# Patient Record
Sex: MALE | Race: WHITE | NOT HISPANIC OR LATINO | Employment: OTHER | URBAN - METROPOLITAN AREA
[De-identification: names, ages, dates, MRNs, and addresses within clinical notes are randomized per-mention and may not be internally consistent; named-entity substitution may affect disease eponyms.]

---

## 2023-10-28 ENCOUNTER — APPOINTMENT (OUTPATIENT)
Dept: GENERAL RADIOLOGY | Facility: HOSPITAL | Age: 73
DRG: 034 | End: 2023-10-28
Payer: OTHER GOVERNMENT

## 2023-10-28 ENCOUNTER — APPOINTMENT (OUTPATIENT)
Dept: CT IMAGING | Facility: HOSPITAL | Age: 73
DRG: 034 | End: 2023-10-28
Payer: OTHER GOVERNMENT

## 2023-10-28 ENCOUNTER — HOSPITAL ENCOUNTER (INPATIENT)
Facility: HOSPITAL | Age: 73
LOS: 14 days | Discharge: NURSING, REHAB OR LONG-TERM CARE | DRG: 034 | End: 2023-11-11
Attending: EMERGENCY MEDICINE | Admitting: EMERGENCY MEDICINE
Payer: OTHER GOVERNMENT

## 2023-10-28 DIAGNOSIS — I63.9 CEREBROVASCULAR ACCIDENT (CVA), UNSPECIFIED MECHANISM: Primary | ICD-10-CM

## 2023-10-28 LAB
ABO GROUP BLD: NORMAL
ALBUMIN SERPL-MCNC: 3.7 G/DL (ref 3.5–5.2)
ALBUMIN/GLOB SERPL: 1.3 G/DL
ALP SERPL-CCNC: 89 U/L (ref 39–117)
ALT SERPL W P-5'-P-CCNC: 7 U/L (ref 1–41)
ANION GAP SERPL CALCULATED.3IONS-SCNC: 10.5 MMOL/L (ref 5–15)
AST SERPL-CCNC: 14 U/L (ref 1–40)
BASOPHILS # BLD AUTO: 0.12 10*3/MM3 (ref 0–0.2)
BASOPHILS NFR BLD AUTO: 1 % (ref 0–1.5)
BILIRUB SERPL-MCNC: 0.6 MG/DL (ref 0–1.2)
BLD GP AB SCN SERPL QL: NEGATIVE
BUN SERPL-MCNC: 9 MG/DL (ref 8–23)
BUN/CREAT SERPL: 5.9 (ref 7–25)
CALCIUM SPEC-SCNC: 8.8 MG/DL (ref 8.6–10.5)
CHLORIDE SERPL-SCNC: 100 MMOL/L (ref 98–107)
CO2 SERPL-SCNC: 30.5 MMOL/L (ref 22–29)
CREAT SERPL-MCNC: 1.53 MG/DL (ref 0.76–1.27)
DEPRECATED RDW RBC AUTO: 40.9 FL (ref 37–54)
EGFRCR SERPLBLD CKD-EPI 2021: 47.1 ML/MIN/1.73
EOSINOPHIL # BLD AUTO: 0.25 10*3/MM3 (ref 0–0.4)
EOSINOPHIL NFR BLD AUTO: 2.2 % (ref 0.3–6.2)
ERYTHROCYTE [DISTWIDTH] IN BLOOD BY AUTOMATED COUNT: 12.2 % (ref 12.3–15.4)
GLOBULIN UR ELPH-MCNC: 2.9 GM/DL
GLUCOSE SERPL-MCNC: 76 MG/DL (ref 65–99)
HCT VFR BLD AUTO: 42.8 % (ref 37.5–51)
HGB BLD-MCNC: 14.7 G/DL (ref 13–17.7)
HOLD SPECIMEN: NORMAL
HOLD SPECIMEN: NORMAL
IMM GRANULOCYTES # BLD AUTO: 0.03 10*3/MM3 (ref 0–0.05)
IMM GRANULOCYTES NFR BLD AUTO: 0.3 % (ref 0–0.5)
INR PPP: 1.02 (ref 0.9–1.1)
LYMPHOCYTES # BLD AUTO: 2.85 10*3/MM3 (ref 0.7–3.1)
LYMPHOCYTES NFR BLD AUTO: 24.7 % (ref 19.6–45.3)
MCH RBC QN AUTO: 31.1 PG (ref 26.6–33)
MCHC RBC AUTO-ENTMCNC: 34.3 G/DL (ref 31.5–35.7)
MCV RBC AUTO: 90.5 FL (ref 79–97)
MONOCYTES # BLD AUTO: 1.14 10*3/MM3 (ref 0.1–0.9)
MONOCYTES NFR BLD AUTO: 9.9 % (ref 5–12)
NEUTROPHILS NFR BLD AUTO: 61.9 % (ref 42.7–76)
NEUTROPHILS NFR BLD AUTO: 7.15 10*3/MM3 (ref 1.7–7)
NRBC BLD AUTO-RTO: 0 /100 WBC (ref 0–0.2)
PLATELET # BLD AUTO: 264 10*3/MM3 (ref 140–450)
PMV BLD AUTO: 9.5 FL (ref 6–12)
POTASSIUM SERPL-SCNC: 3.1 MMOL/L (ref 3.5–5.2)
PROT SERPL-MCNC: 6.6 G/DL (ref 6–8.5)
PROTHROMBIN TIME: 13.5 SECONDS (ref 11.7–14.2)
RBC # BLD AUTO: 4.73 10*6/MM3 (ref 4.14–5.8)
RH BLD: POSITIVE
SODIUM SERPL-SCNC: 141 MMOL/L (ref 136–145)
T&S EXPIRATION DATE: NORMAL
WBC NRBC COR # BLD: 11.54 10*3/MM3 (ref 3.4–10.8)
WHOLE BLOOD HOLD COAG: NORMAL
WHOLE BLOOD HOLD SPECIMEN: NORMAL

## 2023-10-28 PROCEDURE — 71045 X-RAY EXAM CHEST 1 VIEW: CPT

## 2023-10-28 PROCEDURE — 86850 RBC ANTIBODY SCREEN: CPT | Performed by: EMERGENCY MEDICINE

## 2023-10-28 PROCEDURE — 86901 BLOOD TYPING SEROLOGIC RH(D): CPT | Performed by: EMERGENCY MEDICINE

## 2023-10-28 PROCEDURE — 93005 ELECTROCARDIOGRAM TRACING: CPT | Performed by: EMERGENCY MEDICINE

## 2023-10-28 PROCEDURE — 25810000003 SODIUM CHLORIDE 0.9 % SOLUTION: Performed by: INTERNAL MEDICINE

## 2023-10-28 PROCEDURE — 80053 COMPREHEN METABOLIC PANEL: CPT | Performed by: EMERGENCY MEDICINE

## 2023-10-28 PROCEDURE — 86900 BLOOD TYPING SEROLOGIC ABO: CPT | Performed by: EMERGENCY MEDICINE

## 2023-10-28 PROCEDURE — 99285 EMERGENCY DEPT VISIT HI MDM: CPT

## 2023-10-28 PROCEDURE — 73030 X-RAY EXAM OF SHOULDER: CPT

## 2023-10-28 PROCEDURE — 93010 ELECTROCARDIOGRAM REPORT: CPT | Performed by: STUDENT IN AN ORGANIZED HEALTH CARE EDUCATION/TRAINING PROGRAM

## 2023-10-28 PROCEDURE — 99222 1ST HOSP IP/OBS MODERATE 55: CPT | Performed by: PSYCHIATRY & NEUROLOGY

## 2023-10-28 PROCEDURE — 85025 COMPLETE CBC W/AUTO DIFF WBC: CPT | Performed by: EMERGENCY MEDICINE

## 2023-10-28 PROCEDURE — 70450 CT HEAD/BRAIN W/O DYE: CPT

## 2023-10-28 PROCEDURE — 85610 PROTHROMBIN TIME: CPT | Performed by: EMERGENCY MEDICINE

## 2023-10-28 RX ORDER — ACETAMINOPHEN 325 MG/1
650 TABLET ORAL EVERY 4 HOURS PRN
Status: DISCONTINUED | OUTPATIENT
Start: 2023-10-28 | End: 2023-11-11 | Stop reason: HOSPADM

## 2023-10-28 RX ORDER — LISINOPRIL 20 MG/1
20 TABLET ORAL DAILY
COMMUNITY

## 2023-10-28 RX ORDER — SODIUM CHLORIDE 9 MG/ML
75 INJECTION, SOLUTION INTRAVENOUS CONTINUOUS
Status: DISCONTINUED | OUTPATIENT
Start: 2023-10-28 | End: 2023-11-02

## 2023-10-28 RX ORDER — UREA 10 %
3 LOTION (ML) TOPICAL NIGHTLY PRN
Status: DISCONTINUED | OUTPATIENT
Start: 2023-10-28 | End: 2023-11-11 | Stop reason: HOSPADM

## 2023-10-28 RX ORDER — ASPIRIN 300 MG/1
300 SUPPOSITORY RECTAL DAILY
Status: DISCONTINUED | OUTPATIENT
Start: 2023-10-28 | End: 2023-10-31

## 2023-10-28 RX ORDER — LISINOPRIL 20 MG/1
20 TABLET ORAL DAILY
Status: DISCONTINUED | OUTPATIENT
Start: 2023-10-29 | End: 2023-11-11 | Stop reason: HOSPADM

## 2023-10-28 RX ORDER — SODIUM CHLORIDE 0.9 % (FLUSH) 0.9 %
10 SYRINGE (ML) INJECTION EVERY 12 HOURS SCHEDULED
Status: DISCONTINUED | OUTPATIENT
Start: 2023-10-28 | End: 2023-11-11 | Stop reason: HOSPADM

## 2023-10-28 RX ORDER — ATORVASTATIN CALCIUM 80 MG/1
80 TABLET, FILM COATED ORAL NIGHTLY
Status: DISCONTINUED | OUTPATIENT
Start: 2023-10-28 | End: 2023-11-11 | Stop reason: HOSPADM

## 2023-10-28 RX ORDER — AMOXICILLIN 250 MG
2 CAPSULE ORAL 2 TIMES DAILY
Status: DISCONTINUED | OUTPATIENT
Start: 2023-10-28 | End: 2023-11-11 | Stop reason: HOSPADM

## 2023-10-28 RX ORDER — ONDANSETRON 4 MG/1
4 TABLET, FILM COATED ORAL EVERY 6 HOURS PRN
Status: DISCONTINUED | OUTPATIENT
Start: 2023-10-28 | End: 2023-11-11 | Stop reason: HOSPADM

## 2023-10-28 RX ORDER — CARVEDILOL 6.25 MG/1
6.25 TABLET ORAL 2 TIMES DAILY WITH MEALS
COMMUNITY

## 2023-10-28 RX ORDER — ASPIRIN 325 MG
325 TABLET ORAL DAILY
Status: DISCONTINUED | OUTPATIENT
Start: 2023-10-28 | End: 2023-10-31

## 2023-10-28 RX ORDER — BISACODYL 10 MG
10 SUPPOSITORY, RECTAL RECTAL DAILY PRN
Status: DISCONTINUED | OUTPATIENT
Start: 2023-10-28 | End: 2023-11-11 | Stop reason: HOSPADM

## 2023-10-28 RX ORDER — ONDANSETRON 2 MG/ML
4 INJECTION INTRAMUSCULAR; INTRAVENOUS EVERY 6 HOURS PRN
Status: DISCONTINUED | OUTPATIENT
Start: 2023-10-28 | End: 2023-10-28 | Stop reason: SDUPTHER

## 2023-10-28 RX ORDER — POLYETHYLENE GLYCOL 3350 17 G/17G
17 POWDER, FOR SOLUTION ORAL DAILY PRN
Status: DISCONTINUED | OUTPATIENT
Start: 2023-10-28 | End: 2023-11-11 | Stop reason: HOSPADM

## 2023-10-28 RX ORDER — CARVEDILOL 6.25 MG/1
6.25 TABLET ORAL 2 TIMES DAILY WITH MEALS
Status: DISCONTINUED | OUTPATIENT
Start: 2023-10-28 | End: 2023-11-11 | Stop reason: HOSPADM

## 2023-10-28 RX ORDER — SODIUM CHLORIDE 9 MG/ML
40 INJECTION, SOLUTION INTRAVENOUS AS NEEDED
Status: DISCONTINUED | OUTPATIENT
Start: 2023-10-28 | End: 2023-11-11 | Stop reason: HOSPADM

## 2023-10-28 RX ORDER — ONDANSETRON 2 MG/ML
4 INJECTION INTRAMUSCULAR; INTRAVENOUS EVERY 6 HOURS PRN
Status: DISCONTINUED | OUTPATIENT
Start: 2023-10-28 | End: 2023-11-11 | Stop reason: HOSPADM

## 2023-10-28 RX ORDER — ACETAMINOPHEN 650 MG/1
650 SUPPOSITORY RECTAL EVERY 4 HOURS PRN
Status: DISCONTINUED | OUTPATIENT
Start: 2023-10-28 | End: 2023-11-02

## 2023-10-28 RX ORDER — ASPIRIN 81 MG/1
81 TABLET ORAL DAILY
COMMUNITY

## 2023-10-28 RX ORDER — SODIUM CHLORIDE 0.9 % (FLUSH) 0.9 %
10 SYRINGE (ML) INJECTION AS NEEDED
Status: DISCONTINUED | OUTPATIENT
Start: 2023-10-28 | End: 2023-11-11 | Stop reason: HOSPADM

## 2023-10-28 RX ORDER — BISACODYL 5 MG/1
5 TABLET, DELAYED RELEASE ORAL DAILY PRN
Status: DISCONTINUED | OUTPATIENT
Start: 2023-10-28 | End: 2023-11-11 | Stop reason: HOSPADM

## 2023-10-28 RX ORDER — FUROSEMIDE 40 MG/1
40 TABLET ORAL DAILY
COMMUNITY

## 2023-10-28 RX ORDER — SODIUM CHLORIDE 0.9 % (FLUSH) 0.9 %
10 SYRINGE (ML) INJECTION AS NEEDED
Status: DISCONTINUED | OUTPATIENT
Start: 2023-10-28 | End: 2023-11-02

## 2023-10-28 RX ORDER — ACETAMINOPHEN 325 MG/1
650 TABLET ORAL EVERY 4 HOURS PRN
Status: DISCONTINUED | OUTPATIENT
Start: 2023-10-28 | End: 2023-10-28 | Stop reason: SDUPTHER

## 2023-10-28 RX ORDER — BISACODYL 10 MG
10 SUPPOSITORY, RECTAL RECTAL DAILY PRN
Status: DISCONTINUED | OUTPATIENT
Start: 2023-10-28 | End: 2023-10-28 | Stop reason: SDUPTHER

## 2023-10-28 RX ADMIN — SODIUM CHLORIDE 75 ML/HR: 9 INJECTION, SOLUTION INTRAVENOUS at 18:49

## 2023-10-28 RX ADMIN — ATORVASTATIN CALCIUM 80 MG: 80 TABLET, FILM COATED ORAL at 20:55

## 2023-10-28 RX ADMIN — Medication 10 ML: at 20:56

## 2023-10-28 RX ADMIN — Medication 3 MG: at 20:56

## 2023-10-28 RX ADMIN — CARVEDILOL 6.25 MG: 6.25 TABLET, FILM COATED ORAL at 22:05

## 2023-10-28 RX ADMIN — SENNOSIDES AND DOCUSATE SODIUM 2 TABLET: 50; 8.6 TABLET ORAL at 20:56

## 2023-10-28 RX ADMIN — ASPIRIN 325 MG: 325 TABLET ORAL at 18:49

## 2023-10-28 NOTE — CASE MANAGEMENT/SOCIAL WORK
Notified by registration that patient has VA insurance (secondary) and Medicare Part A (primary). Patient is not a candidate to consider transfer to [facility] facility, given that his presentation is concerning for CVA. ERIN Hobson RN

## 2023-10-28 NOTE — ED PROVIDER NOTES
EMERGENCY DEPARTMENT ENCOUNTER    Room Number:  14/14  PCP: Provider, No Known      HPI:  Chief Complaint: Left arm weakness  A complete HPI/ROS/PMH/PSH/SH/FH are unobtainable due to: Dementia  Context: Tigre Amaral is a 73 y.o. male who presents to the ED c/o left arm weakness.  This occurred Thursday night.  He had a fall that was witnessed by his granddaughter.  He fell onto his left side.  At that time, granddaughter did not notice any abnormalities other than some weakness in the left arm which she attributed to pain.  He is continue to have this weakness and she has most recently noticed some lack of coordination which is why she brought him here today.  He did not hit his head.  He is on no blood thinners.  He has a history of dementia.        PAST MEDICAL HISTORY  Active Ambulatory Problems     Diagnosis Date Noted    No Active Ambulatory Problems     Resolved Ambulatory Problems     Diagnosis Date Noted    No Resolved Ambulatory Problems     No Additional Past Medical History         PAST SURGICAL HISTORY  No past surgical history on file.      FAMILY HISTORY  No family history on file.      SOCIAL HISTORY  Social History     Socioeconomic History    Marital status: Single         ALLERGIES  Patient has no known allergies.        REVIEW OF SYSTEMS  Review of Systems     All systems reviewed and negative except for those discussed in HPI.       PHYSICAL EXAM  ED Triage Vitals [10/28/23 1306]   Temp Heart Rate Resp BP SpO2   98.2 °F (36.8 °C) 74 16 133/75 99 %      Temp src Heart Rate Source Patient Position BP Location FiO2 (%)   Oral Monitor Sitting Right arm --       Physical Exam      GENERAL: no acute distress  HENT: nares patent  EYES: no scleral icterus  CV: regular rhythm, normal rate  RESPIRATORY: normal effort  ABDOMEN: soft  MUSCULOSKELETAL: no deformity  NEURO:   4/5 strength to his left arm with associated dysmetria  He appears to have some degree of visual neglect to his left side although  it is inconsistent  NIH Stroke Scale      Interval: Baseline  Time: 15:43 EDT  Person Administering Scale: Niko Rivas II, MD    Administer stroke scale items in the order listed. Record performance in each category after each subscale exam. Do not go back and change scores. Follow directions provided for each exam technique. Scores should reflect what the patient does, not what the clinician thinks the patient can do. The clinician should record answers while administering the exam and work quickly. Except where indicated, the patient should not be coached (i.e., repeated requests to patient to make a special effort).      1a  Level of consciousness: 0=alert; keenly responsive   1b. LOC questions:  0=Performs both tasks correctly   1c. LOC commands: 0=Answers both questions correctly   2.  Best Gaze: 0=normal   3.  Visual: 0=No visual loss   4. Facial Palsy: 0=Normal symmetric movement   5a.  Motor left arm: 1=Drift, limb holds 90 (or 45) degrees but drifts down before full 10 seconds: does not hit bed   5b.  Motor right arm: 0=No drift, limb holds 90 (or 45) degrees for full 10 seconds   6a. motor left le=Drift, limb holds 90 (or 45) degrees but drifts down before full 10 seconds: does not hit bed   6b  Motor right le=No drift, limb holds 90 (or 45) degrees for full 10 seconds   7. Limb Ataxia: 1=Present in one limb   8.  Sensory: 0=Normal; no sensory loss   9. Best Language:  0=No aphasia, normal   10. Dysarthria: 0=Normal   11. Extinction and Inattention: 1=Visual, tactile, auditory, spatial or personal inattention or extinction to bilateral simultaneous stimulation in one of the sensory modalities   12. Distal motor function: 0=Normal    Total:   4     PSYCH:  calm, cooperative  SKIN: warm, dry    Vital signs and nursing notes reviewed.          LAB RESULTS  Recent Results (from the past 24 hour(s))   Comprehensive Metabolic Panel    Collection Time: 10/28/23  2:49 PM    Specimen: Blood   Result  Value Ref Range    Glucose 76 65 - 99 mg/dL    BUN 9 8 - 23 mg/dL    Creatinine 1.53 (H) 0.76 - 1.27 mg/dL    Sodium 141 136 - 145 mmol/L    Potassium 3.1 (L) 3.5 - 5.2 mmol/L    Chloride 100 98 - 107 mmol/L    CO2 30.5 (H) 22.0 - 29.0 mmol/L    Calcium 8.8 8.6 - 10.5 mg/dL    Total Protein 6.6 6.0 - 8.5 g/dL    Albumin 3.7 3.5 - 5.2 g/dL    ALT (SGPT) 7 1 - 41 U/L    AST (SGOT) 14 1 - 40 U/L    Alkaline Phosphatase 89 39 - 117 U/L    Total Bilirubin 0.6 0.0 - 1.2 mg/dL    Globulin 2.9 gm/dL    A/G Ratio 1.3 g/dL    BUN/Creatinine Ratio 5.9 (L) 7.0 - 25.0    Anion Gap 10.5 5.0 - 15.0 mmol/L    eGFR 47.1 (L) >60.0 mL/min/1.73   Protime-INR    Collection Time: 10/28/23  2:49 PM    Specimen: Blood   Result Value Ref Range    Protime 13.5 11.7 - 14.2 Seconds    INR 1.02 0.90 - 1.10   CBC Auto Differential    Collection Time: 10/28/23  2:49 PM    Specimen: Blood   Result Value Ref Range    WBC 11.54 (H) 3.40 - 10.80 10*3/mm3    RBC 4.73 4.14 - 5.80 10*6/mm3    Hemoglobin 14.7 13.0 - 17.7 g/dL    Hematocrit 42.8 37.5 - 51.0 %    MCV 90.5 79.0 - 97.0 fL    MCH 31.1 26.6 - 33.0 pg    MCHC 34.3 31.5 - 35.7 g/dL    RDW 12.2 (L) 12.3 - 15.4 %    RDW-SD 40.9 37.0 - 54.0 fl    MPV 9.5 6.0 - 12.0 fL    Platelets 264 140 - 450 10*3/mm3    Neutrophil % 61.9 42.7 - 76.0 %    Lymphocyte % 24.7 19.6 - 45.3 %    Monocyte % 9.9 5.0 - 12.0 %    Eosinophil % 2.2 0.3 - 6.2 %    Basophil % 1.0 0.0 - 1.5 %    Immature Grans % 0.3 0.0 - 0.5 %    Neutrophils, Absolute 7.15 (H) 1.70 - 7.00 10*3/mm3    Lymphocytes, Absolute 2.85 0.70 - 3.10 10*3/mm3    Monocytes, Absolute 1.14 (H) 0.10 - 0.90 10*3/mm3    Eosinophils, Absolute 0.25 0.00 - 0.40 10*3/mm3    Basophils, Absolute 0.12 0.00 - 0.20 10*3/mm3    Immature Grans, Absolute 0.03 0.00 - 0.05 10*3/mm3    nRBC 0.0 0.0 - 0.2 /100 WBC   ECG 12 Lead ED Triage Standing Order; Stroke (Onset >12 hrs)    Collection Time: 10/28/23  3:11 PM   Result Value Ref Range    QT Interval 541 ms    QTC Interval  541 ms       Ordered the above labs and reviewed the results.        RADIOLOGY  CT Head Without Contrast Stroke Protocol    Result Date: 10/28/2023  CT HEAD WITHOUT CONTRAST  CLINICAL HISTORY: Left arm weakness. Status post fall.  TECHNIQUE: CT scan of the head was obtained with 3 mm axial soft tissue and 2 mm axial bone algorithm algorithm images. No intravenous contrast was administered. Sagittal and coronal reconstructions were obtained.  COMPARISON: No previous similar studies are available for comparison.  FINDINGS:   There is no evidence for a calvarial fracture. There is no evidence for an acute extra-axial hemorrhage.  The ventricles, sulci, and cisterns are age-appropriate. There are extensive areas of chronic infarction identified within the lateral aspect of the left frontal, parietal, temporal, and occipital lobes within the left MCA distribution. These foci of encephalomalacia measure up to approximately 10.1 x 2.5 cm in greatest axial dimensions. Small chronic infarcts within the left lentiform nucleus are within the lateral lenticulostriate distribution. Also, there is a focus of age-indeterminate infarction within the superior aspect of the right parietal lobe within the right MCA distribution measuring up to approximately 5 mm in diameter. Another age-indeterminate infarct is seen within the right MCA distribution involving the right precentral gyrus measuring up to 9 mm in diameter. Also, there is an age-indeterminate infarct within the right occipital lobe that measures up to 1 cm in diameter that is either within the right MCA or right PCA distribution. Further temporal characterization with MR imaging is suggested.  There is a soft tissue mass within the right premaxillary soft tissues that extends into the soft tissues along the right lateral aspect of the nose and this measures up to 3.3 x 1.9 cm in greatest axial dimensions and is compatible with a known basal cell carcinoma.  Incidental note  is made of a fracture of the right orbital floor that is likely chronic in nature although correlation with clinical data is suggested       There is no evidence for acute traumatic intracranial pathology. However, there are small age-indeterminate infarcts noted within the right frontal and parietal lobes that could potentially be acute to subacute in nature. These are within the right MCA distribution. Additionally, there is an age-indeterminate infarct within the right occipital lobe that is either within the right MCA or right PCA distribution. Further temporal characterization is recommended with MR imaging.  There is a large chronic infarct within the lateral aspect of the left cerebral hemisphere that is within the left MCA distribution and this involves the lateral aspect of the left frontal, parietal, temporal, and occipital lobes.  There is a 3.3 x 1.9 cm soft tissue mass within the right premaxillary soft tissues that extends into the soft tissues along the right lateral aspect of the nose and this is compatible with a known basal cell carcinoma.  Incidental note is made of a fracture of the right orbital floor that is likely chronic in nature although correlation with clinical data is suggested.  These findings and recommendations were discussed with Dr. Niko Rivas on 10/28/2023 at approximately 3:15 p.m.  Radiation dose reduction techniques were utilized, including automated exposure control and exposure modulation based on body size.      XR Chest 1 View, XR Shoulder 2+ View Left    Result Date: 10/28/2023  PORTABLE CHEST X-RAY AND LEFT SHOULDER X-RAY  HISTORY: Fall, arm pain. Stroke  Portable chest x-ray is provided. A total of 3 images of the left shoulder also provided. Correlation: None.  FINDINGS: The cardiomediastinal silhouette is normal. The lungs are clear. The costophrenic sulci are dry and the bones appear normal. There is no pneumothorax. Dual-lead cardiac pacer/defibrillator.  No  osseous, articular, or soft tissue abnormality identified at the left shoulder.      Cardiac defibrillator in place. Otherwise negative x-rays of the chest and the left shoulder.  This report was finalized on 10/28/2023 3:07 PM by Dr. Jhonny Charles M.D on Workstation: [facility]       Ordered the above noted radiological studies. Reviewed by me in PACS.          PROCEDURES  Procedures        MEDICATIONS GIVEN IN ER  Medications   sodium chloride 0.9 % flush 10 mL (has no administration in time range)         MEDICAL DECISION MAKING, PROGRESS, and CONSULTS    Discussion below represents my analysis of pertinent findings related to patient's condition, differential diagnosis, treatment plan and final disposition.      Orders placed during this visit:  Orders Placed This Encounter   Procedures    CT Head Without Contrast Stroke Protocol    XR Chest 1 View    XR Shoulder 2+ View Left    Comprehensive Metabolic Panel    Protime-INR    [place] Draw    CBC Auto Differential    NPO Diet NPO Type: Strict NPO    Undress and Gown    Continuous Pulse Oximetry    Vital Signs    Nursing Dysphagia Screening (Complete Prior to Giving anything PO)    RN to Place Order SLP Consult (IF swallow screen failed) - Eval & Treat Choosing Reason of RN Dysphagia Screen Failed    [facility] (on-call MD unless specified) Details    Oxygen Therapy- Nasal Cannula; Titrate 1-6 LPM Per SpO2; 90 - 95%    POC Glucose Once    ECG 12 Lead ED Triage Standing Order; Stroke (Onset >12 hrs)    Type & Screen    Insert Peripheral IV    Inpatient Admission    CBC & Differential    Green Top (Gel)    Lavender Top    Gold Top - SST    Light Blue Top             Differential diagnosis:    Fracture, stroke, TIA, cervical radiculopathy          Independent interpretation of labs, radiology studies, and discussions with consultants:  ED Course as of 10/28/23 1543   Sat Oct 28, 2023   1510 I discussed the CT head results with Dr. Harrison, radiology.  Patient has  age-indeterminate infarct in the right MCA territory that correlates with his left arm weakness. [TD]   1511 Patient is not a candidate for TNK as he was last known well 2 days ago. [TD]   1512 I discussed the case with Dr. Roberts, stroke neurology.  He will follow in consultation.  He recommends CTA head and neck as well as MRI. [TD]      ED Course User Index  [TD] Niko Rivas II, MD             DIAGNOSIS  Final diagnoses:   Cerebrovascular accident (CVA), unspecified mechanism         DISPOSITION  Admit      Latest Documented Vital Signs:  As of 15:43 EDT  BP- 159/76 HR- 61 Temp- 98.2 °F (36.8 °C) (Oral) O2 sat- 98%      --    Please note that portions of this were completed with a voice recognition program.       Note Disclaimer: At [facility], we believe that sharing information builds trust and better relationships. You are receiving this note because you are receiving care at [facility] or recently visited. It is possible you will see health information before a provider has talked with you about it. This kind of information can be easy to misunderstand. To help you fully understand what it means for your health, we urge you to discuss this note with your provider.         Niko Rivas II, MD  10/28/23 6937

## 2023-10-28 NOTE — ED NOTES
Nursing report ED to floor  Tigre Amaral  73 y.o.  male    HPI :   Chief Complaint   Patient presents with    Fall    Arm Injury       Admitting doctor:   Lashon Brooks MD    Admitting diagnosis:   The encounter diagnosis was Cerebrovascular accident (CVA), unspecified mechanism.    Code status:   Current Code Status       Date Active Code Status Order ID Comments User Context       Not on file            Allergies:   Patient has no known allergies.    Isolation:   No active isolations    Intake and Output  No intake or output data in the 24 hours ending 10/28/23 1611    Weight:       10/28/23  1306   Weight: 74.8 kg (165 lb)       Most recent vitals:   Vitals:    10/28/23 1449 10/28/23 1451 10/28/23 1451 10/28/23 1521   BP: 138/73 156/74 138/73 159/76   BP Location:   Right arm    Patient Position:   Sitting    Pulse: 74 63 60 61   Resp:   17    Temp:       TempSrc:       SpO2: 96% 99% 99% 98%   Weight:       Height:           Active LDAs/IV Access:   Lines, Drains & Airways       Active LDAs       None                    Labs (abnormal labs have a star):   Labs Reviewed   COMPREHENSIVE METABOLIC PANEL - Abnormal; Notable for the following components:       Result Value    Creatinine 1.53 (*)     Potassium 3.1 (*)     CO2 30.5 (*)     BUN/Creatinine Ratio 5.9 (*)     eGFR 47.1 (*)     All other components within normal limits    Narrative:     GFR Normal >60  Chronic Kidney Disease <60  Kidney Failure <15    The GFR formula is only valid for adults with stable renal function between ages 18 and 70.   CBC WITH AUTO DIFFERENTIAL - Abnormal; Notable for the following components:    WBC 11.54 (*)     RDW 12.2 (*)     Neutrophils, Absolute 7.15 (*)     Monocytes, Absolute 1.14 (*)     All other components within normal limits   PROTIME-INR - Normal   RAINBOW DRAW    Narrative:     The following orders were created for panel order [place] Draw.  Procedure                               Abnormality          Status                     ---------                               -----------         ------                     Green Top (Gel)[[id number]]                                  Final result               Lavender Top[[id number]]                                     Final result               Gold Top - SST[[id number]]                                   Final result               Light Blue Top[[id number]]                                   Final result                 Please view results for these tests on the individual orders.   POCT GLUCOSE FINGERSTICK   TYPE AND SCREEN   CBC AND DIFFERENTIAL    Narrative:     The following orders were created for panel order CBC & Differential.  Procedure                               Abnormality         Status                     ---------                               -----------         ------                     CBC Auto Differential[[id number]]        Abnormal            Final result                 Please view results for these tests on the individual orders.   GREEN TOP   LAVENDER TOP   GOLD TOP - SST   LIGHT BLUE TOP       EKG:   ECG 12 Lead ED Triage Standing Order; Stroke (Onset >12 hrs)   Preliminary Result   HEART RATE= 60  bpm   RR Interval= 1000  ms   NE Interval= 96  ms   P Horizontal Axis= 250  deg   P Front Axis= -67  deg   QRSD Interval= 213  ms   QT Interval= 541  ms   QTcB= 541  ms   QRS Axis= -73  deg   T Wave Axis= 106  deg   - ABNORMAL ECG -   A-V dual-paced rhythm with some inhibition   No further analysis attempted due to paced rhythm   Baseline wander in lead(s) V2   Electronically Signed By:    Date and Time of Study: 2023-10-28 15:11:55          Meds given in ED:   Medications   sodium chloride 0.9 % flush 10 mL (has no administration in time range)       Imaging results:  CT Head Without Contrast Stroke Protocol    Result Date: 10/28/2023   There is no evidence for acute traumatic intracranial pathology. However, there are small age-indeterminate infarcts  noted within the right frontal and parietal lobes that could potentially be acute to subacute in nature. These are within the right MCA distribution. Additionally and similarly, there is an age-indeterminate infarct within the right occipital lobe that is either within the right MCA or right PCA distribution. Further temporal characterization is recommended with MR imaging.  There is a large chronic infarct within the lateral aspect of the left cerebral hemisphere that is within the left MCA distribution and this involves the lateral aspect of the left frontal, parietal, temporal, and occipital lobes.  There is a 3.3 x 1.9 cm soft tissue mass within the right premaxillary soft tissues that extends into the soft tissues along the right lateral aspect of the nose and this is compatible with a known basal cell carcinoma.  Incidental note is made of a fracture of the right orbital floor that is likely chronic in nature although correlation with clinical data is suggested.  These findings and recommendations were discussed with Dr. Niko Rivas on 10/28/2023 at approximately 3:15 p.m.  Radiation dose reduction techniques were utilized, including automated exposure control and exposure modulation based on body size.  This report was finalized on 10/28/2023 4:02 PM by Dr. Jeff Harrison M.D on Workstation: [id number]      XR Chest 1 View    Result Date: 10/28/2023  Cardiac defibrillator in place. Otherwise negative x-rays of the chest and the left shoulder.  This report was finalized on 10/28/2023 3:07 PM by Dr. Jhonny Charles M.D on Workstation: [id number]      XR Shoulder 2+ View Left    Result Date: 10/28/2023  Cardiac defibrillator in place. Otherwise negative x-rays of the chest and the left shoulder.  This report was finalized on 10/28/2023 3:07 PM by Dr. Jhonny Charles M.D on Workstation: [id number]       Ambulatory status:   - Assist x2     Social issues:   Social History     Socioeconomic History    Marital status:  Single       NIH Stroke Scale:       Gayle Felix RN  10/28/23 16:11 EDT

## 2023-10-28 NOTE — CONSULTS
"NEUROLOGY CONSULT - STROKE TEAM    DOS: 10/28/2023  NAME: Tigre Amaral   : 1950  PCP: Provider, No Known  CC: Stroke  Referring MD: No ref. provider found  Patient being seen at request of Dr. Rivas for advice and opinion on weakness and fall.    Neurological Problem and Interval History:  73 y.o. male presents with chief complaint of: \"I feel fine\".  73m with history of head trauma left frontal brought by granddaughter who says he had a fall several days ago onto his left side and weakness of the left arm. He is on aspirin and has had dementia. It's not clear that his left frontal CTA abnormality is not residual of a contusion instead of a left MCA inferior branch infarct but it does look like it could be either.       Past Medical/Surgical Hx:  No past medical history on file.  No past surgical history on file.    Review of Systems:        No fever, sweats or chills,  No neck pain, back pain or joint pain  No loss of hearing or tinnitus  No blurry or double vision  No rashes or edema  No chest pain or palpitations  No shortness of breath or wheezing  No diarrhea or constipation  No urinary incontinence or dysuria  No seizures or syncope  No depression or anxiety    Medications On Admission  (Not in a hospital admission)    No current facility-administered medications on file prior to encounter.     No current outpatient medications on file prior to encounter.       Allergies:  No Known Allergies    Social Hx:  Social History     Socioeconomic History    Marital status: Single       Family Hx:  No family history on file.    Review of Imaging (Interpretation of images not reports):      Laboratory Results:   Lab Results   Component Value Date    GLUCOSE 76 10/28/2023    CALCIUM 8.8 10/28/2023     10/28/2023    K 3.1 (L) 10/28/2023    CO2 30.5 (H) 10/28/2023     10/28/2023    BUN 9 10/28/2023    CREATININE 1.53 (H) 10/28/2023    BCR 5.9 (L) 10/28/2023    ANIONGAP 10.5 10/28/2023     Lab Results " "  Component Value Date    WBC 11.54 (H) 10/28/2023    HGB 14.7 10/28/2023    HCT 42.8 10/28/2023    MCV 90.5 10/28/2023     10/28/2023     No results found for: \"CHOL\"  No results found for: \"HDL\"  No results found for: \"LDL\"  No results found for: \"TRIG\"  No results found for: \"HGBA1C\"  Lab Results   Component Value Date    INR 1.02 10/28/2023    INR 0.97 04/26/2023    PROTIME 13.5 10/28/2023    PROTIME 11.3 04/26/2023         Physical Examination:   /76   Pulse 61   Temp 98.2 °F (36.8 °C) (Oral)   Resp 17   Ht 172.7 cm (68\")   Wt 74.8 kg (165 lb)   SpO2 98%   BMI 25.09 kg/m²   General Appearance:   Well developed, well nourished, well groomed, alert, and cooperative.  HEENT: Normocephalic. Atraumatic. No JVD, no tracheal deviation. Large right face tumor.   Neck and Spine: Normal range of motion.  Normal alignment. No mass or tenderness.   Extremities:    No edema or deformities.   Skin:    No rashes or discoloration    Neurological examination:  Higher Integrative  Function: Alert and oriented to person but January 2001 and doesn't know the president. Unable to retain information for more than a few minutes. Able to do simple calculation but not more complex. Some receptive aphasia elements? Makes errors on more complex commands.   CN II: Pupils are equal, round, and reactive to light. Blinks to visual threat and counts fingers in all fields  CN III IV VI: Extraocular movements are full without nystagmus.   CN V: Normal facial sensation   CN VII: Facial movements are symmetric. No labial dysarthria  CN VIII:   Hard of hearing?  CN IX & X:   No palatal or pharnygeal dysarthria.  CN XI: Turns head without abnormality.   CN XII:   The tongue is midline.  No lingual dysarthria.   Motor: Left pronator drift, slower finger tap and hand tap. Able to lift left leg easily and do heel to shin. Able to perform finger to nose and give nearly full power on grasp but can just overcome push and pull. "   Reflexes: 2+ in the upper and lower extremities. Plantar responses are flexor.  Sensation: Denies sensory complanits.   Station and Gait: Deferred for bed rest    Coordination: Finger-to-nose test shows no dysmetria.  Rapid alternating movements are normal.  Heel-to-shin normal.    NIHSS:     Diagnoses / Discussion:  73 y.o. who presents with Sx of left arm weakness and a fall. 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 Patient was on aspirin at home. Patient with moderate-severe dementia making examination and history difficult.    CT also shows a right orbital fracture of uncertain significance and defer to primary team.    Plan:  Does not meet criteria for minor stroke protocol given the time frame but having the event on aspirin, if MRI confirms infarct, consider advancing to plavix as single agent. Echo and MRI (patient has pacemaker but has had MRI previously).      I have discussed the above with the patient and family.  Time spent with patient: 40min    MDM  Reviewed: previous chart, nursing note and vitals  Reviewed previous: labs and CT scan  Interpretation: CT scan and labs  Total time providing critical care: 30-74 minutes. This excludes time spent performing separately reportable procedures and services.         Prince Roberts MD  Neurology

## 2023-10-29 ENCOUNTER — APPOINTMENT (OUTPATIENT)
Dept: CT IMAGING | Facility: HOSPITAL | Age: 73
DRG: 034 | End: 2023-10-29
Payer: OTHER GOVERNMENT

## 2023-10-29 LAB
ALBUMIN SERPL-MCNC: 3.1 G/DL (ref 3.5–5.2)
ALBUMIN/GLOB SERPL: 1.3 G/DL
ALP SERPL-CCNC: 71 U/L (ref 39–117)
ALT SERPL W P-5'-P-CCNC: 6 U/L (ref 1–41)
ANION GAP SERPL CALCULATED.3IONS-SCNC: 9.6 MMOL/L (ref 5–15)
AST SERPL-CCNC: 13 U/L (ref 1–40)
BILIRUB SERPL-MCNC: 0.8 MG/DL (ref 0–1.2)
BUN SERPL-MCNC: 9 MG/DL (ref 8–23)
BUN/CREAT SERPL: 7 (ref 7–25)
CALCIUM SPEC-SCNC: 8.3 MG/DL (ref 8.6–10.5)
CHLORIDE SERPL-SCNC: 103 MMOL/L (ref 98–107)
CHOLEST SERPL-MCNC: 146 MG/DL (ref 0–200)
CO2 SERPL-SCNC: 26.4 MMOL/L (ref 22–29)
CREAT SERPL-MCNC: 1.28 MG/DL (ref 0.76–1.27)
DEPRECATED RDW RBC AUTO: 40.2 FL (ref 37–54)
EGFRCR SERPLBLD CKD-EPI 2021: 58.4 ML/MIN/1.73
ERYTHROCYTE [DISTWIDTH] IN BLOOD BY AUTOMATED COUNT: 12.3 % (ref 12.3–15.4)
GLOBULIN UR ELPH-MCNC: 2.4 GM/DL
GLUCOSE BLDC GLUCOMTR-MCNC: 102 MG/DL (ref 70–130)
GLUCOSE BLDC GLUCOMTR-MCNC: 144 MG/DL (ref 70–130)
GLUCOSE BLDC GLUCOMTR-MCNC: 92 MG/DL (ref 70–130)
GLUCOSE BLDC GLUCOMTR-MCNC: 94 MG/DL (ref 70–130)
GLUCOSE SERPL-MCNC: 93 MG/DL (ref 65–99)
HBA1C MFR BLD: 5.2 % (ref 4.8–5.6)
HCT VFR BLD AUTO: 38.1 % (ref 37.5–51)
HDLC SERPL-MCNC: 34 MG/DL (ref 40–60)
HGB BLD-MCNC: 13.1 G/DL (ref 13–17.7)
LDLC SERPL CALC-MCNC: 98 MG/DL (ref 0–100)
LDLC/HDLC SERPL: 2.87 {RATIO}
MAGNESIUM SERPL-MCNC: 1.8 MG/DL (ref 1.6–2.4)
MCH RBC QN AUTO: 30.8 PG (ref 26.6–33)
MCHC RBC AUTO-ENTMCNC: 34.4 G/DL (ref 31.5–35.7)
MCV RBC AUTO: 89.6 FL (ref 79–97)
PLATELET # BLD AUTO: 222 10*3/MM3 (ref 140–450)
PMV BLD AUTO: 9.6 FL (ref 6–12)
POTASSIUM SERPL-SCNC: 2.4 MMOL/L (ref 3.5–5.2)
POTASSIUM SERPL-SCNC: 3.4 MMOL/L (ref 3.5–5.2)
PROT SERPL-MCNC: 5.5 G/DL (ref 6–8.5)
QT INTERVAL: 541 MS
QTC INTERVAL: 541 MS
RBC # BLD AUTO: 4.25 10*6/MM3 (ref 4.14–5.8)
SODIUM SERPL-SCNC: 139 MMOL/L (ref 136–145)
TRIGL SERPL-MCNC: 72 MG/DL (ref 0–150)
TSH SERPL DL<=0.05 MIU/L-ACNC: 1.88 UIU/ML (ref 0.27–4.2)
VLDLC SERPL-MCNC: 14 MG/DL (ref 5–40)
WBC NRBC COR # BLD: 8.49 10*3/MM3 (ref 3.4–10.8)

## 2023-10-29 PROCEDURE — 84443 ASSAY THYROID STIM HORMONE: CPT | Performed by: INTERNAL MEDICINE

## 2023-10-29 PROCEDURE — 97530 THERAPEUTIC ACTIVITIES: CPT

## 2023-10-29 PROCEDURE — 80061 LIPID PANEL: CPT | Performed by: INTERNAL MEDICINE

## 2023-10-29 PROCEDURE — 99233 SBSQ HOSP IP/OBS HIGH 50: CPT | Performed by: PHYSICIAN ASSISTANT

## 2023-10-29 PROCEDURE — 97162 PT EVAL MOD COMPLEX 30 MIN: CPT

## 2023-10-29 PROCEDURE — 82948 REAGENT STRIP/BLOOD GLUCOSE: CPT

## 2023-10-29 PROCEDURE — 84132 ASSAY OF SERUM POTASSIUM: CPT | Performed by: STUDENT IN AN ORGANIZED HEALTH CARE EDUCATION/TRAINING PROGRAM

## 2023-10-29 PROCEDURE — 36415 COLL VENOUS BLD VENIPUNCTURE: CPT | Performed by: INTERNAL MEDICINE

## 2023-10-29 PROCEDURE — 85027 COMPLETE CBC AUTOMATED: CPT | Performed by: INTERNAL MEDICINE

## 2023-10-29 PROCEDURE — 70498 CT ANGIOGRAPHY NECK: CPT

## 2023-10-29 PROCEDURE — 83735 ASSAY OF MAGNESIUM: CPT | Performed by: STUDENT IN AN ORGANIZED HEALTH CARE EDUCATION/TRAINING PROGRAM

## 2023-10-29 PROCEDURE — 83036 HEMOGLOBIN GLYCOSYLATED A1C: CPT | Performed by: INTERNAL MEDICINE

## 2023-10-29 PROCEDURE — 97166 OT EVAL MOD COMPLEX 45 MIN: CPT

## 2023-10-29 PROCEDURE — 25510000001 IOPAMIDOL PER 1 ML: Performed by: STUDENT IN AN ORGANIZED HEALTH CARE EDUCATION/TRAINING PROGRAM

## 2023-10-29 PROCEDURE — 80053 COMPREHEN METABOLIC PANEL: CPT | Performed by: INTERNAL MEDICINE

## 2023-10-29 PROCEDURE — 70496 CT ANGIOGRAPHY HEAD: CPT

## 2023-10-29 RX ORDER — NICOTINE 21 MG/24HR
1 PATCH, TRANSDERMAL 24 HOURS TRANSDERMAL
Status: DISCONTINUED | OUTPATIENT
Start: 2023-10-29 | End: 2023-11-11 | Stop reason: HOSPADM

## 2023-10-29 RX ORDER — POTASSIUM CHLORIDE 750 MG/1
40 TABLET, FILM COATED, EXTENDED RELEASE ORAL EVERY 4 HOURS
Status: COMPLETED | OUTPATIENT
Start: 2023-10-29 | End: 2023-10-29

## 2023-10-29 RX ADMIN — POTASSIUM CHLORIDE 40 MEQ: 750 TABLET, EXTENDED RELEASE ORAL at 08:46

## 2023-10-29 RX ADMIN — POTASSIUM CHLORIDE 40 MEQ: 750 TABLET, EXTENDED RELEASE ORAL at 17:58

## 2023-10-29 RX ADMIN — LISINOPRIL 20 MG: 20 TABLET ORAL at 08:47

## 2023-10-29 RX ADMIN — SENNOSIDES AND DOCUSATE SODIUM 2 TABLET: 50; 8.6 TABLET ORAL at 08:47

## 2023-10-29 RX ADMIN — ASPIRIN 325 MG: 325 TABLET ORAL at 08:47

## 2023-10-29 RX ADMIN — SENNOSIDES AND DOCUSATE SODIUM 2 TABLET: 50; 8.6 TABLET ORAL at 20:24

## 2023-10-29 RX ADMIN — POTASSIUM CHLORIDE 40 MEQ: 750 TABLET, EXTENDED RELEASE ORAL at 14:01

## 2023-10-29 RX ADMIN — NICOTINE 1 PATCH: 21 PATCH, EXTENDED RELEASE TRANSDERMAL at 20:24

## 2023-10-29 RX ADMIN — CARVEDILOL 6.25 MG: 6.25 TABLET, FILM COATED ORAL at 17:58

## 2023-10-29 RX ADMIN — IOPAMIDOL 95 ML: 755 INJECTION, SOLUTION INTRAVENOUS at 16:18

## 2023-10-29 RX ADMIN — ATORVASTATIN CALCIUM 80 MG: 80 TABLET, FILM COATED ORAL at 20:24

## 2023-10-29 RX ADMIN — CARVEDILOL 6.25 MG: 6.25 TABLET, FILM COATED ORAL at 08:46

## 2023-10-29 RX ADMIN — Medication 10 ML: at 20:31

## 2023-10-29 NOTE — PROGRESS NOTES
[facility] Acute Inpt Rehab Note     Received request for acute inpatient rehab evaluation per CCP.      Chart reviewed and evaluation started.  Therapies noted.    Will reach out to family regarding discharge planning / support.    Will update CCP when evaluation is complete.    Please note limited bed availability on rehab over coming days.    Thanks,   Santa Mosher RN  Rehab Admission Nurse

## 2023-10-29 NOTE — H&P
HISTORY AND PHYSICAL   [facility]        Date of Admission: 10/28/2023  Patient Identification:  Name: Tigre Amaral  Age: 73 y.o.  Sex: male  :  1950  MRN: [id number]                     Primary Care Physician: Provider, No Known    Chief Complaint:  73 year old gentleman who presented to the emergency room with weakness of his left arm; he fell two nights ago; he has had difficulty keeping his balance and has been more confused; he has a history of dementia and does not remember any of these events; a family member is at the bedside to give the history     History of Present Illness:   As above    Past Medical History:  No past medical history on file.  Past Surgical History:  No past surgical history on file.   Home Meds:  Medications Prior to Admission   Medication Sig Dispense Refill Last Dose    aspirin 81 MG EC tablet Take 1 tablet by mouth Daily.   10/28/2023    carvedilol (COREG) 6.25 MG tablet Take 1 tablet by mouth 2 (Two) Times a Day With Meals.   10/28/2023    furosemide (LASIX) 40 MG tablet Take 1 tablet by mouth Daily.   10/28/2023    lisinopril (PRINIVIL,ZESTRIL) 20 MG tablet Take 1 tablet by mouth Daily.   10/28/2023       Allergies:  No Known Allergies  Immunizations:    There is no immunization history on file for this patient.  Social History:   Social History     Social History Narrative    Not on file     Social History     Socioeconomic History    Marital status: Single       Family History:  No family history on file.     Review of Systems  Not obtainable from the patient    Objective:  T Max 24 hrs: Temp (24hrs), Av.9 °F (36.6 °C), Min:97.5 °F (36.4 °C), Max:98.2 °F (36.8 °C)    Vitals Ranges:   Temp:  [97.5 °F (36.4 °C)-98.2 °F (36.8 °C)] 97.5 °F (36.4 °C)  Heart Rate:  [55-74] 60  Resp:  [16-17] 16  BP: (130-169)/(59-80) 130/64      Exam:  /64 (BP Location: Left arm, Patient Position: Lying)   Pulse 60   Temp 97.5 °F (36.4 °C) (Oral)   Resp 16   Ht  "172.7 cm (68\")   Wt 74.8 kg (165 lb)   SpO2 98%   BMI 25.09 kg/m²     General Appearance:    Alert, cooperative, no distress, appears stated age   Head:    Normocephalic, without obvious abnormality, atraumatic   Eyes:    PERRL, conjunctivae/corneas clear, EOM's intact, both eyes   Ears:    Normal external ear canals, both ears   Nose:   Nares normal, septum midline, mucosa normal, no drainage    or sinus tenderness; nodule medial to nose right   Throat:   Lips, mucosa, and tongue normal   Neck:   Supple, symmetrical, trachea midline, no adenopathy;     thyroid:  no enlargement/tenderness/nodules; no carotid    bruit or JVD   Back:     Symmetric, no curvature, ROM normal, no CVA tenderness   Lungs:     Decreased breath sounds bilaterally, respirations unlabored   Chest Wall:    No tenderness or deformity    Heart:    Regular rate and rhythm, S1 and S2 normal, no murmur, rub   or gallop   Abdomen:     Soft, nontender, bowel sounds active all four quadrants,     no masses, no hepatomegaly, no splenomegaly   Extremities:   Extremities normal, atraumatic, no cyanosis or edema                       .    Data Review:  Labs in chart were reviewed.  WBC   Date Value Ref Range Status   10/28/2023 11.54 (H) 3.40 - 10.80 10*3/mm3 Final     Hemoglobin   Date Value Ref Range Status   10/28/2023 14.7 13.0 - 17.7 g/dL Final     Hematocrit   Date Value Ref Range Status   10/28/2023 42.8 37.5 - 51.0 % Final     Platelets   Date Value Ref Range Status   10/28/2023 264 140 - 450 10*3/mm3 Final     Sodium   Date Value Ref Range Status   10/28/2023 141 136 - 145 mmol/L Final     Potassium   Date Value Ref Range Status   10/28/2023 3.1 (L) 3.5 - 5.2 mmol/L Final     Chloride   Date Value Ref Range Status   10/28/2023 100 98 - 107 mmol/L Final     CO2   Date Value Ref Range Status   10/28/2023 30.5 (H) 22.0 - 29.0 mmol/L Final     BUN   Date Value Ref Range Status   10/28/2023 9 8 - 23 mg/dL Final     Creatinine   Date Value Ref Range " Status   10/28/2023 1.53 (H) 0.76 - 1.27 mg/dL Final     Glucose   Date Value Ref Range Status   10/28/2023 76 65 - 99 mg/dL Final     Calcium   Date Value Ref Range Status   10/28/2023 8.8 8.6 - 10.5 mg/dL Final                Imaging Results (All)       Procedure Component Value Units Date/Time    CT Head Without Contrast Stroke Protocol [[id number]] Collected: 10/28/23 1524     Updated: 10/28/23 1605    Narrative:      CT HEAD WITHOUT CONTRAST     CLINICAL HISTORY: Left arm weakness. Status post fall.     TECHNIQUE: CT scan of the head was obtained with 3 mm axial soft tissue  and 2 mm axial bone algorithm algorithm images. No intravenous contrast  was administered. Sagittal and coronal reconstructions were obtained.     COMPARISON: No previous similar studies are available for comparison.     FINDINGS:       There is no evidence for a calvarial fracture. There is no evidence for  an acute extra-axial hemorrhage.     The ventricles, sulci, and cisterns are age-appropriate. There are  extensive areas of chronic infarction identified within the lateral  aspect of the left frontal, parietal, temporal, and occipital lobes  within the left MCA distribution. These foci of encephalomalacia measure  up to approximately 10.1 x 2.5 cm in greatest axial dimensions. Small  chronic infarcts within the left lentiform nucleus are within the  lateral lenticulostriate distribution. Also, there is a focus of  age-indeterminate infarction within the superior aspect of the right  parietal lobe within the right MCA distribution measuring up to  approximately 5 mm in diameter. Another age-indeterminate infarct is  seen within the right MCA distribution involving the right precentral  gyrus measuring up to 9 mm in diameter. Also, there is an  age-indeterminate infarct within the right occipital lobe that measures  up to 1 cm in diameter that is either within the right MCA or right PCA  distribution. Further temporal characterization  with MR imaging is  suggested.     There is a soft tissue mass within the right premaxillary soft tissues  that extends into the soft tissues along the right lateral aspect of the  nose and this measures up to 3.3 x 1.9 cm in greatest axial dimensions  and is compatible with a known basal cell carcinoma.     Incidental note is made of a fracture of the right orbital floor that is  likely chronic in nature although correlation with clinical data is  suggested       Impression:         There is no evidence for acute traumatic intracranial pathology.  However, there are small age-indeterminate infarcts noted within the  right frontal and parietal lobes that could potentially be acute to  subacute in nature. These are within the right MCA distribution.  Additionally and similarly, there is an age-indeterminate infarct within  the right occipital lobe that is either within the right MCA or right  PCA distribution. Further temporal characterization is recommended with  MR imaging.     There is a large chronic infarct within the lateral aspect of the left  cerebral hemisphere that is within the left MCA distribution and this  involves the lateral aspect of the left frontal, parietal, temporal, and  occipital lobes.     There is a 3.3 x 1.9 cm soft tissue mass within the right premaxillary  soft tissues that extends into the soft tissues along the right lateral  aspect of the nose and this is compatible with a known basal cell  carcinoma.     Incidental note is made of a fracture of the right orbital floor that is  likely chronic in nature although correlation with clinical data is  suggested.     These findings and recommendations were discussed with Dr. Niko Rivas  on 10/28/2023 at approximately 3:15 p.m.     Radiation dose reduction techniques were utilized, including automated  exposure control and exposure modulation based on body size.     This report was finalized on 10/28/2023 4:02 PM by Dr. Jeff Harrison M.D  on  Workstation: [id number]       XR Chest 1 View [[id number]] Collected: 10/28/23 1506     Updated: 10/28/23 1510    Narrative:      PORTABLE CHEST X-RAY AND LEFT SHOULDER X-RAY     HISTORY: Fall, arm pain. Stroke     Portable chest x-ray is provided. A total of 3 images of the left  shoulder also provided. Correlation: None.     FINDINGS: The cardiomediastinal silhouette is normal. The lungs are  clear. The costophrenic sulci are dry and the bones appear normal. There  is no pneumothorax. Dual-lead cardiac pacer/defibrillator.     No osseous, articular, or soft tissue abnormality identified at the left  shoulder.       Impression:      Cardiac defibrillator in place. Otherwise negative x-rays of  the chest and the left shoulder.     This report was finalized on 10/28/2023 3:07 PM by Dr. Jhonny Charles M.D on Workstation: [id number]       XR Shoulder 2+ View Left [[id number]] Collected: 10/28/23 1506     Updated: 10/28/23 1510    Narrative:      PORTABLE CHEST X-RAY AND LEFT SHOULDER X-RAY     HISTORY: Fall, arm pain. Stroke     Portable chest x-ray is provided. A total of 3 images of the left  shoulder also provided. Correlation: None.     FINDINGS: The cardiomediastinal silhouette is normal. The lungs are  clear. The costophrenic sulci are dry and the bones appear normal. There  is no pneumothorax. Dual-lead cardiac pacer/defibrillator.     No osseous, articular, or soft tissue abnormality identified at the left  shoulder.       Impression:      Cardiac defibrillator in place. Otherwise negative x-rays of  the chest and the left shoulder.     This report was finalized on 10/28/2023 3:07 PM by Dr. Jhonny Charles M.D on Workstation: [id number]                 Assessment:  Active Hospital Problems    Diagnosis  POA    **Stroke [I63.9]  Yes      Resolved Hospital Problems   No resolved problems to display.   Dementia  Fall  Hypertension  Hypokalemia  Ckd3  ataxia    Plan:  Will monitor on telemetry  Stroke workup  Replace  potassium  Appreciate neurology input  Dw patient, family and ed provider    Lashon Brooks MD  10/28/2023  21:29 EDT

## 2023-10-29 NOTE — DISCHARGE PLACEMENT REQUEST
"Tigre Amaral (73 y.o. Male)       Date of Birth   1950    Social Security Number       Address   [address] [place] [address]    Home Phone   [phone]    MRN   [id number]       Scientologist   None    Marital Status   Single                            Admission Date   10/28/23    Admission Type   Emergency    Admitting Provider   Niko Rivas II, MD    Attending Provider   Jimenez Zarate MD    Department, Room/Bed   [address], [address]/1       Discharge Date       Discharge Disposition       Discharge Destination                                 Attending Provider: Jimenez Zarate MD    Allergies: No Known Allergies    Isolation: None   Infection: None   Code Status: CPR    Ht: 172.7 cm (68\")   Wt: 74.8 kg (165 lb)    Admission Cmt: None   Principal Problem: Stroke [I63.9]                   Active Insurance as of 10/28/2023       Primary Coverage       Payor Plan Insurance Group Employer/Plan Group    MEDICARE MEDICARE A ONLY        Payor Plan Address Payor Plan Phone Number Payor Plan Fax Number Effective Dates    [address] [phone]  3/1/2015 - None Entered    [facility] [id number]         Subscriber Name Subscriber Birth Date Member ID       TIGRE AMARAL 1950 [id number]               Secondary Coverage       Payor Plan Insurance Group Employer/Plan Group    [id number]        Payor Plan Address Payor Plan Phone Number Payor Plan Fax Number Effective Dates    [facility] OFFICE OF COMMUNITY CARE [phone]  1/1/2023 - None Entered    [address] [address]       [facility] [id number]         Subscriber Name Subscriber Birth Date Member ID       TIGRE AMARAL 1950 [id number]                     Emergency Contacts        (Rel.) Home Phone Work Phone Mobile Phone    DIRK FOWLER (Grandchild) [phone] -- --              {Outbreak/Travel/Exposure Documentation......;  Question Available Choices Patient Response   COVID-19 " Outbreak Screen:  Do you currently have a new onset of the following symptoms?        Fever/Chills, Cough, Shortness of air, Loss of taste or smell, No, Unknown  No (10/28/23 1305)   COVID-19 Outbreak Screen: In the last 14 days, have you had contact with anyone who is ill, has show any of the symptoms listed above and/or has been diagnosis with the 2019 Novel Coronavirus? This includes any immediate household members but excludes any patients with whom you have been in contact within your normal work duties wearing proper PPE, if you are a healthcare worker.  Yes, No, Unknown              (not recorded)   COVID-19 Outbreak Screen: Who was notified? Free text (not recorded)   Ebola Screening Outbreak Screen: Have you traveled to the [place] or [place] within the past 21 days?  Yes, No, Unknown (not recorded)   Ebola Screening Outbreak Screen: Do you have ANY of the following symptoms: Fever/Chills, Vomiting, Diarrhea, Fatigue, Headache, Muscle pain, Unexplained bleeding, Abdominal (stomach) pain, No, Unknown (not recorded)   Ebola Screening Outbreak Screen: Name of Person notified Free text (not recorded)   Travel Screen: Have you traveled in the last month? If so, to what country have you traveled? If [place] what state? Yes, No, Unknown  List of all countries  List of all States No (10/28/23 1307)  (not recorded)  (not recorded)   Infection Risk: Do you currently have the following symptoms?  (If cough is selected, the Tuberculosis Screen is performed.) Cough, Fever, Rash, No No (10/28/23 2247)   Tuberculosis Screen: Do you have any of the following Tuberculosis Risks?  Have you lived or spent time with anyone who had or may have TB?  Have you lived in or visited any of the following areas for more than one month: [place], [place], [place], [place] or [place], the [place] or [place]?  Do you have HIV/AIDS?  Have you lived in or worked in a nursing home, homeless shelter, correctional  facility, or substance abuse treatment facility?   No    If Yes do you have any of the following symptoms? Yes responses display to the right    If Yes, symptoms listed are:  Cough greater than or equal to 3 weeks, Loss of appetite, Unexplained weight loss, Night sweats, Bloody sputum or hemoptysis, Hoarseness, Fever, Fatigue, Chest pain, No (not recorded)  (not recorded)   Exposure Screen: Have you been exposed to any of these contagious diseases in the last month? Measles, Chickenpox, Meningitis, Pertussis, Whooping Cough, No No (10/28/23 6411)

## 2023-10-29 NOTE — PROGRESS NOTES
Name: Tigre Amaral ADMIT: 10/28/2023   : 1950  PCP: Provider, No Known    MRN: [id number] LOS: 1 days   AGE/SEX: 73 y.o. male  ROOM: [facility]     Subjective   No acute events overnight.  Potassium low at 2.4.      Objective   Objective   Vital Signs  Temp:  [97.5 °F (36.4 °C)-98.4 °F (36.9 °C)] 98.4 °F (36.9 °C)  Heart Rate:  [55-74] 60  Resp:  [16-18] 18  BP: (130-169)/(59-80) 139/77  SpO2:  [96 %-100 %] 97 %  on   ;   Device (Oxygen Therapy): room air  Body mass index is 25.09 kg/m².  Physical Exam  Constitutional:       General: He is not in acute distress.  Cardiovascular:      Rate and Rhythm: Normal rate and regular rhythm.      Comments: Pacemaker noted  Pulmonary:      Effort: Pulmonary effort is normal. No respiratory distress.   Abdominal:      General: There is no distension.      Palpations: Abdomen is soft.      Tenderness: There is no abdominal tenderness.   Skin:     General: Skin is warm and dry.   Neurological:      General: No focal deficit present.      Mental Status: He is alert and oriented to person, place, and time.      Comments: I do not appreciate significant left-sided weakness.  Motor strength of both the upper and lower extremities including right and left, seem about equal to me.         Results Review     I reviewed the patient's new clinical results.  Results from last 7 days   Lab Units 10/29/23  0600 10/28/23  1449   WBC 10*3/mm3 8.49 11.54*   HEMOGLOBIN g/dL 13.1 14.7   PLATELETS 10*3/mm3 222 264     Results from last 7 days   Lab Units 10/29/23  0600 10/28/23  1449   SODIUM mmol/L 139 141   POTASSIUM mmol/L 2.4* 3.1*   CHLORIDE mmol/L 103 100   CO2 mmol/L 26.4 30.5*   BUN mg/dL 9 9   CREATININE mg/dL 1.28* 1.53*   GLUCOSE mg/dL 93 76   Estimated Creatinine Clearance: 52.8 mL/min (A) (by C-G formula based on SCr of 1.28 mg/dL (H)).  Results from last 7 days   Lab Units 10/29/23  0600 10/28/23  1449   ALBUMIN g/dL 3.1* 3.7   BILIRUBIN mg/dL 0.8 0.6   ALK PHOS U/L 71 89  "  AST (SGOT) U/L 13 14   ALT (SGPT) U/L 6 7     Results from last 7 days   Lab Units 10/29/23  0600 10/28/23  1449   CALCIUM mg/dL 8.3* 8.8   ALBUMIN g/dL 3.1* 3.7     No results found for: \"COVID19\"  Hemoglobin A1C   Date/Time Value Ref Range Status   10/29/2023 0600 5.20 4.80 - 5.60 % Final     Glucose   Date/Time Value Ref Range Status   10/29/2023 1128 102 70 - 130 mg/dL Final   10/29/2023 0555 94 70 - 130 mg/dL Final   10/29/2023 0045 92 70 - 130 mg/dL Final     No results found for this or any previous visit.      CT Head Without Contrast Stroke Protocol  Narrative: CT HEAD WITHOUT CONTRAST     CLINICAL HISTORY: Left arm weakness. Status post fall.     TECHNIQUE: CT scan of the head was obtained with 3 mm axial soft tissue  and 2 mm axial bone algorithm algorithm images. No intravenous contrast  was administered. Sagittal and coronal reconstructions were obtained.     COMPARISON: No previous similar studies are available for comparison.     FINDINGS:       There is no evidence for a calvarial fracture. There is no evidence for  an acute extra-axial hemorrhage.     The ventricles, sulci, and cisterns are age-appropriate. There are  extensive areas of chronic infarction identified within the lateral  aspect of the left frontal, parietal, temporal, and occipital lobes  within the left MCA distribution. These foci of encephalomalacia measure  up to approximately 10.1 x 2.5 cm in greatest axial dimensions. Small  chronic infarcts within the left lentiform nucleus are within the  lateral lenticulostriate distribution. Also, there is a focus of  age-indeterminate infarction within the superior aspect of the right  parietal lobe within the right MCA distribution measuring up to  approximately 5 mm in diameter. Another age-indeterminate infarct is  seen within the right MCA distribution involving the right precentral  gyrus measuring up to 9 mm in diameter. Also, there is an  age-indeterminate infarct within the right " occipital lobe that measures  up to 1 cm in diameter that is either within the right MCA or right PCA  distribution. Further temporal characterization with MR imaging is  suggested.     There is a soft tissue mass within the right premaxillary soft tissues  that extends into the soft tissues along the right lateral aspect of the  nose and this measures up to 3.3 x 1.9 cm in greatest axial dimensions  and is compatible with a known basal cell carcinoma.     Incidental note is made of a fracture of the right orbital floor that is  likely chronic in nature although correlation with clinical data is  suggested     Impression:    There is no evidence for acute traumatic intracranial pathology.  However, there are small age-indeterminate infarcts noted within the  right frontal and parietal lobes that could potentially be acute to  subacute in nature. These are within the right MCA distribution.  Additionally and similarly, there is an age-indeterminate infarct within  the right occipital lobe that is either within the right MCA or right  PCA distribution. Further temporal characterization is recommended with  MR imaging.     There is a large chronic infarct within the lateral aspect of the left  cerebral hemisphere that is within the left MCA distribution and this  involves the lateral aspect of the left frontal, parietal, temporal, and  occipital lobes.     There is a 3.3 x 1.9 cm soft tissue mass within the right premaxillary  soft tissues that extends into the soft tissues along the right lateral  aspect of the nose and this is compatible with a known basal cell  carcinoma.     Incidental note is made of a fracture of the right orbital floor that is  likely chronic in nature although correlation with clinical data is  suggested.     These findings and recommendations were discussed with Dr. Niko Rivas  on 10/28/2023 at approximately 3:15 p.m.     Radiation dose reduction techniques were utilized, including  automated  exposure control and exposure modulation based on body size.     This report was finalized on 10/28/2023 4:02 PM by Dr. Jeff Harrison M.D  on Workstation: [id number]     XR Chest 1 View, XR Shoulder 2+ View Left  Narrative: PORTABLE CHEST X-RAY AND LEFT SHOULDER X-RAY     HISTORY: Fall, arm pain. Stroke     Portable chest x-ray is provided. A total of 3 images of the left  shoulder also provided. Correlation: None.     FINDINGS: The cardiomediastinal silhouette is normal. The lungs are  clear. The costophrenic sulci are dry and the bones appear normal. There  is no pneumothorax. Dual-lead cardiac pacer/defibrillator.     No osseous, articular, or soft tissue abnormality identified at the left  shoulder.     Impression: Cardiac defibrillator in place. Otherwise negative x-rays of  the chest and the left shoulder.     This report was finalized on 10/28/2023 3:07 PM by Dr. Jhonny Charles M.D on Workstation: [id number]       Scheduled Medications  aspirin, 325 mg, Oral, Daily   Or  aspirin, 300 mg, Rectal, Daily  atorvastatin, 80 mg, Oral, Nightly  carvedilol, 6.25 mg, Oral, BID With Meals  lisinopril, 20 mg, Oral, Daily  potassium chloride ER, 40 mEq, Oral, Q4H  senna-docusate sodium, 2 tablet, Oral, BID  sodium chloride, 10 mL, Intravenous, Q12H    Infusions  sodium chloride, 75 mL/hr, Last Rate: 75 mL/hr (10/28/23 7872)    Diet  Diet: Cardiac Diets; Healthy Heart (2-3 Na+); Texture: Soft to Chew (NDD 3); Soft to Chew: Chopped Meat; Fluid Consistency: Thin (IDDSI 0)       Assessment/Plan     Active Hospital Problems    Diagnosis  POA    **Stroke [I63.9]  Yes      Resolved Hospital Problems   No resolved problems to display.       73 y.o. male admitted with Stroke.      Left-sided hemiparesis  -CT head shows encephalomalacia of the left MCA region and right-sided parietal hypodensity.  -Stroke Work-up is pending.  -Aspirin, high-dose statin    Hypertension  -Continue lisinopril and Coreg    Hypokalemia  -Is  currently receiving supplemental potassium.  Repeat labs tomorrow  -Check magnesium      SCDs for DVT prophylaxis.  Full code.  Discussed with patient and family.  Anticipate discharge home with home health vs SNF      Jimenez Zarate MD  [facility]  10/29/23  14:05 EDT

## 2023-10-29 NOTE — PLAN OF CARE
Goal Outcome Evaluation:  Plan of Care Reviewed With: patient, grandchild(bolivar)              Patient is a 73 y.o. male admitted to [facility] for CVA (work up going and MRI not completed at time of eval) on 10/28/2023. CT head revealed  small age-indeterminate infarcts noted within the R frontal and parietal lobes that could potentially be acute to subacute in nature within the right MCA distribution.PMHx includes dementia, pacemaker.. Patient is ind at baseline without use of AD and lives alone but daughter lives next door to pt.  Pt presents with decreased L sided LE strength compared to R, L lateral lean in sitting and standing, increased cognitive deficits per granddaughter at bedside, poor attention to tasks, decreased LUE coordination, and overall functional decline from baseline. Inattention to L UE during tasks with cues to correct. Today, patient performed bed mobility with modA, required modAx2 for STS to rwx, and took a few sidesteps to HOB requiring minAx2 using rwx.  Patient may benefit from skilled PT services to address functional deficits and improve level of independence prior to discharge. Anticipate acute rehab upon DC.        Anticipated Discharge Disposition (PT): inpatient rehabilitation facility

## 2023-10-29 NOTE — PLAN OF CARE
Goal Outcome Evaluation:  Plan of Care Reviewed With: patient, grandchild(bolivar)        Progress: no change  Outcome Evaluation: Pt is a 73 y.o. male admitted with CVA with PMH of dementia. Per family, Pt lives independently next door to daughter and does not use any DME. Pt participated in bed mobility with Min/Mod Ax1, functional transfers with Mod Ax2, and LBD tasks with Mod A. Pt presents with decreased sequencing, coordination, balance, and strength impacting ADL independence. Varying left lateral lean noted with EOB activity. Skilled IPOT services warranted. Rec IRF at d/c to address current functional deficits.

## 2023-10-29 NOTE — THERAPY EVALUATION
Patient Name: Tigre Amaral  : 1950    MRN: [id number]                              Today's Date: 10/29/2023       Admit Date: 10/28/2023    Visit Dx:     ICD-10-CM ICD-9-CM   1. Cerebrovascular accident (CVA), unspecified mechanism  I63.9 434.91     Patient Active Problem List   Diagnosis    Stroke     History reviewed. No pertinent past medical history.  History reviewed. No pertinent surgical history.   General Information       Row Name 10/29/23 [facility]          Physical Therapy Time and Intention    Document Type evaluation  -CB     Mode of Treatment co-treatment;occupational therapy;physical therapy  -CB       Row Name 10/29/23 [facility]          General Information    Patient Profile Reviewed yes  -CB     Prior Level of Function independent:;gait;transfer;bed mobility  no AD at baseline  -CB     Existing Precautions/Restrictions fall;pacemaker  -CB     Barriers to Rehab medically complex;cognitive status  -CB       Row Name 10/29/23 [facility]          Living Environment    People in Home alone  daughter lives next door to pt  -CB       Row Name 10/29/23 [facility]          Home Main Entrance    Number of Stairs, Main Entrance none  -CB       Row Name 10/29/23 [facility]          Cognition    Orientation Status (Cognition) oriented to;person  increased confusion from baseline per granddaughter report at bedside  -CB       Row Name 10/29/23 125          Safety Issues, Functional Mobility    Safety Issues Affecting Function (Mobility) ability to follow commands;awareness of need for assistance;insight into deficits/self-awareness;judgment;positioning of assistive device;problem-solving;safety precaution awareness;safety precautions follow-through/compliance;sequencing abilities  -CB     Impairments Affecting Function (Mobility) balance;cognition;coordination;endurance/activity tolerance;postural/trunk control;strength;visual/perceptual  -CB               User Key  (r) = Recorded By, (t) = Taken By, (c) = Cosigned By       Initials Name Provider Type    CB Katherin England PT Physical Therapist                   Mobility       Row Name 10/29/23 1253          Bed Mobility    Bed Mobility supine-sit;sit-supine  -CB     Supine-Sit [place] (Bed Mobility) moderate assist (50% patient effort);verbal cues  -CB     Sit-Supine [place] (Bed Mobility) moderate assist (50% patient effort);verbal cues  -CB     Assistive Device (Bed Mobility) head of bed elevated;bed rails;leg   -CB       Row Name 10/29/23 1253          Sit-Stand Transfer    Sit-Stand [place] (Transfers) moderate assist (50% patient effort);2 person assist;nonverbal cues (demo/gesture);verbal cues  -CB     Assistive Device (Sit-Stand Transfers) walker, front-wheeled  -CB     Comment, (Sit-Stand Transfer) x3 STS reps with cues for UE placement and to attend to LUE  -CB       Row Name 10/29/23 1253          Gait/Stairs (Locomotion)    [place] Level (Gait) minimum assist (75% patient effort);2 person assist;verbal cues  -CB     Assistive Device (Gait) walker, front-wheeled  -CB     Distance in Feet (Gait) 3 sidesteps to HOB  -CB     Deviations/Abnormal Patterns (Gait) gait speed decreased;stride length decreased  -CB     Bilateral Gait Deviations forward flexed posture;heel strike decreased  -CB     Comment, (Gait/Stairs) cues for sequencing of LE to take sidesteps  -CB               User Key  (r) = Recorded By, (t) = Taken By, (c) = Cosigned By      Initials Name Provider Type    CB Katherin England PT Physical Therapist                   Obj/Interventions       Row Name 10/29/23 1256          Range of Motion Comprehensive    General Range of Motion bilateral lower extremity ROM WFL  -CB       Row Name 10/29/23 1256          Strength Comprehensive (MMT)    Comment, General Manual Muscle Testing (MMT) Assessment RLE 4+/5; LLE 3+/5  -CB       Row Name 10/29/23 1256          Motor Skills    Motor Skills coordination  -CB     Coordination finger to nose;minimal  impairment;moderate impairment;left  -CB       Row Name 10/29/23 1256          Balance    Balance Assessment sitting static balance;sitting dynamic balance;standing static balance;standing dynamic balance  -CB     Balance Interventions sitting;standing;sit to stand;supported;static;dynamic;minimal challenge  -CB     Comment, Balance variable assist sitting balance CGA-maxA; strong L lateral lean in sitting and standing with max VC to correct  -CB       Row Name 10/29/23 1256          Sensory Assessment (Somatosensory)    Sensory Assessment (Somatosensory) unable/difficult to assess  -CB               User Key  (r) = Recorded By, (t) = Taken By, (c) = Cosigned By      Initials Name Provider Type    CB Katherin England, PT Physical Therapist                   Goals/Plan       Row Name 10/29/23 1300          Bed Mobility Goal 1 (PT)    Activity/Assistive Device (Bed Mobility Goal 1, PT) bed mobility activities, all  -CB     [place] Level/Cues Needed (Bed Mobility Goal 1, PT) standby assist  -CB     Time Frame (Bed Mobility Goal 1, PT) long term goal (LTG);1 week  -CB       Row Name 10/29/23 1300          Transfer Goal 1 (PT)    Activity/Assistive Device (Transfer Goal 1, PT) sit-to-stand/stand-to-sit;bed-to-chair/chair-to-bed  -CB     [place] Level/Cues Needed (Transfer Goal 1, PT) minimum assist (75% or more patient effort)  -CB     Time Frame (Transfer Goal 1, PT) long term goal (LTG);1 week  -CB       Row Name 10/29/23 1300          Gait Training Goal 1 (PT)    Activity/Assistive Device (Gait Training Goal 1, PT) gait (walking locomotion);assistive device use;improve balance and speed;increase endurance/gait distance;decrease fall risk;diminish gait deviation  -CB     [place] Level (Gait Training Goal 1, PT) moderate assist (50-74% patient effort)  -CB     Distance (Gait Training Goal 1, PT) 25ft  -CB     Time Frame (Gait Training Goal 1, PT) long term goal (LTG);1 week  -CB       Row Name 10/29/23 1300           Therapy Assessment/Plan (PT)    Planned Therapy Interventions (PT) balance training;bed mobility training;gait training;home exercise program;patient/family education;transfer training;strengthening;neuromuscular re-education  -CB               User Key  (r) = Recorded By, (t) = Taken By, (c) = Cosigned By      Initials Name Provider Type    CB Katherin England, RHEA Physical Therapist                   Clinical Impression       Row Name 10/29/23 1259          Pain    Pretreatment Pain Rating 0/10 - no pain  -CB     Posttreatment Pain Rating 0/10 - no pain  -CB       Row Name 10/29/23 1259          Plan of Care Review    Plan of Care Reviewed With patient;grandchild(bolivar)  -CB     Outcome Evaluation Patient is a 73 y.o. male admitted to [facility] for CVA (work up going and MRI not completed at time of eval) on 10/28/2023. CT head revealed  small age-indeterminate infarcts noted within the R frontal and parietal lobes that could potentially be acute to subacute in nature within the right MCA distribution.PMHx includes dementia, pacemaker.. Patient is ind at baseline without use of AD and lives alone but daughter lives next door to pt.  Pt presents with decreased L sided LE strength compared to R, L lateral lean in sitting and standing, increased cognitive deficits per granddaughter at bedside, poor attention to tasks, decreased LUE coordination, and overall functional decline from baseline. Inattention to L UE during tasks with cues to correct. Today, patient performed bed mobility with modA, required modAx2 for STS to rwx, and took a few sidesteps to HOB requiring minAx2 using rwx.  Patient may benefit from skilled PT services to address functional deficits and improve level of independence prior to discharge. Anticipate acute rehab upon DC.  -CB       Row Name 10/29/23 1259          Therapy Assessment/Plan (PT)    Rehab Potential (PT) good, to achieve stated therapy goals  -CB     Criteria for Skilled Interventions  Met (PT) yes  -CB     Therapy Frequency (PT) 6 times/wk  -CB       Row Name 10/29/23 1259          Positioning and Restraints    Pre-Treatment Position in bed  -CB     Post Treatment Position bed  -CB     In Bed notified nsg;fowlers;call light within reach;encouraged to call for assist;exit alarm on;side rails up x3;with family/caregiver  -CB               User Key  (r) = Recorded By, (t) = Taken By, (c) = Cosigned By      Initials Name Provider Type    Katherin Douglas PT Physical Therapist                   Outcome Measures       Row Name 10/29/23 1301 10/29/23 0845       How much help from another person do you currently need...    Turning from your back to your side while in flat bed without using bedrails? 3  -CB 4  -RM    Moving from lying on back to sitting on the side of a flat bed without bedrails? 2  -CB 3  -RM    Moving to and from a bed to a chair (including a wheelchair)? 2  -CB 3  -RM    Standing up from a chair using your arms (e.g., wheelchair, bedside chair)? 2  -CB 3  -RM    Climbing 3-5 steps with a railing? 1  -CB 3  -RM    To walk in hospital room? 1  -CB 3  -RM    AM-PAC 6 Clicks Score (PT) 11  -CB 19  -RM    Highest level of mobility 4 --> Transferred to chair/commode  -CB 6 --> Walked 10 steps or more  -RM      Row Name 10/29/23 1301          Modified [place] Scale    Modified [place] Scale 5 - Severe disability.  Bedridden, incontinent, and requiring constant nursing care and attention.  -CB       Row Name 10/29/23 1301 10/29/23 1213       Functional Assessment    Outcome Measure Options AM-PAC 6 Clicks Basic Mobility (PT);Modified [place]  -CB AM-PAC 6 Clicks Daily Activity (OT)  -BS              User Key  (r) = Recorded By, (t) = Taken By, (c) = Cosigned By      Initials Name Provider Type    Katherin Douglas, PT Physical Therapist    Sanjiv Akins, RN Registered Nurse    Camille Greco, OT Occupational Therapist                                 Physical Therapy Education       Title:  PT OT SLP Therapies (In Progress)       Topic: Physical Therapy (In Progress)       Point: Mobility training (In Progress)       Learning Progress Summary             Patient Acceptance, E,TB,D, NR,NL by CB at 10/29/2023 1302   Family Acceptance, E,TB,D, NR,NL by CB at 10/29/2023 1302                         Point: Home exercise program (Not Started)       Learner Progress:  Not documented in this visit.              Point: Body mechanics (In Progress)       Learning Progress Summary             Patient Acceptance, E,TB,D, NR,NL by CB at 10/29/2023 1302   Family Acceptance, E,TB,D, NR,NL by CB at 10/29/2023 1302                         Point: Precautions (In Progress)       Learning Progress Summary             Patient Acceptance, E,TB,D, NR,NL by CB at 10/29/2023 1302   Family Acceptance, E,TB,D, NR,NL by CB at 10/29/2023 1302                                         User Key       Initials Effective Dates Name Provider Type Discipline     10/22/21 -  Katherin England, PT Physical Therapist PT                  PT Recommendation and Plan  Planned Therapy Interventions (PT): balance training, bed mobility training, gait training, home exercise program, patient/family education, transfer training, strengthening, neuromuscular re-education  Plan of Care Reviewed With: patient, grandchild(bolivar)  Outcome Evaluation: Patient is a 73 y.o. male admitted to [facility] for CVA (work up going and MRI not completed at time of eval) on 10/28/2023. CT head revealed  small age-indeterminate infarcts noted within the R frontal and parietal lobes that could potentially be acute to subacute in nature within the right MCA distribution.PMHx includes dementia, pacemaker.. Patient is ind at baseline without use of AD and lives alone but daughter lives next door to pt.  Pt presents with decreased L sided LE strength compared to R, L lateral lean in sitting and standing, increased cognitive deficits per granddaughter at bedside, poor attention to  tasks, decreased LUE coordination, and overall functional decline from baseline. Inattention to L UE during tasks with cues to correct. Today, patient performed bed mobility with modA, required modAx2 for STS to rwx, and took a few sidesteps to HOB requiring minAx2 using rwx.  Patient may benefit from skilled PT services to address functional deficits and improve level of independence prior to discharge. Anticipate acute rehab upon DC.     Time Calculation:         PT Charges       Row Name 10/29/23 1308             Time Calculation    Start Time 1028  -CB      Stop Time 1047  -CB      Time Calculation (min) 19 min  -CB      PT Received On 10/29/23  -CB      PT - Next Appointment 10/30/23  -CB      PT Goal Re-Cert Due Date 11/05/23  -CB         Time Calculation- PT    Total Timed Code Minutes- PT 10 minute(s)  -CB         Timed Charges    47811 - PT Therapeutic Activity Minutes 10  -CB         Total Minutes    Timed Charges Total Minutes 10  -CB       Total Minutes 10  -CB                User Key  (r) = Recorded By, (t) = Taken By, (c) = Cosigned By      Initials Name Provider Type    CB Katherin England, PT Physical Therapist                  Therapy Charges for Today       Code Description Service Date Service Provider Modifiers Qty    07248220882 HC PT THERAPEUTIC ACT EA 15 MIN 10/29/2023 Katherin England, PT GP 1    33848864961 HC PT EVAL MOD COMPLEXITY 3 10/29/2023 Katherin England, PT GP 1            PT G-Codes  Outcome Measure Options: AM-PAC 6 Clicks Basic Mobility (PT), Modified [place]  AM-PAC 6 Clicks Score (PT): 11  AM-PAC 6 Clicks Score (OT): 14  Modified Lisa Scale: 5 - Severe disability.  Bedridden, incontinent, and requiring constant nursing care and attention.  PT Discharge Summary  Anticipated Discharge Disposition (PT): inpatient rehabilitation facility    Katherin England PT  10/29/2023

## 2023-10-29 NOTE — PLAN OF CARE
Goal Outcome Evaluation:  Plan of Care Reviewed With: patient met lying in bed, vital signs have been stable, on room air, he is very forgetful and has to be redirected constantly. No new complain.        Progress: no change

## 2023-10-29 NOTE — CASE MANAGEMENT/SOCIAL WORK
Discharge Planning Assessment  [facility]     Patient Name: Tigre Amaral  MRN: [id number]  Today's Date: 10/29/2023    Admit Date: 10/28/2023    Plan: Acute rehab referrals pending   Discharge Needs Assessment       Row Name 10/29/23 1102       Living Environment    People in Home alone    Current Living Arrangements home    Primary Care Provided by self    Provides Primary Care For no one    Family Caregiver if Needed child(bolivar), adult;grandchild(bolivar), adult    Quality of Family Relationships helpful;involved;supportive       Transition Planning    Patient/Family Anticipates Transition to inpatient rehabilitation facility    Patient/Family Anticipated Services at Transition rehabilitation services       Discharge Needs Assessment    Readmission Within the Last 30 Days no previous admission in last 30 days    Current Outpatient/Agency/Support Group inpatient rehabilitation facility    Equipment Currently Used at Home walker, rolling    Concerns to be Addressed discharge planning                   Discharge Plan       Row Name 10/29/23 1104       Plan    Plan Acute rehab referrals pending    Patient/Family in Agreement with Plan yes    Plan Comments Incoming call from PT that patient and family agreeable to acute rehab at [facility] per therapy recommendations. CCP called and spoke wih patient's granddaughter, Allen, due to patient's confusion, who confirms her and patient agreeable to acute rehab. Agreeable to [facility] locations and [facility] acute. No strong preference for either but patient has been to [facility] before so interested in [place] location if appropriate. [facility] and [facility] referals placed. Patient lives alone next door to daughterRola ([phone]), and is generally IADL's. Per Allen, family checks in daily and may do a medicine reminder and help with meals. He has a walker but does not generallt use. No longer drives. No PCP, primarily sees heart specialist at [facility] but unable to recall  name. Pharmacy updated. CCP to follow. Jade CASSIDY RN CCP                  Continued Care and Services - Admitted Since 10/28/2023       Destination       Service Provider Request Status Selected Services Address Phone Fax Patient Preferred    [facility] Considering N/A [address] [id number] [phone] --    [facility] INPATIENT Pending - Request Sent N/A [address] [place] [id number] [phone] --    [facility] ACUTE REHAB PROGRAM Pending - No Request Sent N/A [address] [id number] [phone] -- --                     Demographic Summary       Row Name 10/29/23 1101       General Information    Arrived From home    Preferred Language English                   Functional Status       Row Name 10/29/23 1101       Functional Status    Usual Activity Tolerance good    Current Activity Tolerance moderate       Functional Status, IADL    Medications assistive person    Meal Preparation assistive person    Housekeeping assistive person    Laundry assistive person    Shopping assistive person                   Psychosocial    No documentation.                  Abuse/Neglect    No documentation.                  Legal    No documentation.                  Substance Abuse    No documentation.                  Patient Forms    No documentation.                     Jade Arellano RN

## 2023-10-29 NOTE — PROGRESS NOTES
[facility] Acute Inpt Rehab Note     Referral received via stroke order set.  Please note this is a screening only, rehab admissions will not actively be evaluating this patient.  If felt patient is appropriate for our services once therapies begin, please call our office at [phone], to initiate a full referral.    Thank you,   Santa Mosher RN   Rehab Admission Nurse

## 2023-10-29 NOTE — PROGRESS NOTES
"DOS: 10/29/2023  NAME: Tigre Amaral   : 1950  PCP: Provider, No Known  Chief Complaint   Patient presents with    Fall    Arm Injury       Chief complaint: L sided weakness  Subjective: Grandson at bedside.  No report of new neuro symptoms.  Pt confused.  Had difficulty getting dressed and seemed to be weaker on L.  Has h/o tbi where he had to learn to walk and talk again about 15 yrs ago, w/u at [facility], grandson thinks    Objective:  Vital signs: /77 (BP Location: Left arm, Patient Position: Lying)   Pulse 60   Temp 98.4 °F (36.9 °C) (Oral)   Resp 18   Ht 172.7 cm (68\")   Wt 74.8 kg (165 lb)   SpO2 97%   BMI 25.09 kg/m²      Gen: NAD, vitals reviewed  MS: distracted, needs redirection, poor insight, no aphasia, language intact, no neglect.  CN: visual acuity grossly normal, PERRL, EOMI, no facial droop, no dysarthria  Motor: drift of L arm, MAEW  Sensory: intact to light touch all 4 ext.    ROS:  No weakness, numbness  No fevers, chills      Laboratory results:  Lab Results   Component Value Date    GLUCOSE 93 10/29/2023    CALCIUM 8.3 (L) 10/29/2023     10/29/2023    K 2.4 (C) 10/29/2023    CO2 26.4 10/29/2023     10/29/2023    BUN 9 10/29/2023    CREATININE 1.28 (H) 10/29/2023    BCR 7.0 10/29/2023    ANIONGAP 9.6 10/29/2023     Lab Results   Component Value Date    WBC 8.49 10/29/2023    HGB 13.1 10/29/2023    HCT 38.1 10/29/2023    MCV 89.6 10/29/2023     10/29/2023     Lab Results   Component Value Date    LDL 98 10/29/2023         Lab 10/29/23  0600   HEMOGLOBIN A1C 5.20        Review of labs:     Review and interpretation of imaging: Head CT encephalomalacia of the left frontal, parietal, temporal, occipital lobes as well as lacunes and R parietal lobe, no acute finding, no hemorrhage, non for comparison    Workup to date:    Diagnoses:  1 L hemiparesis  2 TBI hx  3 tobacco abuse    Impression: 73-year-old male with past medical history of dementia, HTN, and grandson " describes what sounds like TBI possibly partly to explain L MCA encephalomalacia seen on HCT with description that he had to relearn to walk and talk again.  But at baseline has dementia but without residual weakness.  His wife who was his more or less his caretaker  in February.  He presented after a fall and reported L sided weakness.  Weakness noticed on Thursday Screening CT showed encephalomalacia of L MCA and R parietal hypodensity.   He was taking asa at home.  Stroke w/u pending.  Case management  c/s too      Will f/u on pending neuroimaging    Plan:  1 ASA, statin  2 tele  3 TTE  4 no need for permissive HTN    Thank you for this consultation.  Discussed above plan with neuro attending, Dr. Roberts who agrees with above plan.  Neurology team is available for concerns or questions.

## 2023-10-29 NOTE — THERAPY EVALUATION
Patient Name: Tigre Amaral  : 1950    MRN: [id number]                              Today's Date: 10/29/2023       Admit Date: 10/28/2023    Visit Dx:     ICD-10-CM ICD-9-CM   1. Cerebrovascular accident (CVA), unspecified mechanism  I63.9 434.91     Patient Active Problem List   Diagnosis    Stroke     No past medical history on file.  No past surgical history on file.   General Information       Row Name 10/29/23 1141          OT Time and Intention    Document Type evaluation  -BS     Mode of Treatment co-treatment;occupational therapy;physical therapy  -BS       Row Name 10/29/23 114          General Information    Patient Profile Reviewed yes  -BS     Prior Level of Function independent:;ADL's;cleaning;min assist:;home management;cooking  -BS     Existing Precautions/Restrictions fall  -BS     Barriers to Rehab medically complex;cognitive status  -BS       Row Name 10/29/23 1141          Occupational Profile    Environmental Supports and Barriers (Occupational Profile) [facility] reports Pt lives independently at home with his dog and daughter next door to check on him. Uses a walk in shower with no DME  -BS       Row Name 10/29/23 1141          Living Environment    People in Home alone  -BS       Row Name 10/29/23 1141          Cognition    Orientation Status (Cognition) oriented to;person  Pt presents with confusion. [facility] reports increased from baseline. Oriented to self only  -BS       Row Name 10/29/23 114          Safety Issues, Functional Mobility    Safety Issues Affecting Function (Mobility) ability to follow commands;awareness of need for assistance;impulsivity;insight into deficits/self-awareness;positioning of assistive device;judgment;safety precaution awareness;safety precautions follow-through/compliance;sequencing abilities  -BS     Impairments Affecting Function (Mobility) balance;cognition;coordination;endurance/activity tolerance;postural/trunk  control;strength;visual/perceptual  -BS               User Key  (r) = Recorded By, (t) = Taken By, (c) = Cosigned By      Initials Name Provider Type    BS Camille Dutta OT Occupational Therapist                     Mobility/ADL's       Row Name 10/29/23 1143          Bed Mobility    Bed Mobility supine-sit;sit-supine  -BS     Supine-Sit [place] (Bed Mobility) minimum assist (75% patient effort);moderate assist (50% patient effort);1 person assist  -BS     Sit-Supine [place] (Bed Mobility) minimum assist (75% patient effort);moderate assist (50% patient effort);1 person assist  -BS     Bed Mobility, Safety Issues cognitive deficits limit understanding;decreased use of arms for pushing/pulling;impaired trunk control for bed mobility  -BS       Row Name 10/29/23 1143          Transfers    Transfers sit-stand transfer;stand-sit transfer  -BS     Comment, (Transfers) Max cues for hand placement and sequencing. STS x2  -BS       Row Name 10/29/23 1143          Sit-Stand Transfer    Sit-Stand [place] (Transfers) moderate assist (50% patient effort);2 person assist;nonverbal cues (demo/gesture);verbal cues  -BS     Assistive Device (Sit-Stand Transfers) walker, front-wheeled  -BS       Row Name 10/29/23 1143          Stand-Sit Transfer    Stand-Sit [place] (Transfers) moderate assist (50% patient effort);verbal cues;nonverbal cues (demo/gesture)  -BS       Row Name 10/29/23 1143          Activities of Daily Living    BADL Assessment/Intervention lower body dressing  -BS       Row Name 10/29/23 1143          Lower Body Dressing Assessment/Training    [place] Level (Lower Body Dressing) don;socks;moderate assist (50% patient effort)  -BS     Position (Lower Body Dressing) edge of bed sitting  -BS     Comment, (Lower Body Dressing) Pt demo'd ability to cross legs over and grab sock. Decreased sequencing and command following and L lateral lean at EOB  -BS               User Key  (r) = Recorded By,  (t) = Taken By, (c) = Cosigned By      Initials Name Provider Type    BS Camille Dutta OT Occupational Therapist                   Obj/Interventions       Row Name 10/29/23 1149          Sensory Assessment (Somatosensory)    Sensory Assessment difficult to assess  -       Row Name 10/29/23 1149          Vision Assessment/Intervention    Visual Impairment/Limitations visual/perceptual impairments present  -     Vision Assessment Comment Decreased coordination and depth perception noted with finger to nose  -       Row Name 10/29/23 1149          Range of Motion Comprehensive    Comment, General Range of Motion RUE WFL. Noted deficits with LUE. AROM grossly 50% full ROM  -BS       Row Name 10/29/23 1149          Strength Comprehensive (MMT)    General Manual Muscle Testing (MMT) Assessment upper extremity strength deficits identified  -     Comment, General Manual Muscle Testing (MMT) Assessment RUE gorssly 4+/5, LUE grossly 2+/5  -       Row Name 10/29/23 1149          Motor Skills    Motor Skills coordination  -     Coordination gross motor deficit;left;upper extremity;finger to nose  -       Row Name 10/29/23 1149          Balance    Balance Assessment sitting static balance;sitting dynamic balance;standing static balance  -BS     Static Sitting Balance contact guard;maximum assist;1-person assist;non-verbal cues (demo/gesture);verbal cues  Variable balance CGA-Max A d/t Left Lateral lean. Max cueing  -BS     Dynamic Sitting Balance moderate assist;maximum assist;1-person assist;non-verbal cues (demo/gesture);verbal cues  -BS     Position, Sitting Balance supported;sitting edge of bed  -BS     Static Standing Balance minimal assist;2-person assist;non-verbal cues (demo/gesture);verbal cues  -BS     Position/Device Used, Standing Balance supported;walker, front-wheeled  -BS     Balance Interventions sitting;sit to stand;supported;static;dynamic;occupation based/functional task;weight shifting activity   -BS               User Key  (r) = Recorded By, (t) = Taken By, (c) = Cosigned By      Initials Name Provider Type    Camille Greco, OT Occupational Therapist                   Goals/Plan       Row Name 10/29/23 1209          Bed Mobility Goal 1 (OT)    Activity/Assistive Device (Bed Mobility Goal 1, OT) bed mobility activities, all  -BS     [place] Level/Cues Needed (Bed Mobility Goal 1, OT) minimum assist (75% or more patient effort)  -BS     Time Frame (Bed Mobility Goal 1, OT) long term goal (LTG);2 weeks  -BS     Progress/Outcomes (Bed Mobility Goal 1, OT) new goal  -BS       Row Name 10/29/23 1209          Transfer Goal 1 (OT)    Activity/Assistive Device (Transfer Goal 1, OT) transfers, all;sit-to-stand/stand-to-sit;commode, bedside without drop arms  -BS     [place] Level/Cues Needed (Transfer Goal 1, OT) moderate assist (50-74% patient effort)  -BS     Time Frame (Transfer Goal 1, OT) short term goal (STG);1 week  -BS     Progress/Outcome (Transfer Goal 1, OT) new goal  -BS       Row Name 10/29/23 1209          Dressing Goal 1 (OT)    Activity/Device (Dressing Goal 1, OT) dressing skills, all;upper body dressing;lower body dressing  -BS     [place]/Cues Needed (Dressing Goal 1, OT) minimum assist (75% or more patient effort)  -BS     Time Frame (Dressing Goal 1, OT) long term goal (LTG);by discharge  -BS     Progress/Outcome (Dressing Goal 1, OT) new goal  -BS       Row Name 10/29/23 1209          Toileting Goal 1 (OT)    Activity/Device (Toileting Goal 1, OT) toileting skills, all  -BS     [place] Level/Cues Needed (Toileting Goal 1, OT) minimum assist (75% or more patient effort)  -BS     Time Frame (Toileting Goal 1, OT) long term goal (LTG);by discharge  -BS     Progress/Outcome (Toileting Goal 1, OT) new goal  -BS       Row Name 10/29/23 1200          Grooming Goal 1 (OT)    Activity/Device (Grooming Goal 1, OT) grooming skills, all  -BS     [place] (Grooming Goal 1, OT)  minimum assist (75% or more patient effort)  -BS     Time Frame (Grooming Goal 1, OT) short term goal (STG);10 days  -BS     Progress/Outcome (Grooming Goal 1, OT) new goal  -BS       Row Name 10/29/23 1206          Strength Goal 1 (OT)    Strength Goal 1 (OT) LUE grossly 3+/5  -BS     Time Frame (Strength Goal 1, OT) long term goal (LTG);by discharge  -BS     Progress/Outcome (Strength Goal 1, OT) new goal  -BS       Row Name 10/29/23 120          Therapy Assessment/Plan (OT)    Planned Therapy Interventions (OT) activity tolerance training;adaptive equipment training;BADL retraining;functional balance retraining;passive ROM/stretching;patient/caregiver education/training;ROM/therapeutic exercise;occupation/activity based interventions;strengthening exercise;transfer/mobility retraining  -BS               User Key  (r) = Recorded By, (t) = Taken By, (c) = Cosigned By      Initials Name Provider Type    BS Camille Dutta OT Occupational Therapist                   Clinical Impression       Row Name 10/29/23 8141          Pain Assessment    Pretreatment Pain Rating 0/10 - no pain  -BS     Posttreatment Pain Rating 0/10 - no pain  -BS       Row Name 10/29/23 1154          Plan of Care Review    Plan of Care Reviewed With patient;grandchild(bolivar)  -BS     Progress no change  -BS     Outcome Evaluation Pt is a 73 y.o. male admitted with CVA with PMH of dementia. Per family, Pt lives independently next door to daughter and does not use any DME. Pt participated in bed mobility with Min/Mod Ax1, functional transfers with Mod Ax2, and LBD tasks with Mod A. Pt presents with decreased sequencing, coordination, balance, and strength impacting ADL independence. Varying left lateral lean noted with EOB activity. Skilled IPOT services warranted. Rec IRF at d/c to address current functional deficits.  -BS       Row Name 10/29/23 9163          Therapy Assessment/Plan (OT)    Rehab Potential (OT) good, to achieve stated therapy  goals  -BS     Criteria for Skilled Therapeutic Interventions Met (OT) yes;meets criteria;skilled treatment is necessary  -BS     Therapy Frequency (OT) 5 times/wk  -BS       Row Name 10/29/23 1157          Therapy Plan Review/Discharge Plan (OT)    Anticipated Discharge Disposition (OT) inpatient rehabilitation facility  -BS       Row Name 10/29/23 1157          Vital Signs    O2 Delivery Pre Treatment room air  -BS     Pre Patient Position Supine  -BS     Intra Patient Position Standing  -BS     Post Patient Position Supine  -BS       Row Name 10/29/23 1157          Positioning and Restraints    Pre-Treatment Position in bed  -BS     Post Treatment Position bed  -BS     In Bed supine;call light within reach;encouraged to call for assist;exit alarm on;with family/caregiver;notified nsg  -BS               User Key  (r) = Recorded By, (t) = Taken By, (c) = Cosigned By      Initials Name Provider Type    Camille Greco, OT Occupational Therapist                   Outcome Measures       Row Name 10/29/23 1213          How much help from another is currently needed...    Putting on and taking off regular lower body clothing? 2  -BS     Bathing (including washing, rinsing, and drying) 2  -BS     Toileting (which includes using toilet bed pan or urinal) 2  -BS     Putting on and taking off regular upper body clothing 2  -BS     Taking care of personal grooming (such as brushing teeth) 3  -BS     Eating meals 3  -BS     AM-PAC 6 Clicks Score (OT) 14  -BS       Row Name 10/29/23 0801          How much help from another person do you currently need...    Turning from your back to your side while in flat bed without using bedrails? 4  -RM     Moving from lying on back to sitting on the side of a flat bed without bedrails? 3  -RM     Moving to and from a bed to a chair (including a wheelchair)? 3  -RM     Standing up from a chair using your arms (e.g., wheelchair, bedside chair)? 3  -RM     Climbing 3-5 steps with a railing?  3  -RM     To walk in hospital room? 3  -RM     AM-PAC 6 Clicks Score (PT) 19  -RM     Highest level of mobility 6 --> Walked 10 steps or more  -RM       Row Name 10/29/23 1213          Functional Assessment    Outcome Measure Options AM-PAC 6 Clicks Daily Activity (OT)  -BS               User Key  (r) = Recorded By, (t) = Taken By, (c) = Cosigned By      Initials Name Provider Type     Sanjiv Valentino RN Registered Nurse    Camille Greco, KIRSTEN Occupational Therapist                    Occupational Therapy Education       Title: PT OT SLP Therapies (Done)       Topic: Occupational Therapy (Done)       Point: ADL training (Done)       Description:   Instruct learner(s) on proper safety adaptation and remediation techniques during self care or transfers.   Instruct in proper use of assistive devices.                  Learning Progress Summary             Patient Acceptance, E, VU,NR by BS at 10/29/2023 1214    Comment: Reason for eval/consult, goal setting, next level of care   Family Acceptance, E, VU,NR by BS at 10/29/2023 1214    Comment: Reason for eval/consult, goal setting, next level of care                         Point: Home exercise program (Done)       Description:   Instruct learner(s) on appropriate technique for monitoring, assisting and/or progressing therapeutic exercises/activities.                  Learning Progress Summary             Patient Acceptance, E, VU,NR by BS at 10/29/2023 1214    Comment: Reason for eval/consult, goal setting, next level of care   Family Acceptance, E, VU,NR by BS at 10/29/2023 1214    Comment: Reason for eval/consult, goal setting, next level of care                         Point: Precautions (Done)       Description:   Instruct learner(s) on prescribed precautions during self-care and functional transfers.                  Learning Progress Summary             Patient Acceptance, E, VU,NR by BS at 10/29/2023 1214    Comment: Reason for eval/consult, goal setting, next  level of care   Family Acceptance, E, VU,NR by BS at 10/29/2023 1214    Comment: Reason for eval/consult, goal setting, next level of care                         Point: Body mechanics (Done)       Description:   Instruct learner(s) on proper positioning and spine alignment during self-care, functional mobility activities and/or exercises.                  Learning Progress Summary             Patient Acceptance, E, VU,NR by BS at 10/29/2023 1214    Comment: Reason for eval/consult, goal setting, next level of care   Family Acceptance, E, VU,NR by BS at 10/29/2023 1214    Comment: Reason for eval/consult, goal setting, next level of care                                         User Key       Initials Effective Dates Name Provider Type Discipline    KATHRYN 11/14/22 -  Camille Dutta, OT Occupational Therapist OT                  OT Recommendation and Plan  Planned Therapy Interventions (OT): activity tolerance training, adaptive equipment training, BADL retraining, functional balance retraining, passive ROM/stretching, patient/caregiver education/training, ROM/therapeutic exercise, occupation/activity based interventions, strengthening exercise, transfer/mobility retraining  Therapy Frequency (OT): 5 times/wk  Plan of Care Review  Plan of Care Reviewed With: patient, grandchild(bolivar)  Progress: no change  Outcome Evaluation: Pt is a 73 y.o. male admitted with CVA with PMH of dementia. Per family, Pt lives independently next door to daughter and does not use any DME. Pt participated in bed mobility with Min/Mod Ax1, functional transfers with Mod Ax2, and LBD tasks with Mod A. Pt presents with decreased sequencing, coordination, balance, and strength impacting ADL independence. Varying left lateral lean noted with EOB activity. Skilled IPOT services warranted. Rec IRF at d/c to address current functional deficits.     Time Calculation:   Evaluation Complexity (OT)  Review Occupational Profile/Medical/Therapy History  Complexity: expanded/moderate complexity  Assessment, Occupational Performance/Identification of Deficit Complexity: 3-5 performance deficits  Clinical Decision Making Complexity (OT): detailed assessment/moderate complexity  Overall Complexity of Evaluation (OT): moderate complexity     Time Calculation- OT       Row Name 10/29/23 1215             Time Calculation- OT    OT Start Time 1009  -BS      OT Stop Time 1035  -BS      OT Time Calculation (min) 26 min  -BS      Total Timed Code Minutes- OT 15 minute(s)  -BS         Timed Charges    32045 - OT Therapeutic Activity Minutes 15  -BS         Untimed Charges    OT Eval/Re-eval Minutes 11  -BS         Total Minutes    Timed Charges Total Minutes 15  -BS      Untimed Charges Total Minutes 11  -BS       Total Minutes 26  -BS                User Key  (r) = Recorded By, (t) = Taken By, (c) = Cosigned By      Initials Name Provider Type    BS Camille Dutta OT Occupational Therapist                  Therapy Charges for Today       Code Description Service Date Service Provider Modifiers Qty    [id number]  OT THERAPEUTIC ACT EA 15 MIN 10/29/2023 Camille Dutta OT GO 1    [id number] HC OT EVAL MOD COMPLEXITY 3 10/29/2023 Camille Dutta OT GO 1                 Camille Dutta OT  10/29/2023

## 2023-10-30 ENCOUNTER — APPOINTMENT (OUTPATIENT)
Dept: CARDIOLOGY | Facility: HOSPITAL | Age: 73
DRG: 034 | End: 2023-10-30
Payer: OTHER GOVERNMENT

## 2023-10-30 LAB
ANION GAP SERPL CALCULATED.3IONS-SCNC: 7 MMOL/L (ref 5–15)
AORTIC DIMENSIONLESS INDEX: 0.7 (DI)
ASCENDING AORTA: 3.6 CM
BH CV ECHO MEAS - AI P1/2T: 603.9 MSEC
BH CV ECHO MEAS - AO MAX PG: 7.4 MMHG
BH CV ECHO MEAS - AO MEAN PG: 3 MMHG
BH CV ECHO MEAS - AO V2 MAX: 136 CM/SEC
BH CV ECHO MEAS - AO V2 VTI: 26 CM
BH CV ECHO MEAS - AVA(I,D): 2.6 CM2
BH CV ECHO MEAS - EDV(CUBED): 125 ML
BH CV ECHO MEAS - EDV(MOD-SP2): 178 ML
BH CV ECHO MEAS - EDV(MOD-SP4): 189 ML
BH CV ECHO MEAS - EF(MOD-BP): 21.6 %
BH CV ECHO MEAS - EF(MOD-SP2): 23.6 %
BH CV ECHO MEAS - EF(MOD-SP4): 25.4 %
BH CV ECHO MEAS - ESV(CUBED): 71.4 ML
BH CV ECHO MEAS - ESV(MOD-SP2): 136 ML
BH CV ECHO MEAS - ESV(MOD-SP4): 141 ML
BH CV ECHO MEAS - FS: 17 %
BH CV ECHO MEAS - IVS/LVPW: 1 CM
BH CV ECHO MEAS - IVSD: 1.2 CM
BH CV ECHO MEAS - LAT PEAK E' VEL: 3 CM/SEC
BH CV ECHO MEAS - LV MASS(C)D: 233.7 GRAMS
BH CV ECHO MEAS - LV MAX PG: 2.8 MMHG
BH CV ECHO MEAS - LV MEAN PG: 1 MMHG
BH CV ECHO MEAS - LV V1 MAX: 84.4 CM/SEC
BH CV ECHO MEAS - LV V1 VTI: 17.8 CM
BH CV ECHO MEAS - LVIDD: 5 CM
BH CV ECHO MEAS - LVIDS: 4.1 CM
BH CV ECHO MEAS - LVOT AREA: 3.8 CM2
BH CV ECHO MEAS - LVOT DIAM: 2.2 CM
BH CV ECHO MEAS - LVPWD: 1.2 CM
BH CV ECHO MEAS - MED PEAK E' VEL: 3.8 CM/SEC
BH CV ECHO MEAS - MR MAX PG: 89.3 MMHG
BH CV ECHO MEAS - MR MAX VEL: 472.5 CM/SEC
BH CV ECHO MEAS - MV A DUR: 0.15 SEC
BH CV ECHO MEAS - MV A MAX VEL: 73.7 CM/SEC
BH CV ECHO MEAS - MV DEC SLOPE: 200.7 CM/SEC2
BH CV ECHO MEAS - MV DEC TIME: 0.2 SEC
BH CV ECHO MEAS - MV E MAX VEL: 58.3 CM/SEC
BH CV ECHO MEAS - MV E/A: 0.79
BH CV ECHO MEAS - MV MAX PG: 2.6 MMHG
BH CV ECHO MEAS - MV MEAN PG: 1.14 MMHG
BH CV ECHO MEAS - MV P1/2T: 93.8 MSEC
BH CV ECHO MEAS - MV V2 VTI: 20.2 CM
BH CV ECHO MEAS - MVA(P1/2T): 2.35 CM2
BH CV ECHO MEAS - MVA(VTI): 3.4 CM2
BH CV ECHO MEAS - PA ACC TIME: 0.17 SEC
BH CV ECHO MEAS - PA V2 MAX: 65 CM/SEC
BH CV ECHO MEAS - PULM A REVS DUR: 0.13 SEC
BH CV ECHO MEAS - PULM A REVS VEL: 31.4 CM/SEC
BH CV ECHO MEAS - PULM DIAS VEL: 36.6 CM/SEC
BH CV ECHO MEAS - PULM S/D: 1
BH CV ECHO MEAS - PULM SYS VEL: 36.6 CM/SEC
BH CV ECHO MEAS - RAP SYSTOLE: 3 MMHG
BH CV ECHO MEAS - RV MAX PG: 1.28 MMHG
BH CV ECHO MEAS - RV V1 MAX: 56.6 CM/SEC
BH CV ECHO MEAS - RV V1 VTI: 12.5 CM
BH CV ECHO MEAS - SV(LVOT): 67.8 ML
BH CV ECHO MEAS - SV(MOD-SP2): 42 ML
BH CV ECHO MEAS - SV(MOD-SP4): 48 ML
BH CV ECHO MEAS - TAPSE (>1.6): 1.75 CM
BH CV ECHO MEAS - TR MAX PG: 75.5 MMHG
BH CV ECHO MEAS - TR MAX VEL: 434.4 CM/SEC
BH CV ECHO MEASUREMENTS AVERAGE E/E' RATIO: 17.15
BH CV ECHO SHUNT ASSESSMENT PERFORMED (HIDDEN SCRIPTING): 1
BH CV XLRA - RV BASE: 2.6 CM
BH CV XLRA - RV LENGTH: 7.3 CM
BH CV XLRA - RV MID: 1.63 CM
BH CV XLRA - TDI S': 9.2 CM/SEC
BUN SERPL-MCNC: 9 MG/DL (ref 8–23)
BUN/CREAT SERPL: 8.1 (ref 7–25)
CALCIUM SPEC-SCNC: 8.4 MG/DL (ref 8.6–10.5)
CHLORIDE SERPL-SCNC: 106 MMOL/L (ref 98–107)
CO2 SERPL-SCNC: 25 MMOL/L (ref 22–29)
CREAT SERPL-MCNC: 1.11 MG/DL (ref 0.76–1.27)
DEPRECATED RDW RBC AUTO: 39.1 FL (ref 37–54)
EGFRCR SERPLBLD CKD-EPI 2021: 69.2 ML/MIN/1.73
ERYTHROCYTE [DISTWIDTH] IN BLOOD BY AUTOMATED COUNT: 12 % (ref 12.3–15.4)
GLUCOSE BLDC GLUCOMTR-MCNC: 88 MG/DL (ref 70–130)
GLUCOSE SERPL-MCNC: 91 MG/DL (ref 65–99)
HCT VFR BLD AUTO: 37.9 % (ref 37.5–51)
HGB BLD-MCNC: 13.1 G/DL (ref 13–17.7)
LEFT ATRIUM VOLUME INDEX: 38.2 ML/M2
MAXIMAL PREDICTED HEART RATE: 145
MCH RBC QN AUTO: 31.1 PG (ref 26.6–33)
MCHC RBC AUTO-ENTMCNC: 34.6 G/DL (ref 31.5–35.7)
MCV RBC AUTO: 90 FL (ref 79–97)
PLATELET # BLD AUTO: 244 10*3/MM3 (ref 140–450)
PMV BLD AUTO: 9.7 FL (ref 6–12)
POTASSIUM SERPL-SCNC: 3.2 MMOL/L (ref 3.5–5.2)
POTASSIUM SERPL-SCNC: 3.2 MMOL/L (ref 3.5–5.2)
RBC # BLD AUTO: 4.21 10*6/MM3 (ref 4.14–5.8)
SINUS: 3.1 CM
SODIUM SERPL-SCNC: 138 MMOL/L (ref 136–145)
STJ: 3 CM
STRESS TARGET HR: 123
WBC NRBC COR # BLD: 9.46 10*3/MM3 (ref 3.4–10.8)

## 2023-10-30 PROCEDURE — 25510000001 PERFLUTREN (DEFINITY) 8.476 MG IN SODIUM CHLORIDE (PF) 0.9 % 10 ML INJECTION: Performed by: INTERNAL MEDICINE

## 2023-10-30 PROCEDURE — 93306 TTE W/DOPPLER COMPLETE: CPT

## 2023-10-30 PROCEDURE — 93306 TTE W/DOPPLER COMPLETE: CPT | Performed by: INTERNAL MEDICINE

## 2023-10-30 PROCEDURE — 85027 COMPLETE CBC AUTOMATED: CPT | Performed by: STUDENT IN AN ORGANIZED HEALTH CARE EDUCATION/TRAINING PROGRAM

## 2023-10-30 PROCEDURE — 99233 SBSQ HOSP IP/OBS HIGH 50: CPT | Performed by: NURSE PRACTITIONER

## 2023-10-30 PROCEDURE — 97530 THERAPEUTIC ACTIVITIES: CPT

## 2023-10-30 PROCEDURE — 82948 REAGENT STRIP/BLOOD GLUCOSE: CPT

## 2023-10-30 PROCEDURE — 80048 BASIC METABOLIC PNL TOTAL CA: CPT | Performed by: STUDENT IN AN ORGANIZED HEALTH CARE EDUCATION/TRAINING PROGRAM

## 2023-10-30 PROCEDURE — 97110 THERAPEUTIC EXERCISES: CPT

## 2023-10-30 PROCEDURE — 84132 ASSAY OF SERUM POTASSIUM: CPT | Performed by: STUDENT IN AN ORGANIZED HEALTH CARE EDUCATION/TRAINING PROGRAM

## 2023-10-30 PROCEDURE — 25810000003 SODIUM CHLORIDE 0.9 % SOLUTION: Performed by: INTERNAL MEDICINE

## 2023-10-30 RX ORDER — POTASSIUM CHLORIDE 750 MG/1
40 TABLET, FILM COATED, EXTENDED RELEASE ORAL EVERY 4 HOURS
Status: COMPLETED | OUTPATIENT
Start: 2023-10-30 | End: 2023-10-30

## 2023-10-30 RX ORDER — LORAZEPAM 2 MG/ML
1 INJECTION INTRAMUSCULAR
Status: COMPLETED | OUTPATIENT
Start: 2023-10-30 | End: 2023-11-02

## 2023-10-30 RX ADMIN — LISINOPRIL 20 MG: 20 TABLET ORAL at 08:52

## 2023-10-30 RX ADMIN — ATORVASTATIN CALCIUM 80 MG: 80 TABLET, FILM COATED ORAL at 20:32

## 2023-10-30 RX ADMIN — PERFLUTREN 2 ML: 6.52 INJECTION, SUSPENSION INTRAVENOUS at 11:35

## 2023-10-30 RX ADMIN — CARVEDILOL 6.25 MG: 6.25 TABLET, FILM COATED ORAL at 17:08

## 2023-10-30 RX ADMIN — CARVEDILOL 6.25 MG: 6.25 TABLET, FILM COATED ORAL at 08:52

## 2023-10-30 RX ADMIN — Medication 10 ML: at 20:32

## 2023-10-30 RX ADMIN — Medication 3 MG: at 20:32

## 2023-10-30 RX ADMIN — SENNOSIDES AND DOCUSATE SODIUM 2 TABLET: 50; 8.6 TABLET ORAL at 20:32

## 2023-10-30 RX ADMIN — NICOTINE 1 PATCH: 21 PATCH, EXTENDED RELEASE TRANSDERMAL at 08:53

## 2023-10-30 RX ADMIN — SODIUM CHLORIDE 75 ML/HR: 9 INJECTION, SOLUTION INTRAVENOUS at 08:54

## 2023-10-30 RX ADMIN — ASPIRIN 325 MG: 325 TABLET ORAL at 08:52

## 2023-10-30 RX ADMIN — ACETAMINOPHEN 650 MG: 325 TABLET, FILM COATED ORAL at 20:32

## 2023-10-30 RX ADMIN — POTASSIUM CHLORIDE 40 MEQ: 750 TABLET, EXTENDED RELEASE ORAL at 17:08

## 2023-10-30 RX ADMIN — POTASSIUM CHLORIDE 40 MEQ: 750 TABLET, EXTENDED RELEASE ORAL at 08:53

## 2023-10-30 NOTE — PLAN OF CARE
Goal Outcome Evaluation:  Plan of Care Reviewed With: patient           Outcome Evaluation: Patient leaving floor for testing. Step daughter present and reports patient is at baseline cognitive status. Semanatic paraphasias at baseline following TBI. Passed RN swallow screen. Will await MRI results before further testing.

## 2023-10-30 NOTE — PROGRESS NOTES
Name: Tigre Amaral ADMIT: 10/28/2023   : 1950  PCP: Provider, No Known    MRN: [id number] LOS: 2 days   AGE/SEX: 73 y.o. male  ROOM: [facility]     Subjective     No new changes overnight.    Objective   Objective   Vital Signs  Temp:  [97.3 °F (36.3 °C)-97.8 °F (36.6 °C)] 97.5 °F (36.4 °C)  Heart Rate:  [59-78] 59  Resp:  [16] 16  BP: (131-161)/() 143/66  SpO2:  [97 %-98 %] 97 %  on   ;   Device (Oxygen Therapy): room air  Body mass index is 28.43 kg/m².  Physical Exam  Constitutional:       General: He is not in acute distress.  Cardiovascular:      Rate and Rhythm: Normal rate and regular rhythm.      Comments: Pacemaker noted  Pulmonary:      Effort: Pulmonary effort is normal. No respiratory distress.   Abdominal:      General: There is no distension.      Palpations: Abdomen is soft.      Tenderness: There is no abdominal tenderness.   Skin:     General: Skin is warm and dry.   Neurological:      General: No focal deficit present.      Mental Status: He is alert and oriented to person, place, and time.      Comments: I do not appreciate significant left-sided weakness.  Motor strength of both the upper and lower extremities including right and left, seem about equal to me.         Results Review     I reviewed the patient's new clinical results.  Results from last 7 days   Lab Units 10/30/23  0522 10/29/23  0600 10/28/23  1449   WBC 10*3/mm3 9.46 8.49 11.54*   HEMOGLOBIN g/dL 13.1 13.1 14.7   PLATELETS 10*3/mm3 244 222 264     Results from last 7 days   Lab Units 10/30/23  0522 10/29/23  2015 10/29/23  0600 10/28/23  1449   SODIUM mmol/L 138  --  139 141   POTASSIUM mmol/L 3.2* 3.4* 2.4* 3.1*   CHLORIDE mmol/L 106  --  103 100   CO2 mmol/L 25.0  --  26.4 30.5*   BUN mg/dL 9  --  9 9   CREATININE mg/dL 1.11  --  1.28* 1.53*   GLUCOSE mg/dL 91  --  93 76   Estimated Creatinine Clearance: 61 mL/min (by C-G formula based on SCr of 1.11 mg/dL).  Results from last 7 days   Lab Units 10/29/23  0600  "10/28/23  1449   ALBUMIN g/dL 3.1* 3.7   BILIRUBIN mg/dL 0.8 0.6   ALK PHOS U/L 71 89   AST (SGOT) U/L 13 14   ALT (SGPT) U/L 6 7     Results from last 7 days   Lab Units 10/30/23  0522 10/29/23  0600 10/28/23  1449   CALCIUM mg/dL 8.4* 8.3* 8.8   ALBUMIN g/dL  --  3.1* 3.7   MAGNESIUM mg/dL  --  1.8  --      No results found for: \"COVID19\"  Hemoglobin A1C   Date/Time Value Ref Range Status   10/29/2023 0600 5.20 4.80 - 5.60 % Final     Glucose   Date/Time Value Ref Range Status   10/29/2023 1635 144 (H) 70 - 130 mg/dL Final   10/29/2023 1128 102 70 - 130 mg/dL Final   10/29/2023 0555 94 70 - 130 mg/dL Final   10/29/2023 0045 92 70 - 130 mg/dL Final     No results found for this or any previous visit.      CT Angiogram Neck, CT Angiogram Head  Narrative: CT ANGIOGRAM OF THE HEAD AND NECK WITH CONTRAST     CLINICAL HISTORY: Left arm weakness. Follow-up stroke.     TECHNIQUE: CT angiogram of the head and neck was obtained with 1 mm  axial images following the administration of IV contrast. Sagittal,  coronal, and 3-dimensional reconstructed images were obtained.  Additionally, a CT scan of the head was obtained with 3 mm axial images  before and after the administration of IV contrast.     COMPARISON: CT head dated 10/28/2023.     FINDINGS:     CTA NECK: There is a bovine configuration of the aortic arch.  Atherosclerotic changes are identified within the aortic arch and great  vessel origins. There is mild-to-moderate degree of stenosis at the  origin of the left common carotid artery and a mild degree of stenosis  is seen within the innominate artery. Mild-to-moderate degrees of  stenoses are appreciated within the origins of the right subclavian  artery and right common carotid artery. The right vertebral artery is  dominant. A mild-to-moderate degree of stenosis is appreciated at the  origin of the right vertebral artery. There is a 60-70% NASCET stenosis  within the proximal portion of the right ICA secondary to " a calcified  and noncalcified atherosclerotic plaque. A mild-to-moderate degree of  stenosis is appreciated within the common carotid arteries bilaterally.  Within the proximal portion of the left ICA, there is an approximate  70-80% NASCET stenosis secondary to mixed calcified and noncalcified  atherosclerotic plaque. There is a severe degree of stenosis at the  origin of the left vertebral artery. A moderate degree of stenosis is  appreciated within the V3 segment of the left vertebral artery.     CTA HEAD: The right vertebral artery is dominant. The post PICA segment  of the left vertebral artery is very diminutive in size and there is  mild atherosclerotic irregularity involving the junction of the V3 and  V4 segments of the left vertebral artery. The basilar artery is  unremarkable. The posterior cerebral arteries are remarkable for  moderate degrees of stenoses within both P2 segments. There is  atherosclerotic change resulting in mild-to-moderate irregularity and  stenosis of the left cavernous ICA. There is up to a moderate degree of  stenosis involving the supraclinoid segment of the right ICA and a  mild-to-moderate degree of stenosis is appreciated within the cavernous  segment of the right ICA. The middle and anterior cerebral arteries are  otherwise unremarkable.     Again appreciated are age-indeterminate infarcts within the right  frontal and parietal lobe within the right MCA distribution. The largest  of these infarcts measures up to approximately 1.1 cm in diameter on  this examination. Also, there is an age-indeterminate infarct within the  right occipital lobe that is either within the right MCA or right PCA  distribution. This infarct is approximately 1 cm in diameter. Again,  further temporal characterization with MR imaging is suggested. When  compared to the prior head CT performed yesterday, there is no  convincing interval change.     Again noted are extensive areas of chronic infarction  within the lateral  aspect of the left frontal, parietal, temporal, and occipital lobes that  are within the left MCA distribution. Again, these foci of chronic  infarctions measure approximately 10.1 x 2.5 cm in greatest axial  dimensions. Additional smaller chronic infarcts are noted within the  left lentiform nucleus within lateral lenticulostriate distribution.     Incidental note is made of a soft tissue mass within the right  premaxillary soft tissues that extends into the soft tissues along the  lateral aspect of the nose measuring up to 3.3 x 1.9 cm in greatest  axial dimensions that is compatible with a known basal cell carcinoma.     Impression:    There are small age-indeterminate infarcts noted within the right  frontal and parietal lobes within the right MCA distribution.  Additionally, a small age-indeterminate infarct is seen within the right  occipital lobe that is either within the right MCA or right PCA  distribution. Further temporal characterization with MR imaging is  suggested.     There is a large chronic infarct within the lateral aspect left frontal,  parietal, temporal, and occipital lobes within the left MCA distribution  and chronic infarcts are noted within the left lentiform nucleus within  the lateral lenticulostriate distribution.     There is an approximate 60-70% NASCET stenosis within the proximal  portion of the right ICA and an approximate 70-80% NASCET stenosis  within the proximal portion of the left ICA secondary to mixed calcified  and noncalcified atherosclerotic plaques.     There is a severe degree of stenosis at the origin of the left vertebral  artery and a moderate degree of stenosis is seen within the V3 segment  of the left vertebral artery.     The remaining intracranial and extracranial atherosclerotic changes are  as discussed in detail above.        Radiation dose reduction techniques were utilized, including automated  exposure control and exposure modulation  based on body size.     This report was finalized on 10/29/2023 7:14 PM by Dr. Jeff Harrison M.D  on Workstation: [id number]       Scheduled Medications  aspirin, 325 mg, Oral, Daily   Or  aspirin, 300 mg, Rectal, Daily  atorvastatin, 80 mg, Oral, Nightly  carvedilol, 6.25 mg, Oral, BID With Meals  lisinopril, 20 mg, Oral, Daily  nicotine, 1 patch, Transdermal, Q24H  potassium chloride ER, 40 mEq, Oral, Q4H  senna-docusate sodium, 2 tablet, Oral, BID  sodium chloride, 10 mL, Intravenous, Q12H    Infusions  sodium chloride, 75 mL/hr, Last Rate: 75 mL/hr (10/30/23 0068)    Diet  Diet: Cardiac Diets; Healthy Heart (2-3 Na+); Texture: Soft to Chew (NDD 3); Soft to Chew: Chopped Meat; Fluid Consistency: Thin (IDDSI 0)       Assessment/Plan     Active Hospital Problems    Diagnosis  POA    **Stroke [I63.9]  Yes      Resolved Hospital Problems   No resolved problems to display.       73 y.o. male admitted with Stroke.      Left-sided hemiparesis  -CT head shows encephalomalacia of the left MCA region and right-sided parietal hypodensity.  -Stroke Work-up is pending.  -Aspirin, high-dose statin    Hypertension  -Continue lisinopril and Coreg    Hypokalemia  -continue replacement     SCDs for DVT prophylaxis.  Full code.  Discussed with patient and family.  Anticipate discharge home with home health vs SNF      Jimenez Zarate MD  [facility]  10/30/23  12:34 EDT

## 2023-10-30 NOTE — PLAN OF CARE
Goal Outcome Evaluation:  Plan of Care Reviewed With: patient condition is the same. He is alert but still confused. On room air. New iv cannula was inserted. Patient was able to ambulate to the bathroom with assistance. No new complain from him        Progress: no change

## 2023-10-30 NOTE — PROGRESS NOTES
"DOS: 10/30/2023  NAME: Tigre Amaral   : 1950  PCP: Provider, No Known  Chief Complaint   Patient presents with    Fall    Arm Injury     Stroke    Subjective: Patent's step daughter who helps take care of him was at the bedside. He continues to have left arm>leg weakness. Cognition is reportedly at baseline. Pt seen in follow up today, however the problem is new to the examiner.      Objective:  Vital signs: /66   Pulse 59   Temp 97.5 °F (36.4 °C) (Oral)   Resp 16   Ht 172.7 cm (68\")   Wt 84.8 kg (187 lb)   SpO2 97%   BMI 28.43 kg/m²       General appearance: Well developed, well nourished, alert and cooperative.   HEENT: Normocephalic.   Cardiac: Regular rate and rhythm.   Peripheral Vasculature: Radial pulses are equal and symmetric.  Chest Exam: Clear to auscultation bilaterally, no wheezes, no rhonchi.  Extremities: Normal, no edema.   Skin: bulbous lesion noted to the right of his nose.     Higher integrative function: Awake and alert to person and city but not to month or year or current president.  Impaired recent/remote memory, attention/concentration.  Difficulty naming and following some commands.  Cranial nerves: Visual impairment on the left with simultaneous stimuli, extraocular movements intact, PERRL.  Normal facial sensation, face symmetric.  Tongue midline.  No dysarthria.  Motor: Right arm and leg 5 out of 5, left arm 4- out of 5, left leg 4 out of 5.  No fasciculations, rigidity, spasticity or abnormal movements.   Sensation: Intact light touch in all extremities.  Station and gait: Deferred.  Coordination: No dysmetria with finger-to-nose.    Scheduled Meds:aspirin, 325 mg, Oral, Daily   Or  aspirin, 300 mg, Rectal, Daily  atorvastatin, 80 mg, Oral, Nightly  carvedilol, 6.25 mg, Oral, BID With Meals  lisinopril, 20 mg, Oral, Daily  nicotine, 1 patch, Transdermal, Q24H  potassium chloride ER, 40 mEq, Oral, Q4H  senna-docusate sodium, 2 tablet, Oral, BID  sodium chloride, 10 " mL, Intravenous, Q12H      Continuous Infusions:sodium chloride, 75 mL/hr, Last Rate: 75 mL/hr (10/30/23 0854)      PRN Meds:.  acetaminophen **OR** acetaminophen    senna-docusate sodium **AND** polyethylene glycol **AND** bisacodyl **AND** bisacodyl    Calcium Replacement - Follow Nurse / BPA Driven Protocol    Magnesium Standard Dose Replacement - Follow Nurse / BPA Driven Protocol    melatonin    ondansetron **OR** ondansetron    Phosphorus Replacement - Follow Nurse / BPA Driven Protocol    Potassium Replacement - Follow Nurse / BPA Driven Protocol    sodium chloride    sodium chloride    sodium chloride    Laboratory results:  Lab Results   Component Value Date    GLUCOSE 91 10/30/2023    CALCIUM 8.4 (L) 10/30/2023     10/30/2023    K 3.2 (L) 10/30/2023    CO2 25.0 10/30/2023     10/30/2023    BUN 9 10/30/2023    CREATININE 1.11 10/30/2023    BCR 8.1 10/30/2023    ANIONGAP 7.0 10/30/2023     Lab Results   Component Value Date    WBC 9.46 10/30/2023    HGB 13.1 10/30/2023    HCT 37.9 10/30/2023    MCV 90.0 10/30/2023     10/30/2023     Lab Results   Component Value Date    CHOL 146 10/29/2023     Lab Results   Component Value Date    HDL 34 (L) 10/29/2023     Lab Results   Component Value Date    LDL 98 10/29/2023     Lab Results   Component Value Date    TRIG 72 10/29/2023         Lab 10/29/23  0600   HEMOGLOBIN A1C 5.20      Review and interpretation of imaging:  CT Angiogram Neck    Result Date: 10/29/2023  CT ANGIOGRAM OF THE HEAD AND NECK WITH CONTRAST  CLINICAL HISTORY: Left arm weakness. Follow-up stroke.  TECHNIQUE: CT angiogram of the head and neck was obtained with 1 mm axial images following the administration of IV contrast. Sagittal, coronal, and 3-dimensional reconstructed images were obtained. Additionally, a CT scan of the head was obtained with 3 mm axial images before and after the administration of IV contrast.  COMPARISON: CT head dated 10/28/2023.  FINDINGS:  CTA NECK:  There is a bovine configuration of the aortic arch. Atherosclerotic changes are identified within the aortic arch and great vessel origins. There is mild-to-moderate degree of stenosis at the origin of the left common carotid artery and a mild degree of stenosis is seen within the innominate artery. Mild-to-moderate degrees of stenoses are appreciated within the origins of the right subclavian artery and right common carotid artery. The right vertebral artery is dominant. A mild-to-moderate degree of stenosis is appreciated at the origin of the right vertebral artery. There is a 60-70% NASCET stenosis within the proximal portion of the right ICA secondary to a calcified and noncalcified atherosclerotic plaque. A mild-to-moderate degree of stenosis is appreciated within the common carotid arteries bilaterally. Within the proximal portion of the left ICA, there is an approximate 70-80% NASCET stenosis secondary to mixed calcified and noncalcified atherosclerotic plaque. There is a severe degree of stenosis at the origin of the left vertebral artery. A moderate degree of stenosis is appreciated within the V3 segment of the left vertebral artery.  CTA HEAD: The right vertebral artery is dominant. The post PICA segment of the left vertebral artery is very diminutive in size and there is mild atherosclerotic irregularity involving the junction of the V3 and V4 segments of the left vertebral artery. The basilar artery is unremarkable. The posterior cerebral arteries are remarkable for moderate degrees of stenoses within both P2 segments. There is atherosclerotic change resulting in mild-to-moderate irregularity and stenosis of the left cavernous ICA. There is up to a moderate degree of stenosis involving the supraclinoid segment of the right ICA and a mild-to-moderate degree of stenosis is appreciated within the cavernous segment of the right ICA. The middle and anterior cerebral arteries are otherwise unremarkable.  Again  appreciated are age-indeterminate infarcts within the right frontal and parietal lobe within the right MCA distribution. The largest of these infarcts measures up to approximately 1.1 cm in diameter on this examination. Also, there is an age-indeterminate infarct within the right occipital lobe that is either within the right MCA or right PCA distribution. This infarct is approximately 1 cm in diameter. Again, further temporal characterization with MR imaging is suggested. When compared to the prior head CT performed yesterday, there is no convincing interval change.  Again noted are extensive areas of chronic infarction within the lateral aspect of the left frontal, parietal, temporal, and occipital lobes that are within the left MCA distribution. Again, these foci of chronic infarctions measure approximately 10.1 x 2.5 cm in greatest axial dimensions. Additional smaller chronic infarcts are noted within the left lentiform nucleus within lateral lenticulostriate distribution.  Incidental note is made of a soft tissue mass within the right premaxillary soft tissues that extends into the soft tissues along the lateral aspect of the nose measuring up to 3.3 x 1.9 cm in greatest axial dimensions that is compatible with a known basal cell carcinoma.       There are small age-indeterminate infarcts noted within the right frontal and parietal lobes within the right MCA distribution. Additionally, a small age-indeterminate infarct is seen within the right occipital lobe that is either within the right MCA or right PCA distribution. Further temporal characterization with MR imaging is suggested.  There is a large chronic infarct within the lateral aspect left frontal, parietal, temporal, and occipital lobes within the left MCA distribution and chronic infarcts are noted within the left lentiform nucleus within the lateral lenticulostriate distribution.  There is an approximate 60-70% NASCET stenosis within the proximal  portion of the right ICA and an approximate 70-80% NASCET stenosis within the proximal portion of the left ICA secondary to mixed calcified and noncalcified atherosclerotic plaques.  There is a severe degree of stenosis at the origin of the left vertebral artery and a moderate degree of stenosis is seen within the V3 segment of the left vertebral artery.  The remaining intracranial and extracranial atherosclerotic changes are as discussed in detail above.   Radiation dose reduction techniques were utilized, including automated exposure control and exposure modulation based on body size.  This report was finalized on 10/29/2023 7:14 PM by Dr. Jeff Harrison M.D on Workstation: [id number]      CT Angiogram Head    Result Date: 10/29/2023  CT ANGIOGRAM OF THE HEAD AND NECK WITH CONTRAST  CLINICAL HISTORY: Left arm weakness. Follow-up stroke.  TECHNIQUE: CT angiogram of the head and neck was obtained with 1 mm axial images following the administration of IV contrast. Sagittal, coronal, and 3-dimensional reconstructed images were obtained. Additionally, a CT scan of the head was obtained with 3 mm axial images before and after the administration of IV contrast.  COMPARISON: CT head dated 10/28/2023.  FINDINGS:  CTA NECK: There is a bovine configuration of the aortic arch. Atherosclerotic changes are identified within the aortic arch and great vessel origins. There is mild-to-moderate degree of stenosis at the origin of the left common carotid artery and a mild degree of stenosis is seen within the innominate artery. Mild-to-moderate degrees of stenoses are appreciated within the origins of the right subclavian artery and right common carotid artery. The right vertebral artery is dominant. A mild-to-moderate degree of stenosis is appreciated at the origin of the right vertebral artery. There is a 60-70% NASCET stenosis within the proximal portion of the right ICA secondary to a calcified and noncalcified atherosclerotic  plaque. A mild-to-moderate degree of stenosis is appreciated within the common carotid arteries bilaterally. Within the proximal portion of the left ICA, there is an approximate 70-80% NASCET stenosis secondary to mixed calcified and noncalcified atherosclerotic plaque. There is a severe degree of stenosis at the origin of the left vertebral artery. A moderate degree of stenosis is appreciated within the V3 segment of the left vertebral artery.  CTA HEAD: The right vertebral artery is dominant. The post PICA segment of the left vertebral artery is very diminutive in size and there is mild atherosclerotic irregularity involving the junction of the V3 and V4 segments of the left vertebral artery. The basilar artery is unremarkable. The posterior cerebral arteries are remarkable for moderate degrees of stenoses within both P2 segments. There is atherosclerotic change resulting in mild-to-moderate irregularity and stenosis of the left cavernous ICA. There is up to a moderate degree of stenosis involving the supraclinoid segment of the right ICA and a mild-to-moderate degree of stenosis is appreciated within the cavernous segment of the right ICA. The middle and anterior cerebral arteries are otherwise unremarkable.  Again appreciated are age-indeterminate infarcts within the right frontal and parietal lobe within the right MCA distribution. The largest of these infarcts measures up to approximately 1.1 cm in diameter on this examination. Also, there is an age-indeterminate infarct within the right occipital lobe that is either within the right MCA or right PCA distribution. This infarct is approximately 1 cm in diameter. Again, further temporal characterization with MR imaging is suggested. When compared to the prior head CT performed yesterday, there is no convincing interval change.  Again noted are extensive areas of chronic infarction within the lateral aspect of the left frontal, parietal, temporal, and occipital  lobes that are within the left MCA distribution. Again, these foci of chronic infarctions measure approximately 10.1 x 2.5 cm in greatest axial dimensions. Additional smaller chronic infarcts are noted within the left lentiform nucleus within lateral lenticulostriate distribution.  Incidental note is made of a soft tissue mass within the right premaxillary soft tissues that extends into the soft tissues along the lateral aspect of the nose measuring up to 3.3 x 1.9 cm in greatest axial dimensions that is compatible with a known basal cell carcinoma.       There are small age-indeterminate infarcts noted within the right frontal and parietal lobes within the right MCA distribution. Additionally, a small age-indeterminate infarct is seen within the right occipital lobe that is either within the right MCA or right PCA distribution. Further temporal characterization with MR imaging is suggested.  There is a large chronic infarct within the lateral aspect left frontal, parietal, temporal, and occipital lobes within the left MCA distribution and chronic infarcts are noted within the left lentiform nucleus within the lateral lenticulostriate distribution.  There is an approximate 60-70% NASCET stenosis within the proximal portion of the right ICA and an approximate 70-80% NASCET stenosis within the proximal portion of the left ICA secondary to mixed calcified and noncalcified atherosclerotic plaques.  There is a severe degree of stenosis at the origin of the left vertebral artery and a moderate degree of stenosis is seen within the V3 segment of the left vertebral artery.  The remaining intracranial and extracranial atherosclerotic changes are as discussed in detail above.   Radiation dose reduction techniques were utilized, including automated exposure control and exposure modulation based on body size.  This report was finalized on 10/29/2023 7:14 PM by Dr. Jeff Harrison M.D on Workstation: [id number]      CT Head Without  Contrast Stroke Protocol    Result Date: 10/28/2023  CT HEAD WITHOUT CONTRAST  CLINICAL HISTORY: Left arm weakness. Status post fall.  TECHNIQUE: CT scan of the head was obtained with 3 mm axial soft tissue and 2 mm axial bone algorithm algorithm images. No intravenous contrast was administered. Sagittal and coronal reconstructions were obtained.  COMPARISON: No previous similar studies are available for comparison.  FINDINGS:   There is no evidence for a calvarial fracture. There is no evidence for an acute extra-axial hemorrhage.  The ventricles, sulci, and cisterns are age-appropriate. There are extensive areas of chronic infarction identified within the lateral aspect of the left frontal, parietal, temporal, and occipital lobes within the left MCA distribution. These foci of encephalomalacia measure up to approximately 10.1 x 2.5 cm in greatest axial dimensions. Small chronic infarcts within the left lentiform nucleus are within the lateral lenticulostriate distribution. Also, there is a focus of age-indeterminate infarction within the superior aspect of the right parietal lobe within the right MCA distribution measuring up to approximately 5 mm in diameter. Another age-indeterminate infarct is seen within the right MCA distribution involving the right precentral gyrus measuring up to 9 mm in diameter. Also, there is an age-indeterminate infarct within the right occipital lobe that measures up to 1 cm in diameter that is either within the right MCA or right PCA distribution. Further temporal characterization with MR imaging is suggested.  There is a soft tissue mass within the right premaxillary soft tissues that extends into the soft tissues along the right lateral aspect of the nose and this measures up to 3.3 x 1.9 cm in greatest axial dimensions and is compatible with a known basal cell carcinoma.  Incidental note is made of a fracture of the right orbital floor that is likely chronic in nature although  correlation with clinical data is suggested       There is no evidence for acute traumatic intracranial pathology. However, there are small age-indeterminate infarcts noted within the right frontal and parietal lobes that could potentially be acute to subacute in nature. These are within the right MCA distribution. Additionally and similarly, there is an age-indeterminate infarct within the right occipital lobe that is either within the right MCA or right PCA distribution. Further temporal characterization is recommended with MR imaging.  There is a large chronic infarct within the lateral aspect of the left cerebral hemisphere that is within the left MCA distribution and this involves the lateral aspect of the left frontal, parietal, temporal, and occipital lobes.  There is a 3.3 x 1.9 cm soft tissue mass within the right premaxillary soft tissues that extends into the soft tissues along the right lateral aspect of the nose and this is compatible with a known basal cell carcinoma.  Incidental note is made of a fracture of the right orbital floor that is likely chronic in nature although correlation with clinical data is suggested.  These findings and recommendations were discussed with Dr. Niko Rivas on 10/28/2023 at approximately 3:15 p.m.  Radiation dose reduction techniques were utilized, including automated exposure control and exposure modulation based on body size.  This report was finalized on 10/28/2023 4:02 PM by Dr. Jeff Harrison M.D on Workstation: [id number]      XR Chest 1 View    Result Date: 10/28/2023  PORTABLE CHEST X-RAY AND LEFT SHOULDER X-RAY  HISTORY: Fall, arm pain. Stroke  Portable chest x-ray is provided. A total of 3 images of the left shoulder also provided. Correlation: None.  FINDINGS: The cardiomediastinal silhouette is normal. The lungs are clear. The costophrenic sulci are dry and the bones appear normal. There is no pneumothorax. Dual-lead cardiac pacer/defibrillator.  No osseous,  articular, or soft tissue abnormality identified at the left shoulder.      Cardiac defibrillator in place. Otherwise negative x-rays of the chest and the left shoulder.  This report was finalized on 10/28/2023 3:07 PM by Dr. Jhonny Charles M.D on Workstation: [id number]      XR Shoulder 2+ View Left    Result Date: 10/28/2023  PORTABLE CHEST X-RAY AND LEFT SHOULDER X-RAY  HISTORY: Fall, arm pain. Stroke  Portable chest x-ray is provided. A total of 3 images of the left shoulder also provided. Correlation: None.  FINDINGS: The cardiomediastinal silhouette is normal. The lungs are clear. The costophrenic sulci are dry and the bones appear normal. There is no pneumothorax. Dual-lead cardiac pacer/defibrillator.  No osseous, articular, or soft tissue abnormality identified at the left shoulder.      Cardiac defibrillator in place. Otherwise negative x-rays of the chest and the left shoulder.  This report was finalized on 10/28/2023 3:07 PM by Dr. Jhonny Charles M.D on Workstation: [id number]      Impression:  73-year-old male, current smoker, with history of hypertension, remote alcohol dependence, TBI and dementia, and presence of ICD, only followed by cardiology as outpatient, who presented 10/28 with left-sided weakness following a fall several days prior.  Initial CT head showed small age-indeterminate infarcts in the right frontal and parietal lobes which could be acute to subacute strokes in the right MCA distribution, as well as age-indeterminate infarct in the right occipital lobe in the right MCA/PCA distribution and large chronic appearing infarct in the left MCA distribution involving the frontal, parietal, temporal, and occipital lobes.  Right premaxillary soft tissue mass also noted, compatible with known basal cell carcinoma.  Right orbital floor fracture also noted, likely chronic.  He was on aspirin 81 mg daily prior to admission.  His wife was his caregiver until she  in February and now his  stepdaughter and grandchildren are helping take care of him and manage his medications.    Additional work-up:  ECG: AV paced rhythm  CTA head/neck: Right ICA with 60 to 70% stenosis, left ICA with 70 to 80% stenosis, severe stenosis of the origin of the left vertebral artery and moderate stenosis of the V3 segment of the left vertebral artery.  Labs: TSH 1.88,LDL 98, hemoglobin A1c 5.20%    Diagnosis:  Left-sided weakness, suspect subacute right MCA infarcts  Dementia/history of TBI  Bilateral carotid stenosis  Tobacco abuse      Plan:  Follow-up MRI and 2D echo, will make further recommendations on CTA findings following MRI brain. Needs cardiology clearance for ICD. Will add ativan 1 mg IV to be given prior to MRI  On aspirin 325 mg daily  Lipitor 80 mg daily added  On nicotine patch, recommend tobacco cessation  Recommend follow-up with dermatology outpatient for basal cell carcinoma  Neurochecks  BP control  Stroke Education  OLMAN/SCDs  PT/OT/ST  Discussed with Dr. Pabon today, will follow

## 2023-10-30 NOTE — CASE MANAGEMENT/SOCIAL WORK
Continued Stay Note  [facility]     Patient Name: Tigre Amaral  MRN: [id number]  Today's Date: 10/30/2023    Admit Date: 10/28/2023    Plan: [facility]   Discharge Plan       Row Name 10/30/23 1446       Plan    Plan [facility]    Plan Comments S/W [facility]/ [facility]- they recommend subacute.  S/w [facility]/ [facility] - they can accept pt for acute rehab.  CCP spoke w/ pt and his family who are in agreement w/ [facility].  They can provide transport. ............[place] [facility]/ [facility]                   Discharge Codes    No documentation.                       Mable Benjamin RN

## 2023-10-30 NOTE — THERAPY TREATMENT NOTE
Patient Name: Tigre Amaral  : 1950    MRN: [id number]                              Today's Date: 10/30/2023       Admit Date: 10/28/2023    Visit Dx:     ICD-10-CM ICD-9-CM   1. Cerebrovascular accident (CVA), unspecified mechanism  I63.9 434.91     Patient Active Problem List   Diagnosis    Stroke     History reviewed. No pertinent past medical history.  History reviewed. No pertinent surgical history.   General Information       Row Name 10/30/23 1523          Physical Therapy Time and Intention    Document Type therapy note (daily note)  -CB     Mode of Treatment individual therapy;physical therapy  -CB       Row Name 10/30/23 1523          General Information    Existing Precautions/Restrictions fall;pacemaker  -CB       Row Name 10/30/23 1523          Cognition    Orientation Status (Cognition) oriented to;person  -CB       Row Name 10/30/23 1523          Safety Issues, Functional Mobility    Safety Issues Affecting Function (Mobility) ability to follow commands;awareness of need for assistance;at risk behavior observed;insight into deficits/self-awareness;judgment;problem-solving;safety precaution awareness;safety precautions follow-through/compliance;sequencing abilities  -CB     Impairments Affecting Function (Mobility) balance;cognition;coordination;endurance/activity tolerance;postural/trunk control;strength;visual/perceptual  -CB               User Key  (r) = Recorded By, (t) = Taken By, (c) = Cosigned By      Initials Name Provider Type    CB Katherin England PT Physical Therapist                   Mobility       Row Name 10/30/23 1523          Bed Mobility    Bed Mobility supine-sit  -CB     Supine-Sit [place] (Bed Mobility) moderate assist (50% patient effort);verbal cues  -CB     Assistive Device (Bed Mobility) head of bed elevated;bed rails;leg   -CB       Row Name 10/30/23 1523          Bed-Chair Transfer    Bed-Chair [place] (Transfers) minimum assist (75% patient  effort);2 person assist  -CB     Assistive Device (Bed-Chair Transfers) --  HHA  -CB       Row Name 10/30/23 1523          Sit-Stand Transfer    Sit-Stand [place] (Transfers) minimum assist (75% patient effort);2 person assist  -CB     Assistive Device (Sit-Stand Transfers) --  HHA  -CB     Comment, (Sit-Stand Transfer) x7 STS reps with cues; pt impulsively sits during STS  -CB       Row Name 10/30/23 1523          Gait/Stairs (Locomotion)    [place] Level (Gait) minimum assist (75% patient effort);2 person assist;verbal cues  -CB     Assistive Device (Gait) --  HHA  -CB     Distance in Feet (Gait) 12ft  -CB     Deviations/Abnormal Patterns (Gait) gait speed decreased;stride length decreased;falguni decreased;base of support, narrow  -CB     Bilateral Gait Deviations heel strike decreased  -CB     Comment, (Gait/Stairs) cues for LE sequencing; no overt LOB but pt is very unsteady  -CB               User Key  (r) = Recorded By, (t) = Taken By, (c) = Cosigned By      Initials Name Provider Type    Katherin Douglas, RHEA Physical Therapist                   Obj/Interventions       Row Name 10/30/23 1525          Motor Skills    Therapeutic Exercise --  seated marching and LAQ x10 reps  -CB       Row Name 10/30/23 1525          Balance    Balance Assessment standing static balance;standing dynamic balance  -CB     Static Standing Balance minimal assist;2-person assist;verbal cues  -CB     Dynamic Standing Balance minimal assist;2-person assist;verbal cues  -CB     Position/Device Used, Standing Balance supported  -CB     Comment, Balance L lateral lean in standing but decreased L lat lean in sitting this date  -CB               User Key  (r) = Recorded By, (t) = Taken By, (c) = Cosigned By      Initials Name Provider Type    Katherin Douglas, PT Physical Therapist                   Goals/Plan    No documentation.                  Clinical Impression       Row Name 10/30/23 1526          Pain    Pretreatment  Pain Rating 0/10 - no pain  -CB     Posttreatment Pain Rating 0/10 - no pain  -CB       Row Name 10/30/23 1526          Plan of Care Review    Plan of Care Reviewed With patient  -CB     Progress improving  -CB     Outcome Evaluation Patient participated with PT and son in law at bedside. Pt completed supine to sitting EOB requiring modA and then multiple STS reps with HHA requiring minAx2. Pt impulsively returns to sitting each time and max VC to maintain standing. Pt completed bed to chair tx with HHA requiring minAx2 then ambulated 12ft with B HHA requiring minAx2. Pt with narrow BARNEY, L lateral lean, and very unsteady during ambulation. Pt sitting UIC post session with family at bedside and RN notified. Will continue to progress as pt tolerates. Plan acute rehab at [place].  -CB       Row Name 10/30/23 1526          Positioning and Restraints    Pre-Treatment Position in bed  -CB     Post Treatment Position chair  -CB     In Chair notified nsg;reclined;call light within reach;encouraged to call for assist;exit alarm on;with family/caregiver  -CB               User Key  (r) = Recorded By, (t) = Taken By, (c) = Cosigned By      Initials Name Provider Type    Katherin Douglas, PT Physical Therapist                   Outcome Measures       Row Name 10/30/23 8496          How much help from another person do you currently need...    Turning from your back to your side while in flat bed without using bedrails? 3  -CB     Moving from lying on back to sitting on the side of a flat bed without bedrails? 2  -CB     Moving to and from a bed to a chair (including a wheelchair)? 2  -CB     Standing up from a chair using your arms (e.g., wheelchair, bedside chair)? 2  -CB     Climbing 3-5 steps with a railing? 1  -CB     To walk in hospital room? 2  -CB     AM-PAC 6 Clicks Score (PT) 12  -CB     Highest level of mobility 4 --> Transferred to chair/commode  -CB       Row Name 10/30/23 9392          Functional Assessment    Outcome  Measure Options AM-PAC 6 Clicks Basic Mobility (PT)  -CB               User Key  (r) = Recorded By, (t) = Taken By, (c) = Cosigned By      Initials Name Provider Type    CB Katherin England, PT Physical Therapist                                 Physical Therapy Education       Title: PT OT SLP Therapies (In Progress)       Topic: Physical Therapy (In Progress)       Point: Mobility training (In Progress)       Learning Progress Summary             Patient Acceptance, E,TB,D, NL,NR by CB at 10/30/2023 1530    Acceptance, E, VU by ML at 10/30/2023 0409    Acceptance, E,TB,D, NR,NL by CB at 10/29/2023 1302   Family Acceptance, E,TB,D, NR,NL by CB at 10/29/2023 1302                         Point: Home exercise program (In Progress)       Learning Progress Summary             Patient Acceptance, E,TB,D, NL,NR by CB at 10/30/2023 1530    Acceptance, E, VU by  at 10/30/2023 0409                         Point: Body mechanics (In Progress)       Learning Progress Summary             Patient Acceptance, E,TB,D, NL,NR by CB at 10/30/2023 1530    Acceptance, E, VU by ML at 10/30/2023 0409    Acceptance, E,TB,D, NR,NL by CB at 10/29/2023 1302   Family Acceptance, E,TB,D, NR,NL by CB at 10/29/2023 1302                         Point: Precautions (In Progress)       Learning Progress Summary             Patient Acceptance, E,TB,D, NL,NR by CB at 10/30/2023 1530    Acceptance, E, VU by  at 10/30/2023 0409    Acceptance, E,TB,D, NR,NL by CB at 10/29/2023 1302   Family Acceptance, E,TB,D, NR,NL by CB at 10/29/2023 1302                                         User Key       Initials Effective Dates Name Provider Type Discipline    CB 10/22/21 -  Katherin England, RHEA Physical Therapist PT     06/15/23 -  Kendal Flor RN Registered Nurse Nurse                  PT Recommendation and Plan  Planned Therapy Interventions (PT): balance training, bed mobility training, gait training, home exercise program, patient/family education,  transfer training, strengthening, neuromuscular re-education  Plan of Care Reviewed With: patient  Progress: improving  Outcome Evaluation: Patient participated with PT and son in law at bedside. Pt completed supine to sitting EOB requiring modA and then multiple STS reps with HHA requiring minAx2. Pt impulsively returns to sitting each time and max VC to maintain standing. Pt completed bed to chair tx with HHA requiring minAx2 then ambulated 12ft with B HHA requiring minAx2. Pt with narrow BARNEY, L lateral lean, and very unsteady during ambulation. Pt sitting UIC post session with family at bedside and RN notified. Will continue to progress as pt tolerates. Plan acute rehab at [place].     Time Calculation:         PT Charges       Row Name 10/30/23 1531             Time Calculation    Start Time 1324  -CB      Stop Time 1347  -CB      Time Calculation (min) 23 min  -CB      PT Received On 10/30/23  -CB      PT - Next Appointment 10/31/23  -CB         Time Calculation- PT    Total Timed Code Minutes- PT 23 minute(s)  -CB         Timed Charges    60003 - PT Therapeutic Exercise Minutes 8  -CB      21983 - PT Therapeutic Activity Minutes 15  -CB         Total Minutes    Timed Charges Total Minutes 23  -CB       Total Minutes 23  -CB                User Key  (r) = Recorded By, (t) = Taken By, (c) = Cosigned By      Initials Name Provider Type    CB Katherin England, PT Physical Therapist                  Therapy Charges for Today       Code Description Service Date Service Provider Modifiers Qty    71310114814 HC PT THERAPEUTIC ACT EA 15 MIN 10/29/2023 Katherin England, PT GP 1    02970465664 HC PT EVAL MOD COMPLEXITY 3 10/29/2023 Katherin England, PT GP 1    55020572718 HC PT THER PROC EA 15 MIN 10/30/2023 Katherin England, PT GP 1    75437595611 HC PT THERAPEUTIC ACT EA 15 MIN 10/30/2023 Katherin England, PT GP 1    56334219236 HC PT THER SUPP EA 15 MIN 10/30/2023 Katherin England, PT GP 2            PT G-Codes  Outcome Measure  Options: AM-PAC 6 Clicks Basic Mobility (PT)  AM-PAC 6 Clicks Score (PT): 12  AM-PAC 6 Clicks Score (OT): 14  Modified Lisa Scale: 5 - Severe disability.  Bedridden, incontinent, and requiring constant nursing care and attention.  PT Discharge Summary  Anticipated Discharge Disposition (PT): inpatient rehabilitation facility    Katherin England, PT  10/30/2023

## 2023-10-30 NOTE — PLAN OF CARE
Goal Outcome Evaluation:  Plan of Care Reviewed With: patient        Progress: improving  Outcome Evaluation: Patient participated with PT and son in law at bedside. Pt completed supine to sitting EOB requiring modA and then multiple STS reps with HHA requiring minAx2. Pt impulsively returns to sitting each time and max VC to maintain standing. Pt completed bed to chair tx with HHA requiring minAx2 then ambulated 12ft with B HHA requiring minAx2. Pt with narrow BARNEY, L lateral lean, and very unsteady during ambulation. Pt sitting UIC post session with family at bedside and RN notified. Will continue to progress as pt tolerates. Plan acute rehab at [place].      Anticipated Discharge Disposition (PT): inpatient rehabilitation facility

## 2023-10-30 NOTE — PROGRESS NOTES
[facility] Acute Rehab    Spoke with pt and son in law (at bedside with permission). Acute Rehab discussed. Pt is oriented to self only. Would need to return home alone as family works full time and are able to offer intermittent asst only. Anticipate pt would need 24/7 after dc from Acute Rehab.   Would recommend subacute rehab.   Msg left to inform CCP.   Called dgt per pt request to inform-     Will sign off    Codie Lima RN  Acute Rehab Admission Nurse

## 2023-10-31 ENCOUNTER — APPOINTMENT (OUTPATIENT)
Dept: MRI IMAGING | Facility: HOSPITAL | Age: 73
DRG: 034 | End: 2023-10-31
Payer: OTHER GOVERNMENT

## 2023-10-31 LAB
ANION GAP SERPL CALCULATED.3IONS-SCNC: 9.4 MMOL/L (ref 5–15)
BUN SERPL-MCNC: 9 MG/DL (ref 8–23)
BUN/CREAT SERPL: 8.4 (ref 7–25)
CALCIUM SPEC-SCNC: 8.4 MG/DL (ref 8.6–10.5)
CHLORIDE SERPL-SCNC: 107 MMOL/L (ref 98–107)
CO2 SERPL-SCNC: 21.6 MMOL/L (ref 22–29)
CREAT SERPL-MCNC: 1.07 MG/DL (ref 0.76–1.27)
EGFRCR SERPLBLD CKD-EPI 2021: 72.4 ML/MIN/1.73
GLUCOSE SERPL-MCNC: 96 MG/DL (ref 65–99)
POTASSIUM SERPL-SCNC: 3.7 MMOL/L (ref 3.5–5.2)
POTASSIUM SERPL-SCNC: 3.8 MMOL/L (ref 3.5–5.2)
SODIUM SERPL-SCNC: 138 MMOL/L (ref 136–145)

## 2023-10-31 PROCEDURE — 25810000003 SODIUM CHLORIDE 0.9 % SOLUTION: Performed by: INTERNAL MEDICINE

## 2023-10-31 PROCEDURE — 84132 ASSAY OF SERUM POTASSIUM: CPT | Performed by: HOSPITALIST

## 2023-10-31 PROCEDURE — 25010000002 LORAZEPAM PER 2 MG: Performed by: NURSE PRACTITIONER

## 2023-10-31 PROCEDURE — 99233 SBSQ HOSP IP/OBS HIGH 50: CPT | Performed by: NURSE PRACTITIONER

## 2023-10-31 PROCEDURE — 80048 BASIC METABOLIC PNL TOTAL CA: CPT | Performed by: STUDENT IN AN ORGANIZED HEALTH CARE EDUCATION/TRAINING PROGRAM

## 2023-10-31 RX ORDER — LORAZEPAM 2 MG/ML
1 INJECTION INTRAMUSCULAR ONCE AS NEEDED
Status: COMPLETED | OUTPATIENT
Start: 2023-10-31 | End: 2023-10-31

## 2023-10-31 RX ORDER — POTASSIUM CHLORIDE 750 MG/1
40 TABLET, FILM COATED, EXTENDED RELEASE ORAL EVERY 4 HOURS
Status: COMPLETED | OUTPATIENT
Start: 2023-10-31 | End: 2023-10-31

## 2023-10-31 RX ORDER — ASPIRIN 81 MG/1
81 TABLET, CHEWABLE ORAL DAILY
Status: DISCONTINUED | OUTPATIENT
Start: 2023-11-01 | End: 2023-11-11 | Stop reason: HOSPADM

## 2023-10-31 RX ADMIN — POTASSIUM CHLORIDE 40 MEQ: 750 TABLET, EXTENDED RELEASE ORAL at 09:23

## 2023-10-31 RX ADMIN — SENNOSIDES AND DOCUSATE SODIUM 2 TABLET: 50; 8.6 TABLET ORAL at 09:23

## 2023-10-31 RX ADMIN — LISINOPRIL 20 MG: 20 TABLET ORAL at 09:23

## 2023-10-31 RX ADMIN — SODIUM CHLORIDE 75 ML/HR: 9 INJECTION, SOLUTION INTRAVENOUS at 05:01

## 2023-10-31 RX ADMIN — ASPIRIN 325 MG: 325 TABLET ORAL at 09:23

## 2023-10-31 RX ADMIN — Medication 10 ML: at 21:09

## 2023-10-31 RX ADMIN — LORAZEPAM 1 MG: 2 INJECTION INTRAMUSCULAR; INTRAVENOUS at 12:01

## 2023-10-31 RX ADMIN — CARVEDILOL 6.25 MG: 6.25 TABLET, FILM COATED ORAL at 09:24

## 2023-10-31 RX ADMIN — CARVEDILOL 6.25 MG: 6.25 TABLET, FILM COATED ORAL at 17:33

## 2023-10-31 RX ADMIN — NICOTINE 1 PATCH: 21 PATCH, EXTENDED RELEASE TRANSDERMAL at 15:24

## 2023-10-31 RX ADMIN — ATORVASTATIN CALCIUM 80 MG: 80 TABLET, FILM COATED ORAL at 21:09

## 2023-10-31 RX ADMIN — Medication 10 ML: at 09:26

## 2023-10-31 RX ADMIN — POTASSIUM CHLORIDE 40 MEQ: 750 TABLET, EXTENDED RELEASE ORAL at 05:01

## 2023-10-31 NOTE — SIGNIFICANT NOTE
10/31/23 1436   OTHER   Discipline speech language pathologist;occupational therapist   Rehab Time/Intention   Session Not Performed patient unavailable for treatment;other (see comments)  (Pt off the floor for MRI. Has now returned but lethargic from ativan and unable to participate in functional ax. Will follow-up tomorrow)   Recommendation   OT - Next Appointment 11/01/23

## 2023-10-31 NOTE — SIGNIFICANT NOTE
10/31/23 1435   OTHER   Discipline physical therapist   Rehab Time/Intention   Session Not Performed   (pt off floor for MRI this afternoon; when pt returned, he was very groggy from ativan and unable to participate in therapy. Nurse aware. f/u 11/1 if appropriate)   Recommendation   PT - Next Appointment 11/01/23

## 2023-10-31 NOTE — SIGNIFICANT NOTE
10/31/23 7862   OTHER   Discipline speech language pathologist   Rehab Time/Intention   Session Not Performed other (see comments)  (MRI pending. Given cognition at baseline and plan is for acute rehab at discharge. Will defer speech/language/cognitive evaluation to the next level of care. Please re-consult with any changes to discharge plan.)

## 2023-10-31 NOTE — PROGRESS NOTES
[facility]     LOS: 3 days     Subjective:    CC:Fall and Arm Injury    DIET:  Diet Order   Procedures    Diet: Cardiac Diets; Healthy Heart (2-3 Na+); Texture: Soft to Chew (NDD 3); Soft to Chew: Chopped Meat; Fluid Consistency: Thin (IDDSI 0)   Attempted MRI and given Ativan, unable to get this completed because of agitation    Objective:    Vital Signs:  Temp:  [97.7 °F (36.5 °C)-97.8 °F (36.6 °C)] 97.8 °F (36.6 °C)  Heart Rate:  [59-96] 80  Resp:  [16-24] 22  BP: (149-177)/() 164/103    SpO2:  [92 %-100 %] 94 %  on   ;   Device (Oxygen Therapy): room air  Body mass index is 24.7 kg/m².    Physical Exam  HENT:      Head: Normocephalic and atraumatic.   Cardiovascular:      Heart sounds: No murmur heard.     No friction rub.   Pulmonary:      Effort: Pulmonary effort is normal.      Breath sounds: Normal breath sounds.   Abdominal:      General: Bowel sounds are normal. There is no distension.      Palpations: Abdomen is soft.      Tenderness: There is no abdominal tenderness.   Skin:     General: Skin is warm and dry.   Neurological:      Comments: Left sided paresis arm and leg, he is sedated from ativan for MRI         Results Review:    Glucose   Date Value Ref Range Status   10/31/2023 96 65 - 99 mg/dL Final   10/30/2023 91 65 - 99 mg/dL Final   10/29/2023 93 65 - 99 mg/dL Final     Results from last 7 days   Lab Units 10/30/23  0522   WBC 10*3/mm3 9.46   HEMOGLOBIN g/dL 13.1   HEMATOCRIT % 37.9   PLATELETS 10*3/mm3 244     Results from last 7 days   Lab Units 10/31/23  1520 10/31/23  0643 10/29/23  2015 10/29/23  0600   SODIUM mmol/L  --  138   < > 139   POTASSIUM mmol/L 3.8 3.7   < > 2.4*   CHLORIDE mmol/L  --  107   < > 103   CO2 mmol/L  --  21.6*   < > 26.4   BUN mg/dL  --  9   < > 9   CREATININE mg/dL  --  1.07   < > 1.28*   CALCIUM mg/dL  --  8.4*   < > 8.3*   BILIRUBIN mg/dL  --   --   --  0.8   ALK PHOS U/L  --   --   --  71   ALT (SGPT) U/L  --   --   --  6   AST  "(SGOT) U/L  --   --   --  13   GLUCOSE mg/dL  --  96   < > 93    < > = values in this interval not displayed.     Results from last 7 days   Lab Units 10/28/23  1449   INR  1.02     Results from last 7 days   Lab Units 10/29/23  0600   MAGNESIUM mg/dL 1.8         Cultures:  No results found for: \"BLOODCX\", \"URINECX\", \"WOUNDCX\", \"MRSACX\", \"RESPCX\", \"STOOLCX\"    I have reviewed daily medications and changes in CPOE    Scheduled meds  [START ON 11/1/2023] aspirin, 81 mg, Oral, Daily  atorvastatin, 80 mg, Oral, Nightly  carvedilol, 6.25 mg, Oral, BID With Meals  lisinopril, 20 mg, Oral, Daily  LORazepam, 1 mg, Intravenous, Once in imaging  nicotine, 1 patch, Transdermal, Q24H  senna-docusate sodium, 2 tablet, Oral, BID  sodium chloride, 10 mL, Intravenous, Q12H        sodium chloride, 75 mL/hr, Last Rate: 75 mL/hr (10/31/23 0501)      PRN meds    acetaminophen **OR** acetaminophen    senna-docusate sodium **AND** polyethylene glycol **AND** bisacodyl **AND** bisacodyl    Calcium Replacement - Follow Nurse / BPA Driven Protocol    Magnesium Standard Dose Replacement - Follow Nurse / BPA Driven Protocol    melatonin    ondansetron **OR** ondansetron    Phosphorus Replacement - Follow Nurse / BPA Driven Protocol    Potassium Replacement - Follow Nurse / BPA Driven Protocol    sodium chloride    sodium chloride    sodium chloride        Stroke        Assessment/Plan:    73 y.o. male admitted with Stroke.        Left-sided hemiparesis  -CT head shows encephalomalacia of the left MCA region and right-sided parietal hypodensity.  -Stroke Work-up is pending.  -Aspirin, high-dose statin     Hypertension  -Continue lisinopril and Coreg     Hypokalemia  -continue replacement          Abhinav Barnes MD  10/31/23  19:45 EDT      "

## 2023-10-31 NOTE — PROGRESS NOTES
DOS: 10/31/2023  NAME: Tigre Amaral   : 1950  PCP: Provider, No Known    Chief Complaint   Patient presents with    Fall    Arm Injury      Stroke    Subjective: Pt seen in follow up, however the problem is new to me.  Patient lying in bed with no family at bedside.  LUE is flaccid.  States he is able to lift his left leg.  Disoriented x2.  Impulsive and not really able to follow complex commands.    Objective:  Vital signs:      Vitals:    10/31/23 0001 10/31/23 0315 10/31/23 0500 10/31/23 0728   BP: 152/64 153/66  161/71   BP Location: Left arm Left arm  Left arm   Patient Position: Lying Lying  Lying   Pulse: 60 65  60   Resp: 18 16  16   Temp: Comment: PT refused. RN notified 97.7 °F (36.5 °C)  97.8 °F (36.6 °C)   TempSrc:  Oral  Oral   SpO2: 96% 100%  100%   Weight:   73.7 kg (162 lb 7.7 oz)    Height:           Current Facility-Administered Medications:     acetaminophen (TYLENOL) tablet 650 mg, 650 mg, Oral, Q4H PRN, 650 mg at 10/30/23 2032 **OR** acetaminophen (TYLENOL) suppository 650 mg, 650 mg, Rectal, Q4H PRN, Lashon Brooks MD    aspirin tablet 325 mg, 325 mg, Oral, Daily, 325 mg at 10/31/23 0923 **OR** aspirin suppository 300 mg, 300 mg, Rectal, Daily, Lashon Brooks MD    atorvastatin (LIPITOR) tablet 80 mg, 80 mg, Oral, Nightly, Lashon Brooks MD, 80 mg at 10/30/23 2032    sennosides-docusate (PERICOLACE) 8.6-50 MG per tablet 2 tablet, 2 tablet, Oral, BID, 2 tablet at 10/31/23 0923 **AND** polyethylene glycol (MIRALAX) packet 17 g, 17 g, Oral, Daily PRN **AND** bisacodyl (DULCOLAX) EC tablet 5 mg, 5 mg, Oral, Daily PRN **AND** bisacodyl (DULCOLAX) suppository 10 mg, 10 mg, Rectal, Daily PRNCecilia Renate Andrea, MD    Calcium Replacement - Follow Nurse / BPA Driven Protocol, , Does not apply, Cecilia BARRON Renate Andrea, MD    carvedilol (COREG) tablet 6.25 mg, 6.25 mg, Oral, BID With Meals, Lashon Brooks MD, 6.25 mg at 10/31/23 4978    lisinopril  (PRINIVIL,ZESTRIL) tablet 20 mg, 20 mg, Oral, Daily, Lashon Brooks MD, 20 mg at 10/31/23 0923    LORazepam (ATIVAN) injection 1 mg, 1 mg, Intravenous, Once in imaging, Moira Lindsay, APRN    LORazepam (ATIVAN) injection 1 mg, 1 mg, Intravenous, Once PRN, Carolina Casillas APRN    Magnesium Standard Dose Replacement - Follow Nurse / BPA Driven Protocol, , Does not apply, PRNCecilia Renate Andrea, MD    melatonin tablet 3 mg, 3 mg, Oral, Nightly PRN, Lashon Brooks MD, 3 mg at 10/30/23 2032    nicotine (NICODERM CQ) 21 MG/24HR patch 1 patch, 1 patch, Transdermal, Q24H, Jimenez Zarate MD, 1 patch at 10/30/23 0853    ondansetron (ZOFRAN) tablet 4 mg, 4 mg, Oral, Q6H PRN **OR** ondansetron (ZOFRAN) injection 4 mg, 4 mg, Intravenous, Q6H PRN, Lashon Brooks MD    Phosphorus Replacement - Follow Nurse / BPA Driven Protocol, , Does not apply, Cecilia BARRON Renate Andrea, MD    Potassium Replacement - Follow Nurse / BPA Driven Protocol, , Does not apply, PRNCecilia Renate Andrea, MD    sodium chloride 0.9 % flush 10 mL, 10 mL, Intravenous, PRN, Niko Rivas II, MD    sodium chloride 0.9 % flush 10 mL, 10 mL, Intravenous, Q12H, Prince Roberts MD, 10 mL at 10/31/23 0926    sodium chloride 0.9 % flush 10 mL, 10 mL, Intravenous, PRNArmando Daniel, MD    sodium chloride 0.9 % infusion 40 mL, 40 mL, Intravenous, PRNArmando Daniel, MD    sodium chloride 0.9 % infusion, 75 mL/hr, Intravenous, Continuous, Lashon Brooks MD, Last Rate: 75 mL/hr at 10/31/23 0501, 75 mL/hr at 10/31/23 0501    PRN meds    acetaminophen **OR** acetaminophen    senna-docusate sodium **AND** polyethylene glycol **AND** bisacodyl **AND** bisacodyl    Calcium Replacement - Follow Nurse / BPA Driven Protocol    LORazepam    Magnesium Standard Dose Replacement - Follow Nurse / BPA Driven Protocol    melatonin    ondansetron **OR** ondansetron    Phosphorus Replacement - Follow Nurse / BPA Driven Protocol    Potassium  "Replacement - Follow Nurse / BPA Driven Protocol    sodium chloride    sodium chloride    sodium chloride    No current facility-administered medications on file prior to encounter.     Current Outpatient Medications on File Prior to Encounter   Medication Sig    aspirin 81 MG EC tablet Take 1 tablet by mouth Daily.    carvedilol (COREG) 6.25 MG tablet Take 1 tablet by mouth 2 (Two) Times a Day With Meals.    furosemide (LASIX) 40 MG tablet Take 1 tablet by mouth Daily.    lisinopril (PRINIVIL,ZESTRIL) 20 MG tablet Take 1 tablet by mouth Daily.       General appearance: Elderly male, NAD, alert, edentulous  HEENT: Normocephalic, atraumatic, right-sided facial tumor just below the right orbital  Resp: Even and unlabored  Extremities: Left hand edema, nonpitting    Neurological:   MS: Oriented to self only.  Receptive aphasia,   CN: + blink to threat reflex, visual fields full, EOMI, facial sensation equal, no facial droop, tongue midline, [place] bilaterally  Motor: 0/5 LUE, 4/5 LLE, 5/5 RUE/RLE  Sensory: light touch sensation intact in all 4 ext.  Coordination: Normal finger to nose test on the right, unable to perform on the left  Gait and station: Deferred  Rapid alternating movements: normal finger to thumb tap on the right, unable to perform on the left    Laboratory results:  Lab Results   Component Value Date    TSH 1.880 10/29/2023     Lab Results   Component Value Date    HGBA1C 5.20 10/29/2023     No results found for: \"[id number]\"  Lab Results   Component Value Date    CHOL 146 10/29/2023     Lab Results   Component Value Date    TRIG 72 10/29/2023     Lab Results   Component Value Date    HDL 34 (L) 10/29/2023     Lab Results   Component Value Date    LDL 98 10/29/2023     Lab Results   Component Value Date    WBC 9.46 10/30/2023    HGB 13.1 10/30/2023    HCT 37.9 10/30/2023    MCV 90.0 10/30/2023     10/30/2023     Lab Results   Component Value Date    GLUCOSE 96 10/31/2023    BUN 9 10/31/2023    " CREATININE 1.07 10/31/2023    BCR 8.4 10/31/2023    K 3.7 10/31/2023    CO2 21.6 (L) 10/31/2023    CALCIUM 8.4 (L) 10/31/2023    ALBUMIN 3.1 (L) 10/29/2023    AST 13 10/29/2023    ALT 6 10/29/2023     Lab Results   Component Value Date    PTT 30.0 04/26/2023     Lab Results   Component Value Date    INR 1.02 10/28/2023    INR 0.97 04/26/2023    PROTIME 13.5 10/28/2023    PROTIME 11.3 04/26/2023     Brief Urine Lab Results       None            Review and interpretation of imaging:  CT Angiogram Neck    Result Date: 10/29/2023   There are small age-indeterminate infarcts noted within the right frontal and parietal lobes within the right MCA distribution. Additionally, a small age-indeterminate infarct is seen within the right occipital lobe that is either within the right MCA or right PCA distribution. Further temporal characterization with MR imaging is suggested.  There is a large chronic infarct within the lateral aspect left frontal, parietal, temporal, and occipital lobes within the left MCA distribution and chronic infarcts are noted within the left lentiform nucleus within the lateral lenticulostriate distribution.  There is an approximate 60-70% NASCET stenosis within the proximal portion of the right ICA and an approximate 70-80% NASCET stenosis within the proximal portion of the left ICA secondary to mixed calcified and noncalcified atherosclerotic plaques.  There is a severe degree of stenosis at the origin of the left vertebral artery and a moderate degree of stenosis is seen within the V3 segment of the left vertebral artery.  The remaining intracranial and extracranial atherosclerotic changes are as discussed in detail above.   Radiation dose reduction techniques were utilized, including automated exposure control and exposure modulation based on body size.  This report was finalized on 10/29/2023 7:14 PM by Dr. Jeff Harrison M.D on Workstation: [id number]      CT Angiogram Head    Result Date:  10/29/2023   There are small age-indeterminate infarcts noted within the right frontal and parietal lobes within the right MCA distribution. Additionally, a small age-indeterminate infarct is seen within the right occipital lobe that is either within the right MCA or right PCA distribution. Further temporal characterization with MR imaging is suggested.  There is a large chronic infarct within the lateral aspect left frontal, parietal, temporal, and occipital lobes within the left MCA distribution and chronic infarcts are noted within the left lentiform nucleus within the lateral lenticulostriate distribution.  There is an approximate 60-70% NASCET stenosis within the proximal portion of the right ICA and an approximate 70-80% NASCET stenosis within the proximal portion of the left ICA secondary to mixed calcified and noncalcified atherosclerotic plaques.  There is a severe degree of stenosis at the origin of the left vertebral artery and a moderate degree of stenosis is seen within the V3 segment of the left vertebral artery.  The remaining intracranial and extracranial atherosclerotic changes are as discussed in detail above.   Radiation dose reduction techniques were utilized, including automated exposure control and exposure modulation based on body size.  This report was finalized on 10/29/2023 7:14 PM by Dr. Jeff Harrison M.D on Workstation: [id number]      CT Head Without Contrast Stroke Protocol    Result Date: 10/28/2023   There is no evidence for acute traumatic intracranial pathology. However, there are small age-indeterminate infarcts noted within the right frontal and parietal lobes that could potentially be acute to subacute in nature. These are within the right MCA distribution. Additionally and similarly, there is an age-indeterminate infarct within the right occipital lobe that is either within the right MCA or right PCA distribution. Further temporal characterization is recommended with MR imaging.   There is a large chronic infarct within the lateral aspect of the left cerebral hemisphere that is within the left MCA distribution and this involves the lateral aspect of the left frontal, parietal, temporal, and occipital lobes.  There is a 3.3 x 1.9 cm soft tissue mass within the right premaxillary soft tissues that extends into the soft tissues along the right lateral aspect of the nose and this is compatible with a known basal cell carcinoma.  Incidental note is made of a fracture of the right orbital floor that is likely chronic in nature although correlation with clinical data is suggested.  These findings and recommendations were discussed with Dr. Niko Rivas on 10/28/2023 at approximately 3:15 p.m.  Radiation dose reduction techniques were utilized, including automated exposure control and exposure modulation based on body size.  This report was finalized on 10/28/2023 4:02 PM by Dr. Jeff Harrison M.D on Workstation: [id number]      XR Chest 1 View    Result Date: 10/28/2023  Cardiac defibrillator in place. Otherwise negative x-rays of the chest and the left shoulder.  This report was finalized on 10/28/2023 3:07 PM by Dr. Jhonny Charles M.D on Workstation: [id number]      XR Shoulder 2+ View Left    Result Date: 10/28/2023  Cardiac defibrillator in place. Otherwise negative x-rays of the chest and the left shoulder.  This report was finalized on 10/28/2023 3:07 PM by Dr. Jhonny Charles M.D on Workstation: [id number]     Results for orders placed during the hospital encounter of 10/28/23    Adult transthoracic echo complete    Interpretation Summary    Left ventricular systolic function is severely decreased. Calculated left ventricular EF = 21.6% Left ventricular ejection fraction appears to be less than 20%.The following left ventricular wall segments are hypokinetic: mid anterolateral, basal inferolateral, mid inferolateral, mid inferior, basal inferoseptal, mid inferoseptal, mid anteroseptal, basal  inferior and basal inferoseptal. The following left ventricular wall segments are akinetic: apical anterior, apical lateral, apical inferior, apical septal and apex.    Left ventricular wall thickness is consistent with mild concentric hypertrophy.    Left ventricular diastolic function is consistent with (grade II w/high LAP) pseudonormalization.    The left atrial cavity is mildly dilated.    Saline test results are negative for right to left atrial level shunt.    Estimated right ventricular systolic pressure from tricuspid regurgitation is mildly elevated (35-45 mmHg).      Impression/Assessment:  This is a 73-year-old male with past medical history of tobacco abuse, alcohol dependency, traumatic brain injury, pacemaker placement, hypertension, reported dementia history who presented to the hospital on 10/28/2023 with complaints of left upper extremity weakness.  Reportedly per documentation from the ER physician symptoms started Thursday night after he sustained a fall.  His family member stated that she did not notice significant left arm weakness after the fall but that the patient was complaining of pain.  He has aspirin listed as a home med however unclear if he has been compliant with this daily.  His initial noncontrast CT head did not reveal any acute intracranial abnormalities, known area of large left MCA territory infarct vs TBI noted as well as age-indeterminate infarct within the right MCA and PCA territories.    BP on arrival 133/75, HR 74. AV paced rhythm.  Temp 98.2. . K+ 3.1. Cr. 1.28.     Diagnosis:  Left-sided hemiparesis, suspect subacute right MCA infarct  Dementia/history of TBI  Bilateral carotid stenosis   History of stroke  Tobacco abuse  Cardiomyopathy    Additional work up to date:  2D Echo: LA cavity mildly dilated, saline test negative, EF 21.6%, septal wall motion abnormality noted, no evidence of LV thrombus or mass, no AV stenosis, mild to moderate MVR.    Plan:  Per RN MRI  scheduled for today at 1215  Lorazepam 1 mg available to administer if needed for behavior, I ordered an additional 1 mg IV as needed if he needs more.  For now continue ASA, I am going to decrease to 81 mg as the patient was not compliant with daily dosing at home  Lipitor 80 mg added this admission, LDL 98  Recommend follow-up with dermatology outpatient for facial basal cell carcinoma  Needs follow-up with vascular surgery outpatient for bilateral carotid stenosis  Neurochecks per stroke protocol  Normalize BP  Stroke Education  OLMAN/SCDs  PT/OT/ST  Therapies as written. CCP for discharge planning. Call RRT for any acute neurological changes. We will continue to follow and advise.    Case discussed with patient, RN, and Dr. Pabon, and he agrees with plan above.       SUNG Munroe

## 2023-10-31 NOTE — PLAN OF CARE
Goal Outcome Evaluation:  Plan of Care Reviewed With: patient condition is still the same. He is alert but still mildly confused. He is on room air, nil fresh complain from him        Progress: no change             Other Other Other Other Other Other

## 2023-11-01 ENCOUNTER — APPOINTMENT (OUTPATIENT)
Dept: MRI IMAGING | Facility: HOSPITAL | Age: 73
DRG: 034 | End: 2023-11-01
Payer: OTHER GOVERNMENT

## 2023-11-01 ENCOUNTER — APPOINTMENT (OUTPATIENT)
Dept: GENERAL RADIOLOGY | Facility: HOSPITAL | Age: 73
DRG: 034 | End: 2023-11-01
Payer: OTHER GOVERNMENT

## 2023-11-01 LAB
ALBUMIN SERPL-MCNC: 3.4 G/DL (ref 3.5–5.2)
ALBUMIN/GLOB SERPL: 1.1 G/DL
ALP SERPL-CCNC: 86 U/L (ref 39–117)
ALT SERPL W P-5'-P-CCNC: 14 U/L (ref 1–41)
ANION GAP SERPL CALCULATED.3IONS-SCNC: 11 MMOL/L (ref 5–15)
AST SERPL-CCNC: 51 U/L (ref 1–40)
BASOPHILS # BLD AUTO: 0.07 10*3/MM3 (ref 0–0.2)
BASOPHILS NFR BLD AUTO: 0.5 % (ref 0–1.5)
BILIRUB SERPL-MCNC: 1.1 MG/DL (ref 0–1.2)
BUN SERPL-MCNC: 11 MG/DL (ref 8–23)
BUN/CREAT SERPL: 9.1 (ref 7–25)
CALCIUM SPEC-SCNC: 8.9 MG/DL (ref 8.6–10.5)
CHLORIDE SERPL-SCNC: 106 MMOL/L (ref 98–107)
CO2 SERPL-SCNC: 20 MMOL/L (ref 22–29)
CREAT SERPL-MCNC: 1.21 MG/DL (ref 0.76–1.27)
D-LACTATE SERPL-SCNC: 1.8 MMOL/L (ref 0.5–2)
DEPRECATED RDW RBC AUTO: 42.8 FL (ref 37–54)
EGFRCR SERPLBLD CKD-EPI 2021: 62.4 ML/MIN/1.73
EOSINOPHIL # BLD AUTO: 0.05 10*3/MM3 (ref 0–0.4)
EOSINOPHIL NFR BLD AUTO: 0.3 % (ref 0.3–6.2)
ERYTHROCYTE [DISTWIDTH] IN BLOOD BY AUTOMATED COUNT: 12.6 % (ref 12.3–15.4)
GEN 5 2HR TROPONIN T REFLEX: 354 NG/L
GLOBULIN UR ELPH-MCNC: 3 GM/DL
GLUCOSE BLDC GLUCOMTR-MCNC: 128 MG/DL (ref 70–130)
GLUCOSE SERPL-MCNC: 119 MG/DL (ref 65–99)
HCT VFR BLD AUTO: 42.3 % (ref 37.5–51)
HGB BLD-MCNC: 14.3 G/DL (ref 13–17.7)
IMM GRANULOCYTES # BLD AUTO: 0.12 10*3/MM3 (ref 0–0.05)
IMM GRANULOCYTES NFR BLD AUTO: 0.8 % (ref 0–0.5)
LYMPHOCYTES # BLD AUTO: 1.4 10*3/MM3 (ref 0.7–3.1)
LYMPHOCYTES NFR BLD AUTO: 9.4 % (ref 19.6–45.3)
MCH RBC QN AUTO: 31.2 PG (ref 26.6–33)
MCHC RBC AUTO-ENTMCNC: 33.8 G/DL (ref 31.5–35.7)
MCV RBC AUTO: 92.2 FL (ref 79–97)
MONOCYTES # BLD AUTO: 1.54 10*3/MM3 (ref 0.1–0.9)
MONOCYTES NFR BLD AUTO: 10.3 % (ref 5–12)
NEUTROPHILS NFR BLD AUTO: 11.71 10*3/MM3 (ref 1.7–7)
NEUTROPHILS NFR BLD AUTO: 78.7 % (ref 42.7–76)
NRBC BLD AUTO-RTO: 0 /100 WBC (ref 0–0.2)
PLATELET # BLD AUTO: 292 10*3/MM3 (ref 140–450)
PMV BLD AUTO: 10.2 FL (ref 6–12)
POTASSIUM SERPL-SCNC: 3.4 MMOL/L (ref 3.5–5.2)
PROCALCITONIN SERPL-MCNC: 0.04 NG/ML (ref 0–0.25)
PROT SERPL-MCNC: 6.4 G/DL (ref 6–8.5)
QT INTERVAL: 443 MS
QT INTERVAL: 475 MS
QTC INTERVAL: 569 MS
QTC INTERVAL: 591 MS
RBC # BLD AUTO: 4.59 10*6/MM3 (ref 4.14–5.8)
SODIUM SERPL-SCNC: 137 MMOL/L (ref 136–145)
TROPONIN T DELTA: 15 NG/L
TROPONIN T SERPL HS-MCNC: 339 NG/L
WBC NRBC COR # BLD: 14.89 10*3/MM3 (ref 3.4–10.8)

## 2023-11-01 PROCEDURE — 71045 X-RAY EXAM CHEST 1 VIEW: CPT

## 2023-11-01 PROCEDURE — 97530 THERAPEUTIC ACTIVITIES: CPT

## 2023-11-01 PROCEDURE — 93010 ELECTROCARDIOGRAM REPORT: CPT | Performed by: INTERNAL MEDICINE

## 2023-11-01 PROCEDURE — 94799 UNLISTED PULMONARY SVC/PX: CPT

## 2023-11-01 PROCEDURE — 85025 COMPLETE CBC W/AUTO DIFF WBC: CPT | Performed by: HOSPITALIST

## 2023-11-01 PROCEDURE — 82948 REAGENT STRIP/BLOOD GLUCOSE: CPT

## 2023-11-01 PROCEDURE — 94760 N-INVAS EAR/PLS OXIMETRY 1: CPT

## 2023-11-01 PROCEDURE — 25010000002 ENOXAPARIN PER 10 MG: Performed by: HOSPITALIST

## 2023-11-01 PROCEDURE — 25010000002 ONDANSETRON PER 1 MG: Performed by: INTERNAL MEDICINE

## 2023-11-01 PROCEDURE — 80053 COMPREHEN METABOLIC PANEL: CPT | Performed by: HOSPITALIST

## 2023-11-01 PROCEDURE — 93005 ELECTROCARDIOGRAM TRACING: CPT | Performed by: NURSE PRACTITIONER

## 2023-11-01 PROCEDURE — 84145 PROCALCITONIN (PCT): CPT | Performed by: HOSPITALIST

## 2023-11-01 PROCEDURE — 93005 ELECTROCARDIOGRAM TRACING: CPT | Performed by: HOSPITALIST

## 2023-11-01 PROCEDURE — 97112 NEUROMUSCULAR REEDUCATION: CPT | Performed by: OCCUPATIONAL THERAPIST

## 2023-11-01 PROCEDURE — 83605 ASSAY OF LACTIC ACID: CPT | Performed by: HOSPITALIST

## 2023-11-01 PROCEDURE — 84484 ASSAY OF TROPONIN QUANT: CPT | Performed by: HOSPITALIST

## 2023-11-01 PROCEDURE — 97110 THERAPEUTIC EXERCISES: CPT

## 2023-11-01 PROCEDURE — 99232 SBSQ HOSP IP/OBS MODERATE 35: CPT | Performed by: NURSE PRACTITIONER

## 2023-11-01 RX ORDER — ENOXAPARIN SODIUM 100 MG/ML
40 INJECTION SUBCUTANEOUS EVERY 24 HOURS
Status: DISCONTINUED | OUTPATIENT
Start: 2023-11-01 | End: 2023-11-11 | Stop reason: HOSPADM

## 2023-11-01 RX ORDER — POTASSIUM CHLORIDE 1.5 G/1.58G
40 POWDER, FOR SOLUTION ORAL EVERY 4 HOURS
Status: COMPLETED | OUTPATIENT
Start: 2023-11-01 | End: 2023-11-02

## 2023-11-01 RX ORDER — HALOPERIDOL 5 MG/ML
2 INJECTION INTRAMUSCULAR ONCE AS NEEDED
Status: DISCONTINUED | OUTPATIENT
Start: 2023-11-01 | End: 2023-11-02

## 2023-11-01 RX ADMIN — SENNOSIDES AND DOCUSATE SODIUM 2 TABLET: 50; 8.6 TABLET ORAL at 09:33

## 2023-11-01 RX ADMIN — ONDANSETRON 4 MG: 2 INJECTION INTRAMUSCULAR; INTRAVENOUS at 17:58

## 2023-11-01 RX ADMIN — ASPIRIN 81 MG: 81 TABLET, CHEWABLE ORAL at 09:34

## 2023-11-01 RX ADMIN — ATORVASTATIN CALCIUM 80 MG: 80 TABLET, FILM COATED ORAL at 20:37

## 2023-11-01 RX ADMIN — Medication 10 ML: at 09:34

## 2023-11-01 RX ADMIN — CARVEDILOL 6.25 MG: 6.25 TABLET, FILM COATED ORAL at 18:17

## 2023-11-01 RX ADMIN — POTASSIUM CHLORIDE 40 MEQ: 1.5 FOR SOLUTION ORAL at 22:54

## 2023-11-01 RX ADMIN — ACETAMINOPHEN 650 MG: 325 TABLET, FILM COATED ORAL at 21:34

## 2023-11-01 RX ADMIN — LISINOPRIL 20 MG: 20 TABLET ORAL at 09:34

## 2023-11-01 RX ADMIN — NICOTINE 1 PATCH: 21 PATCH, EXTENDED RELEASE TRANSDERMAL at 09:35

## 2023-11-01 RX ADMIN — ENOXAPARIN SODIUM 40 MG: 100 INJECTION SUBCUTANEOUS at 23:01

## 2023-11-01 RX ADMIN — CARVEDILOL 6.25 MG: 6.25 TABLET, FILM COATED ORAL at 09:33

## 2023-11-01 RX ADMIN — Medication 10 ML: at 20:37

## 2023-11-01 NOTE — THERAPY TREATMENT NOTE
Patient Name: Tigre Amaral  : 1950    MRN: [id number]                              Today's Date: 2023       Admit Date: 10/28/2023    Visit Dx:     ICD-10-CM ICD-9-CM   1. Cerebrovascular accident (CVA), unspecified mechanism  I63.9 434.91     Patient Active Problem List   Diagnosis    Stroke     History reviewed. No pertinent past medical history.  History reviewed. No pertinent surgical history.   General Information       Row Name 23 1139          Physical Therapy Time and Intention    Document Type therapy note (daily note)  -DJ     Mode of Treatment individual therapy;physical therapy  -DJ       Row Name 23 1139          General Information    Patient Profile Reviewed yes  -DJ     Existing Precautions/Restrictions fall;pacemaker  -DJ       Row Name 23 1139          Cognition    Orientation Status (Cognition) oriented to;person;other (see comments)  pt thinks he is in senior care; mumbles incoherently about marajuana, very confused  -DJ       Row Name 23 1139          Safety Issues, Functional Mobility    Comment, Safety Issues/Impairments (Mobility) gt belt, nonskid socks  -DJ               User Key  (r) = Recorded By, (t) = Taken By, (c) = Cosigned By      Initials Name Provider Type    Sheila Talley, PT Physical Therapist                   Mobility       Row Name 23 1145          Bed Mobility    Bed Mobility supine-sit;sit-supine  -DJ     Supine-Sit [place] (Bed Mobility) moderate assist (50% patient effort);verbal cues  MUCH encouragement  -DJ     Sit-Supine [place] (Bed Mobility) moderate assist (50% patient effort);verbal cues;2 person assist  -DJ     Assistive Device (Bed Mobility) head of bed elevated;bed rails;leg   -DJ     Comment, (Bed Mobility) dependent to reposition in bed  -DJ       Row Name 23 114          Transfers    Comment, (Transfers) pt refused - once sitting pt attempted to return supine twice  -DJ       Row Name 23 1140           Bed-Chair Transfer    Bed-Chair [place] (Transfers) unable to assess  -DJ       Row Name 11/01/23 1145          Sit-Stand Transfer    Sit-Stand [place] (Transfers) unable to assess  -DJ               User Key  (r) = Recorded By, (t) = Taken By, (c) = Cosigned By      Initials Name Provider Type    Sheila Talley, RHEA Physical Therapist                   Obj/Interventions       Row Name 11/01/23 1146          Motor Skills    Motor Skills functional endurance  -DJ     Functional Endurance poor  -DJ     Therapeutic Exercise other (see comments)  difficulty following commands today  -DJ       Row Name 11/01/23 1146          Balance    Balance Assessment sitting static balance;sitting dynamic balance  -DJ     Static Sitting Balance contact guard  -DJ     Dynamic Sitting Balance minimal assist;2-person assist  -DJ     Position, Sitting Balance supported;sitting edge of bed  -DJ               User Key  (r) = Recorded By, (t) = Taken By, (c) = Cosigned By      Initials Name Provider Type    Sheila Talley, RHEA Physical Therapist                   Goals/Plan    No documentation.                  Clinical Impression       Row Name 11/01/23 1147          Pain    Pretreatment Pain Rating 0/10 - no pain  -DJ       Row Name 11/01/23 1147          Plan of Care Review    Plan of Care Reviewed With patient  -DJ     Progress declining  -DJ     Outcome Evaluation Pt found diagonal in bed with one leg over foot rest, generally confused (he thinks he is in [facility] and it is 2001, mumbles incoherently about marajuana). Pt has difficulty follwoing dommands but was able to sit EOB with mod A and much encouragement. Pt dem fair sitting balance but refused to stand from EOB. He attempted to return supine twice when sittign and therapise asked him to stand. Pt req mod A of 2 to raise legs back into bed and was dependent to reposition. Slow progress due to confusion. Cont PT to address functional deficits and progress as  tolerated.  -DJ       Row Name 11/01/23 1147          Therapy Assessment/Plan (PT)    Criteria for Skilled Interventions Met (PT) skilled treatment is necessary  -DJ       Row Name 11/01/23 1147          Vital Signs    Pre Patient Position Supine  -DJ     Intra Patient Position Sitting  -DJ     Post Patient Position Supine  -DJ       Row Name 11/01/23 1147          Positioning and Restraints    Pre-Treatment Position in bed  -DJ     Post Treatment Position bed  -DJ     In Bed notified nsg;supine;call light within reach;encouraged to call for assist;exit alarm on  -DJ               User Key  (r) = Recorded By, (t) = Taken By, (c) = Cosigned By      Initials Name Provider Type    Sheila Talley, PT Physical Therapist                   Outcome Measures       Row Name 11/01/23 1151          How much help from another person do you currently need...    Turning from your back to your side while in flat bed without using bedrails? 3  -DJ     Moving from lying on back to sitting on the side of a flat bed without bedrails? 2  -DJ     Moving to and from a bed to a chair (including a wheelchair)? 2  -DJ     Standing up from a chair using your arms (e.g., wheelchair, bedside chair)? 2  -DJ     Climbing 3-5 steps with a railing? 1  -DJ     To walk in hospital room? 2  -DJ     AM-PAC 6 Clicks Score (PT) 12  -DJ     Highest level of mobility 4 --> Transferred to chair/commode  -DJ       Row Name 11/01/23 1151          Functional Assessment    Outcome Measure Options AM-PAC 6 Clicks Basic Mobility (PT)  -DJ               User Key  (r) = Recorded By, (t) = Taken By, (c) = Cosigned By      Initials Name Provider Type    Sheila Talley, PT Physical Therapist                                 Physical Therapy Education       Title: PT OT SLP Therapies (In Progress)       Topic: Physical Therapy (In Progress)       Point: Mobility training (In Progress)       Learning Progress Summary             Patient Acceptance, E, VU by ML at  10/31/2023 0308    Acceptance, E,TB,D, NL,NR by CB at 10/30/2023 1530    Acceptance, E, VU by ML at 10/30/2023 0409    Acceptance, E,TB,D, NR,NL by CB at 10/29/2023 1302   Family Acceptance, E,TB,D, NR,NL by CB at 10/29/2023 1302                         Point: Home exercise program (Done)       Learning Progress Summary             Patient Acceptance, E, VU by ML at 10/31/2023 0308    Acceptance, E,TB,D, NL,NR by CB at 10/30/2023 1530    Acceptance, E, VU by ML at 10/30/2023 0409                         Point: Body mechanics (In Progress)       Learning Progress Summary             Patient Acceptance, E, NL,NR by DJ at 11/1/2023 1152    Acceptance, E, VU by ML at 10/31/2023 0308    Acceptance, E,TB,D, NL,NR by CB at 10/30/2023 1530    Acceptance, E, VU by ML at 10/30/2023 0409    Acceptance, E,TB,D, NR,NL by CB at 10/29/2023 1302   Family Acceptance, E,TB,D, NR,NL by CB at 10/29/2023 1302                         Point: Precautions (In Progress)       Learning Progress Summary             Patient Acceptance, E, NL,NR by DJ at 11/1/2023 1152    Acceptance, E, VU by ML at 10/31/2023 0308    Acceptance, E,TB,D, NL,NR by CB at 10/30/2023 1530    Acceptance, E, VU by ML at 10/30/2023 0409    Acceptance, E,TB,D, NR,NL by CB at 10/29/2023 1302   Family Acceptance, E,TB,D, NR,NL by CB at 10/29/2023 1302                                         User Key       Initials Effective Dates Name Provider Type Discipline    TJ 10/25/19 -  Sheila Reyna, PT Physical Therapist PT    CB 10/22/21 -  Katherin England, PT Physical Therapist PT     06/15/23 -  Kendal Flor, RN Registered Nurse Nurse                  PT Recommendation and Plan     Plan of Care Reviewed With: patient  Progress: declining  Outcome Evaluation: Pt found diagonal in bed with one leg over foot rest, generally confused (he thinks he is in [facility] and it is 2001, mumbles incoherently about marajuana). Pt has difficulty follwoing dommands but was able to sit EOB with  mod A and much encouragement. Pt dem fair sitting balance but refused to stand from EOB. He attempted to return supine twice when sittign and therapise asked him to stand. Pt req mod A of 2 to raise legs back into bed and was dependent to reposition. Slow progress due to confusion. Cont PT to address functional deficits and progress as tolerated.     Time Calculation:         PT Charges       Row Name 11/01/23 1152             Time Calculation    Start Time 0933  -DJ      Stop Time 0953  -DJ      Time Calculation (min) 20 min  -DJ      PT Non-Billable Time (min) 10 min  -DJ      PT Received On 11/01/23  -DJ      PT - Next Appointment 11/02/23  -DJ                User Key  (r) = Recorded By, (t) = Taken By, (c) = Cosigned By      Initials Name Provider Type    Sheila Talley, RHEA Physical Therapist                  Therapy Charges for Today       Code Description Service Date Service Provider Modifiers Qty    [id number]  PT THERAPEUTIC ACT EA 15 MIN 11/1/2023 Sheila Reyna, PT GP 1    [id number]  PT THER PROC EA 15 MIN 11/1/2023 Sheila Reyna, PT GP 1            PT G-Codes  Outcome Measure Options: AM-PAC 6 Clicks Basic Mobility (PT)  AM-PAC 6 Clicks Score (PT): 12  AM-PAC 6 Clicks Score (OT): 14  Modified [place] Scale: 5 - Severe disability.  Bedridden, incontinent, and requiring constant nursing care and attention.       Sheila Reyna PT  11/1/2023

## 2023-11-01 NOTE — THERAPY TREATMENT NOTE
Patient Name: Tigre Amaral  : 1950    MRN: [id number]                              Today's Date: 2023       Admit Date: 10/28/2023    Visit Dx:     ICD-10-CM ICD-9-CM   1. Cerebrovascular accident (CVA), unspecified mechanism  I63.9 434.91     Patient Active Problem List   Diagnosis    Stroke     History reviewed. No pertinent past medical history.  History reviewed. No pertinent surgical history.   General Information       Row Name 23 1533          OT Time and Intention    Document Type therapy note (daily note)  -     Mode of Treatment individual therapy;occupational therapy  -       Row Name 23 1533          General Information    Existing Precautions/Restrictions fall  -     Barriers to Rehab medically complex  -       Row Name 23          Cognition    Orientation Status (Cognition) oriented to;person  -       Row Name 23 1533          Safety Issues, Functional Mobility    Impairments Affecting Function (Mobility) coordination;endurance/activity tolerance;cognition;motor control;grasp;motor planning;muscle tone abnormal;strength  -               User Key  (r) = Recorded By, (t) = Taken By, (c) = Cosigned By      Initials Name Provider Type     Marisa Francois OTR Occupational Therapist                     Mobility/ADL's       Row Name 23 153          Bed Mobility    Comment, (Bed Mobility) NT pt A x2. and very groggy, in/out of sleep. and wants to remain in bed.  -       Row Name 23 153          Transfers    Comment, (Transfers) NT pt wants to remain in bed  -               User Key  (r) = Recorded By, (t) = Taken By, (c) = Cosigned By      Initials Name Provider Type     Marisa Francois OTR Occupational Therapist                   Obj/Interventions       Row Name 23 153          Shoulder (Therapeutic Exercise)    Shoulder (Therapeutic Exercise) PROM (passive range of motion)  -     Shoulder PROM (Therapeutic  Exercise) left;flexion;extension;10 repetitions;aBduction;aDduction  2 sets  -       Row Name 11/01/23 1535          Elbow/Forearm (Therapeutic Exercise)    Elbow/Forearm (Therapeutic Exercise) PROM (passive range of motion)  -     Elbow/Forearm PROM (Therapeutic Exercise) left;flexion;extension;supination;pronation;supine;10 repetitions  2 sets  -       Row Name 11/01/23 1535          Wrist (Therapeutic Exercise)    Wrist (Therapeutic Exercise) PROM (passive range of motion)  -     Wrist PROM (Therapeutic Exercise) left;flexion;extension;10 repetitions  2 sets  -       Row Name 11/01/23 1535          Hand (Therapeutic Exercise)    Hand (Therapeutic Exercise) PROM (passive range of motion)  -     Hand PROM (Therapeutic Exercise) left;finger flexion;finger extension;extrinsic stretch;intrinsic stretch;thumb flexion;10 repetitions  2 sets  -       Row Name 11/01/23 1535          Motor Skills    Therapeutic Exercise shoulder;elbow/forearm;wrist;hand  -       Row Name 11/01/23 1535          Balance    Comment, Balance NT pt wanted to remain in bed.  -               User Key  (r) = Recorded By, (t) = Taken By, (c) = Cosigned By      Initials Name Provider Type    Marisa Borjas, OTR Occupational Therapist                   Goals/Plan    No documentation.                  Clinical Impression       Row Name 11/01/23 1536          Pain Assessment    Pretreatment Pain Rating 0/10 - no pain  -     Posttreatment Pain Rating 0/10 - no pain  -     Pre/Posttreatment Pain Comment but reports sometimes has pain in L UE  -       Row Name 11/01/23 1536          Plan of Care Review    Plan of Care Reviewed With patient  -     Progress no change  -     Outcome Evaluation pt worked w OT today in tx session. pt completed PROM L UE and demo no movement return yet in UE. pt able to reach w RUE for cup and drink from cup. pt fatigued this date and wanted to remain in bed, noted pt is A x2 for bed  mobility and tsf. pt cont to benefit from OT to incr ADL ,strength, ROM, balance, and tsf.  -KP       Row Name 11/01/23 1536          Positioning and Restraints    Pre-Treatment Position in bed  -KP     Post Treatment Position bed  -KP     In Bed supine;exit alarm on;call light within reach;encouraged to call for assist  -KP               User Key  (r) = Recorded By, (t) = Taken By, (c) = Cosigned By      Initials Name Provider Type    Marisa Borjas, OTR Occupational Therapist                   Outcome Measures       Row Name 11/01/23 1538          How much help from another is currently needed...    Putting on and taking off regular lower body clothing? 2  -KP     Bathing (including washing, rinsing, and drying) 2  -KP     Toileting (which includes using toilet bed pan or urinal) 2  -KP     Putting on and taking off regular upper body clothing 2  -KP     Taking care of personal grooming (such as brushing teeth) 3  -KP     Eating meals 3  -KP     AM-PAC 6 Clicks Score (OT) 14  -KP       Row Name 11/01/23 1151          How much help from another person do you currently need...    Turning from your back to your side while in flat bed without using bedrails? 3  -DJ     Moving from lying on back to sitting on the side of a flat bed without bedrails? 2  -DJ     Moving to and from a bed to a chair (including a wheelchair)? 2  -DJ     Standing up from a chair using your arms (e.g., wheelchair, bedside chair)? 2  -DJ     Climbing 3-5 steps with a railing? 1  -DJ     To walk in hospital room? 2  -DJ     AM-PAC 6 Clicks Score (PT) 12  -DJ     Highest level of mobility 4 --> Transferred to chair/commode  -DJ       Row Name 11/01/23 1538 11/01/23 1151       Functional Assessment    Outcome Measure Options AM-PAC 6 Clicks Daily Activity (OT)  -KP AM-PAC 6 Clicks Basic Mobility (PT)  -DJ              User Key  (r) = Recorded By, (t) = Taken By, (c) = Cosigned By      Initials Name Provider Type    Marisa Borjas  Ayden OTR Occupational Therapist    Sheila Talley, PT Physical Therapist                    Occupational Therapy Education       Title: PT OT SLP Therapies (In Progress)       Topic: Occupational Therapy (Done)       Point: ADL training (Done)       Description:   Instruct learner(s) on proper safety adaptation and remediation techniques during self care or transfers.   Instruct in proper use of assistive devices.                  Learning Progress Summary             Patient Acceptance, E, VU by ML at 10/31/2023 0308    Acceptance, E, VU by ML at 10/30/2023 0409    Acceptance, E, VU,NR by BS at 10/29/2023 1214    Comment: Reason for eval/consult, goal setting, next level of care   Family Acceptance, E, VU,NR by BS at 10/29/2023 1214    Comment: Reason for eval/consult, goal setting, next level of care                         Point: Home exercise program (Done)       Description:   Instruct learner(s) on appropriate technique for monitoring, assisting and/or progressing therapeutic exercises/activities.                  Learning Progress Summary             Patient Acceptance, E, VU by ML at 10/31/2023 0308    Acceptance, E, VU by ML at 10/30/2023 0409    Acceptance, E, VU,NR by BS at 10/29/2023 1214    Comment: Reason for eval/consult, goal setting, next level of care   Family Acceptance, E, VU,NR by BS at 10/29/2023 1214    Comment: Reason for eval/consult, goal setting, next level of care                         Point: Precautions (Done)       Description:   Instruct learner(s) on prescribed precautions during self-care and functional transfers.                  Learning Progress Summary             Patient Acceptance, E, VU by ML at 10/31/2023 0308    Acceptance, E, VU by ML at 10/30/2023 0409    Acceptance, E, VU,NR by BS at 10/29/2023 1214    Comment: Reason for eval/consult, goal setting, next level of care   Family Acceptance, E, VU,NR by BS at 10/29/2023 1214    Comment: Reason for eval/consult, goal  setting, next level of care                         Point: Body mechanics (Done)       Description:   Instruct learner(s) on proper positioning and spine alignment during self-care, functional mobility activities and/or exercises.                  Learning Progress Summary             Patient Acceptance, E, VU by  at 10/31/2023 0308    Acceptance, E, VU by  at 10/30/2023 0409    Acceptance, E, VU,NR by BS at 10/29/2023 1214    Comment: Reason for eval/consult, goal setting, next level of care   Family Acceptance, E, VU,NR by  at 10/29/2023 1214    Comment: Reason for eval/consult, goal setting, next level of care                                         User Key       Initials Effective Dates Name Provider Type Discipline     11/14/22 -  Camille Dutta OT Occupational Therapist OT     06/15/23 -  Kendal Flor RN Registered Nurse Nurse                  OT Recommendation and Plan     Plan of Care Review  Plan of Care Reviewed With: patient  Progress: no change  Outcome Evaluation: pt worked w OT today in tx session. pt completed PROM L UE and demo no movement return yet in UE. pt able to reach w RUE for cup and drink from cup. pt fatigued this date and wanted to remain in bed, noted pt is A x2 for bed mobility and tsf. pt cont to benefit from OT to incr ADL ,strength, ROM, balance, and tsf.     Time Calculation:         Time Calculation- OT       Row Name 11/01/23 1538             Time Calculation- OT    OT Start Time 1427  -KP      OT Stop Time 1437  -KP      OT Time Calculation (min) 10 min  -KP      Total Timed Code Minutes- OT 10 minute(s)  -KP      OT Received On 11/01/23  -      OT - Next Appointment 11/02/23  -         Timed Charges    15509 -  OT Neuromuscular Reeducation Minutes 10  -KP         Total Minutes    Timed Charges Total Minutes 10  -KP       Total Minutes 10  -KP                User Key  (r) = Recorded By, (t) = Taken By, (c) = Cosigned By      Initials Name Provider Type      Marisa Francois, YESSICA Occupational Therapist                  Therapy Charges for Today       Code Description Service Date Service Provider Modifiers Qty    [id number] HC OT NEUROMUSC RE EDUCATION EA 15 MIN 11/1/2023 Marisa Francois, YESSICA GO 1                 YESSICA Vidales  11/1/2023

## 2023-11-01 NOTE — PROGRESS NOTES
Discharge Planning Assessment  [facility]     Patient Name: Tigre Amaral  MRN: [id number]  Today's Date: 11/1/2023    Admit Date: 10/28/2023    Plan: [facility], wheel chair van arranged for tomorrow   Discharge Needs Assessment    No documentation.                  Discharge Plan       Row Name 11/01/23 1324       Plan    Plan [facility], wheel chair van arranged for tomorrow    Plan Comments [facility] wheel chair van arranged for tomorrow at [id number] [place] J[place]                  Continued Care and Services - Admitted Since 10/28/2023       Destination Coordination complete.      Service Provider Request Status Selected Services Address Phone Fax Patient Preferred    [facility] [place]  Selected Inpatient Rehabilitation [address] [facility] [id number] [phone] --    [facility] Pending - [facility] N[facility]A [address] [facility] [id number] [phone] --    [facility] AC[facility] REHAB PROGRAM Declined  Clinical Denial N/A [address] [id number] [phone] -- --                  Expected Discharge Date and Time       Expected Discharge Date Expected Discharge Time    Nov 1, 2023            Demographic Summary    No documentation.                  Functional Status    No documentation.                  Psychosocial    No documentation.                  Abuse/Neglect    No documentation.                  Legal    No documentation.                  Substance Abuse    No documentation.                  Patient Forms    No documentation.                     Azul Bledsoe, DAVIN

## 2023-11-01 NOTE — PLAN OF CARE
Goal Outcome Evaluation:      Patient alert with some confusion. Patient has no complaints of pain, slept this shift after attempt at MRI on previous shift.

## 2023-11-01 NOTE — CODE DOCUMENTATION
"Patient Name:  Tigre Amaral  YOB: 1950  MRN:  [id number]  Admit Date:  10/28/2023    Visit Diagnoses:     ICD-10-CM ICD-9-CM   1. Cerebrovascular accident (CVA), unspecified mechanism  I63.9 434.91       Reason For Rapid: chest discomfort    RN Communicated With: Primary RN      Rapid Outcome: patient to remain on current unit    Communication From Rapid Team:   RRT responded to patient complained of chest pain and calling out. Patient was in bed, alert and confused, which dementia is noted in H&P. When asked if he was still in pain, patient did not give a straight answer. EKG was done and did not show anything emergent and troponin was already ordered prior to RRT arrival. Primary RN stated she will call the doctor to notify him of situation and to obtain further orders. Patient began to vomit and zofran was administered per order, see MAR. Patient denied pain after vomiting and then stated he did not remember vomiting. Primary RN assured this RN that she was comfortable with ending the rapid and following up with physician for further orders.    Most Recent Vital Signs  Temp:  [97.5 °F (36.4 °C)-98.8 °F (37.1 °C)] 97.7 °F (36.5 °C)  Heart Rate:  [] 109  Resp:  [18-20] 18  BP: (147-180)/() 175/100  SpO2:  [94 %-98 %] 97 %  on   ;   Device (Oxygen Therapy): room air    Labs:      Glucose   Date/Time Value Ref Range Status   11/01/2023 1741 128 70 - 130 mg/dL Final   10/30/2023 1608 88 70 - 130 mg/dL Final     No results found for: \"SITE\", \"ALLENTEST\", \"PHART\", \"IFU1FOY\", \"PO2ART\", \"RLG3XNX\", \"BASEEXCESS\", \"Z7HPECWX\", \"HGBBG\", \"HCTABG\", \"OXYHEMOGLOBI\", \"METHHGBN\", \"CARBOXYHGB\", \"CO2CT\", \"BAROMETRIC\", \"MODALITY\", \"FIO2\"  Results from last 7 days   Lab Units 10/30/23  0522 10/29/23  0600 10/28/23  1449   WBC 10*3/mm3 9.46 8.49 11.54*   HEMOGLOBIN g/dL 13.1 13.1 14.7   PLATELETS 10*3/mm3 244 222 264     Results from last 7 days   Lab Units 10/31/23  1520 10/31/23  0643 10/30/23  2251 " "10/30/23  0522 10/29/23  2015 10/29/23  0600 10/28/23  1449   SODIUM mmol/L  --  138  --  138  --  139 141   POTASSIUM mmol/L 3.8 3.7 3.2* 3.2*   < > 2.4* 3.1*   CHLORIDE mmol/L  --  107  --  106  --  103 100   CO2 mmol/L  --  21.6*  --  25.0  --  26.4 30.5*   BUN mg/dL  --  9  --  9  --  9 9   CREATININE mg/dL  --  1.07  --  1.11  --  1.28* 1.53*   GLUCOSE mg/dL  --  96  --  91  --  93 76   ALBUMIN g/dL  --   --   --   --   --  3.1* 3.7   BILIRUBIN mg/dL  --   --   --   --   --  0.8 0.6   ALK PHOS U/L  --   --   --   --   --  71 89   AST (SGOT) U/L  --   --   --   --   --  13 14   ALT (SGPT) U/L  --   --   --   --   --  6 7    < > = values in this interval not displayed.   Estimated Creatinine Clearance: 59.5 mL/min (by C-G formula based on SCr of 1.07 mg/dL).          No results found for: \"STREPPNEUAG\", \"LEGANTIGENUR\"        NIH Stroke Scale:  Interval: baseline  1a. Level of Consciousness: 0-->Alert, keenly responsive  1b. LOC Questions: 0-->Answers both questions correctly  1c. LOC Commands: 1-->Performs one task correctly  2. Best Gaze: 0-->Normal  3. Visual: 0-->No visual loss  4. Facial Palsy: 0-->Normal symmetrical movements  5a. Motor Arm, Left: 2-->Some effort against gravity, limb cannot get to or maintain (if cued) 90 (or 45) degrees, drifts down to bed, but has some effort against gravity  5b. Motor Arm, Right: 0-->No drift, limb holds 90 (or 45) degrees for full 10 secs  6a. Motor Leg, Left: 1-->Drift, leg falls by the end of the 5-sec period but does not hit bed  6b. Motor Leg, Right: 0-->No drift, leg holds 30 degree position for full 5 secs  7. Limb Ataxia: 0-->Absent  8. Sensory: 0-->Normal, no sensory loss  9. Best Language: 0-->No aphasia, normal  10. Dysarthria: 0-->Normal  11. Extinction and Inattention (formerly Neglect): 0-->No abnormality    Total (NIH Stroke Scale): 4    Please refer to full rapid documentation on summary page under Index / Code Timeline  "

## 2023-11-01 NOTE — PLAN OF CARE
Goal Outcome Evaluation:  Plan of Care Reviewed With: patient        Progress: declining  Outcome Evaluation: Pt found diagonal in bed with one leg over foot rest, generally confused (he thinks he is in alf and it is 2001, mumbles incoherently about marajuana). Pt has difficulty follwoing dommands but was able to sit EOB with mod A and much encouragement. Pt dem fair sitting balance but refused to stand from EOB. He attempted to return supine twice when sittign and therapise asked him to stand. Pt req mod A of 2 to raise legs back into bed and was dependent to reposition. Slow progress due to confusion. Cont PT to address functional deficits and progress as tolerated.

## 2023-11-01 NOTE — PLAN OF CARE
Goal Outcome Evaluation:  Plan of Care Reviewed With: patient        Progress: no change  Outcome Evaluation: pt worked w OT today in tx session. pt completed PROM L UE and demo no movement return yet in UE. pt able to reach w RUE for cup and drink from cup. pt fatigued this date and wanted to remain in bed, noted pt is A x2 for bed mobility and tsf. pt cont to benefit from OT to incr ADL ,strength, ROM, balance, and tsf.

## 2023-11-01 NOTE — PROGRESS NOTES
DOS: 2023  NAME: Tigre Amaral   : 1950  PCP: Provider, No Known    Chief Complaint   Patient presents with    Fall    Arm Injury        Stroke    Subjective: No acute events overnight.  Lying in bed resting comfortably.  Stepdaughter at bedside.  States he slept all night after receiving the Ativan but was unable to lie still during the MRI to complete it.    Objective:  Vital signs:      Vitals:    10/31/23 2334 23 0344 23 0641 23 0718   BP: 156/83 148/85  169/87   BP Location: Left arm Left arm  Left arm   Patient Position: Lying Lying  Lying   Pulse: 90 87  82   Resp: 18 18  18   Temp: 98.8 °F (37.1 °C) 97.5 °F (36.4 °C)  97.7 °F (36.5 °C)   TempSrc: Oral Oral  Oral   SpO2: 96% 96%  96%   Weight:   70.5 kg (155 lb 6.8 oz)    Height:           Current Facility-Administered Medications:     acetaminophen (TYLENOL) tablet 650 mg, 650 mg, Oral, Q4H PRN, 650 mg at 10/30/23 2032 **OR** acetaminophen (TYLENOL) suppository 650 mg, 650 mg, Rectal, Q4H PRN, Lashon Brooks MD    aspirin chewable tablet 81 mg, 81 mg, Oral, Daily, Carolina Casillas, APRN, 81 mg at 23 0934    atorvastatin (LIPITOR) tablet 80 mg, 80 mg, Oral, Nightly, Lashon Brooks MD, 80 mg at 10/31/23 2109    sennosides-docusate (PERICOLACE) 8.6-50 MG per tablet 2 tablet, 2 tablet, Oral, BID, 2 tablet at 23 0933 **AND** polyethylene glycol (MIRALAX) packet 17 g, 17 g, Oral, Daily PRN **AND** bisacodyl (DULCOLAX) EC tablet 5 mg, 5 mg, Oral, Daily PRN **AND** bisacodyl (DULCOLAX) suppository 10 mg, 10 mg, Rectal, Daily PRN, Lashon Brooks MD    Calcium Replacement - Follow Nurse / BPA Driven Protocol, , Does not apply, PRN, Lashon rBooks MD    carvedilol (COREG) tablet 6.25 mg, 6.25 mg, Oral, BID With Meals, Lashon Brooks MD, 6.25 mg at 23 0933    haloperidol lactate (HALDOL) injection 2 mg, 2 mg, Intravenous, Once PRN, Carolina Casillas, APRN    lisinopril  (PRINIVIL,ZESTRIL) tablet 20 mg, 20 mg, Oral, Daily, Lashon Brooks MD, 20 mg at 11/01/23 0934    LORazepam (ATIVAN) injection 1 mg, 1 mg, Intravenous, Once in imaging, Moira Lindsay, APRN    Magnesium Standard Dose Replacement - Follow Nurse / BPA Driven Protocol, , Does not apply, PRNCecilia Renate Andrea, MD    melatonin tablet 3 mg, 3 mg, Oral, Nightly PRN, Lashon Brooks MD, 3 mg at 10/30/23 2032    nicotine (NICODERM CQ) 21 MG/24HR patch 1 patch, 1 patch, Transdermal, Q24H, Jimenez Zarate MD, 1 patch at 11/01/23 0935    ondansetron (ZOFRAN) tablet 4 mg, 4 mg, Oral, Q6H PRN **OR** ondansetron (ZOFRAN) injection 4 mg, 4 mg, Intravenous, Q6H PRN, Lashon Brooks MD    Phosphorus Replacement - Follow Nurse / BPA Driven Protocol, , Does not apply, PRNCecilia Renate Andrea, MD    Potassium Replacement - Follow Nurse / BPA Driven Protocol, , Does not apply, Cecilia BARRON Renate Andrea, MD    sodium chloride 0.9 % flush 10 mL, 10 mL, Intravenous, PRN, Niko Rivas II, MD    sodium chloride 0.9 % flush 10 mL, 10 mL, Intravenous, Q12H, Prince Roberts MD, 10 mL at 11/01/23 0934    sodium chloride 0.9 % flush 10 mL, 10 mL, Intravenous, PRNArmando Daniel, MD    sodium chloride 0.9 % infusion 40 mL, 40 mL, Intravenous, PRNArmando Daniel, MD    sodium chloride 0.9 % infusion, 75 mL/hr, Intravenous, Continuous, Lashon Brooks MD, Last Rate: 75 mL/hr at 10/31/23 0501, 75 mL/hr at 10/31/23 0501    PRN meds    acetaminophen **OR** acetaminophen    senna-docusate sodium **AND** polyethylene glycol **AND** bisacodyl **AND** bisacodyl    Calcium Replacement - Follow Nurse / BPA Driven Protocol    haloperidol lactate    Magnesium Standard Dose Replacement - Follow Nurse / BPA Driven Protocol    melatonin    ondansetron **OR** ondansetron    Phosphorus Replacement - Follow Nurse / BPA Driven Protocol    Potassium Replacement - Follow Nurse / BPA Driven Protocol    sodium chloride    sodium  "chloride    sodium chloride    No current facility-administered medications on file prior to encounter.     Current Outpatient Medications on File Prior to Encounter   Medication Sig    aspirin 81 MG EC tablet Take 1 tablet by mouth Daily.    carvedilol (COREG) 6.25 MG tablet Take 1 tablet by mouth 2 (Two) Times a Day With Meals.    furosemide (LASIX) 40 MG tablet Take 1 tablet by mouth Daily.    lisinopril (PRINIVIL,ZESTRIL) 20 MG tablet Take 1 tablet by mouth Daily.       General appearance: Elderly male, NAD, alert, edentulous  HEENT: Normocephalic, atraumatic, right-sided facial tumor just below the right orbital  Resp: Even and unlabored  Extremities: Left hand edema, nonpitting     Neurological:   MS: Oriented to self, stated he was in the hospital but unable to name, awake and alert. cooperative  CN: visual fields full, EOMI, facial sensation equal, no facial droop, tongue midline, [place] bilaterally  Motor: 2/5 LUE, 4/5 LLE, 5/5 RUE/RLE  Sensory: light touch sensation intact in all 4 ext.  Coordination: Normal finger to nose test on the right, unable to perform on the left  Gait and station: Deferred  Rapid alternating movements: normal finger to thumb tap on the right, unable to perform on the left    Physical exam performed, changes noted.    Laboratory results:  Lab Results   Component Value Date    TSH 1.880 10/29/2023     Lab Results   Component Value Date    HGBA1C 5.20 10/29/2023     No results found for: \"[id number]\"  Lab Results   Component Value Date    CHOL 146 10/29/2023     Lab Results   Component Value Date    TRIG 72 10/29/2023     Lab Results   Component Value Date    HDL 34 (L) 10/29/2023     Lab Results   Component Value Date    LDL 98 10/29/2023     Lab Results   Component Value Date    WBC 9.46 10/30/2023    HGB 13.1 10/30/2023    HCT 37.9 10/30/2023    MCV 90.0 10/30/2023     10/30/2023     Lab Results   Component Value Date    GLUCOSE 96 10/31/2023    BUN 9 10/31/2023    CREATININE " 1.07 10/31/2023    BCR 8.4 10/31/2023    K 3.8 10/31/2023    CO2 21.6 (L) 10/31/2023    CALCIUM 8.4 (L) 10/31/2023    ALBUMIN 3.1 (L) 10/29/2023    AST 13 10/29/2023    ALT 6 10/29/2023     Lab Results   Component Value Date    PTT 30.0 04/26/2023     Lab Results   Component Value Date    INR 1.02 10/28/2023    INR 0.97 04/26/2023    PROTIME 13.5 10/28/2023    PROTIME 11.3 04/26/2023     Brief Urine Lab Results       None            Review and interpretation of imaging:  CT Angiogram Neck    Result Date: 10/29/2023   There are small age-indeterminate infarcts noted within the right frontal and parietal lobes within the right MCA distribution. Additionally, a small age-indeterminate infarct is seen within the right occipital lobe that is either within the right MCA or right PCA distribution. Further temporal characterization with MR imaging is suggested.  There is a large chronic infarct within the lateral aspect left frontal, parietal, temporal, and occipital lobes within the left MCA distribution and chronic infarcts are noted within the left lentiform nucleus within the lateral lenticulostriate distribution.  There is an approximate 60-70% NASCET stenosis within the proximal portion of the right ICA and an approximate 70-80% NASCET stenosis within the proximal portion of the left ICA secondary to mixed calcified and noncalcified atherosclerotic plaques.  There is a severe degree of stenosis at the origin of the left vertebral artery and a moderate degree of stenosis is seen within the V3 segment of the left vertebral artery.  The remaining intracranial and extracranial atherosclerotic changes are as discussed in detail above.   Radiation dose reduction techniques were utilized, including automated exposure control and exposure modulation based on body size.  This report was finalized on 10/29/2023 7:14 PM by Dr. Jeff Harrison M.D on Workstation: [id number]      CT Angiogram Head    Result Date: 10/29/2023   There  are small age-indeterminate infarcts noted within the right frontal and parietal lobes within the right MCA distribution. Additionally, a small age-indeterminate infarct is seen within the right occipital lobe that is either within the right MCA or right PCA distribution. Further temporal characterization with MR imaging is suggested.  There is a large chronic infarct within the lateral aspect left frontal, parietal, temporal, and occipital lobes within the left MCA distribution and chronic infarcts are noted within the left lentiform nucleus within the lateral lenticulostriate distribution.  There is an approximate 60-70% NASCET stenosis within the proximal portion of the right ICA and an approximate 70-80% NASCET stenosis within the proximal portion of the left ICA secondary to mixed calcified and noncalcified atherosclerotic plaques.  There is a severe degree of stenosis at the origin of the left vertebral artery and a moderate degree of stenosis is seen within the V3 segment of the left vertebral artery.  The remaining intracranial and extracranial atherosclerotic changes are as discussed in detail above.   Radiation dose reduction techniques were utilized, including automated exposure control and exposure modulation based on body size.  This report was finalized on 10/29/2023 7:14 PM by Dr. Jeff Harrison M.D on Workstation: [id number]      CT Head Without Contrast Stroke Protocol    Result Date: 10/28/2023   There is no evidence for acute traumatic intracranial pathology. However, there are small age-indeterminate infarcts noted within the right frontal and parietal lobes that could potentially be acute to subacute in nature. These are within the right MCA distribution. Additionally and similarly, there is an age-indeterminate infarct within the right occipital lobe that is either within the right MCA or right PCA distribution. Further temporal characterization is recommended with MR imaging.  There is a large  chronic infarct within the lateral aspect of the left cerebral hemisphere that is within the left MCA distribution and this involves the lateral aspect of the left frontal, parietal, temporal, and occipital lobes.  There is a 3.3 x 1.9 cm soft tissue mass within the right premaxillary soft tissues that extends into the soft tissues along the right lateral aspect of the nose and this is compatible with a known basal cell carcinoma.  Incidental note is made of a fracture of the right orbital floor that is likely chronic in nature although correlation with clinical data is suggested.  These findings and recommendations were discussed with Dr. Niko Rivas on 10/28/2023 at approximately 3:15 p.m.  Radiation dose reduction techniques were utilized, including automated exposure control and exposure modulation based on body size.  This report was finalized on 10/28/2023 4:02 PM by Dr. Jeff Harrison M.D on Workstation: [id number]      XR Chest 1 View    Result Date: 10/28/2023  Cardiac defibrillator in place. Otherwise negative x-rays of the chest and the left shoulder.  This report was finalized on 10/28/2023 3:07 PM by Dr. Jhonny Charles M.D on Workstation: [id number]      XR Shoulder 2+ View Left    Result Date: 10/28/2023  Cardiac defibrillator in place. Otherwise negative x-rays of the chest and the left shoulder.  This report was finalized on 10/28/2023 3:07 PM by Dr. Jhonny Charles M.D on Workstation: [id number]     Results for orders placed during the hospital encounter of 10/28/23    Adult transthoracic echo complete    Interpretation Summary    Left ventricular systolic function is severely decreased. Calculated left ventricular EF = 21.6% Left ventricular ejection fraction appears to be less than 20%.The following left ventricular wall segments are hypokinetic: mid anterolateral, basal inferolateral, mid inferolateral, mid inferior, basal inferoseptal, mid inferoseptal, mid anteroseptal, basal inferior and basal  inferoseptal. The following left ventricular wall segments are akinetic: apical anterior, apical lateral, apical inferior, apical septal and apex.    Left ventricular wall thickness is consistent with mild concentric hypertrophy.    Left ventricular diastolic function is consistent with (grade II w/high LAP) pseudonormalization.    The left atrial cavity is mildly dilated.    Saline test results are negative for right to left atrial level shunt.    Estimated right ventricular systolic pressure from tricuspid regurgitation is mildly elevated (35-45 mmHg).      Impression/Assessment:  This is a 73-year-old male with past medical history of tobacco abuse, alcohol dependency, traumatic brain injury, pacemaker placement, hypertension, reported dementia history who presented to the hospital on 10/28/2023 with complaints of left upper extremity weakness.  Reportedly per documentation from the ER physician symptoms started Thursday night after he sustained a fall.  His family member stated that she did not notice significant left arm weakness after the fall but that the patient was complaining of pain.  He has aspirin listed as a home med however unclear if he has been compliant with this daily.  His initial noncontrast CT head did not reveal any acute intracranial abnormalities, known area of large left MCA territory infarct vs TBI noted as well as age-indeterminate infarct within the right MCA and PCA territories.     BP on arrival 133/75, HR 74. AV paced rhythm.  Temp 98.2. . K+ 3.1. Cr. 1.28.      Diagnosis:  Left-sided hemiparesis, suspect subacute right MCA infarct  Dementia/history of TBI  Bilateral carotid stenosis   History of stroke  Tobacco abuse  Cardiomyopathy     Additional work up to date:  2D Echo: LA cavity mildly dilated, saline test negative, EF 21.6%, septal wall motion abnormality noted, no evidence of LV thrombus or mass, no AV stenosis, mild to moderate MVR.    Plan:  Was unable to complete MRI  last night despite receiving 1 mg of lorazepam.  Would like to reattempt this today, have ordered 2 mg of Haldol IV to be given prior to going down.  EKG also ordered to check his QT interval, if >450 hold Haldol.  If unable to complete MRI, will order follow-up CT head to be performed in the morning.  Can cancel CT head order if MRI is complete.  For now continue ASA 81 mg  Continue Lipitor 80 mg  We will leave further recommendations on CTA findings once we can review his MRI to determine source of stroke.  Needs follow-up with dermatology outpatient for facial growth abnormality.  Neurochecks per stroke protocol  Avoid hypotension  Stroke Education  OLMAN/SCDs  PT/OT/ST  Therapies as written. CCP for discharge planning. Call RRT for any acute neurological changes.   We will continue to follow and advise.    Case discussed with patient, RN, Dr. Barnes, and Dr. Pabon, and he agrees with plan above.    SUNG Munroe

## 2023-11-01 NOTE — PROGRESS NOTES
[facility]     LOS: 4 days     Subjective:    CC:Fall and Arm Injury    DIET:  Diet Order   Procedures    Diet: Cardiac Diets; Healthy Heart (2-3 Na+); Texture: Soft to Chew (NDD 3); Soft to Chew: Chopped Meat; Fluid Consistency: Thin (IDDSI 0)   Repeating mri when more cooperative  Rapid response    Objective:    Vital Signs:  Temp:  [97.5 °F (36.4 °C)-98.8 °F (37.1 °C)] 97.7 °F (36.5 °C)  Heart Rate:  [] 109  Resp:  [18-20] 18  BP: (147-180)/() 175/100    SpO2:  [94 %-98 %] 96 %  on  Flow (L/min):  [3] 3;   Device (Oxygen Therapy): nasal cannula  Body mass index is 23.63 kg/m².    Physical Exam  HENT:      Head: Normocephalic and atraumatic.   Cardiovascular:      Heart sounds: No murmur heard.     No friction rub.   Pulmonary:      Effort: Pulmonary effort is normal.      Breath sounds: Normal breath sounds.   Abdominal:      General: Bowel sounds are normal. There is no distension.      Palpations: Abdomen is soft.      Tenderness: There is no abdominal tenderness.   Skin:     General: Skin is warm and dry.   Neurological:      Comments: Left sided paresis arm and leg, [facility]         Results Review:    Glucose   Date Value Ref Range Status   10/31/2023 96 65 - 99 mg/dL Final   10/30/2023 91 65 - 99 mg/dL Final     Results from last 7 days   Lab Units 10/30/23  0522   WBC 10*3/mm3 9.46   HEMOGLOBIN g/dL 13.1   HEMATOCRIT % 37.9   PLATELETS 10*3/mm3 244     Results from last 7 days   Lab Units 10/31/23  1520 10/31/23  0643 10/29/23  2015 10/29/23  0600   SODIUM mmol/L  --  138   < > 139   POTASSIUM mmol/L 3.8 3.7   < > 2.4*   CHLORIDE mmol/L  --  107   < > 103   CO2 mmol/L  --  21.6*   < > 26.4   BUN mg/dL  --  9   < > 9   CREATININE mg/dL  --  1.07   < > 1.28*   CALCIUM mg/dL  --  8.4*   < > 8.3*   BILIRUBIN mg/dL  --   --   --  0.8   ALK PHOS U/L  --   --   --  71   ALT (SGPT) U/L  --   --   --  6   AST (SGOT) U/L  --   --   --  13   GLUCOSE  "mg/dL  --  96   < > 93    < > = values in this interval not displayed.     Results from last 7 days   Lab Units 10/28/23  1449   INR  1.02     Results from last 7 days   Lab Units 10/29/23  0600   MAGNESIUM mg/dL 1.8         Cultures:  No results found for: \"BLOODCX\", \"URINECX\", \"WOUNDCX\", \"MRSACX\", \"RESPCX\", \"STOOLCX\"    I have reviewed daily medications and changes in CPOE    Scheduled meds  aspirin, 81 mg, Oral, Daily  atorvastatin, 80 mg, Oral, Nightly  carvedilol, 6.25 mg, Oral, BID With Meals  lisinopril, 20 mg, Oral, Daily  LORazepam, 1 mg, Intravenous, Once in imaging  nicotine, 1 patch, Transdermal, Q24H  senna-docusate sodium, 2 tablet, Oral, BID  sodium chloride, 10 mL, Intravenous, Q12H        sodium chloride, 75 mL/hr, Last Rate: 75 mL/hr (10/31/23 0501)      PRN meds    acetaminophen **OR** acetaminophen    senna-docusate sodium **AND** polyethylene glycol **AND** bisacodyl **AND** bisacodyl    Calcium Replacement - Follow Nurse / BPA Driven Protocol    haloperidol lactate    Magnesium Standard Dose Replacement - Follow Nurse / BPA Driven Protocol    melatonin    ondansetron **OR** ondansetron    Phosphorus Replacement - Follow Nurse / BPA Driven Protocol    Potassium Replacement - Follow Nurse / BPA Driven Protocol    sodium chloride    sodium chloride    sodium chloride        Stroke        Assessment/Plan:    73 y.o. male admitted with Stroke.     Left-sided hemiparesis  -CT head shows encephalomalacia of the left MCA region and right-sided parietal hypodensity.  -Stroke Work-up is pending.  -Aspirin, high-dose statin   -Did not seem to be moving left side well yesterday but he was sedated for the MRI.  He is moving left side without any problem today    Chest pain today lasting a few minutes and quickly resolved, occurred while the patient was eating.  A rapid response was called.  Concerned that the patient may have had some choking or aspiration as the patient vomited afterward  -Cardiology " consultation  -EKG with no ischemic changes but he is paced  -Troponin pending  -Speech eval  -Chest x-ray unremarkable    Hypertension  -Continue lisinopril and Coreg     Hypokalemia  -continue replacement     Leukocytosis  -Monitor for possible aspiration     Dvt ppx; add lovenox low dose, given his immobility      Elevated troponin noted, patient not having any chest pain at this time.  EKG difficult to interpret paced.  Cardiology has been consulted.  We will add low-dose Lovenox, continue with aspirin Plavix    Abhinav Barnes MD  11/01/23  18:07 EDT

## 2023-11-02 ENCOUNTER — APPOINTMENT (OUTPATIENT)
Dept: MRI IMAGING | Facility: HOSPITAL | Age: 73
DRG: 034 | End: 2023-11-02
Payer: OTHER GOVERNMENT

## 2023-11-02 ENCOUNTER — APPOINTMENT (OUTPATIENT)
Dept: GENERAL RADIOLOGY | Facility: HOSPITAL | Age: 73
DRG: 034 | End: 2023-11-02
Payer: OTHER GOVERNMENT

## 2023-11-02 ENCOUNTER — APPOINTMENT (OUTPATIENT)
Dept: CARDIOLOGY | Facility: HOSPITAL | Age: 73
DRG: 034 | End: 2023-11-02
Payer: OTHER GOVERNMENT

## 2023-11-02 ENCOUNTER — APPOINTMENT (OUTPATIENT)
Dept: CT IMAGING | Facility: HOSPITAL | Age: 73
DRG: 034 | End: 2023-11-02
Payer: OTHER GOVERNMENT

## 2023-11-02 LAB
ALBUMIN SERPL-MCNC: 3.3 G/DL (ref 3.5–5.2)
ALBUMIN/GLOB SERPL: 1.2 G/DL
ALP SERPL-CCNC: 76 U/L (ref 39–117)
ALT SERPL W P-5'-P-CCNC: 16 U/L (ref 1–41)
ANION GAP SERPL CALCULATED.3IONS-SCNC: 8.9 MMOL/L (ref 5–15)
AST SERPL-CCNC: 51 U/L (ref 1–40)
ATMOSPHERIC PRESS: 759.1 MMHG
BASE EXCESS BLDV CALC-SCNC: -3.5 MMOL/L (ref -2–2)
BASOPHILS # BLD AUTO: 0.1 10*3/MM3 (ref 0–0.2)
BASOPHILS NFR BLD AUTO: 0.8 % (ref 0–1.5)
BDY SITE: ABNORMAL
BH CV VAS PRELIMINARY FINDINGS SCRIPTING: 1
BH CV XLRA MEAS LEFT DIST CCA EDV: -9 CM/SEC
BH CV XLRA MEAS LEFT DIST CCA PSV: -29.9 CM/SEC
BH CV XLRA MEAS LEFT DIST ICA EDV: -38.7 CM/SEC
BH CV XLRA MEAS LEFT DIST ICA PSV: -109.4 CM/SEC
BH CV XLRA MEAS LEFT ICA/CCA RATIO: 18.27
BH CV XLRA MEAS LEFT MID ICA EDV: -64 CM/SEC
BH CV XLRA MEAS LEFT MID ICA PSV: -154.1 CM/SEC
BH CV XLRA MEAS LEFT PROX CCA EDV: 10.9 CM/SEC
BH CV XLRA MEAS LEFT PROX CCA PSV: 56 CM/SEC
BH CV XLRA MEAS LEFT PROX ECA EDV: -11 CM/SEC
BH CV XLRA MEAS LEFT PROX ECA PSV: -73.3 CM/SEC
BH CV XLRA MEAS LEFT PROX ICA EDV: -318.1 CM/SEC
BH CV XLRA MEAS LEFT PROX ICA PSV: -546.2 CM/SEC
BH CV XLRA MEAS LEFT PROX SCLA PSV: 146.2 CM/SEC
BH CV XLRA MEAS LEFT VERTEBRAL A EDV: -6.3 CM/SEC
BH CV XLRA MEAS LEFT VERTEBRAL A PSV: -34.7 CM/SEC
BH CV XLRA MEAS RIGHT DIST CCA PSV: -37 CM/SEC
BH CV XLRA MEAS RIGHT DIST ICA EDV: -8.3 CM/SEC
BH CV XLRA MEAS RIGHT DIST ICA PSV: -24.7 CM/SEC
BH CV XLRA MEAS RIGHT ICA/CCA RATIO: 19.8
BH CV XLRA MEAS RIGHT MID ICA EDV: -32.7 CM/SEC
BH CV XLRA MEAS RIGHT MID ICA PSV: -137.2 CM/SEC
BH CV XLRA MEAS RIGHT PROX CCA EDV: 6.7 CM/SEC
BH CV XLRA MEAS RIGHT PROX CCA PSV: 53.2 CM/SEC
BH CV XLRA MEAS RIGHT PROX ECA EDV: 11.3 CM/SEC
BH CV XLRA MEAS RIGHT PROX ECA PSV: 146.4 CM/SEC
BH CV XLRA MEAS RIGHT PROX ICA EDV: -186.6 CM/SEC
BH CV XLRA MEAS RIGHT PROX ICA PSV: -732.6 CM/SEC
BH CV XLRA MEAS RIGHT PROX SCLA PSV: 166 CM/SEC
BH CV XLRA MEAS RIGHT VERTEBRAL A EDV: -23.2 CM/SEC
BH CV XLRA MEAS RIGHT VERTEBRAL A PSV: -76.3 CM/SEC
BILIRUB SERPL-MCNC: 1.2 MG/DL (ref 0–1.2)
BUN SERPL-MCNC: 11 MG/DL (ref 8–23)
BUN/CREAT SERPL: 9.6 (ref 7–25)
CALCIUM SPEC-SCNC: 8.5 MG/DL (ref 8.6–10.5)
CHLORIDE SERPL-SCNC: 109 MMOL/L (ref 98–107)
CO2 BLDA-SCNC: 22.9 MMOL/L (ref 23–27)
CO2 SERPL-SCNC: 18.1 MMOL/L (ref 22–29)
CREAT SERPL-MCNC: 1.14 MG/DL (ref 0.76–1.27)
DEPRECATED RDW RBC AUTO: 40.4 FL (ref 37–54)
DEVICE COMMENT: ABNORMAL
EGFRCR SERPLBLD CKD-EPI 2021: 67.1 ML/MIN/1.73
EOSINOPHIL # BLD AUTO: 0.07 10*3/MM3 (ref 0–0.4)
EOSINOPHIL NFR BLD AUTO: 0.5 % (ref 0.3–6.2)
ERYTHROCYTE [DISTWIDTH] IN BLOOD BY AUTOMATED COUNT: 12.2 % (ref 12.3–15.4)
GLOBULIN UR ELPH-MCNC: 2.8 GM/DL
GLUCOSE BLDC GLUCOMTR-MCNC: 116 MG/DL (ref 70–130)
GLUCOSE BLDC GLUCOMTR-MCNC: 129 MG/DL (ref 70–130)
GLUCOSE SERPL-MCNC: 108 MG/DL (ref 65–99)
HCO3 BLDV-SCNC: 21.7 MMOL/L (ref 22–28)
HCT VFR BLD AUTO: 39.5 % (ref 37.5–51)
HGB BLD-MCNC: 13.5 G/DL (ref 13–17.7)
IMM GRANULOCYTES # BLD AUTO: 0.06 10*3/MM3 (ref 0–0.05)
IMM GRANULOCYTES NFR BLD AUTO: 0.5 % (ref 0–0.5)
INHALED O2 CONCENTRATION: 21 %
LYMPHOCYTES # BLD AUTO: 2.62 10*3/MM3 (ref 0.7–3.1)
LYMPHOCYTES NFR BLD AUTO: 20.4 % (ref 19.6–45.3)
MCH RBC QN AUTO: 30.8 PG (ref 26.6–33)
MCHC RBC AUTO-ENTMCNC: 34.2 G/DL (ref 31.5–35.7)
MCV RBC AUTO: 90.2 FL (ref 79–97)
MODALITY: ABNORMAL
MONOCYTES # BLD AUTO: 1.36 10*3/MM3 (ref 0.1–0.9)
MONOCYTES NFR BLD AUTO: 10.6 % (ref 5–12)
NEUTROPHILS NFR BLD AUTO: 67.2 % (ref 42.7–76)
NEUTROPHILS NFR BLD AUTO: 8.66 10*3/MM3 (ref 1.7–7)
NRBC BLD AUTO-RTO: 0 /100 WBC (ref 0–0.2)
NT-PROBNP SERPL-MCNC: ABNORMAL PG/ML (ref 0–1800)
PCO2 BLDV: 38.9 MM HG (ref 41–51)
PH BLDV: 7.36 PH UNITS (ref 7.31–7.41)
PLATELET # BLD AUTO: 270 10*3/MM3 (ref 140–450)
PMV BLD AUTO: 10 FL (ref 6–12)
PO2 BLDV: 21.1 MM HG (ref 35–45)
POTASSIUM SERPL-SCNC: 3.9 MMOL/L (ref 3.5–5.2)
POTASSIUM SERPL-SCNC: 3.9 MMOL/L (ref 3.5–5.2)
PROCALCITONIN SERPL-MCNC: 0.06 NG/ML (ref 0–0.25)
PROT SERPL-MCNC: 6.1 G/DL (ref 6–8.5)
QT INTERVAL: 400 MS
QT INTERVAL: 423 MS
QTC INTERVAL: 543 MS
QTC INTERVAL: 577 MS
RBC # BLD AUTO: 4.38 10*6/MM3 (ref 4.14–5.8)
SAO2 % BLDCOV: 32.8 % (ref 45–75)
SODIUM SERPL-SCNC: 136 MMOL/L (ref 136–145)
TOTAL RATE: 26 BREATHS/MINUTE
TROPONIN T SERPL HS-MCNC: 425 NG/L
TROPONIN T SERPL HS-MCNC: 451 NG/L
WBC NRBC COR # BLD: 12.87 10*3/MM3 (ref 3.4–10.8)

## 2023-11-02 PROCEDURE — 87040 BLOOD CULTURE FOR BACTERIA: CPT | Performed by: INTERNAL MEDICINE

## 2023-11-02 PROCEDURE — 84145 PROCALCITONIN (PCT): CPT | Performed by: HOSPITALIST

## 2023-11-02 PROCEDURE — 94761 N-INVAS EAR/PLS OXIMETRY MLT: CPT

## 2023-11-02 PROCEDURE — 93005 ELECTROCARDIOGRAM TRACING: CPT | Performed by: HOSPITALIST

## 2023-11-02 PROCEDURE — 82803 BLOOD GASES ANY COMBINATION: CPT

## 2023-11-02 PROCEDURE — 25810000003 SODIUM CHLORIDE 0.9 % SOLUTION: Performed by: INTERNAL MEDICINE

## 2023-11-02 PROCEDURE — 80053 COMPREHEN METABOLIC PANEL: CPT | Performed by: HOSPITALIST

## 2023-11-02 PROCEDURE — 82948 REAGENT STRIP/BLOOD GLUCOSE: CPT

## 2023-11-02 PROCEDURE — 99232 SBSQ HOSP IP/OBS MODERATE 35: CPT | Performed by: NURSE PRACTITIONER

## 2023-11-02 PROCEDURE — 25010000002 FUROSEMIDE PER 20 MG: Performed by: INTERNAL MEDICINE

## 2023-11-02 PROCEDURE — 94799 UNLISTED PULMONARY SVC/PX: CPT

## 2023-11-02 PROCEDURE — 93010 ELECTROCARDIOGRAM REPORT: CPT | Performed by: INTERNAL MEDICINE

## 2023-11-02 PROCEDURE — 85025 COMPLETE CBC W/AUTO DIFF WBC: CPT | Performed by: HOSPITALIST

## 2023-11-02 PROCEDURE — 94760 N-INVAS EAR/PLS OXIMETRY 1: CPT

## 2023-11-02 PROCEDURE — 84484 ASSAY OF TROPONIN QUANT: CPT | Performed by: HOSPITALIST

## 2023-11-02 PROCEDURE — 25010000002 FUROSEMIDE PER 20 MG: Performed by: HOSPITALIST

## 2023-11-02 PROCEDURE — 71045 X-RAY EXAM CHEST 1 VIEW: CPT

## 2023-11-02 PROCEDURE — 93880 EXTRACRANIAL BILAT STUDY: CPT

## 2023-11-02 PROCEDURE — 70450 CT HEAD/BRAIN W/O DYE: CPT

## 2023-11-02 PROCEDURE — 92610 EVALUATE SWALLOWING FUNCTION: CPT

## 2023-11-02 PROCEDURE — 25010000002 LORAZEPAM PER 2 MG: Performed by: NURSE PRACTITIONER

## 2023-11-02 PROCEDURE — 25010000002 PIPERACILLIN SOD-TAZOBACTAM PER 1 G: Performed by: INTERNAL MEDICINE

## 2023-11-02 PROCEDURE — 83880 ASSAY OF NATRIURETIC PEPTIDE: CPT | Performed by: HOSPITALIST

## 2023-11-02 PROCEDURE — 84132 ASSAY OF SERUM POTASSIUM: CPT | Performed by: HOSPITALIST

## 2023-11-02 PROCEDURE — 25010000002 ENOXAPARIN PER 10 MG: Performed by: HOSPITALIST

## 2023-11-02 PROCEDURE — 70551 MRI BRAIN STEM W/O DYE: CPT

## 2023-11-02 RX ORDER — FUROSEMIDE 10 MG/ML
40 INJECTION INTRAMUSCULAR; INTRAVENOUS ONCE
Status: DISCONTINUED | OUTPATIENT
Start: 2023-11-02 | End: 2023-11-02

## 2023-11-02 RX ORDER — NITROGLYCERIN 0.4 MG/1
0.4 TABLET SUBLINGUAL
Status: DISCONTINUED | OUTPATIENT
Start: 2023-11-02 | End: 2023-11-11 | Stop reason: HOSPADM

## 2023-11-02 RX ORDER — FUROSEMIDE 10 MG/ML
80 INJECTION INTRAMUSCULAR; INTRAVENOUS ONCE
Status: COMPLETED | OUTPATIENT
Start: 2023-11-02 | End: 2023-11-02

## 2023-11-02 RX ORDER — LORAZEPAM 2 MG/ML
1 INJECTION INTRAMUSCULAR ONCE AS NEEDED
Status: COMPLETED | OUTPATIENT
Start: 2023-11-02 | End: 2023-11-02

## 2023-11-02 RX ORDER — FUROSEMIDE 10 MG/ML
20 INJECTION INTRAMUSCULAR; INTRAVENOUS ONCE
Status: COMPLETED | OUTPATIENT
Start: 2023-11-02 | End: 2023-11-02

## 2023-11-02 RX ADMIN — Medication 10 ML: at 20:54

## 2023-11-02 RX ADMIN — ATORVASTATIN CALCIUM 80 MG: 80 TABLET, FILM COATED ORAL at 20:52

## 2023-11-02 RX ADMIN — ENOXAPARIN SODIUM 40 MG: 100 INJECTION SUBCUTANEOUS at 23:53

## 2023-11-02 RX ADMIN — Medication 10 ML: at 08:50

## 2023-11-02 RX ADMIN — POTASSIUM CHLORIDE 40 MEQ: 1.5 FOR SOLUTION ORAL at 02:51

## 2023-11-02 RX ADMIN — LISINOPRIL 20 MG: 20 TABLET ORAL at 09:02

## 2023-11-02 RX ADMIN — PIPERACILLIN SODIUM AND TAZOBACTAM SODIUM 3.38 G: 3; .375 INJECTION, SOLUTION INTRAVENOUS at 16:06

## 2023-11-02 RX ADMIN — PIPERACILLIN SODIUM AND TAZOBACTAM SODIUM 3.38 G: 3; .375 INJECTION, SOLUTION INTRAVENOUS at 09:30

## 2023-11-02 RX ADMIN — FUROSEMIDE 80 MG: 10 INJECTION, SOLUTION INTRAMUSCULAR; INTRAVENOUS at 08:38

## 2023-11-02 RX ADMIN — LORAZEPAM 1 MG: 2 INJECTION INTRAMUSCULAR; INTRAVENOUS at 14:18

## 2023-11-02 RX ADMIN — ASPIRIN 81 MG: 81 TABLET, CHEWABLE ORAL at 09:02

## 2023-11-02 RX ADMIN — CARVEDILOL 6.25 MG: 6.25 TABLET, FILM COATED ORAL at 09:02

## 2023-11-02 RX ADMIN — SODIUM CHLORIDE 75 ML/HR: 9 INJECTION, SOLUTION INTRAVENOUS at 02:12

## 2023-11-02 RX ADMIN — Medication 3 MG: at 21:06

## 2023-11-02 RX ADMIN — NICOTINE 1 PATCH: 21 PATCH, EXTENDED RELEASE TRANSDERMAL at 13:46

## 2023-11-02 RX ADMIN — CARVEDILOL 6.25 MG: 6.25 TABLET, FILM COATED ORAL at 18:19

## 2023-11-02 RX ADMIN — LORAZEPAM 1 MG: 2 INJECTION INTRAMUSCULAR; INTRAVENOUS at 15:30

## 2023-11-02 RX ADMIN — PIPERACILLIN SODIUM AND TAZOBACTAM SODIUM 3.38 G: 3; .375 INJECTION, SOLUTION INTRAVENOUS at 23:54

## 2023-11-02 RX ADMIN — FUROSEMIDE 20 MG: 20 INJECTION, SOLUTION INTRAMUSCULAR; INTRAVENOUS at 07:12

## 2023-11-02 NOTE — CONSULTS
CONSULT NOTE    Patient Identification:  Tigre Amaral  73 y.o.  male  1950  [id number]            Requesting physician: Dr. Abhinav Barnes    Reason for Consultation:  resp distress icu mgmt    CC: resp distress    History of Present Illness:  Patient is a 73-year-old admitted to the hospital for a stroke with left-sided hemiparesis who had chest pain yesterday with EKG showing no ischemic changes and cardiology consulted.  This morning patient was found to be in respiratory distress with increased work of breathing hypertensive and rapid response was called.  We have been consulted to help manage respiratory distress and management of patient in the unit.  At present time the patient is tachypneic.    Echocardiogram yesterday showed  an EF of 20%.Reviewing care everywhere the patient has a history of cardiomyopathy with a defibrillator in place follows with [facility] cardiology.  Has a history of hypertension hyperlipidemia as well on aspirin Coreg Lasix and lisinopril seen Dr. Kenyon reviewed last office note.    Patient is awake alert difficulty would be hard to obtain it so it appears the patient tries to give answers in a humorous manner.  From what I can tell he has not thrown up he has no chest pain he is awake saturating well on nasal cannula.  He is in slight respiratory distress.  He denies active chest pain.    He is not cooperative with nursing or interview.      Review of Systems:  Unable to obtain secondary to respiratory distress  Previous medical history:   Hypertension  NICM s/p icd   HF systolic ef 20      Previous surgical history unable to obtain secondary to respiratory distress     Medications Prior to Admission   Medication Sig Dispense Refill Last Dose    aspirin 81 MG EC tablet Take 1 tablet by mouth Daily.   10/28/2023    carvedilol (COREG) 6.25 MG tablet Take 1 tablet by mouth 2 (Two) Times a Day With Meals.   10/28/2023    furosemide (LASIX) 40 MG tablet  "Take 1 tablet by mouth Daily.   10/28/2023    lisinopril (PRINIVIL,ZESTRIL) 20 MG tablet Take 1 tablet by mouth Daily.   10/28/2023       No Known Allergies  Social history unable to obtain at present time reported to be single living [facility]  Per chart review from outpatient cardiology smoker.  No alcohol or drugs per outpatient records    Family history noncontributory    Physical Exam:  BP (!) 173/113   Pulse (!) 121   Temp 97.6 °F (36.4 °C) (Oral)   Resp 18   Ht 172.7 cm (68\")   Wt 75.2 kg (165 lb 12.6 oz)   SpO2 95%   BMI 25.21 kg/m²   Body mass index is 25.21 kg/m².   General appearance: Awake alert, conversant   Eyes: Anicteric sclerae, moist conjunctivae; no lid lag;   HENT: Atraumatic; oropharynx clear with moist mucous membranes and no mucosal ulcerations; large 4 to 5 cm skin abnormality partially obstructing right  Neck: Trachea midline; positive JVD  Lungs: Bilateral air entry, crackles posterior with mildly increased respiratory effort and positive intercostal retractions  CV: Tachycardic and nonrigid  Abdomen: thin non rigid  Extremities:   Skin:warm dry  Psych: calm affect, alert       LABS:  High-sensitivity troponin 425  Creatinine 1.14  Glucose 108  Bicarb 18.1  AST 51  Procalcitonin 0.06    WBC 12.8  Hemoglobin 13.5  Platelets 270    Lactate 1.8    Imaging: I personally visualized the images of scans/x-rays performed within last 3 days.    Cxr - right middle lobe infiltrate, +vascular congestion +ICD     Left ventricular systolic function is severely decreased. Calculated left ventricular EF = 21.6% Left ventricular ejection fraction appears to be less than 20%.The following left ventricular wall segments are hypokinetic: mid anterolateral, basal inferolateral, mid inferolateral, mid inferior, basal inferoseptal, mid inferoseptal, mid anteroseptal, basal inferior and basal inferoseptal. The following left ventricular wall segments are akinetic: apical anterior, apical lateral, " apical inferior, apical septal and apex.    Left ventricular wall thickness is consistent with mild concentric hypertrophy.    Left ventricular diastolic function is consistent with (grade II w/high LAP) pseudonormalization.    The left atrial cavity is mildly dilated.    Saline test results are negative for right to left atrial level shunt.    Estimated right ventricular systolic pressure from tricuspid regurgitation is mildly elevated (35-45 mmHg).      Assessment and plan  Acute on chronic decompensated systolic heart failure with EF 20%  Cardiomyopathy per outpatient records no mention of ischemia or coronary artery disease  Status post ICD.  Hypertension  Hyperlipidemia  Aspiration pneumonia  Stroke  Respiratory distress  Acute pulmonary edema from heart failure    Transthoracic echocardiogram shows an EF of 20% patient is very hypertensive in respiratory distress chest x-ray shows likely aspiration pneumonia in the right middle lobe as well as pulmonary edema.    Give Lasix 80 mg stat     Bp control - improving    Obtain ABG.    Start Zosyn for aspiration pneumonia obtain blood cultures    Titrated oxygen down    Cardiology has been consulted await recommendations-seems to follow with [facility] cardiology - note amiodarone nitro orders    Seen stat brought down to ICU by rapid response team.    Cxr reviewed urgently.    Total critical care time was 50 minutes, excluding any separately billable procedure time.  Time did not overlap with any other provider.        Magdiel Murray MD  [facility]  11/02/23  07:59EDT

## 2023-11-02 NOTE — CODE DOCUMENTATION
RRT called after pt became SOA after being turned.  O2 sats 92% on 4L NC and tachycardic.  CXR  and EKG obtained and troponin ordered stat.

## 2023-11-02 NOTE — CONSULTS
Name: Tigre Amaral ADMIT: 10/28/2023   : 1950  PCP: Provider, No Known    MRN: [id number] LOS: 5 days   AGE/SEX: 73 y.o. male  ROOM: [address] [place] [address]      Patient Care Team:  Provider, No Known as PCP - General  Chief Complaint   Patient presents with    Fall    Arm Injury     CC:consult for right carotid stenosis. Patient complains of being restrained by his pulse oximeter.     Subjective     Inpatient Vascular Surgery Consult  Consult performed by: Evan Ramos II, MD  Consult ordered by: Tyler Villalpando MD        Fall    Arm Injury       Patient is a pleasant 73-year-old gentleman with a history of dementia, hypertension, previous TBI, who was admitted after a fall and was noted to have some left arm and leg weakness on .  Patient CT scans were initially equivocal for infarct due to his previous TBI and some encephalomalacia but did have perhaps right parietal lesion on CT scan.  There was some difficulty obtaining MRI due to patient agitation but he did have this completed today which shows multiple right-sided infarcts mostly in a watershed distribution.   As noted previously the patient has dementia and at the time of my evaluation is very fixated on leaving the hospital and not being restrained by his pulse oximeter.  He is not able to tell me what year it is, where he is, or why he is here.  He does know he is in [facility].  He is able to move the toes on his left foot and  with his left hand with some encouragement but does not move either of the left arm or leg otherwise.  He denies being in pain      Review of Systems  UTO secondary to patient AMS/agitation.     PMH: htn, dementia  Surg Hx: UTO 2/2 patient dementia  Fam Hx: UTO 2/2 dementia     Medications Prior to Admission   Medication Sig Dispense Refill Last Dose    aspirin 81 MG EC tablet Take 1 tablet by mouth Daily.   10/28/2023    carvedilol (COREG) 6.25 MG tablet Take 1 tablet by mouth 2 (Two)  Times a Day With Meals.   10/28/2023    furosemide (LASIX) 40 MG tablet Take 1 tablet by mouth Daily.   10/28/2023    lisinopril (PRINIVIL,ZESTRIL) 20 MG tablet Take 1 tablet by mouth Daily.   10/28/2023     amiodarone, 150 mg, Intravenous, Once  aspirin, 81 mg, Oral, Daily  atorvastatin, 80 mg, Oral, Nightly  carvedilol, 6.25 mg, Oral, BID With Meals  enoxaparin, 40 mg, Subcutaneous, Q24H  lisinopril, 20 mg, Oral, Daily  nicotine, 1 patch, Transdermal, Q24H  piperacillin-tazobactam, 3.375 g, Intravenous, Q8H  senna-docusate sodium, 2 tablet, Oral, BID  sodium chloride, 10 mL, Intravenous, Q12H      amiodarone, 0.5 mg/min        acetaminophen **OR** [DISCONTINUED] acetaminophen    senna-docusate sodium **AND** polyethylene glycol **AND** bisacodyl **AND** bisacodyl    Magnesium Standard Dose Replacement - Follow Nurse / BPA Driven Protocol    melatonin    nitroglycerin    ondansetron **OR** ondansetron    Phosphorus Replacement - Follow Nurse / BPA Driven Protocol    Potassium Replacement - Follow Nurse / BPA Driven Protocol    sodium chloride    sodium chloride  Patient has no known allergies.    Objective     Physical Exam:  Physical Exam  Vitals reviewed.   Constitutional:       General: He is not in acute distress.     Appearance: He is not toxic-appearing.   HENT:      Head:        Comments: Mass.  Eyes:      General: No scleral icterus.  Cardiovascular:      Rate and Rhythm: Normal rate and regular rhythm.   Pulmonary:      Effort: Pulmonary effort is normal. No respiratory distress.   Abdominal:      Palpations: Abdomen is soft.      Tenderness: There is no abdominal tenderness.   Skin:     General: Skin is warm and dry.   Neurological:      Mental Status: He is alert. He is disoriented.      Comments: Does not lift left arm or leg off bed, and can not hold them off the bed when I lifted them. Does  with left hand and move left foot to command.   5/5 strength R arm leg. No gross CN deficits.     Psychiatric:         Mood and Affect: Mood normal.         Behavior: Behavior normal.          Vital Signs and Labs:  Vital Signs Patient Vitals for the past 24 hrs:   BP Temp Temp src Pulse Resp SpO2 Weight   11/02/23 1800 137/88 -- -- 79 -- -- --   11/02/23 1600 136/79 97.7 °F (36.5 °C) Axillary 85 -- 98 % --   11/02/23 1546 140/84 -- -- 86 16 97 % --   11/02/23 1450 133/74 -- -- 104 17 90 % --   11/02/23 1132 134/72 98.1 °F (36.7 °C) Oral 77 -- 100 % --   11/02/23 1100 117/67 -- -- 71 -- 97 % --   11/02/23 1000 124/73 -- -- 79 -- 96 % --   11/02/23 0900 143/76 -- -- 90 -- 96 % --   11/02/23 0825 -- -- -- 105 -- 94 % --   11/02/23 0816 153/96 97.1 °F (36.2 °C) Oral 108 (!) 39 95 % --   11/02/23 0806 -- -- -- 109 -- 97 % --   11/02/23 0805 -- -- -- 108 -- 98 % --   11/02/23 0752 -- -- -- 120 -- 97 % --   11/02/23 0751 -- -- -- (!) 123 -- 97 % --   11/02/23 0748 -- -- -- (!) 128 -- -- --   11/02/23 0747 -- -- -- -- -- 96 % --   11/02/23 0745 -- -- -- (!) 122 -- 98 % --   11/02/23 0742 -- -- -- (!) 121 -- 96 % --   11/02/23 0739 -- -- -- (!) 124 -- 96 % --   11/02/23 0737 -- -- -- -- -- 94 % --   11/02/23 0736 -- -- -- (!) 123 -- -- --   11/02/23 0733 -- -- -- (!) 130 -- 95 % --   11/02/23 0730 -- -- -- (!) 126 -- -- --   11/02/23 0729 (!) 173/113 -- -- (!) 121 -- 95 % --   11/02/23 0729 -- -- -- -- -- 99 % --   11/02/23 0727 -- -- -- (!) 125 -- 97 % --   11/02/23 0724 -- -- -- (!) 121 -- 91 % --   11/02/23 0723 (!) 163/121 -- -- 117 -- 92 % --   11/02/23 0721 -- -- -- 108 -- (!) 87 % --   11/02/23 0718 -- -- -- (!) 130 -- 93 % --   11/02/23 0717 -- -- -- (!) 126 -- 96 % --   11/02/23 0536 -- -- -- -- -- -- 75.2 kg (165 lb 12.6 oz)   11/02/23 0423 147/79 97.6 °F (36.4 °C) Oral 79 18 96 % --   11/01/23 2307 152/75 98.5 °F (36.9 °C) Oral 81 18 95 % --   11/01/23 2015 151/84 99.1 °F (37.3 °C) Oral 89 18 93 % --     I/O:  I/O last 3 completed shifts:  In: 240 [P.O.:240]  Out: 0     CBC    Results from last 7 days   Lab  Units 11/02/23  0612 11/01/23  1846 10/30/23  0522 10/29/23  0600 10/28/23  1449   WBC 10*3/mm3 12.87* 14.89* 9.46 8.49 11.54*   HEMOGLOBIN g/dL 13.5 14.3 13.1 13.1 14.7   PLATELETS 10*3/mm3 270 292 244 222 264     BMP   Results from last 7 days   Lab Units 11/02/23  0612 11/01/23  1846 10/31/23  1520 10/31/23  0643 10/30/23  2251 10/30/23  0522 10/29/23  2015 10/29/23  0600 10/28/23  1449   SODIUM mmol/L 136 137  --  138  --  138  --  139 141   POTASSIUM mmol/L 3.9  3.9 3.4* 3.8 3.7 3.2* 3.2* 3.4* 2.4* 3.1*   CHLORIDE mmol/L 109* 106  --  107  --  106  --  103 100   CO2 mmol/L 18.1* 20.0*  --  21.6*  --  25.0  --  26.4 30.5*   BUN mg/dL 11 11  --  9  --  9  --  9 9   CREATININE mg/dL 1.14 1.21  --  1.07  --  1.11  --  1.28* 1.53*   GLUCOSE mg/dL 108* 119*  --  96  --  91  --  93 76   MAGNESIUM mg/dL  --   --   --   --   --   --   --  1.8  --      Cr Clearance Estimated Creatinine Clearance: 59.6 mL/min (by C-G formula based on SCr of 1.14 mg/dL).  Coag   Results from last 7 days   Lab Units 10/28/23  1449   INR  1.02     HbA1C   Lab Results   Component Value Date    HGBA1C 5.20 10/29/2023     Blood Glucose   Glucose   Date/Time Value Ref Range Status   11/02/2023 0825 129 70 - 130 mg/dL Final   11/02/2023 0712 116 70 - 130 mg/dL Final   11/01/2023 1741 128 70 - 130 mg/dL Final     Infection   Results from last 7 days   Lab Units 11/02/23  0612 11/01/23  1846   PROCALCITONIN ng/mL 0.06 0.04     CMP   Results from last 7 days   Lab Units 11/02/23  0612 11/01/23  1846 10/31/23  1520 10/31/23  0643 10/30/23  2251 10/30/23  0522 10/29/23  2015 10/29/23  0600 10/28/23  1449   SODIUM mmol/L 136 137  --  138  --  138  --  139 141   POTASSIUM mmol/L 3.9  3.9 3.4* 3.8 3.7 3.2* 3.2* 3.4* 2.4* 3.1*   CHLORIDE mmol/L 109* 106  --  107  --  106  --  103 100   CO2 mmol/L 18.1* 20.0*  --  21.6*  --  25.0  --  26.4 30.5*   BUN mg/dL 11 11  --  9  --  9  --  9 9   CREATININE mg/dL 1.14 1.21  --  1.07  --  1.11  --  1.28* 1.53*  "  GLUCOSE mg/dL 108* 119*  --  96  --  91  --  93 76   ALBUMIN g/dL 3.3* 3.4*  --   --   --   --   --  3.1* 3.7   BILIRUBIN mg/dL 1.2 1.1  --   --   --   --   --  0.8 0.6   ALK PHOS U/L 76 86  --   --   --   --   --  71 89   AST (SGOT) U/L 51* 51*  --   --   --   --   --  13 14   ALT (SGPT) U/L 16 14  --   --   --   --   --  6 7     ABG      UA      FEDERICO  No results found for: \"POCMETH\", \"POCAMPHET\", \"POCBARBITUR\", \"POCBENZO\", \"POCCOCAINE\", \"POCOPIATES\", \"POCOXYCODO\", \"POCPHENCYC\", \"POCPROPOXY\", \"POCTHC\", \"POCTRICYC\"  Lysis Labs   Results from last 7 days   Lab Units 11/02/23  0612 11/01/23  1846 10/31/23  0643 10/30/23  0522 10/29/23  0600 10/28/23  1449   INR   --   --   --   --   --  1.02   HEMOGLOBIN g/dL 13.5 14.3  --  13.1 13.1 14.7   PLATELETS 10*3/mm3 270 292  --  244 222 264   CREATININE mg/dL 1.14 1.21 1.07 1.11 1.28* 1.53*     Radiology(recent) MRI Brain Without Contrast    Result Date: 11/2/2023  The study is significantly hampered by patient motion but multiple acute infarcts involving the right frontal, parietal and to a lesser extent occipital and temporal lobes are appreciated including both white matter and cortical infarcts. The majority of infarcts are oriented in an AP dimension consistent with watershed infarcts.  The above information was called to and discussed with SUNG Munroe on 11/02/2023 at 1610 hours.  This report was finalized on 11/2/2023 4:48 PM by Dr. Abhinav Vidales M.D on Workstation: [id number]      CT Head Without Contrast    Result Date: 11/2/2023  1.  A large remote left MCA distribution infarct is appreciated with volume loss similar in appearance as compared to the prior examination. Lacunar infarcts involving the corona radiata on the right are noted. There is no evidence of acute infarction or intracranial hemorrhage. Further evaluation could be performed with a MRI examination of the brain as indicated. 2.  A 3.3 x 1.9 cm soft tissue mass is appreciated overlying the " medial aspect of the maxilla on the right and extending over the posterior aspect of the nasal bone on the right. The patient has a known basal cell carcinoma at this location.  Radiation dose reduction techniques were utilized, including automated exposure control and exposure modulation based on body size.   This report was finalized on 11/2/2023 4:16 PM by Dr. Abhinav Vidales M.D on Workstation: [id number]      XR Chest 1 View    Result Date: 11/2/2023  FINDINGS AND IMPRESSION: Left pacer/AICD is present.  Worsening pulmonary vascular congestion is present. There is also worsening bibasilar pulmonary pacification and interstitial thickening, right greater than left. Findings are most concerning for worsening multifocal pneumonia and/or pulmonary edema in the appropriate clinical context. Correlation with patient history is recommended with follow-up chest CT if clinically indicated. No pneumothorax is seen. Cardiac silhouette is within normal limits for size.  This report was finalized on 11/2/2023 8:21 AM by Dr. Addy Joseph M.D on Workstation: [id number]       Active Hospital Problems    Diagnosis  POA    **Stroke [I63.9]  Yes      Resolved Hospital Problems   No resolved problems to display.           Problem Points:  4:  Patient has a new problem, with additional work-up planned  Total problem points:4 or more    Data Points:  1:  I have reviewed or order clinical lab test  1:  I have reviewed or order radiology test (except heart catheterization or echo)  2:  I have personally and independently review of image, tracing, or specimen  2:  I have reviewed and summation of old records and/or discussed the patients care with another health care provider  Total data points:4 or more    Risk Points:  High:  Major elective surgery with risk factors or emergent surgery    MDM requires 2/3 (Problem points, Data points and Risk)  MDM Prob point Data point Risk   SF 1 1 Minimal   Low 2 2 Low   Mod 3 3 Moderate   High 4 4  High     Code requires 3/3 (MDM, History and Exam)  Code MDM History Exam Time   77360 SF/Low Detailed Detailed 30   91435 Mod Comprehensive Comprehensive 50   01688 High Comprehensive Comprehensive 70     Detailed history:  4 elements HPI or status of 3 chronic problems; 2-9 system ROS  Comprehensive:  4 elements HPI or status of 3 chronic problems;  10 system ROS    Detailed Exam:  12 findings from any organ system  Comprehensive Exam:  2 findings from each of 9 systems.   [id number]    Assessment & Plan       Stroke      73 y.o. male with dementia, hypertension, nose mass now found to have cardiomyopathy with an ejection fraction of 20% admitted with right hemispheric stroke he also has right carotid artery stenosis.   Based on my measurements on axial projection of the CTA he has approximately 50% stenosis of the right internal carotid artery by NASCET criteria.  This would be considered a symptomatic lesion. Under ordinary circumstances we would strongly consider intervention for this lesion. However given the patient's baseline functional status, current inability to move left arm and leg (although previous notes suggest he can move them may simply be having left hemineglect) and cardiomyopathy I do think the best treatment for this patient would be best medical therapy with antiplatelet and statin.  Would favor dual antiplatelet but will discuss with neurology.  Carotid duplex has been ordered, we will follow-up the results of this.  We will follow and discuss with patient's family when they are available.  No plans for intervention at this time.    I discussed the patients findings and my recommendations with patient and nursing staff.          Evan Ramos II, MD  11/02/23  18:16 EDT    Please call my office with any question: [phone]

## 2023-11-02 NOTE — PROGRESS NOTES
DOS: 2023  NAME: Tigre Amaral   : 1950  PCP: Provider, No Known    Chief Complaint   Patient presents with    Fall    Arm Injury        Stroke    Subjective: Had 2 RRT called due to concern for chest pain, arrhythmia, and shortness of air.  He is back to baseline now.  Was eating and sitting up in bed.  2 family members at bedside.    Objective:  Vital signs:      Vitals:    23 0900 23 1000 23 1100 23 1132   BP: 143/76 124/73 117/67 134/72   BP Location:       Patient Position:       Pulse: 90 79 71 77   Resp:       Temp:    98.1 °F (36.7 °C)   TempSrc:    Oral   SpO2: 96% 96% 97% 100%   Weight:       Height:           Current Facility-Administered Medications:     acetaminophen (TYLENOL) tablet 650 mg, 650 mg, Oral, Q4H PRN, 650 mg at 23 **OR** [DISCONTINUED] acetaminophen (TYLENOL) suppository 650 mg, 650 mg, Rectal, Q4H PRN, Lashon Brooks MD    amiodarone 150 mg in 100 mL D5W (loading dose), 150 mg, Intravenous, Once **FOLLOWED BY** amiodarone 360 mg in 200 mL D5W infusion, 1 mg/min, Intravenous, Continuous **FOLLOWED BY** amiodarone 360 mg in 200 mL D5W infusion, 0.5 mg/min, Intravenous, Continuous, Magdiel Murray MD    aspirin chewable tablet 81 mg, 81 mg, Oral, Daily, Carolina Casillas, APRN, 81 mg at 23    atorvastatin (LIPITOR) tablet 80 mg, 80 mg, Oral, Nightly, Lashon Brooks MD, 80 mg at 23    sennosides-docusate (PERICOLACE) 8.6-50 MG per tablet 2 tablet, 2 tablet, Oral, BID, 2 tablet at 23 0933 **AND** polyethylene glycol (MIRALAX) packet 17 g, 17 g, Oral, Daily PRN **AND** bisacodyl (DULCOLAX) EC tablet 5 mg, 5 mg, Oral, Daily PRN **AND** bisacodyl (DULCOLAX) suppository 10 mg, 10 mg, Rectal, Daily PRN, Lashon Brooks MD    carvedilol (COREG) tablet 6.25 mg, 6.25 mg, Oral, BID With Meals, Lashon Brooks MD, 6.25 mg at 23 0902    Enoxaparin Sodium (LOVENOX) syringe 40 mg, 40 mg,  Subcutaneous, Q24H, Abhinav Barnes MD, 40 mg at 11/01/23 2301    lisinopril (PRINIVIL,ZESTRIL) tablet 20 mg, 20 mg, Oral, Daily, Lashon Brooks MD, 20 mg at 11/02/23 0902    LORazepam (ATIVAN) injection 1 mg, 1 mg, Intravenous, Once in imaging, Moira Lindsay, APRN    LORazepam (ATIVAN) injection 1 mg, 1 mg, Intravenous, Once PRN, Carolina Casillas, APRN    Magnesium Standard Dose Replacement - Follow Nurse / BPA Driven Protocol, , Does not apply, PRNCecilia Renate Andrea, MD    melatonin tablet 3 mg, 3 mg, Oral, Nightly PRN, Lashon Brooks MD, 3 mg at 10/30/23 2032    nicotine (NICODERM CQ) 21 MG/24HR patch 1 patch, 1 patch, Transdermal, Q24H, Jimenez Zarate MD, 1 patch at 11/02/23 1346    nitroglycerin (NITROSTAT) SL tablet 0.4 mg, 0.4 mg, Sublingual, Q5 Min PRN, Magdiel Murray MD    ondansetron (ZOFRAN) tablet 4 mg, 4 mg, Oral, Q6H PRN **OR** ondansetron (ZOFRAN) injection 4 mg, 4 mg, Intravenous, Q6H PRN, Lashon Brooks MD, 4 mg at 11/01/23 1758    Phosphorus Replacement - Follow Nurse / BPA Driven Protocol, , Does not apply, Cecilia BARRON Renate Andrea, MD    piperacillin-tazobactam (ZOSYN) 3.375 g in iso-osmotic dextrose 50 ml (premix), 3.375 g, Intravenous, Q8H, Magdiel Murray MD    Potassium Replacement - Follow Nurse / BPA Driven Protocol, , Does not apply, Cecilia BARRON Renate Andrea, MD    sodium chloride 0.9 % flush 10 mL, 10 mL, Intravenous, Q12H, Prince Roberts MD, 10 mL at 11/02/23 0850    sodium chloride 0.9 % flush 10 mL, 10 mL, Intravenous, PRN, Prince Roberts MD    sodium chloride 0.9 % infusion 40 mL, 40 mL, Intravenous, PRNArmando Daniel, MD    PRN meds    acetaminophen **OR** [DISCONTINUED] acetaminophen    senna-docusate sodium **AND** polyethylene glycol **AND** bisacodyl **AND** bisacodyl    LORazepam    Magnesium Standard Dose Replacement - Follow Nurse / BPA Driven Protocol    melatonin    nitroglycerin    ondansetron **OR** ondansetron    Phosphorus  "Replacement - Follow Nurse / BPA Driven Protocol    Potassium Replacement - Follow Nurse / BPA Driven Protocol    sodium chloride    sodium chloride    No current facility-administered medications on file prior to encounter.     Current Outpatient Medications on File Prior to Encounter   Medication Sig    aspirin 81 MG EC tablet Take 1 tablet by mouth Daily.    carvedilol (COREG) 6.25 MG tablet Take 1 tablet by mouth 2 (Two) Times a Day With Meals.    furosemide (LASIX) 40 MG tablet Take 1 tablet by mouth Daily.    lisinopril (PRINIVIL,ZESTRIL) 20 MG tablet Take 1 tablet by mouth Daily.       General appearance: Elderly male, NAD, alert, edentulous  HEENT: Normocephalic, atraumatic, right-sided facial tumor just below the right orbital  Resp: Even and unlabored  Extremities: Left hand edema, nonpitting     Neurological:   MS: Oriented to self, stated he was in the hospital but unable to name, stated year was 2002, awake and alert. cooperative  CN: visual fields full, EOMI, facial sensation equal, no facial droop, tongue midline, [place] bilaterally  Motor: 2/5 LUE, 4/5 LLE, 5/5 RUE/RLE  Sensory: light touch and cold sensation intact in all 4 ext.  Coordination: Normal finger to nose test on the right, unable to perform on the left    Physical exam performed, changes noted.    Laboratory results:  Lab Results   Component Value Date    TSH 1.880 10/29/2023     Lab Results   Component Value Date    HGBA1C 5.20 10/29/2023     No results found for: \"[id number]\"  Lab Results   Component Value Date    CHOL 146 10/29/2023     Lab Results   Component Value Date    TRIG 72 10/29/2023     Lab Results   Component Value Date    HDL 34 (L) 10/29/2023     Lab Results   Component Value Date    LDL 98 10/29/2023     Lab Results   Component Value Date    WBC 12.87 (H) 11/02/2023    HGB 13.5 11/02/2023    HCT 39.5 11/02/2023    MCV 90.2 11/02/2023     11/02/2023     Lab Results   Component Value Date    GLUCOSE 108 (H) 11/02/2023 "    BUN 11 11/02/2023    CREATININE 1.14 11/02/2023    BCR 9.6 11/02/2023    K 3.9 11/02/2023    K 3.9 11/02/2023    CO2 18.1 (L) 11/02/2023    CALCIUM 8.5 (L) 11/02/2023    ALBUMIN 3.3 (L) 11/02/2023    AST 51 (H) 11/02/2023    ALT 16 11/02/2023     Lab Results   Component Value Date    PTT 30.0 04/26/2023     Lab Results   Component Value Date    INR 1.02 10/28/2023    INR 0.97 04/26/2023    PROTIME 13.5 10/28/2023    PROTIME 11.3 04/26/2023     Brief Urine Lab Results       None            Review and interpretation of imaging:  CT Head Without Contrast    Result Date: 11/2/2023  1.  A large remote left MCA distribution infarct is appreciated with volume loss similar in appearance as compared to the prior examination. Lacunar infarcts involving the corona radiata on the right are noted. There is no evidence of acute infarction or intracranial hemorrhage. Further evaluation could be performed with a MRI examination of the brain as indicated. 2.  A 3.3 x 1.9 cm soft tissue mass is appreciated overlying the medial aspect of the maxilla on the right and extending over the posterior aspect of the nasal bone on the right. The patient has a known basal cell carcinoma at this location.  Radiation dose reduction techniques were utilized, including automated exposure control and exposure modulation based on body size.       XR Chest 1 View    Result Date: 11/2/2023  FINDINGS AND IMPRESSION: Left pacer/AICD is present.  Worsening pulmonary vascular congestion is present. There is also worsening bibasilar pulmonary pacification and interstitial thickening, right greater than left. Findings are most concerning for worsening multifocal pneumonia and/or pulmonary edema in the appropriate clinical context. Correlation with patient history is recommended with follow-up chest CT if clinically indicated. No pneumothorax is seen. Cardiac silhouette is within normal limits for size.  This report was finalized on 11/2/2023 8:21 AM by  Dr. Addy Joseph M.D on Workstation: [id number]      CT Angiogram Neck    Result Date: 10/29/2023   There are small age-indeterminate infarcts noted within the right frontal and parietal lobes within the right MCA distribution. Additionally, a small age-indeterminate infarct is seen within the right occipital lobe that is either within the right MCA or right PCA distribution. Further temporal characterization with MR imaging is suggested.  There is a large chronic infarct within the lateral aspect left frontal, parietal, temporal, and occipital lobes within the left MCA distribution and chronic infarcts are noted within the left lentiform nucleus within the lateral lenticulostriate distribution.  There is an approximate 60-70% NASCET stenosis within the proximal portion of the right ICA and an approximate 70-80% NASCET stenosis within the proximal portion of the left ICA secondary to mixed calcified and noncalcified atherosclerotic plaques.  There is a severe degree of stenosis at the origin of the left vertebral artery and a moderate degree of stenosis is seen within the V3 segment of the left vertebral artery.  The remaining intracranial and extracranial atherosclerotic changes are as discussed in detail above.   Radiation dose reduction techniques were utilized, including automated exposure control and exposure modulation based on body size.  This report was finalized on 10/29/2023 7:14 PM by Dr. Jeff Harrison M.D on Workstation: [id number]      CT Angiogram Head    Result Date: 10/29/2023   There are small age-indeterminate infarcts noted within the right frontal and parietal lobes within the right MCA distribution. Additionally, a small age-indeterminate infarct is seen within the right occipital lobe that is either within the right MCA or right PCA distribution. Further temporal characterization with MR imaging is suggested.  There is a large chronic infarct within the lateral aspect left frontal, parietal,  temporal, and occipital lobes within the left MCA distribution and chronic infarcts are noted within the left lentiform nucleus within the lateral lenticulostriate distribution.  There is an approximate 60-70% NASCET stenosis within the proximal portion of the right ICA and an approximate 70-80% NASCET stenosis within the proximal portion of the left ICA secondary to mixed calcified and noncalcified atherosclerotic plaques.  There is a severe degree of stenosis at the origin of the left vertebral artery and a moderate degree of stenosis is seen within the V3 segment of the left vertebral artery.  The remaining intracranial and extracranial atherosclerotic changes are as discussed in detail above.   Radiation dose reduction techniques were utilized, including automated exposure control and exposure modulation based on body size.  This report was finalized on 10/29/2023 7:14 PM by Dr. Jeff Harrison M.D on Workstation: [id number]      CT Head Without Contrast Stroke Protocol    Result Date: 10/28/2023   There is no evidence for acute traumatic intracranial pathology. However, there are small age-indeterminate infarcts noted within the right frontal and parietal lobes that could potentially be acute to subacute in nature. These are within the right MCA distribution. Additionally and similarly, there is an age-indeterminate infarct within the right occipital lobe that is either within the right MCA or right PCA distribution. Further temporal characterization is recommended with MR imaging.  There is a large chronic infarct within the lateral aspect of the left cerebral hemisphere that is within the left MCA distribution and this involves the lateral aspect of the left frontal, parietal, temporal, and occipital lobes.  There is a 3.3 x 1.9 cm soft tissue mass within the right premaxillary soft tissues that extends into the soft tissues along the right lateral aspect of the nose and this is compatible with a known basal cell  carcinoma.  Incidental note is made of a fracture of the right orbital floor that is likely chronic in nature although correlation with clinical data is suggested.  These findings and recommendations were discussed with Dr. Niko Rivas on 10/28/2023 at approximately 3:15 p.m.  Radiation dose reduction techniques were utilized, including automated exposure control and exposure modulation based on body size.  This report was finalized on 10/28/2023 4:02 PM by Dr. Jeff Harrison M.D on Workstation: [id number]      XR Chest 1 View    Result Date: 10/28/2023  Cardiac defibrillator in place. Otherwise negative x-rays of the chest and the left shoulder.  This report was finalized on 10/28/2023 3:07 PM by Dr. Jhonny Chalres M.D on Workstation: [id number]      XR Shoulder 2+ View Left    Result Date: 10/28/2023  Cardiac defibrillator in place. Otherwise negative x-rays of the chest and the left shoulder.  This report was finalized on 10/28/2023 3:07 PM by Dr. Jhonny Charles M.D on Workstation: [id number]     Results for orders placed during the hospital encounter of 10/28/23    Adult transthoracic echo complete    Interpretation Summary    Left ventricular systolic function is severely decreased. Calculated left ventricular EF = 21.6% Left ventricular ejection fraction appears to be less than 20%.The following left ventricular wall segments are hypokinetic: mid anterolateral, basal inferolateral, mid inferolateral, mid inferior, basal inferoseptal, mid inferoseptal, mid anteroseptal, basal inferior and basal inferoseptal. The following left ventricular wall segments are akinetic: apical anterior, apical lateral, apical inferior, apical septal and apex.    Left ventricular wall thickness is consistent with mild concentric hypertrophy.    Left ventricular diastolic function is consistent with (grade II w/high LAP) pseudonormalization.    The left atrial cavity is mildly dilated.    Saline test results are negative for right to  left atrial level shunt.    Estimated right ventricular systolic pressure from tricuspid regurgitation is mildly elevated (35-45 mmHg).      Impression/Assessment:     Expand All Collapse All    DOS: 2023  NAME: Tigre Amaral           : 1950  PCP: Provider, No Known        Chief Complaint   Patient presents with    Fall    Arm Injury         Stroke     Subjective: No acute events overnight.  Lying in bed resting comfortably.  Stepdaughter at bedside.  States he slept all night after receiving the Ativan but was unable to lie still during the MRI to complete it.     Objective:  Vital signs:      Vitals          Vitals:     10/31/23 2334 23 0344 23 0641 23 0718   BP: 156/83 148/85   169/87   BP Location: Left arm Left arm   Left arm   Patient Position: Lying Lying   Lying   Pulse: 90 87   82   Resp: 18 18   18   Temp: 98.8 °F (37.1 °C) 97.5 °F (36.4 °C)   97.7 °F (36.5 °C)   TempSrc: Oral Oral   Oral   SpO2: 96% 96%   96%   Weight:     70.5 kg (155 lb 6.8 oz)     Height:                    Current Facility-Administered Medications:     acetaminophen (TYLENOL) tablet 650 mg, 650 mg, Oral, Q4H PRN, 650 mg at 10/30/23 2032 **OR** acetaminophen (TYLENOL) suppository 650 mg, 650 mg, Rectal, Q4H PRN, Lashon Brooks MD    aspirin chewable tablet 81 mg, 81 mg, Oral, Daily, Carolina Casillas, APRN, 81 mg at 23 0934    atorvastatin (LIPITOR) tablet 80 mg, 80 mg, Oral, Nightly, Lashon Brooks MD, 80 mg at 10/31/23 2109    sennosides-docusate (PERICOLACE) 8.6-50 MG per tablet 2 tablet, 2 tablet, Oral, BID, 2 tablet at 23 0933 **AND** polyethylene glycol (MIRALAX) packet 17 g, 17 g, Oral, Daily PRN **AND** bisacodyl (DULCOLAX) EC tablet 5 mg, 5 mg, Oral, Daily PRN **AND** bisacodyl (DULCOLAX) suppository 10 mg, 10 mg, Rectal, Daily Cecilia BARRON Renate Andrea, MD    Calcium Replacement - Follow Nurse / BPA Driven Protocol, , Does not apply, Cecilia BARRON Renate Andrea, MD     carvedilol (COREG) tablet 6.25 mg, 6.25 mg, Oral, BID With Meals, Lashon Brooks MD, 6.25 mg at 11/01/23 0933    haloperidol lactate (HALDOL) injection 2 mg, 2 mg, Intravenous, Once PRN, Carolina Casillas, SUNG    lisinopril (PRINIVIL,ZESTRIL) tablet 20 mg, 20 mg, Oral, Daily, Lashon Brooks MD, 20 mg at 11/01/23 0934    LORazepam (ATIVAN) injection 1 mg, 1 mg, Intravenous, Once in imaging, Moira Lindsay, SUNG    Magnesium Standard Dose Replacement - Follow Nurse / BPA Driven Protocol, , Does not apply, PRNCecilia Renate Andrea, MD    melatonin tablet 3 mg, 3 mg, Oral, Nightly PRN, Lashon Brokos MD, 3 mg at 10/30/23 2032    nicotine (NICODERM CQ) 21 MG/24HR patch 1 patch, 1 patch, Transdermal, Q24H, Jimenez Zarate MD, 1 patch at 11/01/23 0935    ondansetron (ZOFRAN) tablet 4 mg, 4 mg, Oral, Q6H PRN **OR** ondansetron (ZOFRAN) injection 4 mg, 4 mg, Intravenous, Q6H PRN, Lashon Brooks MD    Phosphorus Replacement - Follow Nurse / BPA Driven Protocol, , Does not apply, PRCecilia BARRIOS Renate Andrea, MD    Potassium Replacement - Follow Nurse / BPA Driven Protocol, , Does not apply, Cecilia BARRON Renate Andrea, MD    sodium chloride 0.9 % flush 10 mL, 10 mL, Intravenous, PRN, Niko Rivas II, MD    sodium chloride 0.9 % flush 10 mL, 10 mL, Intravenous, Q12H, Prince Roberts MD, 10 mL at 11/01/23 0934    sodium chloride 0.9 % flush 10 mL, 10 mL, Intravenous, PRN, Prince Roberts MD    sodium chloride 0.9 % infusion 40 mL, 40 mL, Intravenous, PRN, Prince Roberts MD    sodium chloride 0.9 % infusion, 75 mL/hr, Intravenous, Continuous, Lashon Brooksa, MD, Last Rate: 75 mL/hr at 10/31/23 0501, 75 mL/hr at 10/31/23 0501     PRN meds    acetaminophen **OR** acetaminophen    senna-docusate sodium **AND** polyethylene glycol **AND** bisacodyl **AND** bisacodyl    Calcium Replacement - Follow Nurse / BPA Driven Protocol    haloperidol lactate    Magnesium Standard Dose Replacement -  "Follow Nurse / BPA Driven Protocol    melatonin    ondansetron **OR** ondansetron    Phosphorus Replacement - Follow Nurse / BPA Driven Protocol    Potassium Replacement - Follow Nurse / BPA Driven Protocol    sodium chloride    sodium chloride    sodium chloride     No current facility-administered medications on file prior to encounter.           Current Outpatient Medications on File Prior to Encounter   Medication Sig    aspirin 81 MG EC tablet Take 1 tablet by mouth Daily.    carvedilol (COREG) 6.25 MG tablet Take 1 tablet by mouth 2 (Two) Times a Day With Meals.    furosemide (LASIX) 40 MG tablet Take 1 tablet by mouth Daily.    lisinopril (PRINIVIL,ZESTRIL) 20 MG tablet Take 1 tablet by mouth Daily.         General appearance: Elderly male, NAD, alert, edentulous  HEENT: Normocephalic, atraumatic, right-sided facial tumor just below the right orbital  Resp: Even and unlabored  Extremities: Left hand edema, nonpitting     Neurological:   MS: Oriented to self, stated he was in the hospital but unable to name, awake and alert. cooperative  CN: visual fields full, EOMI, facial sensation equal, no facial droop, tongue midline, [place] bilaterally  Motor: 2/5 LUE, 4/5 LLE, 5/5 RUE/RLE  Sensory: light touch sensation intact in all 4 ext.  Coordination: Normal finger to nose test on the right, unable to perform on the left  Gait and station: Deferred  Rapid alternating movements: normal finger to thumb tap on the right, unable to perform on the left     Physical exam performed, changes noted.     Laboratory results:        Lab Results   Component Value Date     TSH 1.880 10/29/2023            Lab Results   Component Value Date     HGBA1C 5.20 10/29/2023      No results found for: \"[id number]\"        Lab Results   Component Value Date     CHOL 146 10/29/2023            Lab Results   Component Value Date     TRIG 72 10/29/2023            Lab Results   Component Value Date     HDL 34 (L) 10/29/2023            Lab Results "   Component Value Date     LDL 98 10/29/2023            Lab Results   Component Value Date     WBC 9.46 10/30/2023     HGB 13.1 10/30/2023     HCT 37.9 10/30/2023     MCV 90.0 10/30/2023      10/30/2023            Lab Results   Component Value Date     GLUCOSE 96 10/31/2023     BUN 9 10/31/2023     CREATININE 1.07 10/31/2023     BCR 8.4 10/31/2023     K 3.8 10/31/2023     CO2 21.6 (L) 10/31/2023     CALCIUM 8.4 (L) 10/31/2023     ALBUMIN 3.1 (L) 10/29/2023     AST 13 10/29/2023     ALT 6 10/29/2023            Lab Results   Component Value Date     PTT 30.0 04/26/2023            Lab Results   Component Value Date     INR 1.02 10/28/2023     INR 0.97 04/26/2023     PROTIME 13.5 10/28/2023     PROTIME 11.3 04/26/2023      Brief Urine Lab Results         None                Review and interpretation of imaging:  CT Angiogram Neck     Result Date: 10/29/2023   There are small age-indeterminate infarcts noted within the right frontal and parietal lobes within the right MCA distribution. Additionally, a small age-indeterminate infarct is seen within the right occipital lobe that is either within the right MCA or right PCA distribution. Further temporal characterization with MR imaging is suggested.  There is a large chronic infarct within the lateral aspect left frontal, parietal, temporal, and occipital lobes within the left MCA distribution and chronic infarcts are noted within the left lentiform nucleus within the lateral lenticulostriate distribution.  There is an approximate 60-70% NASCET stenosis within the proximal portion of the right ICA and an approximate 70-80% NASCET stenosis within the proximal portion of the left ICA secondary to mixed calcified and noncalcified atherosclerotic plaques.  There is a severe degree of stenosis at the origin of the left vertebral artery and a moderate degree of stenosis is seen within the V3 segment of the left vertebral artery.  The remaining intracranial and extracranial  atherosclerotic changes are as discussed in detail above.   Radiation dose reduction techniques were utilized, including automated exposure control and exposure modulation based on body size.  This report was finalized on 10/29/2023 7:14 PM by Dr. Jeff Harrison M.D on Workstation: [id number]       CT Angiogram Head     Result Date: 10/29/2023   There are small age-indeterminate infarcts noted within the right frontal and parietal lobes within the right MCA distribution. Additionally, a small age-indeterminate infarct is seen within the right occipital lobe that is either within the right MCA or right PCA distribution. Further temporal characterization with MR imaging is suggested.  There is a large chronic infarct within the lateral aspect left frontal, parietal, temporal, and occipital lobes within the left MCA distribution and chronic infarcts are noted within the left lentiform nucleus within the lateral lenticulostriate distribution.  There is an approximate 60-70% NASCET stenosis within the proximal portion of the right ICA and an approximate 70-80% NASCET stenosis within the proximal portion of the left ICA secondary to mixed calcified and noncalcified atherosclerotic plaques.  There is a severe degree of stenosis at the origin of the left vertebral artery and a moderate degree of stenosis is seen within the V3 segment of the left vertebral artery.  The remaining intracranial and extracranial atherosclerotic changes are as discussed in detail above.   Radiation dose reduction techniques were utilized, including automated exposure control and exposure modulation based on body size.  This report was finalized on 10/29/2023 7:14 PM by Dr. Jeff Harrison M.D on Workstation: [id number]       CT Head Without Contrast Stroke Protocol     Result Date: 10/28/2023   There is no evidence for acute traumatic intracranial pathology. However, there are small age-indeterminate infarcts noted within the right frontal and parietal  lobes that could potentially be acute to subacute in nature. These are within the right MCA distribution. Additionally and similarly, there is an age-indeterminate infarct within the right occipital lobe that is either within the right MCA or right PCA distribution. Further temporal characterization is recommended with MR imaging.  There is a large chronic infarct within the lateral aspect of the left cerebral hemisphere that is within the left MCA distribution and this involves the lateral aspect of the left frontal, parietal, temporal, and occipital lobes.  There is a 3.3 x 1.9 cm soft tissue mass within the right premaxillary soft tissues that extends into the soft tissues along the right lateral aspect of the nose and this is compatible with a known basal cell carcinoma.  Incidental note is made of a fracture of the right orbital floor that is likely chronic in nature although correlation with clinical data is suggested.  These findings and recommendations were discussed with Dr. Niko Rivas on 10/28/2023 at approximately 3:15 p.m.  Radiation dose reduction techniques were utilized, including automated exposure control and exposure modulation based on body size.  This report was finalized on 10/28/2023 4:02 PM by Dr. Jeff Harrison M.D on Workstation: [id number]       XR Chest 1 View     Result Date: 10/28/2023  Cardiac defibrillator in place. Otherwise negative x-rays of the chest and the left shoulder.  This report was finalized on 10/28/2023 3:07 PM by Dr. Jhonny Charles M.D on Workstation: [id number]       XR Shoulder 2+ View Left     Result Date: 10/28/2023  Cardiac defibrillator in place. Otherwise negative x-rays of the chest and the left shoulder.  This report was finalized on 10/28/2023 3:07 PM by Dr. Jhonny Charles M.D on Workstation: [id number]     Results for orders placed during the hospital encounter of 10/28/23     Adult transthoracic echo complete     Interpretation Summary    Left ventricular  systolic function is severely decreased. Calculated left ventricular EF = 21.6% Left ventricular ejection fraction appears to be less than 20%.The following left ventricular wall segments are hypokinetic: mid anterolateral, basal inferolateral, mid inferolateral, mid inferior, basal inferoseptal, mid inferoseptal, mid anteroseptal, basal inferior and basal inferoseptal. The following left ventricular wall segments are akinetic: apical anterior, apical lateral, apical inferior, apical septal and apex.    Left ventricular wall thickness is consistent with mild concentric hypertrophy.    Left ventricular diastolic function is consistent with (grade II w/high LAP) pseudonormalization.    The left atrial cavity is mildly dilated.    Saline test results are negative for right to left atrial level shunt.    Estimated right ventricular systolic pressure from tricuspid regurgitation is mildly elevated (35-45 mmHg).        Impression/Assessment:  This is a 73-year-old male with past medical history of tobacco abuse, alcohol dependency, traumatic brain injury, pacemaker placement, hypertension, reported dementia history who presented to the hospital on 10/28/2023 with complaints of left upper extremity weakness.  Reportedly per documentation from the ER physician symptoms started Thursday night after he sustained a fall.  His family member stated that she did not notice significant left arm weakness after the fall but that the patient was complaining of pain.  He has aspirin listed as a home med however unclear if he has been compliant with this daily.  His initial noncontrast CT head did not reveal any acute intracranial abnormalities, known area of large left MCA territory infarct vs TBI noted as well as age-indeterminate infarct within the right MCA and PCA territories.     BP on arrival 133/75, HR 74. AV paced rhythm.  Temp 98.2. . K+ 3.1. Cr. 1.28.      Diagnosis:  Left-sided hemiparesis, suspect subacute right MCA  infarct  Dementia/history of TBI  Bilateral carotid stenosis   History of stroke  Tobacco abuse  Cardiomyopathy     Additional work up to date:  2D Echo: LA cavity mildly dilated, saline test negative, EF 21.6%, septal wall motion abnormality noted, no evidence of LV thrombus or mass, no AV stenosis, mild to moderate MVR.        Plan:  Repeat CTH this morning appears to show possibly evolving right corona radiata infarct but maybe chronic finding as it is present on his initial CTH but looks a little larger.   Would like to reattempt MRI today. QT was 423 today, 443 and 400 yesterday. Will hold off on Haldol and order lorazepam 1 mg for his MRI with instructions to give an additional 1 mg if needed.  Would like pacemaker interrogation for afib/flutter.   For now continue ASA 81 mg  Continue Lipitor 80 mg  We will leave further recommendations on CTA findings once we can review his MRI to determine source of stroke.  Needs follow-up with dermatology outpatient for facial growth abnormality.  Neurochecks per stroke protocol  Avoid hypotension  Stroke Education  OLMAN/SCDs  PT/OT/ST  Therapies as written. CCP for discharge planning. Call RRT for any acute neurological changes.   We will continue to follow and advise.    Case discussed with patient, step daughter, son, and Dr. Villalpando, and he agrees with plan above.    SUNG Munroe

## 2023-11-02 NOTE — NURSING NOTE
Patient arrived in radiology triage  for MRI with pacemaker. RN is with patient. Patient placed on MRI safe monitor. Patient's pacemaker placed in MRI surescan mode.

## 2023-11-02 NOTE — PLAN OF CARE
Goal Outcome Evaluation:      Patient denies any pain, paced on the monitor.  Critical Lab values received for Troponin, cardiology notified, no new orders received. Potassium replaced and patient monitored on unit.

## 2023-11-02 NOTE — CODE DOCUMENTATION
LHA and cardiology updated on pt status.  No improvement after IV lasix given, pt remains tachypneic and tachycardic.  Troponin elevated and reported to cards.  Order received to transfer pt to ICU per cardiology.

## 2023-11-02 NOTE — PLAN OF CARE
Goal Outcome Evaluation:              Outcome Evaluation: Clinical swallow assessment completed. Patient agreeable to trials of thin liquids, puree, soft, and regular solids. Expectorated ice chip due to dislike of cold temperature liquids. Agreeable to p.o. trials once ice was removed from cup. Tolerated sips of thin liquid via cup and straw with no overt s/sx of aspiration or penetration. Timely swallow initiation with puree solids. Slightly prolonged, but functional mastication with soft and regular solids. Suspect disorganized mastication related to lack of dentition (patient's family reported his dentures to be at home). Mild oral residue post swallow with regular solids. Cleared with thin liquid wash. Recommend continue order for soft, chopped solids with thin liquids. Medication whole with applesauce. Precautions; upright, small bites/sips, slow rate. Recommend VFSS to assess patient's swallow fxn and rule out silent aspiration.

## 2023-11-02 NOTE — PROGRESS NOTES
"[facility] Clinical Pharmacy Services: Enoxaparin Consult    Tigresandee Amaral has a pharmacy consult to dose prophylactic enoxaparin per Dr. Barnes's request.     Indication: VTE Prophylaxis  Home Anticoagulation: None     Relevant clinical data and objective history reviewed:  73 y.o. male 172.7 cm (68\") 70.5 kg (155 lb 6.8 oz)   Body mass index is 23.63 kg/m².   Results from last 7 days   Lab Units 11/01/23  1846   PLATELETS 10*3/mm3 292     Estimated Creatinine Clearance: 52.6 mL/min (by C-G formula based on SCr of 1.21 mg/dL).    Assessment/Plan    Will start patient on 40mg subcutaneous every 24 hours, adjusted for renal function. Consult order will be discontinued but pharmacy will continue to follow.     Destiney Gasca [facility]  Clinical Pharmacist    "

## 2023-11-02 NOTE — CONSULTS
Tigrejennie Amaral   73 y.o.  male    LOS: 5 days   Patient Care Team:  Provider, No Known as PCP - General      Subjective     Chief Complaint:    History of Present Illness:   73-year-old white female patient who been followed by my colleague  has history of AICD for primary prevention of sudden death.  He has history of ventricular tachycardia.  Patient has dementia and history is obtained mostly from review of charts.  He had an episode of chest pain this morning.  Patient has history of left arm weakness a few days ago.  He also lost his balance.  CT of the head showed old and subacute infarcts.  He has a soft tissue mass on the right premaxillary area consistent with his previously diagnosed basal cell carcinoma.  Echocardiogram showed ejection fraction 21.6%.  EKG is paced rhythm.        History reviewed. No pertinent past medical history.  History reviewed. No pertinent surgical history.  Medications Prior to Admission   Medication Sig Dispense Refill Last Dose    aspirin 81 MG EC tablet Take 1 tablet by mouth Daily.   10/28/2023    carvedilol (COREG) 6.25 MG tablet Take 1 tablet by mouth 2 (Two) Times a Day With Meals.   10/28/2023    furosemide (LASIX) 40 MG tablet Take 1 tablet by mouth Daily.   10/28/2023    lisinopril (PRINIVIL,ZESTRIL) 20 MG tablet Take 1 tablet by mouth Daily.   10/28/2023       History reviewed. No pertinent family history.  Social History     Socioeconomic History    Marital status: Single     Objective       Review of Systems:   Constitutional: Negative for diaphoresis, fatigue, fever and unexpected weight change.   HENT: Negative.    Eyes: Negative.    Respiratory: Negative for cough, shortness of breath and wheezing.    Cardiovascular: Negative for chest pain, palpitations and leg swelling.   Gastrointestinal: Negative for abdominal pain, blood in stool, constipation, diarrhea, nausea and vomiting.   Endocrine: Negative.    Genitourinary: Negative for difficulty urinating,  dysuria and frequency.   Musculoskeletal: Negative.    Skin: Negative.    Allergic/Immunologic: Negative for environmental allergies and food allergies.   Neurological: Negative.    Hematological: Negative.    Psychiatric/Behavioral: Negative.        Current Facility-Administered Medications:     acetaminophen (TYLENOL) tablet 650 mg, 650 mg, Oral, Q4H PRN, 650 mg at 11/01/23 2134 **OR** [DISCONTINUED] acetaminophen (TYLENOL) suppository 650 mg, 650 mg, Rectal, Q4H PRN, Lashon Brooks MD    amiodarone 150 mg in 100 mL D5W (loading dose), 150 mg, Intravenous, Once **FOLLOWED BY** amiodarone 360 mg in 200 mL D5W infusion, 1 mg/min, Intravenous, Continuous **FOLLOWED BY** amiodarone 360 mg in 200 mL D5W infusion, 0.5 mg/min, Intravenous, Continuous, Magdiel Murray MD    aspirin chewable tablet 81 mg, 81 mg, Oral, Daily, Carolina Casillas, APRN, 81 mg at 11/02/23 0902    atorvastatin (LIPITOR) tablet 80 mg, 80 mg, Oral, Nightly, Lashon Brooks MD, 80 mg at 11/01/23 2037    sennosides-docusate (PERICOLACE) 8.6-50 MG per tablet 2 tablet, 2 tablet, Oral, BID, 2 tablet at 11/01/23 0933 **AND** polyethylene glycol (MIRALAX) packet 17 g, 17 g, Oral, Daily PRN **AND** bisacodyl (DULCOLAX) EC tablet 5 mg, 5 mg, Oral, Daily PRN **AND** bisacodyl (DULCOLAX) suppository 10 mg, 10 mg, Rectal, Daily PRN, Lashon Brooks MD    carvedilol (COREG) tablet 6.25 mg, 6.25 mg, Oral, BID With Meals, Lashon Brooks MD, 6.25 mg at 11/02/23 0902    Enoxaparin Sodium (LOVENOX) syringe 40 mg, 40 mg, Subcutaneous, Q24H, Abhinav Barnes MD, 40 mg at 11/01/23 2301    lisinopril (PRINIVIL,ZESTRIL) tablet 20 mg, 20 mg, Oral, Daily, Lashon Brooks MD, 20 mg at 11/02/23 0902    LORazepam (ATIVAN) injection 1 mg, 1 mg, Intravenous, Once in imaging, Moira Lindsay, SUNG    Magnesium Standard Dose Replacement - Follow Nurse / BPA Driven Protocol, , Does not apply, PRN, Lashon Brooks MD     melatonin tablet 3 mg, 3 mg, Oral, Nightly PRN, Lashon Brooks MD, 3 mg at 10/30/23 2032    nicotine (NICODERM CQ) 21 MG/24HR patch 1 patch, 1 patch, Transdermal, Q24H, Jimenez Zarate MD, 1 patch at 11/01/23 0935    nitroglycerin (NITROSTAT) SL tablet 0.4 mg, 0.4 mg, Sublingual, Q5 Min PRN, Magdiel Murray MD    ondansetron (ZOFRAN) tablet 4 mg, 4 mg, Oral, Q6H PRN **OR** ondansetron (ZOFRAN) injection 4 mg, 4 mg, Intravenous, Q6H PRN, Lashon Brooks MD, 4 mg at 11/01/23 1758    Phosphorus Replacement - Follow Nurse / BPA Driven Protocol, , Does not apply, Cecilia BARRON Renate Andrea, MD    piperacillin-tazobactam (ZOSYN) 3.375 g in iso-osmotic dextrose 50 ml (premix), 3.375 g, Intravenous, Q8H, Magdiel Murray MD    Potassium Replacement - Follow Nurse / BPA Driven Protocol, , Does not apply, Cecilia BARRON Renate Andrea, MD    sodium chloride 0.9 % flush 10 mL, 10 mL, Intravenous, Q12H, Prince Roberts MD, 10 mL at 11/02/23 0850    sodium chloride 0.9 % flush 10 mL, 10 mL, Intravenous, PRN, Prince Roberts MD    sodium chloride 0.9 % infusion 40 mL, 40 mL, Intravenous, PRN, Prince Roberts MD      Physical Exam:   Vital Sign Min/Max for last 24 hours  Temp  Min: 97.1 °F (36.2 °C)  Max: 99.1 °F (37.3 °C)   BP  Min: 143/76  Max: 180/93    Pulse  Min: 79  Max: 130     Wt Readings from Last 3 Encounters:   11/02/23 75.2 kg (165 lb 12.6 oz)       General Appearance:  Awake,  Alert, cooperative, in no acute distress   Head:  Normocephalic, without obvious abnormality, atraumatic   Eyes:          Conjunctivae normal, anicteric, eom intact    Neck: No adenopathy, supple, trachea midline, no thyromegaly, no   carotid bruit, no JVD, no elevated cvp   Lungs:   Clear to auscultation,respirations regular, even and                  unlabored    Heart:  Regular rhythm and normal rate, normal S1 and S2,            No murmur, no gallop, no rub, no click    Chest Wall:  No abnormalities observed   Abdomen:   Normal  "bowel sounds, no masses, soft nontender, nondistended                    Rectal:   Deferred   Extremities: No edema. Moves all extremities well, no cyanosis, no erythema   Pulses: Pulses palpable and equal bilaterally   Skin: No bleeding, bruising or rash   Neurologic: Speech clear and appropriate, no facial drooping     :       MONITOR:    Results Review:     Sodium Sodium   Date Value Ref Range Status   11/02/2023 136 136 - 145 mmol/L Final   11/01/2023 137 136 - 145 mmol/L Final   10/31/2023 138 136 - 145 mmol/L Final      Potassium Potassium   Date Value Ref Range Status   11/02/2023 3.9 3.5 - 5.2 mmol/L Final   11/02/2023 3.9 3.5 - 5.2 mmol/L Final   11/01/2023 3.4 (L) 3.5 - 5.2 mmol/L Final     Comment:     Slight hemolysis detected by analyzer. Results may be affected.   10/31/2023 3.8 3.5 - 5.2 mmol/L Final   10/31/2023 3.7 3.5 - 5.2 mmol/L Final   10/30/2023 3.2 (L) 3.5 - 5.2 mmol/L Final      Chloride Chloride   Date Value Ref Range Status   11/02/2023 109 (H) 98 - 107 mmol/L Final   11/01/2023 106 98 - 107 mmol/L Final   10/31/2023 107 98 - 107 mmol/L Final      Bicarbonate No results found for: \"PLASMABICARB\"   BUN BUN   Date Value Ref Range Status   11/02/2023 11 8 - 23 mg/dL Final   11/01/2023 11 8 - 23 mg/dL Final   10/31/2023 9 8 - 23 mg/dL Final      Creatinine Creatinine   Date Value Ref Range Status   11/02/2023 1.14 0.76 - 1.27 mg/dL Final   11/01/2023 1.21 0.76 - 1.27 mg/dL Final   10/31/2023 1.07 0.76 - 1.27 mg/dL Final      Calcium Calcium   Date Value Ref Range Status   11/02/2023 8.5 (L) 8.6 - 10.5 mg/dL Final   11/01/2023 8.9 8.6 - 10.5 mg/dL Final   10/31/2023 8.4 (L) 8.6 - 10.5 mg/dL Final      Magnesium No results found for: \"MG\"     Results from last 7 days   Lab Units 11/02/23  0612   WBC 10*3/mm3 12.87*   HEMOGLOBIN g/dL 13.5   HEMATOCRIT % 39.5   PLATELETS 10*3/mm3 270     Lab Results   Lab Value Date/Time    TROPONINT 425 (C) 11/02/2023 0735    TROPONINT 451 (C) 11/02/2023 0612    " "TROPONINT 354 (C) 11/01/2023 2031    TROPONINT 339 (C) 11/01/2023 1846     Lab Results   Component Value Date    CHOL 146 10/29/2023     Lab Results   Component Value Date    HDL 34 (L) 10/29/2023     Lab Results   Component Value Date    LDL 98 10/29/2023     Lab Results   Component Value Date    TRIG 72 10/29/2023     No components found for: \"CHOLHDL\"  No results found for: \"PTT\"  No components found for: \"PT/INR\"  Lab Results   Component Value Date    HGBA1C 5.20 10/29/2023      Lab Results   Component Value Date    TSH 1.880 10/29/2023        Echo EF Estimated  )No results found for: \"ECHOEFEST\"        Left ventricular systolic function is severely decreased. Calculated left ventricular EF = 21.6% Left ventricular ejection fraction appears to be less than 20%.The following left ventricular wall segments are hypokinetic: mid anterolateral, basal inferolateral, mid inferolateral, mid inferior, basal inferoseptal, mid inferoseptal, mid anteroseptal, basal inferior and basal inferoseptal. The following left ventricular wall segments are akinetic: apical anterior, apical lateral, apical inferior, apical septal and apex.    Left ventricular wall thickness is consistent with mild concentric hypertrophy.    Left ventricular diastolic function is consistent with (grade II w/high LAP) pseudonormalization.    The left atrial cavity is mildly dilated.    Saline test results are negative for right to left atrial level shunt.    Estimated right ventricular systolic pressure from tricuspid regurgitation is mildly elevated (35-45 mmHg).     Assessment/ Plan    Active Hospital Problems    Diagnosis  POA    **Stroke [I63.9]  Yes     Atypical chest pain  Nonischemic cardiomyopathy status post AICD    Dementia  Dyspnea  Patient be treated with the diuretics per pulmonology  Will follow        Mika Kim MD  11/02/23  11:07 EDT    Discussed with  Time:        "

## 2023-11-02 NOTE — PLAN OF CARE
Problem: Adult Inpatient Plan of Care  Goal: Plan of Care Review  Outcome: Ongoing, Progressing  Flowsheets (Taken 11/2/2023 6217)  Progress: no change  Plan of Care Reviewed With: patient  Outcome Evaluation: In CICU. On room air. Head CT and MRI done. 2mg of ativan total given for MRI. Vascular surgery consult. Lasix given. External catheter in place. Family updated at bedside.

## 2023-11-02 NOTE — PROGRESS NOTES
Discharge Planning Assessment  [facility]     Patient Name: Tigre Amaral  MRN: [id number]  Today's Date: 11/2/2023    Admit Date: 10/28/2023    Plan: [facility] when medically stable   Discharge Needs Assessment    No documentation.                  Discharge Plan       Row Name 11/02/23 0933       Plan    Plan [facility] when medically stable    Plan Comments Call placed to [phone] at [facility] and left a voice message  to inform her that patient will not be coming today. Call placed to [facility] and cancelled the 1500 transport. Mel JIN                  Continued Care and Services - Admitted Since 10/28/2023       Destination Coordination complete.      Service Provider Request Status Selected Services Address Phone Fax Patient Preferred    [facility]  Selected Inpatient Rehabilitation [address] [address] [phone] --    [facility] P[facility] - Request Sent N/A [address] [facility] [address] [phone] --    [facility] ACUTE REHAB PROGRAM Declined  Clinical Denial N/A [address] [id number] [phone] -- --                  Expected Discharge Date and Time       Expected Discharge Date Expected Discharge Time    Nov 2, 2023            Demographic Summary    No documentation.                  Functional Status    No documentation.                  Psychosocial    No documentation.                  Abuse/Neglect    No documentation.                  Legal    No documentation.                  Substance Abuse    No documentation.                  Patient Forms    No documentation.                     Azul Bledsoe, RN

## 2023-11-02 NOTE — THERAPY EVALUATION
Acute Care - Speech Language Pathology   Swallow Initial Evaluation [facility]     Patient Name: Tigre Amaral  : 1950  MRN: [id number]  Today's Date: 2023               Admit Date: 10/28/2023    Visit Dx:     ICD-10-CM ICD-9-CM   1. Cerebrovascular accident (CVA), unspecified mechanism  I63.9 434.91     Patient Active Problem List   Diagnosis    Stroke     History reviewed. No pertinent past medical history.  History reviewed. No pertinent surgical history.    SLP Recommendation and Plan  SLP Swallowing Diagnosis: R/O pharyngeal dysphagia (23 135)  SLP Diet Recommendation: soft to chew textures, chopped, thin liquids (23 [facility])  Recommended Precautions and Strategies: upright posture during/after eating, small bites of food and sips of liquid (23 [facility])  SLP Rec. for Method of Medication Administration: meds whole, with puree (23)     Monitor for Signs of Aspiration: yes, notify SLP if any concerns (23)  Recommended Diagnostics: reassess via VFSS ([facility]) (23)  Swallow Criteria for Skilled Therapeutic Interventions Met: demonstrates skilled criteria (23)  Anticipated Discharge Disposition (SLP): anticipate therapy at next level of care (23 [facility])  Rehab Potential/Prognosis, Swallowing: good, to achieve stated therapy goals (23)  Therapy Frequency (Swallow): PRN (23)  Predicted Duration Therapy Intervention (Days): until discharge (23 [facility])  Oral Care Recommendations: Oral Care BID/PRN (23 135)                                      Oral Care Recommendations: Oral Care BID/PRN (23 [facility])    Outcome Evaluation: Clinical swallow assessment completed. Patient agreeable to trials of thin liquids, puree, soft, and regular solids. Expectorated ice chip due to dislike of cold temperature liquids. Agreeable to p.o. trials once ice was removed from cup. Tolerated sips of thin liquid via cup and straw with no  Please let this patient know blood typing is not routine and he would likely need to pay for this out of pocket.    Nicole Magdaleno DO     overt s/sx of aspiration or penetration. Timely swallow initiation with puree solids. Slightly prolonged, but functional mastication with soft and regular solids. Suspect disorganized mastication related to lack of dentition (patient's family reported his dentures to be at home). Mild oral residue post swallow with regular solids. Cleared with thin liquid wash. Recommend continue order for soft, chopped solids with thin liquids. Medication whole with applesauce. Precautions; upright, small bites/sips, slow rate. Recommend VFSS to assess patient's swallow fxn and rule out silent aspiration.      SWALLOW EVALUATION (last 72 hours)       SLP Adult Swallow Evaluation       Row Name 11/02/23 8145       Rehab Evaluation    Document Type evaluation  -SR    Subjective Information no complaints  -SR    Patient Observations alert;cooperative;agree to therapy  -SR    Patient Effort good  -SR    Symptoms Noted During/After Treatment none  -SR       General Information    Patient Profile Reviewed yes  -SR    Pertinent History Of Current Problem 73 y.o. male; presented to the hospital on 10/28/2023 with complaints of left upper extremity weakness. CT head did not reveal any acute intracranial abnormalities. RRT called 11/2 and patient was transferred to ICU for monitoring. Recent CXR significant for right middle lobe infiltrate, vascular congestion. PMHx significant for TBI, dementia, pacemaker.  -SR    Current Method of Nutrition soft to chew textures;thin liquids  -SR    Precautions/Limitations, Vision WFL;for purposes of eval  -SR    Precautions/Limitations, Hearing WFL;difficult to assess  -SR    Prior Level of Function-Communication cognitive-linguistic impairment  -SR    Prior Level of Function-Swallowing no diet consistency restrictions  -SR    Plans/Goals Discussed with patient and family;agreed upon  -SR    Barriers to Rehab medically complex  -SR       Pain Scale: Numbers Pre/Post-Treatment    Pretreatment Pain Rating  0/10 - no pain  -SR    Posttreatment Pain Rating 0/10 - no pain  -SR       Oral Motor Structure and Function    Dentition Assessment other (see comments);edentulous, dentures not available  upper dentures not available  -SR    Secretion Management WNL/WFL  -SR    Mucosal Quality moist, healthy  -SR    Volitional Swallow unable to elicit  -SR    Volitional Cough unable to elicit  -SR       Oral Musculature and Cranial Nerve Assessment    Oral Motor General Assessment generalized oral motor weakness  -SR       General Eating/Swallowing Observations    Respiratory Support Currently in Use room air  -SR    Eating/Swallowing Skills self-fed;fed by SLP  -SR    Positioning During Eating upright 90 degree;upright in bed  -SR    Utensils Used spoon;cup;straw  -SR    Consistencies Trialed ice chips;thin liquids;pureed;mechanical ground textures;mixed consistency;regular textures  -SR       Clinical Swallow Eval    Oral Prep Phase WFL  -SR    Oral Transit WFL  -SR    Oral Residue impaired  -SR    Pharyngeal Phase suspected pharyngeal impairment  -SR    Clinical Swallow Evaluation Summary Clinical swallow assessment completed. Patient agreeable to trials of thin liquids, puree, soft, and regular solids. Expectorated ice chip due to dislike of cold temperature liquids. Agreeable to p.o. trials once ice was removed from cup. Tolerated sips of thin liquid via cup and straw with no overt s/sx of aspiration or penetration. Timely swallow initiation with puree solids. Slightly prolonged, but functional mastication with soft and regular solids. Suspect disorganized mastication related to lack of dentition (patient's family reported his dentures to be at home). Mild oral residue post swallow with regular solids. Cleared with thin liquid wash. Recommend continue order for soft, chopped solids with thin liquids. Medication whole with applesauce. Precautions; upright, small bites/sips, slow rate. Recommend VFSS to assess patient's swallow  fxn and rule out silent aspiration.  -SR       SLP Evaluation Clinical Impression    SLP Swallowing Diagnosis R/O pharyngeal dysphagia  -SR    Functional Impact risk of aspiration/pneumonia  -SR    Rehab Potential/Prognosis, Swallowing good, to achieve stated therapy goals  -SR    Swallow Criteria for Skilled Therapeutic Interventions Met demonstrates skilled criteria  -SR       Recommendations    Therapy Frequency (Swallow) PRN  -SR    Predicted Duration Therapy Intervention (Days) until discharge  -SR    SLP Diet Recommendation soft to chew textures;chopped;thin liquids  -SR    Recommended Diagnostics reassess via VFSS ([facility])  -SR    Recommended Precautions and Strategies upright posture during/after eating;small bites of food and sips of liquid  -SR    Oral Care Recommendations Oral Care BID/PRN  -SR    SLP Rec. for Method of Medication Administration meds whole;with puree  -SR    Monitor for Signs of Aspiration yes;notify SLP if any concerns  -SR    Anticipated Discharge Disposition (SLP) anticipate therapy at next level of care  -SR       Swallow Goals (SLP)    Swallow LTGs Patient will demonstrate functional swallow for  -SR       (LTG) Patient will demonstrate functional swallow for    Diet Texture (Demonstrate functional swallow) regular textures  -SR    Liquid viscosity (Demonstrate functional swallow) thin liquids  -SR    [place] (Demonstrate functional swallow) independently (over 90% accuracy)  -SR    Time Frame (Demonstrate functional swallow) 1 week  -SR              User Key  (r) = Recorded By, (t) = Taken By, (c) = Cosigned By      Initials Name Effective Dates    SR Kori Ocampo CCC-SLP 11/10/22 -                     EDUCATION  The patient has been educated in the following areas:   Dysphagia (Swallowing Impairment) Oral Care/Hydration.        SLP GOALS       Row Name 11/02/23 1350             (LTG) Patient will demonstrate functional swallow for    Diet Texture (Demonstrate functional  swallow) regular textures  -SR      Liquid viscosity (Demonstrate functional swallow) thin liquids  -SR      [place] (Demonstrate functional swallow) independently (over 90% accuracy)  -SR      Time Frame (Demonstrate functional swallow) 1 week  -SR                User Key  (r) = Recorded By, (t) = Taken By, (c) = Cosigned By      Initials Name Provider Type    SR Kori Ocampo CCC-SLP Speech and Language Pathologist                       Time Calculation:    Time Calculation- SLP       Row Name 11/02/23 1521             Time Calculation- SLP    SLP Start Time 1350  -SR      SLP Received On 11/02/23  -SR         Untimed Charges    SLP Eval/Re-eval  ST Eval Oral Pharyng Swallow - 75217  -SR      [id number] Eval Oral Pharyng Swallow Minutes 60  -SR         Total Minutes    Untimed Charges Total Minutes 60  -SR       Total Minutes 60  -SR                User Key  (r) = Recorded By, (t) = Taken By, (c) = Cosigned By      Initials Name Provider Type    SR Kori Ocampo CCC-SLP Speech and Language Pathologist                    Therapy Charges for Today       Code Description Service Date Service Provider Modifiers Qty    [id number]  ST EVAL ORAL PHARYNG SWALLOW 4 11/2/2023 Kori Ocampo CCC-SLP GN 1                 EDGARDO Romero  11/2/2023

## 2023-11-03 ENCOUNTER — APPOINTMENT (OUTPATIENT)
Dept: GENERAL RADIOLOGY | Facility: HOSPITAL | Age: 73
DRG: 034 | End: 2023-11-03
Payer: OTHER GOVERNMENT

## 2023-11-03 PROBLEM — I65.21 CAROTID ARTERY STENOSIS, SYMPTOMATIC, RIGHT: Status: ACTIVE | Noted: 2023-11-03

## 2023-11-03 PROBLEM — I10 HYPERTENSION: Status: ACTIVE | Noted: 2023-11-03

## 2023-11-03 PROBLEM — E78.5 HYPERLIPIDEMIA: Status: ACTIVE | Noted: 2023-11-03

## 2023-11-03 PROBLEM — I50.33 ACUTE ON CHRONIC DIASTOLIC CONGESTIVE HEART FAILURE: Status: ACTIVE | Noted: 2023-11-03

## 2023-11-03 PROBLEM — Z72.0 TOBACCO ABUSE: Status: ACTIVE | Noted: 2023-11-03

## 2023-11-03 PROBLEM — I50.23 ACUTE ON CHRONIC SYSTOLIC CONGESTIVE HEART FAILURE: Status: ACTIVE | Noted: 2023-11-03

## 2023-11-03 PROBLEM — J69.0 ASPIRATION PNEUMONIA: Status: ACTIVE | Noted: 2023-11-03

## 2023-11-03 PROBLEM — N18.31 STAGE 3A CHRONIC KIDNEY DISEASE: Status: ACTIVE | Noted: 2023-11-03

## 2023-11-03 PROBLEM — C44.311 BASAL CELL CARCINOMA (BCC) OF SKIN OF NOSE: Status: ACTIVE | Noted: 2023-11-03

## 2023-11-03 PROBLEM — I63.411 CEREBROVASCULAR ACCIDENT (CVA) DUE TO EMBOLISM OF RIGHT MIDDLE CEREBRAL ARTERY: Status: ACTIVE | Noted: 2023-10-28

## 2023-11-03 LAB
QT INTERVAL: 570 MS
QTC INTERVAL: 575 MS

## 2023-11-03 PROCEDURE — 99253 IP/OBS CNSLTJ NEW/EST LOW 45: CPT | Performed by: NURSE PRACTITIONER

## 2023-11-03 PROCEDURE — 93005 ELECTROCARDIOGRAM TRACING: CPT | Performed by: INTERNAL MEDICINE

## 2023-11-03 PROCEDURE — 97530 THERAPEUTIC ACTIVITIES: CPT

## 2023-11-03 PROCEDURE — 25010000002 ENOXAPARIN PER 10 MG: Performed by: HOSPITALIST

## 2023-11-03 PROCEDURE — 74230 X-RAY XM SWLNG FUNCJ C+: CPT

## 2023-11-03 PROCEDURE — 25010000002 PIPERACILLIN SOD-TAZOBACTAM PER 1 G: Performed by: INTERNAL MEDICINE

## 2023-11-03 PROCEDURE — 92611 MOTION FLUOROSCOPY/SWALLOW: CPT

## 2023-11-03 PROCEDURE — 99232 SBSQ HOSP IP/OBS MODERATE 35: CPT | Performed by: NURSE PRACTITIONER

## 2023-11-03 PROCEDURE — 93010 ELECTROCARDIOGRAM REPORT: CPT | Performed by: INTERNAL MEDICINE

## 2023-11-03 RX ORDER — CLOPIDOGREL BISULFATE 75 MG/1
150 TABLET ORAL ONCE
Status: COMPLETED | OUTPATIENT
Start: 2023-11-03 | End: 2023-11-03

## 2023-11-03 RX ORDER — CLOPIDOGREL BISULFATE 75 MG/1
75 TABLET ORAL DAILY
Status: DISCONTINUED | OUTPATIENT
Start: 2023-11-04 | End: 2023-11-06 | Stop reason: SDUPTHER

## 2023-11-03 RX ADMIN — BARIUM SULFATE 1 TEASPOON(S): 0.6 CREAM ORAL at 12:08

## 2023-11-03 RX ADMIN — BARIUM SULFATE 55 ML: 0.81 POWDER, FOR SUSPENSION ORAL at 12:07

## 2023-11-03 RX ADMIN — PIPERACILLIN SODIUM AND TAZOBACTAM SODIUM 3.38 G: 3; .375 INJECTION, SOLUTION INTRAVENOUS at 23:08

## 2023-11-03 RX ADMIN — BARIUM SULFATE 50 ML: 400 SUSPENSION ORAL at 12:08

## 2023-11-03 RX ADMIN — Medication 10 ML: at 09:26

## 2023-11-03 RX ADMIN — PIPERACILLIN SODIUM AND TAZOBACTAM SODIUM 3.38 G: 3; .375 INJECTION, SOLUTION INTRAVENOUS at 06:31

## 2023-11-03 RX ADMIN — PIPERACILLIN SODIUM AND TAZOBACTAM SODIUM 3.38 G: 3; .375 INJECTION, SOLUTION INTRAVENOUS at 15:51

## 2023-11-03 RX ADMIN — BARIUM SULFATE 4 ML: 980 POWDER, FOR SUSPENSION ORAL at 12:08

## 2023-11-03 RX ADMIN — CARVEDILOL 6.25 MG: 6.25 TABLET, FILM COATED ORAL at 17:01

## 2023-11-03 RX ADMIN — SENNOSIDES AND DOCUSATE SODIUM 2 TABLET: 50; 8.6 TABLET ORAL at 09:26

## 2023-11-03 RX ADMIN — CARVEDILOL 6.25 MG: 6.25 TABLET, FILM COATED ORAL at 09:27

## 2023-11-03 RX ADMIN — ASPIRIN 81 MG: 81 TABLET, CHEWABLE ORAL at 09:27

## 2023-11-03 RX ADMIN — CLOPIDOGREL BISULFATE 150 MG: 75 TABLET, FILM COATED ORAL at 15:51

## 2023-11-03 RX ADMIN — NICOTINE 1 PATCH: 21 PATCH, EXTENDED RELEASE TRANSDERMAL at 09:26

## 2023-11-03 RX ADMIN — ACETAMINOPHEN 650 MG: 325 TABLET, FILM COATED ORAL at 20:53

## 2023-11-03 RX ADMIN — Medication 10 ML: at 20:54

## 2023-11-03 RX ADMIN — LISINOPRIL 20 MG: 20 TABLET ORAL at 09:27

## 2023-11-03 RX ADMIN — ENOXAPARIN SODIUM 40 MG: 100 INJECTION SUBCUTANEOUS at 23:15

## 2023-11-03 RX ADMIN — ATORVASTATIN CALCIUM 80 MG: 80 TABLET, FILM COATED ORAL at 20:54

## 2023-11-03 NOTE — PROGRESS NOTES
"  Daily Progress Note.   [facility] CARDIAC INTENSIVE CARE  11/3/2023    Patient:  Name:  Tigre Amaral  MRN:  [id number]  1950  73 y.o.  male         Requesting physician: Dr. Abhinav Barnes     Reason for Consultation:  resp distress icu mgmt     CC: resp distress    Interval History:  No acute events overnight afebrile.  Blood pressure controlled on room air.  Slightly confused feels like he was at a bar last night dancing with girls.  Denies any worsening shortness breath chest pain.  Good mood but confused      Physical Exam:  /76   Pulse 64   Temp 98.1 °F (36.7 °C) (Oral)   Resp 12   Ht 172.7 cm (68\")   Wt 75.2 kg (165 lb 12.6 oz)   SpO2 98%   BMI 25.21 kg/m²   Body mass index is 25.21 kg/m².    Intake/Output Summary (Last 24 hours) at 11/3/2023 1012  Last data filed at 11/3/2023 0700  Gross per 24 hour   Intake 235 ml   Output 2900 ml   Net -2665 ml     General appearance: Awake alert, confused conversant   Eyes: Anicteric sclerae, moist conjunctivae; no lid lag;   HENT: Atraumatic; oropharynx clear with moist mucous membranes and no mucosal ulcerations; large 4 to 5 cm skin abnormality partially obstructing right  Neck: Trachea midline; positive JVD  Lungs: Bilateral air entry, calm respiratory effort and nointercostal retractions  CV: rrr  Abdomen: thin non rigid  Extremities:   Skin:warm dry  Psych: calm affect, alert confused       Data Review:  Notable Labs:  Results from last 7 days   Lab Units 11/02/23  0612 11/01/23  1846 10/30/23  0522 10/29/23  0600 10/28/23  1449   WBC 10*3/mm3 12.87* 14.89* 9.46 8.49 11.54*   HEMOGLOBIN g/dL 13.5 14.3 13.1 13.1 14.7   PLATELETS 10*3/mm3 270 292 244 222 264     Results from last 7 days   Lab Units 11/02/23  0612 11/01/23  1846 10/31/23  1520 10/31/23  0643 10/30/23  2251 10/30/23  0522 10/29/23  2015 10/29/23  0600 10/28/23  1449   SODIUM mmol/L 136 137  --  138  --  138  --  139 141   POTASSIUM mmol/L 3.9  3.9 3.4* 3.8 3.7 3.2* " 3.2* 3.4* 2.4* 3.1*   CHLORIDE mmol/L 109* 106  --  107  --  106  --  103 100   CO2 mmol/L 18.1* 20.0*  --  21.6*  --  25.0  --  26.4 30.5*   BUN mg/dL 11 11  --  9  --  9  --  9 9   CREATININE mg/dL 1.14 1.21  --  1.07  --  1.11  --  1.28* 1.53*   GLUCOSE mg/dL 108* 119*  --  96  --  91  --  93 76   CALCIUM mg/dL 8.5* 8.9  --  8.4*  --  8.4*  --  8.3* 8.8   MAGNESIUM mg/dL  --   --   --   --   --   --   --  1.8  --    Estimated Creatinine Clearance: 59.6 mL/min (by C-G formula based on SCr of 1.14 mg/dL).    Results from last 7 days   Lab Units 11/02/23  0612 11/01/23  1846 10/30/23  0522 10/29/23  0600   AST (SGOT) U/L 51* 51*  --  13   ALT (SGPT) U/L 16 14  --  6   PROCALCITONIN ng/mL 0.06 0.04  --   --    LACTATE mmol/L  --  1.8  --   --    PLATELETS 10*3/mm3 270 292 244 222             Imaging:  Reviewed chest images personally from past 3 days    ASSESSMENT  /  PLAN:  Imaging: I personally visualized the images of scans/x-rays performed within last 3 days.     Cxr - right middle lobe infiltrate, +vascular congestion +ICD      Left ventricular systolic function is severely decreased. Calculated left ventricular EF = 21.6% Left ventricular ejection fraction appears to be less than 20%.The following left ventricular wall segments are hypokinetic: mid anterolateral, basal inferolateral, mid inferolateral, mid inferior, basal inferoseptal, mid inferoseptal, mid anteroseptal, basal inferior and basal inferoseptal. The following left ventricular wall segments are akinetic: apical anterior, apical lateral, apical inferior, apical septal and apex.    Left ventricular wall thickness is consistent with mild concentric hypertrophy.    Left ventricular diastolic function is consistent with (grade II w/high LAP) pseudonormalization.    The left atrial cavity is mildly dilated.    Saline test results are negative for right to left atrial level shunt.    Estimated right ventricular systolic pressure from tricuspid  regurgitation is mildly elevated (35-45 mmHg).        Assessment and plan  Acute on chronic decompensated systolic heart failure with EF 20%  Cardiomyopathy per outpatient records no mention of ischemia or coronary artery disease  Status post ICD.  Hypertension  Hyperlipidemia  Aspiration pneumonia  Stroke  Respiratory distress  Acute pulmonary edema from heart failure     Transthoracic echocardiogram shows an EF of 20% patient is very hypertensive in respiratory distress chest x-ray shows likely aspiration pneumonia in the right middle lobe as well as pulmonary edema.  Improved with diuretic dose      Bp control - improved      Cont Zosyn for aspiration pneumonia obtain blood cultures -augmentin tomorrow pending cultures on 5 day course.     Titrated oxygen down on room air now     Cardiology care per Dr Kim.    Discussed with bedside RN, appreciate insight and care.        Electronically signed by Magdiel Murray MD, 11/03/23, 10:12 AM EDT.  [facility]

## 2023-11-03 NOTE — PROGRESS NOTES
Name: Tigre Amaral ADMIT: 10/28/2023   : 1950  PCP: Provider, No Known    MRN: [id number] LOS: 6 days   AGE/SEX: 73 y.o. male  ROOM:  [facility] P584/1     CC: consult for symptomatic carotid stenosis.   Interval History: no acute events overnight. Pt still confused but is moving left side more.   Subjective   Subjective     Review of SystemsUTO    Objective   Objective     Vitals:   Temp:  [97.7 °F (36.5 °C)-98.3 °F (36.8 °C)] 98.3 °F (36.8 °C)  Heart Rate:  [] 62  Resp:  [12-17] 12  BP: (111-163)/(55-98) 128/84    No intake/output data recorded.    Scheduled Meds:     amiodarone, 150 mg, Intravenous, Once  aspirin, 81 mg, Oral, Daily  atorvastatin, 80 mg, Oral, Nightly  carvedilol, 6.25 mg, Oral, BID With Meals  enoxaparin, 40 mg, Subcutaneous, Q24H  lisinopril, 20 mg, Oral, Daily  nicotine, 1 patch, Transdermal, Q24H  piperacillin-tazobactam, 3.375 g, Intravenous, Q8H  senna-docusate sodium, 2 tablet, Oral, BID  sodium chloride, 10 mL, Intravenous, Q12H      IV Meds:        Physical Exam  NAD  R nose mass noted  L Arm/Leg 3/5 strength  5/5 R arm/leg    Data Reviewed:  CBC    Results from last 7 days   Lab Units 11/02/23  0612 11/01/23  1846 10/30/23  0522 10/29/23  0600 10/28/23  1449   WBC 10*3/mm3 12.87* 14.89* 9.46 8.49 11.54*   HEMOGLOBIN g/dL 13.5 14.3 13.1 13.1 14.7   PLATELETS 10*3/mm3 270 292 244 222 264     BMP   Results from last 7 days   Lab Units 23  0612 11/01/23  1846 10/31/23  1520 10/31/23  0643 10/30/23  2251 10/30/23  0522 10/29/23  2015 10/29/23  0600 10/28/23  1449   SODIUM mmol/L 136 137  --  138  --  138  --  139 141   POTASSIUM mmol/L 3.9  3.9 3.4* 3.8 3.7 3.2* 3.2* 3.4* 2.4* 3.1*   CHLORIDE mmol/L 109* 106  --  107  --  106  --  103 100   CO2 mmol/L 18.1* 20.0*  --  21.6*  --  25.0  --  26.4 30.5*   BUN mg/dL 11 11  --  9  --  9  --  9 9   CREATININE mg/dL 1.14 1.21  --  1.07  --  1.11  --  1.28* 1.53*   GLUCOSE mg/dL 108* 119*  --  96  --  91  --  93 76  "  MAGNESIUM mg/dL  --   --   --   --   --   --   --  1.8  --      Cr Clearance Estimated Creatinine Clearance: 59.6 mL/min (by C-G formula based on SCr of 1.14 mg/dL).  Coag   Results from last 7 days   Lab Units 10/28/23  1449   INR  1.02     HbA1C   Lab Results   Component Value Date    HGBA1C 5.20 10/29/2023     Blood Glucose   Glucose   Date/Time Value Ref Range Status   11/02/2023 0825 129 70 - 130 mg/dL Final   11/02/2023 0712 116 70 - 130 mg/dL Final   11/01/2023 1741 128 70 - 130 mg/dL Final     Infection   Results from last 7 days   Lab Units 11/02/23  0929 11/02/23  0612 11/01/23  1846   BLOODCX  No growth at 24 hours  No growth at 24 hours  --   --    PROCALCITONIN ng/mL  --  0.06 0.04     CMP   Results from last 7 days   Lab Units 11/02/23  0612 11/01/23  1846 10/31/23  1520 10/31/23  0643 10/30/23  2251 10/30/23  0522 10/29/23  2015 10/29/23  0600 10/28/23  1449   SODIUM mmol/L 136 137  --  138  --  138  --  139 141   POTASSIUM mmol/L 3.9  3.9 3.4* 3.8 3.7 3.2* 3.2* 3.4* 2.4* 3.1*   CHLORIDE mmol/L 109* 106  --  107  --  106  --  103 100   CO2 mmol/L 18.1* 20.0*  --  21.6*  --  25.0  --  26.4 30.5*   BUN mg/dL 11 11  --  9  --  9  --  9 9   CREATININE mg/dL 1.14 1.21  --  1.07  --  1.11  --  1.28* 1.53*   GLUCOSE mg/dL 108* 119*  --  96  --  91  --  93 76   ALBUMIN g/dL 3.3* 3.4*  --   --   --   --   --  3.1* 3.7   BILIRUBIN mg/dL 1.2 1.1  --   --   --   --   --  0.8 0.6   ALK PHOS U/L 76 86  --   --   --   --   --  71 89   AST (SGOT) U/L 51* 51*  --   --   --   --   --  13 14   ALT (SGPT) U/L 16 14  --   --   --   --   --  6 7     ABG      UA      FEDERICO  No results found for: \"POCMETH\", \"POCAMPHET\", \"POCBARBITUR\", \"POCBENZO\", \"POCCOCAINE\", \"POCOPIATES\", \"POCOXYCODO\", \"POCPHENCYC\", \"POCPROPOXY\", \"POCTHC\", \"POCTRICYC\"  Lysis Labs   Results from last 7 days   Lab Units 11/02/23  0612 11/01/23  1846 10/31/23  0643 10/30/23  0522 10/29/23  0600 10/28/23  1449   INR   --   --   --   --   --  1.02 "   HEMOGLOBIN g/dL 13.5 14.3  --  13.1 13.1 14.7   PLATELETS 10*3/mm3 270 292  --  244 222 264   CREATININE mg/dL 1.14 1.21 1.07 1.11 1.28* 1.53*     Radiology(recent) MRI Brain Without Contrast    Result Date: 11/2/2023  The study is significantly hampered by patient motion but multiple acute infarcts involving the right frontal, parietal and to a lesser extent occipital and temporal lobes are appreciated including both white matter and cortical infarcts. The majority of infarcts are oriented in an AP dimension consistent with watershed infarcts.  The above information was called to and discussed with SUNG Munroe on 11/02/2023 at 1610 hours.  This report was finalized on 11/2/2023 4:48 PM by Dr. Abhinav Vidales M.D on Workstation: [id number]      CT Head Without Contrast    Result Date: 11/2/2023  1.  A large remote left MCA distribution infarct is appreciated with volume loss similar in appearance as compared to the prior examination. Lacunar infarcts involving the corona radiata on the right are noted. There is no evidence of acute infarction or intracranial hemorrhage. Further evaluation could be performed with a MRI examination of the brain as indicated. 2.  A 3.3 x 1.9 cm soft tissue mass is appreciated overlying the medial aspect of the maxilla on the right and extending over the posterior aspect of the nasal bone on the right. The patient has a known basal cell carcinoma at this location.  Radiation dose reduction techniques were utilized, including automated exposure control and exposure modulation based on body size.   This report was finalized on 11/2/2023 4:16 PM by Dr. Abhinav Vidales M.D on Workstation: [id number]      XR Chest 1 View    Result Date: 11/2/2023  FINDINGS AND IMPRESSION: Left pacer/AICD is present.  Worsening pulmonary vascular congestion is present. There is also worsening bibasilar pulmonary pacification and interstitial thickening, right greater than left. Findings are most concerning  for worsening multifocal pneumonia and/or pulmonary edema in the appropriate clinical context. Correlation with patient history is recommended with follow-up chest CT if clinically indicated. No pneumothorax is seen. Cardiac silhouette is within normal limits for size.  This report was finalized on 2023 8:21 AM by Dr. Addy Joseph M.D on Workstation: [id number]       Most notable findings include: CTA and duplex with high grade carotid stenosis.     Active Hospital Problems:   Active Hospital Problems    Diagnosis  POA    **Stroke [I63.9]  Yes      Resolved Hospital Problems   No resolved problems to display.      Assessment & Plan   [place], Subsequent Hospital Care  Assessment / Plan     73 y.o. male with dementia, hypertension, nose mass now found to have cardiomyopathy with an ejection fraction of 20% admitted with right hemispheric stroke he also has right carotid stenosis, as well as left carotid stenosis.  Carotid duplex suggest near occlusive carotid lesions.  Sometimes this can result in some distal collapse of the ICA due to diminished perfusion which renders assessment of degree of stenosis by NASCET criteria on a CTA less accurate.  Distal ICA does appear to be of normal caliber so not convinced that is what accounts for the discrepancy between the CTA and the carotid duplex.  However, given the severity of the duplex findings I do think it is reasonable to consider intervention for this carotid lesion.  Discussed with Dr. Villalpando with neurology.  He does not feel best medical therapy with dual antiplatelets will be sufficient for management, and will reach out to neurosurgery to discuss possible stenting.  I still feel his severe cardiomyopathy makes open surgery precarious.   We will follow.     Evan Ramos II, MD  23  12:55 EDT  Office Number [phone]

## 2023-11-03 NOTE — PLAN OF CARE
Goal Outcome Evaluation:  Plan of Care Reviewed With: patient, daughter (Step daughter)        Progress: no change  Outcome Evaluation: Pt seen for PT/OT cotx this AM and tolerated the session fairly. Transport arrived mid session to take pt down for a swallow study. Pt required Mod/MaxA x2 for bed mobility and STS via B HHA and MaxA x2 for a stand pivot to the R to tsf to the stretcher. Pt required MaxA from this PT for weight shifting at his hips to assist w/ stand pivot tsf while OT in front holding pts hands and guiding pt. Pt overall limited by his high distractibility and frequent re-orientation to task. PT will cont to follow to progress his mobility as tolerated.      Anticipated Discharge Disposition (PT): inpatient rehabilitation facility

## 2023-11-03 NOTE — THERAPY TREATMENT NOTE
Patient Name: Tigre Amaral  : 1950    MRN: [id number]                              Today's Date: 11/3/2023       Admit Date: 10/28/2023    Visit Dx:     ICD-10-CM ICD-9-CM   1. Cerebrovascular accident (CVA), unspecified mechanism  I63.9 434.91     Patient Active Problem List   Diagnosis    Stroke     History reviewed. No pertinent past medical history.  History reviewed. No pertinent surgical history.   General Information       Row Name 23 1158          Physical Therapy Time and Intention    Document Type therapy note (daily note)  -MG     Mode of Treatment co-treatment;physical therapy;occupational therapy  -MG       Row Name 23 1158          General Information    Patient Profile Reviewed yes  -MG     Existing Precautions/Restrictions fall  -MG     Barriers to Rehab medically complex;cognitive status  -MG       Row Name 23 1158          Cognition    Orientation Status (Cognition) oriented to;person;place;verbal cues/prompts needed for orientation  Knew [place] and [facility], but thought he was in [place]  -MG       Row Name 23 1158          Safety Issues, Functional Mobility    Safety Issues Affecting Function (Mobility) ability to follow commands;awareness of need for assistance;insight into deficits/self-awareness;problem-solving;safety precaution awareness;safety precautions follow-through/compliance;sequencing abilities  -MG     Impairments Affecting Function (Mobility) coordination;endurance/activity tolerance;cognition;motor control;grasp;motor planning;muscle tone abnormal;strength;balance  -MG     Cognitive Impairments, Mobility Safety/Performance attention;awareness, need for assistance;insight into deficits/self-awareness;problem-solving/reasoning;safety precaution awareness;safety precaution follow-through;sequencing abilities  -MG     Comment, Safety Issues/Impairments (Mobility) Gait belt and non-skid socks donned. PT/OT cotx due to pts medical complexity,  impaired cognition, poor activity tolerance and need for two skilled therapists for safe functional mobility progression.  -MG               User Key  (r) = Recorded By, (t) = Taken By, (c) = Cosigned By      Initials Name Provider Type    MG Camelia Gibson, PT Physical Therapist                   Mobility       Row Name 11/03/23 1200          Bed Mobility    Supine-Sit [place] (Bed Mobility) moderate assist (50% patient effort);maximum assist (25% patient effort);2 person assist;verbal cues;nonverbal cues (demo/gesture)  -MG     Sit-Supine [place] (Bed Mobility) maximum assist (25% patient effort);2 person assist;verbal cues;nonverbal cues (demo/gesture)  -MG     Assistive Device (Bed Mobility) head of bed elevated  -MG     Comment, (Bed Mobility) Required frequent cueing, re-direction to task and assist at LEs and trunk to come to sitting EOB. Once sitting on stretcher pt required increased assist to lay supine due to confusion, distractible and needing increased attention to task.  -MG       Row Name 11/03/23 1200          Bed-Chair Transfer    Bed-Chair [place] (Transfers) maximum assist (25% patient effort);2 person assist;verbal cues  -MG     Assistive Device (Bed-Chair Transfers) other (see comments)  B HHA  -MG     Comment, (Bed-Chair Transfer) Bed to stretcher. Stand pivot to R. OT blocking L knee, however pt having difficulty with weight shifting in order to step. This PT was able to get behind pt to assist at his hips for weight shifting MaxA; OT in front holding onto pts hands and guiding pt.  -MG       Row Name 11/03/23 1200          Sit-Stand Transfer    Sit-Stand [place] (Transfers) moderate assist (50% patient effort);maximum assist (25% patient effort);2 person assist;verbal cues;nonverbal cues (demo/gesture)  -MG     Assistive Device (Sit-Stand Transfers) other (see comments)  B HHA  -MG     Comment, (Sit-Stand Transfer) STS x3 from EOB. L knee foot blocked. Impulsively sits.  Required cueing for upright posture by this PT and OT and for attention to task.  -MG               User Key  (r) = Recorded By, (t) = Taken By, (c) = Cosigned By      Initials Name Provider Type    Camelia Almonte PT Physical Therapist                   Obj/Interventions       Row Name 11/03/23 1205          Motor Skills    Therapeutic Exercise other (see comments)  SLR x2 on each LE.  -MG               User Key  (r) = Recorded By, (t) = Taken By, (c) = Cosigned By      Initials Name Provider Type    Camelia Almonte, PT Physical Therapist                   Goals/Plan    No documentation.                  Clinical Impression       Row Name 11/03/23 1206          Pain    Pre/Posttreatment Pain Comment Does not rate pain.  -MG     Pain Intervention(s) Medication (See MAR);Ambulation/increased activity;Repositioned;Rest  -MG       Row Name 11/03/23 1206          Plan of Care Review    Plan of Care Reviewed With patient;daughter  Step daughter  -MG     Progress no change  -MG     Outcome Evaluation Pt seen for PT/OT cotx this AM and tolerated the session fairly. Transport arrived mid session to take pt down for a swallow study. Pt required Mod/MaxA x2 for bed mobility and STS via B HHA and MaxA x2 for a stand pivot to the R to tsf to the stretcher. Pt required MaxA from this PT for weight shifting at his hips to assist w/ stand pivot tsf while OT in front holding pts hands and guiding pt. Pt overall limited by his high distractibility and frequent re-orientation to task. PT will cont to follow to progress his mobility as tolerated.  -MG       Row Name 11/03/23 1206          Therapy Assessment/Plan (PT)    Rehab Potential (PT) good, to achieve stated therapy goals  -MG     Criteria for Skilled Interventions Met (PT) skilled treatment is necessary  -MG     Therapy Frequency (PT) 6 times/wk  -MG       Row Name 11/03/23 1206          Vital Signs    Pre Systolic BP Rehab 128  MAP 95. Lying  -MG     Pre Treatment  Diastolic BP 84  -MG     Pretreatment Heart Rate (beats/min) 60  -MG     Pretreatment Resp Rate (breaths/min) 15  -MG     Pre SpO2 (%) 98  -MG     O2 Delivery Pre Treatment room air  -MG     O2 Delivery Intra Treatment room air  -MG     O2 Delivery Post Treatment room air  -MG     Pre Patient Position Supine  -MG     Intra Patient Position Standing  -MG     Post Patient Position Supine  -MG       Row Name 11/03/23 1206          Positioning and Restraints    Pre-Treatment Position in bed  -MG     Post Treatment Position other  -MG     Other Position with other staff  Transport to swallow study  -MG               User Key  (r) = Recorded By, (t) = Taken By, (c) = Cosigned By      Initials Name Provider Type    Camelia Almonte PT Physical Therapist                   Outcome Measures       Row Name 11/03/23 1209          How much help from another person do you currently need...    Turning from your back to your side while in flat bed without using bedrails? 2  -MG     Moving from lying on back to sitting on the side of a flat bed without bedrails? 2  -MG     Moving to and from a bed to a chair (including a wheelchair)? 2  -MG     Standing up from a chair using your arms (e.g., wheelchair, bedside chair)? 2  -MG     Climbing 3-5 steps with a railing? 1  -MG     To walk in hospital room? 1  -MG     AM-PAC 6 Clicks Score (PT) 10  -MG     Highest level of mobility 4 --> Transferred to chair/commode  -MG               User Key  (r) = Recorded By, (t) = Taken By, (c) = Cosigned By      Initials Name Provider Type    Camelia Almonte PT Physical Therapist                                 Physical Therapy Education       Title: PT OT SLP Therapies (In Progress)       Topic: Physical Therapy (In Progress)       Point: Mobility training (In Progress)       Learning Progress Summary             Patient Acceptance, E, VU by ML at 10/31/2023 0308    Acceptance, E,TB,D, NL,NR by JUNAID at 10/30/2023 1530    Acceptance, E, VU by MACK  at 10/30/2023 0409    Acceptance, E,TB,D, NR,NL by CB at 10/29/2023 1302   Family Acceptance, E,TB,D, NR,NL by CB at 10/29/2023 1302                         Point: Home exercise program (Done)       Learning Progress Summary             Patient Acceptance, E, VU by ML at 10/31/2023 0308    Acceptance, E,TB,D, NL,NR by CB at 10/30/2023 1530    Acceptance, E, VU by ML at 10/30/2023 0409                         Point: Body mechanics (In Progress)       Learning Progress Summary             Patient Acceptance, E, NL,NR by DJ at 11/1/2023 1152    Acceptance, E, VU by ML at 10/31/2023 0308    Acceptance, E,TB,D, NL,NR by CB at 10/30/2023 1530    Acceptance, E, VU by ML at 10/30/2023 0409    Acceptance, E,TB,D, NR,NL by CB at 10/29/2023 1302   Family Acceptance, E,TB,D, NR,NL by CB at 10/29/2023 1302                         Point: Precautions (In Progress)       Learning Progress Summary             Patient Acceptance, E, NR,NL by MG at 11/3/2023 1210    Acceptance, E, NL,NR by DJ at 11/1/2023 1152    Acceptance, E, VU by ML at 10/31/2023 0308    Acceptance, E,TB,D, NL,NR by CB at 10/30/2023 1530    Acceptance, E, VU by ML at 10/30/2023 0409    Acceptance, E,TB,D, NR,NL by CB at 10/29/2023 1302   Family Acceptance, E,TB,D, NR,NL by CB at 10/29/2023 1302                                         User Key       Initials Effective Dates Name Provider Type Discipline    MG 05/24/22 -  Camelia Gibson, PT Physical Therapist PT    DJ 10/25/19 -  Sheila Reyna, PT Physical Therapist PT    CB 10/22/21 -  Katherin England, PT Physical Therapist PT    ML 06/15/23 -  Kendal Flor, RN Registered Nurse Nurse                  PT Recommendation and Plan     Plan of Care Reviewed With: patient, daughter (Step daughter)  Progress: no change  Outcome Evaluation: Pt seen for PT/OT cotx this AM and tolerated the session fairly. Transport arrived mid session to take pt down for a swallow study. Pt required Mod/MaxA x2 for bed mobility and STS via  B HHA and MaxA x2 for a stand pivot to the R to tsf to the stretcher. Pt required MaxA from this PT for weight shifting at his hips to assist w/ stand pivot tsf while OT in front holding pts hands and guiding pt. Pt overall limited by his high distractibility and frequent re-orientation to task. PT will cont to follow to progress his mobility as tolerated.     Time Calculation:         PT Charges       Row Name 11/03/23 1210             Time Calculation    Start Time 1120  -MG      Stop Time 1137  -MG      Time Calculation (min) 17 min  -MG      PT Received On 11/03/23  -MG      PT - Next Appointment 11/04/23  -MG                User Key  (r) = Recorded By, (t) = Taken By, (c) = Cosigned By      Initials Name Provider Type    Camelia Almonte, PT Physical Therapist                  Therapy Charges for Today       Code Description Service Date Service Provider Modifiers Qty    [id number]  PT THERAPEUTIC ACT EA 15 MIN 11/3/2023 Camelia Gibson, PT GP 1            PT G-Codes  Outcome Measure Options: AM-PAC 6 Clicks Daily Activity (OT)  AM-PAC 6 Clicks Score (PT): 10  AM-PAC 6 Clicks Score (OT): 14  Modified Sawyer Scale: 5 - Severe disability.  Bedridden, incontinent, and requiring constant nursing care and attention.  PT Discharge Summary  Anticipated Discharge Disposition (PT): inpatient rehabilitation facility    Camelia Gibson PT  11/3/2023

## 2023-11-03 NOTE — PROGRESS NOTES
Tigrejennie Amaral   73 y.o.  male    LOS: 6 days   Patient Care Team:  Provider, No Known as PCP - General      Subjective     Interval History:     73-year-old white male patient who been followed by my colleague  has history of AICD for primary prevention of sudden death.  He has history of ventricular tachycardia.  Patient has dementia and history is obtained mostly from review of charts.  He had an episode of chest pain this morning.  Patient has history of left arm weakness a few days ago.  He also lost his balance.  CT of the head showed old and subacute infarcts.  He has a soft tissue mass on the right premaxillary area consistent with his previously diagnosed basal cell carcinoma.  Echocardiogram showed ejection fraction 21.6%.  EKG is paced rhythm.     Patient Complaints:     Review of Systems:       Medication Review:   Current Facility-Administered Medications:     acetaminophen (TYLENOL) tablet 650 mg, 650 mg, Oral, Q4H PRN, 650 mg at 23 **OR** [DISCONTINUED] acetaminophen (TYLENOL) suppository 650 mg, 650 mg, Rectal, Q4H PRN, Lashon Brooks MD    amiodarone 150 mg in 100 mL D5W (loading dose), 150 mg, Intravenous, Once **FOLLOWED BY** [] amiodarone 360 mg in 200 mL D5W infusion, 1 mg/min, Intravenous, Continuous **FOLLOWED BY** [] amiodarone 360 mg in 200 mL D5W infusion, 0.5 mg/min, Intravenous, Continuous, Magdiel Murray MD    aspirin chewable tablet 81 mg, 81 mg, Oral, Daily, Carolina Casillas, APRN, 81 mg at 23    atorvastatin (LIPITOR) tablet 80 mg, 80 mg, Oral, Nightly, Lashon Brooks MD, 80 mg at 23    sennosides-docusate (PERICOLACE) 8.6-50 MG per tablet 2 tablet, 2 tablet, Oral, BID, 2 tablet at 23 **AND** polyethylene glycol (MIRALAX) packet 17 g, 17 g, Oral, Daily PRN **AND** bisacodyl (DULCOLAX) EC tablet 5 mg, 5 mg, Oral, Daily PRN **AND** bisacodyl (DULCOLAX) suppository 10 mg, 10 mg, Rectal, Daily PRN,  Lashon Brooks MD    carvedilol (COREG) tablet 6.25 mg, 6.25 mg, Oral, BID With Meals, Lashon Brooks MD, 6.25 mg at 11/03/23 0927    Enoxaparin Sodium (LOVENOX) syringe 40 mg, 40 mg, Subcutaneous, Q24H, Abhinav Barnes MD, 40 mg at 11/02/23 2353    lisinopril (PRINIVIL,ZESTRIL) tablet 20 mg, 20 mg, Oral, Daily, Lashon Brooks MD, 20 mg at 11/03/23 0927    Magnesium Standard Dose Replacement - Follow Nurse / BPA Driven Protocol, , Does not apply, PRN, Lashon Brooks MD    melatonin tablet 3 mg, 3 mg, Oral, Nightly PRN, Lashon Brooks MD, 3 mg at 11/02/23 2106    nicotine (NICODERM CQ) 21 MG/24HR patch 1 patch, 1 patch, Transdermal, Q24H, Jimenez Zarate MD, 1 patch at 11/03/23 0926    nitroglycerin (NITROSTAT) SL tablet 0.4 mg, 0.4 mg, Sublingual, Q5 Min PRN, Magdiel Murray MD    ondansetron (ZOFRAN) tablet 4 mg, 4 mg, Oral, Q6H PRN **OR** ondansetron (ZOFRAN) injection 4 mg, 4 mg, Intravenous, Q6H PRN, Lashon Brooks MD, 4 mg at 11/01/23 1758    Phosphorus Replacement - Follow Nurse / BPA Driven Protocol, , Does not apply, PRN, Lashon Brooks MD    piperacillin-tazobactam (ZOSYN) 3.375 g in iso-osmotic dextrose 50 ml (premix), 3.375 g, Intravenous, Q8H, Magdiel Murray MD, 3.375 g at 11/03/23 0631    Potassium Replacement - Follow Nurse / BPA Driven Protocol, , Does not apply, PRN, Lashon Brooks MD    sodium chloride 0.9 % flush 10 mL, 10 mL, Intravenous, Q12H, Prince Roberts MD, 10 mL at 11/03/23 0926    sodium chloride 0.9 % flush 10 mL, 10 mL, Intravenous, Armando BARRON Daniel, MD    sodium chloride 0.9 % infusion 40 mL, 40 mL, Intravenous, Armando BARRON Daniel, MD      Objective   Vital Sign Min/Max for last 24 hours  Temp  Min: 97.7 °F (36.5 °C)  Max: 98.1 °F (36.7 °C)   BP  Min: 111/56  Max: 163/74    Pulse  Min: 60  Max: 106     Wt Readings from Last 3 Encounters:   11/02/23 75.2 kg (165 lb 12.6 oz)        Intake/Output Summary (Last 24  "hours) at 11/3/2023 0952  Last data filed at 11/3/2023 0700  Gross per 24 hour   Intake 235 ml   Output 2900 ml   Net -2665 ml     Physical Exam:      General Appearance:    Well developed and well nourished in no acute distress   Head:    Normocephalic, atraumatic   Eyes:            Conjunctivae normal, anicteric, no xanthelasma   Neck:   supple, trachea midline, no thyromegaly, no carotid bruit, no JVD, no elevated CVP   Lungs:     Clear to auscultation,respirations regular, even and                  unlabored    Heart:    Regular rhythm and normal rate, normal S1 and S2,            No murmur, no gallop, no rub, no click   Chest Wall:    No abnormalities observed   Abdomen:     Normal bowel sounds, no masses, no organomegaly, soft        nontender, nondistended, no guarding, no rebound                tenderness   Rectal:     Deferred   Extremities:   No edema. Moves all extremities well, no cyanosis, no erythema   Pulses:   Pulses palpable and equal bilaterally   Skin:   No bleeding, bruising or rash   Neurologic:   awake alert and oriented x3, speech clear and approp, no facial drooping     :    Monitor:      Results Review:         Sodium Sodium   Date Value Ref Range Status   11/02/2023 136 136 - 145 mmol/L Final   11/01/2023 137 136 - 145 mmol/L Final      Potassium Potassium   Date Value Ref Range Status   11/02/2023 3.9 3.5 - 5.2 mmol/L Final   11/02/2023 3.9 3.5 - 5.2 mmol/L Final   11/01/2023 3.4 (L) 3.5 - 5.2 mmol/L Final     Comment:     Slight hemolysis detected by analyzer. Results may be affected.   10/31/2023 3.8 3.5 - 5.2 mmol/L Final      Chloride Chloride   Date Value Ref Range Status   11/02/2023 109 (H) 98 - 107 mmol/L Final   11/01/2023 106 98 - 107 mmol/L Final      Bicarbonate No results found for: \"PLASMABICARB\"   BUN BUN   Date Value Ref Range Status   11/02/2023 11 8 - 23 mg/dL Final   11/01/2023 11 8 - 23 mg/dL Final      Creatinine Creatinine   Date Value Ref Range Status   11/02/2023 " "1.14 0.76 - 1.27 mg/dL Final   11/01/2023 1.21 0.76 - 1.27 mg/dL Final      Calcium Calcium   Date Value Ref Range Status   11/02/2023 8.5 (L) 8.6 - 10.5 mg/dL Final   11/01/2023 8.9 8.6 - 10.5 mg/dL Final      Magnesium No results found for: \"MG\"     Results from last 7 days   Lab Units 11/02/23  0612   WBC 10*3/mm3 12.87*   HEMOGLOBIN g/dL 13.5   HEMATOCRIT % 39.5   PLATELETS 10*3/mm3 270     Lab Results   Lab Value Date/Time    TROPONINT 425 (C) 11/02/2023 0735    TROPONINT 451 (C) 11/02/2023 0612    TROPONINT 354 (C) 11/01/2023 2031    TROPONINT 339 (C) 11/01/2023 1846            Echo EF Estimated  No results found for: \"ECHOEFEST\"      Left ventricular systolic function is severely decreased. Calculated left ventricular EF = 21.6% Left ventricular ejection fraction appears to be less than 20%.The following left ventricular wall segments are hypokinetic: mid anterolateral, basal inferolateral, mid inferolateral, mid inferior, basal inferoseptal, mid inferoseptal, mid anteroseptal, basal inferior and basal inferoseptal. The following left ventricular wall segments are akinetic: apical anterior, apical lateral, apical inferior, apical septal and apex.    Left ventricular wall thickness is consistent with mild concentric hypertrophy.    Left ventricular diastolic function is consistent with (grade II w/high LAP) pseudonormalization.    The left atrial cavity is mildly dilated.    Saline test results are negative for right to left atrial level shunt.    Estimated right ventricular systolic pressure from tricuspid regurgitation is mildly elevated (35-45 mmHg).   MRI    The study is significantly hampered by patient motion but  multiple acute infarcts involving the right frontal, parietal and to a  lesser extent occipital and temporal lobes are appreciated including  both white matter and cortical infarcts. The majority of infarcts are  oriented in an AP dimension consistent with watershed infarcts.    CT " angio    There is an approximate 60-70% NASCET stenosis within the proximal  portion of the right ICA and an approximate 70-80% NASCET stenosis  within the proximal portion of the left ICA secondary to mixed calcified  and noncalcified atherosclerotic plaques.     There is a severe degree of stenosis at the origin of the left vertebral  artery and a moderate degree of stenosis is seen within the V3 segment  of the left vertebral artery.    Assessment/ Plan  Assessment & Plan   Active Hospital Problems    Diagnosis  POA    **Stroke [I63.9]  Yes   Nonischemic cardiomyopathy status post AICD    Dementia  No plans for surgery per vascular    Dual antiplatelet therapy        Mika Kim MD  11/03/23  09:52 EDT

## 2023-11-03 NOTE — CONSULTS
[facility] NEUROSURGERY CONSULT NOTE    Patient name: Tigre Amaral  Referring Provider: SUNG Casillas  Reason for Consultation: Severe symptomatic right ICA stenosis    Patient Care Team:  Provider, No Known as PCP - General    Chief complaint: Acute right watershed strokes likely secondary to severe right ICA stenosis    Subjective .     History of present illness:    Patient is a 73 y.o. male who initially presented to the ED with complaints of left-sided weakness.  He was found to have multiple right sided watershed infarcts.  A carotid Doppler was obtained and showed severe stenosis in the right ICA measuring 70 to 99%.  Case was discussed with vascular surgery who prefer we evaluate for possible stent placement as his severe cardiomyopathy would make an open surgery very risky.  Per stepdaughter at bedside, at baseline patient is very unsteady on his feet and has issues concentrating and is very forgetful with a history of dementia.  He is a current smoker was on aspirin prior to hospitalization.    Review of Systems  Review of Systems   Unable to perform ROS: Dementia       History  PAST MEDICAL HISTORY  History reviewed. No pertinent past medical history.    PAST SURGICAL HISTORY  History reviewed. No pertinent surgical history.    FAMILY HISTORY  History reviewed. No pertinent family history.    SOCIAL HISTORY         Allergies:  Patient has no known allergies.    MEDICATIONS:  Medications Prior to Admission   Medication Sig Dispense Refill Last Dose    aspirin 81 MG EC tablet Take 1 tablet by mouth Daily.   10/28/2023    carvedilol (COREG) 6.25 MG tablet Take 1 tablet by mouth 2 (Two) Times a Day With Meals.   10/28/2023    furosemide (LASIX) 40 MG tablet Take 1 tablet by mouth Daily.   10/28/2023    lisinopril (PRINIVIL,ZESTRIL) 20 MG tablet Take 1 tablet by mouth Daily.   10/28/2023       Objective     Results Review:  LABS:    Results from last 7 days   Lab Units 11/02/23  0612 11/01/23  0131  10/30/23  0522   WBC 10*3/mm3 12.87* 14.89* 9.46   HEMOGLOBIN g/dL 13.5 14.3 13.1   HEMATOCRIT % 39.5 42.3 37.9   PLATELETS 10*3/mm3 270 292 244       Results from last 7 days   Lab Units 11/02/23  0612 11/01/23  1846 10/31/23  1520 10/31/23  0643   SODIUM mmol/L 136 137  --  138   POTASSIUM mmol/L 3.9  3.9 3.4* 3.8 3.7   CHLORIDE mmol/L 109* 106  --  107   CO2 mmol/L 18.1* 20.0*  --  21.6*   BUN mg/dL 11 11  --  9   CREATININE mg/dL 1.14 1.21  --  1.07   GLUCOSE mg/dL 108* 119*  --  96   CALCIUM mg/dL 8.5* 8.9  --  8.4*         DIAGNOSTICS:  MRI Brain Without Contrast    Result Date: 11/2/2023  The study is significantly hampered by patient motion but multiple acute infarcts involving the right frontal, parietal and to a lesser extent occipital and temporal lobes are appreciated including both white matter and cortical infarcts. The majority of infarcts are oriented in an AP dimension consistent with watershed infarcts.  The above information was called to and discussed with SUNG Munroe on 11/02/2023 at 1610 hours.  This report was finalized on 11/2/2023 4:48 PM by Dr. Abhinav Vidales M.D on Workstation: [id number]      CT Head Without Contrast    Result Date: 11/2/2023  1.  A large remote left MCA distribution infarct is appreciated with volume loss similar in appearance as compared to the prior examination. Lacunar infarcts involving the corona radiata on the right are noted. There is no evidence of acute infarction or intracranial hemorrhage. Further evaluation could be performed with a MRI examination of the brain as indicated. 2.  A 3.3 x 1.9 cm soft tissue mass is appreciated overlying the medial aspect of the maxilla on the right and extending over the posterior aspect of the nasal bone on the right. The patient has a known basal cell carcinoma at this location.  Radiation dose reduction techniques were utilized, including automated exposure control and exposure modulation based on body size.   This  report was finalized on 11/2/2023 4:16 PM by Dr. Abhinav Vidales M.D on Workstation: [id number]      XR Chest 1 View    Result Date: 11/2/2023  FINDINGS AND IMPRESSION: Left pacer/AICD is present.  Worsening pulmonary vascular congestion is present. There is also worsening bibasilar pulmonary pacification and interstitial thickening, right greater than left. Findings are most concerning for worsening multifocal pneumonia and/or pulmonary edema in the appropriate clinical context. Correlation with patient history is recommended with follow-up chest CT if clinically indicated. No pneumothorax is seen. Cardiac silhouette is within normal limits for size.  This report was finalized on 11/2/2023 8:21 AM by Dr. Addy Joseph M.D on Workstation: [id number]         Results Review:   I reviewed the patient's new clinical results.  I personally viewed and interpreted the patient's chart.    Vital Signs   Temp:  [97.7 °F (36.5 °C)-98.4 °F (36.9 °C)] 98.4 °F (36.9 °C)  Heart Rate:  [] 66  Resp:  [12-17] 12  BP: (111-163)/(55-98) 121/66    Physical exam  Drowsy, arouses, following commands on right.  Left-sided neglect.  Opens eyes spontaneously.  Pupils equal round reactive to light  Face symmetric  Speech is pretty clear  gait deferred  Able to detect  light touch in all 4 extremities      Assessment & Plan       Cerebrovascular accident (CVA) due to embolism of right middle cerebral artery    Aspiration pneumonia    Acute on chronic systolic congestive heart failure    Hypertension    Stage 3a chronic kidney disease    Hyperlipidemia    Carotid artery stenosis, symptomatic, right    Tobacco abuse    73-year-old male with an acute right sided CVA and severe right internal carotid artery stenosis.    PLAN:     Plavix load today and then 75 mg daily starting tomorrow.  Check P2 Y12 in a.m.  Continue aspirin.  Plan for angiogram on Monday with possible stent placement.  Further stroke management per neurology.  Do not hold  "aspirin and Plavix the day of angiogram    I discussed the patient's findings and my recommendations with patient, family, and nursing staff    I spent 45 minutes caring for Tigre Amaral on this date of service. This time includes time spent by me in the following activities: preparing for the visit, reviewing tests, obtaining and/or reviewing a separately obtained history, performing a medically appropriate examination and/or evaluation, counseling and educating the patient/family/caregiver, ordering medications, tests, or procedures, referring and communicating with other health care professionals, documenting information in the medical record, independently interpreting results and communicating that information with the patient/family/caregiver, and care coordination         Babita Marte, APRN  11/03/23  14:41 EDT    \"Dictated utilizing Dragon dictation\".      "

## 2023-11-03 NOTE — PROGRESS NOTES
Continued Stay Note  [facility]     Patient Name: Tigre Amaral  MRN: [id number]  Today's Date: 11/3/2023    Admit Date: 10/28/2023    Plan: [facility] pending medical stability   Discharge Plan       Row Name 11/03/23 1410       Plan    Plan [facility] pending medical stability    Patient/Family in Agreement with Plan yes    Plan [facility] following for medical stability per Meghan. No prescriptions needed, facility has on site pharmacy. Packet in CCP office. Rachel Mckee LCSW                   Discharge Codes    No documentation.                 Expected Discharge Date and Time       Expected Discharge Date Expected Discharge Time    Nov 2, 2023               Debra Mckee LCSW

## 2023-11-03 NOTE — NURSING NOTE
Pt remains in the CICU on room air. Accurate UOP.  Vital signs remain stable.  No issues overnight. Possible overflow this morning. Family updated. Will continue to monitor.

## 2023-11-03 NOTE — PLAN OF CARE
Goal Outcome Evaluation:              Outcome Evaluation: Video swallow study completed. Reduced safety awareness with oral intake, likely impacting oral swallow. No manuel laryngeal penetration and/or tracheal aspiration observed. Suggest mechanical ground textures, thin liquids. Meds whole in puree. Notify SLP if any concerns. SLP following.

## 2023-11-03 NOTE — PROGRESS NOTES
"DOS: 11/3/2023  NAME: Tigre Amaral   : 1950  PCP: Provider, No Known    Chief Complaint   Patient presents with    Fall    Arm Injury        Stroke    Subjective: No acute events overnight.  Patient lying in bed awake states that he would like \"tea and cigarettes\".  Denies any new weakness, numbness, speech or visual disturbances, or headaches.  No family at bedside.    Objective:  Vital signs:      Vitals:    23 0700 23 0800 23 1000 23 1100   BP: 119/58 129/61 136/76 128/84   BP Location: Left arm      Patient Position: Lying      Pulse: 60 60 64 62   Resp: 12      Temp: 98.1 °F (36.7 °C)   98.3 °F (36.8 °C)   TempSrc: Oral      SpO2: 96% 99% 98% 97%   Weight:       Height:           Current Facility-Administered Medications:     acetaminophen (TYLENOL) tablet 650 mg, 650 mg, Oral, Q4H PRN, 650 mg at 23 **OR** [DISCONTINUED] acetaminophen (TYLENOL) suppository 650 mg, 650 mg, Rectal, Q4H PRN, Lashon Brooks MD    amiodarone 150 mg in 100 mL D5W (loading dose), 150 mg, Intravenous, Once **FOLLOWED BY** [] amiodarone 360 mg in 200 mL D5W infusion, 1 mg/min, Intravenous, Continuous **FOLLOWED BY** [] amiodarone 360 mg in 200 mL D5W infusion, 0.5 mg/min, Intravenous, Continuous, Magdiel Murray MD    aspirin chewable tablet 81 mg, 81 mg, Oral, Daily, Carolina Casillas, APRN, 81 mg at 23    atorvastatin (LIPITOR) tablet 80 mg, 80 mg, Oral, Nightly, Lashon Brooks MD, 80 mg at 23    sennosides-docusate (PERICOLACE) 8.6-50 MG per tablet 2 tablet, 2 tablet, Oral, BID, 2 tablet at 23 **AND** polyethylene glycol (MIRALAX) packet 17 g, 17 g, Oral, Daily PRN **AND** bisacodyl (DULCOLAX) EC tablet 5 mg, 5 mg, Oral, Daily PRN **AND** bisacodyl (DULCOLAX) suppository 10 mg, 10 mg, Rectal, Daily PRN, Lashon Brooks MD    carvedilol (COREG) tablet 6.25 mg, 6.25 mg, Oral, BID With Meals, Lashon Brooks MD, " 6.25 mg at 11/03/23 0927    Enoxaparin Sodium (LOVENOX) syringe 40 mg, 40 mg, Subcutaneous, Q24H, Abhinav Barnes MD, 40 mg at 11/02/23 2353    lisinopril (PRINIVIL,ZESTRIL) tablet 20 mg, 20 mg, Oral, Daily, StingLashon gaitan MD, 20 mg at 11/03/23 0927    Magnesium Standard Dose Replacement - Follow Nurse / BPA Driven Protocol, , Does not apply, PRNCecilia Renate Andrea, MD    melatonin tablet 3 mg, 3 mg, Oral, Nightly PRN, Lashon Brooks MD, 3 mg at 11/02/23 2106    nicotine (NICODERM CQ) 21 MG/24HR patch 1 patch, 1 patch, Transdermal, Q24H, Jimenez Zarate MD, 1 patch at 11/03/23 0926    nitroglycerin (NITROSTAT) SL tablet 0.4 mg, 0.4 mg, Sublingual, Q5 Min PRN, Magdiel Murray MD    ondansetron (ZOFRAN) tablet 4 mg, 4 mg, Oral, Q6H PRN **OR** ondansetron (ZOFRAN) injection 4 mg, 4 mg, Intravenous, Q6H PRN, Lashon Brooks MD, 4 mg at 11/01/23 1758    Phosphorus Replacement - Follow Nurse / BPA Driven Protocol, , Does not apply, PRCecilia BARRIOS Renate Andrea, MD    piperacillin-tazobactam (ZOSYN) 3.375 g in iso-osmotic dextrose 50 ml (premix), 3.375 g, Intravenous, Q8H, Magdiel Murray MD, 3.375 g at 11/03/23 0631    Potassium Replacement - Follow Nurse / BPA Driven Protocol, , Does not apply, PRNCecilia Renate Andrea, MD    sodium chloride 0.9 % flush 10 mL, 10 mL, Intravenous, Q12H, Prince Roberts MD, 10 mL at 11/03/23 0926    sodium chloride 0.9 % flush 10 mL, 10 mL, Intravenous, PRN, Prince Roberts MD    sodium chloride 0.9 % infusion 40 mL, 40 mL, Intravenous, PRN, Prince Roberts MD    PRN meds    acetaminophen **OR** [DISCONTINUED] acetaminophen    senna-docusate sodium **AND** polyethylene glycol **AND** bisacodyl **AND** bisacodyl    Magnesium Standard Dose Replacement - Follow Nurse / BPA Driven Protocol    melatonin    nitroglycerin    ondansetron **OR** ondansetron    Phosphorus Replacement - Follow Nurse / BPA Driven Protocol    Potassium Replacement - Follow Nurse / BPA  "Driven Protocol    sodium chloride    sodium chloride    No current facility-administered medications on file prior to encounter.     Current Outpatient Medications on File Prior to Encounter   Medication Sig    aspirin 81 MG EC tablet Take 1 tablet by mouth Daily.    carvedilol (COREG) 6.25 MG tablet Take 1 tablet by mouth 2 (Two) Times a Day With Meals.    furosemide (LASIX) 40 MG tablet Take 1 tablet by mouth Daily.    lisinopril (PRINIVIL,ZESTRIL) 20 MG tablet Take 1 tablet by mouth Daily.       General appearance: Elderly male, NAD, alert, edentulous  HEENT: Normocephalic, atraumatic, right-sided facial tumor just below the right orbital  Resp: Even and unlabored  Extremities: Left hand edema, nonpitting     Neurological:   MS: Oriented to self only, awake and alert. cooperative  CN: visual fields full, facial sensation equal, no facial droop, tongue midline, [place] bilaterally  Motor: 2/5 LUE, 4/5 LLE, 5/5 RUE/RLE  Sensory: light touch and cold sensation intact in all 4 ext.  Coordination: Normal finger to nose test on the right, unable to perform on the left    Physical exam performed, changes noted.    Laboratory results:  Lab Results   Component Value Date    TSH 1.880 10/29/2023     Lab Results   Component Value Date    HGBA1C 5.20 10/29/2023     No results found for: \"[id number]\"  Lab Results   Component Value Date    CHOL 146 10/29/2023     Lab Results   Component Value Date    TRIG 72 10/29/2023     Lab Results   Component Value Date    HDL 34 (L) 10/29/2023     Lab Results   Component Value Date    LDL 98 10/29/2023     Lab Results   Component Value Date    WBC 12.87 (H) 11/02/2023    HGB 13.5 11/02/2023    HCT 39.5 11/02/2023    MCV 90.2 11/02/2023     11/02/2023     Lab Results   Component Value Date    GLUCOSE 108 (H) 11/02/2023    BUN 11 11/02/2023    CREATININE 1.14 11/02/2023    BCR 9.6 11/02/2023    K 3.9 11/02/2023    K 3.9 11/02/2023    CO2 18.1 (L) 11/02/2023    CALCIUM 8.5 (L) 11/02/2023 "    ALBUMIN 3.3 (L) 11/02/2023    AST 51 (H) 11/02/2023    ALT 16 11/02/2023     Lab Results   Component Value Date    PTT 30.0 04/26/2023     Lab Results   Component Value Date    INR 1.02 10/28/2023    INR 0.97 04/26/2023    PROTIME 13.5 10/28/2023    PROTIME 11.3 04/26/2023     Brief Urine Lab Results       None            Review and interpretation of imaging:  MRI Brain Without Contrast    Result Date: 11/2/2023  The study is significantly hampered by patient motion but multiple acute infarcts involving the right frontal, parietal and to a lesser extent occipital and temporal lobes are appreciated including both white matter and cortical infarcts. The majority of infarcts are oriented in an AP dimension consistent with watershed infarcts.  The above information was called to and discussed with SUNG Munroe on 11/02/2023 at 1610 hours.  This report was finalized on 11/2/2023 4:48 PM by Dr. Abhinav Vidales M.D on Workstation: [id number]      CT Head Without Contrast    Result Date: 11/2/2023  1.  A large remote left MCA distribution infarct is appreciated with volume loss similar in appearance as compared to the prior examination. Lacunar infarcts involving the corona radiata on the right are noted. There is no evidence of acute infarction or intracranial hemorrhage. Further evaluation could be performed with a MRI examination of the brain as indicated. 2.  A 3.3 x 1.9 cm soft tissue mass is appreciated overlying the medial aspect of the maxilla on the right and extending over the posterior aspect of the nasal bone on the right. The patient has a known basal cell carcinoma at this location.  Radiation dose reduction techniques were utilized, including automated exposure control and exposure modulation based on body size.   This report was finalized on 11/2/2023 4:16 PM by Dr. Abhinav Vidales M.D on Workstation: [email or web address]      XR Chest 1 View    Result Date: 11/2/2023  FINDINGS AND IMPRESSION: Left pacer/AICD is  present.  Worsening pulmonary vascular congestion is present. There is also worsening bibasilar pulmonary pacification and interstitial thickening, right greater than left. Findings are most concerning for worsening multifocal pneumonia and/or pulmonary edema in the appropriate clinical context. Correlation with patient history is recommended with follow-up chest CT if clinically indicated. No pneumothorax is seen. Cardiac silhouette is within normal limits for size.  This report was finalized on 11/2/2023 8:21 AM by Dr. Addy Joseph M.D on Workstation: [id number]      CT Angiogram Neck    Result Date: 10/29/2023   There are small age-indeterminate infarcts noted within the right frontal and parietal lobes within the right MCA distribution. Additionally, a small age-indeterminate infarct is seen within the right occipital lobe that is either within the right MCA or right PCA distribution. Further temporal characterization with MR imaging is suggested.  There is a large chronic infarct within the lateral aspect left frontal, parietal, temporal, and occipital lobes within the left MCA distribution and chronic infarcts are noted within the left lentiform nucleus within the lateral lenticulostriate distribution.  There is an approximate 60-70% NASCET stenosis within the proximal portion of the right ICA and an approximate 70-80% NASCET stenosis within the proximal portion of the left ICA secondary to mixed calcified and noncalcified atherosclerotic plaques.  There is a severe degree of stenosis at the origin of the left vertebral artery and a moderate degree of stenosis is seen within the V3 segment of the left vertebral artery.  The remaining intracranial and extracranial atherosclerotic changes are as discussed in detail above.   Radiation dose reduction techniques were utilized, including automated exposure control and exposure modulation based on body size.  This report was finalized on 10/29/2023 7:14 PM by   Jeff Harrison M.D on Workstation: [id number]      CT Angiogram Head    Result Date: 10/29/2023   There are small age-indeterminate infarcts noted within the right frontal and parietal lobes within the right MCA distribution. Additionally, a small age-indeterminate infarct is seen within the right occipital lobe that is either within the right MCA or right PCA distribution. Further temporal characterization with MR imaging is suggested.  There is a large chronic infarct within the lateral aspect left frontal, parietal, temporal, and occipital lobes within the left MCA distribution and chronic infarcts are noted within the left lentiform nucleus within the lateral lenticulostriate distribution.  There is an approximate 60-70% NASCET stenosis within the proximal portion of the right ICA and an approximate 70-80% NASCET stenosis within the proximal portion of the left ICA secondary to mixed calcified and noncalcified atherosclerotic plaques.  There is a severe degree of stenosis at the origin of the left vertebral artery and a moderate degree of stenosis is seen within the V3 segment of the left vertebral artery.  The remaining intracranial and extracranial atherosclerotic changes are as discussed in detail above.   Radiation dose reduction techniques were utilized, including automated exposure control and exposure modulation based on body size.  This report was finalized on 10/29/2023 7:14 PM by Dr. Jeff Harrison M.D on Workstation: [id number]      CT Head Without Contrast Stroke Protocol    Result Date: 10/28/2023   There is no evidence for acute traumatic intracranial pathology. However, there are small age-indeterminate infarcts noted within the right frontal and parietal lobes that could potentially be acute to subacute in nature. These are within the right MCA distribution. Additionally and similarly, there is an age-indeterminate infarct within the right occipital lobe that is either within the right MCA or right PCA  distribution. Further temporal characterization is recommended with MR imaging.  There is a large chronic infarct within the lateral aspect of the left cerebral hemisphere that is within the left MCA distribution and this involves the lateral aspect of the left frontal, parietal, temporal, and occipital lobes.  There is a 3.3 x 1.9 cm soft tissue mass within the right premaxillary soft tissues that extends into the soft tissues along the right lateral aspect of the nose and this is compatible with a known basal cell carcinoma.  Incidental note is made of a fracture of the right orbital floor that is likely chronic in nature although correlation with clinical data is suggested.  These findings and recommendations were discussed with Dr. Niko Rivas on 10/28/2023 at approximately 3:15 p.m.  Radiation dose reduction techniques were utilized, including automated exposure control and exposure modulation based on body size.  This report was finalized on 10/28/2023 4:02 PM by Dr. Jeff Harrison M.D on Workstation: [id number]      XR Chest 1 View    Result Date: 10/28/2023  Cardiac defibrillator in place. Otherwise negative x-rays of the chest and the left shoulder.  This report was finalized on 10/28/2023 3:07 PM by Dr. Jhonny Charles M.D on Workstation: [id number]      XR Shoulder 2+ View Left    Result Date: 10/28/2023  Cardiac defibrillator in place. Otherwise negative x-rays of the chest and the left shoulder.  This report was finalized on 10/28/2023 3:07 PM by Dr. Jhonny Charles M.D on Workstation: [id number]     Results for orders placed during the hospital encounter of 10/28/23    Adult transthoracic echo complete    Interpretation Summary    Left ventricular systolic function is severely decreased. Calculated left ventricular EF = 21.6% Left ventricular ejection fraction appears to be less than 20%.The following left ventricular wall segments are hypokinetic: mid anterolateral, basal inferolateral, mid inferolateral,  mid inferior, basal inferoseptal, mid inferoseptal, mid anteroseptal, basal inferior and basal inferoseptal. The following left ventricular wall segments are akinetic: apical anterior, apical lateral, apical inferior, apical septal and apex.    Left ventricular wall thickness is consistent with mild concentric hypertrophy.    Left ventricular diastolic function is consistent with (grade II w/high LAP) pseudonormalization.    The left atrial cavity is mildly dilated.    Saline test results are negative for right to left atrial level shunt.    Estimated right ventricular systolic pressure from tricuspid regurgitation is mildly elevated (35-45 mmHg).        Impression/Assessment:  This is a 73-year-old male with past medical history of tobacco abuse, alcohol dependency, traumatic brain injury, pacemaker placement, hypertension, reported dementia history who presented to the hospital on 10/28/2023 with complaints of left upper extremity weakness.  Reportedly per documentation from the ER physician symptoms started Thursday night after he sustained a fall.  His family member stated that she did not notice significant left arm weakness after the fall but that the patient was complaining of pain.  He has aspirin listed as a home med however unclear if he has been compliant with this daily.  His initial noncontrast CT head did not reveal any acute intracranial abnormalities, known area of large left MCA territory infarct vs TBI noted as well as age-indeterminate infarct within the right MCA and PCA territories.     BP on arrival 133/75, HR 74. AV paced rhythm.  Temp 98.2. . K+ 3.1. Cr. 1.28.      Diagnosis:  Acute right hemispheric infarcts, cortical and subcortical, watershed appearing; suspect symptomatic right carotid stenosis  Bilateral carotid stenosis, R > L  History of large left MCA infarct  Dementia/history of TBI  Tobacco abuse  Cardiomyopathy     Additional work up to date:  2D Echo: LA cavity mildly  dilated, saline test negative, EF 21.6%, septal wall motion abnormality noted, no evidence of LV thrombus or mass, no AV stenosis, mild to moderate MVR.  MRI brain WO: Numerous acute right hemispheric infarcts cortical and subcortical with majority in an AP dimension consistent with watershed infarcts.  Moderate small vessel disease.  Chronic left temporal infarct noted.    Plan:  -MRI showing numerous acute right hemispheric infarcts, watershed appearing.   Carotid U/S reviewed with Dr. Villalpando he has right ICA PSV > 700 which indicates significant stenosis 70-99%. We discussed with vascular surgery that medical therapy would likely not be a good option for him despite him being high risk for intervention.   -Discussed with Dr. Spann, he will evaluate the patient for possible stenting.   For now continue ASA and Lipitor until evaluated by interventionalist.   Neurochecks per stroke protocol  Avoid hypotension, keep SBP >120  Stroke Education  OLMAN/SCDs  PT/OT/ST  Therapies as written. CCP for discharge planning. Call RRT for any acute neurological changes.   We will continue to follow and advise.    Case discussed with patient, RN, Dr. Murray, and Dr. Villalpando, and he agrees with plan above.    SUNG Munroe

## 2023-11-03 NOTE — THERAPY TREATMENT NOTE
Patient Name: Tigre Amaral  : 1950    MRN: [id number]                              Today's Date: 11/3/2023       Admit Date: 10/28/2023    Visit Dx:     ICD-10-CM ICD-9-CM   1. Cerebrovascular accident (CVA), unspecified mechanism  I63.9 434.91     Patient Active Problem List   Diagnosis    Cerebrovascular accident (CVA) due to embolism of right middle cerebral artery    Aspiration pneumonia    Acute on chronic systolic congestive heart failure    Hypertension    Stage 3a chronic kidney disease    Hyperlipidemia    Carotid artery stenosis, symptomatic, right    Tobacco abuse     History reviewed. No pertinent past medical history.  History reviewed. No pertinent surgical history.   General Information       Row Name 23 1318          OT Time and Intention    Document Type therapy note (daily note)  -RB     Mode of Treatment co-treatment;physical therapy;occupational therapy  low pt activity tolerance, AMS  -RB       Row Name 23 1318          General Information    Existing Precautions/Restrictions fall  -RB       Row Name 23 1318          Cognition    Orientation Status (Cognition) oriented to;person;place;unable/difficult to assess;verbal cues/prompts needed for orientation;other (see comments)  generally confused  -RB               User Key  (r) = Recorded By, (t) = Taken By, (c) = Cosigned By      Initials Name Provider Type    RB Sosa Marquez OT Occupational Therapist                     Mobility/ADL's       Row Name 23 1320          Bed Mobility    Bed Mobility supine-sit;sit-supine  -RB     Supine-Sit [place] (Bed Mobility) moderate assist (50% patient effort);2 person assist;verbal cues;nonverbal cues (demo/gesture)  -RB     Sit-Supine [place] (Bed Mobility) maximum assist (25% patient effort);2 person assist;verbal cues;nonverbal cues (demo/gesture)  -RB     Bed Mobility, Safety Issues cognitive deficits limit understanding;decreased use of arms for  pushing/pulling;impaired trunk control for bed mobility;decreased use of legs for bridging/pushing  -     Assistive Device (Bed Mobility) bed rails  -RB     Comment, (Bed Mobility) sit to supine onto transport stretcher  -       Row Name 11/03/23 1320          Transfers    Transfers sit-stand transfer;stand-sit transfer  -RB     Comment, (Transfers) LLE blocked due to weakness and buckling  -       Row Name 11/03/23 1320          Bed-Chair Transfer    Bed-Chair [place] (Transfers) maximum assist (25% patient effort);2 person assist;verbal cues;nonverbal cues (demo/gesture)  -RB     Comment, (Bed-Chair Transfer) [facility]  -       Row Name 11/03/23 1320          Sit-Stand Transfer    Sit-Stand [place] (Transfers) 2 person assist;verbal cues;nonverbal cues (demo/gesture);moderate assist (50% patient effort);maximum assist (25% patient effort)  -RB     Comment, (Sit-Stand Transfer) [facility]       Row Name 11/03/23 1320          Stand-Sit Transfer    Stand-Sit [place] (Transfers) moderate assist (50% patient effort);maximum assist (25% patient effort);2 person assist;verbal cues;nonverbal cues (demo/gesture)  -RB     Comment, (Stand-Sit Transfer) [facility]       Row Name 11/03/23 1320          Functional Mobility    Functional Mobility- Ind. Level not tested;unable to perform  -       Row Name 11/03/23 1320          Activities of Daily Living    BADL Assessment/Intervention lower body dressing;toileting  -RB       Row Name 11/03/23 1320          Lower Body Dressing Assessment/Training    [place] Level (Lower Body Dressing) lower body dressing skills;dependent (less than 25% patient effort)  -RB     Position (Lower Body Dressing) supported sitting  -       Row Name 11/03/23 1320          Toileting Assessment/Training    [place] Level (Toileting) toileting skills;dependent (less than 25% patient effort)  -RB     Position (Toileting) supported sitting;supine  -RB               User Key  (r) =  Recorded By, (t) = Taken By, (c) = Cosigned By      Initials Name Provider Type    Sosa Still OT Occupational Therapist                   Obj/Interventions       Row Name 11/03/23 1321          Balance    Comment, Balance SBA/CGA sitting balance  -RB               User Key  (r) = Recorded By, (t) = Taken By, (c) = Cosigned By      Initials Name Provider Type    Sosa Still OT Occupational Therapist                   Goals/Plan    No documentation.                  Clinical Impression       Row Name 11/03/23 1321          Pain Assessment    Pretreatment Pain Rating 0/10 - no pain  -RB     Posttreatment Pain Rating 0/10 - no pain  -RB       Row Name 11/03/23 1321          Plan of Care Review    Plan of Care Reviewed With patient  -RB     Progress no change  -RB     Outcome Evaluation The pt participated in OT/PT this AM. Co-tx completed as the pt was leaving the floor and requires x2 skilled hands to safely complete OOB activity. He completed bed mobility with Mod A x2. He stopped frequently as he became distracted and his AMS limiting his understanding of therapy. Total A for LBD sock positioning. He stood several times with Max A x2 and L knee blocked. He was able to pivot to a stretcher as he was leaving the floor for a test. Max A x2 to assist legs and trunk on to the stretcher safely. He continues to require total A for toileting. IRF recommended.  -RB       Row Name 11/03/23 1321          Therapy Assessment/Plan (OT)    Rehab Potential (OT) good, to achieve stated therapy goals  -RB     Criteria for Skilled Therapeutic Interventions Met (OT) yes;skilled treatment is necessary  -RB     Therapy Frequency (OT) 5 times/wk  -RB       Row Name 11/03/23 1321          Therapy Plan Review/Discharge Plan (OT)    Anticipated Discharge Disposition (OT) inpatient rehabilitation facility  -RB               User Key  (r) = Recorded By, (t) = Taken By, (c) = Cosigned By      Initials Name Provider Type     Sosa Still, OT Occupational Therapist                   Outcome Measures       Row Name 11/03/23 1209          How much help from another person do you currently need...    Turning from your back to your side while in flat bed without using bedrails? 2  -MG     Moving from lying on back to sitting on the side of a flat bed without bedrails? 2  -MG     Moving to and from a bed to a chair (including a wheelchair)? 2  -MG     Standing up from a chair using your arms (e.g., wheelchair, bedside chair)? 2  -MG     Climbing 3-5 steps with a railing? 1  -MG     To walk in hospital room? 1  -MG     AM-PAC 6 Clicks Score (PT) 10  -MG     Highest level of mobility 4 --> Transferred to chair/commode  -MG               User Key  (r) = Recorded By, (t) = Taken By, (c) = Cosigned By      Initials Name Provider Type    Camelia Almonte, PT Physical Therapist                    Occupational Therapy Education       Title: PT OT SLP Therapies (In Progress)       Topic: Occupational Therapy (Done)       Point: ADL training (Done)       Description:   Instruct learner(s) on proper safety adaptation and remediation techniques during self care or transfers.   Instruct in proper use of assistive devices.                  Learning Progress Summary             Patient Acceptance, E, VU by ML at 10/31/2023 0308    Acceptance, E, VU by ML at 10/30/2023 0409    Acceptance, E, VU,NR by BS at 10/29/2023 1214    Comment: Reason for eval/consult, goal setting, next level of care   Family Acceptance, E, VU,NR by BS at 10/29/2023 1214    Comment: Reason for eval/consult, goal setting, next level of care                         Point: Home exercise program (Done)       Description:   Instruct learner(s) on appropriate technique for monitoring, assisting and/or progressing therapeutic exercises/activities.                  Learning Progress Summary             Patient Acceptance, E, VU by ML at 10/31/2023 0308    Acceptance, E, VU by ML at  10/30/2023 0409    Acceptance, E, VU,NR by BS at 10/29/2023 1214    Comment: Reason for eval/consult, goal setting, next level of care   Family Acceptance, E, VU,NR by BS at 10/29/2023 1214    Comment: Reason for eval/consult, goal setting, next level of care                         Point: Precautions (Done)       Description:   Instruct learner(s) on prescribed precautions during self-care and functional transfers.                  Learning Progress Summary             Patient Acceptance, E, VU by ML at 10/31/2023 0308    Acceptance, E, VU by ML at 10/30/2023 0409    Acceptance, E, VU,NR by BS at 10/29/2023 1214    Comment: Reason for eval/consult, goal setting, next level of care   Family Acceptance, E, VU,NR by BS at 10/29/2023 1214    Comment: Reason for eval/consult, goal setting, next level of care                         Point: Body mechanics (Done)       Description:   Instruct learner(s) on proper positioning and spine alignment during self-care, functional mobility activities and/or exercises.                  Learning Progress Summary             Patient Acceptance, E, VU by ML at 10/31/2023 0308    Acceptance, E, VU by ML at 10/30/2023 0409    Acceptance, E, VU,NR by BS at 10/29/2023 1214    Comment: Reason for eval/consult, goal setting, next level of care   Family Acceptance, E, VU,NR by BS at 10/29/2023 1214    Comment: Reason for eval/consult, goal setting, next level of care                                         User Key       Initials Effective Dates Name Provider Type Discipline     11/14/22 -  Camille Dutta OT Occupational Therapist OT     06/15/23 -  Kendal Flor, RN Registered Nurse Nurse                  OT Recommendation and Plan  Therapy Frequency (OT): 5 times/wk  Plan of Care Review  Plan of Care Reviewed With: patient  Progress: no change  Outcome Evaluation: The pt participated in OT/PT this AM. Co-tx completed as the pt was leaving the floor and requires x2 skilled hands to  safely complete OOB activity. He completed bed mobility with Mod A x2. He stopped frequently as he became distracted and his AMS limiting his understanding of therapy. Total A for LBD sock positioning. He stood several times with Max A x2 and L knee blocked. He was able to pivot to a stretcher as he was leaving the floor for a test. Max A x2 to assist legs and trunk on to the stretcher safely. He continues to require total A for toileting. IRF recommended.     Time Calculation:         Time Calculation- OT       Row Name 11/03/23 1317             Time Calculation- OT    OT Start Time 1110  -RB      OT Stop Time 1121  -RB      OT Time Calculation (min) 11 min  -RB      Total Timed Code Minutes- OT 11 minute(s)  -RB      OT Received On 11/03/23  -RB      OT - Next Appointment 11/06/23  -RB      OT Goal Re-Cert Due Date 11/17/23  -RB         Timed Charges    80145 - OT Therapeutic Activity Minutes 11  -RB         Total Minutes    Timed Charges Total Minutes 11  -RB       Total Minutes 11  -RB                User Key  (r) = Recorded By, (t) = Taken By, (c) = Cosigned By      Initials Name Provider Type    Sosa Still OT Occupational Therapist                  Therapy Charges for Today       Code Description Service Date Service Provider Modifiers Qty    [id number] HC OT THERAPEUTIC ACT EA 15 MIN 11/3/2023 Sosa Marquez OT GO 1                 Sosa Marquez OT  11/3/2023

## 2023-11-03 NOTE — PAYOR COMM NOTE
"Tigre Amaral (73 y.o. Male)                    ATTENTION; SERJIO GREEN RN  - CLINICALS FOR AUTH [id number]                    REPLY TO UR DEPT COLLIN TRIPP LPN    [phone]  OR CALL           Date of Birth   1950    Social Security Number       Address   [address] [place] [address]    Home Phone   [phone]    MRN   [id number]       Episcopal   None    Marital Status   Single                            Admission Date   10/28/23    Admission Type   Emergency    Admitting Provider   Niko Rivas II, MD    Attending Provider   Jose Mendiola MD    Department, Room/Bed   [facility] CARDIAC INTENSIVE CARE, 3004/1       Discharge Date       Discharge Disposition       Discharge Destination                                 Attending Provider: Jose Mendiola MD    Allergies: No Known Allergies    Isolation: None   Infection: None   Code Status: CPR    Ht: 172.7 cm (68\")   Wt: 75.2 kg (165 lb 12.6 oz)    Admission Cmt: None   Principal Problem: Stroke [I63.9]                   Active Insurance as of 10/28/2023       Primary Coverage       Payor Plan Insurance Group Employer/Plan Group    MEDICARE MEDICARE A ONLY        Payor Plan Address Payor Plan Phone Number Payor Plan Fax Number Effective Dates    [address] [phone]  3/1/2015 - None Entered    [facility] [address]         Subscriber Name Subscriber Birth Date Member ID       TIGRE AMARAL 1950 [id number]               Secondary Coverage       Payor Plan Insurance Group Employer/Plan Group    [facility] DE[id number]        Payor Plan Address Payor Plan Phone Number Payor Plan Fax Number Effective Dates    [facility] OFFICE OF COMMUNITY CARE [phone]  1/1/2023 - None Entered    [address] [address]       [facility] [address]         Subscriber Name Subscriber Birth Date Member ID       TIGRE AMARAL 1950 [id number]                     Emergency Contacts        (Rel.) " "Home Phone Work Phone Mobile Phone    DIRK FOWLER (Grandchild) [phone] -- --    Rola Fowler (Daughter) -- -- [phone]                 Physician Progress Notes        Magdiel Murray MD at 11/03/23 1012            Daily Progress Note.   [facility] CARDIAC INTENSIVE CARE  11/3/2023    Patient:  Name:  Tigre Amaral  MRN:  [id number]  1950  73 y.o.  male         Requesting physician: Dr. Abhinav Barnes     Reason for Consultation:  resp distress icu mgmt     CC: resp distress    Interval History:  No acute events overnight afebrile.  Blood pressure controlled on room air.  Slightly confused feels like he was at a bar last night dancing with girls.  Denies any worsening shortness breath chest pain.  Good mood but confused      Physical Exam:  /76   Pulse 64   Temp 98.1 °F (36.7 °C) (Oral)   Resp 12   Ht 172.7 cm (68\")   Wt 75.2 kg (165 lb 12.6 oz)   SpO2 98%   BMI 25.21 kg/m²   Body mass index is 25.21 kg/m².    Intake/Output Summary (Last 24 hours) at 11/3/2023 1012  Last data filed at 11/3/2023 0700  Gross per 24 hour   Intake 235 ml   Output 2900 ml   Net -2665 ml     General appearance: Awake alert, confused conversant   Eyes: Anicteric sclerae, moist conjunctivae; no lid lag;   HENT: Atraumatic; oropharynx clear with moist mucous membranes and no mucosal ulcerations; large 4 to 5 cm skin abnormality partially obstructing right  Neck: Trachea midline; positive JVD  Lungs: Bilateral air entry, calm respiratory effort and nointercostal retractions  CV: rrr  Abdomen: thin non rigid  Extremities:   Skin:warm dry  Psych: calm affect, alert confused       Data Review:  Notable Labs:  Results from last 7 days   Lab Units 11/02/23  0612 11/01/23  1846 10/30/23  0522 10/29/23  0600 10/28/23  1449   WBC 10*3/mm3 12.87* 14.89* 9.46 8.49 11.54*   HEMOGLOBIN g/dL 13.5 14.3 13.1 13.1 14.7   PLATELETS 10*3/mm3 270 292 244 804 264     Results from last 7 days   Lab Units " 11/02/23 0612 11/01/23  1846 10/31/23  1520 10/31/23  0643 10/30/23  2251 10/30/23  0522 10/29/23  2015 10/29/23  0600 10/28/23  1449   SODIUM mmol/L 136 137  --  138  --  138  --  139 141   POTASSIUM mmol/L 3.9  3.9 3.4* 3.8 3.7 3.2* 3.2* 3.4* 2.4* 3.1*   CHLORIDE mmol/L 109* 106  --  107  --  106  --  103 100   CO2 mmol/L 18.1* 20.0*  --  21.6*  --  25.0  --  26.4 30.5*   BUN mg/dL 11 11  --  9  --  9  --  9 9   CREATININE mg/dL 1.14 1.21  --  1.07  --  1.11  --  1.28* 1.53*   GLUCOSE mg/dL 108* 119*  --  96  --  91  --  93 76   CALCIUM mg/dL 8.5* 8.9  --  8.4*  --  8.4*  --  8.3* 8.8   MAGNESIUM mg/dL  --   --   --   --   --   --   --  1.8  --    Estimated Creatinine Clearance: 59.6 mL/min (by C-G formula based on SCr of 1.14 mg/dL).    Results from last 7 days   Lab Units 11/02/23  0612 11/01/23  1846 10/30/23  0522 10/29/23  0600   AST (SGOT) U/L 51* 51*  --  13   ALT (SGPT) U/L 16 14  --  6   PROCALCITONIN ng/mL 0.06 0.04  --   --    LACTATE mmol/L  --  1.8  --   --    PLATELETS 10*3/mm3 270 292 244 222             Imaging:  Reviewed chest images personally from past 3 days    ASSESSMENT  /  PLAN:  Imaging: I personally visualized the images of scans/x-rays performed within last 3 days.     Cxr - right middle lobe infiltrate, +vascular congestion +ICD      Left ventricular systolic function is severely decreased. Calculated left ventricular EF = 21.6% Left ventricular ejection fraction appears to be less than 20%.The following left ventricular wall segments are hypokinetic: mid anterolateral, basal inferolateral, mid inferolateral, mid inferior, basal inferoseptal, mid inferoseptal, mid anteroseptal, basal inferior and basal inferoseptal. The following left ventricular wall segments are akinetic: apical anterior, apical lateral, apical inferior, apical septal and apex.    Left ventricular wall thickness is consistent with mild concentric hypertrophy.    Left ventricular diastolic function is consistent with  (grade II w/high LAP) pseudonormalization.    The left atrial cavity is mildly dilated.    Saline test results are negative for right to left atrial level shunt.    Estimated right ventricular systolic pressure from tricuspid regurgitation is mildly elevated (35-45 mmHg).        Assessment and plan  Acute on chronic decompensated systolic heart failure with EF 20%  Cardiomyopathy per outpatient records no mention of ischemia or coronary artery disease  Status post ICD.  Hypertension  Hyperlipidemia  Aspiration pneumonia  Stroke  Respiratory distress  Acute pulmonary edema from heart failure     Transthoracic echocardiogram shows an EF of 20% patient is very hypertensive in respiratory distress chest x-ray shows likely aspiration pneumonia in the right middle lobe as well as pulmonary edema.  Improved with diuretic dose      Bp control - improved      Cont Zosyn for aspiration pneumonia obtain blood cultures -augmentin tomorrow pending cultures on 5 day course.     Titrated oxygen down on room air now     Cardiology care per Dr Kim.    Discussed with bedside RN, appreciate insight and care.        Electronically signed by Magdiel Murray MD, 11/03/23, 10:12 AM EDT.  [facility]        Electronically signed by Magdiel Murray MD at 11/03/23 1021       Mika Kim MD at 11/03/23 0952          Tigre Amaral   73 y.o.  male    LOS: 6 days   Patient Care Team:  Provider, No Known as PCP - General      Subjective     Interval History:     73-year-old white male patient who been followed by my colleague  has history of AICD for primary prevention of sudden death.  He has history of ventricular tachycardia.  Patient has dementia and history is obtained mostly from review of charts.  He had an episode of chest pain this morning.  Patient has history of left arm weakness a few days ago.  He also lost his balance.  CT of the head showed old and subacute infarcts.  He has a soft  tissue mass on the right premaxillary area consistent with his previously diagnosed basal cell carcinoma.  Echocardiogram showed ejection fraction 21.6%.  EKG is paced rhythm.     Patient Complaints:     Review of Systems:       Medication Review:   Current Facility-Administered Medications:     acetaminophen (TYLENOL) tablet 650 mg, 650 mg, Oral, Q4H PRN, 650 mg at 23 **OR** [DISCONTINUED] acetaminophen (TYLENOL) suppository 650 mg, 650 mg, Rectal, Q4H PRN, Lashon Brooks MD    amiodarone 150 mg in 100 mL D5W (loading dose), 150 mg, Intravenous, Once **FOLLOWED BY** [] amiodarone 360 mg in 200 mL D5W infusion, 1 mg/min, Intravenous, Continuous **FOLLOWED BY** [] amiodarone 360 mg in 200 mL D5W infusion, 0.5 mg/min, Intravenous, Continuous, Magdiel Murray MD    aspirin chewable tablet 81 mg, 81 mg, Oral, Daily, Carolina Casillas, APRN, 81 mg at 23    atorvastatin (LIPITOR) tablet 80 mg, 80 mg, Oral, Nightly, Lashon Brooks MD, 80 mg at 23    sennosides-docusate (PERICOLACE) 8.6-50 MG per tablet 2 tablet, 2 tablet, Oral, BID, 2 tablet at 23 **AND** polyethylene glycol (MIRALAX) packet 17 g, 17 g, Oral, Daily PRN **AND** bisacodyl (DULCOLAX) EC tablet 5 mg, 5 mg, Oral, Daily PRN **AND** bisacodyl (DULCOLAX) suppository 10 mg, 10 mg, Rectal, Daily PRN, Lashon Brooks MD    carvedilol (COREG) tablet 6.25 mg, 6.25 mg, Oral, BID With Meals, Lashon Brooks MD, 6.25 mg at 23    Enoxaparin Sodium (LOVENOX) syringe 40 mg, 40 mg, Subcutaneous, Q24H, Abhinav Barnes MD, 40 mg at 23    lisinopril (PRINIVIL,ZESTRIL) tablet 20 mg, 20 mg, Oral, Daily, Lashon Brooks MD, 20 mg at 23 0927    Magnesium Standard Dose Replacement - Follow Nurse / BPA Driven Protocol, , Does not apply, Cecilia BARRON Renate Andrea, MD    melatonin tablet 3 mg, 3 mg, Oral, Nightly PRNCecilia Renate Andrea, MD, 3 mg at  11/02/23 2106    nicotine (NICODERM CQ) 21 MG/24HR patch 1 patch, 1 patch, Transdermal, Q24H, Jimenez Zarate MD, 1 patch at 11/03/23 0926    nitroglycerin (NITROSTAT) SL tablet 0.4 mg, 0.4 mg, Sublingual, Q5 Min PRN, Magdiel Murray MD    ondansetron (ZOFRAN) tablet 4 mg, 4 mg, Oral, Q6H PRN **OR** ondansetron (ZOFRAN) injection 4 mg, 4 mg, Intravenous, Q6H PRN, Lashon Brooks MD, 4 mg at 11/01/23 1758    Phosphorus Replacement - Follow Nurse / BPA Driven Protocol, , Does not apply, Cecilia BARRON Renate Andrea, MD    piperacillin-tazobactam (ZOSYN) 3.375 g in iso-osmotic dextrose 50 ml (premix), 3.375 g, Intravenous, Q8H, Magdiel Murray MD, 3.375 g at 11/03/23 0631    Potassium Replacement - Follow Nurse / BPA Driven Protocol, , Does not apply, Cecilia BARRON Renate Andrea, MD    sodium chloride 0.9 % flush 10 mL, 10 mL, Intravenous, Q12H, Prince Roberts MD, 10 mL at 11/03/23 0926    sodium chloride 0.9 % flush 10 mL, 10 mL, Intravenous, PRArmando BARRIOS Daniel, MD    sodium chloride 0.9 % infusion 40 mL, 40 mL, Intravenous, Armando BARRON Daniel, MD      Objective   Vital Sign Min/Max for last 24 hours  Temp  Min: 97.7 °F (36.5 °C)  Max: 98.1 °F (36.7 °C)   BP  Min: 111/56  Max: 163/74    Pulse  Min: 60  Max: 106     Wt Readings from Last 3 Encounters:   11/02/23 75.2 kg (165 lb 12.6 oz)        Intake/Output Summary (Last 24 hours) at 11/3/2023 0952  Last data filed at 11/3/2023 0700  Gross per 24 hour   Intake 235 ml   Output 2900 ml   Net -2665 ml     Physical Exam:      General Appearance:    Well developed and well nourished in no acute distress   Head:    Normocephalic, atraumatic   Eyes:            Conjunctivae normal, anicteric, no xanthelasma   Neck:   supple, trachea midline, no thyromegaly, no carotid bruit, no JVD, no elevated CVP   Lungs:     Clear to auscultation,respirations regular, even and                  unlabored    Heart:    Regular rhythm and normal rate, normal S1 and S2,            No  "murmur, no gallop, no rub, no click   Chest Wall:    No abnormalities observed   Abdomen:     Normal bowel sounds, no masses, no organomegaly, soft        nontender, nondistended, no guarding, no rebound                tenderness   Rectal:     Deferred   Extremities:   No edema. Moves all extremities well, no cyanosis, no erythema   Pulses:   Pulses palpable and equal bilaterally   Skin:   No bleeding, bruising or rash   Neurologic:   awake alert and oriented x3, speech clear and approp, no facial drooping     :    Monitor:      Results Review:         Sodium Sodium   Date Value Ref Range Status   11/02/2023 136 136 - 145 mmol/L Final   11/01/2023 137 136 - 145 mmol/L Final      Potassium Potassium   Date Value Ref Range Status   11/02/2023 3.9 3.5 - 5.2 mmol/L Final   11/02/2023 3.9 3.5 - 5.2 mmol/L Final   11/01/2023 3.4 (L) 3.5 - 5.2 mmol/L Final     Comment:     Slight hemolysis detected by analyzer. Results may be affected.   10/31/2023 3.8 3.5 - 5.2 mmol/L Final      Chloride Chloride   Date Value Ref Range Status   11/02/2023 109 (H) 98 - 107 mmol/L Final   11/01/2023 106 98 - 107 mmol/L Final      Bicarbonate No results found for: \"PLASMABICARB\"   BUN BUN   Date Value Ref Range Status   11/02/2023 11 8 - 23 mg/dL Final   11/01/2023 11 8 - 23 mg/dL Final      Creatinine Creatinine   Date Value Ref Range Status   11/02/2023 1.14 0.76 - 1.27 mg/dL Final   11/01/2023 1.21 0.76 - 1.27 mg/dL Final      Calcium Calcium   Date Value Ref Range Status   11/02/2023 8.5 (L) 8.6 - 10.5 mg/dL Final   11/01/2023 8.9 8.6 - 10.5 mg/dL Final      Magnesium No results found for: \"MG\"     Results from last 7 days   Lab Units 11/02/23  0612   WBC 10*3/mm3 12.87*   HEMOGLOBIN g/dL 13.5   HEMATOCRIT % 39.5   PLATELETS 10*3/mm3 270     Lab Results   Lab Value Date/Time    TROPONINT 425 (C) 11/02/2023 0735    TROPONINT 451 (C) 11/02/2023 0612    TROPONINT 354 (C) 11/01/2023 2031    TROPONINT 339 (C) 11/01/2023 1846            Echo " "EF Estimated  No results found for: \"ECHOEFEST\"      Left ventricular systolic function is severely decreased. Calculated left ventricular EF = 21.6% Left ventricular ejection fraction appears to be less than 20%.The following left ventricular wall segments are hypokinetic: mid anterolateral, basal inferolateral, mid inferolateral, mid inferior, basal inferoseptal, mid inferoseptal, mid anteroseptal, basal inferior and basal inferoseptal. The following left ventricular wall segments are akinetic: apical anterior, apical lateral, apical inferior, apical septal and apex.    Left ventricular wall thickness is consistent with mild concentric hypertrophy.    Left ventricular diastolic function is consistent with (grade II w/high LAP) pseudonormalization.    The left atrial cavity is mildly dilated.    Saline test results are negative for right to left atrial level shunt.    Estimated right ventricular systolic pressure from tricuspid regurgitation is mildly elevated (35-45 mmHg).   MRI    The study is significantly hampered by patient motion but  multiple acute infarcts involving the right frontal, parietal and to a  lesser extent occipital and temporal lobes are appreciated including  both white matter and cortical infarcts. The majority of infarcts are  oriented in an AP dimension consistent with watershed infarcts.    CT angio    There is an approximate 60-70% NASCET stenosis within the proximal  portion of the right ICA and an approximate 70-80% NASCET stenosis  within the proximal portion of the left ICA secondary to mixed calcified  and noncalcified atherosclerotic plaques.     There is a severe degree of stenosis at the origin of the left vertebral  artery and a moderate degree of stenosis is seen within the V3 segment  of the left vertebral artery.    Assessment/ Plan  Assessment & Plan   Active Hospital Problems    Diagnosis  POA    **Stroke [I63.9]  Yes   Nonischemic cardiomyopathy status post " AICD    Dementia  No plans for surgery per vascular    Dual antiplatelet therapy        Mika Kim MD  23  09:52 EDT             Electronically signed by Mika Kim MD at 23 0957       Carolina Casillas APRN at 23 1415          DOS: 2023  NAME: Tigre Amaral   : 1950  PCP: Provider, No Known    Chief Complaint   Patient presents with    Fall    Arm Injury        Stroke    Subjective: Had 2 RRT called due to concern for chest pain, arrhythmia, and shortness of air.  He is back to baseline now.  Was eating and sitting up in bed.  2 family members at bedside.    Objective:  Vital signs:      Vitals:    23 0900 23 1000 23 1100 23 1132   BP: 143/76 124/73 117/67 134/72   BP Location:       Patient Position:       Pulse: 90 79 71 77   Resp:       Temp:    98.1 °F (36.7 °C)   TempSrc:    Oral   SpO2: 96% 96% 97% 100%   Weight:       Height:           Current Facility-Administered Medications:     acetaminophen (TYLENOL) tablet 650 mg, 650 mg, Oral, Q4H PRN, 650 mg at 23 2134 **OR** [DISCONTINUED] acetaminophen (TYLENOL) suppository 650 mg, 650 mg, Rectal, Q4H PRN, Lashon Brooks MD    amiodarone 150 mg in 100 mL D5W (loading dose), 150 mg, Intravenous, Once **FOLLOWED BY** amiodarone 360 mg in 200 mL D5W infusion, 1 mg/min, Intravenous, Continuous **FOLLOWED BY** amiodarone 360 mg in 200 mL D5W infusion, 0.5 mg/min, Intravenous, Continuous, Magdiel Murray MD    aspirin chewable tablet 81 mg, 81 mg, Oral, Daily, Caorlina Casillas APRN, 81 mg at 23 0902    atorvastatin (LIPITOR) tablet 80 mg, 80 mg, Oral, Nightly, Lashon Brooks MD, 80 mg at 23    sennosides-docusate (PERICOLACE) 8.6-50 MG per tablet 2 tablet, 2 tablet, Oral, BID, 2 tablet at 23 0933 **AND** polyethylene glycol (MIRALAX) packet 17 g, 17 g, Oral, Daily PRN **AND** bisacodyl (DULCOLAX) EC tablet 5 mg, 5 mg, Oral, Daily PRN **AND**  bisacodyl (DULCOLAX) suppository 10 mg, 10 mg, Rectal, Daily PRN, Lashon Brooks MD    carvedilol (COREG) tablet 6.25 mg, 6.25 mg, Oral, BID With Meals, Lashon Brooks MD, 6.25 mg at 11/02/23 0902    Enoxaparin Sodium (LOVENOX) syringe 40 mg, 40 mg, Subcutaneous, Q24H, Abhinav Barnes MD, 40 mg at 11/01/23 2301    lisinopril (PRINIVIL,ZESTRIL) tablet 20 mg, 20 mg, Oral, Daily, Lashon Brooks MD, 20 mg at 11/02/23 0902    LORazepam (ATIVAN) injection 1 mg, 1 mg, Intravenous, Once in imaging, Moira Lindsay APRN    LORazepam (ATIVAN) injection 1 mg, 1 mg, Intravenous, Once PRN, Carolina Casillas APRN    Magnesium Standard Dose Replacement - Follow Nurse / BPA Driven Protocol, , Does not apply, PRN, Lashon Brooks MD    melatonin tablet 3 mg, 3 mg, Oral, Nightly PRN, Lashon Brooks MD, 3 mg at 10/30/23 2032    nicotine (NICODERM CQ) 21 MG/24HR patch 1 patch, 1 patch, Transdermal, Q24H, Jimenez Zarate MD, 1 patch at 11/02/23 1346    nitroglycerin (NITROSTAT) SL tablet 0.4 mg, 0.4 mg, Sublingual, Q5 Min PRN, Magdiel Murray MD    ondansetron (ZOFRAN) tablet 4 mg, 4 mg, Oral, Q6H PRN **OR** ondansetron (ZOFRAN) injection 4 mg, 4 mg, Intravenous, Q6H PRN, Lashon Brooks MD, 4 mg at 11/01/23 1758    Phosphorus Replacement - Follow Nurse / BPA Driven Protocol, , Does not apply, PRNCecilia Renate Andrea, MD    piperacillin-tazobactam (ZOSYN) 3.375 g in iso-osmotic dextrose 50 ml (premix), 3.375 g, Intravenous, Q8H, Magdiel Murray MD    Potassium Replacement - Follow Nurse / BPA Driven Protocol, , Does not apply, PRN, StinglLashon MD    sodium chloride 0.9 % flush 10 mL, 10 mL, Intravenous, Q12H, Prince Roberts MD, 10 mL at 11/02/23 0850    sodium chloride 0.9 % flush 10 mL, 10 mL, Intravenous, PRN, Prince Roberts MD    sodium chloride 0.9 % infusion 40 mL, 40 mL, Intravenous, PRN, Prince Roberts MD    PRN meds    acetaminophen **OR** [DISCONTINUED]  "acetaminophen    senna-docusate sodium **AND** polyethylene glycol **AND** bisacodyl **AND** bisacodyl    LORazepam    Magnesium Standard Dose Replacement - Follow Nurse / BPA Driven Protocol    melatonin    nitroglycerin    ondansetron **OR** ondansetron    Phosphorus Replacement - Follow Nurse / BPA Driven Protocol    Potassium Replacement - Follow Nurse / BPA Driven Protocol    sodium chloride    sodium chloride    No current facility-administered medications on file prior to encounter.     Current Outpatient Medications on File Prior to Encounter   Medication Sig    aspirin 81 MG EC tablet Take 1 tablet by mouth Daily.    carvedilol (COREG) 6.25 MG tablet Take 1 tablet by mouth 2 (Two) Times a Day With Meals.    furosemide (LASIX) 40 MG tablet Take 1 tablet by mouth Daily.    lisinopril (PRINIVIL,ZESTRIL) 20 MG tablet Take 1 tablet by mouth Daily.       General appearance: Elderly male, NAD, alert, edentulous  HEENT: Normocephalic, atraumatic, right-sided facial tumor just below the right orbital  Resp: Even and unlabored  Extremities: Left hand edema, nonpitting     Neurological:   MS: Oriented to self, stated he was in the hospital but unable to name, stated year was 2002, awake and alert. cooperative  CN: visual fields full, EOMI, facial sensation equal, no facial droop, tongue midline, Council bilaterally  Motor: 2/5 LUE, 4/5 LLE, 5/5 RUE/RLE  Sensory: light touch and cold sensation intact in all 4 ext.  Coordination: Normal finger to nose test on the right, unable to perform on the left    Physical exam performed, changes noted.    Laboratory results:  Lab Results   Component Value Date    TSH 1.880 10/29/2023     Lab Results   Component Value Date    HGBA1C 5.20 10/29/2023     No results found for: \"[id number]\"  Lab Results   Component Value Date    CHOL 146 10/29/2023     Lab Results   Component Value Date    TRIG 72 10/29/2023     Lab Results   Component Value Date    HDL 34 (L) 10/29/2023     Lab Results "   Component Value Date    LDL 98 10/29/2023     Lab Results   Component Value Date    WBC 12.87 (H) 11/02/2023    HGB 13.5 11/02/2023    HCT 39.5 11/02/2023    MCV 90.2 11/02/2023     11/02/2023     Lab Results   Component Value Date    GLUCOSE 108 (H) 11/02/2023    BUN 11 11/02/2023    CREATININE 1.14 11/02/2023    BCR 9.6 11/02/2023    K 3.9 11/02/2023    K 3.9 11/02/2023    CO2 18.1 (L) 11/02/2023    CALCIUM 8.5 (L) 11/02/2023    ALBUMIN 3.3 (L) 11/02/2023    AST 51 (H) 11/02/2023    ALT 16 11/02/2023     Lab Results   Component Value Date    PTT 30.0 04/26/2023     Lab Results   Component Value Date    INR 1.02 10/28/2023    INR 0.97 04/26/2023    PROTIME 13.5 10/28/2023    PROTIME 11.3 04/26/2023     Brief Urine Lab Results       None            Review and interpretation of imaging:  CT Head Without Contrast    Result Date: 11/2/2023  1.  A large remote left MCA distribution infarct is appreciated with volume loss similar in appearance as compared to the prior examination. Lacunar infarcts involving the corona radiata on the right are noted. There is no evidence of acute infarction or intracranial hemorrhage. Further evaluation could be performed with a MRI examination of the brain as indicated. 2.  A 3.3 x 1.9 cm soft tissue mass is appreciated overlying the medial aspect of the maxilla on the right and extending over the posterior aspect of the nasal bone on the right. The patient has a known basal cell carcinoma at this location.  Radiation dose reduction techniques were utilized, including automated exposure control and exposure modulation based on body size.       XR Chest 1 View    Result Date: 11/2/2023  FINDINGS AND IMPRESSION: Left pacer/AICD is present.  Worsening pulmonary vascular congestion is present. There is also worsening bibasilar pulmonary pacification and interstitial thickening, right greater than left. Findings are most concerning for worsening multifocal pneumonia and/or pulmonary  edema in the appropriate clinical context. Correlation with patient history is recommended with follow-up chest CT if clinically indicated. No pneumothorax is seen. Cardiac silhouette is within normal limits for size.  This report was finalized on 11/2/2023 8:21 AM by Dr. Addy Joseph M.D on Workstation: [id number]      CT Angiogram Neck    Result Date: 10/29/2023   There are small age-indeterminate infarcts noted within the right frontal and parietal lobes within the right MCA distribution. Additionally, a small age-indeterminate infarct is seen within the right occipital lobe that is either within the right MCA or right PCA distribution. Further temporal characterization with MR imaging is suggested.  There is a large chronic infarct within the lateral aspect left frontal, parietal, temporal, and occipital lobes within the left MCA distribution and chronic infarcts are noted within the left lentiform nucleus within the lateral lenticulostriate distribution.  There is an approximate 60-70% NASCET stenosis within the proximal portion of the right ICA and an approximate 70-80% NASCET stenosis within the proximal portion of the left ICA secondary to mixed calcified and noncalcified atherosclerotic plaques.  There is a severe degree of stenosis at the origin of the left vertebral artery and a moderate degree of stenosis is seen within the V3 segment of the left vertebral artery.  The remaining intracranial and extracranial atherosclerotic changes are as discussed in detail above.   Radiation dose reduction techniques were utilized, including automated exposure control and exposure modulation based on body size.  This report was finalized on 10/29/2023 7:14 PM by Dr. Jeff Harrison M.D on Workstation: [id number]      CT Angiogram Head    Result Date: 10/29/2023   There are small age-indeterminate infarcts noted within the right frontal and parietal lobes within the right MCA distribution. Additionally, a small  age-indeterminate infarct is seen within the right occipital lobe that is either within the right MCA or right PCA distribution. Further temporal characterization with MR imaging is suggested.  There is a large chronic infarct within the lateral aspect left frontal, parietal, temporal, and occipital lobes within the left MCA distribution and chronic infarcts are noted within the left lentiform nucleus within the lateral lenticulostriate distribution.  There is an approximate 60-70% NASCET stenosis within the proximal portion of the right ICA and an approximate 70-80% NASCET stenosis within the proximal portion of the left ICA secondary to mixed calcified and noncalcified atherosclerotic plaques.  There is a severe degree of stenosis at the origin of the left vertebral artery and a moderate degree of stenosis is seen within the V3 segment of the left vertebral artery.  The remaining intracranial and extracranial atherosclerotic changes are as discussed in detail above.   Radiation dose reduction techniques were utilized, including automated exposure control and exposure modulation based on body size.  This report was finalized on 10/29/2023 7:14 PM by Dr. Jeff Harrison M.D on Workstation: [id number]      CT Head Without Contrast Stroke Protocol    Result Date: 10/28/2023   There is no evidence for acute traumatic intracranial pathology. However, there are small age-indeterminate infarcts noted within the right frontal and parietal lobes that could potentially be acute to subacute in nature. These are within the right MCA distribution. Additionally and similarly, there is an age-indeterminate infarct within the right occipital lobe that is either within the right MCA or right PCA distribution. Further temporal characterization is recommended with MR imaging.  There is a large chronic infarct within the lateral aspect of the left cerebral hemisphere that is within the left MCA distribution and this involves the lateral  aspect of the left frontal, parietal, temporal, and occipital lobes.  There is a 3.3 x 1.9 cm soft tissue mass within the right premaxillary soft tissues that extends into the soft tissues along the right lateral aspect of the nose and this is compatible with a known basal cell carcinoma.  Incidental note is made of a fracture of the right orbital floor that is likely chronic in nature although correlation with clinical data is suggested.  These findings and recommendations were discussed with Dr. Niko Rivas on 10/28/2023 at approximately 3:15 p.m.  Radiation dose reduction techniques were utilized, including automated exposure control and exposure modulation based on body size.  This report was finalized on 10/28/2023 4:02 PM by Dr. Jeff Harrison M.D on Workstation: [id number]      XR Chest 1 View    Result Date: 10/28/2023  Cardiac defibrillator in place. Otherwise negative x-rays of the chest and the left shoulder.  This report was finalized on 10/28/2023 3:07 PM by Dr. Jhonny Charles M.D on Workstation: [id number]      XR Shoulder 2+ View Left    Result Date: 10/28/2023  Cardiac defibrillator in place. Otherwise negative x-rays of the chest and the left shoulder.  This report was finalized on 10/28/2023 3:07 PM by Dr. Jhonny Charles M.D on Workstation: [id number]     Results for orders placed during the hospital encounter of 10/28/23    Adult transthoracic echo complete    Interpretation Summary    Left ventricular systolic function is severely decreased. Calculated left ventricular EF = 21.6% Left ventricular ejection fraction appears to be less than 20%.The following left ventricular wall segments are hypokinetic: mid anterolateral, basal inferolateral, mid inferolateral, mid inferior, basal inferoseptal, mid inferoseptal, mid anteroseptal, basal inferior and basal inferoseptal. The following left ventricular wall segments are akinetic: apical anterior, apical lateral, apical inferior, apical septal and  apex.    Left ventricular wall thickness is consistent with mild concentric hypertrophy.    Left ventricular diastolic function is consistent with (grade II w/high LAP) pseudonormalization.    The left atrial cavity is mildly dilated.    Saline test results are negative for right to left atrial level shunt.    Estimated right ventricular systolic pressure from tricuspid regurgitation is mildly elevated (35-45 mmHg).      Impression/Assessment:     Expand All Collapse All    DOS: 2023  NAME: Tigre Amaral           : 1950  PCP: Provider, No Known        Chief Complaint   Patient presents with    Fall    Arm Injury         Stroke     Subjective: No acute events overnight.  Lying in bed resting comfortably.  Stepdaughter at bedside.  States he slept all night after receiving the Ativan but was unable to lie still during the MRI to complete it.     Objective:  Vital signs:      Vitals          Vitals:     10/31/23 2334 23 0344 23 0641 23 0718   BP: 156/83 148/85   169/87   BP Location: Left arm Left arm   Left arm   Patient Position: Lying Lying   Lying   Pulse: 90 87   82   Resp: 18 18   18   Temp: 98.8 °F (37.1 °C) 97.5 °F (36.4 °C)   97.7 °F (36.5 °C)   TempSrc: Oral Oral   Oral   SpO2: 96% 96%   96%   Weight:     70.5 kg (155 lb 6.8 oz)     Height:                    Current Facility-Administered Medications:     acetaminophen (TYLENOL) tablet 650 mg, 650 mg, Oral, Q4H PRN, 650 mg at 10/30/23 2032 **OR** acetaminophen (TYLENOL) suppository 650 mg, 650 mg, Rectal, Q4H PRN, Lashon Brooks MD    aspirin chewable tablet 81 mg, 81 mg, Oral, Daily, Carolina Casillas, APRN, 81 mg at 23 09    atorvastatin (LIPITOR) tablet 80 mg, 80 mg, Oral, Nightly, Lashon Brooks MD, 80 mg at 10/31/23 2109    sennosides-docusate (PERICOLACE) 8.6-50 MG per tablet 2 tablet, 2 tablet, Oral, BID, 2 tablet at 23 0933 **AND** polyethylene glycol (MIRALAX) packet 17 g, 17 g, Oral,  Daily PRN **AND** bisacodyl (DULCOLAX) EC tablet 5 mg, 5 mg, Oral, Daily PRN **AND** bisacodyl (DULCOLAX) suppository 10 mg, 10 mg, Rectal, Daily PRN, Lashon Brooks MD    Calcium Replacement - Follow Nurse / BPA Driven Protocol, , Does not apply, PRNCecilia Renate Andrea, MD    carvedilol (COREG) tablet 6.25 mg, 6.25 mg, Oral, BID With Meals, Lashon Brooks MD, 6.25 mg at 11/01/23 0933    haloperidol lactate (HALDOL) injection 2 mg, 2 mg, Intravenous, Once PRN, Carolina Casillas APRN    lisinopril (PRINIVIL,ZESTRIL) tablet 20 mg, 20 mg, Oral, Daily, StingLashon gaitan MD, 20 mg at 11/01/23 0934    LORazepam (ATIVAN) injection 1 mg, 1 mg, Intravenous, Once in imaging, Moira Lindsay APRN    Magnesium Standard Dose Replacement - Follow Nurse / BPA Driven Protocol, , Does not apply, PRNCecilia Renate Andrea, MD    melatonin tablet 3 mg, 3 mg, Oral, Nightly PRN, Lashon Brooks MD, 3 mg at 10/30/23 2032    nicotine (NICODERM CQ) 21 MG/24HR patch 1 patch, 1 patch, Transdermal, Q24H, Jimenez Zarate MD, 1 patch at 11/01/23 0935    ondansetron (ZOFRAN) tablet 4 mg, 4 mg, Oral, Q6H PRN **OR** ondansetron (ZOFRAN) injection 4 mg, 4 mg, Intravenous, Q6H PRN, Lashon Brooks MD    Phosphorus Replacement - Follow Nurse / BPA Driven Protocol, , Does not apply, PRCecilia BARRIOS Renate Andrea, MD    Potassium Replacement - Follow Nurse / BPA Driven Protocol, , Does not apply, PRCecilia BARRIOS Renate Andrea, MD    sodium chloride 0.9 % flush 10 mL, 10 mL, Intravenous, PRN, Niko Rivas II, MD    sodium chloride 0.9 % flush 10 mL, 10 mL, Intravenous, Q12H, Prince Roberts MD, 10 mL at 11/01/23 0934    sodium chloride 0.9 % flush 10 mL, 10 mL, Intravenous, PRN, Prince Roberts MD    sodium chloride 0.9 % infusion 40 mL, 40 mL, Intravenous, PRN, Prince Roberts MD    sodium chloride 0.9 % infusion, 75 mL/hr, Intravenous, Continuous, Stingl, Lashon Multani MD, Last Rate: 75 mL/hr at 10/31/23 0501, 75  mL/hr at 10/31/23 0501     PRN meds    acetaminophen **OR** acetaminophen    senna-docusate sodium **AND** polyethylene glycol **AND** bisacodyl **AND** bisacodyl    Calcium Replacement - Follow Nurse / BPA Driven Protocol    haloperidol lactate    Magnesium Standard Dose Replacement - Follow Nurse / BPA Driven Protocol    melatonin    ondansetron **OR** ondansetron    Phosphorus Replacement - Follow Nurse / BPA Driven Protocol    Potassium Replacement - Follow Nurse / BPA Driven Protocol    sodium chloride    sodium chloride    sodium chloride     No current facility-administered medications on file prior to encounter.           Current Outpatient Medications on File Prior to Encounter   Medication Sig    aspirin 81 MG EC tablet Take 1 tablet by mouth Daily.    carvedilol (COREG) 6.25 MG tablet Take 1 tablet by mouth 2 (Two) Times a Day With Meals.    furosemide (LASIX) 40 MG tablet Take 1 tablet by mouth Daily.    lisinopril (PRINIVIL,ZESTRIL) 20 MG tablet Take 1 tablet by mouth Daily.         General appearance: Elderly male, NAD, alert, edentulous  HEENT: Normocephalic, atraumatic, right-sided facial tumor just below the right orbital  Resp: Even and unlabored  Extremities: Left hand edema, nonpitting     Neurological:   MS: Oriented to self, stated he was in the hospital but unable to name, awake and alert. cooperative  CN: visual fields full, EOMI, facial sensation equal, no facial droop, tongue midline, [place] bilaterally  Motor: 2/5 LUE, 4/5 LLE, 5/5 RUE/RLE  Sensory: light touch sensation intact in all 4 ext.  Coordination: Normal finger to nose test on the right, unable to perform on the left  Gait and station: Deferred  Rapid alternating movements: normal finger to thumb tap on the right, unable to perform on the left     Physical exam performed, changes noted.     Laboratory results:        Lab Results   Component Value Date     TSH 1.880 10/29/2023            Lab Results   Component Value Date     HGBA1C  "5.20 10/29/2023      No results found for: \"[id number]\"        Lab Results   Component Value Date     CHOL 146 10/29/2023            Lab Results   Component Value Date     TRIG 72 10/29/2023            Lab Results   Component Value Date     HDL 34 (L) 10/29/2023            Lab Results   Component Value Date     LDL 98 10/29/2023            Lab Results   Component Value Date     WBC 9.46 10/30/2023     HGB 13.1 10/30/2023     HCT 37.9 10/30/2023     MCV 90.0 10/30/2023      10/30/2023            Lab Results   Component Value Date     GLUCOSE 96 10/31/2023     BUN 9 10/31/2023     CREATININE 1.07 10/31/2023     BCR 8.4 10/31/2023     K 3.8 10/31/2023     CO2 21.6 (L) 10/31/2023     CALCIUM 8.4 (L) 10/31/2023     ALBUMIN 3.1 (L) 10/29/2023     AST 13 10/29/2023     ALT 6 10/29/2023            Lab Results   Component Value Date     PTT 30.0 04/26/2023            Lab Results   Component Value Date     INR 1.02 10/28/2023     INR 0.97 04/26/2023     PROTIME 13.5 10/28/2023     PROTIME 11.3 04/26/2023      Brief Urine Lab Results         None                Review and interpretation of imaging:  CT Angiogram Neck     Result Date: 10/29/2023   There are small age-indeterminate infarcts noted within the right frontal and parietal lobes within the right MCA distribution. Additionally, a small age-indeterminate infarct is seen within the right occipital lobe that is either within the right MCA or right PCA distribution. Further temporal characterization with MR imaging is suggested.  There is a large chronic infarct within the lateral aspect left frontal, parietal, temporal, and occipital lobes within the left MCA distribution and chronic infarcts are noted within the left lentiform nucleus within the lateral lenticulostriate distribution.  There is an approximate 60-70% NASCET stenosis within the proximal portion of the right ICA and an approximate 70-80% NASCET stenosis within the proximal portion of the left ICA " secondary to mixed calcified and noncalcified atherosclerotic plaques.  There is a severe degree of stenosis at the origin of the left vertebral artery and a moderate degree of stenosis is seen within the V3 segment of the left vertebral artery.  The remaining intracranial and extracranial atherosclerotic changes are as discussed in detail above.   Radiation dose reduction techniques were utilized, including automated exposure control and exposure modulation based on body size.  This report was finalized on 10/29/2023 7:14 PM by Dr. Jeff Harrison M.D on Workstation: [id number]       CT Angiogram Head     Result Date: 10/29/2023   There are small age-indeterminate infarcts noted within the right frontal and parietal lobes within the right MCA distribution. Additionally, a small age-indeterminate infarct is seen within the right occipital lobe that is either within the right MCA or right PCA distribution. Further temporal characterization with MR imaging is suggested.  There is a large chronic infarct within the lateral aspect left frontal, parietal, temporal, and occipital lobes within the left MCA distribution and chronic infarcts are noted within the left lentiform nucleus within the lateral lenticulostriate distribution.  There is an approximate 60-70% NASCET stenosis within the proximal portion of the right ICA and an approximate 70-80% NASCET stenosis within the proximal portion of the left ICA secondary to mixed calcified and noncalcified atherosclerotic plaques.  There is a severe degree of stenosis at the origin of the left vertebral artery and a moderate degree of stenosis is seen within the V3 segment of the left vertebral artery.  The remaining intracranial and extracranial atherosclerotic changes are as discussed in detail above.   Radiation dose reduction techniques were utilized, including automated exposure control and exposure modulation based on body size.  This report was finalized on 10/29/2023 7:14  PM by Dr. Jeff Harrison M.D on Workstation: [id number]       CT Head Without Contrast Stroke Protocol     Result Date: 10/28/2023   There is no evidence for acute traumatic intracranial pathology. However, there are small age-indeterminate infarcts noted within the right frontal and parietal lobes that could potentially be acute to subacute in nature. These are within the right MCA distribution. Additionally and similarly, there is an age-indeterminate infarct within the right occipital lobe that is either within the right MCA or right PCA distribution. Further temporal characterization is recommended with MR imaging.  There is a large chronic infarct within the lateral aspect of the left cerebral hemisphere that is within the left MCA distribution and this involves the lateral aspect of the left frontal, parietal, temporal, and occipital lobes.  There is a 3.3 x 1.9 cm soft tissue mass within the right premaxillary soft tissues that extends into the soft tissues along the right lateral aspect of the nose and this is compatible with a known basal cell carcinoma.  Incidental note is made of a fracture of the right orbital floor that is likely chronic in nature although correlation with clinical data is suggested.  These findings and recommendations were discussed with Dr. Niko Rivas on 10/28/2023 at approximately 3:15 p.m.  Radiation dose reduction techniques were utilized, including automated exposure control and exposure modulation based on body size.  This report was finalized on 10/28/2023 4:02 PM by Dr. Jeff Harrison M.D on Workstation: [id number]       XR Chest 1 View     Result Date: 10/28/2023  Cardiac defibrillator in place. Otherwise negative x-rays of the chest and the left shoulder.  This report was finalized on 10/28/2023 3:07 PM by Dr. Jhonny Charles M.D on Workstation: [id number]       XR Shoulder 2+ View Left     Result Date: 10/28/2023  Cardiac defibrillator in place. Otherwise negative x-rays of the  chest and the left shoulder.  This report was finalized on 10/28/2023 3:07 PM by Dr. Jhonny Charles M.D on Workstation: [id number]     Results for orders placed during the hospital encounter of 10/28/23     Adult transthoracic echo complete     Interpretation Summary    Left ventricular systolic function is severely decreased. Calculated left ventricular EF = 21.6% Left ventricular ejection fraction appears to be less than 20%.The following left ventricular wall segments are hypokinetic: mid anterolateral, basal inferolateral, mid inferolateral, mid inferior, basal inferoseptal, mid inferoseptal, mid anteroseptal, basal inferior and basal inferoseptal. The following left ventricular wall segments are akinetic: apical anterior, apical lateral, apical inferior, apical septal and apex.    Left ventricular wall thickness is consistent with mild concentric hypertrophy.    Left ventricular diastolic function is consistent with (grade II w/high LAP) pseudonormalization.    The left atrial cavity is mildly dilated.    Saline test results are negative for right to left atrial level shunt.    Estimated right ventricular systolic pressure from tricuspid regurgitation is mildly elevated (35-45 mmHg).        Impression/Assessment:  This is a 73-year-old male with past medical history of tobacco abuse, alcohol dependency, traumatic brain injury, pacemaker placement, hypertension, reported dementia history who presented to the hospital on 10/28/2023 with complaints of left upper extremity weakness.  Reportedly per documentation from the ER physician symptoms started Thursday night after he sustained a fall.  His family member stated that she did not notice significant left arm weakness after the fall but that the patient was complaining of pain.  He has aspirin listed as a home med however unclear if he has been compliant with this daily.  His initial noncontrast CT head did not reveal any acute intracranial abnormalities, known  area of large left MCA territory infarct vs TBI noted as well as age-indeterminate infarct within the right MCA and PCA territories.     BP on arrival 133/75, HR 74. AV paced rhythm.  Temp 98.2. . K+ 3.1. Cr. 1.28.      Diagnosis:  Left-sided hemiparesis, suspect subacute right MCA infarct  Dementia/history of TBI  Bilateral carotid stenosis   History of stroke  Tobacco abuse  Cardiomyopathy     Additional work up to date:  2D Echo: LA cavity mildly dilated, saline test negative, EF 21.6%, septal wall motion abnormality noted, no evidence of LV thrombus or mass, no AV stenosis, mild to moderate MVR.        Plan:  Repeat CTH this morning appears to show possibly evolving right corona radiata infarct but maybe chronic finding as it is present on his initial CTH but looks a little larger.   Would like to reattempt MRI today. QT was 423 today, 443 and 400 yesterday. Will hold off on Haldol and order lorazepam 1 mg for his MRI with instructions to give an additional 1 mg if needed.  Would like pacemaker interrogation for afib/flutter.   For now continue ASA 81 mg  Continue Lipitor 80 mg  We will leave further recommendations on CTA findings once we can review his MRI to determine source of stroke.  Needs follow-up with dermatology outpatient for facial growth abnormality.  Neurochecks per stroke protocol  Avoid hypotension  Stroke Education  OLMAN/SCDs  PT/OT/ST  Therapies as written. CCP for discharge planning. Call RRT for any acute neurological changes.   We will continue to follow and advise.    Case discussed with patient, step daughter, son, and Dr. Villalpando, and he agrees with plan above.    SUNG Munroe      Electronically signed by Carolina Casillas APRN at 11/02/23 6022       Abhinav Barnes MD at 11/01/23 1480              [facility]     LOS: 4 days     Subjective:    CC:Fall and Arm Injury    DIET:  Diet Order   Procedures    Diet: Cardiac Diets; Healthy Heart (2-3 Na+);  Texture: Soft to Chew (NDD 3); Soft to Chew: Chopped Meat; Fluid Consistency: Thin (IDDSI 0)   Repeating mri when more cooperative  Rapid response    Objective:    Vital Signs:  Temp:  [97.5 °F (36.4 °C)-98.8 °F (37.1 °C)] 97.7 °F (36.5 °C)  Heart Rate:  [] 109  Resp:  [18-20] 18  BP: (147-180)/() 175/100    SpO2:  [94 %-98 %] 96 %  on  Flow (L/min):  [3] 3;   Device (Oxygen Therapy): nasal cannula  Body mass index is 23.63 kg/m².    Physical Exam  HENT:      Head: Normocephalic and atraumatic.   Cardiovascular:      Heart sounds: No murmur heard.     No friction rub.   Pulmonary:      Effort: Pulmonary effort is normal.      Breath sounds: Normal breath sounds.   Abdominal:      General: Bowel sounds are normal. There is no distension.      Palpations: Abdomen is soft.      Tenderness: There is no abdominal tenderness.   Skin:     General: Skin is warm and dry.   Neurological:      Comments: Left sided paresis arm and leg, he is sedated from [facility] for MRI         Results Review:    Glucose   Date Value Ref Range Status   10/31/2023 96 65 - 99 mg/dL Final   10/30/2023 91 65 - 99 mg/dL Final     Results from last 7 days   Lab Units 10/30/23  0522   WBC 10*3/mm3 9.46   HEMOGLOBIN g/dL 13.1   HEMATOCRIT % 37.9   PLATELETS 10*3/mm3 244     Results from last 7 days   Lab Units 10/31/23  1520 10/31/23  0643 10/29/23  2015 10/29/23  0600   SODIUM mmol/L  --  138   < > 139   POTASSIUM mmol/L 3.8 3.7   < > 2.4*   CHLORIDE mmol/L  --  107   < > 103   CO2 mmol/L  --  21.6*   < > 26.4   BUN mg/dL  --  9   < > 9   CREATININE mg/dL  --  1.07   < > 1.28*   CALCIUM mg/dL  --  8.4*   < > 8.3*   BILIRUBIN mg/dL  --   --   --  0.8   ALK PHOS U/L  --   --   --  71   ALT (SGPT) U/L  --   --   --  6   AST (SGOT) U/L  --   --   --  13   GLUCOSE mg/dL  --  96   < > 93    < > = values in this interval not displayed.     Results from last 7 days   Lab Units 10/28/23  1449   INR  1.02     Results from last 7 days   Lab Units  "10/29/23  0600   MAGNESIUM mg/dL 1.8         Cultures:  No results found for: \"BLOODCX\", \"URINECX\", \"WOUNDCX\", \"MRSACX\", \"RESPCX\", \"STOOLCX\"    I have reviewed daily medications and changes in CPOE    Scheduled meds  aspirin, 81 mg, Oral, Daily  atorvastatin, 80 mg, Oral, Nightly  carvedilol, 6.25 mg, Oral, BID With Meals  lisinopril, 20 mg, Oral, Daily  LORazepam, 1 mg, Intravenous, Once in imaging  nicotine, 1 patch, Transdermal, Q24H  senna-docusate sodium, 2 tablet, Oral, BID  sodium chloride, 10 mL, Intravenous, Q12H        sodium chloride, 75 mL/hr, Last Rate: 75 mL/hr (10/31/23 0501)      PRN meds    acetaminophen **OR** acetaminophen    senna-docusate sodium **AND** polyethylene glycol **AND** bisacodyl **AND** bisacodyl    Calcium Replacement - Follow Nurse / BPA Driven Protocol    haloperidol lactate    Magnesium Standard Dose Replacement - Follow Nurse / BPA Driven Protocol    melatonin    ondansetron **OR** ondansetron    Phosphorus Replacement - Follow Nurse / BPA Driven Protocol    Potassium Replacement - Follow Nurse / BPA Driven Protocol    sodium chloride    sodium chloride    sodium chloride        Stroke        Assessment/Plan:    73 y.o. male admitted with Stroke.     Left-sided hemiparesis  -CT head shows encephalomalacia of the left MCA region and right-sided parietal hypodensity.  -Stroke Work-up is pending.  -Aspirin, high-dose statin   -Did not seem to be moving left side well yesterday but he was sedated for the MRI.  He is moving left side without any problem today    Chest pain today lasting a few minutes and quickly resolved, occurred while the patient was eating.  A rapid response was called.  Concerned that the patient may have had some choking or aspiration as the patient vomited afterward  -Cardiology consultation  -EKG with no ischemic changes but he is paced  -Troponin pending  -Speech eval  -Chest x-ray unremarkable    Hypertension  -Continue lisinopril and Coreg   "   Hypokalemia  -continue replacement     Leukocytosis  -Monitor for possible aspiration     Dvt ppx; add lovenox low dose, given his immobility      Elevated troponin noted, patient not having any chest pain at this time.  EKG difficult to interpret paced.  Cardiology has been consulted.  We will add low-dose Lovenox, continue with aspirin Plavix    Abhinav Barnes MD  23  18:07 EDT        Electronically signed by Abhinav Barnes MD at 23       Carolina Casillas APRN at 23 1317          DOS: 2023  NAME: Tigre Amaral   : 1950  PCP: Provider, No Known    Chief Complaint   Patient presents with    Fall    Arm Injury        Stroke    Subjective: No acute events overnight.  Lying in bed resting comfortably.  Stepdaughter at bedside.  States he slept all night after receiving the Ativan but was unable to lie still during the MRI to complete it.    Objective:  Vital signs:      Vitals:    10/31/23 2334 23 0344 23 0641 23 0718   BP: 156/83 148/85  169/87   BP Location: Left arm Left arm  Left arm   Patient Position: Lying Lying  Lying   Pulse: 90 87  82   Resp: 18 18  18   Temp: 98.8 °F (37.1 °C) 97.5 °F (36.4 °C)  97.7 °F (36.5 °C)   TempSrc: Oral Oral  Oral   SpO2: 96% 96%  96%   Weight:   70.5 kg (155 lb 6.8 oz)    Height:           Current Facility-Administered Medications:     acetaminophen (TYLENOL) tablet 650 mg, 650 mg, Oral, Q4H PRN, 650 mg at 10/30/23 2032 **OR** acetaminophen (TYLENOL) suppository 650 mg, 650 mg, Rectal, Q4H PRN, Lashon Brooks MD    aspirin chewable tablet 81 mg, 81 mg, Oral, Daily, Carolina Casillas APRN, 81 mg at 23 0959    atorvastatin (LIPITOR) tablet 80 mg, 80 mg, Oral, Nightly, Lashon Brooks MD, 80 mg at 10/31/23 2109    sennosides-docusate (PERICOLACE) 8.6-50 MG per tablet 2 tablet, 2 tablet, Oral, BID, 2 tablet at 23 0933 **AND** polyethylene glycol (MIRALAX) packet 17 g, 17 g, Oral, Daily PRN  **AND** bisacodyl (DULCOLAX) EC tablet 5 mg, 5 mg, Oral, Daily PRN **AND** bisacodyl (DULCOLAX) suppository 10 mg, 10 mg, Rectal, Daily PRN, Lashon Brooks MD    Calcium Replacement - Follow Nurse / BPA Driven Protocol, , Does not apply, PRNCecilia Renate Andrea, MD    carvedilol (COREG) tablet 6.25 mg, 6.25 mg, Oral, BID With Meals, Lashon Brooks MD, 6.25 mg at 11/01/23 0933    haloperidol lactate (HALDOL) injection 2 mg, 2 mg, Intravenous, Once PRN, Carolina Casillas APRN    lisinopril (PRINIVIL,ZESTRIL) tablet 20 mg, 20 mg, Oral, Daily, StingLashon gaitan MD, 20 mg at 11/01/23 0934    LORazepam (ATIVAN) injection 1 mg, 1 mg, Intravenous, Once in imaging, Moira Lindsay APRN    Magnesium Standard Dose Replacement - Follow Nurse / BPA Driven Protocol, , Does not apply, PRNCecilia Renate Andrea, MD    melatonin tablet 3 mg, 3 mg, Oral, Nightly PRNCecilia Renate Andrea, MD, 3 mg at 10/30/23 2032    nicotine (NICODERM CQ) 21 MG/24HR patch 1 patch, 1 patch, Transdermal, Q24H, Jimenez Zarate MD, 1 patch at 11/01/23 0935    ondansetron (ZOFRAN) tablet 4 mg, 4 mg, Oral, Q6H PRN **OR** ondansetron (ZOFRAN) injection 4 mg, 4 mg, Intravenous, Q6H PRN, Lashon Brooks MD    Phosphorus Replacement - Follow Nurse / BPA Driven Protocol, , Does not apply, Cecilia BARRON Renate Andrea, MD    Potassium Replacement - Follow Nurse / BPA Driven Protocol, , Does not apply, Cecilia BARRON Renate Andrea, MD    sodium chloride 0.9 % flush 10 mL, 10 mL, Intravenous, PRN, Niko Rivas II, MD    sodium chloride 0.9 % flush 10 mL, 10 mL, Intravenous, Q12H, Prince Roberts MD, 10 mL at 11/01/23 0934    sodium chloride 0.9 % flush 10 mL, 10 mL, Intravenous, PRN, Prince Roberts MD    sodium chloride 0.9 % infusion 40 mL, 40 mL, Intravenous, PRN, Prince Roberts MD    sodium chloride 0.9 % infusion, 75 mL/hr, Intravenous, Continuous, Stingl, Lashon Multani MD, Last Rate: 75 mL/hr at 10/31/23 0501, 75 mL/hr at  10/31/23 0501    PRN meds    acetaminophen **OR** acetaminophen    senna-docusate sodium **AND** polyethylene glycol **AND** bisacodyl **AND** bisacodyl    Calcium Replacement - Follow Nurse / BPA Driven Protocol    haloperidol lactate    Magnesium Standard Dose Replacement - Follow Nurse / BPA Driven Protocol    melatonin    ondansetron **OR** ondansetron    Phosphorus Replacement - Follow Nurse / BPA Driven Protocol    Potassium Replacement - Follow Nurse / BPA Driven Protocol    sodium chloride    sodium chloride    sodium chloride    No current facility-administered medications on file prior to encounter.     Current Outpatient Medications on File Prior to Encounter   Medication Sig    aspirin 81 MG EC tablet Take 1 tablet by mouth Daily.    carvedilol (COREG) 6.25 MG tablet Take 1 tablet by mouth 2 (Two) Times a Day With Meals.    furosemide (LASIX) 40 MG tablet Take 1 tablet by mouth Daily.    lisinopril (PRINIVIL,ZESTRIL) 20 MG tablet Take 1 tablet by mouth Daily.       General appearance: Elderly male, NAD, alert, edentulous  HEENT: Normocephalic, atraumatic, right-sided facial tumor just below the right orbital  Resp: Even and unlabored  Extremities: Left hand edema, nonpitting     Neurological:   MS: Oriented to self, stated he was in the hospital but unable to name, awake and alert. cooperative  CN: visual fields full, EOMI, facial sensation equal, no facial droop, tongue midline, [place] bilaterally  Motor: 2/5 LUE, 4/5 LLE, 5/5 RUE/RLE  Sensory: light touch sensation intact in all 4 ext.  Coordination: Normal finger to nose test on the right, unable to perform on the left  Gait and station: Deferred  Rapid alternating movements: normal finger to thumb tap on the right, unable to perform on the left    Physical exam performed, changes noted.    Laboratory results:  Lab Results   Component Value Date    TSH 1.880 10/29/2023     Lab Results   Component Value Date    HGBA1C 5.20 10/29/2023     No results found  "for: \"[id number]\"  Lab Results   Component Value Date    CHOL 146 10/29/2023     Lab Results   Component Value Date    TRIG 72 10/29/2023     Lab Results   Component Value Date    HDL 34 (L) 10/29/2023     Lab Results   Component Value Date    LDL 98 10/29/2023     Lab Results   Component Value Date    WBC 9.46 10/30/2023    HGB 13.1 10/30/2023    HCT 37.9 10/30/2023    MCV 90.0 10/30/2023     10/30/2023     Lab Results   Component Value Date    GLUCOSE 96 10/31/2023    BUN 9 10/31/2023    CREATININE 1.07 10/31/2023    BCR 8.4 10/31/2023    K 3.8 10/31/2023    CO2 21.6 (L) 10/31/2023    CALCIUM 8.4 (L) 10/31/2023    ALBUMIN 3.1 (L) 10/29/2023    AST 13 10/29/2023    ALT 6 10/29/2023     Lab Results   Component Value Date    PTT 30.0 04/26/2023     Lab Results   Component Value Date    INR 1.02 10/28/2023    INR 0.97 04/26/2023    PROTIME 13.5 10/28/2023    PROTIME 11.3 04/26/2023     Brief Urine Lab Results       None            Review and interpretation of imaging:  CT Angiogram Neck    Result Date: 10/29/2023   There are small age-indeterminate infarcts noted within the right frontal and parietal lobes within the right MCA distribution. Additionally, a small age-indeterminate infarct is seen within the right occipital lobe that is either within the right MCA or right PCA distribution. Further temporal characterization with MR imaging is suggested.  There is a large chronic infarct within the lateral aspect left frontal, parietal, temporal, and occipital lobes within the left MCA distribution and chronic infarcts are noted within the left lentiform nucleus within the lateral lenticulostriate distribution.  There is an approximate 60-70% NASCET stenosis within the proximal portion of the right ICA and an approximate 70-80% NASCET stenosis within the proximal portion of the left ICA secondary to mixed calcified and noncalcified atherosclerotic plaques.  There is a severe degree of stenosis at the origin of the " left vertebral artery and a moderate degree of stenosis is seen within the V3 segment of the left vertebral artery.  The remaining intracranial and extracranial atherosclerotic changes are as discussed in detail above.   Radiation dose reduction techniques were utilized, including automated exposure control and exposure modulation based on body size.  This report was finalized on 10/29/2023 7:14 PM by Dr. Jeff Harrison M.D on Workstation: [id number]      CT Angiogram Head    Result Date: 10/29/2023   There are small age-indeterminate infarcts noted within the right frontal and parietal lobes within the right MCA distribution. Additionally, a small age-indeterminate infarct is seen within the right occipital lobe that is either within the right MCA or right PCA distribution. Further temporal characterization with MR imaging is suggested.  There is a large chronic infarct within the lateral aspect left frontal, parietal, temporal, and occipital lobes within the left MCA distribution and chronic infarcts are noted within the left lentiform nucleus within the lateral lenticulostriate distribution.  There is an approximate 60-70% NASCET stenosis within the proximal portion of the right ICA and an approximate 70-80% NASCET stenosis within the proximal portion of the left ICA secondary to mixed calcified and noncalcified atherosclerotic plaques.  There is a severe degree of stenosis at the origin of the left vertebral artery and a moderate degree of stenosis is seen within the V3 segment of the left vertebral artery.  The remaining intracranial and extracranial atherosclerotic changes are as discussed in detail above.   Radiation dose reduction techniques were utilized, including automated exposure control and exposure modulation based on body size.  This report was finalized on 10/29/2023 7:14 PM by Dr. Jeff Harrison M.D on Workstation: [id number]      CT Head Without Contrast Stroke Protocol    Result Date: 10/28/2023    There is no evidence for acute traumatic intracranial pathology. However, there are small age-indeterminate infarcts noted within the right frontal and parietal lobes that could potentially be acute to subacute in nature. These are within the right MCA distribution. Additionally and similarly, there is an age-indeterminate infarct within the right occipital lobe that is either within the right MCA or right PCA distribution. Further temporal characterization is recommended with MR imaging.  There is a large chronic infarct within the lateral aspect of the left cerebral hemisphere that is within the left MCA distribution and this involves the lateral aspect of the left frontal, parietal, temporal, and occipital lobes.  There is a 3.3 x 1.9 cm soft tissue mass within the right premaxillary soft tissues that extends into the soft tissues along the right lateral aspect of the nose and this is compatible with a known basal cell carcinoma.  Incidental note is made of a fracture of the right orbital floor that is likely chronic in nature although correlation with clinical data is suggested.  These findings and recommendations were discussed with Dr. Niko Rivas on 10/28/2023 at approximately 3:15 p.m.  Radiation dose reduction techniques were utilized, including automated exposure control and exposure modulation based on body size.  This report was finalized on 10/28/2023 4:02 PM by Dr. Jeff Harrison M.D on Workstation: [id number]      XR Chest 1 View    Result Date: 10/28/2023  Cardiac defibrillator in place. Otherwise negative x-rays of the chest and the left shoulder.  This report was finalized on 10/28/2023 3:07 PM by Dr. Jhonny Charles M.D on Workstation: [id number]      XR Shoulder 2+ View Left    Result Date: 10/28/2023  Cardiac defibrillator in place. Otherwise negative x-rays of the chest and the left shoulder.  This report was finalized on 10/28/2023 3:07 PM by Dr. Jhonny Charles M.D on Workstation: [id number]      Results for orders placed during the hospital encounter of 10/28/23    Adult transthoracic echo complete    Interpretation Summary    Left ventricular systolic function is severely decreased. Calculated left ventricular EF = 21.6% Left ventricular ejection fraction appears to be less than 20%.The following left ventricular wall segments are hypokinetic: mid anterolateral, basal inferolateral, mid inferolateral, mid inferior, basal inferoseptal, mid inferoseptal, mid anteroseptal, basal inferior and basal inferoseptal. The following left ventricular wall segments are akinetic: apical anterior, apical lateral, apical inferior, apical septal and apex.    Left ventricular wall thickness is consistent with mild concentric hypertrophy.    Left ventricular diastolic function is consistent with (grade II w/high LAP) pseudonormalization.    The left atrial cavity is mildly dilated.    Saline test results are negative for right to left atrial level shunt.    Estimated right ventricular systolic pressure from tricuspid regurgitation is mildly elevated (35-45 mmHg).      Impression/Assessment:  This is a 73-year-old male with past medical history of tobacco abuse, alcohol dependency, traumatic brain injury, pacemaker placement, hypertension, reported dementia history who presented to the hospital on 10/28/2023 with complaints of left upper extremity weakness.  Reportedly per documentation from the ER physician symptoms started Thursday night after he sustained a fall.  His family member stated that she did not notice significant left arm weakness after the fall but that the patient was complaining of pain.  He has aspirin listed as a home med however unclear if he has been compliant with this daily.  His initial noncontrast CT head did not reveal any acute intracranial abnormalities, known area of large left MCA territory infarct vs TBI noted as well as age-indeterminate infarct within the right MCA and PCA territories.      BP on arrival 133/75, HR 74. AV paced rhythm.  Temp 98.2. . K+ 3.1. Cr. 1.28.      Diagnosis:  Left-sided hemiparesis, suspect subacute right MCA infarct  Dementia/history of TBI  Bilateral carotid stenosis   History of stroke  Tobacco abuse  Cardiomyopathy     Additional work up to date:  2D Echo: LA cavity mildly dilated, saline test negative, EF 21.6%, septal wall motion abnormality noted, no evidence of LV thrombus or mass, no AV stenosis, mild to moderate MVR.    Plan:  Was unable to complete MRI last night despite receiving 1 mg of lorazepam.  Would like to reattempt this today, have ordered 2 mg of Haldol IV to be given prior to going down.  EKG also ordered to check his QT interval, if >450 hold Haldol.  If unable to complete MRI, will order follow-up CT head to be performed in the morning.  Can cancel CT head order if MRI is complete.  For now continue ASA 81 mg  Continue Lipitor 80 mg  We will leave further recommendations on CTA findings once we can review his MRI to determine source of stroke.  Needs follow-up with dermatology outpatient for facial growth abnormality.  Neurochecks per stroke protocol  Avoid hypotension  Stroke Education  OLMAN/SCDs  PT/OT/ST  Therapies as written. CCP for discharge planning. Call RRT for any acute neurological changes.   We will continue to follow and advise.    Case discussed with patient, RN, Dr. Barnes, and Dr. Pabon, and he agrees with plan above.    SUNG Munroe      Electronically signed by Carolina Casillas APRN at 11/01/23 2760       Abhinav Barnes MD at 10/31/23 1945              [facility]     LOS: 3 days     Subjective:    CC:Fall and Arm Injury    DIET:  Diet Order   Procedures    Diet: Cardiac Diets; Healthy Heart (2-3 Na+); Texture: Soft to Chew (NDD 3); Soft to Chew: Chopped Meat; Fluid Consistency: Thin (IDDSI 0)   Attempted MRI and given Ativan, unable to get this completed because of  "agitation    Objective:    Vital Signs:  Temp:  [97.7 °F (36.5 °C)-97.8 °F (36.6 °C)] 97.8 °F (36.6 °C)  Heart Rate:  [59-96] 80  Resp:  [16-24] 22  BP: (149-177)/() 164/103    SpO2:  [92 %-100 %] 94 %  on   ;   Device (Oxygen Therapy): room air  Body mass index is 24.7 kg/m².    Physical Exam  HENT:      Head: Normocephalic and atraumatic.   Cardiovascular:      Heart sounds: No murmur heard.     No friction rub.   Pulmonary:      Effort: Pulmonary effort is normal.      Breath sounds: Normal breath sounds.   Abdominal:      General: Bowel sounds are normal. There is no distension.      Palpations: Abdomen is soft.      Tenderness: There is no abdominal tenderness.   Skin:     General: Skin is warm and dry.   Neurological:      Comments: Left sided paresis arm and leg, [facility]         Results Review:    Glucose   Date Value Ref Range Status   10/31/2023 96 65 - 99 mg/dL Final   10/30/2023 91 65 - 99 mg/dL Final   10/29/2023 93 65 - 99 mg/dL Final     Results from last 7 days   Lab Units 10/30/23  0522   WBC 10*3/mm3 9.46   HEMOGLOBIN g/dL 13.1   HEMATOCRIT % 37.9   PLATELETS 10*3/mm3 244     Results from last 7 days   Lab Units 10/31/23  1520 10/31/23  0643 10/29/23  2015 10/29/23  0600   SODIUM mmol/L  --  138   < > 139   POTASSIUM mmol/L 3.8 3.7   < > 2.4*   CHLORIDE mmol/L  --  107   < > 103   CO2 mmol/L  --  21.6*   < > 26.4   BUN mg/dL  --  9   < > 9   CREATININE mg/dL  --  1.07   < > 1.28*   CALCIUM mg/dL  --  8.4*   < > 8.3*   BILIRUBIN mg/dL  --   --   --  0.8   ALK PHOS U/L  --   --   --  71   ALT (SGPT) U/L  --   --   --  6   AST (SGOT) U/L  --   --   --  13   GLUCOSE mg/dL  --  96   < > 93    < > = values in this interval not displayed.     Results from last 7 days   Lab Units 10/28/23  1449   INR  1.02     Results from last 7 days   Lab Units 10/29/23  0600   MAGNESIUM mg/dL 1.8         Cultures:  No results found for: \"BLOODCX\", \"URINECX\", \"WOUNDCX\", \"MRSACX\", \"RESPCX\", " "\"STOOLCX\"    I have reviewed daily medications and changes in CPOE    Scheduled meds  [START ON 2023] aspirin, 81 mg, Oral, Daily  atorvastatin, 80 mg, Oral, Nightly  carvedilol, 6.25 mg, Oral, BID With Meals  lisinopril, 20 mg, Oral, Daily  LORazepam, 1 mg, Intravenous, Once in imaging  nicotine, 1 patch, Transdermal, Q24H  senna-docusate sodium, 2 tablet, Oral, BID  sodium chloride, 10 mL, Intravenous, Q12H        sodium chloride, 75 mL/hr, Last Rate: 75 mL/hr (10/31/23 0501)      PRN meds    acetaminophen **OR** acetaminophen    senna-docusate sodium **AND** polyethylene glycol **AND** bisacodyl **AND** bisacodyl    Calcium Replacement - Follow Nurse / BPA Driven Protocol    Magnesium Standard Dose Replacement - Follow Nurse / BPA Driven Protocol    melatonin    ondansetron **OR** ondansetron    Phosphorus Replacement - Follow Nurse / BPA Driven Protocol    Potassium Replacement - Follow Nurse / BPA Driven Protocol    sodium chloride    sodium chloride    sodium chloride        Stroke        Assessment/Plan:    73 y.o. male admitted with Stroke.        Left-sided hemiparesis  -CT head shows encephalomalacia of the left MCA region and right-sided parietal hypodensity.  -Stroke Work-up is pending.  -Aspirin, high-dose statin     Hypertension  -Continue lisinopril and Coreg     Hypokalemia  -continue replacement          Abhinav Barnes MD  10/31/23  19:45 EDT        Electronically signed by Abhinav Barnes MD at 10/31/23 1947       Carolina Casillas APRN at 10/31/23 1048          DOS: 10/31/2023  NAME: Tigre Amaral   : 1950  PCP: Provider, No Known    Chief Complaint   Patient presents with    Fall    Arm Injury      Stroke    Subjective: Pt seen in follow up, however the problem is new to me.  Patient lying in bed with no family at bedside.  LUE is flaccid.  States he is able to lift his left leg.  Disoriented x2.  Impulsive and not really able to follow complex commands.    Objective:  Vital " signs:      Vitals:    10/31/23 0001 10/31/23 0315 10/31/23 0500 10/31/23 0728   BP: 152/64 153/66  161/71   BP Location: Left arm Left arm  Left arm   Patient Position: Lying Lying  Lying   Pulse: 60 65  60   Resp: 18 16  16   Temp: Comment: PT refused. RN notified 97.7 °F (36.5 °C)  97.8 °F (36.6 °C)   TempSrc:  Oral  Oral   SpO2: 96% 100%  100%   Weight:   73.7 kg (162 lb 7.7 oz)    Height:           Current Facility-Administered Medications:     acetaminophen (TYLENOL) tablet 650 mg, 650 mg, Oral, Q4H PRN, 650 mg at 10/30/23 2032 **OR** acetaminophen (TYLENOL) suppository 650 mg, 650 mg, Rectal, Q4H PRN, Lashon Brooks MD    aspirin tablet 325 mg, 325 mg, Oral, Daily, 325 mg at 10/31/23 0923 **OR** aspirin suppository 300 mg, 300 mg, Rectal, Daily, Lashon Brooks MD    atorvastatin (LIPITOR) tablet 80 mg, 80 mg, Oral, Nightly, Lashon Brooks MD, 80 mg at 10/30/23 2032    sennosides-docusate (PERICOLACE) 8.6-50 MG per tablet 2 tablet, 2 tablet, Oral, BID, 2 tablet at 10/31/23 0923 **AND** polyethylene glycol (MIRALAX) packet 17 g, 17 g, Oral, Daily PRN **AND** bisacodyl (DULCOLAX) EC tablet 5 mg, 5 mg, Oral, Daily PRN **AND** bisacodyl (DULCOLAX) suppository 10 mg, 10 mg, Rectal, Daily PRN, Lashon Brooks MD    Calcium Replacement - Follow Nurse / BPA Driven Protocol, , Does not apply, PRN, Lashon Brooks MD    carvedilol (COREG) tablet 6.25 mg, 6.25 mg, Oral, BID With Meals, Lashon Brooks MD, 6.25 mg at 10/31/23 0924    lisinopril (PRINIVIL,ZESTRIL) tablet 20 mg, 20 mg, Oral, Daily, Lashon Brooks MD, 20 mg at 10/31/23 0923    LORazepam (ATIVAN) injection 1 mg, 1 mg, Intravenous, Once in imaging, Moira Lindsay APRN    LORazepam (ATIVAN) injection 1 mg, 1 mg, Intravenous, Once PRN, Carolina Casillas APRN    Magnesium Standard Dose Replacement - Follow Nurse / BPA Driven Protocol, , Does not apply, PRN, Lashon Brooks MD    melatonin tablet 3  mg, 3 mg, Oral, Nightly PRN, Lashon Brooks MD, 3 mg at 10/30/23 2032    nicotine (NICODERM CQ) 21 MG/24HR patch 1 patch, 1 patch, Transdermal, Q24H, Jimenez Zarate MD, 1 patch at 10/30/23 0853    ondansetron (ZOFRAN) tablet 4 mg, 4 mg, Oral, Q6H PRN **OR** ondansetron (ZOFRAN) injection 4 mg, 4 mg, Intravenous, Q6H PRN, Lashon Brooks MD    Phosphorus Replacement - Follow Nurse / BPA Driven Protocol, , Does not apply, PRNCecilia Renate Andrea, MD    Potassium Replacement - Follow Nurse / BPA Driven Protocol, , Does not apply, PRCecilia BARRIOS Renate Andrea, MD    sodium chloride 0.9 % flush 10 mL, 10 mL, Intravenous, PRN, Niko Rivas II, MD    sodium chloride 0.9 % flush 10 mL, 10 mL, Intravenous, Q12H, Prince Roberts MD, 10 mL at 10/31/23 0926    sodium chloride 0.9 % flush 10 mL, 10 mL, Intravenous, PRN, Prince Roberts MD    sodium chloride 0.9 % infusion 40 mL, 40 mL, Intravenous, PRN, Prince Roberts MD    sodium chloride 0.9 % infusion, 75 mL/hr, Intravenous, Continuous, Lashon Brooks MD, Last Rate: 75 mL/hr at 10/31/23 0501, 75 mL/hr at 10/31/23 0501    PRN meds    acetaminophen **OR** acetaminophen    senna-docusate sodium **AND** polyethylene glycol **AND** bisacodyl **AND** bisacodyl    Calcium Replacement - Follow Nurse / BPA Driven Protocol    LORazepam    Magnesium Standard Dose Replacement - Follow Nurse / BPA Driven Protocol    melatonin    ondansetron **OR** ondansetron    Phosphorus Replacement - Follow Nurse / BPA Driven Protocol    Potassium Replacement - Follow Nurse / BPA Driven Protocol    sodium chloride    sodium chloride    sodium chloride    No current facility-administered medications on file prior to encounter.     Current Outpatient Medications on File Prior to Encounter   Medication Sig    aspirin 81 MG EC tablet Take 1 tablet by mouth Daily.    carvedilol (COREG) 6.25 MG tablet Take 1 tablet by mouth 2 (Two) Times a Day With Meals.    furosemide (LASIX) 40 MG  "tablet Take 1 tablet by mouth Daily.    lisinopril (PRINIVIL,ZESTRIL) 20 MG tablet Take 1 tablet by mouth Daily.       General appearance: Elderly male, NAD, alert, edentulous  HEENT: Normocephalic, atraumatic, right-sided facial tumor just below the right orbital  Resp: Even and unlabored  Extremities: Left hand edema, nonpitting    Neurological:   MS: Oriented to self only.  Receptive aphasia,   CN: + blink to threat reflex, visual fields full, EOMI, facial sensation equal, no facial droop, tongue midline, [place] bilaterally  Motor: 0/5 LUE, 4/5 LLE, 5/5 RUE/RLE  Sensory: light touch sensation intact in all 4 ext.  Coordination: Normal finger to nose test on the right, unable to perform on the left  Gait and station: Deferred  Rapid alternating movements: normal finger to thumb tap on the right, unable to perform on the left    Laboratory results:  Lab Results   Component Value Date    TSH 1.880 10/29/2023     Lab Results   Component Value Date    HGBA1C 5.20 10/29/2023     No results found for: \"[id number]\"  Lab Results   Component Value Date    CHOL 146 10/29/2023     Lab Results   Component Value Date    TRIG 72 10/29/2023     Lab Results   Component Value Date    HDL 34 (L) 10/29/2023     Lab Results   Component Value Date    LDL 98 10/29/2023     Lab Results   Component Value Date    WBC 9.46 10/30/2023    HGB 13.1 10/30/2023    HCT 37.9 10/30/2023    MCV 90.0 10/30/2023     10/30/2023     Lab Results   Component Value Date    GLUCOSE 96 10/31/2023    BUN 9 10/31/2023    CREATININE 1.07 10/31/2023    BCR 8.4 10/31/2023    K 3.7 10/31/2023    CO2 21.6 (L) 10/31/2023    CALCIUM 8.4 (L) 10/31/2023    ALBUMIN 3.1 (L) 10/29/2023    AST 13 10/29/2023    ALT 6 10/29/2023     Lab Results   Component Value Date    PTT 30.0 04/26/2023     Lab Results   Component Value Date    INR 1.02 10/28/2023    INR 0.97 04/26/2023    PROTIME 13.5 10/28/2023    PROTIME 11.3 04/26/2023     Brief Urine Lab Results       None      "       Review and interpretation of imaging:  CT Angiogram Neck    Result Date: 10/29/2023   There are small age-indeterminate infarcts noted within the right frontal and parietal lobes within the right MCA distribution. Additionally, a small age-indeterminate infarct is seen within the right occipital lobe that is either within the right MCA or right PCA distribution. Further temporal characterization with MR imaging is suggested.  There is a large chronic infarct within the lateral aspect left frontal, parietal, temporal, and occipital lobes within the left MCA distribution and chronic infarcts are noted within the left lentiform nucleus within the lateral lenticulostriate distribution.  There is an approximate 60-70% NASCET stenosis within the proximal portion of the right ICA and an approximate 70-80% NASCET stenosis within the proximal portion of the left ICA secondary to mixed calcified and noncalcified atherosclerotic plaques.  There is a severe degree of stenosis at the origin of the left vertebral artery and a moderate degree of stenosis is seen within the V3 segment of the left vertebral artery.  The remaining intracranial and extracranial atherosclerotic changes are as discussed in detail above.   Radiation dose reduction techniques were utilized, including automated exposure control and exposure modulation based on body size.  This report was finalized on 10/29/2023 7:14 PM by Dr. Jeff Harrison M.D on Workstation: [id number]      CT Angiogram Head    Result Date: 10/29/2023   There are small age-indeterminate infarcts noted within the right frontal and parietal lobes within the right MCA distribution. Additionally, a small age-indeterminate infarct is seen within the right occipital lobe that is either within the right MCA or right PCA distribution. Further temporal characterization with MR imaging is suggested.  There is a large chronic infarct within the lateral aspect left frontal, parietal, temporal,  and occipital lobes within the left MCA distribution and chronic infarcts are noted within the left lentiform nucleus within the lateral lenticulostriate distribution.  There is an approximate 60-70% NASCET stenosis within the proximal portion of the right ICA and an approximate 70-80% NASCET stenosis within the proximal portion of the left ICA secondary to mixed calcified and noncalcified atherosclerotic plaques.  There is a severe degree of stenosis at the origin of the left vertebral artery and a moderate degree of stenosis is seen within the V3 segment of the left vertebral artery.  The remaining intracranial and extracranial atherosclerotic changes are as discussed in detail above.   Radiation dose reduction techniques were utilized, including automated exposure control and exposure modulation based on body size.  This report was finalized on 10/29/2023 7:14 PM by Dr. Jeff Harrison M.D on Workstation: [email or web address] ([email or web address])      CT Head Without Contrast Stroke Protocol    Result Date: 10/28/2023   There is no evidence for acute traumatic intracranial pathology. However, there are small age-indeterminate infarcts noted within the right frontal and parietal lobes that could potentially be acute to subacute in nature. These are within the right MCA distribution. Additionally and similarly, there is an age-indeterminate infarct within the right occipital lobe that is either within the right MCA or right PCA distribution. Further temporal characterization is recommended with MR imaging.  There is a large chronic infarct within the lateral aspect of the left cerebral hemisphere that is within the left MCA distribution and this involves the lateral aspect of the left frontal, parietal, temporal, and occipital lobes.  There is a 3.3 x 1.9 cm soft tissue mass within the right premaxillary soft tissues that extends into the soft tissues along the right lateral aspect of the nose and this is compatible with a known basal cell  carcinoma.  Incidental note is made of a fracture of the right orbital floor that is likely chronic in nature although correlation with clinical data is suggested.  These findings and recommendations were discussed with Dr. Niko Rivas on 10/28/2023 at approximately 3:15 p.m.  Radiation dose reduction techniques were utilized, including automated exposure control and exposure modulation based on body size.  This report was finalized on 10/28/2023 4:02 PM by Dr. Jeff Harrison M.D on Workstation: [id number]      XR Chest 1 View    Result Date: 10/28/2023  Cardiac defibrillator in place. Otherwise negative x-rays of the chest and the left shoulder.  This report was finalized on 10/28/2023 3:07 PM by Dr. Jhonny Charles M.D on Workstation: [id number]      XR Shoulder 2+ View Left    Result Date: 10/28/2023  Cardiac defibrillator in place. Otherwise negative x-rays of the chest and the left shoulder.  This report was finalized on 10/28/2023 3:07 PM by Dr. Jhonny Charles M.D on Workstation: [id number]     Results for orders placed during the hospital encounter of 10/28/23    Adult transthoracic echo complete    Interpretation Summary    Left ventricular systolic function is severely decreased. Calculated left ventricular EF = 21.6% Left ventricular ejection fraction appears to be less than 20%.The following left ventricular wall segments are hypokinetic: mid anterolateral, basal inferolateral, mid inferolateral, mid inferior, basal inferoseptal, mid inferoseptal, mid anteroseptal, basal inferior and basal inferoseptal. The following left ventricular wall segments are akinetic: apical anterior, apical lateral, apical inferior, apical septal and apex.    Left ventricular wall thickness is consistent with mild concentric hypertrophy.    Left ventricular diastolic function is consistent with (grade II w/high LAP) pseudonormalization.    The left atrial cavity is mildly dilated.    Saline test results are negative for right to  left atrial level shunt.    Estimated right ventricular systolic pressure from tricuspid regurgitation is mildly elevated (35-45 mmHg).      Impression/Assessment:  This is a 73-year-old male with past medical history of tobacco abuse, alcohol dependency, traumatic brain injury, pacemaker placement, hypertension, reported dementia history who presented to the hospital on 10/28/2023 with complaints of left upper extremity weakness.  Reportedly per documentation from the ER physician symptoms started Thursday night after he sustained a fall.  His family member stated that she did not notice significant left arm weakness after the fall but that the patient was complaining of pain.  He has aspirin listed as a home med however unclear if he has been compliant with this daily.  His initial noncontrast CT head did not reveal any acute intracranial abnormalities, known area of large left MCA territory infarct vs TBI noted as well as age-indeterminate infarct within the right MCA and PCA territories.    BP on arrival 133/75, HR 74. AV paced rhythm.  Temp 98.2. . K+ 3.1. Cr. 1.28.     Diagnosis:  Left-sided hemiparesis, suspect subacute right MCA infarct  Dementia/history of TBI  Bilateral carotid stenosis   History of stroke  Tobacco abuse  Cardiomyopathy    Additional work up to date:  2D Echo: LA cavity mildly dilated, saline test negative, EF 21.6%, septal wall motion abnormality noted, no evidence of LV thrombus or mass, no AV stenosis, mild to moderate MVR.    Plan:  Per RN MRI scheduled for today at 1215  Lorazepam 1 mg available to administer if needed for behavior, I ordered an additional 1 mg IV as needed if he needs more.  For now continue ASA, I am going to decrease to 81 mg as the patient was not compliant with daily dosing at home  Lipitor 80 mg added this admission, LDL 98  Recommend follow-up with dermatology outpatient for facial basal cell carcinoma  Needs follow-up with vascular surgery outpatient for  bilateral carotid stenosis  Neurochecks per stroke protocol  Normalize BP  Stroke Education  OLMAN/SCDs  PT/OT/ST  Therapies as written. CCP for discharge planning. Call RRT for any acute neurological changes. We will continue to follow and advise.    Case discussed with patient, RN, and Dr. Pabon, and he agrees with plan above.       SUNG Munroe     Electronically signed by Carolina Casillas APRN at 10/31/23 1123       Jimenez Zarate MD at 10/30/23 1239              Name: Tigre Amaral ADMIT: 10/28/2023   : 1950  PCP: Provider, No Known    MRN: [id number] LOS: 2 days   AGE/SEX: 73 y.o. male  ROOM: [facility]     Subjective     No new changes overnight.    Objective   Objective   Vital Signs  Temp:  [97.3 °F (36.3 °C)-97.8 °F (36.6 °C)] 97.5 °F (36.4 °C)  Heart Rate:  [59-78] 59  Resp:  [16] 16  BP: (131-161)/() 143/66  SpO2:  [97 %-98 %] 97 %  on   ;   Device (Oxygen Therapy): room air  Body mass index is 28.43 kg/m².  Physical Exam  Constitutional:       General: He is not in acute distress.  Cardiovascular:      Rate and Rhythm: Normal rate and regular rhythm.      Comments: Pacemaker noted  Pulmonary:      Effort: Pulmonary effort is normal. No respiratory distress.   Abdominal:      General: There is no distension.      Palpations: Abdomen is soft.      Tenderness: There is no abdominal tenderness.   Skin:     General: Skin is warm and dry.   Neurological:      General: No focal deficit present.      Mental Status: He is alert and oriented to person, place, and time.      Comments: I do not appreciate significant left-sided weakness.  Motor strength of both the upper and lower extremities including right and left, seem about equal to me.         Results Review     I reviewed the patient's new clinical results.  Results from last 7 days   Lab Units 10/30/23  0522 10/29/23  0600 10/28/23  1449   WBC 10*3/mm3 9.46 8.49 11.54*   HEMOGLOBIN g/dL 13.1 13.1 14.7   PLATELETS 10*3/mm3 244 222 264  "    Results from last 7 days   Lab Units 10/30/23  0522 10/29/23  2015 10/29/23  0600 10/28/23  1449   SODIUM mmol/L 138  --  139 141   POTASSIUM mmol/L 3.2* 3.4* 2.4* 3.1*   CHLORIDE mmol/L 106  --  103 100   CO2 mmol/L 25.0  --  26.4 30.5*   BUN mg/dL 9  --  9 9   CREATININE mg/dL 1.11  --  1.28* 1.53*   GLUCOSE mg/dL 91  --  93 76   Estimated Creatinine Clearance: 61 mL/min (by C-G formula based on SCr of 1.11 mg/dL).  Results from last 7 days   Lab Units 10/29/23  0600 10/28/23  1449   ALBUMIN g/dL 3.1* 3.7   BILIRUBIN mg/dL 0.8 0.6   ALK PHOS U/L 71 89   AST (SGOT) U/L 13 14   ALT (SGPT) U/L 6 7     Results from last 7 days   Lab Units 10/30/23  0522 10/29/23  0600 10/28/23  1449   CALCIUM mg/dL 8.4* 8.3* 8.8   ALBUMIN g/dL  --  3.1* 3.7   MAGNESIUM mg/dL  --  1.8  --      No results found for: \"COVID19\"  Hemoglobin A1C   Date/Time Value Ref Range Status   10/29/2023 0600 5.20 4.80 - 5.60 % Final     Glucose   Date/Time Value Ref Range Status   10/29/2023 1635 144 (H) 70 - 130 mg/dL Final   10/29/2023 1128 102 70 - 130 mg/dL Final   10/29/2023 0555 94 70 - 130 mg/dL Final   10/29/2023 0045 92 70 - 130 mg/dL Final     No results found for this or any previous visit.      CT Angiogram Neck, CT Angiogram Head  Narrative: CT ANGIOGRAM OF THE HEAD AND NECK WITH CONTRAST     CLINICAL HISTORY: Left arm weakness. Follow-up stroke.     TECHNIQUE: CT angiogram of the head and neck was obtained with 1 mm  axial images following the administration of IV contrast. Sagittal,  coronal, and 3-dimensional reconstructed images were obtained.  Additionally, a CT scan of the head was obtained with 3 mm axial images  before and after the administration of IV contrast.     COMPARISON: CT head dated 10/28/2023.     FINDINGS:     CTA NECK: There is a bovine configuration of the aortic arch.  Atherosclerotic changes are identified within the aortic arch and great  vessel origins. There is mild-to-moderate degree of stenosis at " the  origin of the left common carotid artery and a mild degree of stenosis  is seen within the innominate artery. Mild-to-moderate degrees of  stenoses are appreciated within the origins of the right subclavian  artery and right common carotid artery. The right vertebral artery is  dominant. A mild-to-moderate degree of stenosis is appreciated at the  origin of the right vertebral artery. There is a 60-70% NASCET stenosis  within the proximal portion of the right ICA secondary to a calcified  and noncalcified atherosclerotic plaque. A mild-to-moderate degree of  stenosis is appreciated within the common carotid arteries bilaterally.  Within the proximal portion of the left ICA, there is an approximate  70-80% NASCET stenosis secondary to mixed calcified and noncalcified  atherosclerotic plaque. There is a severe degree of stenosis at the  origin of the left vertebral artery. A moderate degree of stenosis is  appreciated within the V3 segment of the left vertebral artery.     CTA HEAD: The right vertebral artery is dominant. The post PICA segment  of the left vertebral artery is very diminutive in size and there is  mild atherosclerotic irregularity involving the junction of the V3 and  V4 segments of the left vertebral artery. The basilar artery is  unremarkable. The posterior cerebral arteries are remarkable for  moderate degrees of stenoses within both P2 segments. There is  atherosclerotic change resulting in mild-to-moderate irregularity and  stenosis of the left cavernous ICA. There is up to a moderate degree of  stenosis involving the supraclinoid segment of the right ICA and a  mild-to-moderate degree of stenosis is appreciated within the cavernous  segment of the right ICA. The middle and anterior cerebral arteries are  otherwise unremarkable.     Again appreciated are age-indeterminate infarcts within the right  frontal and parietal lobe within the right MCA distribution. The largest  of these infarcts  measures up to approximately 1.1 cm in diameter on  this examination. Also, there is an age-indeterminate infarct within the  right occipital lobe that is either within the right MCA or right PCA  distribution. This infarct is approximately 1 cm in diameter. Again,  further temporal characterization with MR imaging is suggested. When  compared to the prior head CT performed yesterday, there is no  convincing interval change.     Again noted are extensive areas of chronic infarction within the lateral  aspect of the left frontal, parietal, temporal, and occipital lobes that  are within the left MCA distribution. Again, these foci of chronic  infarctions measure approximately 10.1 x 2.5 cm in greatest axial  dimensions. Additional smaller chronic infarcts are noted within the  left lentiform nucleus within lateral lenticulostriate distribution.     Incidental note is made of a soft tissue mass within the right  premaxillary soft tissues that extends into the soft tissues along the  lateral aspect of the nose measuring up to 3.3 x 1.9 cm in greatest  axial dimensions that is compatible with a known basal cell carcinoma.     Impression:    There are small age-indeterminate infarcts noted within the right  frontal and parietal lobes within the right MCA distribution.  Additionally, a small age-indeterminate infarct is seen within the right  occipital lobe that is either within the right MCA or right PCA  distribution. Further temporal characterization with MR imaging is  suggested.     There is a large chronic infarct within the lateral aspect left frontal,  parietal, temporal, and occipital lobes within the left MCA distribution  and chronic infarcts are noted within the left lentiform nucleus within  the lateral lenticulostriate distribution.     There is an approximate 60-70% NASCET stenosis within the proximal  portion of the right ICA and an approximate 70-80% NASCET stenosis  within the proximal portion of the left  ICA secondary to mixed calcified  and noncalcified atherosclerotic plaques.     There is a severe degree of stenosis at the origin of the left vertebral  artery and a moderate degree of stenosis is seen within the V3 segment  of the left vertebral artery.     The remaining intracranial and extracranial atherosclerotic changes are  as discussed in detail above.        Radiation dose reduction techniques were utilized, including automated  exposure control and exposure modulation based on body size.     This report was finalized on 10/29/2023 7:14 PM by Dr. Jeff Harrison M.D  on Workstation: [id number]       Scheduled Medications  aspirin, 325 mg, Oral, Daily   Or  aspirin, 300 mg, Rectal, Daily  atorvastatin, 80 mg, Oral, Nightly  carvedilol, 6.25 mg, Oral, BID With Meals  lisinopril, 20 mg, Oral, Daily  nicotine, 1 patch, Transdermal, Q24H  potassium chloride ER, 40 mEq, Oral, Q4H  senna-docusate sodium, 2 tablet, Oral, BID  sodium chloride, 10 mL, Intravenous, Q12H    Infusions  sodium chloride, 75 mL/hr, Last Rate: 75 mL/hr (10/30/23 7437)    Diet  Diet: Cardiac Diets; Healthy Heart (2-3 Na+); Texture: Soft to Chew (NDD 3); Soft to Chew: Chopped Meat; Fluid Consistency: Thin (IDDSI 0)      Assessment/Plan     Active Hospital Problems    Diagnosis  POA    **Stroke [I63.9]  Yes      Resolved Hospital Problems   No resolved problems to display.       73 y.o. male admitted with Stroke.      Left-sided hemiparesis  -CT head shows encephalomalacia of the left MCA region and right-sided parietal hypodensity.  -Stroke Work-up is pending.  -Aspirin, high-dose statin    Hypertension  -Continue lisinopril and Coreg    Hypokalemia  -continue replacement     SCDs for DVT prophylaxis.  Full code.  Discussed with patient and family.  Anticipate discharge home with home health vs SNF      Jimenez Zarate MD  [facility]  10/30/23  12:34 EDT            Electronically signed by Jimenez Zarate MD at 10/30/23 0001    "    Moira Lindsay, APRN at 10/30/23 1136          DOS: 10/30/2023  NAME: Tigre Amaral   : 1950  PCP: Provider, No Known  Chief Complaint   Patient presents with    Fall    Arm Injury     Stroke    Subjective: Patent's step daughter who helps take care of him was at the bedside. He continues to have left arm>leg weakness. Cognition is reportedly at baseline. Pt seen in follow up today, however the problem is new to the examiner.      Objective:  Vital signs: /66   Pulse 59   Temp 97.5 °F (36.4 °C) (Oral)   Resp 16   Ht 172.7 cm (68\")   Wt 84.8 kg (187 lb)   SpO2 97%   BMI 28.43 kg/m²       General appearance: Well developed, well nourished, alert and cooperative.   HEENT: Normocephalic.   Cardiac: Regular rate and rhythm.   Peripheral Vasculature: Radial pulses are equal and symmetric.  Chest Exam: Clear to auscultation bilaterally, no wheezes, no rhonchi.  Extremities: Normal, no edema.   Skin: bulbous lesion noted to the right of his nose.     Higher integrative function: Awake and alert to person and city but not to month or year or current president.  Impaired recent/remote memory, attention/concentration.  Difficulty naming and following some commands.  Cranial nerves: Visual impairment on the left with simultaneous stimuli, extraocular movements intact, PERRL.  Normal facial sensation, face symmetric.  Tongue midline.  No dysarthria.  Motor: Right arm and leg 5 out of 5, left arm 4- out of 5, left leg 4 out of 5.  No fasciculations, rigidity, spasticity or abnormal movements.   Sensation: Intact light touch in all extremities.  Station and gait: Deferred.  Coordination: No dysmetria with finger-to-nose.    Scheduled Meds:aspirin, 325 mg, Oral, Daily   Or  aspirin, 300 mg, Rectal, Daily  atorvastatin, 80 mg, Oral, Nightly  carvedilol, 6.25 mg, Oral, BID With Meals  lisinopril, 20 mg, Oral, Daily  nicotine, 1 patch, Transdermal, Q24H  potassium chloride ER, 40 mEq, Oral, " Q4H  senna-docusate sodium, 2 tablet, Oral, BID  sodium chloride, 10 mL, Intravenous, Q12H      Continuous Infusions:sodium chloride, 75 mL/hr, Last Rate: 75 mL/hr (10/30/23 0854)      PRN Meds:.  acetaminophen **OR** acetaminophen    senna-docusate sodium **AND** polyethylene glycol **AND** bisacodyl **AND** bisacodyl    Calcium Replacement - Follow Nurse / BPA Driven Protocol    Magnesium Standard Dose Replacement - Follow Nurse / BPA Driven Protocol    melatonin    ondansetron **OR** ondansetron    Phosphorus Replacement - Follow Nurse / BPA Driven Protocol    Potassium Replacement - Follow Nurse / BPA Driven Protocol    sodium chloride    sodium chloride    sodium chloride    Laboratory results:  Lab Results   Component Value Date    GLUCOSE 91 10/30/2023    CALCIUM 8.4 (L) 10/30/2023     10/30/2023    K 3.2 (L) 10/30/2023    CO2 25.0 10/30/2023     10/30/2023    BUN 9 10/30/2023    CREATININE 1.11 10/30/2023    BCR 8.1 10/30/2023    ANIONGAP 7.0 10/30/2023     Lab Results   Component Value Date    WBC 9.46 10/30/2023    HGB 13.1 10/30/2023    HCT 37.9 10/30/2023    MCV 90.0 10/30/2023     10/30/2023     Lab Results   Component Value Date    CHOL 146 10/29/2023     Lab Results   Component Value Date    HDL 34 (L) 10/29/2023     Lab Results   Component Value Date    LDL 98 10/29/2023     Lab Results   Component Value Date    TRIG 72 10/29/2023         Lab 10/29/23  0600   HEMOGLOBIN A1C 5.20      Review and interpretation of imaging:  CT Angiogram Neck    Result Date: 10/29/2023  CT ANGIOGRAM OF THE HEAD AND NECK WITH CONTRAST  CLINICAL HISTORY: Left arm weakness. Follow-up stroke.  TECHNIQUE: CT angiogram of the head and neck was obtained with 1 mm axial images following the administration of IV contrast. Sagittal, coronal, and 3-dimensional reconstructed images were obtained. Additionally, a CT scan of the head was obtained with 3 mm axial images before and after the administration of IV  contrast.  COMPARISON: CT head dated 10/28/2023.  FINDINGS:  CTA NECK: There is a bovine configuration of the aortic arch. Atherosclerotic changes are identified within the aortic arch and great vessel origins. There is mild-to-moderate degree of stenosis at the origin of the left common carotid artery and a mild degree of stenosis is seen within the innominate artery. Mild-to-moderate degrees of stenoses are appreciated within the origins of the right subclavian artery and right common carotid artery. The right vertebral artery is dominant. A mild-to-moderate degree of stenosis is appreciated at the origin of the right vertebral artery. There is a 60-70% NASCET stenosis within the proximal portion of the right ICA secondary to a calcified and noncalcified atherosclerotic plaque. A mild-to-moderate degree of stenosis is appreciated within the common carotid arteries bilaterally. Within the proximal portion of the left ICA, there is an approximate 70-80% NASCET stenosis secondary to mixed calcified and noncalcified atherosclerotic plaque. There is a severe degree of stenosis at the origin of the left vertebral artery. A moderate degree of stenosis is appreciated within the V3 segment of the left vertebral artery.  CTA HEAD: The right vertebral artery is dominant. The post PICA segment of the left vertebral artery is very diminutive in size and there is mild atherosclerotic irregularity involving the junction of the V3 and V4 segments of the left vertebral artery. The basilar artery is unremarkable. The posterior cerebral arteries are remarkable for moderate degrees of stenoses within both P2 segments. There is atherosclerotic change resulting in mild-to-moderate irregularity and stenosis of the left cavernous ICA. There is up to a moderate degree of stenosis involving the supraclinoid segment of the right ICA and a mild-to-moderate degree of stenosis is appreciated within the cavernous segment of the right ICA. The  middle and anterior cerebral arteries are otherwise unremarkable.  Again appreciated are age-indeterminate infarcts within the right frontal and parietal lobe within the right MCA distribution. The largest of these infarcts measures up to approximately 1.1 cm in diameter on this examination. Also, there is an age-indeterminate infarct within the right occipital lobe that is either within the right MCA or right PCA distribution. This infarct is approximately 1 cm in diameter. Again, further temporal characterization with MR imaging is suggested. When compared to the prior head CT performed yesterday, there is no convincing interval change.  Again noted are extensive areas of chronic infarction within the lateral aspect of the left frontal, parietal, temporal, and occipital lobes that are within the left MCA distribution. Again, these foci of chronic infarctions measure approximately 10.1 x 2.5 cm in greatest axial dimensions. Additional smaller chronic infarcts are noted within the left lentiform nucleus within lateral lenticulostriate distribution.  Incidental note is made of a soft tissue mass within the right premaxillary soft tissues that extends into the soft tissues along the lateral aspect of the nose measuring up to 3.3 x 1.9 cm in greatest axial dimensions that is compatible with a known basal cell carcinoma.       There are small age-indeterminate infarcts noted within the right frontal and parietal lobes within the right MCA distribution. Additionally, a small age-indeterminate infarct is seen within the right occipital lobe that is either within the right MCA or right PCA distribution. Further temporal characterization with MR imaging is suggested.  There is a large chronic infarct within the lateral aspect left frontal, parietal, temporal, and occipital lobes within the left MCA distribution and chronic infarcts are noted within the left lentiform nucleus within the lateral lenticulostriate distribution.   There is an approximate 60-70% NASCET stenosis within the proximal portion of the right ICA and an approximate 70-80% NASCET stenosis within the proximal portion of the left ICA secondary to mixed calcified and noncalcified atherosclerotic plaques.  There is a severe degree of stenosis at the origin of the left vertebral artery and a moderate degree of stenosis is seen within the V3 segment of the left vertebral artery.  The remaining intracranial and extracranial atherosclerotic changes are as discussed in detail above.   Radiation dose reduction techniques were utilized, including automated exposure control and exposure modulation based on body size.  This report was finalized on 10/29/2023 7:14 PM by Dr. Jeff Harrison M.D on Workstation: [id number]      CT Angiogram Head    Result Date: 10/29/2023  CT ANGIOGRAM OF THE HEAD AND NECK WITH CONTRAST  CLINICAL HISTORY: Left arm weakness. Follow-up stroke.  TECHNIQUE: CT angiogram of the head and neck was obtained with 1 mm axial images following the administration of IV contrast. Sagittal, coronal, and 3-dimensional reconstructed images were obtained. Additionally, a CT scan of the head was obtained with 3 mm axial images before and after the administration of IV contrast.  COMPARISON: CT head dated 10/28/2023.  FINDINGS:  CTA NECK: There is a bovine configuration of the aortic arch. Atherosclerotic changes are identified within the aortic arch and great vessel origins. There is mild-to-moderate degree of stenosis at the origin of the left common carotid artery and a mild degree of stenosis is seen within the innominate artery. Mild-to-moderate degrees of stenoses are appreciated within the origins of the right subclavian artery and right common carotid artery. The right vertebral artery is dominant. A mild-to-moderate degree of stenosis is appreciated at the origin of the right vertebral artery. There is a 60-70% NASCET stenosis within the proximal portion of the  right ICA secondary to a calcified and noncalcified atherosclerotic plaque. A mild-to-moderate degree of stenosis is appreciated within the common carotid arteries bilaterally. Within the proximal portion of the left ICA, there is an approximate 70-80% NASCET stenosis secondary to mixed calcified and noncalcified atherosclerotic plaque. There is a severe degree of stenosis at the origin of the left vertebral artery. A moderate degree of stenosis is appreciated within the V3 segment of the left vertebral artery.  CTA HEAD: The right vertebral artery is dominant. The post PICA segment of the left vertebral artery is very diminutive in size and there is mild atherosclerotic irregularity involving the junction of the V3 and V4 segments of the left vertebral artery. The basilar artery is unremarkable. The posterior cerebral arteries are remarkable for moderate degrees of stenoses within both P2 segments. There is atherosclerotic change resulting in mild-to-moderate irregularity and stenosis of the left cavernous ICA. There is up to a moderate degree of stenosis involving the supraclinoid segment of the right ICA and a mild-to-moderate degree of stenosis is appreciated within the cavernous segment of the right ICA. The middle and anterior cerebral arteries are otherwise unremarkable.  Again appreciated are age-indeterminate infarcts within the right frontal and parietal lobe within the right MCA distribution. The largest of these infarcts measures up to approximately 1.1 cm in diameter on this examination. Also, there is an age-indeterminate infarct within the right occipital lobe that is either within the right MCA or right PCA distribution. This infarct is approximately 1 cm in diameter. Again, further temporal characterization with MR imaging is suggested. When compared to the prior head CT performed yesterday, there is no convincing interval change.  Again noted are extensive areas of chronic infarction within the  lateral aspect of the left frontal, parietal, temporal, and occipital lobes that are within the left MCA distribution. Again, these foci of chronic infarctions measure approximately 10.1 x 2.5 cm in greatest axial dimensions. Additional smaller chronic infarcts are noted within the left lentiform nucleus within lateral lenticulostriate distribution.  Incidental note is made of a soft tissue mass within the right premaxillary soft tissues that extends into the soft tissues along the lateral aspect of the nose measuring up to 3.3 x 1.9 cm in greatest axial dimensions that is compatible with a known basal cell carcinoma.       There are small age-indeterminate infarcts noted within the right frontal and parietal lobes within the right MCA distribution. Additionally, a small age-indeterminate infarct is seen within the right occipital lobe that is either within the right MCA or right PCA distribution. Further temporal characterization with MR imaging is suggested.  There is a large chronic infarct within the lateral aspect left frontal, parietal, temporal, and occipital lobes within the left MCA distribution and chronic infarcts are noted within the left lentiform nucleus within the lateral lenticulostriate distribution.  There is an approximate 60-70% NASCET stenosis within the proximal portion of the right ICA and an approximate 70-80% NASCET stenosis within the proximal portion of the left ICA secondary to mixed calcified and noncalcified atherosclerotic plaques.  There is a severe degree of stenosis at the origin of the left vertebral artery and a moderate degree of stenosis is seen within the V3 segment of the left vertebral artery.  The remaining intracranial and extracranial atherosclerotic changes are as discussed in detail above.   Radiation dose reduction techniques were utilized, including automated exposure control and exposure modulation based on body size.  This report was finalized on 10/29/2023 7:14 PM  by Dr. Jeff Harrison M.D on Workstation: [id number]      CT Head Without Contrast Stroke Protocol    Result Date: 10/28/2023  CT HEAD WITHOUT CONTRAST  CLINICAL HISTORY: Left arm weakness. Status post fall.  TECHNIQUE: CT scan of the head was obtained with 3 mm axial soft tissue and 2 mm axial bone algorithm algorithm images. No intravenous contrast was administered. Sagittal and coronal reconstructions were obtained.  COMPARISON: No previous similar studies are available for comparison.  FINDINGS:   There is no evidence for a calvarial fracture. There is no evidence for an acute extra-axial hemorrhage.  The ventricles, sulci, and cisterns are age-appropriate. There are extensive areas of chronic infarction identified within the lateral aspect of the left frontal, parietal, temporal, and occipital lobes within the left MCA distribution. These foci of encephalomalacia measure up to approximately 10.1 x 2.5 cm in greatest axial dimensions. Small chronic infarcts within the left lentiform nucleus are within the lateral lenticulostriate distribution. Also, there is a focus of age-indeterminate infarction within the superior aspect of the right parietal lobe within the right MCA distribution measuring up to approximately 5 mm in diameter. Another age-indeterminate infarct is seen within the right MCA distribution involving the right precentral gyrus measuring up to 9 mm in diameter. Also, there is an age-indeterminate infarct within the right occipital lobe that measures up to 1 cm in diameter that is either within the right MCA or right PCA distribution. Further temporal characterization with MR imaging is suggested.  There is a soft tissue mass within the right premaxillary soft tissues that extends into the soft tissues along the right lateral aspect of the nose and this measures up to 3.3 x 1.9 cm in greatest axial dimensions and is compatible with a known basal cell carcinoma.  Incidental note is made of a fracture of  the right orbital floor that is likely chronic in nature although correlation with clinical data is suggested       There is no evidence for acute traumatic intracranial pathology. However, there are small age-indeterminate infarcts noted within the right frontal and parietal lobes that could potentially be acute to subacute in nature. These are within the right MCA distribution. Additionally and similarly, there is an age-indeterminate infarct within the right occipital lobe that is either within the right MCA or right PCA distribution. Further temporal characterization is recommended with MR imaging.  There is a large chronic infarct within the lateral aspect of the left cerebral hemisphere that is within the left MCA distribution and this involves the lateral aspect of the left frontal, parietal, temporal, and occipital lobes.  There is a 3.3 x 1.9 cm soft tissue mass within the right premaxillary soft tissues that extends into the soft tissues along the right lateral aspect of the nose and this is compatible with a known basal cell carcinoma.  Incidental note is made of a fracture of the right orbital floor that is likely chronic in nature although correlation with clinical data is suggested.  These findings and recommendations were discussed with Dr. Niko Rivas on 10/28/2023 at approximately 3:15 p.m.  Radiation dose reduction techniques were utilized, including automated exposure control and exposure modulation based on body size.  This report was finalized on 10/28/2023 4:02 PM by Dr. Jeff Harrison M.D on Workstation: [id number]      XR Chest 1 View    Result Date: 10/28/2023  PORTABLE CHEST X-RAY AND LEFT SHOULDER X-RAY  HISTORY: Fall, arm pain. Stroke  Portable chest x-ray is provided. A total of 3 images of the left shoulder also provided. Correlation: None.  FINDINGS: The cardiomediastinal silhouette is normal. The lungs are clear. The costophrenic sulci are dry and the bones appear normal. There is no  pneumothorax. Dual-lead cardiac pacer/defibrillator.  No osseous, articular, or soft tissue abnormality identified at the left shoulder.      Cardiac defibrillator in place. Otherwise negative x-rays of the chest and the left shoulder.  This report was finalized on 10/28/2023 3:07 PM by Dr. Jhonny Charles M.D on Workstation: [id number]      XR Shoulder 2+ View Left    Result Date: 10/28/2023  PORTABLE CHEST X-RAY AND LEFT SHOULDER X-RAY  HISTORY: Fall, arm pain. Stroke  Portable chest x-ray is provided. A total of 3 images of the left shoulder also provided. Correlation: None.  FINDINGS: The cardiomediastinal silhouette is normal. The lungs are clear. The costophrenic sulci are dry and the bones appear normal. There is no pneumothorax. Dual-lead cardiac pacer/defibrillator.  No osseous, articular, or soft tissue abnormality identified at the left shoulder.      Cardiac defibrillator in place. Otherwise negative x-rays of the chest and the left shoulder.  This report was finalized on 10/28/2023 3:07 PM by Dr. Jhonny Charles M.D on Workstation: [id number]      Impression:  73-year-old male, current smoker, with history of hypertension, remote alcohol dependence, TBI and dementia, and presence of ICD, only followed by cardiology as outpatient, who presented 10/28 with left-sided weakness following a fall several days prior.  Initial CT head showed small age-indeterminate infarcts in the right frontal and parietal lobes which could be acute to subacute strokes in the right MCA distribution, as well as age-indeterminate infarct in the right occipital lobe in the right MCA/PCA distribution and large chronic appearing infarct in the left MCA distribution involving the frontal, parietal, temporal, and occipital lobes.  Right premaxillary soft tissue mass also noted, compatible with known basal cell carcinoma.  Right orbital floor fracture also noted, likely chronic.  He was on aspirin 81 mg daily prior to admission.  His  wife was his caregiver until she  in February and now his stepdaughter and grandchildren are helping take care of him and manage his medications.    Additional work-up:  ECG: AV paced rhythm  CTA head/neck: Right ICA with 60 to 70% stenosis, left ICA with 70 to 80% stenosis, severe stenosis of the origin of the left vertebral artery and moderate stenosis of the V3 segment of the left vertebral artery.  Labs: TSH 1.88,LDL 98, hemoglobin A1c 5.20%    Diagnosis:  Left-sided weakness, suspect subacute right MCA infarcts  Dementia/history of TBI  Bilateral carotid stenosis  Tobacco abuse      Plan:  Follow-up MRI and 2D echo, will make further recommendations on CTA findings following MRI brain. Needs cardiology clearance for ICD. Will add ativan 1 mg IV to be given prior to MRI  On aspirin 325 mg daily  Lipitor 80 mg daily added  On nicotine patch, recommend tobacco cessation  Recommend follow-up with dermatology outpatient for basal cell carcinoma  Neurochecks  BP control  Stroke Education  OLMAN/SCDs  PT/OT/ST  Discussed with Dr. Pabon today, will follow    Electronically signed by Moira Lindsay APRN at 10/30/23 1344         Magdiel Murray MD at 23 0745        Consult Orders    1. Inpatient Cardiology Consult [[id number]] ordered by Ahbinav Barnes MD at 23                            CONSULT NOTE    Patient Identification:  Tigre Amaral  73 y.o.  male  1950  [id number]            Requesting physician: Dr. Abhinav Barnes    Reason for Consultation:  resp distress icu mgmt    CC: resp distress    History of Present Illness:  Patient is a 73-year-old admitted to the hospital for a stroke with left-sided hemiparesis who had chest pain yesterday with EKG showing no ischemic changes and cardiology consulted.  This morning patient was found to be in respiratory distress with increased work of breathing hypertensive and rapid response was called.  We have been consulted to help  "manage respiratory distress and management of patient in the unit.  At present time the patient is tachypneic.    Echocardiogram yesterday showed  an EF of 20%.Reviewing care everywhere the patient has a history of cardiomyopathy with a defibrillator in place follows with [facility] cardiology.  Has a history of hypertension hyperlipidemia as well on aspirin Coreg Lasix and lisinopril seen Dr. Kenyon reviewed last office note.    Patient is awake alert difficulty would be hard to obtain it so it appears the patient tries to give answers in a humorous manner.  From what I can tell he has not thrown up he has no chest pain he is awake saturating well on nasal cannula.  He is in slight respiratory distress.  He denies active chest pain.    He is not cooperative with nursing or interview.      Review of Systems:  Unable to obtain secondary to respiratory distress  Previous medical history:   Hypertension  NICM s/p icd   HF systolic ef 20      Previous surgical history unable to obtain secondary to respiratory distress     Medications Prior to Admission   Medication Sig Dispense Refill Last Dose    aspirin 81 MG EC tablet Take 1 tablet by mouth Daily.   10/28/2023    carvedilol (COREG) 6.25 MG tablet Take 1 tablet by mouth 2 (Two) Times a Day With Meals.   10/28/2023    furosemide (LASIX) 40 MG tablet Take 1 tablet by mouth Daily.   10/28/2023    lisinopril (PRINIVIL,ZESTRIL) 20 MG tablet Take 1 tablet by mouth Daily.   10/28/2023       No Known Allergies  Social history unable to obtain at present time reported to be single living [facility]  Per chart review from outpatient cardiology smoker.  No alcohol or drugs per outpatient records    Family history noncontributory    Physical Exam:  BP (!) 173/113   Pulse (!) 121   Temp 97.6 °F (36.4 °C) (Oral)   Resp 18   Ht 172.7 cm (68\")   Wt 75.2 kg (165 lb 12.6 oz)   SpO2 95%   BMI 25.21 kg/m²   Body mass index is 25.21 kg/m².   General appearance: " Awake alert, conversant   Eyes: Anicteric sclerae, moist conjunctivae; no lid lag;   HENT: Atraumatic; oropharynx clear with moist mucous membranes and no mucosal ulcerations; large 4 to 5 cm skin abnormality partially obstructing right  Neck: Trachea midline; positive JVD  Lungs: Bilateral air entry, crackles posterior with mildly increased respiratory effort and positive intercostal retractions  CV: Tachycardic and nonrigid  Abdomen: thin non rigid  Extremities:   Skin:warm dry  Psych: calm affect, alert       LABS:  High-sensitivity troponin 425  Creatinine 1.14  Glucose 108  Bicarb 18.1  AST 51  Procalcitonin 0.06    WBC 12.8  Hemoglobin 13.5  Platelets 270    Lactate 1.8    Imaging: I personally visualized the images of scans/x-rays performed within last 3 days.    Cxr - right middle lobe infiltrate, +vascular congestion +ICD     Left ventricular systolic function is severely decreased. Calculated left ventricular EF = 21.6% Left ventricular ejection fraction appears to be less than 20%.The following left ventricular wall segments are hypokinetic: mid anterolateral, basal inferolateral, mid inferolateral, mid inferior, basal inferoseptal, mid inferoseptal, mid anteroseptal, basal inferior and basal inferoseptal. The following left ventricular wall segments are akinetic: apical anterior, apical lateral, apical inferior, apical septal and apex.    Left ventricular wall thickness is consistent with mild concentric hypertrophy.    Left ventricular diastolic function is consistent with (grade II w/high LAP) pseudonormalization.    The left atrial cavity is mildly dilated.    Saline test results are negative for right to left atrial level shunt.    Estimated right ventricular systolic pressure from tricuspid regurgitation is mildly elevated (35-45 mmHg).      Assessment and plan  Acute on chronic decompensated systolic heart failure with EF 20%  Cardiomyopathy per outpatient records no mention of ischemia or coronary  artery disease  Status post ICD.  Hypertension  Hyperlipidemia  Aspiration pneumonia  Stroke  Respiratory distress  Acute pulmonary edema from heart failure    Transthoracic echocardiogram shows an EF of 20% patient is very hypertensive in respiratory distress chest x-ray shows likely aspiration pneumonia in the right middle lobe as well as pulmonary edema.    Give Lasix 80 mg stat     Bp control - improving    Obtain ABG.    Start Zosyn for aspiration pneumonia obtain blood cultures    Titrated oxygen down    Cardiology has been consulted await recommendations-seems to follow with [facility] cardiology - note amiodarone nitro orders    Seen stat brought down to ICU by rapid response team.    Cxr reviewed urgently.    Total critical care time was 50 minutes, excluding any separately billable procedure time.  Time did not overlap with any other provider.        Magdiel Murray MD  [facility]  11/02/23  07:59EDT      Electronically signed by Magdiel Murray MD at 11/02/23 1503       Mika Kim MD at 11/02/23 1106          Tigre Amaral   73 y.o.  male    LOS: 5 days   Patient Care Team:  Provider, No Known as PCP - General      Subjective     Chief Complaint:    History of Present Illness:   73-year-old white female patient who been followed by my colleague  has history of AICD for primary prevention of sudden death.  He has history of ventricular tachycardia.  Patient has dementia and history is obtained mostly from review of charts.  He had an episode of chest pain this morning.  Patient has history of left arm weakness a few days ago.  He also lost his balance.  CT of the head showed old and subacute infarcts.  He has a soft tissue mass on the right premaxillary area consistent with his previously diagnosed basal cell carcinoma.  Echocardiogram showed ejection fraction 21.6%.  EKG is paced rhythm.        History reviewed. No pertinent past medical  history.  History reviewed. No pertinent surgical history.  Medications Prior to Admission   Medication Sig Dispense Refill Last Dose    aspirin 81 MG EC tablet Take 1 tablet by mouth Daily.   10/28/2023    carvedilol (COREG) 6.25 MG tablet Take 1 tablet by mouth 2 (Two) Times a Day With Meals.   10/28/2023    furosemide (LASIX) 40 MG tablet Take 1 tablet by mouth Daily.   10/28/2023    lisinopril (PRINIVIL,ZESTRIL) 20 MG tablet Take 1 tablet by mouth Daily.   10/28/2023       History reviewed. No pertinent family history.  Social History     Socioeconomic History    Marital status: Single     Objective       Review of Systems:   Constitutional: Negative for diaphoresis, fatigue, fever and unexpected weight change.   HENT: Negative.    Eyes: Negative.    Respiratory: Negative for cough, shortness of breath and wheezing.    Cardiovascular: Negative for chest pain, palpitations and leg swelling.   Gastrointestinal: Negative for abdominal pain, blood in stool, constipation, diarrhea, nausea and vomiting.   Endocrine: Negative.    Genitourinary: Negative for difficulty urinating, dysuria and frequency.   Musculoskeletal: Negative.    Skin: Negative.    Allergic/Immunologic: Negative for environmental allergies and food allergies.   Neurological: Negative.    Hematological: Negative.    Psychiatric/Behavioral: Negative.        Current Facility-Administered Medications:     acetaminophen (TYLENOL) tablet 650 mg, 650 mg, Oral, Q4H PRN, 650 mg at 11/01/23 2134 **OR** [DISCONTINUED] acetaminophen (TYLENOL) suppository 650 mg, 650 mg, Rectal, Q4H PRN, Lashon Brooks MD    amiodarone 150 mg in 100 mL D5W (loading dose), 150 mg, Intravenous, Once **FOLLOWED BY** amiodarone 360 mg in 200 mL D5W infusion, 1 mg/min, Intravenous, Continuous **FOLLOWED BY** amiodarone 360 mg in 200 mL D5W infusion, 0.5 mg/min, Intravenous, Continuous, Magdiel Murray MD    aspirin chewable tablet 81 mg, 81 mg, Oral, Daily, Sonja  SUNG Manzo, 81 mg at 11/02/23 0902    atorvastatin (LIPITOR) tablet 80 mg, 80 mg, Oral, Nightly, Lashon Brooks MD, 80 mg at 11/01/23 2037    sennosides-docusate (PERICOLACE) 8.6-50 MG per tablet 2 tablet, 2 tablet, Oral, BID, 2 tablet at 11/01/23 0933 **AND** polyethylene glycol (MIRALAX) packet 17 g, 17 g, Oral, Daily PRN **AND** bisacodyl (DULCOLAX) EC tablet 5 mg, 5 mg, Oral, Daily PRN **AND** bisacodyl (DULCOLAX) suppository 10 mg, 10 mg, Rectal, Daily PRN, Lashon Brooks MD    carvedilol (COREG) tablet 6.25 mg, 6.25 mg, Oral, BID With Meals, Lashon Brooks MD, 6.25 mg at 11/02/23 0902    Enoxaparin Sodium (LOVENOX) syringe 40 mg, 40 mg, Subcutaneous, Q24H, Abhinav Barnes MD, 40 mg at 11/01/23 2301    lisinopril (PRINIVIL,ZESTRIL) tablet 20 mg, 20 mg, Oral, Daily, Lashon Brooks MD, 20 mg at 11/02/23 0902    LORazepam (ATIVAN) injection 1 mg, 1 mg, Intravenous, Once in imaging, Moira Lindsay, APRN    Magnesium Standard Dose Replacement - Follow Nurse / BPA Driven Protocol, , Does not apply, PRN, Lashon Brooks MD    melatonin tablet 3 mg, 3 mg, Oral, Nightly PRN, Lashon Brooks MD, 3 mg at 10/30/23 2032    nicotine (NICODERM CQ) 21 MG/24HR patch 1 patch, 1 patch, Transdermal, Q24H, Jimenez Zarate MD, 1 patch at 11/01/23 0935    nitroglycerin (NITROSTAT) SL tablet 0.4 mg, 0.4 mg, Sublingual, Q5 Min PRN, Magdiel Murray MD    ondansetron (ZOFRAN) tablet 4 mg, 4 mg, Oral, Q6H PRN **OR** ondansetron (ZOFRAN) injection 4 mg, 4 mg, Intravenous, Q6H PRN, Lashon Brooks MD, 4 mg at 11/01/23 5048    Phosphorus Replacement - Follow Nurse / BPA Driven Protocol, , Does not apply, Cecilia BARRON Renate Andrea, MD    piperacillin-tazobactam (ZOSYN) 3.375 g in iso-osmotic dextrose 50 ml (premix), 3.375 g, Intravenous, Q8H, Magdiel Murray MD    Potassium Replacement - Follow Nurse / BPA Driven Protocol, , Does not apply, PRN, Stingl, Lashon Rangel,  MD    sodium chloride 0.9 % flush 10 mL, 10 mL, Intravenous, Q12H, Prince Roberts MD, 10 mL at 11/02/23 0850    sodium chloride 0.9 % flush 10 mL, 10 mL, Intravenous, PRN, Prince Roberts MD    sodium chloride 0.9 % infusion 40 mL, 40 mL, Intravenous, PRN, Prince Roberts MD      Physical Exam:   Vital Sign Min/Max for last 24 hours  Temp  Min: 97.1 °F (36.2 °C)  Max: 99.1 °F (37.3 °C)   BP  Min: 143/76  Max: 180/93    Pulse  Min: 79  Max: 130     Wt Readings from Last 3 Encounters:   11/02/23 75.2 kg (165 lb 12.6 oz)       General Appearance:  Awake,  Alert, cooperative, in no acute distress   Head:  Normocephalic, without obvious abnormality, atraumatic   Eyes:          Conjunctivae normal, anicteric, eom intact    Neck: No adenopathy, supple, trachea midline, no thyromegaly, no   carotid bruit, no JVD, no elevated cvp   Lungs:   Clear to auscultation,respirations regular, even and                  unlabored    Heart:  Regular rhythm and normal rate, normal S1 and S2,            No murmur, no gallop, no rub, no click    Chest Wall:  No abnormalities observed   Abdomen:   Normal bowel sounds, no masses, soft nontender, nondistended                    Rectal:   Deferred   Extremities: No edema. Moves all extremities well, no cyanosis, no erythema   Pulses: Pulses palpable and equal bilaterally   Skin: No bleeding, bruising or rash   Neurologic: Speech clear and appropriate, no facial drooping     :       MONITOR:    Results Review:     Sodium Sodium   Date Value Ref Range Status   11/02/2023 136 136 - 145 mmol/L Final   11/01/2023 137 136 - 145 mmol/L Final   10/31/2023 138 136 - 145 mmol/L Final      Potassium Potassium   Date Value Ref Range Status   11/02/2023 3.9 3.5 - 5.2 mmol/L Final   11/02/2023 3.9 3.5 - 5.2 mmol/L Final   11/01/2023 3.4 (L) 3.5 - 5.2 mmol/L Final     Comment:     Slight hemolysis detected by analyzer. Results may be affected.   10/31/2023 3.8 3.5 - 5.2 mmol/L Final   10/31/2023 3.7 3.5 - 5.2  "mmol/L Final   10/30/2023 3.2 (L) 3.5 - 5.2 mmol/L Final      Chloride Chloride   Date Value Ref Range Status   11/02/2023 109 (H) 98 - 107 mmol/L Final   11/01/2023 106 98 - 107 mmol/L Final   10/31/2023 107 98 - 107 mmol/L Final      Bicarbonate No results found for: \"PLASMABICARB\"   BUN BUN   Date Value Ref Range Status   11/02/2023 11 8 - 23 mg/dL Final   11/01/2023 11 8 - 23 mg/dL Final   10/31/2023 9 8 - 23 mg/dL Final      Creatinine Creatinine   Date Value Ref Range Status   11/02/2023 1.14 0.76 - 1.27 mg/dL Final   11/01/2023 1.21 0.76 - 1.27 mg/dL Final   10/31/2023 1.07 0.76 - 1.27 mg/dL Final      Calcium Calcium   Date Value Ref Range Status   11/02/2023 8.5 (L) 8.6 - 10.5 mg/dL Final   11/01/2023 8.9 8.6 - 10.5 mg/dL Final   10/31/2023 8.4 (L) 8.6 - 10.5 mg/dL Final      Magnesium No results found for: \"MG\"     Results from last 7 days   Lab Units 11/02/23  0612   WBC 10*3/mm3 12.87*   HEMOGLOBIN g/dL 13.5   HEMATOCRIT % 39.5   PLATELETS 10*3/mm3 270     Lab Results   Lab Value Date/Time    TROPONINT 425 (C) 11/02/2023 0735    TROPONINT 451 (C) 11/02/2023 0612    TROPONINT 354 (C) 11/01/2023 2031    TROPONINT 339 (C) 11/01/2023 1846     Lab Results   Component Value Date    CHOL 146 10/29/2023     Lab Results   Component Value Date    HDL 34 (L) 10/29/2023     Lab Results   Component Value Date    LDL 98 10/29/2023     Lab Results   Component Value Date    TRIG 72 10/29/2023     No components found for: \"CHOLHDL\"  No results found for: \"PTT\"  No components found for: \"PT/INR\"  Lab Results   Component Value Date    HGBA1C 5.20 10/29/2023      Lab Results   Component Value Date    TSH 1.880 10/29/2023        Echo EF Estimated  )No results found for: \"ECHOEFEST\"        Left ventricular systolic function is severely decreased. Calculated left ventricular EF = 21.6% Left ventricular ejection fraction appears to be less than 20%.The following left ventricular wall segments are hypokinetic: mid anterolateral, " basal inferolateral, mid inferolateral, mid inferior, basal inferoseptal, mid inferoseptal, mid anteroseptal, basal inferior and basal inferoseptal. The following left ventricular wall segments are akinetic: apical anterior, apical lateral, apical inferior, apical septal and apex.    Left ventricular wall thickness is consistent with mild concentric hypertrophy.    Left ventricular diastolic function is consistent with (grade II w/high LAP) pseudonormalization.    The left atrial cavity is mildly dilated.    Saline test results are negative for right to left atrial level shunt.    Estimated right ventricular systolic pressure from tricuspid regurgitation is mildly elevated (35-45 mmHg).     Assessment/ Plan    Active Hospital Problems    Diagnosis  POA    **Stroke [I63.9]  Yes     Atypical chest pain  Nonischemic cardiomyopathy status post AICD    Dementia  Dyspnea  Patient be treated with the diuretics per pulmonology  Will follow        Mika Kim MD  23  11:07 EDT    Discussed with  Time:          Electronically signed by Mika Kim MD at 23 1110       Evan Ramos II, MD at 23 1816        Consult Orders    1. Inpatient Vascular Surgery Consult [[id number]] ordered by Tyler Villalpando MD                   Name: Tigre Amaral ADMIT: 10/28/2023   : 1950  PCP: Provider, No Known    MRN: [id number] LOS: 5 days   AGE/SEX: 73 y.o. male  ROOM: [address] [place] [address]      Patient Care Team:  Provider, No Known as PCP - General  Chief Complaint   Patient presents with    Fall    Arm Injury     CC:consult for right carotid stenosis. Patient complains of being restrained by his pulse oximeter.     Subjective     Inpatient Vascular Surgery Consult  Consult performed by: Evan Ramos II, MD  Consult ordered by: Tyler Villalpando MD        Fall    Arm Injury       Patient is a pleasant 73-year-old gentleman with a history of dementia, hypertension, previous TBI,  who was admitted after a fall and was noted to have some left arm and leg weakness on October 28.  Patient CT scans were initially equivocal for infarct due to his previous TBI and some encephalomalacia but did have perhaps right parietal lesion on CT scan.  There was some difficulty obtaining MRI due to patient agitation but he did have this completed today which shows multiple right-sided infarcts mostly in a watershed distribution.   As noted previously the patient has dementia and at the time of my evaluation is very fixated on leaving the hospital and not being restrained by his pulse oximeter.  He is not able to tell me what year it is, where he is, or why he is here.  He does know he is in [facility].  He is able to move the toes on his left foot and  with his left hand with some encouragement but does not move either of the left arm or leg otherwise.  He denies being in pain      Review of Systems  UTO secondary to patient AMS/agitation.     PMH: htn, dementia  Surg Hx: UTO 2/2 patient dementia  Fam Hx: UTO 2/2 dementia     Medications Prior to Admission   Medication Sig Dispense Refill Last Dose    aspirin 81 MG EC tablet Take 1 tablet by mouth Daily.   10/28/2023    carvedilol (COREG) 6.25 MG tablet Take 1 tablet by mouth 2 (Two) Times a Day With Meals.   10/28/2023    furosemide (LASIX) 40 MG tablet Take 1 tablet by mouth Daily.   10/28/2023    lisinopril (PRINIVIL,ZESTRIL) 20 MG tablet Take 1 tablet by mouth Daily.   10/28/2023     amiodarone, 150 mg, Intravenous, Once  aspirin, 81 mg, Oral, Daily  atorvastatin, 80 mg, Oral, Nightly  carvedilol, 6.25 mg, Oral, BID With Meals  enoxaparin, 40 mg, Subcutaneous, Q24H  lisinopril, 20 mg, Oral, Daily  nicotine, 1 patch, Transdermal, Q24H  piperacillin-tazobactam, 3.375 g, Intravenous, Q8H  senna-docusate sodium, 2 tablet, Oral, BID  sodium chloride, 10 mL, Intravenous, Q12H      amiodarone, 0.5 mg/min        acetaminophen **OR** [DISCONTINUED]  acetaminophen    senna-docusate sodium **AND** polyethylene glycol **AND** bisacodyl **AND** bisacodyl    Magnesium Standard Dose Replacement - Follow Nurse / BPA Driven Protocol    melatonin    nitroglycerin    ondansetron **OR** ondansetron    Phosphorus Replacement - Follow Nurse / BPA Driven Protocol    Potassium Replacement - Follow Nurse / BPA Driven Protocol    sodium chloride    sodium chloride  Patient has no known allergies.    Objective     Physical Exam:  Physical Exam  Vitals reviewed.   Constitutional:       General: He is not in acute distress.     Appearance: He is not toxic-appearing.   HENT:      Head:        Comments: Mass.  Eyes:      General: No scleral icterus.  Cardiovascular:      Rate and Rhythm: Normal rate and regular rhythm.   Pulmonary:      Effort: Pulmonary effort is normal. No respiratory distress.   Abdominal:      Palpations: Abdomen is soft.      Tenderness: There is no abdominal tenderness.   Skin:     General: Skin is warm and dry.   Neurological:      Mental Status: He is alert. He is disoriented.      Comments: Does not lift left arm or leg off bed, and can not hold them off the bed when I lifted them. Does  with left hand and move left foot to command.   5/5 strength R arm leg. No gross CN deficits.    Psychiatric:         Mood and Affect: Mood normal.         Behavior: Behavior normal.          Vital Signs and Labs:  Vital Signs Patient Vitals for the past 24 hrs:   BP Temp Temp src Pulse Resp SpO2 Weight   11/02/23 1800 137/88 -- -- 79 -- -- --   11/02/23 1600 136/79 97.7 °F (36.5 °C) Axillary 85 -- 98 % --   11/02/23 1546 140/84 -- -- 86 16 97 % --   11/02/23 1450 133/74 -- -- 104 17 90 % --   11/02/23 1132 134/72 98.1 °F (36.7 °C) Oral 77 -- 100 % --   11/02/23 1100 117/67 -- -- 71 -- 97 % --   11/02/23 1000 124/73 -- -- 79 -- 96 % --   11/02/23 0900 143/76 -- -- 90 -- 96 % --   11/02/23 0825 -- -- -- 105 -- 94 % --   11/02/23 0816 153/96 97.1 °F (36.2 °C) Oral 108 (!)  39 95 % --   11/02/23 0806 -- -- -- 109 -- 97 % --   11/02/23 0805 -- -- -- 108 -- 98 % --   11/02/23 0752 -- -- -- 120 -- 97 % --   11/02/23 0751 -- -- -- (!) 123 -- 97 % --   11/02/23 0748 -- -- -- (!) 128 -- -- --   11/02/23 0747 -- -- -- -- -- 96 % --   11/02/23 0745 -- -- -- (!) 122 -- 98 % --   11/02/23 0742 -- -- -- (!) 121 -- 96 % --   11/02/23 0739 -- -- -- (!) 124 -- 96 % --   11/02/23 0737 -- -- -- -- -- 94 % --   11/02/23 0736 -- -- -- (!) 123 -- -- --   11/02/23 0733 -- -- -- (!) 130 -- 95 % --   11/02/23 0730 -- -- -- (!) 126 -- -- --   11/02/23 0729 (!) 173/113 -- -- (!) 121 -- 95 % --   11/02/23 0729 -- -- -- -- -- 99 % --   11/02/23 0727 -- -- -- (!) 125 -- 97 % --   11/02/23 0724 -- -- -- (!) 121 -- 91 % --   11/02/23 0723 (!) 163/121 -- -- 117 -- 92 % --   11/02/23 0721 -- -- -- 108 -- (!) 87 % --   11/02/23 0718 -- -- -- (!) 130 -- 93 % --   11/02/23 0717 -- -- -- (!) 126 -- 96 % --   11/02/23 0536 -- -- -- -- -- -- 75.2 kg (165 lb 12.6 oz)   11/02/23 0423 147/79 97.6 °F (36.4 °C) Oral 79 18 96 % --   11/01/23 2307 152/75 98.5 °F (36.9 °C) Oral 81 18 95 % --   11/01/23 2015 151/84 99.1 °F (37.3 °C) Oral 89 18 93 % --     I/O:  I/O last 3 completed shifts:  In: 240 [P.O.:240]  Out: 0     CBC    Results from last 7 days   Lab Units 11/02/23  0612 11/01/23  1846 10/30/23  0522 10/29/23  0600 10/28/23  1449   WBC 10*3/mm3 12.87* 14.89* 9.46 8.49 11.54*   HEMOGLOBIN g/dL 13.5 14.3 13.1 13.1 14.7   PLATELETS 10*3/mm3 270 292 244 222 264     BMP   Results from last 7 days   Lab Units 11/02/23  0612 11/01/23  1846 10/31/23  1520 10/31/23  0643 10/30/23  2251 10/30/23  0522 10/29/23  2015 10/29/23  0600 10/28/23  1449   SODIUM mmol/L 136 137  --  138  --  138  --  139 141   POTASSIUM mmol/L 3.9  3.9 3.4* 3.8 3.7 3.2* 3.2* 3.4* 2.4* 3.1*   CHLORIDE mmol/L 109* 106  --  107  --  106  --  103 100   CO2 mmol/L 18.1* 20.0*  --  21.6*  --  25.0  --  26.4 30.5*   BUN mg/dL 11 11  --  9  --  9  --  9 9  "  CREATININE mg/dL 1.14 1.21  --  1.07  --  1.11  --  1.28* 1.53*   GLUCOSE mg/dL 108* 119*  --  96  --  91  --  93 76   MAGNESIUM mg/dL  --   --   --   --   --   --   --  1.8  --      Cr Clearance Estimated Creatinine Clearance: 59.6 mL/min (by C-G formula based on SCr of 1.14 mg/dL).  Coag   Results from last 7 days   Lab Units 10/28/23  1449   INR  1.02     HbA1C   Lab Results   Component Value Date    HGBA1C 5.20 10/29/2023     Blood Glucose   Glucose   Date/Time Value Ref Range Status   11/02/2023 0825 129 70 - 130 mg/dL Final   11/02/2023 0712 116 70 - 130 mg/dL Final   11/01/2023 1741 128 70 - 130 mg/dL Final     Infection   Results from last 7 days   Lab Units 11/02/23  0612 11/01/23  1846   PROCALCITONIN ng/mL 0.06 0.04     CMP   Results from last 7 days   Lab Units 11/02/23  0612 11/01/23  1846 10/31/23  1520 10/31/23  0643 10/30/23  2251 10/30/23  0522 10/29/23  2015 10/29/23  0600 10/28/23  1449   SODIUM mmol/L 136 137  --  138  --  138  --  139 141   POTASSIUM mmol/L 3.9  3.9 3.4* 3.8 3.7 3.2* 3.2* 3.4* 2.4* 3.1*   CHLORIDE mmol/L 109* 106  --  107  --  106  --  103 100   CO2 mmol/L 18.1* 20.0*  --  21.6*  --  25.0  --  26.4 30.5*   BUN mg/dL 11 11  --  9  --  9  --  9 9   CREATININE mg/dL 1.14 1.21  --  1.07  --  1.11  --  1.28* 1.53*   GLUCOSE mg/dL 108* 119*  --  96  --  91  --  93 76   ALBUMIN g/dL 3.3* 3.4*  --   --   --   --   --  3.1* 3.7   BILIRUBIN mg/dL 1.2 1.1  --   --   --   --   --  0.8 0.6   ALK PHOS U/L 76 86  --   --   --   --   --  71 89   AST (SGOT) U/L 51* 51*  --   --   --   --   --  13 14   ALT (SGPT) U/L 16 14  --   --   --   --   --  6 7     ABG      UA      FEDERICO  No results found for: \"POCMETH\", \"POCAMPHET\", \"POCBARBITUR\", \"POCBENZO\", \"POCCOCAINE\", \"POCOPIATES\", \"POCOXYCODO\", \"POCPHENCYC\", \"POCPROPOXY\", \"POCTHC\", \"POCTRICYC\"  Lysis Labs   Results from last 7 days   Lab Units 11/02/23  0612 11/01/23  1846 10/31/23  0643 10/30/23  0522 10/29/23  0600 10/28/23  1449   INR   --   --  "  --   --   --  1.02   HEMOGLOBIN g/dL 13.5 14.3  --  13.1 13.1 14.7   PLATELETS 10*3/mm3 270 292  --  244 222 264   CREATININE mg/dL 1.14 1.21 1.07 1.11 1.28* 1.53*     Radiology(recent) MRI Brain Without Contrast    Result Date: 11/2/2023  The study is significantly hampered by patient motion but multiple acute infarcts involving the right frontal, parietal and to a lesser extent occipital and temporal lobes are appreciated including both white matter and cortical infarcts. The majority of infarcts are oriented in an AP dimension consistent with watershed infarcts.  The above information was called to and discussed with SUNG Munroe on 11/02/2023 at 1610 hours.  This report was finalized on 11/2/2023 4:48 PM by Dr. Abhinav Vidales M.D on Workstation: [id number]      CT Head Without Contrast    Result Date: 11/2/2023  1.  A large remote left MCA distribution infarct is appreciated with volume loss similar in appearance as compared to the prior examination. Lacunar infarcts involving the corona radiata on the right are noted. There is no evidence of acute infarction or intracranial hemorrhage. Further evaluation could be performed with a MRI examination of the brain as indicated. 2.  A 3.3 x 1.9 cm soft tissue mass is appreciated overlying the medial aspect of the maxilla on the right and extending over the posterior aspect of the nasal bone on the right. The patient has a known basal cell carcinoma at this location.  Radiation dose reduction techniques were utilized, including automated exposure control and exposure modulation based on body size.   This report was finalized on 11/2/2023 4:16 PM by Dr. Abhinav Vidales M.D on Workstation: [id number]      XR Chest 1 View    Result Date: 11/2/2023  FINDINGS AND IMPRESSION: Left pacer/AICD is present.  Worsening pulmonary vascular congestion is present. There is also worsening bibasilar pulmonary pacification and interstitial thickening, right greater than left. Findings  are most concerning for worsening multifocal pneumonia and/or pulmonary edema in the appropriate clinical context. Correlation with patient history is recommended with follow-up chest CT if clinically indicated. No pneumothorax is seen. Cardiac silhouette is within normal limits for size.  This report was finalized on 11/2/2023 8:21 AM by Dr. Addy Joseph M.D on Workstation: [id number]       Active Hospital Problems    Diagnosis  POA    **Stroke [I63.9]  Yes      Resolved Hospital Problems   No resolved problems to display.           Problem Points:  4:  Patient has a new problem, with additional work-up planned  Total problem points:4 or more    Data Points:  1:  I have reviewed or order clinical lab test  1:  I have reviewed or order radiology test (except heart catheterization or echo)  2:  I have personally and independently review of image, tracing, or specimen  2:  I have reviewed and summation of old records and/or discussed the patients care with another health care provider  Total data points:4 or more    Risk Points:  High:  Major elective surgery with risk factors or emergent surgery    MDM requires 2/3 (Problem points, Data points and Risk)  MDM Prob point Data point Risk   SF 1 1 Minimal   Low 2 2 Low   Mod 3 3 Moderate   High 4 4 High     Code requires 3/3 (MDM, History and Exam)  Code MDM History Exam Time   82795 SF/Low Detailed Detailed 30   46741 Mod Comprehensive Comprehensive 50   41149 High Comprehensive Comprehensive 70     Detailed history:  4 elements HPI or status of 3 chronic problems; 2-9 system ROS  Comprehensive:  4 elements HPI or status of 3 chronic problems;  10 system ROS    Detailed Exam:  12 findings from any organ system  Comprehensive Exam:  2 findings from each of 9 systems.   12800    Assessment & Plan       Stroke      73 y.o. male with dementia, hypertension, nose mass now found to have cardiomyopathy with an ejection fraction of 20% admitted with right hemispheric stroke he  also has right carotid artery stenosis.   Based on my measurements on axial projection of the CTA he has approximately 50% stenosis of the right internal carotid artery by NASCET criteria.  This would be considered a symptomatic lesion. Under ordinary circumstances we would strongly consider intervention for this lesion. However given the patient's baseline functional status, current inability to move left arm and leg (although previous notes suggest he can move them may simply be having left hemineglect) and cardiomyopathy I do think the best treatment for this patient would be best medical therapy with antiplatelet and statin.  Would favor dual antiplatelet but will discuss with neurology.  Carotid duplex has been ordered, we will follow-up the results of this.  We will follow and discuss with patient's family when they are available.  No plans for intervention at this time.    I discussed the patients findings and my recommendations with patient and nursing staff.          Evan Ramos II, MD  11/02/23  18:16 EDT    Please call my office with any question: [phone]                Electronically signed by Evan Ramos II, MD at 11/02/23 7870

## 2023-11-03 NOTE — PROGRESS NOTES
Name: Tigre Amaral ADMIT: 10/28/2023   : 1950  PCP: Provider, No Known    MRN: [id number] LOS: 6 days   AGE/SEX: 73 y.o. male  ROOM:  [place] [address]/     Vascular Surgery Note    Neurosurgery plans noted. Plavix load over the weekend and angiogram with possible stent Monday.   Will follow peripherally for now.   Please do not hesitate to call with any questions or concerns.       Evan Ramos II, MD  23  16:50 EDT  Office Number [phone]

## 2023-11-03 NOTE — PLAN OF CARE
The pt participated in OT/PT this AM. Co-tx completed as the pt was leaving the floor and requires x2 skilled hands to safely complete OOB activity. He completed bed mobility with Mod A x2. He stopped frequently as he became distracted and his AMS limiting his understanding of therapy. Total A for LBD sock positioning. He stood several times with Max A x2 and L knee blocked. He was able to pivot to a stretcher as he was leaving the floor for a test. Max A x2 to assist legs and trunk on to the stretcher safely. He continues to require total A for toileting. IRF recommended.

## 2023-11-03 NOTE — MBS/VFSS/FEES
Acute Care - Speech Language Pathology   Swallow Initial Evaluation [facility]     Patient Name: Tigre Amaral  : 1950  MRN: [id number]  Today's Date: 11/3/2023               Admit Date: 10/28/2023    Visit Dx:     ICD-10-CM ICD-9-CM   1. Cerebrovascular accident (CVA), unspecified mechanism  I63.9 434.91     Patient Active Problem List   Diagnosis    Stroke     History reviewed. No pertinent past medical history.  History reviewed. No pertinent surgical history.    SLP Recommendation and Plan  SLP Swallowing Diagnosis: mild, oral dysphagia (23)  SLP Diet Recommendation: mechanical ground textures, thin liquids (23)  Recommended Precautions and Strategies: upright posture during/after eating, small bites of food and sips of liquid, general aspiration precautions (23)  SLP Rec. for Method of Medication Administration: meds whole, with puree, as tolerated (23)     Monitor for Signs of Aspiration: yes, notify SLP if any concerns (23)  Recommended Diagnostics: reassess via clinical swallow evaluation (23)  Swallow Criteria for Skilled Therapeutic Interventions Met: demonstrates skilled criteria (23)  Anticipated Discharge Disposition (SLP): anticipate therapy at next level of care (23)  Rehab Potential/Prognosis, Swallowing: good, to achieve stated therapy goals (23)  Therapy Frequency (Swallow): PRN (23)  Predicted Duration Therapy Intervention (Days): until discharge (23)  Oral Care Recommendations: Oral Care BID/PRN (23)                                      Oral Care Recommendations: Oral Care BID/PRN (23)    Outcome Evaluation: Video swallow study completed. Reduced safety awareness with oral intake, likely impacting oral swallow. No manuel laryngeal penetration and/or tracheal aspiration observed. Suggest mechanical ground textures, thin liquids. Meds whole in  "puree. Notify SLP if any concerns. SLP following.      SWALLOW EVALUATION (last 72 hours)       SLP Adult Swallow Evaluation       Row Name 11/03/23 1227       Rehab Evaluation    Document Type evaluation  -BB    Subjective Information no complaints  -BB    Patient Observations alert  -BB    Patient Effort adequate  -BB    Symptoms Noted During/After Treatment none  -BB       General Information    Patient Profile Reviewed yes  -BB    Pertinent History Of Current Problem 73 y.o. male; presented to the hospital on 10/28/2023 with complaints of left upper extremity weakness. Recent CXR significant for right middle lobe infiltrate, vascular congestion. Pt also with Acute on chronic decompensated systolic heart failure with EF 20%. PMHx significant for TBI, dementia, pacemaker. MRI, 11/2/23, showed: \"The study is significantly hampered by patient motion but  multiple acute infarcts involving the right frontal, parietal and to a  lesser extent occipital and temporal lobes are appreciated including  both white matter and cortical infarcts. The majority of infarcts are  oriented in an AP dimension consistent with watershed infarcts.\" VFSS recommended to rule out silent aspiration given pulmonary findings and acute CVA.  -BB    Current Method of Nutrition soft to chew textures;thin liquids  -BB    Precautions/Limitations, Vision WFL;for purposes of eval  -BB    Precautions/Limitations, Hearing WFL;difficult to assess  -BB    Prior Level of Function-Communication cognitive-linguistic impairment  -BB    Prior Level of Function-Swallowing no diet consistency restrictions  -BB    Plans/Goals Discussed with patient;agreed upon  -BB    Barriers to Rehab medically complex;cognitive status  -BB       Pain    Additional Documentation Pain Scale: FACES Pre/Post-Treatment (Group)  -BB       Pain Scale: Numbers Pre/Post-Treatment    Pretreatment Pain Rating --    Posttreatment Pain Rating --       Pain Scale: FACES Pre/Post-Treatment "    Pain: FACES Scale, Pretreatment 0-->no hurt  -BB    Posttreatment Pain Rating 0-->no hurt  -BB       Oral Motor Structure and Function    Dentition Assessment --    Secretion Management --    Mucosal Quality --    Volitional Swallow --    Volitional Cough --       Oral Musculature and Cranial Nerve Assessment    Oral Motor General Assessment --       General Eating/Swallowing Observations    Respiratory Support Currently in Use --    Eating/Swallowing Skills --    Positioning During Eating --    Utensils Used --    Consistencies Trialed --       Clinical Swallow Eval    Oral Prep Phase --    Oral Transit --    Oral Residue --    Pharyngeal Phase --    Clinical Swallow Evaluation Summary --       MBS/VFSS    Utensils Used spoon;cup  -BB    Consistencies Trialed regular textures;soft to chew textures;mixed consistency;pureed;thin liquids  -BB       MBS/VFSS Interpretation    VFSS Summary Oral phase most notable for poor stripping of utensil and oral preparatory considerations, likely due to impulsivity (e.g., posterior escape of mixed texture bolus observed as pt attempting to communicate with staff).     Pharyngeal phase appears functional.     No manuel laryngeal penetration and/or tracheal aspiration observed.     Structural considerations: incidental finding of anterior bony growths of cervical spine c/w osteophytes at C5-C6 (defer to MD for interpretation); does not appear to obstruct bolus flow.  -BB       MBSImP Score    MBSImP Score Completed? Yes  -BB    Materials presented per Standard Protocol? No  -BB    How was standard protocol modified? Protocol modified:  -BB       Oral Impairment Domain    Component 1- Lip Closure 4: Escape beyond mid-chin  -BB    Component 2- Tongue Control During Bolus Hold 1: Escape to lateral buccal cavity/floor of mouth (FOM)  -BB    Component 3- Bolus Prep/Mastication 0: Timely and efficient chewing and mashing  -BB    Component 4- Bolus Transport/Lingual Motion 0: Brisk  tongue motion  -BB    Component 5- Oral Residue 2: Residue collection on oral structures  -BB    Component 6- Initiation of Pharyngeal Swallow 3: Bolus head in pyriforms  -BB       Pharyngeal Impairment Domain    Component 7- Soft Palate Elevation 0: No bolus between soft palate (SP)/pharyngeal wall (PW)  -BB    Component 8- Laryngeal Elevation 0: Complete superior movement of thyroid cartilage with complete approximation of arytenoids to epiglottic petiole  -BB    Component 9- Anterior Hyoid Excursion 0: Complete anterior movement  -BB    Component 10- Epiglottic Movement 0: Complete inversion  -BB    Component 11- Laryngeal Vestibular Closure- Height of Swallow 0: Complete- no air/contrast in laryngeal vestibule  -BB    Component 12- Pharyngeal Stripping Wave 0: Present- complete  -BB    Component 13- Pharyngeal Contraction (A/P View Only) --  not tested  -BB    Component 14- PE Segment Opening 1: Partial distension/partial duration- partial obstruction of flow  -BB    Component 15- Tongue Base Retraction 2: Narrow column of contrast or air between TB and PW  -BB    Component 16- Pharyngeal Residue 1: Trace residue within or on pharyngeal structures  -BB       Esophageal Impairment Domain    Component 17- Esophageal Clearance (Upright Position) --  not tested  -BB       SLP Communication to Radiology    Severity Level of Dysphagia mild dysphagia  -BB    Summary Statement Radiologist, Dr Joseph, present. Oral phase most notable for poor stripping of utensil and oral preparatory considerations, likely due to impulsivity (e.g., posterior escape of mixed texture bolus observed as pt attempting to communicate with staff). Pharyngeal phase appears functional. No manuel laryngeal penetration and/or tracheal aspiration observed. Structural considerations: incidental finding of anterior bony growths of cervical spine c/w osteophytes at C5-C6 (defer to MD for interpretation); does not appear to obstruct bolus flow.  -BB        SLP Evaluation Clinical Impression    SLP Swallowing Diagnosis mild;oral dysphagia  -BB    Functional Impact risk of aspiration/pneumonia  -BB    Rehab Potential/Prognosis, Swallowing good, to achieve stated therapy goals  -BB    Swallow Criteria for Skilled Therapeutic Interventions Met demonstrates skilled criteria  -BB       Recommendations    Therapy Frequency (Swallow) PRN  -BB    Predicted Duration Therapy Intervention (Days) until discharge  -BB    SLP Diet Recommendation mechanical ground textures;thin liquids  -BB    Recommended Diagnostics reassess via clinical swallow evaluation  -BB    Recommended Precautions and Strategies upright posture during/after eating;small bites of food and sips of liquid;general aspiration precautions  -BB    Oral Care Recommendations Oral Care BID/PRN  -BB    SLP Rec. for Method of Medication Administration meds whole;with puree;as tolerated  -BB    Monitor for Signs of Aspiration yes;notify SLP if any concerns  -BB    Anticipated Discharge Disposition (SLP) anticipate therapy at next level of care  -BB       Swallow Goals (SLP)    Swallow LTGs --       (LTG) Patient will demonstrate functional swallow for    Diet Texture (Demonstrate functional swallow) --    Liquid viscosity (Demonstrate functional swallow) --    Wolfe (Demonstrate functional swallow) --    Time Frame (Demonstrate functional swallow) --              User Key  (r) = Recorded By, (t) = Taken By, (c) = Cosigned By      Initials Name Effective Dates    SR Kori Ocampo CCC-SLP 11/10/22 -     Chidi Miller, FREDY 02/19/23 -                     EDUCATION  The patient has been educated in the following areas:   Dysphagia (Swallowing Impairment).        SLP GOALS       Row Name 11/02/23 7220             (LTG) Patient will demonstrate functional swallow for    Diet Texture (Demonstrate functional swallow) regular textures  -SR      Liquid viscosity (Demonstrate functional swallow) thin liquids  -SR       [place] (Demonstrate functional swallow) independently (over 90% accuracy)  -SR      Time Frame (Demonstrate functional swallow) 1 week  -SR                User Key  (r) = Recorded By, (t) = Taken By, (c) = Cosigned By      Initials Name Provider Type    SR Kori Ocampo CCC-SLP Speech and Language Pathologist                       Time Calculation:    Time Calculation- SLP       Row Name 11/03/23 1248             Time Calculation- SLP    SLP Start Time 1135  -BB      SLP Stop Time 1250  -BB      SLP Time Calculation (min) 75 min  -BB      SLP Received On 11/03/23  -BB         Untimed Charges    SLP Eval/Re-eval  ST Motion Fluoro Eval Swallow - 68128  -BB      11244-[id number] Motion Fluoro Eval Swallow Minutes 75  -BB         Total Minutes    Untimed Charges Total Minutes 75  -BB       Total Minutes 75  -BB                User Key  (r) = Recorded By, (t) = Taken By, (c) = Cosigned By      Initials Name Provider Type    BB Chidi Amaral SLP Speech and Language Pathologist                    Therapy Charges for Today       Code Description Service Date Service Provider Modifiers Qty    [id number]  ST MOTION FLUORO EVAL SWALLOW 5 11/3/2023 Chidi Amaral SLP GN 1                 FREDY Gerard  11/3/2023

## 2023-11-03 NOTE — PROGRESS NOTES
"    Name: Tigre Amaral ADMIT: 10/28/2023   : 1950  PCP: Provider, No Known    MRN: [id number] LOS: 6 days   AGE/SEX: 73 y.o. male  ROOM: \[facility]\""/     Subjective   Subjective   \"I feel good.\"  Denies shortness of breath.       Objective   Objective   Vital Signs  Temp:  [97.7 °F (36.5 °C)-98.4 °F (36.9 °C)] 98.4 °F (36.9 °C)  Heart Rate:  [] 66  Resp:  [12-16] 12  BP: (111-163)/(55-98) 121/66  SpO2:  [94 %-99 %] 98 %  on   ;   Device (Oxygen Therapy): room air  Body mass index is 25.21 kg/m².  Physical Exam  Vitals and nursing note reviewed.   Constitutional:       General: He is not in acute distress.     Appearance: He is ill-appearing.   HENT:      Nose:      Comments: Large mass on the right side of his nose consistent with prior diagnosis basal cell carcinoma  Cardiovascular:      Rate and Rhythm: Normal rate and regular rhythm.   Pulmonary:      Effort: Pulmonary effort is normal.      Breath sounds: Normal breath sounds.   Abdominal:      General: Bowel sounds are normal.      Palpations: Abdomen is soft.      Tenderness: There is no abdominal tenderness.   Musculoskeletal:         General: No swelling.   Skin:     General: Skin is warm and dry.   Neurological:      Mental Status: He is alert. Mental status is at baseline. He is disoriented.      Comments: Left-sided weakness       Results Review     I reviewed the patient's new clinical results.  Results from last 7 days   Lab Units 23  0612 11/01/23  1846 10/30/23  0522 10/29/23  0600   WBC 10*3/mm3 12.87* 14.89* 9.46 8.49   HEMOGLOBIN g/dL 13.5 14.3 13.1 13.1   PLATELETS 10*3/mm3 270 292 244 222     Results from last 7 days   Lab Units 23  0612 23  1846 10/31/23  1520 10/31/23  0643 10/30/23  2251 10/30/23  0522   SODIUM mmol/L 136 137  --  138  --  138   POTASSIUM mmol/L 3.9  3.9 3.4* 3.8 3.7   < > 3.2*   CHLORIDE mmol/L 109* 106  --  107  --  106   CO2 mmol/L 18.1* 20.0*  --  21.6*  --  25.0   BUN mg/dL   --    --  " 9   CREATININE mg/dL 1.14 1.21  --  1.07  --  1.11   GLUCOSE mg/dL 108* 119*  --  96  --  91   EGFR mL/min/1.73 67.1 62.4  --  72.4  --  69.2    < > = values in this interval not displayed.     Results from last 7 days   Lab Units 11/02/23  0612 11/01/23 1846 10/29/23  0600 10/28/23  1449   ALBUMIN g/dL 3.3* 3.4* 3.1* 3.7   BILIRUBIN mg/dL 1.2 1.1 0.8 0.6   ALK PHOS U/L 76 86 71 89   AST (SGOT) U/L 51* 51* 13 14   ALT (SGPT) U/L 16 14 6 7     Results from last 7 days   Lab Units 11/02/23  0612 11/01/23  1846 10/31/23  0643 10/30/23  0522 10/29/23  0600 10/28/23  1449   CALCIUM mg/dL 8.5* 8.9 8.4* 8.4* 8.3* 8.8   ALBUMIN g/dL 3.3* 3.4*  --   --  3.1* 3.7   MAGNESIUM mg/dL  --   --   --   --  1.8  --      Results from last 7 days   Lab Units 11/02/23 0612 11/01/23  1846   PROCALCITONIN ng/mL 0.06 0.04   LACTATE mmol/L  --  1.8     Glucose   Date/Time Value Ref Range Status   11/02/2023 0825 129 70 - 130 mg/dL Final   11/02/2023 0712 116 70 - 130 mg/dL Final   11/01/2023 1741 128 70 - 130 mg/dL Final       MRI Brain Without Contrast    Result Date: 11/2/2023  The study is significantly hampered by patient motion but multiple acute infarcts involving the right frontal, parietal and to a lesser extent occipital and temporal lobes are appreciated including both white matter and cortical infarcts. The majority of infarcts are oriented in an AP dimension consistent with watershed infarcts.  The above information was called to and discussed with SUNG Munroe on 11/02/2023 at 1610 hours.  This report was finalized on 11/2/2023 4:48 PM by Dr. Abhinav Vidales M.D on Workstation: [id number]      CT Head Without Contrast    Result Date: 11/2/2023  1.  A large remote left MCA distribution infarct is appreciated with volume loss similar in appearance as compared to the prior examination. Lacunar infarcts involving the corona radiata on the right are noted. There is no evidence of acute infarction or intracranial hemorrhage.  Further evaluation could be performed with a MRI examination of the brain as indicated. 2.  A 3.3 x 1.9 cm soft tissue mass is appreciated overlying the medial aspect of the maxilla on the right and extending over the posterior aspect of the nasal bone on the right. The patient has a known basal cell carcinoma at this location.  Radiation dose reduction techniques were utilized, including automated exposure control and exposure modulation based on body size.   This report was finalized on 11/2/2023 4:16 PM by Dr. Abhinav Vidales M.D on Workstation: [id number]      XR Chest 1 View    Result Date: 11/2/2023  FINDINGS AND IMPRESSION: Left pacer/AICD is present.  Worsening pulmonary vascular congestion is present. There is also worsening bibasilar pulmonary pacification and interstitial thickening, right greater than left. Findings are most concerning for worsening multifocal pneumonia and/or pulmonary edema in the appropriate clinical context. Correlation with patient history is recommended with follow-up chest CT if clinically indicated. No pneumothorax is seen. Cardiac silhouette is within normal limits for size.  This report was finalized on 11/2/2023 8:21 AM by Dr. Addy Joseph M.D on Workstation: [id number]       I have personally reviewed all medications:  Scheduled Medications  amiodarone, 150 mg, Intravenous, Once  aspirin, 81 mg, Oral, Daily  atorvastatin, 80 mg, Oral, Nightly  carvedilol, 6.25 mg, Oral, BID With Meals  clopidogrel, 150 mg, Oral, Once  [START ON 11/4/2023] clopidogrel, 75 mg, Oral, Daily  enoxaparin, 40 mg, Subcutaneous, Q24H  lisinopril, 20 mg, Oral, Daily  nicotine, 1 patch, Transdermal, Q24H  piperacillin-tazobactam, 3.375 g, Intravenous, Q8H  senna-docusate sodium, 2 tablet, Oral, BID  sodium chloride, 10 mL, Intravenous, Q12H    Infusions   Diet  Diet: Cardiac Diets; Healthy Heart (2-3 Na+); No Mixed Consistencies; Texture: Mechanical Ground (NDD 2); Fluid Consistency: Thin (IDDSI 0)    I  "have personally reviewed:  [x]  Laboratory   [x]  Microbiology   [x]  Radiology   [x]  EKG/Telemetry  [x]  Cardiology/Vascular   []  Pathology    []  Records       Assessment/Plan     Active Hospital Problems    Diagnosis  POA    **Cerebrovascular accident (CVA) due to embolism of right middle cerebral artery [I63.411]  Yes    Aspiration pneumonia [J69.0]  Unknown    Acute on chronic systolic congestive heart failure [I50.23]  Unknown    Hypertension [I10]  Unknown    Stage 3a chronic kidney disease [N18.31]  Unknown    Hyperlipidemia [E78.5]  Unknown    Carotid artery stenosis, symptomatic, right [I65.21]  Unknown    Tobacco abuse [Z72.0]  Unknown    Basal cell carcinoma (BCC) of skin of nose [C44.311]  Unknown      Resolved Hospital Problems   No resolved problems to display.       73 y.o. male admitted with Cerebrovascular accident (CVA) due to embolism of right middle cerebral artery.    Events noted.  Patient had respiratory distress early yesterday morning requiring transfer to the intensive care unit.  He was treated for pulmonary edema and aspiration pneumonia.  Today feels much better and has been transferred back out to neuro monitored floor.  Cardiology following him but do not seem to be planning any further intervention or treatments.  He has significant cardiomyopathy.  Would need surgical clearance.  Main issue is acute CVA and what is felt to be symptomatic right carotid artery stenosis.  Neurosurgery and vascular surgery weighing options but seems carotid artery stent planned. In interim he is on aspirin, Plavix and statin.    On Zosyn per pulmonology for aspiration pneumonia.  Respiratory status stable today.    He has large mass on right side of his nose that seems to be consistent with basal cell carcinoma.  Per chart this is \"known\" but I do not see confirmation of this.  Would not recommend intervention at this time but would recommend outpatient referral to dermatology.      Pharmacy to dose " Lovenox for DVT prophylaxis.  Full code.  Discussed with patient.  Anticipate discharge home with HH vs SNU facility next week..      Jose Mendiola MD  [facility]  11/03/23  14:56 EDT

## 2023-11-04 LAB
ANION GAP SERPL CALCULATED.3IONS-SCNC: 10 MMOL/L (ref 5–15)
BUN SERPL-MCNC: 16 MG/DL (ref 8–23)
BUN/CREAT SERPL: 12.1 (ref 7–25)
CALCIUM SPEC-SCNC: 8.5 MG/DL (ref 8.6–10.5)
CHLORIDE SERPL-SCNC: 105 MMOL/L (ref 98–107)
CO2 SERPL-SCNC: 26 MMOL/L (ref 22–29)
CREAT SERPL-MCNC: 1.32 MG/DL (ref 0.76–1.27)
DEPRECATED RDW RBC AUTO: 39.6 FL (ref 37–54)
EGFRCR SERPLBLD CKD-EPI 2021: 56.2 ML/MIN/1.73
ERYTHROCYTE [DISTWIDTH] IN BLOOD BY AUTOMATED COUNT: 12.2 % (ref 12.3–15.4)
GLUCOSE SERPL-MCNC: 88 MG/DL (ref 65–99)
HCT VFR BLD AUTO: 38.2 % (ref 37.5–51)
HGB BLD-MCNC: 13.2 G/DL (ref 13–17.7)
MAGNESIUM SERPL-MCNC: 2 MG/DL (ref 1.6–2.4)
MCH RBC QN AUTO: 30.9 PG (ref 26.6–33)
MCHC RBC AUTO-ENTMCNC: 34.6 G/DL (ref 31.5–35.7)
MCV RBC AUTO: 89.5 FL (ref 79–97)
PA ADP PRP-ACNC: 186 PRU (ref 194–418)
PLATELET # BLD AUTO: 265 10*3/MM3 (ref 140–450)
PMV BLD AUTO: 10.1 FL (ref 6–12)
POTASSIUM SERPL-SCNC: 2.9 MMOL/L (ref 3.5–5.2)
POTASSIUM SERPL-SCNC: 3.2 MMOL/L (ref 3.5–5.2)
QT INTERVAL: 516 MS
QTC INTERVAL: 533 MS
RBC # BLD AUTO: 4.27 10*6/MM3 (ref 4.14–5.8)
SODIUM SERPL-SCNC: 141 MMOL/L (ref 136–145)
WBC NRBC COR # BLD: 7.82 10*3/MM3 (ref 3.4–10.8)

## 2023-11-04 PROCEDURE — 93010 ELECTROCARDIOGRAM REPORT: CPT | Performed by: INTERNAL MEDICINE

## 2023-11-04 PROCEDURE — 99233 SBSQ HOSP IP/OBS HIGH 50: CPT | Performed by: NURSE PRACTITIONER

## 2023-11-04 PROCEDURE — 80048 BASIC METABOLIC PNL TOTAL CA: CPT | Performed by: HOSPITALIST

## 2023-11-04 PROCEDURE — 85576 BLOOD PLATELET AGGREGATION: CPT | Performed by: PHYSICIAN ASSISTANT

## 2023-11-04 PROCEDURE — 85027 COMPLETE CBC AUTOMATED: CPT | Performed by: HOSPITALIST

## 2023-11-04 PROCEDURE — 97530 THERAPEUTIC ACTIVITIES: CPT

## 2023-11-04 PROCEDURE — 84132 ASSAY OF SERUM POTASSIUM: CPT | Performed by: STUDENT IN AN ORGANIZED HEALTH CARE EDUCATION/TRAINING PROGRAM

## 2023-11-04 PROCEDURE — 25010000002 PIPERACILLIN SOD-TAZOBACTAM PER 1 G: Performed by: INTERNAL MEDICINE

## 2023-11-04 PROCEDURE — S0260 H&P FOR SURGERY: HCPCS | Performed by: PHYSICIAN ASSISTANT

## 2023-11-04 PROCEDURE — 93005 ELECTROCARDIOGRAM TRACING: CPT | Performed by: INTERNAL MEDICINE

## 2023-11-04 PROCEDURE — 83735 ASSAY OF MAGNESIUM: CPT | Performed by: STUDENT IN AN ORGANIZED HEALTH CARE EDUCATION/TRAINING PROGRAM

## 2023-11-04 RX ORDER — AMOXICILLIN AND CLAVULANATE POTASSIUM 875; 125 MG/1; MG/1
1 TABLET, FILM COATED ORAL EVERY 12 HOURS SCHEDULED
Status: DISPENSED | OUTPATIENT
Start: 2023-11-04 | End: 2023-11-10

## 2023-11-04 RX ORDER — POTASSIUM CHLORIDE 750 MG/1
40 TABLET, FILM COATED, EXTENDED RELEASE ORAL EVERY 4 HOURS
Status: COMPLETED | OUTPATIENT
Start: 2023-11-04 | End: 2023-11-04

## 2023-11-04 RX ADMIN — ATORVASTATIN CALCIUM 80 MG: 80 TABLET, FILM COATED ORAL at 20:37

## 2023-11-04 RX ADMIN — CARVEDILOL 6.25 MG: 6.25 TABLET, FILM COATED ORAL at 17:10

## 2023-11-04 RX ADMIN — SENNOSIDES AND DOCUSATE SODIUM 2 TABLET: 50; 8.6 TABLET ORAL at 08:17

## 2023-11-04 RX ADMIN — POTASSIUM CHLORIDE 40 MEQ: 750 TABLET, EXTENDED RELEASE ORAL at 10:13

## 2023-11-04 RX ADMIN — ASPIRIN 81 MG: 81 TABLET, CHEWABLE ORAL at 08:17

## 2023-11-04 RX ADMIN — NICOTINE 1 PATCH: 21 PATCH, EXTENDED RELEASE TRANSDERMAL at 08:17

## 2023-11-04 RX ADMIN — PIPERACILLIN SODIUM AND TAZOBACTAM SODIUM 3.38 G: 3; .375 INJECTION, SOLUTION INTRAVENOUS at 06:52

## 2023-11-04 RX ADMIN — POTASSIUM CHLORIDE 40 MEQ: 750 TABLET, EXTENDED RELEASE ORAL at 14:36

## 2023-11-04 RX ADMIN — CARVEDILOL 6.25 MG: 6.25 TABLET, FILM COATED ORAL at 08:17

## 2023-11-04 RX ADMIN — CLOPIDOGREL BISULFATE 75 MG: 75 TABLET, FILM COATED ORAL at 08:17

## 2023-11-04 RX ADMIN — LISINOPRIL 20 MG: 20 TABLET ORAL at 08:17

## 2023-11-04 RX ADMIN — AMOXICILLIN AND CLAVULANATE POTASSIUM 1 TABLET: 875; 125 TABLET, FILM COATED ORAL at 20:37

## 2023-11-04 RX ADMIN — POTASSIUM CHLORIDE 40 MEQ: 750 TABLET, EXTENDED RELEASE ORAL at 17:10

## 2023-11-04 NOTE — PLAN OF CARE
Problem: Skin Injury Risk Increased  Goal: Skin Health and Integrity  Outcome: Ongoing, Progressing     Problem: Fall Injury Risk  Goal: Absence of Fall and Fall-Related Injury  Outcome: Ongoing, Progressing   Goal Outcome Evaluation:           Progress: no change  Outcome Evaluation: Pt appeared to sleep fair, alert and oriented x2, weak lue, up to br with assist , pt had to be reminded to call for assist and not attempt to get out of bed unassisted, bed alarm in place, denies pain, compliant with repositioning, incontinent of bowel and bladder.

## 2023-11-04 NOTE — PLAN OF CARE
Goal Outcome Evaluation:  Plan of Care Reviewed With: patient        Progress: improving  Outcome Evaluation: Pt agreeable to particpate with PT this date. Able to perform supine to sit with modA x1 and cues for sequencing. Pt stood EOB x2 with modAx2 and able to sidestep 3' to HOB with cues to increase toe clearance. Pt continues to be a good candidate for skilled PT services.      Anticipated Discharge Disposition (PT): inpatient rehabilitation facility

## 2023-11-04 NOTE — PLAN OF CARE
Problem: Adult Inpatient Plan of Care  Goal: Absence of Hospital-Acquired Illness or Injury  Intervention: Identify and Manage Fall Risk  Recent Flowsheet Documentation  Taken 11/4/2023 1600 by Joyce Mcnamara RN  Safety Promotion/Fall Prevention:   safety round/check completed   nonskid shoes/slippers when out of bed  Taken 11/4/2023 1400 by Joyce Mcnamara RN  Safety Promotion/Fall Prevention:   safety round/check completed   nonskid shoes/slippers when out of bed  Taken 11/4/2023 1206 by Joyce Mcnamara, RN  Safety Promotion/Fall Prevention:   safety round/check completed   nonskid shoes/slippers when out of bed  Taken 11/4/2023 1000 by Joyce Mcnamara RN  Safety Promotion/Fall Prevention:   safety round/check completed   room organization consistent   nonskid shoes/slippers when out of bed   fall prevention program maintained   clutter free environment maintained   assistive device/personal items within reach   activity supervised  Taken 11/4/2023 0800 by Joyce Mcnamara RN  Safety Promotion/Fall Prevention:   safety round/check completed   room organization consistent   nonskid shoes/slippers when out of bed   fall prevention program maintained   clutter free environment maintained   assistive device/personal items within reach   activity supervised     Problem: Adult Inpatient Plan of Care  Goal: Absence of Hospital-Acquired Illness or Injury  Intervention: Prevent and Manage VTE (Venous Thromboembolism) Risk  Recent Flowsheet Documentation  Taken 11/4/2023 0800 by Joyce Mcnamara RN  VTE Prevention/Management:   bilateral   sequential compression devices on  Range of Motion: active ROM (range of motion) encouraged

## 2023-11-04 NOTE — PROGRESS NOTES
Tigrejennie Amaral   73 y.o.  male    LOS: 7 days   Patient Care Team:  Provider, No Known as PCP - General      Subjective     Interval History:     73-year-old white male patient who been followed by my colleague  has history of AICD for primary prevention of sudden death.  He has history of ventricular tachycardia.  Patient has dementia and history is obtained mostly from review of charts.  He had an episode of chest pain this morning.  Patient has history of left arm weakness a few days ago.  He also lost his balance.  CT of the head showed old and subacute infarcts.  He has a soft tissue mass on the right premaxillary area consistent with his previously diagnosed basal cell carcinoma.  Echocardiogram showed ejection fraction 21.6%.  EKG is paced rhythm.    Interval history no chest pain and or shortness of breath.     Patient Complaints:     Review of Systems:       Medication Review:   Current Facility-Administered Medications:     acetaminophen (TYLENOL) tablet 650 mg, 650 mg, Oral, Q4H PRN, 650 mg at 23 **OR** [DISCONTINUED] acetaminophen (TYLENOL) suppository 650 mg, 650 mg, Rectal, Q4H PRN, Lashon Brooks MD    amiodarone 150 mg in 100 mL D5W (loading dose), 150 mg, Intravenous, Once **FOLLOWED BY** [] amiodarone 360 mg in 200 mL D5W infusion, 1 mg/min, Intravenous, Continuous **FOLLOWED BY** [] amiodarone 360 mg in 200 mL D5W infusion, 0.5 mg/min, Intravenous, Continuous, Magdiel Murray MD    aspirin chewable tablet 81 mg, 81 mg, Oral, Daily, Carolina Casillas, APRN, 81 mg at 23    atorvastatin (LIPITOR) tablet 80 mg, 80 mg, Oral, Nightly, Lashon Brooks MD, 80 mg at 23    sennosides-docusate (PERICOLACE) 8.6-50 MG per tablet 2 tablet, 2 tablet, Oral, BID, 2 tablet at 23 **AND** polyethylene glycol (MIRALAX) packet 17 g, 17 g, Oral, Daily PRN **AND** bisacodyl (DULCOLAX) EC tablet 5 mg, 5 mg, Oral, Daily PRN **AND**  bisacodyl (DULCOLAX) suppository 10 mg, 10 mg, Rectal, Daily PRN, Lashon Brooks MD    carvedilol (COREG) tablet 6.25 mg, 6.25 mg, Oral, BID With Meals, Lashon Brooks MD, 6.25 mg at 11/04/23 0817    clopidogrel (PLAVIX) tablet 75 mg, 75 mg, Oral, Daily, Babita Marte APRN, 75 mg at 11/04/23 0817    Enoxaparin Sodium (LOVENOX) syringe 40 mg, 40 mg, Subcutaneous, Q24H, Abhinav Barnes MD, 40 mg at 11/03/23 2315    lisinopril (PRINIVIL,ZESTRIL) tablet 20 mg, 20 mg, Oral, Daily, Lashon Brooks MD, 20 mg at 11/04/23 0817    Magnesium Standard Dose Replacement - Follow Nurse / BPA Driven Protocol, , Does not apply, PRN, Lashon Brooks MD    melatonin tablet 3 mg, 3 mg, Oral, Nightly PRN, Lashon Brooks MD, 3 mg at 11/02/23 2106    nicotine (NICODERM CQ) 21 MG/24HR patch 1 patch, 1 patch, Transdermal, Q24H, Jimenez Zarate MD, 1 patch at 11/04/23 0817    nitroglycerin (NITROSTAT) SL tablet 0.4 mg, 0.4 mg, Sublingual, Q5 Min PRN, Magdiel Murray MD    ondansetron (ZOFRAN) tablet 4 mg, 4 mg, Oral, Q6H PRN **OR** ondansetron (ZOFRAN) injection 4 mg, 4 mg, Intravenous, Q6H PRN, Lashon Brooks MD, 4 mg at 11/01/23 1758    Phosphorus Replacement - Follow Nurse / BPA Driven Protocol, , Does not apply, Cecilia BARRON Renate Andrea, MD    piperacillin-tazobactam (ZOSYN) 3.375 g in iso-osmotic dextrose 50 ml (premix), 3.375 g, Intravenous, Q8H, Magdiel Murray MD, 3.375 g at 11/04/23 0652    potassium chloride (K-DUR,KLOR-CON) ER tablet 40 mEq, 40 mEq, Oral, Q4H, Salvatore Bedoya MD    Potassium Replacement - Follow Nurse / BPA Driven Protocol, , Does not apply, PRN, Lashon Brooks MD    sodium chloride 0.9 % flush 10 mL, 10 mL, Intravenous, Q12H, Prince Roberts MD, 10 mL at 11/03/23 2054    sodium chloride 0.9 % flush 10 mL, 10 mL, Intravenous, PRN, Prince Roberts MD    sodium chloride 0.9 % infusion 40 mL, 40 mL, Intravenous, PRN, Prince Roberts,  MD      Objective   Vital Sign Min/Max for last 24 hours  Temp  Min: 95.6 °F (35.3 °C)  Max: 98.4 °F (36.9 °C)   BP  Min: 117/60  Max: 157/79    Pulse  Min: 62  Max: 72     Wt Readings from Last 3 Encounters:   11/04/23 71.5 kg (157 lb 10.1 oz)        Intake/Output Summary (Last 24 hours) at 11/4/2023 0856  Last data filed at 11/4/2023 0652  Gross per 24 hour   Intake 100 ml   Output --   Net 100 ml     Physical Exam:      General Appearance:    Well developed and well nourished in no acute distress   Head:    Normocephalic, atraumatic   Eyes:            Conjunctivae normal, anicteric, no xanthelasma   Neck:   supple, trachea midline, no thyromegaly, no carotid bruit, no JVD, no elevated CVP   Lungs:     Clear to auscultation,respirations regular, even and                  unlabored    Heart:    Regular rhythm and normal rate, normal S1 and S2,            No murmur, no gallop, no rub, no click   Chest Wall:    No abnormalities observed   Abdomen:     Normal bowel sounds, no masses, no organomegaly, soft        nontender, nondistended, no guarding, no rebound                tenderness   Rectal:     Deferred   Extremities:   No edema. Moves all extremities well, no cyanosis, no erythema   Pulses:   Pulses palpable and equal bilaterally   Skin:   No bleeding, bruising or rash   Neurologic:   awake alert and oriented x3, speech clear and approp, no facial drooping     :    Monitor:      Results Review:         Sodium Sodium   Date Value Ref Range Status   11/04/2023 141 136 - 145 mmol/L Final   11/02/2023 136 136 - 145 mmol/L Final   11/01/2023 137 136 - 145 mmol/L Final      Potassium Potassium   Date Value Ref Range Status   11/04/2023 2.9 (L) 3.5 - 5.2 mmol/L Final   11/02/2023 3.9 3.5 - 5.2 mmol/L Final   11/02/2023 3.9 3.5 - 5.2 mmol/L Final   11/01/2023 3.4 (L) 3.5 - 5.2 mmol/L Final     Comment:     Slight hemolysis detected by analyzer. Results may be affected.      Chloride Chloride   Date Value Ref Range  "Status   11/04/2023 105 98 - 107 mmol/L Final   11/02/2023 109 (H) 98 - 107 mmol/L Final   11/01/2023 106 98 - 107 mmol/L Final      Bicarbonate No results found for: \"PLASMABICARB\"   BUN BUN   Date Value Ref Range Status   11/04/2023 16 8 - 23 mg/dL Final   11/02/2023 11 8 - 23 mg/dL Final   11/01/2023 11 8 - 23 mg/dL Final      Creatinine Creatinine   Date Value Ref Range Status   11/04/2023 1.32 (H) 0.76 - 1.27 mg/dL Final   11/02/2023 1.14 0.76 - 1.27 mg/dL Final   11/01/2023 1.21 0.76 - 1.27 mg/dL Final      Calcium Calcium   Date Value Ref Range Status   11/04/2023 8.5 (L) 8.6 - 10.5 mg/dL Final   11/02/2023 8.5 (L) 8.6 - 10.5 mg/dL Final   11/01/2023 8.9 8.6 - 10.5 mg/dL Final      Magnesium No results found for: \"MG\"     Results from last 7 days   Lab Units 11/04/23  0730   WBC 10*3/mm3 7.82   HEMOGLOBIN g/dL 13.2   HEMATOCRIT % 38.2   PLATELETS 10*3/mm3 265     Lab Results   Lab Value Date/Time    TROPONINT 425 (C) 11/02/2023 0735    TROPONINT 451 (C) 11/02/2023 0612    TROPONINT 354 (C) 11/01/2023 2031    TROPONINT 339 (C) 11/01/2023 1846            Echo EF Estimated  No results found for: \"ECHOEFEST\"      Left ventricular systolic function is severely decreased. Calculated left ventricular EF = 21.6% Left ventricular ejection fraction appears to be less than 20%.The following left ventricular wall segments are hypokinetic: mid anterolateral, basal inferolateral, mid inferolateral, mid inferior, basal inferoseptal, mid inferoseptal, mid anteroseptal, basal inferior and basal inferoseptal. The following left ventricular wall segments are akinetic: apical anterior, apical lateral, apical inferior, apical septal and apex.    Left ventricular wall thickness is consistent with mild concentric hypertrophy.    Left ventricular diastolic function is consistent with (grade II w/high LAP) pseudonormalization.    The left atrial cavity is mildly dilated.    Saline test results are negative for right to left atrial " level shunt.    Estimated right ventricular systolic pressure from tricuspid regurgitation is mildly elevated (35-45 mmHg).   MRI    The study is significantly hampered by patient motion but  multiple acute infarcts involving the right frontal, parietal and to a  lesser extent occipital and temporal lobes are appreciated including  both white matter and cortical infarcts. The majority of infarcts are  oriented in an AP dimension consistent with watershed infarcts.    CT angio    There is an approximate 60-70% NASCET stenosis within the proximal  portion of the right ICA and an approximate 70-80% NASCET stenosis  within the proximal portion of the left ICA secondary to mixed calcified  and noncalcified atherosclerotic plaques.     There is a severe degree of stenosis at the origin of the left vertebral  artery and a moderate degree of stenosis is seen within the V3 segment  of the left vertebral artery.    Assessment/ Plan  Assessment & Plan   Active Hospital Problems    Diagnosis  POA    **Cerebrovascular accident (CVA) due to embolism of right middle cerebral artery [I63.411]  Yes    Aspiration pneumonia [J69.0]  Unknown    Acute on chronic systolic congestive heart failure [I50.23]  Unknown    Hypertension [I10]  Unknown    Stage 3a chronic kidney disease [N18.31]  Unknown    Hyperlipidemia [E78.5]  Unknown    Carotid artery stenosis, symptomatic, right [I65.21]  Unknown    Tobacco abuse [Z72.0]  Unknown    Basal cell carcinoma (BCC) of skin of nose [C44.311]  Unknown     We will continue to monitor patient is euvolemic today.  Patient with normal function of ICD and no recent therapies.  We will continue with dual antiplatelet therapy and monitor progress.    Patient is my office patient [facility] is stable will see Monday      Phong Gonzales MD  11/04/23  08:56 EDT

## 2023-11-04 NOTE — PROGRESS NOTES
"DOS: 2023  NAME: Tigre Amaral   : 1950  PCP: Provider, No Known  Chief Complaint   Patient presents with    Fall    Arm Injury   Patient seen in follow-up today; new to me        Stroke      Subjective: No acute events overnight.  Patient denies any new complaints or concerns on my exam.      No family at bedside.     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 Language normal. No neglect. Higher integrative function normal  CN: II-XII normal-uvula slightly deviated left  Motor: 5/5, normal tone on the right; left upper extremity distal 2 -/5 handgrip 2/5, proximal 3+/5  Reflexes: Toes downgoing  Sensory: Intact-to light touch  Coordination: Normal-finger-to-nose    Laboratory results:  Lab Results   Component Value Date    GLUCOSE 88 2023    CALCIUM 8.5 (L) 2023     2023    K 2.9 (L) 2023    CO2 26.0 2023     2023    BUN 16 2023    CREATININE 1.32 (H) 2023    BCR 12.1 2023    ANIONGAP 10.0 2023     Lab Results   Component Value Date    WBC 7.82 2023    HGB 13.2 2023    HCT 38.2 2023    MCV 89.5 2023     2023     Lab Results   Component Value Date    CHOL 146 10/29/2023     Lab Results   Component Value Date    HDL 34 (L) 10/29/2023     Lab Results   Component Value Date    LDL 98 10/29/2023     Lab Results   Component Value Date    TRIG 72 10/29/2023         Lab 10/29/23  0600   HEMOGLOBIN A1C 5.20      Review and " interpretation of imaging:  Imaging discussed below reviewed independently.      Impression: This is a 73-year-old male with known past medical history of EtOH dependency/tobacco abuse, TBI, PPM, hypertension and reported dementia who [facility] 10[facility] due to complaint of left upper extremity weakness for which our service was consulted.  Chart review reflects that symptoms started Thursday night before arrival after patient sustained a fall.  Family reportedly did not notice any significant left arm weakness that patient was complaining initial CT of the head negative for acute findings although known area of large left MCA territory infarct versus residual TBI noted and age-indeterminate infarct in the right MCA CVA territories.  Patient was normotensive on arrival and afebrile.  EKG AV paced rhythm.  Since admission patient found to have numerous acute right hemispheric infarcts cortical and subcortical-watershed.  Moderate small vessel disease.  Chronic left temporal infarct noted.  Other work-up noted below. Both vascular team and endovascular teams consulted-Dr. Spann to review case and complete possible stenting in the interim patient on aspirin and statin-advised against hypotension/hypovolemia-dehydration.  Patient on DAPT therapy and high-dose statin here-unclear if patient was taking antiplatelet consistent prior to arrival      Neurologically, stable with persistent left-sided weakness.  Neurosurgery planning for right ICA stent on Monday.  Other recommendations below.PT/OT/ST. CCP to assist with discharge planning. Call RRT for any acute neurological changes and/or concerns. We will continue to follow and advise peripherally until stent placement complete.       Work up to date:  2D Echo: LA cavity mildly dilated, saline test negative, EF 21.6%, septal wall motion abnormality noted, no evidence of LV thrombus or mass, no AV stenosis, mild to moderate MVR.  Carotid  ultrasound: Severe near occlusive right internal carotid artery.  Left internal carotid artery with severe stenosis-both with near occlusion  CTA of the head: Multiple small age-indeterminate infarcts noted.  Evidence of chronic large infarct in the left MCA distribution noted.  Severe ICA stenosis bilaterally.  CT head repeat 11/2: Remote large left MCA infarct redemonstrated with evidence of volume loss.  Lacunar infarcts involving corona radiata on the right redemonstrated.  No evidence of acute infarct and/or intracranial hemorrhage noted.  Incidental finding of 3.3 x 1.9 soft tissue mass appreciated maxilla on the right over the right nasal bone-patient with known basal cell carcinoma  MRI brain WO: Numerous acute right hemispheric infarcts cortical and subcortical with majority in an AP dimension consistent with watershed infarcts.  Moderate small vessel disease.  Chronic left temporal infarct noted.        Diagnosis:  1.  Acute right hemispheric infarcts-subcortical watershed.  Suspect due to right carotid stenosis-near occlusive  2.  Bilateral carotid stenosis with right slightly greater than left etiology of infarct felt to be secondary to right carotid [id number] stent placement next week  3.  History of large left MCA infarct  4.  History of TBI with associated cognitive impairment felt to be representative of dementia  5.  Tobacco abuse  6.  Cardiomyopathy    Plan:   Hold PT/OT till procedure completed   Avoid hypotension/dehydration  Bedrest with bathroom privileges  Aspirin 81 mg daily  Plavix 75 mg daily-150 mg load given yesterday; P2 Y12 today 186-adequate response  Lipitor 80 mg daily-LDL 98-on no antiplatelet prior to arrival  Neurochecks  BP control-maintain systolic blood pressure 120-150; avoid hypotension  Stroke Education  OLMAN/SCDs  PT/OT/ST    Case reviewed with attending neurologist Kwasi and he agrees with treatment plan above.     SUNG Butterfield

## 2023-11-04 NOTE — THERAPY TREATMENT NOTE
Patient Name: Tigre Amaral  : 1950    MRN: [id number]                              Today's Date: 2023       Admit Date: 10/28/2023    Visit Dx:     ICD-10-CM ICD-9-CM   1. Cerebrovascular accident (CVA), unspecified mechanism  I63.9 434.91     Patient Active Problem List   Diagnosis    Cerebrovascular accident (CVA) due to embolism of right middle cerebral artery    Aspiration pneumonia    Acute on chronic systolic congestive heart failure    Hypertension    Stage 3a chronic kidney disease    Hyperlipidemia    Carotid artery stenosis, symptomatic, right    Tobacco abuse    Basal cell carcinoma (BCC) of skin of nose     History reviewed. No pertinent past medical history.  History reviewed. No pertinent surgical history.   General Information       Row Name 23 1408          Physical Therapy Time and Intention    Document Type therapy note (daily note)  -CN     Mode of Treatment physical therapy;individual therapy  -CN       Row Name 23 1408          General Information    Patient Profile Reviewed yes  -CN     Barriers to Rehab medically complex;cognitive status  -CN       Row Name 23 1408          Cognition    Orientation Status (Cognition) oriented to;person;place;verbal cues/prompts needed for orientation  -CN       Row Name 23 1408          Safety Issues, Functional Mobility    Impairments Affecting Function (Mobility) coordination;endurance/activity tolerance;cognition;motor control;grasp;motor planning;muscle tone abnormal;strength;balance  -CN               User Key  (r) = Recorded By, (t) = Taken By, (c) = Cosigned By      Initials Name Provider Type    CN Mayi Webber, PT Physical Therapist                   Mobility       Row Name 23 1409          Bed Mobility    Bed Mobility supine-sit;sit-supine  -CN     Supine-Sit [place] (Bed Mobility) moderate assist (50% patient effort);verbal cues;nonverbal cues (demo/gesture)  -CN     Sit-Supine  [place] (Bed Mobility) maximum assist (25% patient effort);2 person assist;verbal cues;nonverbal cues (demo/gesture);moderate assist (50% patient effort)  -CN     Assistive Device (Bed Mobility) bed rails  -CN       Row Name 11/04/23 1409          Sit-Stand Transfer    Sit-Stand [place] (Transfers) 2 person assist;verbal cues;nonverbal cues (demo/gesture);moderate assist (50% patient effort)  -CN     Assistive Device (Sit-Stand Transfers) walker, front-wheeled  -CN     Comment, (Sit-Stand Transfer) Pt performed STS x3 at EOB, required assist with placement of L UE on Rwx  -CN       Row Name 11/04/23 1409          Gait/Stairs (Locomotion)    [place] Level (Gait) minimum assist (75% patient effort);2 person assist;verbal cues  -CN     Assistive Device (Gait) walker, front-wheeled  -CN     Distance in Feet (Gait) 3' sidestep to HOB, cues to march feet  -CN     Deviations/Abnormal Patterns (Gait) base of support, narrow  -CN               User Key  (r) = Recorded By, (t) = Taken By, (c) = Cosigned By      Initials Name Provider Type    Mayi Yates, PT Physical Therapist                   Obj/Interventions    No documentation.                  Goals/Plan    No documentation.                  Clinical Impression       Row Name 11/04/23 1411          Pain    Pretreatment Pain Rating 0/10 - no pain  -CN     Posttreatment Pain Rating 0/10 - no pain  -CN       Row Name 11/04/23 1411          Plan of Care Review    Plan of Care Reviewed With patient  -CN     Progress improving  -CN     Outcome Evaluation Pt agreeable to particpate with PT this date. Able to perform supine to sit with modA x1 and cues for sequencing. Pt stood EOB x2 with modAx2 and able to sidestep 3' to HOB with cues to increase toe clearance. Pt continues to be a good candidate for skilled PT services.  -CN       Row Name 11/04/23 1411          Therapy Assessment/Plan (PT)    Rehab Potential (PT) good, to achieve stated therapy  goals  -CN     Criteria for Skilled Interventions Met (PT) skilled treatment is necessary  -CN     Therapy Frequency (PT) 6 times/wk  -CN       Row Name 11/04/23 1411          Positioning and Restraints    Pre-Treatment Position in bed  -CN     Post Treatment Position bed  -CN     In Bed supine;call light within reach;encouraged to call for assist;exit alarm on;with family/caregiver;LUE elevated;legs elevated  -CN               User Key  (r) = Recorded By, (t) = Taken By, (c) = Cosigned By      Initials Name Provider Type    Mayi Yates, PT Physical Therapist                   Outcome Measures       Row Name 11/04/23 1412 11/04/23 0800       How much help from another person do you currently need...    Turning from your back to your side while in flat bed without using bedrails? 3  -CN 3  -MB    Moving from lying on back to sitting on the side of a flat bed without bedrails? 2  -CN 3  -MB    Moving to and from a bed to a chair (including a wheelchair)? 2  -CN 2  -MB    Standing up from a chair using your arms (e.g., wheelchair, bedside chair)? 2  -CN 2  -MB    Climbing 3-5 steps with a railing? 1  -CN 1  -MB    To walk in hospital room? 2  -CN 1  -MB    AM-PAC 6 Clicks Score (PT) 12  -CN 12  -MB    Highest level of mobility 4 --> Transferred to chair/commode  -CN 4 --> Transferred to chair/commode  -MB      Row Name 11/04/23 1412          Functional Assessment    Outcome Measure Options AM-PAC 6 Clicks Basic Mobility (PT)  -CN               User Key  (r) = Recorded By, (t) = Taken By, (c) = Cosigned By      Initials Name Provider Type    Mayi Yates, PT Physical Therapist    Joyce Moran, RN Registered Nurse                                 Physical Therapy Education       Title: PT OT SLP Therapies (In Progress)       Topic: Physical Therapy (In Progress)       Point: Mobility training (In Progress)       Learning Progress Summary             Patient Acceptance, E, VU,NR by MICHELLE at  11/4/2023 1413    Acceptance, E, VU by ML at 10/31/2023 0308    Acceptance, E,TB,D, NL,NR by CB at 10/30/2023 1530    Acceptance, E, VU by ML at 10/30/2023 0409    Acceptance, E,TB,D, NR,NL by CB at 10/29/2023 1302   Family Acceptance, E,TB,D, NR,NL by CB at 10/29/2023 1302                         Point: Home exercise program (Done)       Learning Progress Summary             Patient Acceptance, E, VU,NR by CN at 11/4/2023 1413    Acceptance, E, VU by ML at 10/31/2023 0308    Acceptance, E,TB,D, NL,NR by CB at 10/30/2023 1530    Acceptance, E, VU by ML at 10/30/2023 0409                         Point: Body mechanics (In Progress)       Learning Progress Summary             Patient Acceptance, E, VU,NR by CN at 11/4/2023 1413    Acceptance, E, NL,NR by DJ at 11/1/2023 1152    Acceptance, E, VU by ML at 10/31/2023 0308    Acceptance, E,TB,D, NL,NR by CB at 10/30/2023 1530    Acceptance, E, VU by ML at 10/30/2023 0409    Acceptance, E,TB,D, NR,NL by CB at 10/29/2023 1302   Family Acceptance, E,TB,D, NR,NL by CB at 10/29/2023 1302                         Point: Precautions (In Progress)       Learning Progress Summary             Patient Acceptance, E, VU,NR by CN at 11/4/2023 1413    Acceptance, E, NR,NL by MG at 11/3/2023 1210    Acceptance, E, NL,NR by DJ at 11/1/2023 1152    Acceptance, E, VU by ML at 10/31/2023 0308    Acceptance, E,TB,D, NL,NR by CB at 10/30/2023 1530    Acceptance, E, VU by ML at 10/30/2023 0409    Acceptance, E,TB,D, NR,NL by CB at 10/29/2023 1302   Family Acceptance, E,TB,D, NR,NL by CB at 10/29/2023 1302                                         User Key       Initials Effective Dates Name Provider Type Discipline    MG 05/24/22 -  Camelia Gibson, PT Physical Therapist PT    CN 06/16/21 -  Mayi Webber, PT Physical Therapist PT    DJ 10/25/19 -  Sheila Reyna, PT Physical Therapist PT    CB 10/22/21 -  Katherin England, PT Physical Therapist PT    ML 06/15/23 -  Kendal Flor RN  Registered Nurse Nurse                  PT Recommendation and Plan     Plan of Care Reviewed With: patient  Progress: improving  Outcome Evaluation: Pt agreeable to particpate with PT this date. Able to perform supine to sit with modA x1 and cues for sequencing. Pt stood EOB x2 with modAx2 and able to sidestep 3' to HOB with cues to increase toe clearance. Pt continues to be a good candidate for skilled PT services.     Time Calculation:         PT Charges       Row Name 11/04/23 1413             Time Calculation    Start Time 1319  -CN      Stop Time 1336  -CN      Time Calculation (min) 17 min  -CN      PT Received On 11/04/23  -CN      PT - Next Appointment 11/06/23  -CN         Timed Charges    09673 - PT Therapeutic Activity Minutes 17  -CN         Total Minutes    Timed Charges Total Minutes 17  -CN       Total Minutes 17  -CN                User Key  (r) = Recorded By, (t) = Taken By, (c) = Cosigned By      Initials Name Provider Type    Mayi Yates, PT Physical Therapist                  Therapy Charges for Today       Code Description Service Date Service Provider Modifiers Qty    [id number]  PT THERAPEUTIC ACT EA 15 MIN 11/4/2023 Mayi Webber, PT GP 1            PT G-Codes  Outcome Measure Options: AM-PAC 6 Clicks Basic Mobility (PT)  AM-PAC 6 Clicks Score (PT): 12  AM-PAC 6 Clicks Score (OT): 14  Modified McCreary Scale: 5 - Severe disability.  Bedridden, incontinent, and requiring constant nursing care and attention.  PT Discharge Summary  Anticipated Discharge Disposition (PT): inpatient rehabilitation facility    Mayi Webber PT  11/4/2023

## 2023-11-04 NOTE — PROGRESS NOTES
[facility] NEUROSURGERY PROGRESS NOTE    HD #8: LUE weakness, watershed CVAs and symptomatic right ICA stenosis    Interval History: Patient stable.  No acute issues overnight.  Denies any new complaints today.    Vital signs in last 24 hours:  Temp:  [95.6 °F (35.3 °C)-98.4 °F (36.9 °C)] 97.5 °F (36.4 °C)  Heart Rate:  [62-72] 65  Resp:  [12-16] 16  BP: (117-157)/(60-84) 157/79    Intake/Output this shift:  No intake/output data recorded.    LABS:  Recent Results (from the past 24 hour(s))   ECG 12 Lead Drug Monitoring; Amiodarone    Collection Time: 11/04/23  3:38 AM   Result Value Ref Range    QT Interval 516 ms    QTC Interval 533 ms   Basic Metabolic Panel    Collection Time: 11/04/23  7:30 AM    Specimen: Blood   Result Value Ref Range    Glucose 88 65 - 99 mg/dL    BUN 16 8 - 23 mg/dL    Creatinine 1.32 (H) 0.76 - 1.27 mg/dL    Sodium 141 136 - 145 mmol/L    Potassium 2.9 (L) 3.5 - 5.2 mmol/L    Chloride 105 98 - 107 mmol/L    CO2 26.0 22.0 - 29.0 mmol/L    Calcium 8.5 (L) 8.6 - 10.5 mg/dL    BUN/Creatinine Ratio 12.1 7.0 - 25.0    Anion Gap 10.0 5.0 - 15.0 mmol/L    eGFR 56.2 (L) >60.0 mL/min/1.73   CBC (No Diff)    Collection Time: 11/04/23  7:30 AM    Specimen: Blood   Result Value Ref Range    WBC 7.82 3.40 - 10.80 10*3/mm3    RBC 4.27 4.14 - 5.80 10*6/mm3    Hemoglobin 13.2 13.0 - 17.7 g/dL    Hematocrit 38.2 37.5 - 51.0 %    MCV 89.5 79.0 - 97.0 fL    MCH 30.9 26.6 - 33.0 pg    MCHC 34.6 31.5 - 35.7 g/dL    RDW 12.2 (L) 12.3 - 15.4 %    RDW-SD 39.6 37.0 - 54.0 fl    MPV 10.1 6.0 - 12.0 fL    Platelets 265 140 - 450 10*3/mm3       IMAGING STUDIES:  Imaging Results (Last 24 Hours)       Procedure Component Value Units Date/Time    FL Video Swallow Single Contrast [[id number]] Collected: 11/03/23 1721     Updated: 11/03/23 1724    Narrative:      VIDEO SWALLOW STUDY     HISTORY: Dysphagia.     Fluoroscopy was provided for the speech pathologist during a video  swallow study.     No aspiration was seen.        Impression:      Fluoroscopy was provided for the speech pathologist during a  video swallow study. For full details please see the speech pathology  report.     This report was finalized on 11/3/2023 5:21 PM by Dr. Addy Joseph M.D  on Workstation: [id number]             PHYSICAL EXAM:  Mental Status: The patient is awake, alert and oriented x 3. Recent and remote memory functions are normal. The patient is attentive with normal concentration.  Language is fluent. Speech is clear. The speech is nondysarthric. Fund of knowledge is normal.  Cranial Nerves III, IV, VI: The pupils are 3 mm, equally round and reactive to light. The extraocular movements are full in all directions of gaze without nystagmus.  Cranial Nerve V: Facial sensation is intact to light touch.  Cranial Nerve VII: Facial movements are symmetric  Cranial Nerve VIII: Audition is intact to finger rub bilaterally.  Cranial Nerves IX, X: Uvula and palate elevate symmetrically.  Cranial Nerve XI: Sternocleidomastoid strength is 5/5 bilaterally with normal shoulder shrug.  Cranial Nerve XII: Midline tongue protrusion without atrophy or fasiculations.  Motor: Tone is normal in all four extremities without fasciculations, atrophy, or myoclonus. There are no involuntary movements.   Muscle Strength: 5-/5 muscle strength in left biceps and triceps muscles.  Otherwise, 5/5 muscle strength in bilateral upper and bilateral lower extremities. Sensory: The sensory examination is normal for light touch bilaterally and symmetrically in all extremities    ASSESSMENT/PLAN:    Cerebrovascular accident (CVA) due to embolism of right middle cerebral artery    Aspiration pneumonia    Acute on chronic systolic congestive heart failure    Hypertension    Stage 3a chronic kidney disease    Hyperlipidemia    Carotid artery stenosis, symptomatic, right    Tobacco abuse    Basal cell carcinoma (BCC) of skin of nose    HD #8: LUE weakness, watershed CVAs and symptomatic right  "ICA stenosis    Neuro: plan for right ICA stent on Monday 11/6 with Dr. Spann, P2Y12 pending, continue DAPT    Shared Decision Making: I discussed the patient's findings and my recommendations with patient and SABINO Berg  11/4/2023  08:39 EDT    \"Dictated utilizing Dragon dictation\".     "

## 2023-11-04 NOTE — PROGRESS NOTES
Name: Tigre Amaral ADMIT: 10/28/2023   : 1950  PCP: Provider, No Known    MRN: [id number] LOS: 7 days   AGE/SEX: 73 y.o. male  ROOM: [facility]/     Subjective   Subjective   Lying in bed.  No problems today.  Tolerating p.o. diet.  Wife at bedside.    Objective   Objective   Vital Signs  Temp:  [95.6 °F (35.3 °C)-98.4 °F (36.9 °C)] 95.6 °F (35.3 °C)  Heart Rate:  [62-72] 63  Resp:  [12-16] 16  BP: (117-147)/(60-84) 147/70  SpO2:  [97 %-100 %] 97 %  on   ;   Device (Oxygen Therapy): room air  Body mass index is 23.97 kg/m².  Physical Exam  HENT:      Nose:      Comments: Large mass on right nostril, external  Pulmonary:      Effort: Pulmonary effort is normal.      Breath sounds: No stridor.   Abdominal:      General: Abdomen is flat. There is no distension.      Palpations: Abdomen is soft.   Neurological:      General: No focal deficit present.      Mental Status: He is alert.         Results Review     I reviewed the patient's new clinical results.  Results from last 7 days   Lab Units 23  0730 23  0612 11/01/23  1846 10/30/23  0522   WBC 10*3/mm3 7.82 12.87* 14.89* 9.46   HEMOGLOBIN g/dL 13.2 13.5 14.3 13.1   PLATELETS 10*3/mm3 265 270 292 244     Results from last 7 days   Lab Units 23  0612 11/01/23  1846 10/31/23  1520 10/31/23  0643 10/30/23  2251 10/30/23  0522   SODIUM mmol/L 136 137  --  138  --  138   POTASSIUM mmol/L 3.9  3.9 3.4* 3.8 3.7   < > 3.2*   CHLORIDE mmol/L 109* 106  --  107  --  106   CO2 mmol/L 18.1* 20.0*  --  21.6*  --  25.0   BUN mg/dL 11 11  --  9  --  9   CREATININE mg/dL 1.14 1.21  --  1.07  --  1.11   GLUCOSE mg/dL 108* 119*  --  96  --  91   EGFR mL/min/1.73 67.1 62.4  --  72.4  --  69.2    < > = values in this interval not displayed.     Results from last 7 days   Lab Units 23  0612 23  1846 10/29/23  0600 10/28/23  1449   ALBUMIN g/dL 3.3* 3.4* 3.1* 3.7   BILIRUBIN mg/dL 1.2 1.1 0.8 0.6   ALK PHOS U/L 76 86 71 89   AST (SGOT) U/L 51* 51* 13  14   ALT (SGPT) U/L 16 14 6 7     Results from last 7 days   Lab Units 11/02/23  0612 11/01/23  1846 10/31/23  0643 10/30/23  0522 10/29/23  0600 10/28/23  1449   CALCIUM mg/dL 8.5* 8.9 8.4* 8.4* 8.3* 8.8   ALBUMIN g/dL 3.3* 3.4*  --   --  3.1* 3.7   MAGNESIUM mg/dL  --   --   --   --  1.8  --      Results from last 7 days   Lab Units 11/02/23  0612 11/01/23  1846   PROCALCITONIN ng/mL 0.06 0.04   LACTATE mmol/L  --  1.8     Glucose   Date/Time Value Ref Range Status   11/02/2023 0825 129 70 - 130 mg/dL Final   11/02/2023 0712 116 70 - 130 mg/dL Final   11/01/2023 1741 128 70 - 130 mg/dL Final       FL Video Swallow Single Contrast    Result Date: 11/3/2023  Fluoroscopy was provided for the speech pathologist during a video swallow study. For full details please see the speech pathology report.  This report was finalized on 11/3/2023 5:21 PM by Dr. Addy Joseph M.D on Workstation: [id number]      MRI Brain Without Contrast    Result Date: 11/2/2023  The study is significantly hampered by patient motion but multiple acute infarcts involving the right frontal, parietal and to a lesser extent occipital and temporal lobes are appreciated including both white matter and cortical infarcts. The majority of infarcts are oriented in an AP dimension consistent with watershed infarcts.  The above information was called to and discussed with SUNG Munroe on 11/02/2023 at 1610 hours.  This report was finalized on 11/2/2023 4:48 PM by Dr. Abhinav Vidales M.D on Workstation: [id number]      CT Head Without Contrast    Result Date: 11/2/2023  1.  A large remote left MCA distribution infarct is appreciated with volume loss similar in appearance as compared to the prior examination. Lacunar infarcts involving the corona radiata on the right are noted. There is no evidence of acute infarction or intracranial hemorrhage. Further evaluation could be performed with a MRI examination of the brain as indicated. 2.  A 3.3 x 1.9 cm soft  tissue mass is appreciated overlying the medial aspect of the maxilla on the right and extending over the posterior aspect of the nasal bone on the right. The patient has a known basal cell carcinoma at this location.  Radiation dose reduction techniques were utilized, including automated exposure control and exposure modulation based on body size.   This report was finalized on 11/2/2023 4:16 PM by Dr. Abhinav Vidales M.D on Workstation: [id number]     Scheduled Medications  amiodarone, 150 mg, Intravenous, Once  aspirin, 81 mg, Oral, Daily  atorvastatin, 80 mg, Oral, Nightly  carvedilol, 6.25 mg, Oral, BID With Meals  clopidogrel, 75 mg, Oral, Daily  enoxaparin, 40 mg, Subcutaneous, Q24H  lisinopril, 20 mg, Oral, Daily  nicotine, 1 patch, Transdermal, Q24H  piperacillin-tazobactam, 3.375 g, Intravenous, Q8H  senna-docusate sodium, 2 tablet, Oral, BID  sodium chloride, 10 mL, Intravenous, Q12H    Infusions   Diet  Diet: Cardiac Diets; Healthy Heart (2-3 Na+); No Mixed Consistencies; Texture: Mechanical Ground (NDD 2); Fluid Consistency: Thin (IDDSI 0)  NPO Diet NPO Type: Sips with Meds       Assessment/Plan     Active Hospital Problems    Diagnosis  POA    **Cerebrovascular accident (CVA) due to embolism of right middle cerebral artery [I63.411]  Yes    Aspiration pneumonia [J69.0]  Unknown    Acute on chronic systolic congestive heart failure [I50.23]  Unknown    Hypertension [I10]  Unknown    Stage 3a chronic kidney disease [N18.31]  Unknown    Hyperlipidemia [E78.5]  Unknown    Carotid artery stenosis, symptomatic, right [I65.21]  Unknown    Tobacco abuse [Z72.0]  Unknown    Basal cell carcinoma (BCC) of skin of nose [C44.311]  Unknown      Resolved Hospital Problems   No resolved problems to display.       73 y.o. male admitted with Cerebrovascular accident (CVA) due to embolism of right middle cerebral artery.      11/04/23  OR w/ VIET on 11/6. Replete K. Check Mg.    Acute CVA  Symptomatic R JOAN  HLD  -Stroke  following  -VI[facility] planning stent on 11/6  -ASA, Plavix    Cardiomyopathy  HTN  -Cardiology following  -Coreg 6.25 mg twice daily, lisinopril 20 mg         Lovenox for DVT prophylaxis.  Discussed with patient and family.  Anticipate discharge Pending PT/OT eval. TBD      Salvatore Bedoya MD  [facility]  11/04/23  08:09 EDT

## 2023-11-04 NOTE — PROGRESS NOTES
"  Daily Progress Note.   [address]  11/4/2023    Patient:  Name:  Tigre Amaral  MRN:  [id number]  1950  73 y.o.  male         Requesting physician: Dr. Abhinav Barnes     Reason for Consultation:  resp distress icu mgmt     CC: resp distress    Interval History:     Follow up pna, ahrf pulmonary edema    Patient doing very well.  Denies any shortness of breath no fever no cough no sputum production.  Wake alert watching TV.  Says he is doing much better.    Physical Exam:  /92 (BP Location: Right arm, Patient Position: Lying)   Pulse 61   Temp 97.7 °F (36.5 °C) (Oral)   Resp 16   Ht 172.7 cm (68\")   Wt 71.5 kg (157 lb 10.1 oz)   SpO2 96%   BMI 23.97 kg/m²   Body mass index is 23.97 kg/m².    Intake/Output Summary (Last 24 hours) at 11/4/2023 1621  Last data filed at 11/4/2023 0652  Gross per 24 hour   Intake 100 ml   Output --   Net 100 ml     General appearance: Awake alert, confused conversant   Eyes: Anicteric sclerae, moist conjunctivae; no lid lag;   HENT: Atraumatic; oropharynx clear with moist mucous membranes and no mucosal ulcerations; large 4 to 5 cm skin abnormality partially obstructing right  Neck: Trachea midline; no JVD  Lungs: Bilateral air entry, no wheeze rhonchi calm respiratory effort and nointercostal retractions  CV: rrr  Abdomen: thin non rigid  Extremities:   Skin:warm dry  Psych: calm affect, alert        Data Review:  Notable Labs:  Results from last 7 days   Lab Units 11/04/23  0730 11/02/23  0612 11/01/23  1846 10/30/23  0522 10/29/23  0600   WBC 10*3/mm3 7.82 12.87* 14.89* 9.46 8.49   HEMOGLOBIN g/dL 13.2 13.5 14.3 13.1 13.1   PLATELETS 10*3/mm3 265 270 292 244 222     Results from last 7 days   Lab Units 11/04/23  0730 11/02/23  0612 11/01/23  1846 10/31/23  1520 10/31/23  0643 10/30/23  2251 10/30/23  0522 10/29/23  2015 10/29/23  0600   SODIUM mmol/L 141 136 137  --  138  --  138  --  139   POTASSIUM mmol/L 2.9* 3.9  3.9 3.4* 3.8 3.7 3.2* " 3.2*   < > 2.4*   CHLORIDE mmol/L 105 109* 106  --  107  --  106  --  103   CO2 mmol/L 26.0 18.1* 20.0*  --  21.6*  --  25.0  --  26.4   BUN mg/dL 16 11 11  --  9  --  9  --  9   CREATININE mg/dL 1.32* 1.14 1.21  --  1.07  --  1.11  --  1.28*   GLUCOSE mg/dL 88 108* 119*  --  96  --  91  --  93   CALCIUM mg/dL 8.5* 8.5* 8.9  --  8.4*  --  8.4*  --  8.3*   MAGNESIUM mg/dL 2.0  --   --   --   --   --   --   --  1.8    < > = values in this interval not displayed.   Estimated Creatinine Clearance: 48.9 mL/min (A) (by C-G formula based on SCr of 1.32 mg/dL (H)).    Results from last 7 days   Lab Units 11/04/23  0730 11/02/23  0612 11/01/23  1846 10/30/23  0522 10/29/23  0600   AST (SGOT) U/L  --  51* 51*  --  13   ALT (SGPT) U/L  --  16 14  --  6   PROCALCITONIN ng/mL  --  0.06 0.04  --   --    LACTATE mmol/L  --   --  1.8  --   --    PLATELETS 10*3/mm3 265 270 292   < > 222    < > = values in this interval not displayed.             Imaging:  Reviewed chest images personally from past 3 days    ASSESSMENT  /  PLAN:  Imaging: I personally visualized the images of scans/x-rays performed within last 3 days.     Cxr - right middle lobe infiltrate, +vascular congestion +ICD      Left ventricular systolic function is severely decreased. Calculated left ventricular EF = 21.6% Left ventricular ejection fraction appears to be less than 20%.The following left ventricular wall segments are hypokinetic: mid anterolateral, basal inferolateral, mid inferolateral, mid inferior, basal inferoseptal, mid inferoseptal, mid anteroseptal, basal inferior and basal inferoseptal. The following left ventricular wall segments are akinetic: apical anterior, apical lateral, apical inferior, apical septal and apex.    Left ventricular wall thickness is consistent with mild concentric hypertrophy.    Left ventricular diastolic function is consistent with (grade II w/high LAP) pseudonormalization.    The left atrial cavity is mildly dilated.     Saline test results are negative for right to left atrial level shunt.    Estimated right ventricular systolic pressure from tricuspid regurgitation is mildly elevated (35-45 mmHg).        Assessment and plan  Acute on chronic decompensated systolic heart failure with EF 20%  Cardiomyopathy per outpatient records no mention of ischemia or coronary artery disease  Status post ICD.  Hypertension  Hyperlipidemia  Aspiration pneumonia  Stroke  Respiratory distress  Acute pulmonary edema from heart failure        From a pulmonary perspective      Stop Zosyn  Start Augmentin  Complete 5-day antibiotic course     Titrated oxygen down on room air now    Electronically signed by Magdiel Murray MD, 11/04/23, 4:22 PM EDT.

## 2023-11-05 LAB — POTASSIUM SERPL-SCNC: 3.9 MMOL/L (ref 3.5–5.2)

## 2023-11-05 PROCEDURE — 84132 ASSAY OF SERUM POTASSIUM: CPT | Performed by: STUDENT IN AN ORGANIZED HEALTH CARE EDUCATION/TRAINING PROGRAM

## 2023-11-05 PROCEDURE — 36600 WITHDRAWAL OF ARTERIAL BLOOD: CPT

## 2023-11-05 PROCEDURE — 25010000002 ENOXAPARIN PER 10 MG: Performed by: HOSPITALIST

## 2023-11-05 PROCEDURE — 82803 BLOOD GASES ANY COMBINATION: CPT

## 2023-11-05 RX ORDER — POTASSIUM CHLORIDE 1.5 G/1.58G
40 POWDER, FOR SOLUTION ORAL EVERY 4 HOURS
Status: DISPENSED | OUTPATIENT
Start: 2023-11-05 | End: 2023-11-05

## 2023-11-05 RX ORDER — HYDROCODONE BITARTRATE AND ACETAMINOPHEN 5; 325 MG/1; MG/1
1 TABLET ORAL EVERY 6 HOURS PRN
Status: DISCONTINUED | OUTPATIENT
Start: 2023-11-05 | End: 2023-11-11 | Stop reason: HOSPADM

## 2023-11-05 RX ADMIN — CARVEDILOL 6.25 MG: 6.25 TABLET, FILM COATED ORAL at 08:00

## 2023-11-05 RX ADMIN — CARVEDILOL 6.25 MG: 6.25 TABLET, FILM COATED ORAL at 16:48

## 2023-11-05 RX ADMIN — SENNOSIDES AND DOCUSATE SODIUM 2 TABLET: 50; 8.6 TABLET ORAL at 20:16

## 2023-11-05 RX ADMIN — AMOXICILLIN AND CLAVULANATE POTASSIUM 1 TABLET: 875; 125 TABLET, FILM COATED ORAL at 20:16

## 2023-11-05 RX ADMIN — NICOTINE 1 PATCH: 21 PATCH, EXTENDED RELEASE TRANSDERMAL at 08:00

## 2023-11-05 RX ADMIN — ACETAMINOPHEN 650 MG: 325 TABLET, FILM COATED ORAL at 12:35

## 2023-11-05 RX ADMIN — POTASSIUM CHLORIDE 40 MEQ: 1.5 FOR SOLUTION ORAL at 04:44

## 2023-11-05 RX ADMIN — SENNOSIDES AND DOCUSATE SODIUM 2 TABLET: 50; 8.6 TABLET ORAL at 08:00

## 2023-11-05 RX ADMIN — LISINOPRIL 20 MG: 20 TABLET ORAL at 08:00

## 2023-11-05 RX ADMIN — ENOXAPARIN SODIUM 40 MG: 100 INJECTION SUBCUTANEOUS at 20:17

## 2023-11-05 RX ADMIN — CLOPIDOGREL BISULFATE 75 MG: 75 TABLET, FILM COATED ORAL at 08:00

## 2023-11-05 RX ADMIN — AMOXICILLIN AND CLAVULANATE POTASSIUM 1 TABLET: 875; 125 TABLET, FILM COATED ORAL at 08:00

## 2023-11-05 RX ADMIN — ATORVASTATIN CALCIUM 80 MG: 80 TABLET, FILM COATED ORAL at 20:16

## 2023-11-05 RX ADMIN — ASPIRIN 81 MG: 81 TABLET, CHEWABLE ORAL at 08:00

## 2023-11-05 RX ADMIN — Medication 10 ML: at 20:17

## 2023-11-05 NOTE — PROGRESS NOTES
This RT attempted to draw the ABG 3 times before having to refuse the pt. First attempt was on his right brachial. He pulled away and screamed that hurt. At that time his family member came in and said try the left arm he doesn't use that arm as much. Another attempt was tried on his left brachial but yielded the same result. The last attempt the patients family member held his left arm down for me as I attempted again. I was able to get the needle in but the patient screamed and raised his fist at this RT. At that time I took the needle out and told the family member we will have to wait and this RT would let the nurse know what happened.

## 2023-11-05 NOTE — PROGRESS NOTES
Spoke with Dr. Bedoya, d/t patient refusal, raising his fist at RT, and numerous attempts to obtain ABG Dr. Bedoya agrees that we should not attempt further at this time and is aware of the situation. ABG order d/c'd per Dr. Bedoya

## 2023-11-05 NOTE — PROGRESS NOTES
VBG obtained by lab, the sample was ran and was arterial instead of venous. ABG re-ordered in place of VBG order. Results given to RN and are as follows.     pH 7.47   CO2 26.8  pO2 75.9  HCO3 19.7  BE -2.4  SO2 96.2%      Sample obtained with pt on 2L NC.

## 2023-11-05 NOTE — PROGRESS NOTES
Called to the patients room by RN to attempt to obtain ABG again. Patient agreed to the procedure, once stuck the patient started yelling profanities at this RT and jerking his arms. Attempt was unsuccessful, referred RN to order a VBG since the patient's CO2 was the main concern instead of PO2. Patient agreed with this. Orders placed by RN, on standby to run VBG.

## 2023-11-05 NOTE — PROGRESS NOTES
Name: Tigre Amaral ADMIT: 10/28/2023   : 1950  PCP: Provider, No Known    MRN: [id number] LOS: 8 days   AGE/SEX: 73 y.o. male  ROOM: [facility]     Subjective   Subjective   Resting in bed.  He says he feels short of breath.    Objective   Objective   Vital Signs  Temp:  [97.5 °F (36.4 °C)-98.5 °F (36.9 °C)] 98.1 °F (36.7 °C)  Heart Rate:  [61-84] 82  Resp:  [16] 16  BP: (124-162)/(79-92) 162/90  SpO2:  [96 %-100 %] 98 %  on  Flow (L/min):  [2] 2;   Device (Oxygen Therapy): nasal cannula  Body mass index is 25.34 kg/m².  Physical Exam  HENT:      Nose:      Comments: Large mass on right nostril, external  Pulmonary:      Effort: Pulmonary effort is normal.      Breath sounds: No stridor.   Abdominal:      General: Abdomen is flat. There is no distension.      Palpations: Abdomen is soft.   Neurological:      General: No focal deficit present.      Mental Status: He is alert.         Results Review     I reviewed the patient's new clinical results.  Results from last 7 days   Lab Units 23  072312 11/01/23  1846 10/30/23  0522   WBC 10*3/mm3 7.82 12.87* 14.89* 9.46   HEMOGLOBIN g/dL 13.2 13.5 14.3 13.1   PLATELETS 10*3/mm3 265 270 292 244     Results from last 7 days   Lab Units 23  2314 23  0730 23  0612 23  1846 10/31/23  1520 10/31/23  0643   SODIUM mmol/L  --  141 136 137  --  138   POTASSIUM mmol/L 3.2* 2.9* 3.9  3.9 3.4*   < > 3.7   CHLORIDE mmol/L  --  105 109* 106  --  107   CO2 mmol/L  --  26.0 18.1* 20.0*  --  21.6*   BUN mg/dL  --  16 11 11  --  9   CREATININE mg/dL  --  1.32* 1.14 1.21  --  1.07   GLUCOSE mg/dL  --  88 108* 119*  --  96   EGFR mL/min/1.73  --  56.2* 67.1 62.4  --  72.4    < > = values in this interval not displayed.     Results from last 7 days   Lab Units 23  0612 23  1846   ALBUMIN g/dL 3.3* 3.4*   BILIRUBIN mg/dL 1.2 1.1   ALK PHOS U/L 76 86   AST (SGOT) U/L 51* 51*   ALT (SGPT) U/L 16 14     Results from last 7 days    Lab Units 11/04/23  0730 11/02/23  0612 11/01/23  1846 10/31/23  0643   CALCIUM mg/dL 8.5* 8.5* 8.9 8.4*   ALBUMIN g/dL  --  3.3* 3.4*  --    MAGNESIUM mg/dL 2.0  --   --   --      Results from last 7 days   Lab Units 11/02/23  0612 11/01/23  1846   PROCALCITONIN ng/mL 0.06 0.04   LACTATE mmol/L  --  1.8     Glucose   Date/Time Value Ref Range Status   11/02/2023 0825 129 70 - 130 mg/dL Final       FL Video Swallow Single Contrast    Result Date: 11/3/2023  Fluoroscopy was provided for the speech pathologist during a video swallow study. For full details please see the speech pathology report.  This report was finalized on 11/3/2023 5:21 PM by Dr. Addy Joseph M.D on Workstation: [id number]     Scheduled Medications  amiodarone, 150 mg, Intravenous, Once  amoxicillin-clavulanate, 1 tablet, Oral, Q12H  aspirin, 81 mg, Oral, Daily  atorvastatin, 80 mg, Oral, Nightly  carvedilol, 6.25 mg, Oral, BID With Meals  clopidogrel, 75 mg, Oral, Daily  enoxaparin, 40 mg, Subcutaneous, Q24H  lisinopril, 20 mg, Oral, Daily  nicotine, 1 patch, Transdermal, Q24H  potassium chloride, 40 mEq, Oral, Q4H  senna-docusate sodium, 2 tablet, Oral, BID  sodium chloride, 10 mL, Intravenous, Q12H    Infusions   Diet  Diet: Cardiac Diets; Healthy Heart (2-3 Na+); No Mixed Consistencies; Texture: Mechanical Ground (NDD 2); Fluid Consistency: Thin (IDDSI 0)  NPO Diet NPO Type: Sips with Meds       Assessment/Plan     Active Hospital Problems    Diagnosis  POA    **Cerebrovascular accident (CVA) due to embolism of right middle cerebral artery [I63.411]  Yes    Aspiration pneumonia [J69.0]  Unknown    Acute on chronic systolic congestive heart failure [I50.23]  Unknown    Hypertension [I10]  Unknown    Stage 3a chronic kidney disease [N18.31]  Unknown    Hyperlipidemia [E78.5]  Unknown    Carotid artery stenosis, symptomatic, right [I65.21]  Unknown    Tobacco abuse [Z72.0]  Unknown    Basal cell carcinoma (BCC) of skin of nose [C44.311]  Unknown       Resolved Hospital Problems   No resolved problems to display.       73 y.o. male admitted with Cerebrovascular accident (CVA) due to embolism of right middle cerebral artery.      11/05/23  Attempted to obtain ABG, however patient became very agitated and would not let RT draw.  Pulmonology following and repeat CXR ordered for this evening.  He says he is short of breath, though O2 sats as well as vitals are all stable.    Acute CVA  Symptomatic R JOAN  HLD  -Stroke following  -VIET planning stent on 11/6  -ASA, Plavix    Aspiration PNA  -Augmentin    Cardiomyopathy  HTN  -TTE 20%  -Cardiology following  -Coreg 6.25 mg twice daily, lisinopril 20 mg     Flow (L/min):  [2] 2    Lovenox for DVT prophylaxis.  Discussed with patient and family.  Anticipate discharge Pending PT/OT eval. TBDIANE Bedoya MD  [facility]  11/05/23  08:17 EST

## 2023-11-05 NOTE — PROGRESS NOTES
"  Daily Progress Note.   [address]  11/5/2023    Patient:  Name:  Tigre Amaral  MRN:  [id number]  1950  73 y.o.  male         Requesting physician: Dr. Abhinav Barnes     Reason for Consultation:  resp distress icu mgmt     CC: resp distress    Interval History:  Follow up pna, ahrf pulmonary edema  Placed on 2lnc no desaturation noted  Pt denies worsening soa.  Calmly watching tv, food in front of him  No cp no palpitations   No lethargy.  Took augmentin crushed in applesauce    Physical Exam:  /94 (BP Location: Right arm, Patient Position: Lying)   Pulse 89   Temp 96.3 °F (35.7 °C) (Axillary)   Resp 16   Ht 172.7 cm (68\")   Wt 75.6 kg (166 lb 10.7 oz)   SpO2 97%   BMI 25.34 kg/m²   Body mass index is 25.34 kg/m².  No intake or output data in the 24 hours ending 11/05/23 1248    General appearance: Awake alert, conversant   Eyes: Anicteric sclerae, moist conjunctivae; no lid lag;   HENT: Atraumatic; oropharynx clear with moist mucous membranes and no mucosal ulcerations; large 4 to 5 cm skin abnormality partially obstructing right  Neck: Trachea midline; no JVD  Lungs: Bilateral air entry, no wheeze rhonchi calm respiratory effort and nointercostal retractions  CV: rrr  Abdomen: thin non rigid  Extremities:   Skin:warm dry  Psych: calm affect, alert        Data Review:  Notable Labs:  Results from last 7 days   Lab Units 11/04/23  0730 11/02/23  0612 11/01/23  1846 10/30/23  0522   WBC 10*3/mm3 7.82 12.87* 14.89* 9.46   HEMOGLOBIN g/dL 13.2 13.5 14.3 13.1   PLATELETS 10*3/mm3 265 270 292 244     Results from last 7 days   Lab Units 11/04/23  2314 11/04/23  0730 11/02/23  0612 11/01/23  1846 10/31/23  1520 10/31/23  0643 10/30/23  2251 10/30/23  0522   SODIUM mmol/L  --  141 136 137  --  138  --  138   POTASSIUM mmol/L 3.2* 2.9* 3.9  3.9 3.4* 3.8 3.7 3.2* 3.2*   CHLORIDE mmol/L  --  105 109* 106  --  107  --  106   CO2 mmol/L  --  26.0 18.1* 20.0*  --  21.6*  --  25.0 "   BUN mg/dL  --  16 11 11  --  9  --  9   CREATININE mg/dL  --  1.32* 1.14 1.21  --  1.07  --  1.11   GLUCOSE mg/dL  --  88 108* 119*  --  96  --  91   CALCIUM mg/dL  --  8.5* 8.5* 8.9  --  8.4*  --  8.4*   MAGNESIUM mg/dL  --  2.0  --   --   --   --   --   --    Estimated Creatinine Clearance: 51.7 mL/min (A) (by C-G formula based on SCr of 1.32 mg/dL (H)).    Results from last 7 days   Lab Units 11/04/23  0730 11/02/23  0612 11/01/23  1846   AST (SGOT) U/L  --  51* 51*   ALT (SGPT) U/L  --  16 14   PROCALCITONIN ng/mL  --  0.06 0.04   LACTATE mmol/L  --   --  1.8   PLATELETS 10*3/mm3 265 270 292             Imaging:  Reviewed chest images personally from past 3 days    ASSESSMENT  /  PLAN:  Imaging: I personally visualized the images of scans/x-rays performed within last 3 days.     Cxr - right middle lobe infiltrate, +vascular congestion +ICD      Left ventricular systolic function is severely decreased. Calculated left ventricular EF = 21.6% Left ventricular ejection fraction appears to be less than 20%.The following left ventricular wall segments are hypokinetic: mid anterolateral, basal inferolateral, mid inferolateral, mid inferior, basal inferoseptal, mid inferoseptal, mid anteroseptal, basal inferior and basal inferoseptal. The following left ventricular wall segments are akinetic: apical anterior, apical lateral, apical inferior, apical septal and apex.    Left ventricular wall thickness is consistent with mild concentric hypertrophy.    Left ventricular diastolic function is consistent with (grade II w/high LAP) pseudonormalization.    The left atrial cavity is mildly dilated.    Saline test results are negative for right to left atrial level shunt.    Estimated right ventricular systolic pressure from tricuspid regurgitation is mildly elevated (35-45 mmHg).        Assessment and plan  Acute on chronic decompensated systolic heart failure with EF 20%  Cardiomyopathy per outpatient records no mention of  ischemia or coronary artery disease  Status post ICD.  Hypertension  Hyperlipidemia  Aspiration pneumonia  Stroke  Respiratory distress  Acute pulmonary edema from heart failure     From a pulmonary perspective      Stop Zosyn  Complete Augmentin crushed in apple sauce  Repeat cxr in am    Complete 5-day antibiotic course     Titrated oxygen down on room air unless hypoxia and please document if has and retitrate up.    Updated pts daughter at bedside.    Electronically signed by Magdiel Murray MD, 11/05/23, 1:07 PM EST.

## 2023-11-05 NOTE — PLAN OF CARE
Goal Outcome Evaluation:            Pt confused and frequently trying to get out of bed during night, removing oxygen and telemetry. Had to have oxygen applied at bedtime due to shortness of breath.    Pt. States he cannot take large pills, so Augmenton was crushed, pt. Took in applesauce, then stated to never due that again. Potassium given in packet in drink of orange juice. Potassium improved but is still 3.2 today.

## 2023-11-06 ENCOUNTER — APPOINTMENT (OUTPATIENT)
Dept: INTERVENTIONAL RADIOLOGY/VASCULAR | Facility: HOSPITAL | Age: 73
DRG: 034 | End: 2023-11-06
Payer: OTHER GOVERNMENT

## 2023-11-06 ENCOUNTER — APPOINTMENT (OUTPATIENT)
Dept: GENERAL RADIOLOGY | Facility: HOSPITAL | Age: 73
DRG: 034 | End: 2023-11-06
Payer: OTHER GOVERNMENT

## 2023-11-06 ENCOUNTER — ANESTHESIA EVENT (OUTPATIENT)
Dept: INTERVENTIONAL RADIOLOGY/VASCULAR | Facility: HOSPITAL | Age: 73
End: 2023-11-06
Payer: OTHER GOVERNMENT

## 2023-11-06 LAB
ANION GAP SERPL CALCULATED.3IONS-SCNC: 13.2 MMOL/L (ref 5–15)
ARTERIAL PATENCY WRIST A: ABNORMAL
ATMOSPHERIC PRESS: 751.1 MMHG
BASE EXCESS BLDA CALC-SCNC: -2.4 MMOL/L (ref 0–2)
BDY SITE: ABNORMAL
BUN SERPL-MCNC: 15 MG/DL (ref 8–23)
BUN/CREAT SERPL: 13.4 (ref 7–25)
CALCIUM SPEC-SCNC: 8 MG/DL (ref 8.6–10.5)
CHLORIDE SERPL-SCNC: 108 MMOL/L (ref 98–107)
CO2 BLDA-SCNC: 20.5 MMOL/L (ref 23–27)
CO2 SERPL-SCNC: 21.8 MMOL/L (ref 22–29)
CREAT SERPL-MCNC: 1.12 MG/DL (ref 0.76–1.27)
DEVICE COMMENT: ABNORMAL
EGFRCR SERPLBLD CKD-EPI 2021: 68.5 ML/MIN/1.73
GAS FLOW AIRWAY: 2 LPM
GLUCOSE BLDC GLUCOMTR-MCNC: 89 MG/DL (ref 70–130)
GLUCOSE SERPL-MCNC: 97 MG/DL (ref 65–99)
HCO3 BLDA-SCNC: 19.7 MMOL/L (ref 22–28)
HEMODILUTION: NO
MODALITY: ABNORMAL
PA ADP PRP-ACNC: 261 PRU (ref 194–418)
PCO2 BLDA: 26.8 MM HG (ref 35–45)
PH BLDA: 7.47 PH UNITS (ref 7.35–7.45)
PO2 BLDA: 75.9 MM HG (ref 80–100)
POTASSIUM SERPL-SCNC: 3.3 MMOL/L (ref 3.5–5.2)
SAO2 % BLDCOA: 96.2 % (ref 92–98.5)
SODIUM SERPL-SCNC: 143 MMOL/L (ref 136–145)

## 2023-11-06 PROCEDURE — 25010000002 HEPARIN (PORCINE) PER 1000 UNITS: Performed by: RADIOLOGY

## 2023-11-06 PROCEDURE — 36226 PLACE CATH VERTEBRAL ART: CPT | Performed by: RADIOLOGY

## 2023-11-06 PROCEDURE — 037K3DZ DILATION OF RIGHT INTERNAL CAROTID ARTERY WITH INTRALUMINAL DEVICE, PERCUTANEOUS APPROACH: ICD-10-PCS | Performed by: RADIOLOGY

## 2023-11-06 PROCEDURE — 80048 BASIC METABOLIC PNL TOTAL CA: CPT | Performed by: INTERNAL MEDICINE

## 2023-11-06 PROCEDURE — C1884 EMBOLIZATION PROTECT SYST: HCPCS

## 2023-11-06 PROCEDURE — C1887 CATHETER, GUIDING: HCPCS

## 2023-11-06 PROCEDURE — 25010000002 SUGAMMADEX 200 MG/2ML SOLUTION: Performed by: STUDENT IN AN ORGANIZED HEALTH CARE EDUCATION/TRAINING PROGRAM

## 2023-11-06 PROCEDURE — C1769 GUIDE WIRE: HCPCS

## 2023-11-06 PROCEDURE — 85347 COAGULATION TIME ACTIVATED: CPT

## 2023-11-06 PROCEDURE — C1894 INTRO/SHEATH, NON-LASER: HCPCS

## 2023-11-06 PROCEDURE — 85576 BLOOD PLATELET AGGREGATION: CPT | Performed by: RADIOLOGY

## 2023-11-06 PROCEDURE — 25010000002 HEPARIN (PORCINE) PER 1000 UNITS: Performed by: ANESTHESIOLOGY

## 2023-11-06 PROCEDURE — 25010000002 FUROSEMIDE PER 20 MG: Performed by: INTERNAL MEDICINE

## 2023-11-06 PROCEDURE — 36223 PLACE CATH CAROTID/INOM ART: CPT | Performed by: RADIOLOGY

## 2023-11-06 PROCEDURE — C1725 CATH, TRANSLUMIN NON-LASER: HCPCS

## 2023-11-06 PROCEDURE — 71045 X-RAY EXAM CHEST 1 VIEW: CPT

## 2023-11-06 PROCEDURE — 0 IODIXANOL PER 1 ML: Performed by: INTERNAL MEDICINE

## 2023-11-06 PROCEDURE — 25010000002 PROPOFOL 10 MG/ML EMULSION: Performed by: ANESTHESIOLOGY

## 2023-11-06 PROCEDURE — X2AH336 CEREBRAL EMBOLIC FILTRATION, EXTRACORPOREAL FLOW REVERSAL CIRCUIT FROM RIGHT COMMON CAROTID ARTERY, PERCUTANEOUS APPROACH, NEW TECHNOLOGY GROUP 6: ICD-10-PCS | Performed by: RADIOLOGY

## 2023-11-06 PROCEDURE — 37215 TRANSCATH STENT CCA W/EPS: CPT | Performed by: RADIOLOGY

## 2023-11-06 PROCEDURE — 25010000002 ONDANSETRON PER 1 MG: Performed by: RADIOLOGY

## 2023-11-06 PROCEDURE — 25010000002 FENTANYL CITRATE (PF) 50 MCG/ML SOLUTION: Performed by: ANESTHESIOLOGY

## 2023-11-06 PROCEDURE — 25010000002 ENOXAPARIN PER 10 MG: Performed by: RADIOLOGY

## 2023-11-06 PROCEDURE — 82948 REAGENT STRIP/BLOOD GLUCOSE: CPT

## 2023-11-06 PROCEDURE — 25810000003 SODIUM CHLORIDE 0.9 % SOLUTION: Performed by: RADIOLOGY

## 2023-11-06 PROCEDURE — C1760 CLOSURE DEV, VASC: HCPCS

## 2023-11-06 PROCEDURE — 25810000003 LACTATED RINGERS PER 1000 ML: Performed by: STUDENT IN AN ORGANIZED HEALTH CARE EDUCATION/TRAINING PROGRAM

## 2023-11-06 PROCEDURE — C1876 STENT, NON-COA/NON-COV W/DEL: HCPCS

## 2023-11-06 RX ORDER — CLOPIDOGREL BISULFATE 75 MG/1
75 TABLET ORAL DAILY
Status: DISCONTINUED | OUTPATIENT
Start: 2023-11-06 | End: 2023-11-11 | Stop reason: HOSPADM

## 2023-11-06 RX ORDER — SODIUM CHLORIDE, SODIUM LACTATE, POTASSIUM CHLORIDE, CALCIUM CHLORIDE 600; 310; 30; 20 MG/100ML; MG/100ML; MG/100ML; MG/100ML
9 INJECTION, SOLUTION INTRAVENOUS CONTINUOUS PRN
Status: DISCONTINUED | OUTPATIENT
Start: 2023-11-06 | End: 2023-11-11 | Stop reason: HOSPADM

## 2023-11-06 RX ORDER — POTASSIUM CHLORIDE 1.5 G/1.58G
40 POWDER, FOR SOLUTION ORAL EVERY 4 HOURS
Status: COMPLETED | OUTPATIENT
Start: 2023-11-06 | End: 2023-11-07

## 2023-11-06 RX ORDER — HEPARIN SODIUM 1000 [USP'U]/ML
INJECTION, SOLUTION INTRAVENOUS; SUBCUTANEOUS AS NEEDED
Status: DISCONTINUED | OUTPATIENT
Start: 2023-11-06 | End: 2023-11-06 | Stop reason: SURG

## 2023-11-06 RX ORDER — HYDROCODONE BITARTRATE AND ACETAMINOPHEN 5; 325 MG/1; MG/1
1 TABLET ORAL ONCE AS NEEDED
Status: DISCONTINUED | OUTPATIENT
Start: 2023-11-06 | End: 2023-11-06

## 2023-11-06 RX ORDER — ONDANSETRON 2 MG/ML
4 INJECTION INTRAMUSCULAR; INTRAVENOUS ONCE AS NEEDED
Status: DISCONTINUED | OUTPATIENT
Start: 2023-11-06 | End: 2023-11-06

## 2023-11-06 RX ORDER — SODIUM CHLORIDE 0.9 % (FLUSH) 0.9 %
10 SYRINGE (ML) INJECTION EVERY 12 HOURS SCHEDULED
Status: DISCONTINUED | OUTPATIENT
Start: 2023-11-06 | End: 2023-11-06

## 2023-11-06 RX ORDER — DIAZEPAM 5 MG/1
5 TABLET ORAL ONCE
Status: COMPLETED | OUTPATIENT
Start: 2023-11-06 | End: 2023-11-06

## 2023-11-06 RX ORDER — PROPOFOL 10 MG/ML
VIAL (ML) INTRAVENOUS AS NEEDED
Status: DISCONTINUED | OUTPATIENT
Start: 2023-11-06 | End: 2023-11-06 | Stop reason: SURG

## 2023-11-06 RX ORDER — MIDAZOLAM HYDROCHLORIDE 1 MG/ML
0.5 INJECTION INTRAMUSCULAR; INTRAVENOUS
Status: DISCONTINUED | OUTPATIENT
Start: 2023-11-06 | End: 2023-11-06

## 2023-11-06 RX ORDER — SODIUM CHLORIDE 9 MG/ML
40 INJECTION, SOLUTION INTRAVENOUS AS NEEDED
Status: DISCONTINUED | OUTPATIENT
Start: 2023-11-06 | End: 2023-11-06

## 2023-11-06 RX ORDER — LIDOCAINE HYDROCHLORIDE 20 MG/ML
INJECTION, SOLUTION INFILTRATION; PERINEURAL AS NEEDED
Status: DISCONTINUED | OUTPATIENT
Start: 2023-11-06 | End: 2023-11-06 | Stop reason: SURG

## 2023-11-06 RX ORDER — GLYCOPYRROLATE 0.2 MG/ML
INJECTION INTRAMUSCULAR; INTRAVENOUS AS NEEDED
Status: DISCONTINUED | OUTPATIENT
Start: 2023-11-06 | End: 2023-11-06 | Stop reason: SURG

## 2023-11-06 RX ORDER — PHENYLEPHRINE HCL IN 0.9% NACL 1 MG/10 ML
SYRINGE (ML) INTRAVENOUS AS NEEDED
Status: DISCONTINUED | OUTPATIENT
Start: 2023-11-06 | End: 2023-11-06 | Stop reason: SURG

## 2023-11-06 RX ORDER — FENTANYL CITRATE 50 UG/ML
INJECTION, SOLUTION INTRAMUSCULAR; INTRAVENOUS AS NEEDED
Status: DISCONTINUED | OUTPATIENT
Start: 2023-11-06 | End: 2023-11-06 | Stop reason: SURG

## 2023-11-06 RX ORDER — HYDROMORPHONE HYDROCHLORIDE 1 MG/ML
0.25 INJECTION, SOLUTION INTRAMUSCULAR; INTRAVENOUS; SUBCUTANEOUS
Status: DISCONTINUED | OUTPATIENT
Start: 2023-11-06 | End: 2023-11-06

## 2023-11-06 RX ORDER — EPHEDRINE SULFATE 50 MG/ML
INJECTION, SOLUTION INTRAVENOUS AS NEEDED
Status: DISCONTINUED | OUTPATIENT
Start: 2023-11-06 | End: 2023-11-06 | Stop reason: SURG

## 2023-11-06 RX ORDER — FUROSEMIDE 10 MG/ML
40 INJECTION INTRAMUSCULAR; INTRAVENOUS ONCE
Status: COMPLETED | OUTPATIENT
Start: 2023-11-06 | End: 2023-11-06

## 2023-11-06 RX ORDER — SODIUM CHLORIDE 0.9 % (FLUSH) 0.9 %
10 SYRINGE (ML) INJECTION AS NEEDED
Status: DISCONTINUED | OUTPATIENT
Start: 2023-11-06 | End: 2023-11-06

## 2023-11-06 RX ORDER — FAMOTIDINE 10 MG/ML
20 INJECTION, SOLUTION INTRAVENOUS
Status: DISCONTINUED | OUTPATIENT
Start: 2023-11-06 | End: 2023-11-06

## 2023-11-06 RX ORDER — FENTANYL CITRATE 50 UG/ML
50 INJECTION, SOLUTION INTRAMUSCULAR; INTRAVENOUS
Status: DISCONTINUED | OUTPATIENT
Start: 2023-11-06 | End: 2023-11-06

## 2023-11-06 RX ORDER — SODIUM CHLORIDE, SODIUM LACTATE, POTASSIUM CHLORIDE, CALCIUM CHLORIDE 600; 310; 30; 20 MG/100ML; MG/100ML; MG/100ML; MG/100ML
INJECTION, SOLUTION INTRAVENOUS CONTINUOUS PRN
Status: DISCONTINUED | OUTPATIENT
Start: 2023-11-06 | End: 2023-11-06 | Stop reason: SURG

## 2023-11-06 RX ORDER — SODIUM CHLORIDE 9 MG/ML
50 INJECTION, SOLUTION INTRAVENOUS CONTINUOUS
Status: DISCONTINUED | OUTPATIENT
Start: 2023-11-06 | End: 2023-11-10

## 2023-11-06 RX ORDER — IODIXANOL 320 MG/ML
300 INJECTION, SOLUTION INTRAVASCULAR
Status: COMPLETED | OUTPATIENT
Start: 2023-11-06 | End: 2023-11-06

## 2023-11-06 RX ORDER — ROCURONIUM BROMIDE 10 MG/ML
INJECTION, SOLUTION INTRAVENOUS AS NEEDED
Status: DISCONTINUED | OUTPATIENT
Start: 2023-11-06 | End: 2023-11-06 | Stop reason: SURG

## 2023-11-06 RX ORDER — SODIUM CHLORIDE 0.9 % (FLUSH) 0.9 %
1-10 SYRINGE (ML) INJECTION AS NEEDED
Status: DISCONTINUED | OUTPATIENT
Start: 2023-11-06 | End: 2023-11-06

## 2023-11-06 RX ADMIN — FENTANYL CITRATE 50 MCG: 50 INJECTION, SOLUTION INTRAMUSCULAR; INTRAVENOUS at 12:00

## 2023-11-06 RX ADMIN — NICARDIPINE HYDROCHLORIDE 12.5 MG/HR: 25 INJECTION, SOLUTION INTRAVENOUS at 16:59

## 2023-11-06 RX ADMIN — ATORVASTATIN CALCIUM 80 MG: 80 TABLET, FILM COATED ORAL at 20:37

## 2023-11-06 RX ADMIN — GLYCOPYRROLATE 0.4 MG: 0.2 INJECTION INTRAMUSCULAR; INTRAVENOUS at 12:41

## 2023-11-06 RX ADMIN — SODIUM CHLORIDE, POTASSIUM CHLORIDE, SODIUM LACTATE AND CALCIUM CHLORIDE: 600; 310; 30; 20 INJECTION, SOLUTION INTRAVENOUS at 11:16

## 2023-11-06 RX ADMIN — CLOPIDOGREL BISULFATE 75 MG: 75 TABLET, FILM COATED ORAL at 14:33

## 2023-11-06 RX ADMIN — Medication 10 ML: at 20:38

## 2023-11-06 RX ADMIN — EPHEDRINE SULFATE 10 MG: 50 INJECTION INTRAVENOUS at 11:41

## 2023-11-06 RX ADMIN — HEPARIN SODIUM: 1000 INJECTION INTRAVENOUS; SUBCUTANEOUS at 11:48

## 2023-11-06 RX ADMIN — POTASSIUM CHLORIDE 40 MEQ: 1.5 FOR SOLUTION ORAL at 20:37

## 2023-11-06 RX ADMIN — NICARDIPINE HYDROCHLORIDE 10 MG/HR: 25 INJECTION, SOLUTION INTRAVENOUS at 14:50

## 2023-11-06 RX ADMIN — HEPARIN SODIUM: 1000 INJECTION INTRAVENOUS; SUBCUTANEOUS at 11:49

## 2023-11-06 RX ADMIN — Medication 100 MCG: at 11:35

## 2023-11-06 RX ADMIN — EPHEDRINE SULFATE 10 MG: 50 INJECTION INTRAVENOUS at 12:22

## 2023-11-06 RX ADMIN — CLOPIDOGREL BISULFATE 75 MG: 75 TABLET, FILM COATED ORAL at 08:17

## 2023-11-06 RX ADMIN — LIDOCAINE HYDROCHLORIDE 60 MG: 20 INJECTION, SOLUTION INFILTRATION; PERINEURAL at 11:21

## 2023-11-06 RX ADMIN — FUROSEMIDE 40 MG: 10 INJECTION, SOLUTION INTRAMUSCULAR; INTRAVENOUS at 16:43

## 2023-11-06 RX ADMIN — DIAZEPAM 5 MG: 5 TABLET ORAL at 10:20

## 2023-11-06 RX ADMIN — PROPOFOL 120 MG: 10 INJECTION, EMULSION INTRAVENOUS at 11:21

## 2023-11-06 RX ADMIN — SUGAMMADEX 200 MG: 100 INJECTION, SOLUTION INTRAVENOUS at 13:36

## 2023-11-06 RX ADMIN — NICARDIPINE HYDROCHLORIDE 7.5 MG/HR: 25 INJECTION, SOLUTION INTRAVENOUS at 14:15

## 2023-11-06 RX ADMIN — HEPARIN SODIUM 8000 UNITS: 1000 INJECTION, SOLUTION INTRAVENOUS; SUBCUTANEOUS at 12:08

## 2023-11-06 RX ADMIN — AMOXICILLIN AND CLAVULANATE POTASSIUM 1 TABLET: 875; 125 TABLET, FILM COATED ORAL at 22:26

## 2023-11-06 RX ADMIN — SENNOSIDES AND DOCUSATE SODIUM 2 TABLET: 50; 8.6 TABLET ORAL at 20:37

## 2023-11-06 RX ADMIN — ASPIRIN 81 MG: 81 TABLET, CHEWABLE ORAL at 08:17

## 2023-11-06 RX ADMIN — LISINOPRIL 20 MG: 20 TABLET ORAL at 08:17

## 2023-11-06 RX ADMIN — NICARDIPINE HYDROCHLORIDE 5 MG/HR: 25 INJECTION, SOLUTION INTRAVENOUS at 14:00

## 2023-11-06 RX ADMIN — ENOXAPARIN SODIUM 40 MG: 100 INJECTION SUBCUTANEOUS at 22:26

## 2023-11-06 RX ADMIN — ONDANSETRON 4 MG: 2 INJECTION INTRAMUSCULAR; INTRAVENOUS at 21:19

## 2023-11-06 RX ADMIN — ROCURONIUM BROMIDE 10 MG: 10 INJECTION, SOLUTION INTRAVENOUS at 13:20

## 2023-11-06 RX ADMIN — NICOTINE 1 PATCH: 21 PATCH, EXTENDED RELEASE TRANSDERMAL at 17:14

## 2023-11-06 RX ADMIN — IODIXANOL 293 ML: 320 INJECTION, SOLUTION INTRAVASCULAR at 13:46

## 2023-11-06 RX ADMIN — CARVEDILOL 6.25 MG: 6.25 TABLET, FILM COATED ORAL at 17:00

## 2023-11-06 RX ADMIN — ROCURONIUM BROMIDE 30 MG: 10 INJECTION, SOLUTION INTRAVENOUS at 11:21

## 2023-11-06 RX ADMIN — NICARDIPINE HYDROCHLORIDE 10 MG/HR: 25 INJECTION, SOLUTION INTRAVENOUS at 19:43

## 2023-11-06 RX ADMIN — CARVEDILOL 6.25 MG: 6.25 TABLET, FILM COATED ORAL at 08:17

## 2023-11-06 NOTE — ANESTHESIA PREPROCEDURE EVALUATION
Anesthesia Evaluation     Patient summary reviewed                Airway   Mallampati: II  Neck ROM: full  No difficulty expected  Dental    (+) edentulous    Pulmonary    (+) pneumonia ,  Cardiovascular     ECG reviewed  Rhythm: regular    (+) pacemaker pacemaker, hypertension, CHF     ROS comment: Ef 20%    Neuro/Psych  (+) CVA  GI/Hepatic/Renal/Endo      Musculoskeletal     Abdominal    Substance History      OB/GYN          Other      history of cancer                Anesthesia Plan    ASA 3     general       Anesthetic plan, risks, benefits, and alternatives have been provided, discussed and informed consent has been obtained with: patient.    CODE STATUS:    Code Status (Patient has no pulse and is not breathing): CPR (Attempt to Resuscitate)  Medical Interventions (Patient has pulse or is breathing): Full

## 2023-11-06 NOTE — PLAN OF CARE
Goal Outcome Evaluation:              Outcome Evaluation: Patient NPO for procedure today and currently off the floor, will continue to follow for swallow re-evaluation.

## 2023-11-06 NOTE — ANESTHESIA POSTPROCEDURE EVALUATION
"Patient: Tigre Amaral    Procedure Summary       Date: 11/06/23 Room / Location: [facility] INTERVENTIONAL    Anesthesia Start: 1115 Anesthesia Stop: 1401    Procedure: IR STENT CERV CAROTID WO EMB PROT Diagnosis: (Carotid Stenosis)    Scheduled Providers:  Provider: Adonis Vanegas MD    Anesthesia Type: general ASA Status: 3            Anesthesia Type: general    Vitals  Vitals Value Taken Time   /57 11/06/23 1501   Temp     Pulse 63 11/06/23 1507   Resp 12 11/06/23 1445   SpO2 95 % 11/06/23 1507   Vitals shown include unfiled device data.        Post Anesthesia Care and Evaluation    Patient location during evaluation: PACU  Patient participation: complete - patient participated  Level of consciousness: awake and alert  Pain management: adequate    Airway patency: patent  Anesthetic complications: No anesthetic complications  PONV Status: controlled  Cardiovascular status: acceptable and hemodynamically stable  Respiratory status: acceptable  Hydration status: acceptable    Comments: /59 (BP Location: Right arm, Patient Position: Lying)   Pulse 66   Temp 36.6 °C (97.8 °F) (Oral)   Resp 12   Ht 172.7 cm (68\")   Wt 76.2 kg (168 lb)   SpO2 93%   BMI 25.54 kg/m²     "

## 2023-11-06 NOTE — PLAN OF CARE
Goal Outcome Evaluation:  Plan of Care Reviewed With: patient        Progress: improving  Outcome Evaluation: VSS, no complaints of pain. pt remains on 2L NC. pt rested well during shift. pt turned independently. labs in am.

## 2023-11-06 NOTE — PROGRESS NOTES
Tigresandee Amaral   73 y.o.  male    LOS: 9 days   Patient Care Team:  Provider, No Known as PCP - General      Subjective   Resting    Review of Systems:   Lungs: No cough, no SOA  CV: No CP, no palpitations  GI: No nausea, vomiting, or diarrhea.     Medication Review:   Current Facility-Administered Medications:     acetaminophen (TYLENOL) tablet 650 mg, 650 mg, Oral, Q4H PRN, 650 mg at 23 1235 **OR** [DISCONTINUED] acetaminophen (TYLENOL) suppository 650 mg, 650 mg, Rectal, Q4H PRN, Lashon Brooks MD    amiodarone 150 mg in 100 mL D5W (loading dose), 150 mg, Intravenous, Once **FOLLOWED BY** [] amiodarone 360 mg in 200 mL D5W infusion, 1 mg/min, Intravenous, Continuous **FOLLOWED BY** [] amiodarone 360 mg in 200 mL D5W infusion, 0.5 mg/min, Intravenous, Continuous, Magdiel Murray MD    amoxicillin-clavulanate (AUGMENTIN) 875-125 MG per tablet 1 tablet, 1 tablet, Oral, Q12H, Magdiel Murray MD, 1 tablet at 23    aspirin chewable tablet 81 mg, 81 mg, Oral, Daily, Carolina Casillas, APRN, 81 mg at 23 0817    atorvastatin (LIPITOR) tablet 80 mg, 80 mg, Oral, Nightly, Lashon Brooks MD, 80 mg at 23    sennosides-docusate (PERICOLACE) 8.6-50 MG per tablet 2 tablet, 2 tablet, Oral, BID, 2 tablet at 23 **AND** polyethylene glycol (MIRALAX) packet 17 g, 17 g, Oral, Daily PRN **AND** bisacodyl (DULCOLAX) EC tablet 5 mg, 5 mg, Oral, Daily PRN **AND** bisacodyl (DULCOLAX) suppository 10 mg, 10 mg, Rectal, Daily PRN, Lashon Brooks MD    carvedilol (COREG) tablet 6.25 mg, 6.25 mg, Oral, BID With Meals, StingLashon gaitan MD, 6.25 mg at 23 08    clopidogrel (PLAVIX) tablet 75 mg, 75 mg, Oral, Daily, Babita Marte APRN, 75 mg at 23    Enoxaparin Sodium (LOVENOX) syringe 40 mg, 40 mg, Subcutaneous, Q24H, Abhinav Barnes MD, 40 mg at 23    HYDROcodone-acetaminophen (NORCO) 5-325 MG per tablet 1  tablet, 1 tablet, Oral, Q6H PRN, Salvatore Bedoya MD    lisinopril (PRINIVIL,ZESTRIL) tablet 20 mg, 20 mg, Oral, Daily, Lashon Brooks MD, 20 mg at 11/06/23 0817    Magnesium Standard Dose Replacement - Follow Nurse / BPA Driven Protocol, , Does not apply, PRN, Lashon Brooks MD    melatonin tablet 3 mg, 3 mg, Oral, Nightly PRN, Lashon Brooks MD, 3 mg at 11/02/23 2106    nicotine (NICODERM CQ) 21 MG/24HR patch 1 patch, 1 patch, Transdermal, Q24H, Jimenez Zarate MD, 1 patch at 11/05/23 0800    nitroglycerin (NITROSTAT) SL tablet 0.4 mg, 0.4 mg, Sublingual, Q5 Min PRN, Magdiel Murray MD    ondansetron (ZOFRAN) tablet 4 mg, 4 mg, Oral, Q6H PRN **OR** ondansetron (ZOFRAN) injection 4 mg, 4 mg, Intravenous, Q6H PRN, Lashon Brooks MD, 4 mg at 11/01/23 1758    Phosphorus Replacement - Follow Nurse / BPA Driven Protocol, , Does not apply, Cecilia BARRON Renate Andrea, MD    Potassium Replacement - Follow Nurse / BPA Driven Protocol, , Does not apply, Cecilia BARRON Renate Andrea, MD    sodium chloride 0.9 % flush 1-10 mL, 1-10 mL, Intravenous, PRN, Sanjiv Spann MD    sodium chloride 0.9 % flush 10 mL, 10 mL, Intravenous, Q12H, Prince Roberts MD, 10 mL at 11/05/23 2017    sodium chloride 0.9 % flush 10 mL, 10 mL, Intravenous, PRN, Prince Roberts MD    sodium chloride 0.9 % flush 10 mL, 10 mL, Intravenous, Q12H, Sanjiv Spann MD    sodium chloride 0.9 % infusion 40 mL, 40 mL, Intravenous, PRN, Prince Roberts MD    sodium chloride 0.9 % infusion 40 mL, 40 mL, Intravenous, PRN, Sanjiv Spann MD    sodium chloride 0.9 % infusion, 50 mL/hr, Intravenous, Continuous, Sanjiv Spann MD      Objective     Vital Sign Min/Max for last 24 hours  Temp  Min: 97.7 °F (36.5 °C)  Max: 97.8 °F (36.6 °C)   BP  Min: 163/89  Max: 172/87    Pulse  Min: 80  Max: 96         11/04/23  0627 11/05/23  0400 11/06/23 0527   Weight: 71.5 kg (157 lb 10.1 oz) 75.6 kg (166 lb 10.7 oz) 75.8 kg (167 lb)     "  No intake or output data in the 24 hours ending 11/06/23 0934    Physical Exam:    General Appearance:    No acute distress   Lungs:     diminished bilaterally. No wheezes, rhonchi or rales.     Heart:    Regular rate and rhythm.  Normal S1 and S2. No murmur, no gallop, rub or lift. , No JVD.   Abdomen:     Normal bowel sounds, soft, nontender, nondistended.   Extremities:   No clubbing, cyanosis or edema.     Skin:   Warm, dry   Neurologic:   Awake       Monitor:sr  Results Review:     I reviewed the patient's new clinical results.    Sodium   Date Value Ref Range Status   11/04/2023 141 136 - 145 mmol/L Final     Potassium   Date Value Ref Range Status   11/05/2023 3.9 3.5 - 5.2 mmol/L Final   11/04/2023 3.2 (L) 3.5 - 5.2 mmol/L Final     Comment:     Slight hemolysis detected by analyzer. Results may be affected.   11/04/2023 2.9 (L) 3.5 - 5.2 mmol/L Final     Chloride   Date Value Ref Range Status   11/04/2023 105 98 - 107 mmol/L Final     No results found for: \"PLASMABICARB\"  BUN   Date Value Ref Range Status   11/04/2023 16 8 - 23 mg/dL Final     Creatinine   Date Value Ref Range Status   11/04/2023 1.32 (H) 0.76 - 1.27 mg/dL Final     Calcium   Date Value Ref Range Status   11/04/2023 8.5 (L) 8.6 - 10.5 mg/dL Final     Magnesium   Date Value Ref Range Status   11/04/2023 2.0 1.6 - 2.4 mg/dL Final       Results from last 7 days   Lab Units 11/04/23  0730   WBC 10*3/mm3 7.82   HEMOGLOBIN g/dL 13.2   HEMATOCRIT % 38.2   PLATELETS 10*3/mm3 265     Lab Results   Component Value Date    CHOL 146 10/29/2023     Lab Results   Component Value Date    HDL 34 (L) 10/29/2023     Lab Results   Component Value Date    LDL 98 10/29/2023     Lab Results   Component Value Date    TRIG 72 10/29/2023         Results from last 7 days   Lab Units 11/02/23  0735 11/02/23  0612 11/01/23 2031 11/01/23  1846   HSTROP T ng/L 425* 451* 354* 339*     Results from last 7 days   Lab Units 11/02/23  0735   PROBNP pg/mL 56,131.0*       "           Assessment/ Plan  Acute on chronic HFrEF  2. NICM s/p AICD     A. Echo 10/30/23:   Left ventricular systolic function is severely decreased. Calculated left ventricular EF = 21.6% Left ventricular ejection fraction appears to be less than 20%.The following left ventricular wall segments are hypokinetic: mid anterolateral, basal inferolateral, mid inferolateral, mid inferior, basal inferoseptal, mid inferoseptal, mid anteroseptal, basal inferior and basal inferoseptal. The following left ventricular wall segments are akinetic: apical anterior, apical lateral, apical inferior, apical septal and apex.  Left ventricular wall thickness is consistent with mild concentric hypertrophy.   Left ventricular diastolic function is consistent with (grade II w/high LAP) pseudonormalization.  The left atrial cavity is mildly dilated.  Saline test results are negative for right to left atrial level shunt.  Estimated right ventricular systolic pressure from tricuspid regurgitation is mildly elevated (35-45 mmHg).    3. H/o Vtach  4. HTN  5. HLD  6. Acute R sided watershed CVAs with severe ICA stenosis    A. h/o large MCA infarct  7. Aspiration PNA  8. Dementia    Plan for right ICA stent today       Time:  20 min

## 2023-11-06 NOTE — PAYOR COMM NOTE
"Tigre Amaral (73 y.o. Male)     SEE ATTACHED FOR REQUESTED CLINICALS      LELAND CASTANEDA RN/ DEPT  [facility]   PH  [phone]  FAX  [phone]        Date of Birth   1950    Social Security Number       Address   [address] [place], [place] [address]    Home Phone   [phone]    MRN   [id number]       Jehovah's witness   None    Marital Status   Single                            Admission Date   10/28/23    Admission Type   Emergency    Admitting Provider   Niko Rivas II, MD    Attending Provider   Rajendra Yusuf MD    Department, Room/Bed   [facility] INTERVENTIONAL, IR POOL/NONE       Discharge Date       Discharge Disposition       Discharge Destination                                 Attending Provider: Rajendra Yusuf MD    Allergies: No Known Allergies    Isolation: None   Infection: None   Code Status: CPR    Ht: 172.7 cm (68\")   Wt: 76.2 kg (168 lb)    Admission Cmt: None   Principal Problem: Cerebrovascular accident (CVA) due to embolism of right middle cerebral artery [I63.411]                   Active Insurance as of 10/28/2023       Primary Coverage       Payor Plan Insurance Group Employer/Plan Group    MEDICARE MEDICARE A ONLY        Payor Plan Address Payor Plan Phone Number Payor Plan Fax Number Effective Dates    [address] [phone]  3/1/2015 - None Entered    [facility] [id number]         Subscriber Name Subscriber Birth Date Member ID       TIGRE AMARAL 1950 [id number]               Secondary Coverage       Payor Plan Insurance Group Employer/Plan Group    [facility] CCN OPTUM        Payor Plan Address Payor Plan Phone Number Payor Plan Fax Number Effective Dates    [address] [phone]  1/1/2022 - None Entered    [facility] [id number]         Subscriber Name Subscriber Birth Date Member ID       TIGRE AMARAL 1950 [id number]               Tertiary Coverage       Payor Plan Insurance Group " Employer/Plan Group    [facility] DEPT [id number]        Payor Plan Address Payor Plan Phone Number Payor Plan Fax Number Effective Dates    [facility] OFFICE OF COMMUNITY CARE [phone]  1/1/2023 - None Entered    [address] [address]       [place] [address]         Subscriber Name Subscriber Birth Date Member ID       TIGRE AMARAL 1950 [id number]                     Emergency Contacts        (Rel.) Home Phone Work Phone Mobile Phone    DIRK FOWLER (Grandchild) [phone] -- --    AddisonRola (Daughter) -- -- [phone]              [place]: [facility] [id number]  Tax ID [id number]  Medication Administration Report for Tigre Amaral as of 11/06/23 1402     Legend:    Given Hold Not Given Due Canceled Entry Other Actions    Time Time (Time) Time Time-Action         Discontinued     Completed     Future     MAR Hold     Linked             Medications 11/05/23 11/06/23      acetaminophen (TYLENOL) tablet 650 mg  Dose: 650 mg  Freq: Every 4 Hours PRN Route: PO  PRN Reasons: Mild Pain,Fever  PRN Comment: Temperature greater than or equal to 37 C  Start: 10/28/23 1614   Admin Instructions:   Based on patient request - if ordered for moderate or severe pain, provider allows for administration of a medication prescribed for a lower pain scale.    Do not exceed 4 grams of acetaminophen in a 24 hr period. Max dose of 2gm for AST/ALT greater than 120 units/L.    If given for pain, use the following pain scale:   Mild Pain = Pain Score of 1-3, CPOT 1-2  Moderate Pain = Pain Score of 4-6, CPOT 3-4  Severe Pain = Pain Score of 7-10, CPOT 5-8    1235-Given               Or  acetaminophen (TYLENOL) suppository 650 mg  Dose: 650 mg  Freq: Every 4 Hours PRN Route: RE  PRN Reasons: Mild Pain,Fever  PRN Comment: Temperature greater than or equal to 37 C  Start: 10/28/23 1614   End: 11/02/23 0746   Admin Instructions:   Based on patient request - if ordered for moderate or severe pain, provider allows for  administration of a medication prescribed for a lower pain scale.    Do not exceed 4 grams of acetaminophen in a 24 hr period. Max dose of 2gm for AST/ALT greater than 120 units/L.    If given for pain, use the following pain scale:   Mild Pain = Pain Score of 1-3, CPOT 1-2  Moderate Pain = Pain Score of 4-6, CPOT 3-4  Severe Pain = Pain Score of 7-10, CPOT 5-8    1235-Not Given:  See Alt                amiodarone 150 mg in 100 mL D5W (loading dose)  Dose: 150 mg  Freq: Once Route: IV  Start: 11/02/23 0845   Admin Instructions:   Bolus over 10 minutes.  Central line preferred, if unavailable use large bore IV access with frequent nurse monitoring of IV site.  IV med requiring filtration 0.22 micron inline filter.        Followed by  amiodarone 360 mg in 200 mL D5W infusion  Rate: 33.3 mL/hr Dose: 1 mg/min  Freq: Continuous Route: IV  Start: 11/02/23 0845   End: 11/02/23 1444   Admin Instructions:   Central line preferred, if unavailable use large bore IV access with frequent nurse monitoring of IV site.  IV med requiring filtration 0.22 micron inline filter.        Followed by  amiodarone 360 mg in 200 mL D5W infusion  Rate: 16.67 mL/hr Dose: 0.5 mg/min  Freq: Continuous Route: IV  Start: 11/02/23 1445   End: 11/03/23 0844   Admin Instructions:   After 18 hours and patient is not NPO, notify physician to consider oral dosing.  Central line preferred, if unavailable use large bore IV access with frequent nurse monitoring of IV site.  IV med requiring filtration 0.22 micron inline filter.         amoxicillin-clavulanate (AUGMENTIN) 875-125 MG per tablet 1 tablet  Dose: 1 tablet  Freq: Every 12 Hours Scheduled Route: PO  Indications of Use: PNEUMONIA  Start: 11/04/23 2100   End: 11/10/23 2059   Admin Instructions:   Crush and give in applesauce    0800-Given     2016-Given           0832-Hold 2100              aspirin chewable tablet 81 mg  Dose: 81 mg  Freq: Daily Route: PO  Start: 11/01/23 0900   Admin  Instructions:   Herbal/drug interaction: Avoid use with ginkgo biloba. Based on patient request - if ordered for moderate or severe pain, provider allows for administration of a medication prescribed for a lower pain scale.  Do not exceed 4 grams of aspirin in a 24 hr period.    If given for pain, use the following pain scale:   Mild Pain = Pain Score of 1-3, CPOT 1-2  Moderate Pain = Pain Score of 4-6, CPOT 3-4  Severe Pain = Pain Score of 7-10, CPOT 5-8    0800-Given            0817-Given               atorvastatin (LIPITOR) tablet 80 mg  Dose: 80 mg  Freq: Nightly Route: PO  Start: 10/28/23 2100   Admin Instructions:   Avoid grapefruit juice.    2016-Given 2100               sennosides-docusate (PERICOLACE) 8.6-50 MG per tablet 2 tablet  Dose: 2 tablet  Freq: 2 Times Daily Route: PO  Start: 10/28/23 2100   Admin Instructions:   HOLD MEDICATION IF PATIENT HAS HAD BOWEL MOVEMENT. Start bowel management regimen if patient has not had a bowel movement after 12 hours.    0800-Given     2016-Given 0832-Hold     2100             And  polyethylene glycol (MIRALAX) packet 17 g  Dose: 17 g  Freq: Daily PRN Route: PO  PRN Reason: Constipation  PRN Comment: Use if senna-docusate is ineffective  Start: 10/28/23 1615   Admin Instructions:   Use if no bowel movement after 12 hours. Mix in 6-8 ounces of water.  Use 4-8 ounces of water, tea, or juice for each 17 gram dose.        And  bisacodyl (DULCOLAX) EC tablet 5 mg  Dose: 5 mg  Freq: Daily PRN Route: PO  PRN Reason: Constipation  PRN Comment: Use if polyethylene glycol is ineffective  Start: 10/28/23 1615   Admin Instructions:   Use if no bowel movement after 12 hours.  Swallow whole. Do not crush, split, or chew tablet.        And  bisacodyl (DULCOLAX) suppository 10 mg  Dose: 10 mg  Freq: Daily PRN Route: RE  PRN Reason: Constipation  PRN Comment: Use if bisacodyl oral is ineffective  Start: 10/28/23 1615   Admin Instructions:   Use if no bowel  movement after 12 hours.  Hold for diarrhea         carvedilol (COREG) tablet 6.25 mg  Dose: 6.25 mg  Freq: 2 Times Daily With Meals Route: PO  Start: 10/28/23 2230   Admin Instructions:   Hold for SBP less than 100, DBP less than 60, or heart rate less than 50  Give with food.    0800-Given     1648-Given           0817-Given     1800              clopidogrel (PLAVIX) tablet 75 mg  Dose: 75 mg  Freq: Daily Route: PO  Start: 11/04/23 0900    0800-Given            0817-Given               Enoxaparin Sodium (LOVENOX) syringe 40 mg  Dose: 40 mg  Freq: Every 24 Hours Route: SC  Indications of Use: PROPHYLAXIS OF VENOUS THROMBOEMBOLISM  Start: 11/01/23 2300   Admin Instructions:   Give subcutaneous in abdomen only. Do not massage site after injection.    (0102)-Not Given     2017-Given     2300-Canceled Entry          2300               famotidine (PEPCID) injection 20 mg  Dose: 20 mg  Freq: 60 Minutes Pre-Op Route: IV  Start: 11/06/23 1143   Admin Instructions:   Give IV push over 2 minutes.         fentaNYL citrate (PF) (SUBLIMAZE) injection 50 mcg  Dose: 50 mcg  Freq: Every 10 Minutes PRN Route: IV  PRN Reason: Severe Pain  Start: 11/06/23 1145   Admin Instructions:   Maximum total dose of fentaNYL is 100 mcg. Based on patient request - if ordered for moderate or severe pain, provider allows for administration of a medication prescribed for a lower pain scale.  If given for pain, use the following pain scale:  Mild Pain = Pain Score of 1-3, CPOT 1-2  Moderate Pain = Pain Score of 4-6, CPOT 3-4  Severe Pain = Pain Score of 7-10, CPOT 5-8         HYDROcodone-acetaminophen (NORCO) 5-325 MG per tablet 1 tablet  Dose: 1 tablet  Freq: Once As Needed Route: PO  PRN Reason: Moderate Pain  Start: 11/06/23 1144   Admin Instructions:   Based on patient request - if ordered for moderate or severe pain, provider allows for administration of a medication prescribed for a lower pain scale.  [JESS]    Do not exceed 4 grams of  acetaminophen in a 24 hr period. Max dose of 2gm for AST/ALT greater than 120 units/L        If given for pain, use the following pain scale:   Mild Pain = Pain Score of 1-3, CPOT 1-2  Moderate Pain = Pain Score of 4-6, CPOT 3-4  Severe Pain = Pain Score of 7-10, CPOT 5-8         HYDROcodone-acetaminophen (NORCO) 5-325 MG per tablet 1 tablet  Dose: 1 tablet  Freq: Every 6 Hours PRN Route: PO  PRN Reason: Moderate Pain  Start: 11/05/23 1548   End: 11/12/23 1547   Admin Instructions:   Based on patient request - if ordered for moderate or severe pain, provider allows for administration of a medication prescribed for a lower pain scale.  [JESS]    Do not exceed 4 grams of acetaminophen in a 24 hr period. Max dose of 2gm for AST/ALT greater than 120 units/L        If given for pain, use the following pain scale:   Mild Pain = Pain Score of 1-3, CPOT 1-2  Moderate Pain = Pain Score of 4-6, CPOT 3-4  Severe Pain = Pain Score of 7-10, CPOT 5-8         HYDROmorphone (DILAUDID) injection 0.25 mg  Dose: 0.25 mg  Freq: Every 10 Minutes PRN Route: IV  PRN Reason: Moderate Pain  Start: 11/06/23 1145   End: 11/13/23 1144   Admin Instructions:   Maximum total dose of HYDROmorphone is 2 mg. Based on patient request - if ordered for moderate or severe pain, provider allows for administration of a medication prescribed for a lower pain scale.  If given for pain, use the following pain scale:  Mild Pain = Pain Score of 1-3, CPOT 1-2  Moderate Pain = Pain Score of 4-6, CPOT 3-4  Severe Pain = Pain Score of 7-10, CPOT 5-8         lactated ringers infusion  Rate: 9 mL/hr Dose: 9 mL/hr  Freq: Continuous PRN Route: IV  PRN Comment: Start prior to surgery  Start: 11/06/23 1143         lisinopril (PRINIVIL,ZESTRIL) tablet 20 mg  Dose: 20 mg  Freq: Daily Route: PO  Start: 10/29/23 0900   Admin Instructions:   Hold for SBP less than 100, DBP less than 60    0800-Given            0817-Given               Magnesium Standard Dose Replacement -  "Follow Nurse / BPA Driven Protocol  Freq: As Needed Route: XX  PRN Reason: Other  Start: 10/28/23 2138   Admin Instructions:   Open Order & Select \"[facility] Electrolyte Replacement Protocol Algorithm\" to View Details         melatonin tablet 3 mg  Dose: 3 mg  Freq: Nightly PRN Route: PO  PRN Reason: Sleep  Start: 10/28/23 1615         midazolam (VERSED) injection 0.5 mg  Dose: 0.5 mg  Freq: Every 10 Minutes PRN Route: IV  PRN Comment: Anxiety prophylaxis, Pre-op comfort  Start: 11/06/23 1143   Admin Instructions:   May repeat dose in 10 minutes one time then contact provider for additional orders.           nicotine (NICODERM CQ) 21 MG/24HR patch 1 patch  Dose: 1 patch  Freq: Every 24 Hours Scheduled Route: TD  Start: 10/29/23 1815   Admin Instructions:   Apply to clean, dry, nonhairy area of skin (typically upper arm or shoulder)  Dispose of nicotine replacement therapies and their wrappers in non-hazardous pharmaceutical waste or in regular trash.    0800-Medication Applied            0832-Canceled Entry     0833-Medication Removed              nitroglycerin (NITROSTAT) SL tablet 0.4 mg  Dose: 0.4 mg  Freq: Every 5 Minutes PRN Route: SL  PRN Reason: Chest Pain  Start: 11/02/23 0743   Admin Instructions:   Do not crush or chew the capsules or tablets. The drug may not work as designed if the capsule or tablet is crushed or chewed. Swallow whole.  May administer up to 3 doses per episode.         ondansetron (ZOFRAN) injection 4 mg  Dose: 4 mg  Freq: Once As Needed Route: IV  PRN Reasons: Nausea,Vomiting  PRN Comment: If not given pre-op/intra-op  Start: 11/06/23 1145   Admin Instructions:   If BOTH ondansetron (ZOFRAN) and promethazine (PHENERGAN) are ordered use ondansetron first and THEN promethazine IF ondansetron is ineffective.         ondansetron (ZOFRAN) tablet 4 mg  Dose: 4 mg  Freq: Every 6 Hours PRN Route: PO  PRN Reasons: Nausea,Vomiting  Start: 10/28/23 1615        Or  ondansetron (ZOFRAN) injection 4 " "mg  Dose: 4 mg  Freq: Every 6 Hours PRN Route: IV  PRN Reasons: Nausea,Vomiting  Start: 10/28/23 1615         Phosphorus Replacement - Follow Nurse / BPA Driven Protocol  Freq: As Needed Route: XX  PRN Reason: Other  Start: 10/28/23 2138   Admin Instructions:   Open Order & Select \"BHS Electrolyte Replacement Protocol Algorithm\" to View Details         Potassium Replacement - Follow Nurse / BPA Driven Protocol  Freq: As Needed Route: XX  PRN Reason: Other  Start: 10/28/23 2138   Admin Instructions:   Open Order & Select \"BHS Electrolyte Replacement Protocol Algorithm\" to View Details         sodium chloride 0.9 % flush 1-10 mL  Dose: 1-10 mL  Freq: As Needed Route: IV  PRN Reason: Line Care  Start: 11/06/23 0902         sodium chloride 0.9 % flush 10 mL  Dose: 10 mL  Freq: As Needed Route: IV  PRN Reason: Line Care  Start: 11/06/23 1143         sodium chloride 0.9 % flush 10 mL  Dose: 10 mL  Freq: Every 12 Hours Scheduled Route: IV  Start: 11/06/23 1145     1145     2100              sodium chloride 0.9 % flush 10 mL  Dose: 10 mL  Freq: Every 12 Hours Scheduled Route: IV  Start: 11/06/23 1000     1000     2100              sodium chloride 0.9 % flush 10 mL  Dose: 10 mL  Freq: As Needed Route: IV  PRN Reason: Line Care  Start: 10/28/23 1614         sodium chloride 0.9 % flush 10 mL  Dose: 10 mL  Freq: Every 12 Hours Scheduled Route: IV  Start: 10/28/23 2100    0801-Canceled Entry     2017-Given           0833-Canceled Entry     2100              sodium chloride 0.9 % infusion  Rate: 50 mL/hr Dose: 50 mL/hr  Freq: Continuous Route: IV  Start: 11/06/23 1000     1000               sodium chloride 0.9 % infusion 40 mL  Dose: 40 mL  Freq: As Needed Route: IV  PRN Reason: Line Care  Start: 11/06/23 1143   Admin Instructions:   Following administration of an IV intermittent medication, flush line with 40mL NS at 100mL/hr.         sodium chloride 0.9 % infusion 40 mL  Dose: 40 mL  Freq: As Needed Route: IV  PRN Reason: Line " Care  Start: 11/06/23 0902   Admin Instructions:   Following administration of an IV intermittent medication, flush line with 40mL NS at 100mL/hr.         sodium chloride 0.9 % infusion 40 mL  Dose: 40 mL  Freq: As Needed Route: IV  PRN Reason: Line Care  Start: 10/28/23 1614   Admin Instructions:   Following administration of an IV intermittent medication, flush line with 40mL NS at 100mL/hr.        Completed Medications  Medications 11/05/23 11/06/23       barium sulfate (VARIBAR THIN LIQUID) oral suspension 55 mL  Dose: 55 mL  Freq: Once in Imaging Route: PO  Start: 11/03/23 1300   End: 11/03/23 1207   Admin Instructions:    Shake well before administration.         Barium Sulfate 60 % cream 1 teaspoon(s)  Dose: 1 teaspoon(s)  Freq: Once in Imaging Route: PO  Start: 11/03/23 1300   End: 11/03/23 1208         barium sulfate oral suspension 4 mL  Dose: 4 mL  Freq: Once in Imaging Route: PO  Start: 11/03/23 1300   End: 11/03/23 1208   Admin Instructions:    Mix with water according to package insert.  Shake well before administration.         barium sulfate oral suspension 50 mL  Dose: 50 mL  Freq: Once in Imaging Route: PO  Start: 11/03/23 1300   End: 11/03/23 1208   Admin Instructions:    Shake well before administration.         barium sulfate oral suspension 50 mL  Dose: 50 mL  Freq: Once in Imaging Route: PO  Start: 11/03/23 1300   End: 11/03/23 1208   Admin Instructions:    Shake well before administration.         clopidogrel (PLAVIX) tablet 150 mg  Dose: 150 mg  Freq: Once Route: PO  Start: 11/03/23 1530   End: 11/03/23 1551         diazePAM (VALIUM) tablet 5 mg  Dose: 5 mg  Freq: Once Route: PO  Start: 11/06/23 1130   End: 11/06/23 1020   Admin Instructions:    Caution: Look alike/sound alike drug alert.  Avoid use with Everette's Wort.  Avoid grapefruit juice.     1020-Given               furosemide (LASIX) injection 20 mg  Dose: 20 mg  Freq: Once Route: IV  Start: 11/02/23 0710   End: 11/02/23 0712          furosemide (LASIX) injection 80 mg  Dose: 80 mg  Freq: Once Route: IV  Start: 11/02/23 0900   End: 11/02/23 0838         iodixanol (VISIPAQUE) 320 MG/ML injection 300 mL  Dose: 300 mL  Freq: Once in Imaging Route: IA  Start: 11/06/23 1345   End: 11/06/23 1346     1346-Given               iopamidol (ISOVUE-370) 76 % injection 100 mL  Dose: 100 mL  Freq: Once in Imaging Route: IV  Start: 10/29/23 1700   End: 10/29/23 1618         LORazepam (ATIVAN) injection 1 mg  Dose: 1 mg  Freq: Once As Needed Route: IV  PRN Reason: Agitation  PRN Comment: to complete MRI  Start: 11/02/23 1336   End: 11/02/23 1530   Admin Instructions:    Caution: Look alike/sound alike drug alert. Dilute 1:1 with normal saline.         LORazepam (ATIVAN) injection 1 mg  Dose: 1 mg  Freq: Once As Needed Route: IV  PRN Reason: Anxiety  PRN Comment: to complete MRI  Start: 10/31/23 1048   End: 10/31/23 1201   Admin Instructions:    Caution: Look alike/sound alike drug alert. Dilute 1:1 with normal saline.         LORazepam (ATIVAN) injection 1 mg  Dose: 1 mg  Freq: Once in Imaging Route: IV  Start: 10/30/23 1430   End: 11/02/23 1418   Admin Instructions:   Prior to MRI   Caution: Look alike/sound alike drug alert. Dilute 1:1 with normal saline.         perflutren (DEFINITY) 8.476 mg in Sodium Chloride (PF) 0.9 % 10 mL injection  Dose: 2 mL  Freq: Once in Imaging Route: IV  Start: 10/30/23 1230   End: 10/30/23 1135   Admin Instructions:   Mix 1.3 mL of mechanically activated Definity with 8.7 mL of normal saline. A total of 2   mL of the resulting Definity solution was administered.         piperacillin-tazobactam (ZOSYN) 3.375 g in iso-osmotic dextrose 50 ml (premix)  Dose: 3.375 g  Freq: Once Route: IV  Start: 11/02/23 0900   End: 11/02/23 1000   Admin Instructions:   Refrigerate         potassium chloride (K-DUR,KLOR-CON) ER tablet 40 mEq  Dose: 40 mEq  Freq: Every 4 Hours Route: PO  Start: 11/04/23 0930   End: 11/04/23 1710   Admin  Instructions:   Do not crush or chew the capsules or tablets. The drug may not work as designed if the capsule or tablet is crushed or chewed. Swallow whole.  Swallow whole; do not crush, split, or chew.         potassium chloride (K-DUR,KLOR-CON) ER tablet 40 mEq  Dose: 40 mEq  Freq: Every 4 Hours Route: PO  Start: 10/31/23 0515   End: 10/31/23 0923   Admin Instructions:   Do not crush or chew the capsules or tablets. The drug may not work as designed if the capsule or tablet is crushed or chewed. Swallow whole.  Swallow whole; do not crush, split, or chew.         potassium chloride (K-DUR,KLOR-CON) ER tablet 40 mEq  Dose: 40 mEq  Freq: Every 4 Hours Route: PO  Start: 10/30/23 0930   End: 10/30/23 1708   Admin Instructions:   Do not crush or chew the capsules or tablets. The drug may not work as designed if the capsule or tablet is crushed or chewed. Swallow whole.  Swallow whole; do not crush, split, or chew.         potassium chloride (K-DUR,KLOR-CON) ER tablet 40 mEq  Dose: 40 mEq  Freq: Every 4 Hours Route: PO  Start: 10/29/23 0800   End: 10/29/23 1758   Admin Instructions:   Do not crush or chew the capsules or tablets. The drug may not work as designed if the capsule or tablet is crushed or chewed. Swallow whole.  Swallow whole; do not crush, split, or chew.         potassium chloride (KLOR-CON) packet 40 mEq  Dose: 40 mEq  Freq: Every 4 Hours Route: PO  Start: 11/01/23 2215   End: 11/02/23 0251   Admin Instructions:   For use with a feeding tube. Mix in at least 4 oz. of liquid.         sodium chloride 1,000 mL with heparin (porcine) 2,000 Units mixture  Freq: As Needed  Start: 11/06/23 1148   End: 11/06/23 1149     1148-Given [C]     1149-Given [C]     1149-Given [C]     1149-Given [C]           Discontinued Medications  Medications 11/05/23 11/06/23       acetaminophen (TYLENOL) tablet 650 mg  Dose: 650 mg  Freq: Every 4 Hours PRN Route: PO  PRN Reason: Mild Pain  Start: 10/28/23 1615   End: 10/28/23 163    Admin Instructions:   Do not exceed 4 grams of acetaminophen in a 24 hr period.    If given for pain, use the following pain scale:   Mild Pain = Pain Score of 1-3, CPOT 1-2  Moderate Pain = Pain Score of 4-6, CPOT 3-4  Severe Pain = Pain Score of 7-10, CPOT 5-8  Based on patient request - if ordered for moderate or severe pain, provider allows for administration of a medication prescribed for a lower pain scale.    Do not exceed 4 grams of acetaminophen in a 24 hr period. Max dose of 2gm for AST/ALT greater than 120 units/L.    If given for pain, use the following pain scale:   Mild Pain = Pain Score of 1-3, CPOT 1-2  Moderate Pain = Pain Score of 4-6, CPOT 3-4  Severe Pain = Pain Score of 7-10, CPOT 5-8         aspirin tablet 325 mg  Dose: 325 mg  Freq: Daily Route: PO  Start: 10/28/23 1631   End: 10/31/23 1123   Admin Instructions:   If patient fails dysphagia, OR option MUST be given.  Do not exceed 4 grams of aspirin in a 24 hr period.    If given for pain, use the following pain scale:   Mild Pain = Pain Score of 1-3, CPOT 1-2  Moderate Pain = Pain Score of 4-6, CPOT 3-4  Severe Pain = Pain Score of 7-10, CPOT 5-8        Or  aspirin suppository 300 mg  Dose: 300 mg  Freq: Daily Route: RE  Start: 10/28/23 1631   End: 10/31/23 1123   Admin Instructions:   If patient fails dysphagia, OR option MUST be given.  Do not exceed 4 grams of aspirin in a 24 hr period.    If given for pain, use the following pain scale:   Mild Pain = Pain Score of 1-3, CPOT 1-2  Moderate Pain = Pain Score of 4-6, CPOT 3-4  Severe Pain = Pain Score of 7-10, CPOT 5-8         bisacodyl (DULCOLAX) suppository 10 mg  Dose: 10 mg  Freq: Daily PRN Route: RE  PRN Reason: Constipation  Start: 10/28/23 1614   End: 10/28/23 1635   Admin Instructions:   Hold for diarrhea         Calcium Replacement - Follow Nurse / BPA Driven Protocol  Freq: As Needed Route: XX  PRN Reason: Other  Start: 10/28/23 2138   End: 11/02/23 0746   Admin Instructions:  "  Open Order & Select \"[facility] Electrolyte Replacement Protocol Algorithm\" to View Details         furosemide (LASIX) injection 40 mg  Dose: 40 mg  Freq: Once Route: IV  Start: 11/02/23 0900   End: 11/02/23 0811         haloperidol lactate (HALDOL) injection 2 mg  Dose: 2 mg  Freq: Once As Needed Route: IV  PRN Reason: Agitation  PRN Comment: give prior to completing MRI, hold if QT interval > 450  Start: 11/01/23 1232   End: 11/02/23 0753   Admin Instructions:   For IV Push, administer no faster than 5 mg / minute.         ondansetron (ZOFRAN) injection 4 mg  Dose: 4 mg  Freq: Every 6 Hours PRN Route: IV  PRN Reasons: Nausea,Vomiting  Start: 10/28/23 1614   End: 10/28/23 1636         Pharmacy to Dose enoxaparin (LOVENOX)  Freq: Continuous PRN Route: XX  PRN Reason: Consult  Indications of Use: PROPHYLAXIS OF VENOUS THROMBOEMBOLISM  Start: 11/01/23 2154   End: 11/02/23 0034         piperacillin-tazobactam (ZOSYN) 3.375 g in iso-osmotic dextrose 50 ml (premix)  Dose: 3.375 g  Freq: Every 8 Hours Route: IV  Indications of Use: PNEUMONIA  Start: 11/02/23 1500   End: 11/04/23 1621   Admin Instructions:   Refrigerate         potassium chloride (KLOR-CON) packet 40 mEq  Dose: 40 mEq  Freq: Every 4 Hours Route: PO  Start: 11/05/23 0500   End: 11/05/23 1259   Admin Instructions:   For use with a feeding tube. Mix in at least 4 oz. of liquid.    0444-Given     1648-Canceled Entry               sodium chloride 0.9 % flush 10 mL  Dose: 10 mL  Freq: As Needed Route: IV  PRN Reason: Line Care  Start: 10/28/23 1324   End: 11/02/23 0746         sodium chloride 0.9 % infusion  Rate: 75 mL/hr Dose: 75 mL/hr  Freq: Continuous Route: IV  Start: 10/28/23 1631   End: 11/02/23 0746                       Physician Progress Notes (last 72 hours)        Gabrielle Mckeon APRN at 11/06/23 0934       Attestation signed by Mika Kim MD at 11/06/23 1007    I have reviewed this documentation and agree.               Summary:skilled nursing              "    Tigresandee Amaral   73 y.o.  male    LOS: 9 days   Patient Care Team:  Provider, No Known as PCP - General      Subjective   Resting    Review of Systems:   Lungs: No cough, no SOA  CV: No CP, no palpitations  GI: No nausea, vomiting, or diarrhea.     Medication Review:   Current Facility-Administered Medications:     acetaminophen (TYLENOL) tablet 650 mg, 650 mg, Oral, Q4H PRN, 650 mg at 23 1235 **OR** [DISCONTINUED] acetaminophen (TYLENOL) suppository 650 mg, 650 mg, Rectal, Q4H PRN, Lashon Brooks MD    amiodarone 150 mg in 100 mL D5W (loading dose), 150 mg, Intravenous, Once **FOLLOWED BY** [] amiodarone 360 mg in 200 mL D5W infusion, 1 mg/min, Intravenous, Continuous **FOLLOWED BY** [] amiodarone 360 mg in 200 mL D5W infusion, 0.5 mg/min, Intravenous, Continuous, Magdiel Murray MD    amoxicillin-clavulanate (AUGMENTIN) 875-125 MG per tablet 1 tablet, 1 tablet, Oral, Q12H, Magdiel Murray MD, 1 tablet at 23    aspirin chewable tablet 81 mg, 81 mg, Oral, Daily, Carolina Casillas, APRN, 81 mg at 23 0817    atorvastatin (LIPITOR) tablet 80 mg, 80 mg, Oral, Nightly, Lashon Brooks MD, 80 mg at 23    sennosides-docusate (PERICOLACE) 8.6-50 MG per tablet 2 tablet, 2 tablet, Oral, BID, 2 tablet at 23 **AND** polyethylene glycol (MIRALAX) packet 17 g, 17 g, Oral, Daily PRN **AND** bisacodyl (DULCOLAX) EC tablet 5 mg, 5 mg, Oral, Daily PRN **AND** bisacodyl (DULCOLAX) suppository 10 mg, 10 mg, Rectal, Daily PRN, Lashon Brooks MD    carvedilol (COREG) tablet 6.25 mg, 6.25 mg, Oral, BID With Meals, StingLashon gaitan MD, 6.25 mg at 23 08    clopidogrel (PLAVIX) tablet 75 mg, 75 mg, Oral, Daily, Babita Marte APRN, 75 mg at 23    Enoxaparin Sodium (LOVENOX) syringe 40 mg, 40 mg, Subcutaneous, Q24H, Abhinav Barnes MD, 40 mg at 23    HYDROcodone-acetaminophen (NORCO) 5-325 MG per tablet  1 tablet, 1 tablet, Oral, Q6H PRN, Salvatore Bedoya MD    lisinopril (PRINIVIL,ZESTRIL) tablet 20 mg, 20 mg, Oral, Daily, Lashon Brooks MD, 20 mg at 11/06/23 0817    Magnesium Standard Dose Replacement - Follow Nurse / BPA Driven Protocol, , Does not apply, PRNCecilia Renate Andrea, MD    melatonin tablet 3 mg, 3 mg, Oral, Nightly PRN, Lashon Brooks MD, 3 mg at 11/02/23 2106    nicotine (NICODERM CQ) 21 MG/24HR patch 1 patch, 1 patch, Transdermal, Q24H, Jimenez Zarate MD, 1 patch at 11/05/23 0800    nitroglycerin (NITROSTAT) SL tablet 0.4 mg, 0.4 mg, Sublingual, Q5 Min PRN, Magdiel Murray MD    ondansetron (ZOFRAN) tablet 4 mg, 4 mg, Oral, Q6H PRN **OR** ondansetron (ZOFRAN) injection 4 mg, 4 mg, Intravenous, Q6H PRN, Lashon Brooks MD, 4 mg at 11/01/23 1758    Phosphorus Replacement - Follow Nurse / BPA Driven Protocol, , Does not apply, Cecilia BARRON Renate Andrea, MD    Potassium Replacement - Follow Nurse / BPA Driven Protocol, , Does not apply, Cecilia BARRON Renate Andrea, MD    sodium chloride 0.9 % flush 1-10 mL, 1-10 mL, Intravenous, PRN, Sanjiv Spann MD    sodium chloride 0.9 % flush 10 mL, 10 mL, Intravenous, Q12H, Prince Roberts MD, 10 mL at 11/05/23 2017    sodium chloride 0.9 % flush 10 mL, 10 mL, Intravenous, PRN, Prince Roberts MD    sodium chloride 0.9 % flush 10 mL, 10 mL, Intravenous, Q12H, Sanjiv Spann MD    sodium chloride 0.9 % infusion 40 mL, 40 mL, Intravenous, PRN, Prince Roberts MD    sodium chloride 0.9 % infusion 40 mL, 40 mL, Intravenous, PRN, Sanjiv Spann MD    sodium chloride 0.9 % infusion, 50 mL/hr, Intravenous, Continuous, Sanjiv Spann MD      Objective     Vital Sign Min/Max for last 24 hours  Temp  Min: 97.7 °F (36.5 °C)  Max: 97.8 °F (36.6 °C)   BP  Min: 163/89  Max: 172/87    Pulse  Min: 80  Max: 96         11/04/23  0627 11/05/23  0400 11/06/23 0527   Weight: 71.5 kg (157 lb 10.1 oz) 75.6 kg (166 lb 10.7 oz) 75.8 kg (167 lb)  "     No intake or output data in the 24 hours ending 11/06/23 0934    Physical Exam:    General Appearance:    No acute distress   Lungs:     diminished bilaterally. No wheezes, rhonchi or rales.     Heart:    Regular rate and rhythm.  Normal S1 and S2. No murmur, no gallop, rub or lift. , No JVD.   Abdomen:     Normal bowel sounds, soft, nontender, nondistended.   Extremities:   No clubbing, cyanosis or edema.     Skin:   Warm, dry   Neurologic:   Awake       Monitor:sr  Results Review:     I reviewed the patient's new clinical results.    Sodium   Date Value Ref Range Status   11/04/2023 141 136 - 145 mmol/L Final     Potassium   Date Value Ref Range Status   11/05/2023 3.9 3.5 - 5.2 mmol/L Final   11/04/2023 3.2 (L) 3.5 - 5.2 mmol/L Final     Comment:     Slight hemolysis detected by analyzer. Results may be affected.   11/04/2023 2.9 (L) 3.5 - 5.2 mmol/L Final     Chloride   Date Value Ref Range Status   11/04/2023 105 98 - 107 mmol/L Final     No results found for: \"PLASMABICARB\"  BUN   Date Value Ref Range Status   11/04/2023 16 8 - 23 mg/dL Final     Creatinine   Date Value Ref Range Status   11/04/2023 1.32 (H) 0.76 - 1.27 mg/dL Final     Calcium   Date Value Ref Range Status   11/04/2023 8.5 (L) 8.6 - 10.5 mg/dL Final     Magnesium   Date Value Ref Range Status   11/04/2023 2.0 1.6 - 2.4 mg/dL Final       Results from last 7 days   Lab Units 11/04/23  0730   WBC 10*3/mm3 7.82   HEMOGLOBIN g/dL 13.2   HEMATOCRIT % 38.2   PLATELETS 10*3/mm3 265     Lab Results   Component Value Date    CHOL 146 10/29/2023     Lab Results   Component Value Date    HDL 34 (L) 10/29/2023     Lab Results   Component Value Date    LDL 98 10/29/2023     Lab Results   Component Value Date    TRIG 72 10/29/2023         Results from last 7 days   Lab Units 11/02/23  0735 11/02/23  0612 11/01/23 2031 11/01/23  1846   HSTROP T ng/L 425* 451* 354* 339*     Results from last 7 days   Lab Units 11/02/23  0735   PROBNP pg/mL 56,131.0*      "            Assessment/ Plan  Acute on chronic HFrEF  2. NICM s/p AICD     A. Echo 10/30/23:   Left ventricular systolic function is severely decreased. Calculated left ventricular EF = 21.6% Left ventricular ejection fraction appears to be less than 20%.The following left ventricular wall segments are hypokinetic: mid anterolateral, basal inferolateral, mid inferolateral, mid inferior, basal inferoseptal, mid inferoseptal, mid anteroseptal, basal inferior and basal inferoseptal. The following left ventricular wall segments are akinetic: apical anterior, apical lateral, apical inferior, apical septal and apex.  Left ventricular wall thickness is consistent with mild concentric hypertrophy.   Left ventricular diastolic function is consistent with (grade II w/high LAP) pseudonormalization.  The left atrial cavity is mildly dilated.  Saline test results are negative for right to left atrial level shunt.  Estimated right ventricular systolic pressure from tricuspid regurgitation is mildly elevated (35-45 mmHg).    3. H/o Vtach  4. HTN  5. HLD  6. Acute R sided watershed CVAs with severe ICA stenosis    A. h/o large MCA infarct  7. Aspiration PNA  8. Dementia    Plan for right ICA stent today       Time:  20 min        Electronically signed by Mika Kim MD at 11/06/23 1007       Magdiel Murray MD at 11/05/23 1248            Daily Progress Note.   [address]  11/5/2023    Patient:  Name:  Tigre Amaral  MRN:  [id number]  1950  73 y.o.  male         Requesting physician: Dr. Abhinav Barnes     Reason for Consultation:  resp distress icu mgmt     CC: resp distress    Interval History:  Follow up pna, ahrf pulmonary edema  Placed on 2lnc no desaturation noted  Pt denies worsening soa.  Calmly watching tv, food in front of him  No cp no palpitations   No lethargy.  Took augmentin crushed in applesauce    Physical Exam:  /94 (BP Location: Right arm, Patient Position: Lying)   " Pulse 89   Temp 96.3 °F (35.7 °C) (Axillary)   Resp 16   Ht 172.7 cm (68\")   Wt 75.6 kg (166 lb 10.7 oz)   SpO2 97%   BMI 25.34 kg/m²   Body mass index is 25.34 kg/m².  No intake or output data in the 24 hours ending 11/05/23 1248    General appearance: Awake alert, conversant   Eyes: Anicteric sclerae, moist conjunctivae; no lid lag;   HENT: Atraumatic; oropharynx clear with moist mucous membranes and no mucosal ulcerations; large 4 to 5 cm skin abnormality partially obstructing right  Neck: Trachea midline; no JVD  Lungs: Bilateral air entry, no wheeze rhonchi calm respiratory effort and nointercostal retractions  CV: rrr  Abdomen: thin non rigid  Extremities:   Skin:warm dry  Psych: calm affect, alert        Data Review:  Notable Labs:  Results from last 7 days   Lab Units 11/04/23  0730 11/02/23  0612 11/01/23  1846 10/30/23  0522   WBC 10*3/mm3 7.82 12.87* 14.89* 9.46   HEMOGLOBIN g/dL 13.2 13.5 14.3 13.1   PLATELETS 10*3/mm3 265 270 292 244     Results from last 7 days   Lab Units 11/04/23  2314 11/04/23  0730 11/02/23  0612 11/01/23  1846 10/31/23  1520 10/31/23  0643 10/30/23  2251 10/30/23  0522   SODIUM mmol/L  --  141 136 137  --  138  --  138   POTASSIUM mmol/L 3.2* 2.9* 3.9  3.9 3.4* 3.8 3.7 3.2* 3.2*   CHLORIDE mmol/L  --  105 109* 106  --  107  --  106   CO2 mmol/L  --  26.0 18.1* 20.0*  --  21.6*  --  25.0   BUN mg/dL  --  16 11 11  --  9  --  9   CREATININE mg/dL  --  1.32* 1.14 1.21  --  1.07  --  1.11   GLUCOSE mg/dL  --  88 108* 119*  --  96  --  91   CALCIUM mg/dL  --  8.5* 8.5* 8.9  --  8.4*  --  8.4*   MAGNESIUM mg/dL  --  2.0  --   --   --   --   --   --    Estimated Creatinine Clearance: 51.7 mL/min (A) (by C-G formula based on SCr of 1.32 mg/dL (H)).    Results from last 7 days   Lab Units 11/04/23  0730 11/02/23  0612 11/01/23  1846   AST (SGOT) U/L  --  51* 51*   ALT (SGPT) U/L  --  16 14   PROCALCITONIN ng/mL  --  0.06 0.04   LACTATE mmol/L  --   --  1.8   PLATELETS 10*3/mm3 265 " 378 820             Imaging:  Reviewed chest images personally from past 3 days    ASSESSMENT  /  PLAN:  Imaging: I personally visualized the images of scans/x-rays performed within last 3 days.     Cxr - right middle lobe infiltrate, +vascular congestion +ICD      Left ventricular systolic function is severely decreased. Calculated left ventricular EF = 21.6% Left ventricular ejection fraction appears to be less than 20%.The following left ventricular wall segments are hypokinetic: mid anterolateral, basal inferolateral, mid inferolateral, mid inferior, basal inferoseptal, mid inferoseptal, mid anteroseptal, basal inferior and basal inferoseptal. The following left ventricular wall segments are akinetic: apical anterior, apical lateral, apical inferior, apical septal and apex.    Left ventricular wall thickness is consistent with mild concentric hypertrophy.    Left ventricular diastolic function is consistent with (grade II w/high LAP) pseudonormalization.    The left atrial cavity is mildly dilated.    Saline test results are negative for right to left atrial level shunt.    Estimated right ventricular systolic pressure from tricuspid regurgitation is mildly elevated (35-45 mmHg).        Assessment and plan  Acute on chronic decompensated systolic heart failure with EF 20%  Cardiomyopathy per outpatient records no mention of ischemia or coronary artery disease  Status post ICD.  Hypertension  Hyperlipidemia  Aspiration pneumonia  Stroke  Respiratory distress  Acute pulmonary edema from heart failure     From a pulmonary perspective      Stop Zosyn  Complete Augmentin crushed in apple sauce  Repeat cxr in am    Complete 5-day antibiotic course     Titrated oxygen down on room air unless hypoxia and please document if has and retitrate up.    Updated pts daughter at bedside.    Electronically signed by Magdiel Murray MD, 11/05/23, 1:07 PM EST.          Electronically signed by Magdiel Murray MD at  23 1307       Salvatore Bedoya MD at 23 0817              Name: Tigre Amaral ADMIT: 10/28/2023   : 1950  PCP: Provider, No Known    MRN: [id number] LOS: 8 days   AGE/SEX: 73 y.o. male  ROOM: [facility]     Subjective   Subjective   Resting in bed.  He says he feels short of breath.    Objective   Objective   Vital Signs  Temp:  [97.5 °F (36.4 °C)-98.5 °F (36.9 °C)] 98.1 °F (36.7 °C)  Heart Rate:  [61-84] 82  Resp:  [16] 16  BP: (124-162)/(79-92) 162/90  SpO2:  [96 %-100 %] 98 %  on  Flow (L/min):  [2] 2;   Device (Oxygen Therapy): nasal cannula  Body mass index is 25.34 kg/m².  Physical Exam  HENT:      Nose:      Comments: Large mass on right nostril, external  Pulmonary:      Effort: Pulmonary effort is normal.      Breath sounds: No stridor.   Abdominal:      General: Abdomen is flat. There is no distension.      Palpations: Abdomen is soft.   Neurological:      General: No focal deficit present.      Mental Status: He is alert.         Results Review     I reviewed the patient's new clinical results.  Results from last 7 days   Lab Units 23  0730 2312 11/01/23  1846 10/30/23  0522   WBC 10*3/mm3 7.82 12.87* 14.89* 9.46   HEMOGLOBIN g/dL 13.2 13.5 14.3 13.1   PLATELETS 10*3/mm3 265 270 292 244     Results from last 7 days   Lab Units 23  2314 23  0730 23  0612 23  1846 10/31/23  1520 10/31/23  0643   SODIUM mmol/L  --  141 136 137  --  138   POTASSIUM mmol/L 3.2* 2.9* 3.9  3.9 3.4*   < > 3.7   CHLORIDE mmol/L  --  105 109* 106  --  107   CO2 mmol/L  --  26.0 18.1* 20.0*  --  21.6*   BUN mg/dL  --  16 11 11  --  9   CREATININE mg/dL  --  1.32* 1.14 1.21  --  1.07   GLUCOSE mg/dL  --  88 108* 119*  --  96   EGFR mL/min/1.73  --  56.2* 67.1 62.4  --  72.4    < > = values in this interval not displayed.     Results from last 7 days   Lab Units 23  0612 23  1846   ALBUMIN g/dL 3.3* 3.4*   BILIRUBIN mg/dL 1.2 1.1   ALK PHOS U/L 76 86   AST  (SGOT) U/L 51* 51*   ALT (SGPT) U/L 16 14     Results from last 7 days   Lab Units 11/04/23  0730 11/02/23  0612 11/01/23  1846 10/31/23  0643   CALCIUM mg/dL 8.5* 8.5* 8.9 8.4*   ALBUMIN g/dL  --  3.3* 3.4*  --    MAGNESIUM mg/dL 2.0  --   --   --      Results from last 7 days   Lab Units 11/02/23  0612 11/01/23  1846   PROCALCITONIN ng/mL 0.06 0.04   LACTATE mmol/L  --  1.8     Glucose   Date/Time Value Ref Range Status   11/02/2023 0825 129 70 - 130 mg/dL Final       FL Video Swallow Single Contrast    Result Date: 11/3/2023  Fluoroscopy was provided for the speech pathologist during a video swallow study. For full details please see the speech pathology report.  This report was finalized on 11/3/2023 5:21 PM by Dr. Addy Joseph M.D on Workstation: [id number]     Scheduled Medications  amiodarone, 150 mg, Intravenous, Once  amoxicillin-clavulanate, 1 tablet, Oral, Q12H  aspirin, 81 mg, Oral, Daily  atorvastatin, 80 mg, Oral, Nightly  carvedilol, 6.25 mg, Oral, BID With Meals  clopidogrel, 75 mg, Oral, Daily  enoxaparin, 40 mg, Subcutaneous, Q24H  lisinopril, 20 mg, Oral, Daily  nicotine, 1 patch, Transdermal, Q24H  potassium chloride, 40 mEq, Oral, Q4H  senna-docusate sodium, 2 tablet, Oral, BID  sodium chloride, 10 mL, Intravenous, Q12H    Infusions   Diet  Diet: Cardiac Diets; Healthy Heart (2-3 Na+); No Mixed Consistencies; Texture: Mechanical Ground (NDD 2); Fluid Consistency: Thin (IDDSI 0)  NPO Diet NPO Type: Sips with Meds      Assessment/Plan     Active Hospital Problems    Diagnosis  POA    **Cerebrovascular accident (CVA) due to embolism of right middle cerebral artery [I63.411]  Yes    Aspiration pneumonia [J69.0]  Unknown    Acute on chronic systolic congestive heart failure [I50.23]  Unknown    Hypertension [I10]  Unknown    Stage 3a chronic kidney disease [N18.31]  Unknown    Hyperlipidemia [E78.5]  Unknown    Carotid artery stenosis, symptomatic, right [I65.21]  Unknown    Tobacco abuse [Z72.0]   "Unknown    Basal cell carcinoma (BCC) of skin of nose [C44.311]  Unknown      Resolved Hospital Problems   No resolved problems to display.       73 y.o. male admitted with Cerebrovascular accident (CVA) due to embolism of right middle cerebral artery.      11/05/23  Attempted to obtain ABG, however patient became very agitated and would not let RT draw.  Pulmonology following and repeat CXR ordered for this evening.  He says he is short of breath, though O2 sats as well as vitals are all stable.    Acute CVA  Symptomatic R JOAN  HLD  -Stroke following  -VIET planning stent on 11/6  -ASA, Plavix    Aspiration PNA  -Augmentin    Cardiomyopathy  HTN  -TTE 20%  -Cardiology following  -Coreg 6.25 mg twice daily, lisinopril 20 mg     Flow (L/min):  [2] 2    Lovenox for DVT prophylaxis.  Discussed with patient and family.  Anticipate discharge Pending PT/OT eval. TBD      Salvatore Bedoya MD  [facility]  11/05/23  08:17 EST        Electronically signed by Salvatore Bedoya MD at 11/05/23 1335       Magdiel Murray MD at 11/04/23 1620            Daily Progress Note.   [address]  11/4/2023    Patient:  Name:  Tigre Amaral  MRN:  [id number]  1950  73 y.o.  male         Requesting physician: Dr. Abhinav Barnes     Reason for Consultation:  resp distress icu mgmt     CC: resp distress    Interval History:     Follow up pna, ahrf pulmonary edema    Patient doing very well.  Denies any shortness of breath no fever no cough no sputum production.  Wake alert watching TV.  Says he is doing much better.    Physical Exam:  /92 (BP Location: Right arm, Patient Position: Lying)   Pulse 61   Temp 97.7 °F (36.5 °C) (Oral)   Resp 16   Ht 172.7 cm (68\")   Wt 71.5 kg (157 lb 10.1 oz)   SpO2 96%   BMI 23.97 kg/m²   Body mass index is 23.97 kg/m².    Intake/Output Summary (Last 24 hours) at 11/4/2023 1621  Last data filed at 11/4/2023 0652  Gross per 24 hour   Intake " 100 ml   Output --   Net 100 ml     General appearance: Awake alert, confused conversant   Eyes: Anicteric sclerae, moist conjunctivae; no lid lag;   HENT: Atraumatic; oropharynx clear with moist mucous membranes and no mucosal ulcerations; large 4 to 5 cm skin abnormality partially obstructing right  Neck: Trachea midline; no JVD  Lungs: Bilateral air entry, no wheeze rhonchi calm respiratory effort and nointercostal retractions  CV: rrr  Abdomen: thin non rigid  Extremities:   Skin:warm dry  Psych: calm affect, alert        Data Review:  Notable Labs:  Results from last 7 days   Lab Units 11/04/23 0730 11/02/23  0612 11/01/23  1846 10/30/23  0522 10/29/23  0600   WBC 10*3/mm3 7.82 12.87* 14.89* 9.46 8.49   HEMOGLOBIN g/dL 13.2 13.5 14.3 13.1 13.1   PLATELETS 10*3/mm3 265 270 292 244 222     Results from last 7 days   Lab Units 11/04/23 0730 11/02/23  0612 11/01/23  1846 10/31/23  1520 10/31/23  0643 10/30/23  2251 10/30/23  0522 10/29/23  2015 10/29/23  0600   SODIUM mmol/L 141 136 137  --  138  --  138  --  139   POTASSIUM mmol/L 2.9* 3.9  3.9 3.4* 3.8 3.7 3.2* 3.2*   < > 2.4*   CHLORIDE mmol/L 105 109* 106  --  107  --  106  --  103   CO2 mmol/L 26.0 18.1* 20.0*  --  21.6*  --  25.0  --  26.4   BUN mg/dL 16 11 11  --  9  --  9  --  9   CREATININE mg/dL 1.32* 1.14 1.21  --  1.07  --  1.11  --  1.28*   GLUCOSE mg/dL 88 108* 119*  --  96  --  91  --  93   CALCIUM mg/dL 8.5* 8.5* 8.9  --  8.4*  --  8.4*  --  8.3*   MAGNESIUM mg/dL 2.0  --   --   --   --   --   --   --  1.8    < > = values in this interval not displayed.   Estimated Creatinine Clearance: 48.9 mL/min (A) (by C-G formula based on SCr of 1.32 mg/dL (H)).    Results from last 7 days   Lab Units 11/04/23  0730 11/02/23  0612 11/01/23  1846 10/30/23  0522 10/29/23  0600   AST (SGOT) U/L  --  51* 51*  --  13   ALT (SGPT) U/L  --  16 14  --  6   PROCALCITONIN ng/mL  --  0.06 0.04  --   --    LACTATE mmol/L  --   --  1.8  --   --    PLATELETS 10*3/mm3 265  270 292   < > 222    < > = values in this interval not displayed.             Imaging:  Reviewed chest images personally from past 3 days    ASSESSMENT  /  PLAN:  Imaging: I personally visualized the images of scans/x-rays performed within last 3 days.     Cxr - right middle lobe infiltrate, +vascular congestion +ICD      Left ventricular systolic function is severely decreased. Calculated left ventricular EF = 21.6% Left ventricular ejection fraction appears to be less than 20%.The following left ventricular wall segments are hypokinetic: mid anterolateral, basal inferolateral, mid inferolateral, mid inferior, basal inferoseptal, mid inferoseptal, mid anteroseptal, basal inferior and basal inferoseptal. The following left ventricular wall segments are akinetic: apical anterior, apical lateral, apical inferior, apical septal and apex.    Left ventricular wall thickness is consistent with mild concentric hypertrophy.    Left ventricular diastolic function is consistent with (grade II w/high LAP) pseudonormalization.    The left atrial cavity is mildly dilated.    Saline test results are negative for right to left atrial level shunt.    Estimated right ventricular systolic pressure from tricuspid regurgitation is mildly elevated (35-45 mmHg).        Assessment and plan  Acute on chronic decompensated systolic heart failure with EF 20%  Cardiomyopathy per outpatient records no mention of ischemia or coronary artery disease  Status post ICD.  Hypertension  Hyperlipidemia  Aspiration pneumonia  Stroke  Respiratory distress  Acute pulmonary edema from heart failure        From a pulmonary perspective      Stop Zosyn  Start Augmentin  Complete 5-day antibiotic course     Titrated oxygen down on room air now    Electronically signed by Magdiel Murray MD, 11/04/23, 4:22 PM EDT.          Electronically signed by Magdiel Murray MD at 11/04/23 1973       Kayla Fowler APRN at 11/04/23 0547          DOS:  "2023  NAME: Tigre Amaral   : 1950  PCP: Provider, No Known  Chief Complaint   Patient presents with    Fall    Arm Injury   Patient seen in follow-up today; new to me        Stroke      Subjective: No acute events overnight.  Patient denies any new complaints or concerns on my exam.      No family at bedside.     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 Language normal. No neglect. Higher integrative function normal  CN: II-XII normal-uvula slightly deviated left  Motor: 5/5, normal tone on the right; left upper extremity distal 2 -/5 handgrip 2/5, proximal 3+/5  Reflexes: Toes downgoing  Sensory: Intact-to light touch  Coordination: Normal-finger-to-nose    Laboratory results:  Lab Results   Component Value Date    GLUCOSE 88 2023    CALCIUM 8.5 (L) 2023     2023    K 2.9 (L) 2023    CO2 26.0 2023     2023    BUN 16 2023    CREATININE 1.32 (H) 2023    BCR 12.1 2023    ANIONGAP 10.0 2023     Lab Results   Component Value Date    WBC 7.82 2023    HGB 13.2 2023    HCT 38.2 2023    MCV 89.5 2023     2023     Lab Results   Component Value Date    CHOL 146 10/29/2023     Lab Results   Component Value Date    HDL 34 (L) 10/29/2023     Lab Results   Component Value Date    LDL 98 10/29/2023     Lab Results   Component Value Date    TRIG 72 10/29/2023         Lab 10/29/23  0600   HEMOGLOBIN A1C 5.20      Review and interpretation " of imaging:  Imaging discussed below reviewed independently.      Impression: This is a 73-year-old male with known past medical history of EtOH dependency/tobacco abuse, TBI, PPM, hypertension and reported dementia who presented to [facility] 10/28 due to complaint of left upper extremity weakness for which our service was consulted.  Chart review reflects that symptoms started Thursday night before arrival after patient sustained a fall.  Family reportedly did not notice any significant left arm weakness that patient was complaining initial CT of the head negative for acute findings although known area of large left MCA territory infarct versus residual TBI noted and age-indeterminate infarct in the right MCA CVA territories.  Patient was normotensive on arrival and afebrile.  EKG AV paced rhythm.  Since admission patient found to have numerous acute right hemispheric infarcts cortical and subcortical-watershed.  Moderate small vessel disease.  Chronic left temporal infarct noted.  Other work-up noted below. Both vascular team and endovascular teams consulted-Dr. Spann to review case and complete possible stenting in the interim patient on aspirin and statin-advised against hypotension/hypovolemia-dehydration.  Patient on DAPT therapy and high-dose statin here-unclear if patient was taking antiplatelet consistent prior to arrival      Neurologically, stable with persistent left-sided weakness.  Neurosurgery planning for right ICA stent on Monday.  Other recommendations below.PT/OT/ST. CCP to assist with discharge planning. Call RRT for any acute neurological changes and/or concerns. We will continue to follow and advise peripherally until stent placement complete.       Work up to date:  2D Echo: LA cavity mildly dilated, saline test negative, EF 21.6%, septal wall motion abnormality noted, no evidence of LV thrombus or mass, no AV stenosis, mild to moderate MVR.  Carotid ultrasound: Severe near  occlusive right internal carotid artery.  Left internal carotid artery with severe stenosis-both with near occlusion  CTA of the head: Multiple small age-indeterminate infarcts noted.  Evidence of chronic large infarct in the left MCA distribution noted.  Severe ICA stenosis bilaterally.  CT head repeat 11/2: Remote large left MCA infarct redemonstrated with evidence of volume loss.  Lacunar infarcts involving corona radiata on the right redemonstrated.  No evidence of acute infarct and/or intracranial hemorrhage noted.  Incidental finding of 3.3 x 1.9 soft tissue mass appreciated maxilla on the right over the right nasal bone-patient with known basal cell carcinoma  MRI brain WO: Numerous acute right hemispheric infarcts cortical and subcortical with majority in an AP dimension consistent with watershed infarcts.  Moderate small vessel disease.  Chronic left temporal infarct noted.        Diagnosis:  1.  Acute right hemispheric infarcts-subcortical watershed.  Suspect due to right carotid stenosis-near occlusive  2.  Bilateral carotid stenosis with right slightly greater than left etiology of infarct felt to be secondary to right carotid mundieiz-dxiohnfaaun-vyvhgbh stent placement next week  3.  History of large left MCA infarct  4.  History of TBI with associated cognitive impairment felt to be representative of dementia  5.  Tobacco abuse  6.  Cardiomyopathy    Plan:   Hold PT/OT till procedure completed   Avoid hypotension/dehydration  Bedrest with bathroom privileges  Aspirin 81 mg daily  Plavix 75 mg daily-150 mg load given yesterday; P2 Y12 today 186-adequate response  Lipitor 80 mg daily-LDL 98-on no antiplatelet prior to arrival  Neurochecks  BP control-maintain systolic blood pressure 120-150; avoid hypotension  Stroke Education  OLMAN/SCDs  PT/OT/ST    Case reviewed with attending neurologist Kwasi and he agrees with treatment plan above.     SUNG Butterfield      Electronically signed by Janeth  SUNG Malave at 23 1352       Phong Gonzales MD at 23 0856          Tigre Amaral   73 y.o.  male    LOS: 7 days   Patient Care Team:  Provider, No Known as PCP - General      Subjective     Interval History:     73-year-old white male patient who been followed by my colleague  has history of AICD for primary prevention of sudden death.  He has history of ventricular tachycardia.  Patient has dementia and history is obtained mostly from review of charts.  He had an episode of chest pain this morning.  Patient has history of left arm weakness a few days ago.  He also lost his balance.  CT of the head showed old and subacute infarcts.  He has a soft tissue mass on the right premaxillary area consistent with his previously diagnosed basal cell carcinoma.  Echocardiogram showed ejection fraction 21.6%.  EKG is paced rhythm.    Interval history no chest pain and or shortness of breath.     Patient Complaints:     Review of Systems:       Medication Review:   Current Facility-Administered Medications:     acetaminophen (TYLENOL) tablet 650 mg, 650 mg, Oral, Q4H PRN, 650 mg at 23 **OR** [DISCONTINUED] acetaminophen (TYLENOL) suppository 650 mg, 650 mg, Rectal, Q4H PRN, Lashon Brooks MD    amiodarone 150 mg in 100 mL D5W (loading dose), 150 mg, Intravenous, Once **FOLLOWED BY** [] amiodarone 360 mg in 200 mL D5W infusion, 1 mg/min, Intravenous, Continuous **FOLLOWED BY** [] amiodarone 360 mg in 200 mL D5W infusion, 0.5 mg/min, Intravenous, Continuous, Magdiel Murray MD    aspirin chewable tablet 81 mg, 81 mg, Oral, Daily, Carolina Casillas APRN, 81 mg at 23    atorvastatin (LIPITOR) tablet 80 mg, 80 mg, Oral, Nightly, aLshon Brooks MD, 80 mg at 23    sennosides-docusate (PERICOLACE) 8.6-50 MG per tablet 2 tablet, 2 tablet, Oral, BID, 2 tablet at 23 **AND** polyethylene glycol (MIRALAX) packet 17 g, 17 g, Oral,  Daily PRN **AND** bisacodyl (DULCOLAX) EC tablet 5 mg, 5 mg, Oral, Daily PRN **AND** bisacodyl (DULCOLAX) suppository 10 mg, 10 mg, Rectal, Daily PRN, Lashon Brooks MD    carvedilol (COREG) tablet 6.25 mg, 6.25 mg, Oral, BID With Meals, Lashon Brooks MD, 6.25 mg at 11/04/23 0817    clopidogrel (PLAVIX) tablet 75 mg, 75 mg, Oral, Daily, Babita Marte APRN, 75 mg at 11/04/23 0817    Enoxaparin Sodium (LOVENOX) syringe 40 mg, 40 mg, Subcutaneous, Q24H, Abhinav Barnes MD, 40 mg at 11/03/23 2315    lisinopril (PRINIVIL,ZESTRIL) tablet 20 mg, 20 mg, Oral, Daily, Lashon Brooks MD, 20 mg at 11/04/23 0817    Magnesium Standard Dose Replacement - Follow Nurse / BPA Driven Protocol, , Does not apply, PRN, Lashon Brooks MD    melatonin tablet 3 mg, 3 mg, Oral, Nightly PRN, Lashon Brooks MD, 3 mg at 11/02/23 2106    nicotine (NICODERM CQ) 21 MG/24HR patch 1 patch, 1 patch, Transdermal, Q24H, Jimenez Zarate MD, 1 patch at 11/04/23 0817    nitroglycerin (NITROSTAT) SL tablet 0.4 mg, 0.4 mg, Sublingual, Q5 Min PRN, Magdiel Murray MD    ondansetron (ZOFRAN) tablet 4 mg, 4 mg, Oral, Q6H PRN **OR** ondansetron (ZOFRAN) injection 4 mg, 4 mg, Intravenous, Q6H PRN, Lashon Brooks MD, 4 mg at 11/01/23 1758    Phosphorus Replacement - Follow Nurse / BPA Driven Protocol, , Does not apply, PRN, Lashon Brooks MD    piperacillin-tazobactam (ZOSYN) 3.375 g in iso-osmotic dextrose 50 ml (premix), 3.375 g, Intravenous, Q8H, Magdiel Murray MD, 3.375 g at 11/04/23 0652    potassium chloride (K-DUR,KLOR-CON) ER tablet 40 mEq, 40 mEq, Oral, Q4H, Salvatore Bedoya MD    Potassium Replacement - Follow Nurse / BPA Driven Protocol, , Does not apply, PRN, StingLashon gaitan MD    sodium chloride 0.9 % flush 10 mL, 10 mL, Intravenous, Q12H, Prince Roberts MD, 10 mL at 11/03/23 2054    sodium chloride 0.9 % flush 10 mL, 10 mL, Intravenous, PRN, Prince Roberts MD     sodium chloride 0.9 % infusion 40 mL, 40 mL, Intravenous, PRN, Prince Roberts MD      Objective   Vital Sign Min/Max for last 24 hours  Temp  Min: 95.6 °F (35.3 °C)  Max: 98.4 °F (36.9 °C)   BP  Min: 117/60  Max: 157/79    Pulse  Min: 62  Max: 72     Wt Readings from Last 3 Encounters:   11/04/23 71.5 kg (157 lb 10.1 oz)        Intake/Output Summary (Last 24 hours) at 11/4/2023 0856  Last data filed at 11/4/2023 0652  Gross per 24 hour   Intake 100 ml   Output --   Net 100 ml     Physical Exam:      General Appearance:    Well developed and well nourished in no acute distress   Head:    Normocephalic, atraumatic   Eyes:            Conjunctivae normal, anicteric, no xanthelasma   Neck:   supple, trachea midline, no thyromegaly, no carotid bruit, no JVD, no elevated CVP   Lungs:     Clear to auscultation,respirations regular, even and                  unlabored    Heart:    Regular rhythm and normal rate, normal S1 and S2,            No murmur, no gallop, no rub, no click   Chest Wall:    No abnormalities observed   Abdomen:     Normal bowel sounds, no masses, no organomegaly, soft        nontender, nondistended, no guarding, no rebound                tenderness   Rectal:     Deferred   Extremities:   No edema. Moves all extremities well, no cyanosis, no erythema   Pulses:   Pulses palpable and equal bilaterally   Skin:   No bleeding, bruising or rash   Neurologic:   awake alert and oriented x3, speech clear and approp, no facial drooping     :    Monitor:      Results Review:         Sodium Sodium   Date Value Ref Range Status   11/04/2023 141 136 - 145 mmol/L Final   11/02/2023 136 136 - 145 mmol/L Final   11/01/2023 137 136 - 145 mmol/L Final      Potassium Potassium   Date Value Ref Range Status   11/04/2023 2.9 (L) 3.5 - 5.2 mmol/L Final   11/02/2023 3.9 3.5 - 5.2 mmol/L Final   11/02/2023 3.9 3.5 - 5.2 mmol/L Final   11/01/2023 3.4 (L) 3.5 - 5.2 mmol/L Final     Comment:     Slight hemolysis detected by  "analyzer. Results may be affected.      Chloride Chloride   Date Value Ref Range Status   11/04/2023 105 98 - 107 mmol/L Final   11/02/2023 109 (H) 98 - 107 mmol/L Final   11/01/2023 106 98 - 107 mmol/L Final      Bicarbonate No results found for: \"PLASMABICARB\"   BUN BUN   Date Value Ref Range Status   11/04/2023 16 8 - 23 mg/dL Final   11/02/2023 11 8 - 23 mg/dL Final   11/01/2023 11 8 - 23 mg/dL Final      Creatinine Creatinine   Date Value Ref Range Status   11/04/2023 1.32 (H) 0.76 - 1.27 mg/dL Final   11/02/2023 1.14 0.76 - 1.27 mg/dL Final   11/01/2023 1.21 0.76 - 1.27 mg/dL Final      Calcium Calcium   Date Value Ref Range Status   11/04/2023 8.5 (L) 8.6 - 10.5 mg/dL Final   11/02/2023 8.5 (L) 8.6 - 10.5 mg/dL Final   11/01/2023 8.9 8.6 - 10.5 mg/dL Final      Magnesium No results found for: \"MG\"     Results from last 7 days   Lab Units 11/04/23  0730   WBC 10*3/mm3 7.82   HEMOGLOBIN g/dL 13.2   HEMATOCRIT % 38.2   PLATELETS 10*3/mm3 265     Lab Results   Lab Value Date/Time    TROPONINT 425 (C) 11/02/2023 0735    TROPONINT 451 (C) 11/02/2023 0612    TROPONINT 354 (C) 11/01/2023 2031    TROPONINT 339 (C) 11/01/2023 1846            Echo EF Estimated  No results found for: \"ECHOEFEST\"      Left ventricular systolic function is severely decreased. Calculated left ventricular EF = 21.6% Left ventricular ejection fraction appears to be less than 20%.The following left ventricular wall segments are hypokinetic: mid anterolateral, basal inferolateral, mid inferolateral, mid inferior, basal inferoseptal, mid inferoseptal, mid anteroseptal, basal inferior and basal inferoseptal. The following left ventricular wall segments are akinetic: apical anterior, apical lateral, apical inferior, apical septal and apex.    Left ventricular wall thickness is consistent with mild concentric hypertrophy.    Left ventricular diastolic function is consistent with (grade II w/high LAP) pseudonormalization.    The left atrial cavity " is mildly dilated.    Saline test results are negative for right to left atrial level shunt.    Estimated right ventricular systolic pressure from tricuspid regurgitation is mildly elevated (35-45 mmHg).   MRI    The study is significantly hampered by patient motion but  multiple acute infarcts involving the right frontal, parietal and to a  lesser extent occipital and temporal lobes are appreciated including  both white matter and cortical infarcts. The majority of infarcts are  oriented in an AP dimension consistent with watershed infarcts.    CT angio    There is an approximate 60-70% NASCET stenosis within the proximal  portion of the right ICA and an approximate 70-80% NASCET stenosis  within the proximal portion of the left ICA secondary to mixed calcified  and noncalcified atherosclerotic plaques.     There is a severe degree of stenosis at the origin of the left vertebral  artery and a moderate degree of stenosis is seen within the V3 segment  of the left vertebral artery.    Assessment/ Plan  Assessment & Plan   Active Hospital Problems    Diagnosis  POA    **Cerebrovascular accident (CVA) due to embolism of right middle cerebral artery [I63.411]  Yes    Aspiration pneumonia [J69.0]  Unknown    Acute on chronic systolic congestive heart failure [I50.23]  Unknown    Hypertension [I10]  Unknown    Stage 3a chronic kidney disease [N18.31]  Unknown    Hyperlipidemia [E78.5]  Unknown    Carotid artery stenosis, symptomatic, right [I65.21]  Unknown    Tobacco abuse [Z72.0]  Unknown    Basal cell carcinoma (BCC) of skin of nose [C44.311]  Unknown     We will continue to monitor patient is euvolemic today.  Patient with normal function of ICD and no recent therapies.  We will continue with dual antiplatelet therapy and monitor progress.    Patient is my office patient CVS is stable will see Monday      Phong Gonzales MD  11/04/23  08:56 EDT             Electronically signed by Phong Gonzales MD at 11/05/23  0846       Giovanna Ziegler PA at 11/04/23 0805       Attestation signed by Niko Lakhani MD at 11/04/23 114    I have reviewed this documentation and agree.                  [facility] NEUROSURGERY PROGRESS NOTE    HD #8: LUE weakness, watershed CVAs and symptomatic right ICA stenosis    Interval History: Patient stable.  No acute issues overnight.  Denies any new complaints today.    Vital signs in last 24 hours:  Temp:  [95.6 °F (35.3 °C)-98.4 °F (36.9 °C)] 97.5 °F (36.4 °C)  Heart Rate:  [62-72] 65  Resp:  [12-16] 16  BP: (117-157)/(60-84) 157/79    Intake/Output this shift:  No intake/output data recorded.    LABS:  Recent Results (from the past 24 hour(s))   ECG 12 Lead Drug Monitoring; Amiodarone    Collection Time: 11/04/23  3:38 AM   Result Value Ref Range    QT Interval 516 ms    QTC Interval 533 ms   Basic Metabolic Panel    Collection Time: 11/04/23  7:30 AM    Specimen: Blood   Result Value Ref Range    Glucose 88 65 - 99 mg/dL    BUN 16 8 - 23 mg/dL    Creatinine 1.32 (H) 0.76 - 1.27 mg/dL    Sodium 141 136 - 145 mmol/L    Potassium 2.9 (L) 3.5 - 5.2 mmol/L    Chloride 105 98 - 107 mmol/L    CO2 26.0 22.0 - 29.0 mmol/L    Calcium 8.5 (L) 8.6 - 10.5 mg/dL    BUN/Creatinine Ratio 12.1 7.0 - 25.0    Anion Gap 10.0 5.0 - 15.0 mmol/L    eGFR 56.2 (L) >60.0 mL/min/1.73   CBC (No Diff)    Collection Time: 11/04/23  7:30 AM    Specimen: Blood   Result Value Ref Range    WBC 7.82 3.40 - 10.80 10*3/mm3    RBC 4.27 4.14 - 5.80 10*6/mm3    Hemoglobin 13.2 13.0 - 17.7 g/dL    Hematocrit 38.2 37.5 - 51.0 %    MCV 89.5 79.0 - 97.0 fL    MCH 30.9 26.6 - 33.0 pg    MCHC 34.6 31.5 - 35.7 g/dL    RDW 12.2 (L) 12.3 - 15.4 %    RDW-SD 39.6 37.0 - 54.0 fl    MPV 10.1 6.0 - 12.0 fL    Platelets 265 140 - 450 10*3/mm3       IMAGING STUDIES:  Imaging Results (Last 24 Hours)       Procedure Component Value Units Date/Time    FL Video Swallow Single Contrast [[id number]] Collected: 11/03/23 3853     Updated:  11/03/23 1724    Narrative:      VIDEO SWALLOW STUDY     HISTORY: Dysphagia.     Fluoroscopy was provided for the speech pathologist during a video  swallow study.     No aspiration was seen.       Impression:      Fluoroscopy was provided for the speech pathologist during a  video swallow study. For full details please see the speech pathology  report.     This report was finalized on 11/3/2023 5:21 PM by Dr. Addy Joseph M.D  on Workstation: [id number]             PHYSICAL EXAM:  Mental Status: The patient is awake, alert and oriented x 3. Recent and remote memory functions are normal. The patient is attentive with normal concentration.  Language is fluent. Speech is clear. The speech is nondysarthric. Fund of knowledge is normal.  Cranial Nerves III, IV, VI: The pupils are 3 mm, equally round and reactive to light. The extraocular movements are full in all directions of gaze without nystagmus.  Cranial Nerve V: Facial sensation is intact to light touch.  Cranial Nerve VII: Facial movements are symmetric  Cranial Nerve VIII: Audition is intact to finger rub bilaterally.  Cranial Nerves IX, X: Uvula and palate elevate symmetrically.  Cranial Nerve XI: Sternocleidomastoid strength is 5/5 bilaterally with normal shoulder shrug.  Cranial Nerve XII: Midline tongue protrusion without atrophy or fasiculations.  Motor: Tone is normal in all four extremities without fasciculations, atrophy, or myoclonus. There are no involuntary movements.   Muscle Strength: 5-/5 muscle strength in left biceps and triceps muscles.  Otherwise, 5/5 muscle strength in bilateral upper and bilateral lower extremities. Sensory: The sensory examination is normal for light touch bilaterally and symmetrically in all extremities    ASSESSMENT/PLAN:    Cerebrovascular accident (CVA) due to embolism of right middle cerebral artery    Aspiration pneumonia    Acute on chronic systolic congestive heart failure    Hypertension    Stage 3a chronic kidney  "disease    Hyperlipidemia    Carotid artery stenosis, symptomatic, right    Tobacco abuse    Basal cell carcinoma (BCC) of skin of nose    HD #8: LUE weakness, watershed CVAs and symptomatic right ICA stenosis    Neuro: plan for right ICA stent on  with Dr. Spann, P2Y12 pending, continue DAPT    Shared Decision Making: I discussed the patient's findings and my recommendations with patient and . SABINO Stafford  2023  08:39 EDT    \"Dictated utilizing Dragon dictation\".       Electronically signed by Niko Lakhani MD at 23 1144       Salvatore Bedoya MD at 23 0809              Name: Tigre Amaral ADMIT: 10/28/2023   : 1950  PCP: Provider, No Known    MRN: [id number] LOS: 7 days   AGE/SEX: 73 y.o. male  ROOM: [facility]     Subjective   Subjective   Lying in bed.  No problems today.  Tolerating p.o. diet.  Wife at bedside.    Objective   Objective   Vital Signs  Temp:  [95.6 °F (35.3 °C)-98.4 °F (36.9 °C)] 95.6 °F (35.3 °C)  Heart Rate:  [62-72] 63  Resp:  [12-16] 16  BP: (117-147)/(60-84) 147/70  SpO2:  [97 %-100 %] 97 %  on   ;   Device (Oxygen Therapy): room air  Body mass index is 23.97 kg/m².  Physical Exam  HENT:      Nose:      Comments: Large mass on right nostril, external  Pulmonary:      Effort: Pulmonary effort is normal.      Breath sounds: No stridor.   Abdominal:      General: Abdomen is flat. There is no distension.      Palpations: Abdomen is soft.   Neurological:      General: No focal deficit present.      Mental Status: He is alert.         Results Review     I reviewed the patient's new clinical results.  Results from last 7 days   Lab Units 23  0730 23  0612 23  1846 10/30/23  0522   WBC 10*3/mm3 7.82 12.87* 14.89* 9.46   HEMOGLOBIN g/dL 13.2 13.5 14.3 13.1   PLATELETS 10*3/mm3 265 270 292 244     Results from last 7 days   Lab Units 23  0612 23  1846 10/31/23  1520 10/31/23  0643 " 10/30/23  2251 10/30/23  0522   SODIUM mmol/L 136 137  --  138  --  138   POTASSIUM mmol/L 3.9  3.9 3.4* 3.8 3.7   < > 3.2*   CHLORIDE mmol/L 109* 106  --  107  --  106   CO2 mmol/L 18.1* 20.0*  --  21.6*  --  25.0   BUN mg/dL 11 11  --  9  --  9   CREATININE mg/dL 1.14 1.21  --  1.07  --  1.11   GLUCOSE mg/dL 108* 119*  --  96  --  91   EGFR mL/min/1.73 67.1 62.4  --  72.4  --  69.2    < > = values in this interval not displayed.     Results from last 7 days   Lab Units 11/02/23  0612 11/01/23  1846 10/29/23  0600 10/28/23  1449   ALBUMIN g/dL 3.3* 3.4* 3.1* 3.7   BILIRUBIN mg/dL 1.2 1.1 0.8 0.6   ALK PHOS U/L 76 86 71 89   AST (SGOT) U/L 51* 51* 13 14   ALT (SGPT) U/L 16 14 6 7     Results from last 7 days   Lab Units 11/02/23  0612 11/01/23  1846 10/31/23  0643 10/30/23  0522 10/29/23  0600 10/28/23  1449   CALCIUM mg/dL 8.5* 8.9 8.4* 8.4* 8.3* 8.8   ALBUMIN g/dL 3.3* 3.4*  --   --  3.1* 3.7   MAGNESIUM mg/dL  --   --   --   --  1.8  --      Results from last 7 days   Lab Units 11/02/23  0612 11/01/23  1846   PROCALCITONIN ng/mL 0.06 0.04   LACTATE mmol/L  --  1.8     Glucose   Date/Time Value Ref Range Status   11/02/2023 0825 129 70 - 130 mg/dL Final   11/02/2023 0712 116 70 - 130 mg/dL Final   11/01/2023 1741 128 70 - 130 mg/dL Final       FL Video Swallow Single Contrast    Result Date: 11/3/2023  Fluoroscopy was provided for the speech pathologist during a video swallow study. For full details please see the speech pathology report.  This report was finalized on 11/3/2023 5:21 PM by Dr. Addy Joseph M.D on Workstation: [id number]      MRI Brain Without Contrast    Result Date: 11/2/2023  The study is significantly hampered by patient motion but multiple acute infarcts involving the right frontal, parietal and to a lesser extent occipital and temporal lobes are appreciated including both white matter and cortical infarcts. The majority of infarcts are oriented in an AP dimension consistent with watershed  infarcts.  The above information was called to and discussed with SUNG Munroe on 11/02/2023 at 1610 hours.  This report was finalized on 11/2/2023 4:48 PM by Dr. Abhinav Vidales M.D on Workstation: [id number]      CT Head Without Contrast    Result Date: 11/2/2023  1.  A large remote left MCA distribution infarct is appreciated with volume loss similar in appearance as compared to the prior examination. Lacunar infarcts involving the corona radiata on the right are noted. There is no evidence of acute infarction or intracranial hemorrhage. Further evaluation could be performed with a MRI examination of the brain as indicated. 2.  A 3.3 x 1.9 cm soft tissue mass is appreciated overlying the medial aspect of the maxilla on the right and extending over the posterior aspect of the nasal bone on the right. The patient has a known basal cell carcinoma at this location.  Radiation dose reduction techniques were utilized, including automated exposure control and exposure modulation based on body size.   This report was finalized on 11/2/2023 4:16 PM by Dr. Abhinav Vidales M.D on Workstation: [id number]     Scheduled Medications  amiodarone, 150 mg, Intravenous, Once  aspirin, 81 mg, Oral, Daily  atorvastatin, 80 mg, Oral, Nightly  carvedilol, 6.25 mg, Oral, BID With Meals  clopidogrel, 75 mg, Oral, Daily  enoxaparin, 40 mg, Subcutaneous, Q24H  lisinopril, 20 mg, Oral, Daily  nicotine, 1 patch, Transdermal, Q24H  piperacillin-tazobactam, 3.375 g, Intravenous, Q8H  senna-docusate sodium, 2 tablet, Oral, BID  sodium chloride, 10 mL, Intravenous, Q12H    Infusions   Diet  Diet: Cardiac Diets; Healthy Heart (2-3 Na+); No Mixed Consistencies; Texture: Mechanical Ground (NDD 2); Fluid Consistency: Thin (IDDSI 0)  NPO Diet NPO Type: Sips with Meds      Assessment/Plan     Active Hospital Problems    Diagnosis  POA    **Cerebrovascular accident (CVA) due to embolism of right middle cerebral artery [I63.411]  Yes    Aspiration  pneumonia [J69.0]  Unknown    Acute on chronic systolic congestive heart failure [I50.23]  Unknown    Hypertension [I10]  Unknown    Stage 3a chronic kidney disease [N18.31]  Unknown    Hyperlipidemia [E78.5]  Unknown    Carotid artery stenosis, symptomatic, right [I65.21]  Unknown    Tobacco abuse [Z72.0]  Unknown    Basal cell carcinoma (BCC) of skin of nose [C44.311]  Unknown      Resolved Hospital Problems   No resolved problems to display.       73 y.o. male admitted with Cerebrovascular accident (CVA) due to embolism of right middle cerebral artery.      23  OR w/ VIET on . Replete K. Check Mg.    Acute CVA  Symptomatic R JOAN  HLD  -Stroke following  -VIET planning stent on   -ASA, Plavix    Cardiomyopathy  HTN  -Cardiology following  -Coreg 6.25 mg twice daily, lisinopril 20 mg         Lovenox for DVT prophylaxis.  Discussed with patient and family.  Anticipate discharge Pending PT/OT eval. TBD      Salvatore Bedoya MD  [facility]  23  08:09 EDT        Electronically signed by Salvatore Bedoya MD at 23 1350       Evan Ramos II, MD at 23 1538            Name: Tigre Amaral ADMIT: 10/28/2023   : 1950  PCP: Provider, No Known    MRN: [id number] LOS: 6 days   AGE/SEX: 73 y.o. male  ROOM:  [place] [id number]/     Vascular Surgery Note    Neurosurgery plans noted. Plavix load over the weekend and angiogram with possible stent Monday.   Will follow peripherally for now.   Please do not hesitate to call with any questions or concerns.       Evan Ramos II, MD  23  16:50 EDT  Office Number [phone]          Electronically signed by Evan Ramos II, MD at 23 9262          Consult Notes (last 72 hours)        Babita Marte, APRN at 23 1428        Consult Orders    1. Inpatient Neurointerventionalist Consult [[id number]] ordered by Tyler Villalpando MD at 23 1047              Attestation signed by  Sanjiv Spann MD at 11/03/23 7601    I have reviewed this documentation and agree.                  [facility] NEUROSURGERY CONSULT NOTE    Patient name: Tigre Amaral  Referring Provider: SUNG Casillas  Reason for Consultation: Severe symptomatic right ICA stenosis    Patient Care Team:  Provider, No Known as PCP - General    Chief complaint: Acute right watershed strokes likely secondary to severe right ICA stenosis    Subjective .     History of present illness:    Patient is a 73 y.o. male who initially presented to the ED with complaints of left-sided weakness.  He was found to have multiple right sided watershed infarcts.  A carotid Doppler was obtained and showed severe stenosis in the right ICA measuring 70 to 99%.  Case was discussed with vascular surgery who prefer we evaluate for possible stent placement as his severe cardiomyopathy would make an open surgery very risky.  Per stepdaughter at bedside, at baseline patient is very unsteady on his feet and has issues concentrating and is very forgetful with a history of dementia.  He is a current smoker was on aspirin prior to hospitalization.    Review of Systems  Review of Systems   Unable to perform ROS: Dementia       History  PAST MEDICAL HISTORY  History reviewed. No pertinent past medical history.    PAST SURGICAL HISTORY  History reviewed. No pertinent surgical history.    FAMILY HISTORY  History reviewed. No pertinent family history.    SOCIAL HISTORY         Allergies:  Patient has no known allergies.    MEDICATIONS:  Medications Prior to Admission   Medication Sig Dispense Refill Last Dose    aspirin 81 MG EC tablet Take 1 tablet by mouth Daily.   10/28/2023    carvedilol (COREG) 6.25 MG tablet Take 1 tablet by mouth 2 (Two) Times a Day With Meals.   10/28/2023    furosemide (LASIX) 40 MG tablet Take 1 tablet by mouth Daily.   10/28/2023    lisinopril (PRINIVIL,ZESTRIL) 20 MG tablet Take 1 tablet by mouth Daily.   10/28/2023       Objective      Results Review:  LABS:    Results from last 7 days   Lab Units 11/02/23  0612 11/01/23  1846 10/30/23  0522   WBC 10*3/mm3 12.87* 14.89* 9.46   HEMOGLOBIN g/dL 13.5 14.3 13.1   HEMATOCRIT % 39.5 42.3 37.9   PLATELETS 10*3/mm3 270 292 244       Results from last 7 days   Lab Units 11/02/23  0612 11/01/23  1846 10/31/23  1520 10/31/23  0643   SODIUM mmol/L 136 137  --  138   POTASSIUM mmol/L 3.9  3.9 3.4* 3.8 3.7   CHLORIDE mmol/L 109* 106  --  107   CO2 mmol/L 18.1* 20.0*  --  21.6*   BUN mg/dL 11 11  --  9   CREATININE mg/dL 1.14 1.21  --  1.07   GLUCOSE mg/dL 108* 119*  --  96   CALCIUM mg/dL 8.5* 8.9  --  8.4*         DIAGNOSTICS:  MRI Brain Without Contrast    Result Date: 11/2/2023  The study is significantly hampered by patient motion but multiple acute infarcts involving the right frontal, parietal and to a lesser extent occipital and temporal lobes are appreciated including both white matter and cortical infarcts. The majority of infarcts are oriented in an AP dimension consistent with watershed infarcts.  The above information was called to and discussed with SUNG Munroe on 11/02/2023 at 1610 hours.  This report was finalized on 11/2/2023 4:48 PM by Dr. Abhinav Vidales M.D on Workstation: [id number]      CT Head Without Contrast    Result Date: 11/2/2023  1.  A large remote left MCA distribution infarct is appreciated with volume loss similar in appearance as compared to the prior examination. Lacunar infarcts involving the corona radiata on the right are noted. There is no evidence of acute infarction or intracranial hemorrhage. Further evaluation could be performed with a MRI examination of the brain as indicated. 2.  A 3.3 x 1.9 cm soft tissue mass is appreciated overlying the medial aspect of the maxilla on the right and extending over the posterior aspect of the nasal bone on the right. The patient has a known basal cell carcinoma at this location.  Radiation dose reduction techniques were  utilized, including automated exposure control and exposure modulation based on body size.   This report was finalized on 11/2/2023 4:16 PM by Dr. Abhinav Vidales M.D on Workstation: [id number]      XR Chest 1 View    Result Date: 11/2/2023  FINDINGS AND IMPRESSION: Left pacer/AICD is present.  Worsening pulmonary vascular congestion is present. There is also worsening bibasilar pulmonary pacification and interstitial thickening, right greater than left. Findings are most concerning for worsening multifocal pneumonia and/or pulmonary edema in the appropriate clinical context. Correlation with patient history is recommended with follow-up chest CT if clinically indicated. No pneumothorax is seen. Cardiac silhouette is within normal limits for size.  This report was finalized on 11/2/2023 8:21 AM by Dr. Addy Joseph M.D on Workstation: [id number]         Results Review:   I reviewed the patient's new clinical results.  I personally viewed and interpreted the patient's chart.    Vital Signs   Temp:  [97.7 °F (36.5 °C)-98.4 °F (36.9 °C)] 98.4 °F (36.9 °C)  Heart Rate:  [] 66  Resp:  [12-17] 12  BP: (111-163)/(55-98) 121/66    Physical exam  Drowsy, arouses, following commands on right.  Left-sided neglect.  Opens eyes spontaneously.  Pupils equal round reactive to light  Face symmetric  Speech is pretty clear  gait deferred  Able to detect  light touch in all 4 extremities      Assessment & Plan       Cerebrovascular accident (CVA) due to embolism of right middle cerebral artery    Aspiration pneumonia    Acute on chronic systolic congestive heart failure    Hypertension    Stage 3a chronic kidney disease    Hyperlipidemia    Carotid artery stenosis, symptomatic, right    Tobacco abuse    73-year-old male with an acute right sided CVA and severe right internal carotid artery stenosis.    PLAN:     Plavix load today and then 75 mg daily starting tomorrow.  Check P2 Y12 in a.m.  Continue aspirin.  Plan for angiogram on  "Monday with possible stent placement.  Further stroke management per neurology.  Do not hold aspirin and Plavix the day of angiogram    I discussed the patient's findings and my recommendations with patient, family, and nursing staff    I spent 45 minutes caring for Tigre Amaral on this date of service. This time includes time spent by me in the following activities: preparing for the visit, reviewing tests, obtaining and/or reviewing a separately obtained history, performing a medically appropriate examination and/or evaluation, counseling and educating the patient/family/caregiver, ordering medications, tests, or procedures, referring and communicating with other health care professionals, documenting information in the medical record, independently interpreting results and communicating that information with the patient/family/caregiver, and care coordination         Babita Marte, APRN  11/03/23  14:41 EDT    \"Dictated utilizing Dragon dictation\".        Electronically signed by Sanjiv Spann MD at 11/03/23 3185       "

## 2023-11-06 NOTE — BRIEF OP NOTE
Progress Note    Tigresandee Amaral      Pre-op Diagnosis:   Right Cerebral Watershed Stroke/ Bilateral Carotid Stenosis       Post-Op Diagnosis Codes:  Same    Procedure/CPT® Codes:    Cerebral DSA with SARAHY Origin Stent under Distal Protection    Anesthesia: General ETT Anesthesia    Staff:   Radiology Nurse: Lauryn Bazzi, RN; Kori Peña RN  Radiologist: Sanjiv Spann MD  Vascular Radiology Technician: Emmanuelle Zeng Joseph         Estimated Blood Loss: 200ml    Urine Voided: * No values recorded between 11/6/2023 12:00 AM and 11/6/2023  1:52 PM *    Specimens:                None          Drains:   [REMOVED] External Urinary Catheter (Removed)   Daily Indications Daily output 11/03/23 1307   Site Assessment Clean;Skin intact 11/03/23 0850   Application/Removal skin care provided 11/03/23 0850   Collection Container Wall suction 11/03/23 1307   Wall suction (mmHG) 80 mmHG 11/03/23 1307   Securement Method Securing device 11/03/23 1307   Catheter care complete Yes 11/03/23 0850   Output (mL) 600 mL 11/03/23 0700       Findings: SARAHY origin stenosis 82% and LICA stenosis 90% with JOAN under distal protection on the right. Will do the left stent in 4 weeks if patent agrees. Needs vascular surgery consult for bilateral iliac stents. Official report to follow. 6F 25cm sheath left in place as A-line and 4F sheath as venous access on the right. Official report to follow.      Complications: None encountered.          Sanjiv Spann MD     Date: 11/6/2023  Time: 13:52 EST

## 2023-11-06 NOTE — PROGRESS NOTES
Continued Stay Note  [facility]     Patient Name: Tigre Amaral  MRN: [id number]  Today's Date: 11/6/2023    Admit Date: 10/28/2023    Plan: [facility] pending medical stability   Discharge Plan       Row Name 11/06/23 1024       Plan    Plan [facility] pending medical stability    Patient/Family in Agreement with Plan yes    Plan Comments Per [facility] following for medical stability. Plan noted for cerebral angiogram today. CCP to continue to follow. Rachel Mckee LCSW                   Discharge Codes    No documentation.                 Expected Discharge Date and Time       Expected Discharge Date Expected Discharge Time    Nov 6, 2023               Debra Mckee LCSW

## 2023-11-06 NOTE — ANESTHESIA PROCEDURE NOTES
Airway  Urgency: elective    Date/Time: 11/6/2023 11:24 AM  Airway not difficult    General Information and Staff    Patient location during procedure: OR  Anesthesiologist: Donald Dudley MD    Indications and Patient Condition  Indications for airway management: airway protection    Preoxygenated: yes  MILS maintained throughout  Mask difficulty assessment: 1 - vent by mask    Final Airway Details  Final airway type: endotracheal airway      Successful airway: ETT  Cuffed: yes   Successful intubation technique: direct laryngoscopy  Endotracheal tube insertion site: oral  Blade: Mosher  Blade size: 2  ETT size (mm): 7.5  Cormack-Lehane Classification: grade I - full view of glottis  Placement verified by: chest auscultation   Number of attempts at approach: 1  Assessment: lips, teeth, and gum same as pre-op

## 2023-11-06 NOTE — PROGRESS NOTES
"                                              LOS: 9 days   Patient Care Team:  Provider, No Known as PCP - General    Chief Complaint:  F/up respiratory failure, CHF and medical problems listed below.  Critical care management post R ICA stent    Subjective   Interval History  I reviewed the admission note, progress notes, PMH, PSH, Family hx, social history, imagings and prior records on this admission, summarized the finding in my note and formulated a transition of care plan.      Seen in ICU.  On 2 L oxygen.  He reported mild dyspnea but denies cough.  BP is elevated.  Moist    REVIEW OF SYSTEMS:   CARDIOVASCULAR: No chest pain, chest pressure or chest discomfort. No palpitations or edema.     GASTROINTESTINAL: No anorexia, nausea, vomiting or diarrhea. No abdominal pain.  CONSTITUTIONAL: No fever or chills.     Ventilator/Non-Invasive Ventilation Settings (From admission, onward)      None                  Physical Exam:     Vital Signs  Temp:  [97.7 °F (36.5 °C)-97.8 °F (36.6 °C)] 97.8 °F (36.6 °C)  Heart Rate:  [77-96] 77  Resp:  [16-22] 16  BP: (163-172)/(77-94) 168/92  No intake or output data in the 24 hours ending 11/06/23 1037  Flowsheet Rows      Flowsheet Row First Filed Value   Admission Height 172.7 cm (68\") Documented at 10/28/2023 1306   Admission Weight 74.8 kg (165 lb) Documented at 10/28/2023 1306            PPE used per hospital policy    General Appearance:   Alert, cooperative, in no acute distress   ENMT:  Mallampati score 3, moist mucous membrane   Eyes:  Pupils equal and reactive to light. EOMI   Neck:   Trachea midline. No thyromegaly.   Lungs:   Diffuse scattered crackles bilaterally.  No wheezing.  No use of accessory muscles.    Heart:   Regular rhythm and normal rate, normal S1 and S2, no         murmur   Skin:   No rash or ecchymosis   Abdomen:    Obese. Soft. No tenderness. No HSM.   Neuro/psych:  Conscious, alert, oriented x3. Strength 5/5 in upper and lower  ext.  Appropriate " mood and affect   Extremities:  No cyanosis, clubbing or edema.  Warm extremities and well-perfused          Results Review:        Results from last 7 days   Lab Units 11/05/23  1304 11/04/23  2314 11/04/23  0730 11/02/23  0612 11/01/23  1846   SODIUM mmol/L  --   --  141 136 137   POTASSIUM mmol/L 3.9 3.2* 2.9* 3.9  3.9 3.4*   CHLORIDE mmol/L  --   --  105 109* 106   CO2 mmol/L  --   --  26.0 18.1* 20.0*   BUN mg/dL  --   --  16 11 11   CREATININE mg/dL  --   --  1.32* 1.14 1.21   GLUCOSE mg/dL  --   --  88 108* 119*   CALCIUM mg/dL  --   --  8.5* 8.5* 8.9     Results from last 7 days   Lab Units 11/02/23  0735 11/02/23  0612 11/01/23  2031   HSTROP T ng/L 425* 451* 354*     Results from last 7 days   Lab Units 11/04/23  0730 11/02/23  0612 11/01/23  1846   WBC 10*3/mm3 7.82 12.87* 14.89*   HEMOGLOBIN g/dL 13.2 13.5 14.3   HEMATOCRIT % 38.2 39.5 42.3   PLATELETS 10*3/mm3 265 270 292         Results from last 7 days   Lab Units 11/02/23  0735   PROBNP pg/mL 56,131.0*                   Results from last 7 days   Lab Units 11/05/23  1518 11/02/23  0825   PH, ARTERIAL pH units 7.474*  --    PCO2, ARTERIAL mm Hg 26.8*  --    PO2 ART mm Hg 75.9*  --    O2 SATURATION ART % 96.2  --    FLOW RATE lpm 2.0000  --    MODALITY  Cannula Room Air         I reviewed the patient's new clinical results.        Medication Review:   amiodarone, 150 mg, Intravenous, Once  amoxicillin-clavulanate, 1 tablet, Oral, Q12H  aspirin, 81 mg, Oral, Daily  atorvastatin, 80 mg, Oral, Nightly  carvedilol, 6.25 mg, Oral, BID With Meals  clopidogrel, 75 mg, Oral, Daily  enoxaparin, 40 mg, Subcutaneous, Q24H  lisinopril, 20 mg, Oral, Daily  nicotine, 1 patch, Transdermal, Q24H  senna-docusate sodium, 2 tablet, Oral, BID  sodium chloride, 10 mL, Intravenous, Q12H  sodium chloride, 10 mL, Intravenous, Q12H        sodium chloride, 50 mL/hr        Diagnostic imaging:  I personally and independently reviewed the following images:  CXR 11/6/2023:  Increased pulmonary vascular markings.  Pacemaker noted.    Assessment     Acute on chronic systolic CHF, EF 20%  Acute pulmonary edema  Aspiration pneumonia  Mild pulmonary hypertension on echo 10/30/2023, RVSP 35-45, likely group 2.  Uncontrolled hypertension  Severe right ICA stenosis s/p stent 11/6/2023  Electrolytes disturbance: Hypokalemia    Acute R sided watershed CVAs with severe ICA stenosis   h/o large MCA infarct  History of V. tach  NICM s/p AICD    All problems new to me    Plan     ICU monitoring.  Neuro check per protocol  Nicardipine drip to keep SBP <140  Lasix 40 mg x 1.  Check potassium today.  A.m. BMP as well  Amoxicillin/clavulanic acid 1 tablet twice a day to finish 7 days of antibiotic therapy.  DVT prophylaxis with Lovenox          Malinda Vera MD  11/06/23  10:37 EST      Time: Critical care 34 min      This note was dictated utilizing [employer] dictation

## 2023-11-07 ENCOUNTER — APPOINTMENT (OUTPATIENT)
Dept: CT IMAGING | Facility: HOSPITAL | Age: 73
DRG: 034 | End: 2023-11-07
Payer: OTHER GOVERNMENT

## 2023-11-07 ENCOUNTER — APPOINTMENT (OUTPATIENT)
Dept: CARDIOLOGY | Facility: HOSPITAL | Age: 73
DRG: 034 | End: 2023-11-07
Payer: OTHER GOVERNMENT

## 2023-11-07 LAB
ACT BLD: 212 SECONDS (ref 82–152)
ACT BLD: 239 SECONDS (ref 82–152)
ANION GAP SERPL CALCULATED.3IONS-SCNC: 8.1 MMOL/L (ref 5–15)
BACTERIA SPEC AEROBE CULT: NORMAL
BACTERIA SPEC AEROBE CULT: NORMAL
BH CV LOWER ARTERIAL LEFT ABI RATIO: 0.81
BH CV LOWER ARTERIAL LEFT ABI RATIO: 0.88
BH CV LOWER ARTERIAL LEFT DORSALIS PEDIS SYS MAX: 87
BH CV LOWER ARTERIAL LEFT DORSALIS PEDIS SYS MAX: 88
BH CV LOWER ARTERIAL LEFT GREAT TOE SYS MAX: 52
BH CV LOWER ARTERIAL LEFT GREAT TOE SYS MAX: 53
BH CV LOWER ARTERIAL LEFT POST TIBIAL SYS MAX: 104
BH CV LOWER ARTERIAL LEFT POST TIBIAL SYS MAX: 95
BH CV LOWER ARTERIAL LEFT TBI RATIO: 0.44
BH CV LOWER ARTERIAL LEFT TBI RATIO: 0.45
BH CV LOWER ARTERIAL RIGHT ABI RATIO: 0.47
BH CV LOWER ARTERIAL RIGHT ABI RATIO: 0.47
BH CV LOWER ARTERIAL RIGHT DORSALIS PEDIS SYS MAX: 27
BH CV LOWER ARTERIAL RIGHT DORSALIS PEDIS SYS MAX: 30
BH CV LOWER ARTERIAL RIGHT GREAT TOE SYS MAX: 25
BH CV LOWER ARTERIAL RIGHT GREAT TOE SYS MAX: 27
BH CV LOWER ARTERIAL RIGHT POST TIBIAL SYS MAX: 55
BH CV LOWER ARTERIAL RIGHT POST TIBIAL SYS MAX: 56
BH CV LOWER ARTERIAL RIGHT TBI RATIO: 0.21
BH CV LOWER ARTERIAL RIGHT TBI RATIO: 0.23
BUN SERPL-MCNC: 12 MG/DL (ref 8–23)
BUN/CREAT SERPL: 12.8 (ref 7–25)
CALCIUM SPEC-SCNC: 6.8 MG/DL (ref 8.6–10.5)
CHLORIDE SERPL-SCNC: 112 MMOL/L (ref 98–107)
CO2 SERPL-SCNC: 19.9 MMOL/L (ref 22–29)
CREAT SERPL-MCNC: 0.94 MG/DL (ref 0.76–1.27)
EGFRCR SERPLBLD CKD-EPI 2021: 84.5 ML/MIN/1.73
GLUCOSE BLDC GLUCOMTR-MCNC: 104 MG/DL (ref 70–130)
GLUCOSE BLDC GLUCOMTR-MCNC: 109 MG/DL (ref 70–130)
GLUCOSE BLDC GLUCOMTR-MCNC: 120 MG/DL (ref 70–130)
GLUCOSE BLDC GLUCOMTR-MCNC: 133 MG/DL (ref 70–130)
GLUCOSE SERPL-MCNC: 112 MG/DL (ref 65–99)
MAGNESIUM SERPL-MCNC: 1.6 MG/DL (ref 1.6–2.4)
PA ADP PRP-ACNC: 113 PRU (ref 194–418)
POTASSIUM SERPL-SCNC: 3 MMOL/L (ref 3.5–5.2)
POTASSIUM SERPL-SCNC: 3 MMOL/L (ref 3.5–5.2)
POTASSIUM SERPL-SCNC: 3.4 MMOL/L (ref 3.5–5.2)
SODIUM SERPL-SCNC: 140 MMOL/L (ref 136–145)
UPPER ARTERIAL LEFT ARM BRACHIAL SYS MAX: 116
UPPER ARTERIAL LEFT ARM BRACHIAL SYS MAX: 118
UPPER ARTERIAL RIGHT ARM BRACHIAL SYS MAX: 117
UPPER ARTERIAL RIGHT ARM BRACHIAL SYS MAX: 118

## 2023-11-07 PROCEDURE — 75635 CT ANGIO ABDOMINAL ARTERIES: CPT

## 2023-11-07 PROCEDURE — 83735 ASSAY OF MAGNESIUM: CPT | Performed by: INTERNAL MEDICINE

## 2023-11-07 PROCEDURE — 99233 SBSQ HOSP IP/OBS HIGH 50: CPT | Performed by: STUDENT IN AN ORGANIZED HEALTH CARE EDUCATION/TRAINING PROGRAM

## 2023-11-07 PROCEDURE — 92526 ORAL FUNCTION THERAPY: CPT

## 2023-11-07 PROCEDURE — 84132 ASSAY OF SERUM POTASSIUM: CPT | Performed by: INTERNAL MEDICINE

## 2023-11-07 PROCEDURE — 25510000001 IOPAMIDOL PER 1 ML: Performed by: INTERNAL MEDICINE

## 2023-11-07 PROCEDURE — 70450 CT HEAD/BRAIN W/O DYE: CPT

## 2023-11-07 PROCEDURE — 82948 REAGENT STRIP/BLOOD GLUCOSE: CPT

## 2023-11-07 PROCEDURE — 25010000002 ENOXAPARIN PER 10 MG: Performed by: RADIOLOGY

## 2023-11-07 PROCEDURE — 93922 UPR/L XTREMITY ART 2 LEVELS: CPT

## 2023-11-07 PROCEDURE — 25810000003 SODIUM CHLORIDE 0.9 % SOLUTION: Performed by: RADIOLOGY

## 2023-11-07 PROCEDURE — 99024 POSTOP FOLLOW-UP VISIT: CPT | Performed by: NURSE PRACTITIONER

## 2023-11-07 PROCEDURE — 80048 BASIC METABOLIC PNL TOTAL CA: CPT | Performed by: INTERNAL MEDICINE

## 2023-11-07 PROCEDURE — 85576 BLOOD PLATELET AGGREGATION: CPT | Performed by: RADIOLOGY

## 2023-11-07 PROCEDURE — 25010000002 POTASSIUM CHLORIDE 10 MEQ/100ML SOLUTION: Performed by: INTERNAL MEDICINE

## 2023-11-07 RX ORDER — POTASSIUM CHLORIDE 7.45 MG/ML
10 INJECTION INTRAVENOUS
Status: COMPLETED | OUTPATIENT
Start: 2023-11-07 | End: 2023-11-07

## 2023-11-07 RX ORDER — SODIUM CHLORIDE 0.9 % (FLUSH) 0.9 %
10 SYRINGE (ML) INJECTION AS NEEDED
Status: DISCONTINUED | OUTPATIENT
Start: 2023-11-07 | End: 2023-11-11 | Stop reason: HOSPADM

## 2023-11-07 RX ORDER — SODIUM CHLORIDE 0.9 % (FLUSH) 0.9 %
20 SYRINGE (ML) INJECTION AS NEEDED
Status: DISCONTINUED | OUTPATIENT
Start: 2023-11-07 | End: 2023-11-11 | Stop reason: HOSPADM

## 2023-11-07 RX ORDER — POTASSIUM CHLORIDE 7.45 MG/ML
10 INJECTION INTRAVENOUS
Status: COMPLETED | OUTPATIENT
Start: 2023-11-07 | End: 2023-11-08

## 2023-11-07 RX ADMIN — POTASSIUM CHLORIDE 10 MEQ: 7.46 INJECTION, SOLUTION INTRAVENOUS at 09:01

## 2023-11-07 RX ADMIN — POTASSIUM CHLORIDE 10 MEQ: 7.46 INJECTION, SOLUTION INTRAVENOUS at 12:01

## 2023-11-07 RX ADMIN — ASPIRIN 81 MG: 81 TABLET, CHEWABLE ORAL at 08:21

## 2023-11-07 RX ADMIN — POTASSIUM CHLORIDE 10 MEQ: 7.46 INJECTION, SOLUTION INTRAVENOUS at 23:19

## 2023-11-07 RX ADMIN — CARVEDILOL 6.25 MG: 6.25 TABLET, FILM COATED ORAL at 08:21

## 2023-11-07 RX ADMIN — ENOXAPARIN SODIUM 40 MG: 100 INJECTION SUBCUTANEOUS at 22:08

## 2023-11-07 RX ADMIN — NICARDIPINE HYDROCHLORIDE 7.5 MG/HR: 25 INJECTION, SOLUTION INTRAVENOUS at 05:49

## 2023-11-07 RX ADMIN — AMOXICILLIN AND CLAVULANATE POTASSIUM 1 TABLET: 875; 125 TABLET, FILM COATED ORAL at 20:51

## 2023-11-07 RX ADMIN — POTASSIUM CHLORIDE 10 MEQ: 7.46 INJECTION, SOLUTION INTRAVENOUS at 08:21

## 2023-11-07 RX ADMIN — AMOXICILLIN AND CLAVULANATE POTASSIUM 1 TABLET: 875; 125 TABLET, FILM COATED ORAL at 08:21

## 2023-11-07 RX ADMIN — POTASSIUM CHLORIDE 10 MEQ: 7.46 INJECTION, SOLUTION INTRAVENOUS at 11:22

## 2023-11-07 RX ADMIN — CARVEDILOL 6.25 MG: 6.25 TABLET, FILM COATED ORAL at 18:34

## 2023-11-07 RX ADMIN — POTASSIUM CHLORIDE 10 MEQ: 7.46 INJECTION, SOLUTION INTRAVENOUS at 06:07

## 2023-11-07 RX ADMIN — Medication 10 ML: at 10:34

## 2023-11-07 RX ADMIN — IOPAMIDOL 94 ML: 755 INJECTION, SOLUTION INTRAVENOUS at 13:01

## 2023-11-07 RX ADMIN — NICOTINE 1 PATCH: 21 PATCH, EXTENDED RELEASE TRANSDERMAL at 10:34

## 2023-11-07 RX ADMIN — LISINOPRIL 20 MG: 20 TABLET ORAL at 08:21

## 2023-11-07 RX ADMIN — ATORVASTATIN CALCIUM 80 MG: 80 TABLET, FILM COATED ORAL at 20:51

## 2023-11-07 RX ADMIN — CLOPIDOGREL BISULFATE 75 MG: 75 TABLET, FILM COATED ORAL at 08:21

## 2023-11-07 RX ADMIN — SODIUM CHLORIDE 50 ML/HR: 9 INJECTION, SOLUTION INTRAVENOUS at 17:53

## 2023-11-07 RX ADMIN — Medication 10 ML: at 20:52

## 2023-11-07 RX ADMIN — NICARDIPINE HYDROCHLORIDE 5 MG/HR: 25 INJECTION, SOLUTION INTRAVENOUS at 00:43

## 2023-11-07 RX ADMIN — POTASSIUM CHLORIDE 10 MEQ: 7.46 INJECTION, SOLUTION INTRAVENOUS at 22:08

## 2023-11-07 RX ADMIN — POTASSIUM CHLORIDE 10 MEQ: 7.46 INJECTION, SOLUTION INTRAVENOUS at 10:34

## 2023-11-07 RX ADMIN — POTASSIUM CHLORIDE 40 MEQ: 1.5 FOR SOLUTION ORAL at 00:46

## 2023-11-07 NOTE — PLAN OF CARE
Goal Outcome Evaluation: Pt remains in ICU on room air and IVF awaiting bed placement to neuro/telemetry floor. Doppler Ankle Brachial Index completed and resulted. Pt OK to transfer at this time per Dr. Hale.

## 2023-11-07 NOTE — PLAN OF CARE
Problem: Adult Inpatient Plan of Care  Goal: Plan of Care Review  Flowsheets (Taken 11/7/2023 1031)  Plan of Care Reviewed With: patient  Outcome Evaluation: Re-eval of swallow completed. Recommend downgrade to puree textures, continue thin liquids. Meds crushed with puree. Patient required max verbal cues to mastication soft solid and clear residue from oral cavity. Recommend supervision with PO, assist as needed, slow rate, alternate liquids/solids. SLP to follow for diet tolerance and re-eval. Suspect cognitive status impacting swallow function.   Goal Outcome Evaluation:

## 2023-11-07 NOTE — SIGNIFICANT NOTE
11/07/23 1331   OTHER   Discipline physical therapist   Rehab Time/Intention   Session Not Performed other (see comments)  (RNKori, states pt still has femoral sheath, not appropriate for PT today; will follow up tomorrow.)   Recommendation   PT - Next Appointment 11/08/23

## 2023-11-07 NOTE — PROGRESS NOTES
"DOS: 2023  NAME: Tigre Amaral   : 1950  PCP: Provider, No Known  Chief Complaint   Patient presents with    Fall    Arm Injury       Chief complaint: Stroke  Subjective: Patient seen and examined at the bedside this morning, patient day 1 postop with right ICA stenting.    Objective:  Vital signs: /52   Pulse 75   Temp 98 °F (36.7 °C) (Oral)   Resp 12   Ht 172.7 cm (68\")   Wt 76.2 kg (168 lb)   SpO2 92%   BMI 25.54 kg/m²    Gen: NAD, vitals reviewed  MS: oriented x2 to self and place but not to the year or month, follow commands appropriately, normal attention/concentration, language intact, no neglect.  CN: visual acuity grossly normal, PERRL, EOMI, left lower facial droop, moderate dysarthria  Motor: 5/5 on the right, 3+/5 on the left upper extremity, 4/5 on the left lower extremity, normal tone  Sensory: intact to light touch all 4 ext.    Laboratory results:  Lab Results   Component Value Date    GLUCOSE 112 (H) 2023    CALCIUM 6.8 (L) 2023     2023    K 3.0 (L) 2023    K 3.0 (L) 2023    CO2 19.9 (L) 2023     (H) 2023    BUN 12 2023    CREATININE 0.94 2023    BCR 12.8 2023    ANIONGAP 8.1 2023     Lab Results   Component Value Date    WBC 7.82 2023    HGB 13.2 2023    HCT 38.2 2023    MCV 89.5 2023     2023     Lab Results   Component Value Date    LDL 98 10/29/2023            Review of labs: Sodium 140, potassium 3.0, BUN 12, creatinine 0.94    Review and interpretation of imaging: No new brain image to review    Work up to date:  2D Echo: LA cavity mildly dilated, saline test negative, EF 21.6%, septal wall motion abnormality noted, no evidence of LV thrombus or mass, no AV stenosis, mild to moderate MVR.  Carotid ultrasound: Severe near occlusive right internal carotid artery.  Left internal carotid artery with severe stenosis-both with near occlusion  CTA of the " head: Multiple small age-indeterminate infarcts noted.  Evidence of chronic large infarct in the left MCA distribution noted.  Severe ICA stenosis bilaterally.  CT head repeat 11/2: Remote large left MCA infarct redemonstrated with evidence of volume loss.  Lacunar infarcts involving corona radiata on the right redemonstrated.  No evidence of acute infarct and/or intracranial hemorrhage noted.  Incidental finding of 3.3 x 1.9 soft tissue mass appreciated maxilla on the right over the right nasal bone-patient with known basal cell carcinoma  MRI brain WO: Numerous acute right hemispheric infarcts cortical and subcortical with majority in an AP dimension consistent with watershed infarcts.  Moderate small vessel disease.  Chronic left temporal infarct noted.      Diagnoses:  -Acute right hemispheric ischemic strokes in the watershed distribution status post right ICA stenting postop day 1  -Left ICA severe stenosis  -History of large left middle cerebral artery infarct  -Bilateral iliac stenosis  -Tobacco abuse  -Cardiomyopathy      Plan:  1.  Continue dual antiplatelet therapy, duration as per neuro interventional recommendations  2.  Continue Lipitor 80 mg daily LDL 98 with goal less than 70  3.  Blood pressure goal as per neuro interventional recommendations, use Cardene drip if needed to reach the goal  4.  PT/OT/speech therapy  5.  Okay for DVT prophylaxis from stroke standpoint  6.  Vascular surgery consult to evaluate the need for bilateral iliac stenting as recommended by Dr. Andersen.    Management discussed with ICU attending

## 2023-11-07 NOTE — PLAN OF CARE
Goal Outcome Evaluation:            NIH 2, PERRLA, VSS (titrating cardene to maintain SBP goal/see MAR), oriented to self and place (baseline forgetful), potassium replaced, see AM labs/ head CT. pt belongings tracked down and with pt at bedside now

## 2023-11-07 NOTE — PROGRESS NOTES
"                                              LOS: 10 days   Patient Care Team:  Provider, No Known as PCP - General    Chief Complaint:  F/up respiratory failure, CHF and medical problems listed below.  Critical care management post R ICA stent    Subjective   Interval History    Seen in ICU.  On RA.  He denies shortness of breath.  BP improved.    REVIEW OF SYSTEMS:   CARDIOVASCULAR: No chest pain, chest pressure or chest discomfort. No palpitations or edema.     GASTROINTESTINAL: No anorexia, nausea, vomiting or diarrhea. No abdominal pain.  CONSTITUTIONAL: No fever or chills.     Ventilator/Non-Invasive Ventilation Settings (From admission, onward)      None                  Physical Exam:     Vital Signs  Temp:  [97.4 °F (36.3 °C)-98.1 °F (36.7 °C)] 98.1 °F (36.7 °C)  Heart Rate:  [59-76] 66  BP: ()/(40-75) 129/58    Intake/Output Summary (Last 24 hours) at 11/7/2023 1504  Last data filed at 11/7/2023 0550  Gross per 24 hour   Intake 1563 ml   Output 1750 ml   Net -187 ml     Flowsheet Rows      Flowsheet Row First Filed Value   Admission Height 172.7 cm (68\") Documented at 10/28/2023 1306   Admission Weight 74.8 kg (165 lb) Documented at 10/28/2023 1306            PPE used per hospital policy    General Appearance:   Alert, cooperative, in no acute distress   ENMT:  Mallampati score 3, moist mucous membrane   Eyes:  Pupils equal and reactive to light. EOMI   Neck:   Trachea midline. No thyromegaly.   Lungs:   Improved air entry bilaterally with diminished crackles.  No wheezing.  No use of accessory muscles.    Heart:   Regular rhythm and normal rate, normal S1 and S2, no         murmur   Skin:   No rash or ecchymosis   Abdomen:    Obese. Soft. No tenderness. No HSM.   Neuro/psych:  Conscious, alert, oriented x3. Strength 5/5 in upper and lower  ext.  Appropriate mood and affect   Extremities:  No cyanosis, clubbing or edema.  Cold feet with no palpable pedal pulses        Results Review:        Results " from last 7 days   Lab Units 11/07/23  0352 11/06/23  1800 11/05/23  1304 11/04/23  2314 11/04/23  0730   SODIUM mmol/L 140 143  --   --  141   POTASSIUM mmol/L 3.0*  3.0* 3.3* 3.9   < > 2.9*   CHLORIDE mmol/L 112* 108*  --   --  105   CO2 mmol/L 19.9* 21.8*  --   --  26.0   BUN mg/dL 12 15  --   --  16   CREATININE mg/dL 0.94 1.12  --   --  1.32*   GLUCOSE mg/dL 112* 97  --   --  88   CALCIUM mg/dL 6.8* 8.0*  --   --  8.5*    < > = values in this interval not displayed.     Results from last 7 days   Lab Units 11/02/23  0735 11/02/23  0612 11/01/23  2031   HSTROP T ng/L 425* 451* 354*     Results from last 7 days   Lab Units 11/04/23  0730 11/02/23  0612 11/01/23  1846   WBC 10*3/mm3 7.82 12.87* 14.89*   HEMOGLOBIN g/dL 13.2 13.5 14.3   HEMATOCRIT % 38.2 39.5 42.3   PLATELETS 10*3/mm3 265 270 292         Results from last 7 days   Lab Units 11/02/23  0735   PROBNP pg/mL 56,131.0*                   Results from last 7 days   Lab Units 11/05/23  1518 11/02/23  0825   PH, ARTERIAL pH units 7.474*  --    PCO2, ARTERIAL mm Hg 26.8*  --    PO2 ART mm Hg 75.9*  --    O2 SATURATION ART % 96.2  --    FLOW RATE lpm 2.0000  --    MODALITY  Cannula Room Air         I reviewed the patient's new clinical results.        Medication Review:   amiodarone, 150 mg, Intravenous, Once  amoxicillin-clavulanate, 1 tablet, Oral, Q12H  aspirin, 81 mg, Oral, Daily  atorvastatin, 80 mg, Oral, Nightly  atropine sulfate, , ,   carvedilol, 6.25 mg, Oral, BID With Meals  clopidogrel, 75 mg, Oral, Daily  enoxaparin, 40 mg, Subcutaneous, Q24H  lisinopril, 20 mg, Oral, Daily  nicotine, 1 patch, Transdermal, Q24H  senna-docusate sodium, 2 tablet, Oral, BID  sodium chloride, 10 mL, Intravenous, Q12H        lactated ringers, 9 mL/hr  niCARdipine, 5-15 mg/hr, Last Rate: Stopped (11/07/23 0900)  sodium chloride, 50 mL/hr        Diagnostic imaging:  I personally and independently reviewed the following images:  CXR 11/6/2023: Increased pulmonary  vascular markings.  Pacemaker noted.    CT chest 11/7/2023: Small to moderate bilateral pleural effusion.  Bilateral lower lobe compressive atelectasis    Assessment     Acute on chronic systolic CHF, EF 20%  Acute pulmonary edema, improved  Aspiration pneumonia, being treated  Small to moderate bilateral pleural effusion  Small bilateral lower lobe compressive atelectasis  Mild pulmonary hypertension on echo 10/30/2023, RVSP 35-45, likely group 2.  Uncontrolled hypertension  Severe right ICA stenosis s/p stent 11/6/2023  Electrolytes disturbance: Hypokalemia, persistent  Bilateral severe iliac artery stenosis, incidental on CTA    Acute R sided watershed CVAs with severe ICA stenosis   h/o large MCA infarct  History of V. tach  NICM s/p AICD        Plan       Weaned off nicardipine earlier today.  Replace potassium.  Magnesium checked due to persistent hypokalemia and it was normal.  PRN Lasix  Amoxicillin/clavulanic acid 1 tablet twice a day to finish 7 days of antibiotic therapy.  DVT prophylaxis with Lovenox  RTC in 2 weeks with neurosurgery.  Continue DAPT with Plavix and aspirin  Lipitor 80 mg daily  JOESPH per vascular surgery.  Discussed with Dr. Hale.    Dispo: Dr. Hale would like to keep the patient in the ICU awaiting JOESPH.            Malinda Vera MD  11/07/23  15:04 EST            This note was dictated utilizing Dragon dictation

## 2023-11-07 NOTE — PROGRESS NOTES
[facility] NEUROSURGERY PROGRESS NOTE    CC: LUE weakness, watershed CVAs and symptomatic right ICA stenosis postprocedure day 1 status post stent placement.    Interval History: no new complaints or events overnight.    Vital signs in last 24 hours:  Temp:  [97.4 °F (36.3 °C)-98 °F (36.7 °C)] 98 °F (36.7 °C)  Heart Rate:  [59-76] 75  Resp:  [12-18] 12  BP: ()/() 101/52    Intake/Output this shift:  No intake/output data recorded.    LABS:  Recent Results (from the past 24 hour(s))   P2Y12 Platelet Inhibition    Collection Time: 11/06/23 12:30 PM    Specimen: Blood   Result Value Ref Range    P2Y12 Platelet Inhibition 261 194 - 418 PRU   POC Activated Clotting Time    Collection Time: 11/06/23 12:31 PM    Specimen: Arterial Blood   Result Value Ref Range    Activated Clotting Time  239 (H) 82 - 152 Seconds   POC Activated Clotting Time    Collection Time: 11/06/23  1:15 PM    Specimen: Blood   Result Value Ref Range    Activated Clotting Time  212 (H) 82 - 152 Seconds   POC Glucose Once    Collection Time: 11/06/23  3:25 PM    Specimen: Blood   Result Value Ref Range    Glucose 89 70 - 130 mg/dL   Basic Metabolic Panel    Collection Time: 11/06/23  6:00 PM    Specimen: Blood   Result Value Ref Range    Glucose 97 65 - 99 mg/dL    BUN 15 8 - 23 mg/dL    Creatinine 1.12 0.76 - 1.27 mg/dL    Sodium 143 136 - 145 mmol/L    Potassium 3.3 (L) 3.5 - 5.2 mmol/L    Chloride 108 (H) 98 - 107 mmol/L    CO2 21.8 (L) 22.0 - 29.0 mmol/L    Calcium 8.0 (L) 8.6 - 10.5 mg/dL    BUN/Creatinine Ratio 13.4 7.0 - 25.0    Anion Gap 13.2 5.0 - 15.0 mmol/L    eGFR 68.5 >60.0 mL/min/1.73   POC Glucose Once    Collection Time: 11/07/23 12:03 AM    Specimen: Blood   Result Value Ref Range    Glucose 133 (H) 70 - 130 mg/dL   Potassium    Collection Time: 11/07/23  3:52 AM    Specimen: Blood   Result Value Ref Range    Potassium 3.0 (L) 3.5 - 5.2 mmol/L   Basic Metabolic Panel    Collection Time: 11/07/23  3:52 AM    Specimen: Blood    Result Value Ref Range    Glucose 112 (H) 65 - 99 mg/dL    BUN 12 8 - 23 mg/dL    Creatinine 0.94 0.76 - 1.27 mg/dL    Sodium 140 136 - 145 mmol/L    Potassium 3.0 (L) 3.5 - 5.2 mmol/L    Chloride 112 (H) 98 - 107 mmol/L    CO2 19.9 (L) 22.0 - 29.0 mmol/L    Calcium 6.8 (L) 8.6 - 10.5 mg/dL    BUN/Creatinine Ratio 12.8 7.0 - 25.0    Anion Gap 8.1 5.0 - 15.0 mmol/L    eGFR 84.5 >60.0 mL/min/1.73   Magnesium    Collection Time: 11/07/23  3:52 AM    Specimen: Blood   Result Value Ref Range    Magnesium 1.6 1.6 - 2.4 mg/dL   POC Glucose Once    Collection Time: 11/07/23  5:23 AM    Specimen: Blood   Result Value Ref Range    Glucose 120 70 - 130 mg/dL       IMAGING STUDIES:  Imaging Results (Last 24 Hours)       Procedure Component Value Units Date/Time    IR Stent Cerv Carotid Without Emb Prot [[id number]] Collected: 11/06/23 1617     Updated: 11/06/23 1717    Narrative:      CEREBRAL ARTERIOGRAM WITH RIGHT ICA ORIGIN STENT PLACEMENT UNDER DISTAL  PROTECTION performed on 11/6/2023.     CLINICAL HISTORY: 73-year-old male with history of hypertension,  hyperlipidemia, cardiac EF of 20%, coronary artery disease and tobacco  abuse. Patient had watershed infarcts in the right carotid distribution  on stroke work-up imaging and now presents for possible carotid stent  placement under distal protection.       TECHNIQUE: After obtaining informed consent, the patient was placed in a  supine position on the angiographic table and the right groin was then  prepped and draped in usual sterile fashion with ChloraPrep soap. Under  ultrasound guidance with permanent images recorded, a 4 Guyanese  micro-puncture set was used to access the right common femoral artery  through a dermatotomy. Through this access a 6 Guyanese 25 cm sheath was  positioned in the right groin for arterial blood pressure monitoring.  Using the micropuncture set again the right common femoral vein was  percutaneously accessed and a 4 Guyanese sheath was positioned  in this  vessel in the right groin for venous access. On the left, a 4 Ivorian  micropuncture set was used to access the left common femoral artery  through a dermatotomy. Through this access an 8 Ivorian sheath was  positioned within the left groin. Over an 035 Glidewire, a 6 Ivorian  vertebral artery diagnostic catheter was placed coaxially into a BMX 96  guiding sheath and together these were placed into the circulation and  the following vessels were then selectively catheterized:     1. Right common carotid artery with digital subtraction imaging of the  cervical and intracranial runoff after contrast injection.  2. Right vertebral artery with digital subtraction imaging of the  cervical and intracranial runoff after contrast injection  3. Left common carotid artery with digital subtraction imaging of the  cervical and intracranial runoff after contrast injection.     Upon completion of the diagnostic portion of the exam, the endovascular  procedure was performed as described below. At the conclusion of the  entire procedure, the catheters and sheath were removed with placement  of an 8 Ivorian Angio-Seal on the left, but the right-sided arterial and  venous sheaths were left sutured in place for arterial blood pressure  monitoring and venous access. Once hemostasis was achieved on the left,  the appropriate dressing was applied. No immediate postprocedural  complications were encountered. General endotracheal anesthesia was  provided by the anesthesia department. Approximately 50.4 minutes of  fluoroscopy time were employed delivering an AK of 2126.48 mGy. 293 cc  of Visipaque 320 were injected. Maximal sterile barrier technique was  employed throughout the entire procedure according to current  guidelines.     Surgeon: Dr. Sanjiv Spann.     FINDINGS: The right common carotid artery injection demonstrates a  patent bifurcation occurring at the C3-4 level, but atherosclerotic  irregularity and narrowing is seen  at the C2-3 level. This short segment  of the proximal internal carotid artery has a high-grade stenosis by  NASCET criteria present measuring 82% with slower runoff seen. The  intracranial vasculature shows a patent M1 segment with delayed filling  of the MCA runoff compared to the external carotid artery circulation. A  mildly hypoplastic A1 segment is also identified with some inflow  washout suggested from a patent anterior communicating artery. The MCA  and MIGUEL runoff is patent with no distal occlusion identified, but  delayed circulation time again seen. No aneurysms, hypervascular lesions  or evidence of AV shunting is appreciated.     The right vertebral artery injection demonstrates a dominant caliber and  a widely patent vertebrobasilar junction. Basilar artery is mildly  irregular but fairly normal in caliber. Basilar artery terminates into  P1 and P2 segments bilaterally as well as a single left superior  cerebellar artery and duplicated right superior cerebellar artery.  Duplicated anterior inferior cerebellar artery is seen on the right as  well with a single left anterior inferior cerebellar artery. Dominant  posterior inferior cerebellar artery is seen. No inflow washout or  reflux into the left vertebral artery is seen at the VB junction. Patent  bilateral transcortical collaterals are seen to the distal MCA  territory.     The left internal carotid artery injection demonstrates a high-grade  stenosis measuring 78% by NASCET criteria over approximately 15 mm in  length. The atherosclerotic change and stenosis occurs at the C2-3 level  with the origin at the C3 level. A normal caliber and tortuous course is  seen in the cervical and intracranial segments. The M1 segment is  patent. The MCA runoff is otherwise normal in its arterial, capillary  and venous phases, but markedly delayed and circulation time. Transient  flash filling of the A1 segment is seen, but only intermittently due to  inflow  washout. A small posterior communicating artery is identified.  The dural sinuses are patent and dominant on the right. No aneurysms,  hypervascular lesions or evidence of AV shunting is appreciated.     ENDOVASCULAR PROCEDURE:     Right ICA Origin Carotid Stent Placement under Distal Protection - Upon  completion of the diagnostic portion of the exam and with the BMX 96  guiding sheath in the cervical right common carotid artery, a  Traxcess-14 microguidewire was placed through the stenosis and  introduced into the cervical right ICA under roadmap conditions through  the guiding sheath. A Spider FX 5 mm distal protection device was then  placed over the Traxcess-14 and it was then removed and the distal  protection device wire became the working wire. Predilatation was then  achieved with a 5 x 20 mm Viatrack 14+ angioplasty balloon. A 7 x 30 mm  Cordis Precise Pro Rx stent was then deployed in the proximal portion of  the cervical internal carotid artery repairing the stenosis. Post  dilatation was required secondary to a residual waist within the  midportion of the stent. This was achieved with a 6 x 20 mm Viatrack 14+  angioplasty balloon. No residual waist or stenosis was seen. Marked  improved flow intracranially was now present. Patient received Plavix  and aspirin preoperatively. IV heparin was also administered during the  procedure with bolus doses to maintain an ACT of 250 seconds or greater.  Dual antiplatelet therapy will be required for 3 - 6 months with  follow-up carotid Doppler ultrasound at 6 months.       Impression:      1.  82% stenosis of the right internal carotid artery just beyond its  origin. Successful carotid artery stent placement under distal  protection performed with no residual narrowing seen. Continuation of  dual antiplatelet therapy required with follow-up carotid Doppler  sonography as described above.  2. 78% stenosis of the left internal carotid artery and future  "stent  placement recommended in approximately 1 month.  3. Bilateral external iliac artery high-grade stenosis and  pre-occlusion. Vascular surgery consult recommended.     All carotid stenosis measurements were made using NASCET criteria.           This report was finalized on 11/6/2023 5:14 PM by Dr. Sanjiv Spann M.D on Workstation: [id number]             PHYSICAL EXAM:  Physical exam  Awake, alert, oriented x3- confused at times with history of dementia   Pupils equal round reactive to light  Extraocular muscles intact  Face symmetric  Speech is fluent and clear  No pronator drift  Motor exam  Bilateral deltoids 5/5, bilateral biceps 5/5, bilateral triceps 5/5, bilateral wrist extension 5/5 bilateral hand  5/5  Bilateral hip flexion 5/5, bilateral knee extension 5/5, bilateral DF/PF 5/5  gait deferred  Able to detect light touch in all 4 extremities  Left groin site soft, flat, no hematoma.  Pedal pulses present.      ASSESSMENT/PLAN:    Cerebrovascular accident (CVA) due to embolism of right middle cerebral artery    Aspiration pneumonia    Acute on chronic systolic congestive heart failure    Hypertension    Stage 3a chronic kidney disease    Hyperlipidemia    Carotid artery stenosis, symptomatic, right    Tobacco abuse    Basal cell carcinoma (BCC) of skin of nose    73-year-old male with watershed strokes status post R ICA stent placement.    Repeat P2 Y12-if therapeutic, we will sign off and have the patient follow-up in 2 weeks.  Continue DAPT with Plavix and aspirin.  Okay to transfer out of the unit  DC Almeida catheter  DC sheath  Vascular surgery to see for bilateral iliac stenosis workup    Shared Decision Making: I discussed the patient's findings and my recommendations with patient and Dr. Spann    Addendum: P2 Y12 therapeutic.  We will sign off at this time and have him follow-up in 2 weeks.    Babita Marte, APRN  11/7/2023  10:48 EST    \"Dictated utilizing Dragon dictation\".     "

## 2023-11-07 NOTE — PROGRESS NOTES
Tigresandee Amaral   73 y.o.  male    LOS: 10 days   Patient Care Team:  Provider, No Known as PCP - General      Subjective   Resting in bed    Review of Systems:   Lungs: No cough, no SOA  CV: No CP, no palpitations  GI: No nausea, vomiting, or diarrhea.     Medication Review:   Current Facility-Administered Medications:     acetaminophen (TYLENOL) tablet 650 mg, 650 mg, Oral, Q4H PRN, 650 mg at 23 1235 **OR** [DISCONTINUED] acetaminophen (TYLENOL) suppository 650 mg, 650 mg, Rectal, Q4H PRN, Lashon Brooks MD    amiodarone 150 mg in 100 mL D5W (loading dose), 150 mg, Intravenous, Once **FOLLOWED BY** [] amiodarone 360 mg in 200 mL D5W infusion, 1 mg/min, Intravenous, Continuous **FOLLOWED BY** [] amiodarone 360 mg in 200 mL D5W infusion, 0.5 mg/min, Intravenous, Continuous, Mika Kim MD    amoxicillin-clavulanate (AUGMENTIN) 875-125 MG per tablet 1 tablet, 1 tablet, Oral, Q12H, Sanjiv Spann MD, 1 tablet at 23    aspirin chewable tablet 81 mg, 81 mg, Oral, Daily, Sanjiv Spann MD, 81 mg at 23    atorvastatin (LIPITOR) tablet 80 mg, 80 mg, Oral, Nightly, Sanjiv Spann MD, 80 mg at 23    sennosides-docusate (PERICOLACE) 8.6-50 MG per tablet 2 tablet, 2 tablet, Oral, BID, 2 tablet at 23 **AND** polyethylene glycol (MIRALAX) packet 17 g, 17 g, Oral, Daily PRN **AND** bisacodyl (DULCOLAX) EC tablet 5 mg, 5 mg, Oral, Daily PRN **AND** bisacodyl (DULCOLAX) suppository 10 mg, 10 mg, Rectal, Daily PRN, Sanjiv Spann MD    carvedilol (COREG) tablet 6.25 mg, 6.25 mg, Oral, BID With Meals, Sanjiv Spann MD, 6.25 mg at 23 0821    clopidogrel (PLAVIX) tablet 75 mg, 75 mg, Oral, Daily, Sanjiv Spann MD, 75 mg at 23    Enoxaparin Sodium (LOVENOX) syringe 40 mg, 40 mg, Subcutaneous, Q24H, Sanjiv Spann MD, 40 mg at 23    HYDROcodone-acetaminophen (NORCO) 5-325 MG per tablet 1 tablet, 1 tablet,  Oral, Q6H PRN, Sanjiv Spann MD    lactated ringers infusion, 9 mL/hr, Intravenous, Continuous PRN, Sanjiv Spann MD    lisinopril (PRINIVIL,ZESTRIL) tablet 20 mg, 20 mg, Oral, Daily, Sanjiv Spann MD, 20 mg at 11/07/23 0821    Magnesium Standard Dose Replacement - Follow Nurse / BPA Driven Protocol, , Does not apply, PRN, Sanjiv Spann MD    melatonin tablet 3 mg, 3 mg, Oral, Nightly PRN, Sanjiv Spann MD, 3 mg at 11/02/23 2106    niCARdipine (CARDENE) 25 mg in 250 mL NS infusion kit, 5-15 mg/hr, Intravenous, Titrated, Sajniv Spann MD, Stopped at 11/07/23 0900    nicotine (NICODERM CQ) 21 MG/24HR patch 1 patch, 1 patch, Transdermal, Q24H, Sanjiv Spann MD, 1 patch at 11/07/23 1034    nitroglycerin (NITROSTAT) SL tablet 0.4 mg, 0.4 mg, Sublingual, Q5 Min PRN, Mika Kim MD    ondansetron (ZOFRAN) tablet 4 mg, 4 mg, Oral, Q6H PRN **OR** ondansetron (ZOFRAN) injection 4 mg, 4 mg, Intravenous, Q6H PRN, Sanjiv Spann MD, 4 mg at 11/06/23 2119    Phosphorus Replacement - Follow Nurse / BPA Driven Protocol, , Does not apply, PRN, Sanjiv Spann MD    potassium chloride 10 mEq in 100 mL IVPB, 10 mEq, Intravenous, Q1H, Mari Patricia MD, Last Rate: 100 mL/hr at 11/07/23 1122, 10 mEq at 11/07/23 1122    Potassium Replacement - Follow Nurse / BPA Driven Protocol, , Does not apply, PRDENIS, Sanjiv Spann MD    sodium chloride 0.9 % flush 10 mL, 10 mL, Intravenous, Q12H, Sanjiv Spann MD, 10 mL at 11/07/23 1034    sodium chloride 0.9 % flush 10 mL, 10 mL, Intravenous, PRN, Sanjiv pSann MD    sodium chloride 0.9 % flush 10 mL, 10 mL, Intravenous, PRN, Rajendra Yusuf MD    sodium chloride 0.9 % flush 20 mL, 20 mL, Intravenous, Paramjit BARRON Abhishek, MD    sodium chloride 0.9 % infusion 40 mL, 40 mL, Intravenous, PRN, Sanjiv Spann MD    sodium chloride 0.9 % infusion, 50 mL/hr, Intravenous, Continuous, Sanjiv Spann MD      Objective     Vital Sign Min/Max for  "last 24 hours  Temp  Min: 97.4 °F (36.3 °C)  Max: 98.1 °F (36.7 °C)   BP  Min: 69/41  Max: 144/40    Pulse  Min: 59  Max: 76         11/05/23  0400 11/06/23  0527 11/06/23  0959   Weight: 75.6 kg (166 lb 10.7 oz) 75.8 kg (167 lb) 76.2 kg (168 lb)        Intake/Output Summary (Last 24 hours) at 11/7/2023 1134  Last data filed at 11/7/2023 0550  Gross per 24 hour   Intake 2563 ml   Output 1750 ml   Net 813 ml       Physical Exam:    General Appearance:    No acute distress   Lungs:     Diminished bilaterally. No wheezes, rhonchi or rales.     Heart:    Regular rate and rhythm.  Normal S1 and S2. No murmur, no gallop, rub or lift. , No JVD.   Abdomen:     Normal bowel sounds, soft, nontender, nondistended.   Extremities:   No clubbing, cyanosis or edema.  Groin site soft   Skin:   Warm, dry   Neurologic:   Awake       Monitor:sr  Results Review:     I reviewed the patient's new clinical results.      Sodium   Date Value Ref Range Status   11/07/2023 140 136 - 145 mmol/L Final   11/06/2023 143 136 - 145 mmol/L Final     Potassium   Date Value Ref Range Status   11/07/2023 3.0 (L) 3.5 - 5.2 mmol/L Final   11/07/2023 3.0 (L) 3.5 - 5.2 mmol/L Final   11/06/2023 3.3 (L) 3.5 - 5.2 mmol/L Final   11/05/2023 3.9 3.5 - 5.2 mmol/L Final   11/04/2023 3.2 (L) 3.5 - 5.2 mmol/L Final     Comment:     Slight hemolysis detected by analyzer. Results may be affected.     Chloride   Date Value Ref Range Status   11/07/2023 112 (H) 98 - 107 mmol/L Final   11/06/2023 108 (H) 98 - 107 mmol/L Final     No results found for: \"PLASMABICARB\"  BUN   Date Value Ref Range Status   11/07/2023 12 8 - 23 mg/dL Final   11/06/2023 15 8 - 23 mg/dL Final     Creatinine   Date Value Ref Range Status   11/07/2023 0.94 0.76 - 1.27 mg/dL Final   11/06/2023 1.12 0.76 - 1.27 mg/dL Final     Calcium   Date Value Ref Range Status   11/07/2023 6.8 (L) 8.6 - 10.5 mg/dL Final   11/06/2023 8.0 (L) 8.6 - 10.5 mg/dL Final     Magnesium   Date Value Ref Range Status "   11/07/2023 1.6 1.6 - 2.4 mg/dL Final       Results from last 7 days   Lab Units 11/04/23  0730   WBC 10*3/mm3 7.82   HEMOGLOBIN g/dL 13.2   HEMATOCRIT % 38.2   PLATELETS 10*3/mm3 265     Lab Results   Component Value Date    CHOL 146 10/29/2023     Lab Results   Component Value Date    HDL 34 (L) 10/29/2023     Lab Results   Component Value Date    LDL 98 10/29/2023     Lab Results   Component Value Date    TRIG 72 10/29/2023         Results from last 7 days   Lab Units 11/02/23  0735 11/02/23  0612 11/01/23 2031 11/01/23  1846   HSTROP T ng/L 425* 451* 354* 339*     Results from last 7 days   Lab Units 11/02/23  0735   PROBNP pg/mL 56,131.0*           Acute on chronic HFrEF  2. NICM s/p AICD     A. Echo 10/30/23:   Left ventricular systolic function is severely decreased. Calculated left ventricular EF = 21.6% Left ventricular ejection fraction appears to be less than 20%.The following left ventricular wall segments are hypokinetic: mid anterolateral, basal inferolateral, mid inferolateral, mid inferior, basal inferoseptal, mid inferoseptal, mid anteroseptal, basal inferior and basal inferoseptal. The following left ventricular wall segments are akinetic: apical anterior, apical lateral, apical inferior, apical septal and apex.  Left ventricular wall thickness is consistent with mild concentric hypertrophy.   Left ventricular diastolic function is consistent with (grade II w/high LAP) pseudonormalization.  The left atrial cavity is mildly dilated.  Saline test results are negative for right to left atrial level shunt.  Estimated right ventricular systolic pressure from tricuspid regurgitation is mildly elevated (35-45 mmHg).     3. H/o Vtach  4. HTN  5. HLD  6. Acute R sided watershed CVAs with severe ICA stenosis, s/p stent placement 11/6/23    A. h/o large MCA infarct  7. Aspiration PNA  8. Dementia  9. Iliac stenosis    S/p ICA stent placement.  Found to have iliac stenosis.  Vascular following and ABIs  pending.  Continue DAPT.   Continue GDMT for HFrEF.  Dr. Kim to see.                  Time:  10 min  Pt seen and examined   To continue current treatment

## 2023-11-07 NOTE — CONSULTS
"Name: Tigre Amaral ADMIT: 10/28/2023   : 1950  PCP: Provider, No Known    MRN: [id number] LOS: 10 days   AGE/SEX: 73 y.o. male  ROOM: [facility]     Inpatient Vascular Surgery Consult  Consult performed by: Penny Andres MD  Consult ordered by: Tyler Villalpando MD Ashlee Ann Vinyard, MD     LOS: 10 days   Patient Care Team:  Provider, No Known as PCP - General    Subjective     History of Present Illness  73 y.o. male with a history of HTN, HLD, dementia, and carotid stenosis POD 1 s/p right carotid artery stenting via a bilateral femoral approach by Dr. Andersen.  During his procedure, he was noted to have a right external iliac artery occlusion and significant common iliac artery stenosis bilaterally and we were consulted for further evaluation.  He does have dementia and is not able to provide an accurate history.  I spoke to his stepdaughter Rola Jaime via phone to obtain more history.  She denies any history of claudication, rest pain, or ulceration.  She does state he is quite sedentary and does not ambulate often.  He does complain of lower back pain and his left leg would \"give out\" over the summer.  He is a current cigarette smoker and smokes 1-1.5 packs per day of cigarettes.      Review of Systems   Unable to perform ROS: Dementia       History reviewed. No pertinent past medical history.    History reviewed. No pertinent surgical history.    History reviewed. No pertinent family history.         Allergies: Patient has no known allergies.    Medications Prior to Admission   Medication Sig Dispense Refill Last Dose    aspirin 81 MG EC tablet Take 1 tablet by mouth Daily.   10/28/2023    carvedilol (COREG) 6.25 MG tablet Take 1 tablet by mouth 2 (Two) Times a Day With Meals.   10/28/2023    furosemide (LASIX) 40 MG tablet Take 1 tablet by mouth Daily.   10/28/2023    lisinopril (PRINIVIL,ZESTRIL) 20 MG tablet Take 1 tablet by mouth Daily.   10/28/2023     amiodarone, 150 mg, " Intravenous, Once  amoxicillin-clavulanate, 1 tablet, Oral, Q12H  aspirin, 81 mg, Oral, Daily  atorvastatin, 80 mg, Oral, Nightly  carvedilol, 6.25 mg, Oral, BID With Meals  clopidogrel, 75 mg, Oral, Daily  enoxaparin, 40 mg, Subcutaneous, Q24H  lisinopril, 20 mg, Oral, Daily  nicotine, 1 patch, Transdermal, Q24H  potassium chloride, 10 mEq, Intravenous, Q1H  senna-docusate sodium, 2 tablet, Oral, BID  sodium chloride, 10 mL, Intravenous, Q12H      lactated ringers, 9 mL/hr  niCARdipine, 5-15 mg/hr, Last Rate: 7.5 mg/hr (11/07/23 0549)  sodium chloride, 50 mL/hr        acetaminophen **OR** [DISCONTINUED] acetaminophen    senna-docusate sodium **AND** polyethylene glycol **AND** bisacodyl **AND** bisacodyl    HYDROcodone-acetaminophen    lactated ringers    Magnesium Standard Dose Replacement - Follow Nurse / BPA Driven Protocol    melatonin    nitroglycerin    ondansetron **OR** ondansetron    Phosphorus Replacement - Follow Nurse / BPA Driven Protocol    Potassium Replacement - Follow Nurse / BPA Driven Protocol    sodium chloride    sodium chloride    sodium chloride    sodium chloride      Objective   Temp:  [97.4 °F (36.3 °C)-98 °F (36.7 °C)] 98 °F (36.7 °C)  Heart Rate:  [59-76] 75  Resp:  [12-18] 12  BP: ()/() 101/52    No intake/output data recorded.    Physical Exam  Constitutional:       General: He is not in acute distress.     Appearance: He is normal weight.   HENT:      Head: Normocephalic and atraumatic.      Right Ear: External ear normal.      Left Ear: External ear normal.      Nose: Nose normal.      Mouth/Throat:      Mouth: Mucous membranes are moist.      Pharynx: Oropharynx is clear.   Eyes:      Extraocular Movements: Extraocular movements intact.      Conjunctiva/sclera: Conjunctivae normal.      Pupils: Pupils are equal, round, and reactive to light.   Cardiovascular:      Rate and Rhythm: Normal rate and regular rhythm.      Comments: Bilateral radial pulses palpable  Left  femoral pulse is palpable  Right femoral pulse is non palpable  Pedal pulses non palpable bilaterally  Pulmonary:      Effort: Pulmonary effort is normal. No respiratory distress.   Abdominal:      General: Abdomen is flat. There is no distension.      Palpations: Abdomen is soft.   Musculoskeletal:         General: No swelling or deformity.      Cervical back: Normal range of motion. No rigidity.      Comments: No ulcerations of either foot   Skin:     General: Skin is warm and dry.      Capillary Refill: Capillary refill takes less than 2 seconds.   Neurological:      General: No focal deficit present.      Mental Status: He is alert and oriented to person, place, and time.   Psychiatric:         Mood and Affect: Mood normal.         Behavior: Behavior normal.         Results from last 7 days   Lab Units 11/04/23  0730 11/02/23  0612 11/01/23  1846   WBC 10*3/mm3 7.82 12.87* 14.89*   HEMOGLOBIN g/dL 13.2 13.5 14.3   PLATELETS 10*3/mm3 265 270 292     Results from last 7 days   Lab Units 11/07/23  0352 11/06/23  1800 11/05/23  1304 11/04/23  2314 11/04/23  0730 11/02/23  0612 11/01/23  1846   SODIUM mmol/L 140 143  --   --  141 136 137   POTASSIUM mmol/L 3.0*  3.0* 3.3* 3.9 3.2* 2.9* 3.9  3.9 3.4*   CHLORIDE mmol/L 112* 108*  --   --  105 109* 106   CO2 mmol/L 19.9* 21.8*  --   --  26.0 18.1* 20.0*   BUN mg/dL 12 15  --   --  16 11 11   CREATININE mg/dL 0.94 1.12  --   --  1.32* 1.14 1.21   GLUCOSE mg/dL 112* 97  --   --  88 108* 119*   Estimated Creatinine Clearance: 73.2 mL/min (by C-G formula based on SCr of 0.94 mg/dL).      Imaging Studies:  I personally and independently reviewed the angiogram images from yesterday, 11/6/23.  These showed a right external iliac artery occlusion and bilateral common iliac artery atherosclerotic disease and stenosis.        Active Hospital Problems    Diagnosis  POA    **Cerebrovascular accident (CVA) due to embolism of right middle cerebral artery [I63.411]  Yes     Aspiration pneumonia [J69.0]  Unknown    Acute on chronic systolic congestive heart failure [I50.23]  Unknown    Hypertension [I10]  Unknown    Stage 3a chronic kidney disease [N18.31]  Unknown    Hyperlipidemia [E78.5]  Unknown    Carotid artery stenosis, symptomatic, right [I65.21]  Unknown    Tobacco abuse [Z72.0]  Unknown    Basal cell carcinoma (BCC) of skin of nose [C44.311]  Unknown      Resolved Hospital Problems   No resolved problems to display.     Problem Points:  4:  Patient has a new problem, with additional work-up planned  Total problem points:4 or more    Data Points:  1:  I have reviewed or order clinical lab test  1:  I have reviewed or order radiology test (except heart catheterization or echo)  2:  I have personally and independently review of image, tracing, or specimen  2:  I have reviewed and summation of old records and/or discussed the patients care with another health care provider  Total data points:4 or more    Risk Points:  Moderate: New diagnosis with unknown prognosis    MDM Prob point Data point Risk   SF 1 1 Minimal   Low 2 2 Low   Mod 3 3 Moderate   High 4 4 High     Code MDM History Exam Time   42396 SF/Low Detailed Detailed 30   86849 Mod Comprehensive Comprehensive 50   09578 High Comprehensive Comprehensive 70     Detailed history:  4 elements HPI or status of 3 chronic problems; 2-9 system ROS  Comprehensive:  4 elements HPI or status of 3 chronic problems;  10 system ROS    Detailed Exam:  12 findings from any organ system  Comprehensive Exam:  2 findings from each of 9 systems.     Billin    Assessment & Plan       Cerebrovascular accident (CVA) due to embolism of right middle cerebral artery    Aspiration pneumonia    Acute on chronic systolic congestive heart failure    Hypertension    Stage 3a chronic kidney disease    Hyperlipidemia    Carotid artery stenosis, symptomatic, right    Tobacco abuse    Basal cell carcinoma (BCC) of skin of nose        73 y.o. male  with symptomatic carotid stenosis s/p right ICA stenting on 11/6/23 who was found incidentally to have bilateral severe iliac artery stenosis.  Per history from his stepdaughter, who is his caretaker, he is asymptomatic.  He does not have any signs or symptoms of acute limb ischemia and he does not have rest pain or tissue loss.    -agree with medical management with aspirin, plavix, and statin  -discussed with his stepdaughter at length on the phone.  We will obtain ABIs and a CT angiogram runoff to get a better idea of the scope of his vascular disease.  He does not have any signs or symptoms of acute limb ischemia so will most likely defer any intervention until he recovers from his symptomatic carotid stenosis and carotid stenting.  Also discussed with nurse at the bedside in the ICU.     I discussed the patients findings and my recommendations with patient, family, and nursing staff.  Please call my office with any question: [phone]    Penny Andres MD  11/07/23  10:03 EST

## 2023-11-07 NOTE — PROGRESS NOTES
I personally and independently reviewed the CT angiogram with bilateral lower extremity runoff that was completed earlier this afternoon.  There is a long segment occlusion of the SMA, the celiac and RENATO are widely patent.  There is a sheath terminating in the infrarenal aorta with <50% stenosis of bilateral common iliac arteries.  Bilateral hypogastric arteries are patent, the right external iliac artery is severely stenotic vs chronically occluded, there is no appreciable patent lumen outside the indwelling sheath.  Bilateral common femoral arteries are patent and the right sided sheath is entering the right common femoral artery.  Bilateral superficial femoral and profunda femoral arteries are patent but the right SFA is focally occluded distally and there is severe stenosis of the right popliteal artery.  The tibial arteries are not well opacified bilaterally, probably due to contrast timing.  The patient has been on dual antiplatelet therapy but not heparin since the sheath was placed.  He had ABIs earlier today that were 0.47 on the right and 0.81 on the left. The sheath was removed at 1430 per NSGY.  Due to the occlusive nature of the sheath in the external iliac artery, I was concerned about thrombosis and possible acute limb ischemia after sheath removal.  I examined the patient and there was not a palpable pulse in the right femoral artery but there was a doppler signal present.  There was no hematoma.  There remained a monophasic right DP doppler signal.  The patient has dementia and is not a reliable historian but he denied any numbness, tingling, or weakness of the right lower extremity.  I repeated the ABIs to ensure there was no significant difference from prior to the sheath pull as his history is unreliable, and these were also stable.  Continue medical management with dual antiplatelet agents and statin for now.  We will follow.    Penny Andres MD  Vascular Surgery  [facility]  O:  [phone]  F: [phone]

## 2023-11-07 NOTE — NURSING NOTE
Arterial and venous sheaths pulled at 14:32 per Neurosurgery (Rosanna ALMARAZ) and Pulmonology (Dr. Bella). Site warm, soft, pink, no hematoma present (see neurovascular charting).

## 2023-11-07 NOTE — THERAPY RE-EVALUATION
Acute Care - Speech Language Pathology   Swallow Re-Evaluation [facility]     Patient Name: Tigre Amaral  : 1950  MRN: [id number]  Today's Date: 2023               Admit Date: 10/28/2023    Visit Dx:     ICD-10-CM ICD-9-CM   1. Cerebrovascular accident (CVA), unspecified mechanism  I63.9 434.91     Patient Active Problem List   Diagnosis    Cerebrovascular accident (CVA) due to embolism of right middle cerebral artery    Aspiration pneumonia    Acute on chronic systolic congestive heart failure    Hypertension    Stage 3a chronic kidney disease    Hyperlipidemia    Carotid artery stenosis, symptomatic, right    Tobacco abuse    Basal cell carcinoma (BCC) of skin of nose     History reviewed. No pertinent past medical history.  History reviewed. No pertinent surgical history.    SLP Recommendation and Plan  SLP Swallowing Diagnosis: mild, oral dysphagia (23)  SLP Diet Recommendation: puree, thin liquids (23)  Recommended Precautions and Strategies: upright posture during/after eating, small bites of food and sips of liquid, general aspiration precautions, other (see comments) (supervision/setup) (23)  SLP Rec. for Method of Medication Administration: meds whole, meds crushed, as tolerated, with puree (23)     Monitor for Signs of Aspiration: yes, notify SLP if any concerns (23)  Recommended Diagnostics: reassess via clinical swallow evaluation (23)  Swallow Criteria for Skilled Therapeutic Interventions Met: demonstrates skilled criteria (23)  Anticipated Discharge Disposition (SLP): anticipate therapy at next level of care (23)  Rehab Potential/Prognosis, Swallowing: good, to achieve stated therapy goals (23)  Therapy Frequency (Swallow): PRN (23)  Predicted Duration Therapy Intervention (Days): until discharge (23)  Oral Care Recommendations: Oral Care BID/PRN (23)    "                                   Oral Care Recommendations: Oral Care BID/PRN (11/07/23 1000)    Plan of Care Reviewed With: patient  Outcome Evaluation: Re-eval of swallow completed. Recommend downgrade to puree textures, continue thin liquids. Meds crushed with puree. Patient required max verbal cues to mastication soft solid and clear residue from oral cavity. Recommend supervision with PO, assist as needed, slow rate, alternate liquids/solids. SLP to follow for diet tolerance and re-eval. Suspect cognitive status impacting swallow function.      SWALLOW EVALUATION (last 72 hours)       SLP Adult Swallow Evaluation       Row Name 11/07/23 1000                   Rehab Evaluation    Document Type re-evaluation  -OC        Subjective Information no complaints  -OC        Patient Observations alert;cooperative;agree to therapy  -OC        Patient Effort adequate  -OC        Symptoms Noted During/After Treatment none  -OC           General Information    Patient Profile Reviewed yes  -OC        Pertinent History Of Current Problem re-eval in ICU s/p \"symptomatic carotid stenosis s/p right ICA stenting on 11/6/23 who was found incidentally to have bilateral severe iliac artery stenosis.\"  -OC        Current Method of Nutrition regular textures;thin liquids  -OC        Precautions/Limitations, Vision WFL;for purposes of eval  -OC        Precautions/Limitations, Hearing WFL;difficult to assess  -OC        Prior Level of Function-Communication cognitive-linguistic impairment  -OC        Plans/Goals Discussed with patient;agreed upon  -OC        Barriers to Rehab medically complex;cognitive status  -OC        Patient's Goals for Discharge patient did not state  -OC           Pain Scale: Numbers Pre/Post-Treatment    Pretreatment Pain Rating 0/10 - no pain  -OC        Posttreatment Pain Rating 0/10 - no pain  -OC           Oral Motor Structure and Function    Dentition Assessment missing teeth;teeth are in poor condition  " -OC        Secretion Management WNL/WFL  -OC        Mucosal Quality moist, healthy  -OC        Volitional Swallow unable to elicit  -OC        Volitional Cough unable to elicit  -OC           Oral Musculature and Cranial Nerve Assessment    Oral Motor General Assessment generalized oral motor weakness  -OC           Clinical Swallow Eval    Clinical Swallow Evaluation Summary Re-eval completed. Patient demonstrated no overt s/s aspiration with puree and thin liquids. Patient demonstrated poor oral awareness with mechanical soft, prolonged mastication/manipulation. Patient required 6 verbal cues and liquid washes to clear moderate oral residue from L buccal cavity with mechanical soft.  -OC           SLP Evaluation Clinical Impression    SLP Swallowing Diagnosis mild;oral dysphagia  -OC        Functional Impact risk of aspiration/pneumonia  -OC        Rehab Potential/Prognosis, Swallowing good, to achieve stated therapy goals  -OC        Swallow Criteria for Skilled Therapeutic Interventions Met demonstrates skilled criteria  -OC           Recommendations    Therapy Frequency (Swallow) PRN  -OC        Predicted Duration Therapy Intervention (Days) until discharge  -OC        SLP Diet Recommendation puree;thin liquids  -OC        Recommended Diagnostics reassess via clinical swallow evaluation  -OC        Recommended Precautions and Strategies upright posture during/after eating;small bites of food and sips of liquid;general aspiration precautions;other (see comments)  supervision/setup  -OC        Oral Care Recommendations Oral Care BID/PRN  -OC        SLP Rec. for Method of Medication Administration meds whole;meds crushed;as tolerated;with puree  -OC        Monitor for Signs of Aspiration yes;notify SLP if any concerns  -OC        Anticipated Discharge Disposition (SLP) anticipate therapy at next level of care  -OC           Swallow Goals (SLP)    Swallow LTGs Swallow Long Term Goal (free text)  -OC           (LTG)  Swallow    (LTG) Swallow Tolerate least restrictive diet with no overt s/s aspiration.  -OC        [place] (Swallow Long Term Goal) with minimal cues (75-90% accuracy)  -OC        Time Frame (Swallow Long Term Goal) by discharge  -OC                  User Key  (r) = Recorded By, (t) = Taken By, (c) = Cosigned By      Initials Name Effective Dates    Cyndie Kirby SLP 08/28/23 -                     EDUCATION  The patient has been educated in the following areas:   Dysphagia (Swallowing Impairment).        SLP GOALS       Row Name 11/07/23 1000             (LTG) Swallow    (LTG) Swallow Tolerate least restrictive diet with no overt s/s aspiration.  -OC      [place] (Swallow Long Term Goal) with minimal cues (75-90% accuracy)  -OC      Time Frame (Swallow Long Term Goal) by discharge  -OC                User Key  (r) = Recorded By, (t) = Taken By, (c) = Cosigned By      Initials Name Provider Type    Cyndie Kirby SLP Speech and Language Pathologist                       Time Calculation:    Time Calculation- SLP       Row Name 11/07/23 1329             Time Calculation- SLP    SLP Start Time 1034  -OC      SLP Received On 11/07/23  -OC         Untimed Charges    SLP Treatment ST Treatment Swallow Minutes  - [id number]  -OC      [id number] Treatment Swallow Minutes 75  -OC         Total Minutes    Untimed Charges Total Minutes 75  -OC       Total Minutes 75  -OC                User Key  (r) = Recorded By, (t) = Taken By, (c) = Cosigned By      Initials Name Provider Type    Cyndie Kirby SLP Speech and Language Pathologist                    Therapy Charges for Today       Code Description Service Date Service Provider Modifiers Qty    [id number]  ST TREATMENT SWALLOW 5 11/7/2023 Cyndie La SLP GN 1                 FREDY Galindo  11/7/2023

## 2023-11-08 ENCOUNTER — TELEPHONE (OUTPATIENT)
Dept: NEUROSURGERY | Facility: CLINIC | Age: 73
End: 2023-11-08
Payer: OTHER GOVERNMENT

## 2023-11-08 LAB — POTASSIUM SERPL-SCNC: 3.7 MMOL/L (ref 3.5–5.2)

## 2023-11-08 PROCEDURE — 97530 THERAPEUTIC ACTIVITIES: CPT

## 2023-11-08 PROCEDURE — 25810000003 SODIUM CHLORIDE 0.9 % SOLUTION: Performed by: RADIOLOGY

## 2023-11-08 PROCEDURE — 25010000002 POTASSIUM CHLORIDE 10 MEQ/100ML SOLUTION: Performed by: INTERNAL MEDICINE

## 2023-11-08 PROCEDURE — 97110 THERAPEUTIC EXERCISES: CPT

## 2023-11-08 PROCEDURE — 97168 OT RE-EVAL EST PLAN CARE: CPT

## 2023-11-08 PROCEDURE — 99024 POSTOP FOLLOW-UP VISIT: CPT | Performed by: NURSE PRACTITIONER

## 2023-11-08 PROCEDURE — 99233 SBSQ HOSP IP/OBS HIGH 50: CPT | Performed by: NURSE PRACTITIONER

## 2023-11-08 PROCEDURE — 84132 ASSAY OF SERUM POTASSIUM: CPT | Performed by: INTERNAL MEDICINE

## 2023-11-08 PROCEDURE — 25010000002 ENOXAPARIN PER 10 MG: Performed by: RADIOLOGY

## 2023-11-08 RX ADMIN — POTASSIUM CHLORIDE 10 MEQ: 7.46 INJECTION, SOLUTION INTRAVENOUS at 00:53

## 2023-11-08 RX ADMIN — ATORVASTATIN CALCIUM 80 MG: 80 TABLET, FILM COATED ORAL at 21:28

## 2023-11-08 RX ADMIN — AMOXICILLIN AND CLAVULANATE POTASSIUM 1 TABLET: 875; 125 TABLET, FILM COATED ORAL at 21:28

## 2023-11-08 RX ADMIN — CARVEDILOL 6.25 MG: 6.25 TABLET, FILM COATED ORAL at 08:06

## 2023-11-08 RX ADMIN — POTASSIUM CHLORIDE 10 MEQ: 7.46 INJECTION, SOLUTION INTRAVENOUS at 02:09

## 2023-11-08 RX ADMIN — ENOXAPARIN SODIUM 40 MG: 100 INJECTION SUBCUTANEOUS at 22:04

## 2023-11-08 RX ADMIN — CARVEDILOL 6.25 MG: 6.25 TABLET, FILM COATED ORAL at 17:02

## 2023-11-08 RX ADMIN — AMOXICILLIN AND CLAVULANATE POTASSIUM 1 TABLET: 875; 125 TABLET, FILM COATED ORAL at 08:06

## 2023-11-08 RX ADMIN — SODIUM CHLORIDE 50 ML/HR: 9 INJECTION, SOLUTION INTRAVENOUS at 18:48

## 2023-11-08 RX ADMIN — Medication 10 ML: at 21:28

## 2023-11-08 RX ADMIN — ASPIRIN 81 MG: 81 TABLET, CHEWABLE ORAL at 08:06

## 2023-11-08 RX ADMIN — CLOPIDOGREL BISULFATE 75 MG: 75 TABLET, FILM COATED ORAL at 08:06

## 2023-11-08 RX ADMIN — NICOTINE 1 PATCH: 21 PATCH, EXTENDED RELEASE TRANSDERMAL at 08:38

## 2023-11-08 RX ADMIN — LISINOPRIL 20 MG: 20 TABLET ORAL at 08:06

## 2023-11-08 NOTE — PLAN OF CARE
Goal Outcome Evaluation:            Pt still waiting for room to transfer. No acute events overnight. Pt's VSS, A&Ox3 (now oriented to self, time & place, just not situation.) NIH 3 (L sided weakness/ drift & slurred speech.)

## 2023-11-08 NOTE — THERAPY RE-EVALUATION
Patient Name: Tigre Amaral  : 1950    MRN: [id number]                              Today's Date: 2023       Admit Date: 10/28/2023    Visit Dx:     ICD-10-CM ICD-9-CM   1. Cerebrovascular accident (CVA), unspecified mechanism  I63.9 434.91     Patient Active Problem List   Diagnosis    Cerebrovascular accident (CVA) due to embolism of right middle cerebral artery    Aspiration pneumonia    Acute on chronic systolic congestive heart failure    Hypertension    Stage 3a chronic kidney disease    Hyperlipidemia    Carotid artery stenosis, symptomatic, right    Tobacco abuse    Basal cell carcinoma (BCC) of skin of nose     History reviewed. No pertinent past medical history.  History reviewed. No pertinent surgical history.   General Information       Row Name 23 1421          OT Time and Intention    Document Type re-evaluation  -RB     Mode of Treatment individual therapy;occupational therapy  -RB       Row Name 23 142          General Information    Patient Profile Reviewed yes  -RB     Prior Level of Function independent:;ADL's;transfer  -RB     Existing Precautions/Restrictions fall  -RB     Barriers to Rehab medically complex  -RB       Row Name 23 1421          Living Environment    People in Home child(bolivar), adult  -RB       Row Name 23 142          Cognition    Orientation Status (Cognition) oriented to;person;place;verbal cues/prompts needed for orientation;situation  -RB       Row Name 23 1421          Safety Issues, Functional Mobility    Safety Issues Affecting Function (Mobility) safety precautions follow-through/compliance;safety precaution awareness;awareness of need for assistance;insight into deficits/self-awareness;judgment;problem-solving;sequencing abilities  -RB     Impairments Affecting Function (Mobility) balance;cognition;endurance/activity tolerance;strength;pain;postural/trunk control;range of motion (ROM)  -RB               User Key  (r) =  Recorded By, (t) = Taken By, (c) = Cosigned By      Initials Name Provider Type    RB Sosa Marquez OT Occupational Therapist                     Mobility/ADL's       Row Name 11/08/23 1423          Bed Mobility    Bed Mobility supine-sit  -RB     Supine-Sit [place] (Bed Mobility) verbal cues;contact guard  -RB     Assistive Device (Bed Mobility) bed rails  -RB       Row Name 11/08/23 1423          Transfers    Transfers sit-stand transfer;stand-sit transfer;bed-chair transfer  -RB     Comment, (Transfers) no knee blocking required today  -RB       Row Name 11/08/23 1423          Bed-Chair Transfer    Bed-Chair [place] (Transfers) minimum assist (75% patient effort);2 person assist;verbal cues;nonverbal cues (demo/gesture)  -RB     Comment, (Bed-Chair Transfer) brittnee you SPS  -RB       Row Name 11/08/23 1423          Sit-Stand Transfer    Sit-Stand [place] (Transfers) minimum assist (75% patient effort);2 person assist;verbal cues;nonverbal cues (demo/gesture)  -RB     Comment, (Sit-Stand Transfer) hha  -RB       Row Name 11/08/23 1423          Stand-Sit Transfer    Stand-Sit [place] (Transfers) minimum assist (75% patient effort);2 person assist;verbal cues;nonverbal cues (demo/gesture)  -RB     Comment, (Stand-Sit Transfer) hha  -RB       Row Name 11/08/23 1423          Functional Mobility    Functional Mobility- Ind. Level unable to perform  -RB       Row Name 11/08/23 1423          Activities of Daily Living    BADL Assessment/Intervention lower body dressing;grooming  -RB       Row Name 11/08/23 1423          Lower Body Dressing Assessment/Training    [place] Level (Lower Body Dressing) lower body dressing skills;dependent (less than 25% patient effort)  -RB     Position (Lower Body Dressing) supine  -RB       Row Name 11/08/23 1423          Grooming Assessment/Training    [place] Level (Grooming) grooming skills;set up;wash face, hands  -RB     Position (Grooming) supported  sitting  -RB               User Key  (r) = Recorded By, (t) = Taken By, (c) = Cosigned By      Initials Name Provider Type    Sosa Still OT Occupational Therapist                   Obj/Interventions       Row Name 11/08/23 1424          Sensory Assessment (Somatosensory)    Sensory Assessment (Somatosensory) sensation intact  -RB       Row Name 11/08/23 1424          Vision Assessment/Intervention    Visual Impairment/Limitations WFL  -RB       Row Name 11/08/23 1424          Range of Motion Comprehensive    Comment, General Range of Motion LUE baseline impaired AROM, 50% achieved  -RB       Row Name 11/08/23 1424          Upper Extremity (Manual Muscle Testing)    Comment, MMT: Upper Extremity Generalized weakness, baseline LUE deficit noticed. BLE generalized weakness but stronger than last week.  -RB       Row Name 11/08/23 1424          Shoulder (Therapeutic Exercise)    Shoulder (Therapeutic Exercise) AROM (active range of motion)  -RB     Shoulder AROM (Therapeutic Exercise) bilateral;flexion;extension;10 repetitions;sitting  -RB       Row Name 11/08/23 1424          Elbow/Forearm (Therapeutic Exercise)    Elbow/Forearm (Therapeutic Exercise) AROM (active range of motion)  -RB     Elbow/Forearm AROM (Therapeutic Exercise) bilateral;flexion;extension;10 repetitions;sitting  -RB       Row Name 11/08/23 1424          Motor Skills    Therapeutic Exercise shoulder;elbow/forearm  -RB       Row Name 11/08/23 1424          Balance    Comment, Balance SBA sitting balance, Min A x2 for standing balance  -RB               User Key  (r) = Recorded By, (t) = Taken By, (c) = Cosigned By      Initials Name Provider Type    Sosa Still OT Occupational Therapist                   Goals/Plan       Row Name 11/08/23 1426          Bed Mobility Goal 1 (OT)    Activity/Assistive Device (Bed Mobility Goal 1, OT) bed mobility activities, all  -RB     [place] Level/Cues Needed (Bed Mobility Goal 1, OT)  modified independence  -RB     Time Frame (Bed Mobility Goal 1, OT) short term goal (STG);2 weeks  -RB     Progress/Outcomes (Bed Mobility Goal 1, OT) goal revised this date  -RB       Row Name 11/08/23 1426          Transfer Goal 1 (OT)    Activity/Assistive Device (Transfer Goal 1, OT) transfers, all  -RB     [place] Level/Cues Needed (Transfer Goal 1, OT) supervision required  -RB     Time Frame (Transfer Goal 1, OT) short term goal (STG);2 weeks  -RB     Progress/Outcome (Transfer Goal 1, OT) goal revised this date  -RB       Row Name 11/08/23 1426          Dressing Goal 1 (OT)    Activity/Device (Dressing Goal 1, OT) dressing skills, all  -RB     [place]/Cues Needed (Dressing Goal 1, OT) minimum assist (75% or more patient effort)  -RB     Time Frame (Dressing Goal 1, OT) short term goal (STG);2 weeks  -RB     Progress/Outcome (Dressing Goal 1, OT) continuing progress toward goal  -RB       Row Name 11/08/23 1426          Toileting Goal 1 (OT)    Activity/Device (Toileting Goal 1, OT) toileting skills, all  -RB     [place] Level/Cues Needed (Toileting Goal 1, OT) minimum assist (75% or more patient effort)  -RB     Time Frame (Toileting Goal 1, OT) short term goal (STG);2 weeks  -RB     Progress/Outcome (Toileting Goal 1, OT) continuing progress toward goal  -RB       Row Name 11/08/23 1426          Grooming Goal 1 (OT)    Activity/Device (Grooming Goal 1, OT) grooming skills, all  -RB     [place] (Grooming Goal 1, OT) modified independence  -RB     Time Frame (Grooming Goal 1, OT) short term goal (STG);2 weeks  -RB     Progress/Outcome (Grooming Goal 1, OT) continuing progress toward goal  -RB       Row Name 11/08/23 1426          Therapy Assessment/Plan (OT)    Planned Therapy Interventions (OT) transfer/mobility retraining;strengthening exercise;ROM/therapeutic exercise;activity tolerance training;adaptive equipment training;BADL retraining;functional balance  retraining;occupation/activity based interventions;patient/caregiver education/training;neuromuscular control/coordination retraining  -RB               User Key  (r) = Recorded By, (t) = Taken By, (c) = Cosigned By      Initials Name Provider Type    RB Sosa Marquez, KIRSTEN Occupational Therapist                   Clinical Impression       Row Name 11/08/23 1425          Pain Assessment    Pretreatment Pain Rating 0/10 - no pain  -RB     Posttreatment Pain Rating 0/10 - no pain  -RB       Row Name 11/08/23 1425          Plan of Care Review    Plan of Care Reviewed With patient  -RB     Progress improving  -RB     Outcome Evaluation Re-eval completed as the pt is s/p SARAHY stent placement on 11/6. The pt was pleasant and agreeable to OT/PT this AM. He completed bed mobility with CGA. BUE exercises as able, improved movement of LUE. He completed a STS with Min A x2 and pivoted to the bedside chair with Min/Mod A x2 - no knee buckling or blocking present. S/S grooming. He remains weak and will require IRF prior to returning home.       Anticipated Discharge Disposition (OT): inpatient rehabilitation facility  -       Row Name 11/08/23 1425          Therapy Assessment/Plan (OT)    Rehab Potential (OT) good, to achieve stated therapy goals  -RB     Criteria for Skilled Therapeutic Interventions Met (OT) yes;skilled treatment is necessary  -RB     Therapy Frequency (OT) 5 times/wk  -RB       Row Name 11/08/23 1425          Therapy Plan Review/Discharge Plan (OT)    Anticipated Discharge Disposition (OT) inpatient rehabilitation facility  -       Row Name 11/08/23 1425          Vital Signs    O2 Delivery Pre Treatment room air  -RB     O2 Delivery Intra Treatment room air  -RB     O2 Delivery Post Treatment room air  -RB     Pre Patient Position Supine  -RB     Intra Patient Position Standing  -RB     Post Patient Position Sitting  -RB       Row Name 11/08/23 1425          Positioning and Restraints    Pre-Treatment  Position in bed  -RB     Post Treatment Position chair  -RB     In Chair notified nsg;reclined;sitting;call light within reach;encouraged to call for assist;exit alarm on  -RB               User Key  (r) = Recorded By, (t) = Taken By, (c) = Cosigned By      Initials Name Provider Type    RB Sosa Marquez OT Occupational Therapist                   Outcome Measures       Row Name 11/08/23 1426          How much help from another is currently needed...    Putting on and taking off regular lower body clothing? 2  -RB     Bathing (including washing, rinsing, and drying) 2  -RB     Toileting (which includes using toilet bed pan or urinal) 2  -RB     Putting on and taking off regular upper body clothing 3  -RB     Taking care of personal grooming (such as brushing teeth) 3  -RB     Eating meals 3  -RB     AM-PAC 6 Clicks Score (OT) 15  -RB       Row Name 11/08/23 1134 11/08/23 0800       How much help from another person do you currently need...    Turning from your back to your side while in flat bed without using bedrails? 4  -EE 4  -AC    Moving from lying on back to sitting on the side of a flat bed without bedrails? 3  -EE 3  -AC    Moving to and from a bed to a chair (including a wheelchair)? 2  -EE 2  -AC    Standing up from a chair using your arms (e.g., wheelchair, bedside chair)? 2  -EE 2  -AC    Climbing 3-5 steps with a railing? 1  -EE 1  -AC    To walk in hospital room? 2  -EE 1  -AC    AM-PAC 6 Clicks Score (PT) 14  -EE 13  -AC    Highest level of mobility 4 --> Transferred to chair/commode  -EE 4 --> Transferred to chair/commode  -AC      Row Name 11/08/23 1426          Modified Lisa Scale    Modified [place] Scale 4 - Moderately severe disability.  Unable to walk without assistance, and unable to attend to own bodily needs without assistance.  -RB       Row Name 11/08/23 1426          Functional Assessment    Outcome Measure Options AM-PAC 6 Clicks Daily Activity (OT);Modified [place]  -RB                User Key  (r) = Recorded By, (t) = Taken By, (c) = Cosigned By      Initials Name Provider Type    Keeley Bello, PT Physical Therapist    Sosa Still, OT Occupational Therapist    Jan Gill, RN Registered Nurse                    Occupational Therapy Education       Title: PT OT SLP Therapies (In Progress)       Topic: Occupational Therapy (Done)       Point: ADL training (Done)       Description:   Instruct learner(s) on proper safety adaptation and remediation techniques during self care or transfers.   Instruct in proper use of assistive devices.                  Learning Progress Summary             Patient Acceptance, E, VU by ML at 10/31/2023 0308    Acceptance, E, VU by ML at 10/30/2023 0409    Acceptance, E, VU,NR by BS at 10/29/2023 1214    Comment: Reason for eval/consult, goal setting, next level of care   Family Acceptance, E, VU,NR by BS at 10/29/2023 1214    Comment: Reason for eval/consult, goal setting, next level of care                         Point: Home exercise program (Done)       Description:   Instruct learner(s) on appropriate technique for monitoring, assisting and/or progressing therapeutic exercises/activities.                  Learning Progress Summary             Patient Acceptance, E, VU by ML at 10/31/2023 0308    Acceptance, E, VU by ML at 10/30/2023 0409    Acceptance, E, VU,NR by BS at 10/29/2023 1214    Comment: Reason for eval/consult, goal setting, next level of care   Family Acceptance, E, VU,NR by BS at 10/29/2023 1214    Comment: Reason for eval/consult, goal setting, next level of care                         Point: Precautions (Done)       Description:   Instruct learner(s) on prescribed precautions during self-care and functional transfers.                  Learning Progress Summary             Patient Acceptance, E, VU by ML at 10/31/2023 0308    Acceptance, E, VU by ML at 10/30/2023 0409    Acceptance, E, VU,NR by BS at 10/29/2023 1214     Comment: Reason for eval/consult, goal setting, next level of care   Family Acceptance, E, VU,NR by BS at 10/29/2023 1214    Comment: Reason for eval/consult, goal setting, next level of care                         Point: Body mechanics (Done)       Description:   Instruct learner(s) on proper positioning and spine alignment during self-care, functional mobility activities and/or exercises.                  Learning Progress Summary             Patient Acceptance, E, VU by ML at 10/31/2023 0308    Acceptance, E, VU by ML at 10/30/2023 0409    Acceptance, E, VU,NR by BS at 10/29/2023 1214    Comment: Reason for eval/consult, goal setting, next level of care   Family Acceptance, E, VU,NR by BS at 10/29/2023 1214    Comment: Reason for eval/consult, goal setting, next level of care                                         User Key       Initials Effective Dates Name Provider Type Discipline     11/14/22 -  Camille Dutta OT Occupational Therapist OT     06/15/23 -  Kendal Flor RN Registered Nurse Nurse                  OT Recommendation and Plan  Planned Therapy Interventions (OT): transfer/mobility retraining, strengthening exercise, ROM/therapeutic exercise, activity tolerance training, adaptive equipment training, BADL retraining, functional balance retraining, occupation/activity based interventions, patient/caregiver education/training, neuromuscular control/coordination retraining  Therapy Frequency (OT): 5 times/wk  Plan of Care Review  Plan of Care Reviewed With: patient  Progress: improving  Outcome Evaluation: Re-eval completed as the pt is s/p SARAHY stent placement on 11/6. The pt was pleasant and agreeable to OT/PT this AM. He completed bed mobility with CGA. BUE exercises as able, improved movement of LUE. He completed a STS with Min A x2 and pivoted to the bedside chair with Min/Mod A x2 - no knee buckling or blocking present. S/S grooming. He remains weak and will require IRF prior to returning  home.       Anticipated Discharge Disposition (OT): inpatient rehabilitation facility     Time Calculation:         Time Calculation- OT       Row Name 11/08/23 1421             Time Calculation- OT    OT Start Time 1040  -RB      OT Stop Time 1058  -RB      OT Time Calculation (min) 18 min  -RB      Total Timed Code Minutes- OT 9 minute(s)  -RB      OT Received On 11/08/23  -RB      OT - Next Appointment 11/09/23  -RB      OT Goal Re-Cert Due Date 11/22/23  -RB         Timed Charges    52278 - OT Therapeutic Exercise Minutes 9  -RB         Untimed Charges    OT Eval/Re-eval Minutes 9  -RB         Total Minutes    Timed Charges Total Minutes 9  -RB      Untimed Charges Total Minutes 9  -RB       Total Minutes 18  -RB                User Key  (r) = Recorded By, (t) = Taken By, (c) = Cosigned By      Initials Name Provider Type    RB Sosa Marquez OT Occupational Therapist                  Therapy Charges for Today       Code Description Service Date Service Provider Modifiers Qty    14240647781 HC OT RE-EVAL 2 11/8/2023 Sosa Marquez OT GO 1    [id number]  OT THER PROC EA 15 MIN 11/8/2023 Sosa Marquez OT GO 1                 Sosa Marquez OT  11/8/2023

## 2023-11-08 NOTE — PROGRESS NOTES
Tigre Amaral   73 y.o.  male    LOS: 11 days   Patient Care Team:  Provider, No Known as PCP - General      Subjective   Resting   Review of Systems:   Lungs: No cough, no SOA  CV: No CP, no palpitations  GI: No nausea, vomiting, or diarrhea.     Medication Review:   Current Facility-Administered Medications:     acetaminophen (TYLENOL) tablet 650 mg, 650 mg, Oral, Q4H PRN, 650 mg at 23 1235 **OR** [DISCONTINUED] acetaminophen (TYLENOL) suppository 650 mg, 650 mg, Rectal, Q4H PRN, Lashon Brooks MD    amiodarone 150 mg in 100 mL D5W (loading dose), 150 mg, Intravenous, Once **FOLLOWED BY** [] amiodarone 360 mg in 200 mL D5W infusion, 1 mg/min, Intravenous, Continuous **FOLLOWED BY** [] amiodarone 360 mg in 200 mL D5W infusion, 0.5 mg/min, Intravenous, Continuous, Mika Kim MD    amoxicillin-clavulanate (AUGMENTIN) 875-125 MG per tablet 1 tablet, 1 tablet, Oral, Q12H, Sanjiv Spann MD, 1 tablet at 23 08    aspirin chewable tablet 81 mg, 81 mg, Oral, Daily, Sanjiv Spann MD, 81 mg at 23    atorvastatin (LIPITOR) tablet 80 mg, 80 mg, Oral, Nightly, Sanjiv Spann MD, 80 mg at 23    sennosides-docusate (PERICOLACE) 8.6-50 MG per tablet 2 tablet, 2 tablet, Oral, BID, 2 tablet at 23 **AND** polyethylene glycol (MIRALAX) packet 17 g, 17 g, Oral, Daily PRN **AND** bisacodyl (DULCOLAX) EC tablet 5 mg, 5 mg, Oral, Daily PRN **AND** bisacodyl (DULCOLAX) suppository 10 mg, 10 mg, Rectal, Daily PRN, Sanjiv Spann MD    carvedilol (COREG) tablet 6.25 mg, 6.25 mg, Oral, BID With Meals, Sanjiv Spann MD, 6.25 mg at 23 08    clopidogrel (PLAVIX) tablet 75 mg, 75 mg, Oral, Daily, Sanjiv Spann MD, 75 mg at 23    Enoxaparin Sodium (LOVENOX) syringe 40 mg, 40 mg, Subcutaneous, Q24H, Sanjiv Spann MD, 40 mg at 23    HYDROcodone-acetaminophen (NORCO) 5-325 MG per tablet 1 tablet, 1 tablet, Oral, Q6H  PRMaria Ines BARRIOS Richard, MD    lactated ringers infusion, 9 mL/hr, Intravenous, Continuous PRN, Sanjiv Spann MD    lisinopril (PRINIVIL,ZESTRIL) tablet 20 mg, 20 mg, Oral, Daily, Sanjiv Spann MD, 20 mg at 11/08/23 0806    Magnesium Standard Dose Replacement - Follow Nurse / BPA Driven Protocol, , Does not apply, Maria Ines BARRON Richard, MD    melatonin tablet 3 mg, 3 mg, Oral, Nightly PRN, Sanjiv Spann MD, 3 mg at 11/02/23 2106    niCARdipine (CARDENE) 25 mg in 250 mL NS infusion kit, 5-15 mg/hr, Intravenous, Titrated, Sanjiv Spann MD, Stopped at 11/07/23 0900    nicotine (NICODERM CQ) 21 MG/24HR patch 1 patch, 1 patch, Transdermal, Q24H, Sanjiv Spann MD, 1 patch at 11/07/23 1034    nitroglycerin (NITROSTAT) SL tablet 0.4 mg, 0.4 mg, Sublingual, Q5 Min PRN, Mika Kim MD    ondansetron (ZOFRAN) tablet 4 mg, 4 mg, Oral, Q6H PRN **OR** ondansetron (ZOFRAN) injection 4 mg, 4 mg, Intravenous, Q6H PRN, Sanjiv Spann MD, 4 mg at 11/06/23 2119    Phosphorus Replacement - Follow Nurse / BPA Driven Protocol, , Does not apply, Maria Ines BARRON Richard, MD    Potassium Replacement - Follow Nurse / BPA Driven Protocol, , Does not apply, Maria Ines BARRON Richard, MD    sodium chloride 0.9 % flush 10 mL, 10 mL, Intravenous, Q12H, Sanjiv Spann MD, 10 mL at 11/07/23 2052    sodium chloride 0.9 % flush 10 mL, 10 mL, Intravenous, PRN, Sanjiv Spann MD    sodium chloride 0.9 % flush 10 mL, 10 mL, Intravenous, PRNParamjit Abhishek, MD    sodium chloride 0.9 % flush 20 mL, 20 mL, Intravenous, PRNParamjit Abhishek, MD    sodium chloride 0.9 % infusion 40 mL, 40 mL, Intravenous, PRN, Sanjiv Spann MD    sodium chloride 0.9 % infusion, 50 mL/hr, Intravenous, Continuous, Sanjiv Spann MD, Last Rate: 50 mL/hr at 11/07/23 1753, 50 mL/hr at 11/07/23 1753      Objective     Vital Sign Min/Max for last 24 hours  Temp  Min: 97.6 °F (36.4 °C)  Max: 98.3 °F (36.8 °C)   BP  Min: 69/41  Max: 144/75   "  Pulse  Min: 60  Max: 75         11/05/23  0400 11/06/23  0527 11/06/23  0959   Weight: 75.6 kg (166 lb 10.7 oz) 75.8 kg (167 lb) 76.2 kg (168 lb)        Intake/Output Summary (Last 24 hours) at 11/8/2023 0824  Last data filed at 11/8/2023 0400  Gross per 24 hour   Intake 1194 ml   Output 1100 ml   Net 94 ml       Physical Exam:    General Appearance:    No acute distress   Lungs:     diminished. No wheezes, rhonchi or rales.     Heart:    Regular rate and rhythm.  Normal S1 and S2. No murmur, no gallop, rub or lift. , No JVD.   Abdomen:     Normal bowel sounds, soft, nontender, nondistended.   Extremities:   No clubbing, cyanosis or edema.     Skin:   Warm, dry   Neurologic:   Awake, dementia      Monitor:paced    I reviewed the patient's new clinical results.    Sodium   Date Value Ref Range Status   11/07/2023 140 136 - 145 mmol/L Final   11/06/2023 143 136 - 145 mmol/L Final     Potassium   Date Value Ref Range Status   11/07/2023 3.4 (L) 3.5 - 5.2 mmol/L Final   11/07/2023 3.0 (L) 3.5 - 5.2 mmol/L Final   11/07/2023 3.0 (L) 3.5 - 5.2 mmol/L Final   11/06/2023 3.3 (L) 3.5 - 5.2 mmol/L Final   11/05/2023 3.9 3.5 - 5.2 mmol/L Final     Chloride   Date Value Ref Range Status   11/07/2023 112 (H) 98 - 107 mmol/L Final   11/06/2023 108 (H) 98 - 107 mmol/L Final     No results found for: \"PLASMABICARB\"  BUN   Date Value Ref Range Status   11/07/2023 12 8 - 23 mg/dL Final   11/06/2023 15 8 - 23 mg/dL Final     Creatinine   Date Value Ref Range Status   11/07/2023 0.94 0.76 - 1.27 mg/dL Final   11/06/2023 1.12 0.76 - 1.27 mg/dL Final     Calcium   Date Value Ref Range Status   11/07/2023 6.8 (L) 8.6 - 10.5 mg/dL Final   11/06/2023 8.0 (L) 8.6 - 10.5 mg/dL Final     Magnesium   Date Value Ref Range Status   11/07/2023 1.6 1.6 - 2.4 mg/dL Final       Results from last 7 days   Lab Units 11/04/23  0730   WBC 10*3/mm3 7.82   HEMOGLOBIN g/dL 13.2   HEMATOCRIT % 38.2   PLATELETS 10*3/mm3 265     Lab Results   Component Value " Date    CHOL 146 10/29/2023     Lab Results   Component Value Date    HDL 34 (L) 10/29/2023     Lab Results   Component Value Date    LDL 98 10/29/2023     Lab Results   Component Value Date    TRIG 72 10/29/2023         Results from last 7 days   Lab Units 11/02/23  0735 11/02/23  0612 11/01/23  2031 11/01/23  1846   HSTROP T ng/L 425* 451* 354* 339*     Results from last 7 days   Lab Units 11/02/23  0735   PROBNP pg/mL 56,131.0*               Assessment/ Plan       Acute on chronic HFrEF  2. NICM s/p AICD     A. Echo 10/30/23:   Left ventricular systolic function is severely decreased. Calculated left ventricular EF = 21.6% Left ventricular ejection fraction appears to be less than 20%.The following left ventricular wall segments are hypokinetic: mid anterolateral, basal inferolateral, mid inferolateral, mid inferior, basal inferoseptal, mid inferoseptal, mid anteroseptal, basal inferior and basal inferoseptal. The following left ventricular wall segments are akinetic: apical anterior, apical lateral, apical inferior, apical septal and apex.  Left ventricular wall thickness is consistent with mild concentric hypertrophy.   Left ventricular diastolic function is consistent with (grade II w/high LAP) pseudonormalization.  The left atrial cavity is mildly dilated.  Saline test results are negative for right to left atrial level shunt.  Estimated right ventricular systolic pressure from tricuspid regurgitation is mildly elevated (35-45 mmHg).     3. H/o Vtach  4. HTN  5. HLD  6. Acute R sided watershed CVAs with severe ICA stenosis, s/p stent placement 11/6/23    A. h/o large MCA infarct  7. Aspiration PNA  8. Dementia  9. Iliac stenosis     Repeat ABIs stable per vascular.    Continue DAPT.   Continue GDMT for HFrEF.  Dr. Kim to see.            Time:  15 min    Patient seen and examined  To continue same treatment

## 2023-11-08 NOTE — PROGRESS NOTES
[facility] PROGRESS NOTE    CC: LUE weakness, watershed CVAs and symptomatic right ICA stenosis postprocedure day 1 status post stent placement.    Interval History: Doing really well today.  Moving left side.    Vital signs in last 24 hours:  Temp:  [97.6 °F (36.4 °C)-98.3 °F (36.8 °C)] 97.6 °F (36.4 °C)  Heart Rate:  [60-74] 60  Resp:  [20] 20  BP: ()/(46-75) 133/68    Intake/Output this shift:  I/O this shift:  In: 240 [P.O.:240]  Out: -     LABS:  Recent Results (from the past 24 hour(s))   P2Y12 Platelet Inhibition    Collection Time: 11/07/23 10:51 AM    Specimen: Blood   Result Value Ref Range    P2Y12 Platelet Inhibition 113 (L) 194 - 418 PRU   POC Glucose Once    Collection Time: 11/07/23 11:35 AM    Specimen: Blood   Result Value Ref Range    Glucose 104 70 - 130 mg/dL   Doppler Ankle Brachial Index Single Level CAR    Collection Time: 11/07/23  1:31 PM   Result Value Ref Range    Upper arterial right arm brachial sys max 117     Upper arterial left arm brachial sys max 118     RIGHT POST TIBIAL SYS MAX 55     LEFT POST TIBIAL SYS MAX 95     RIGHT DORSALIS PEDIS SYS MAX 30     LEFT DORSALIS PEDIS SYS MAX 88     RIGHT GREAT TOE SYS MAX 27     LEFT GREAT TOE SYS MAX 53     RIGHT JOESPH RATIO 0.47     LEFT JOESPH RATIO 0.81     RIGHT TBI RATIO 0.23     LEFT TBI RATIO 0.45    Doppler Ankle Brachial Index Single Level CAR    Collection Time: 11/07/23  4:57 PM   Result Value Ref Range    Upper arterial right arm brachial sys max 118     Upper arterial left arm brachial sys max 116     RIGHT POST TIBIAL SYS MAX 56     LEFT POST TIBIAL SYS      RIGHT DORSALIS PEDIS SYS MAX 27     LEFT DORSALIS PEDIS SYS MAX 87     RIGHT GREAT TOE SYS MAX 25     LEFT GREAT TOE SYS MAX 52     RIGHT JOESPH RATIO 0.47     LEFT JOESPH RATIO 0.88     RIGHT TBI RATIO 0.21     LEFT TBI RATIO 0.44    POC Glucose Once    Collection Time: 11/07/23  5:29 PM    Specimen: Blood   Result Value Ref Range    Glucose 109 70 - 130 mg/dL    Potassium    Collection Time: 11/07/23  8:52 PM    Specimen: Blood   Result Value Ref Range    Potassium 3.4 (L) 3.5 - 5.2 mmol/L   Potassium    Collection Time: 11/08/23  8:06 AM    Specimen: Blood   Result Value Ref Range    Potassium 3.7 3.5 - 5.2 mmol/L       IMAGING STUDIES:  Imaging Results (Last 24 Hours)       Procedure Component Value Units Date/Time    CT Head Without Contrast [[id number]] Collected: 11/07/23 1439     Updated: 11/08/23 0757    Narrative:      CT SCAN OF THE HEAD WITHOUT CONTRAST ON 11/07/2023     CLINICAL HISTORY: Stroke follow-up, patient had recent stenting of  high-grade narrowing in the cervical right internal carotid artery.      TECHNIQUE: Spiral CT images were obtained from the base of the skull to  the vertex without intravenous contrast. The images were reformatted and  are submitted in 3 mm thick axial, sagittal and coronal CT sections with  brain algorithm.      This is correlated to a prior head CT from [facility] on  11/02/2023 and MRI of the brain on 11/02/2023.     FINDINGS: There is some patchy low-density extending from the  periventricular into the subcortical white matter of the cerebral  hemispheres, consistent with mild-to-moderate small vessel disease.  There is a moderate size area of encephalomalacia tracking throughout  the lateral left temporal lobe into the anterior lateral left occipital  lobe compatible with an old lateral left temporal occipital infarct in  the left MCA territory that measures up to 8 x 3.5 x 3.5 cm in anterior  posterior, medial lateral and craniocaudal dimension and there is an  additional chronic infarct in the mid inferior lateral left frontal lobe  that is in the distribution of the mid and inferior lateral frontal  branches of the left MCA territory that measures 5.5 x 3.3 x 2.5 cm in  anterior posterior, craniocaudal and medial lateral dimension. There are  additional smaller chronic infarcts in the inferior lateral  left  parietal lobe measuring 2 cm in the left MCA territory. On recent head  CT on 11/02/2023 there were multiple small nodular 4-10 mm slightly  hypodense acute to subacute infarcts in the right cerebral hemisphere  that were diffusion hyperintense on MRI of the brain 11/02/2023 with  over 30 separate 3-10 mm acute infarcts involving the superior right  frontal parietal parenchyma, superior right frontal parietal subcortical  white matter and 2 in the right corona radiata region then involvement  of the lateral right frontal lobe and posterior lateral right parietal  lobe. These infarcts are very difficult to appreciate on this head CT  and are undoubtedly subacute infarcts that are nearly isodense to brain  on the current exam.  Many of them may have been in the watershed  territory between the right anterior and middle cerebral artery  territories although some of them could have been embolic infarcts in  the right MCA territory. The remainder of the brain parenchyma is normal  in attenuation.  There is mild cerebral atrophy and the ventricles are  upper limits of normal in size.  I see no midline shift.  No extra-axial  fluid collections are identified. There is no evidence of acute  intracranial hemorrhage. The calvarium and the skull base are normal in  appearance. The paranasal sinuses, mastoid air cells and middle ear  cavities are clear.  There is a soft tissue mass in the medial right  periorbital soft tissues that immediately abuts the right side of the  nasal bone and inferiorly abuts the medial aspect of the anterior wall  of the right maxillary sinus.  This mass measures 3.4 x 2.1 x 2.5 cm in  medial lateral, anterior posterior and craniocaudal dimension and is  concerning for a cutaneous malignancy with deep subcutaneous extension  contacting bony surfaces as described and it is unchanged since head CT  5 days ago on 11/02/2023.       Impression:      1.The patient has extensive areas of chronic  infarction in the left  middle cerebral artery territory with an 8 x 3.5 x 3.5 cm chronic  lateral left temporal occipital infarct and an additional 5.5 x 3.3 x  2.5 cm old infarct in the mid to inferior lateral left frontal lobe and  a 2 cm old inferior left parietal infarct all in the left MCA territory.  2. On recent head CT and subsequent MRI of the brain 5 days ago on  11/02/2023 there were multiple over 30 hypodense 3-10 mm acute infarcts  studding the superior right frontal parietal parenchyma, superior right  frontal parietal cortex, subcortical white matter and several in the  right corona radiata region and several in the posterior lateral right  parietal lobe and lateral right frontal lobe.  Many of these lined up in  the watershed territory and could have been acute watershed territory  infarcts although some of the may have been in the right MCA territory.  These were diffusion hyperintense infarcts compatible with acute to  subacute infarcts on the MRI of the brain 11/02/2023 five days ago.  These subacute infarcts are difficult to appreciate on the current head  CT as they are nearly isodense to brain parenchyma.  3. There is stable soft tissue mass in the inferior medial right  periorbital soft tissues and tracking inferiorly anterior to the medial  aspect of the anterior wall of the right maxillary sinus and further  medially and contacting the lateral aspect of the right nasal bone.   This is compatible with a cutaneous malignancy with subcutaneous  extension.  It maximally measures 3.4 x 2.1 x 2.5 cm in medial lateral,  anterior posterior and craniocaudal dimension.   4. There is mild-to-moderate small vessel disease in the cerebral white  matter and mild diffuse cerebral atrophy.  The remainder of the head CT  is within normal limits. Specifically, no acute intracranial hemorrhage  is identified. Results communicated to Keith Spann MD, from  neurointerventional neurosurgery by telephone on  11/07/2023 at 2 p.m.     Radiation dose reduction techniques were utilized, including automated  exposure control and exposure modulation based on body size.        This report was finalized on 11/8/2023 7:54 AM by Dr. Jon Saldivar M.D  on Workstation: [id number]       CT Angio Abdominal Aorta Bilateral Iliofem Runoff [[id number]] Collected: 11/07/23 1354     Updated: 11/07/23 1424    Narrative:      CT ANGIOGRAM OF THE ABDOMEN AND PELVIS WITH BILATERAL LOWER EXTREMITY  RUNOFF. MULTIPLE CORONAL, SAGITTAL, AND 3-D RECONSTRUCTIONS.     HISTORY: Stroke, peripheral arterial disease      TECHNIQUE: Radiation dose reduction techniques were utilized, including  automated exposure control and exposure modulation based on body size.   CT angiogram of the abdomen and pelvis with lower extremity runoff was  performed. Multiple coronal, sagittal, and 3-D reconstruction images  were obtained.     COMPARISON: None     FINDINGS:     Small to moderate bilateral pleural effusions with overlying pulmonary  opacification incompletely visualized. Presumed pacemaker wires are also  incompletely seen. There are no findings of small bowel obstruction. The  appendix is unremarkable. While this examination is not tailored for  detailed evaluation of the abdominal viscera due to contrast timing, no  gross abnormality is seen within the pancreas, spleen or adrenal glands.  Subcentimeter liver lesions are too small to characterize. Presumed  vicarious excretion into the gallbladder is present.     Subcentimeter renal lesions are too small to characterize. Larger  hypodense lesions demonstrating density less than 15 Hounsfield units  are likely benign per Bosniak 2019 criteria. There is no hydronephrosis.     No abdominal pelvic adenopathy by size criteria. There is colonic  diverticulosis. The bladder is decompressed by Almeida catheter and not  well evaluated. Small amount of ascites is present no free  intraperitoneal air is seen.     Generalized  bone demineralization is present.     CT angiography abdomen and pelvis:  *  Long segment of severe stenosis within the proximal in mid superior  mesenteric artery secondary to calcified and noncalcified  atherosclerosis with reconstitution seen distally.  *  There is mild stenosis of the origin of the celiac artery secondary  to calcified noncalcified atherosclerosis.  *  Mild to moderate stenosis of the origins of the bilateral renal  arteries is present. Short segment of moderate to severe stenosis of the  distal right renal artery secondary to noncalcified atherosclerosis.  *  Severe atherosclerosis present within the abdominal aorta. Segments  of mild to moderate stenosis are present within the bilateral common  iliac arteries, left greater than right.     Left lower extremity:  *  Significant atherosclerosis there is present within the left external  iliac artery. Short segment of severe stenosis within the mid to distal  aspect of the left external iliac artery secondary to calcified  noncalcified atherosclerosis.  *  There is mild to moderate stenosis throughout the course of the left  common femoral artery. The left profunda femoris artery is patent.  *  Scattered areas of atherosclerosis are present along the left  superficial femoral artery. There is a short segment of mild stenosis  within the distal left superficial femoral artery and a short segment of  moderate to severe stenosis as it projects in the area of the abductor  hiatus (image [phone]). The popliteal artery reconstitutes distally.  *  Atherosclerosis is present along the course of the left popliteal  artery resulting in short segments of mild to moderate stenosis, most  notably along its distal aspect.  *  The left anterior tibial and posterior tibial arteries are patent  into the foot. The left peroneal artery is more attenuated and not  definitively seen opacified with contrast distal to the ankle due to  slow flow versus occlusion.     Right  lower extremity:  *  A right femoral artery catheter is present with tip terminating in  the distal abdominal aorta.  *  The right external iliac artery is occluded without definitive  opacification.  *   The right common femoral artery demonstrates moderate to severe  stenosis along its course. The right profunda femoris artery is patent.  *  The right superficial femoral artery is asymmetrically attenuated and  becomes gradually nonopacified with contrast distally over a segment of  approximately 2.8 cm.  *  The right popliteal artery reconstitutes distal to the adductor  hiatus and demonstrates moderate to severe stenosis throughout its  course due to calcified noncalcified atherosclerosis.  *  The right anterior tibial, posterior tibial and peroneal arteries are  opacified with contrast approximately and becomes gradually nonopacified  and no longer visualized distal to the midportion of the right lower leg  at approximately the same level.       Impression:      1.  Severe atherosclerosis throughout the arterial vasculature of the  abdomen, pelvis and bilateral lower extremities with varying degrees of  stenosis as detailed above. Of note, the right external iliac artery  appears to be completely occluded, there is moderate to severe stenosis  throughout the course of the right femoral artery and a moderate length  segment of occlusion of the right superficial femoral artery. These  findings were discussed with Kori, the patient's nurse by telephone by  Addy Joseph at 2:15 p.m.   on   11/7/2023 . Additionally, the right  lower extremity arterial vasculature is not definitively seen opacified  distal to the mid right lower leg suggestive of slow flow given the  above findings; however, occlusion of any of these arteries cannot be  excluded.  2.  Long segment of severe stenosis of the superior mesenteric artery.  3.  Short segment of moderate to severe stenosis of the right distal  renal artery.  4.  Small to  "moderate bilateral pleural effusions with overlying  pulmonary pacification incompletely visualized. Small amount of ascites.  5.  Other findings as above.        This report was finalized on 11/7/2023 2:21 PM by Dr. Addy Joseph M.D  on Workstation: [id number]             PHYSICAL EXAM:  Physical exam  Awake, alert, oriented x3- confused at times with history of dementia   Pupils equal round reactive to light  Extraocular muscles intact  Face symmetric  Speech is fluent and clear  No pronator drift  Motor exam  Bilateral deltoids 5/5, bilateral biceps 5/5, bilateral triceps 5/5, bilateral wrist extension 5/5 bilateral hand  5/5  Bilateral hip flexion 5/5, bilateral knee extension 5/5, bilateral DF/PF 5/5  gait deferred  Able to detect light touch in all 4 extremities  Left groin site soft, flat, no hematoma.  Pedal pulses present.      ASSESSMENT/PLAN:    Cerebrovascular accident (CVA) due to embolism of right middle cerebral artery    Aspiration pneumonia    Acute on chronic systolic congestive heart failure    Hypertension    Stage 3a chronic kidney disease    Hyperlipidemia    Carotid artery stenosis, symptomatic, right    Tobacco abuse    Basal cell carcinoma (BCC) of skin of nose    73-year-old male with watershed strokes status post R ICA stent placement.    Continues to make improvements.  Nothing further from a neurosurgical standpoint.  We will sign off at this time but be available as needed.  Follow-up has been scheduled.  Vascular surgery to see for bilateral iliac stenosis workup  okay transfer out of the ICU- does not need to go to [facility]    Shared Decision Making: I discussed the patient's findings and my recommendations with patient and Dr. Maria Ines Marte, APRN  11/8/2023  10:50 EST    \"Dictated utilizing Dragon dictation\".     "

## 2023-11-08 NOTE — THERAPY RE-EVALUATION
Patient Name: Tigre Amaral  : 1950    MRN: [id number]                              Today's Date: 2023       Admit Date: 10/28/2023    Visit Dx:     ICD-10-CM ICD-9-CM   1. Cerebrovascular accident (CVA), unspecified mechanism  I63.9 434.91     Patient Active Problem List   Diagnosis    Cerebrovascular accident (CVA) due to embolism of right middle cerebral artery    Aspiration pneumonia    Acute on chronic systolic congestive heart failure    Hypertension    Stage 3a chronic kidney disease    Hyperlipidemia    Carotid artery stenosis, symptomatic, right    Tobacco abuse    Basal cell carcinoma (BCC) of skin of nose     History reviewed. No pertinent past medical history.  History reviewed. No pertinent surgical history.   General Information       Row Name 23 1125          Physical Therapy Time and Intention    Document Type re-evaluation  see initial eval for PLOF/living environment  -EE     Mode of Treatment physical therapy;co-treatment;occupational therapy  -EE       Row Name 23 1125          General Information    Existing Precautions/Restrictions fall  -EE     Barriers to Rehab medically complex;cognitive status  -EE       Row Name 23 1125          Cognition    Orientation Status (Cognition) oriented to;person;place  -EE       Row Name 23 1125          Safety Issues, Functional Mobility    Safety Issues Affecting Function (Mobility) ability to follow commands;insight into deficits/self-awareness;sequencing abilities;judgment;problem-solving;safety precaution awareness  -EE     Impairments Affecting Function (Mobility) balance;coordination;endurance/activity tolerance;cognition;motor control;grasp;postural/trunk control;strength;muscle tone abnormal  -EE     Cognitive Impairments, Mobility Safety/Performance attention;insight into deficits/self-awareness;judgment;sequencing abilities;safety precaution awareness  -EE     Comment, Safety Issues/Impairments (Mobility) Co  treatment medically appropriate and necessary due to patient acuity level, activity tolerance and safety of patient and staff. Treatment is focusing on progression of care and goals established in the POC.  -EE               User Key  (r) = Recorded By, (t) = Taken By, (c) = Cosigned By      Initials Name Provider Type    EE Keeley Azul, RHEA Physical Therapist                   Mobility       Row Name 11/08/23 1128          Bed Mobility    Bed Mobility supine-sit  -EE     Supine-Sit [place] (Bed Mobility) contact guard;verbal cues  -EE     Assistive Device (Bed Mobility) head of bed elevated;bed rails  -EE     Comment, (Bed Mobility) moderate cues for sequencing; increased time required to complete  -EE       Row Name 11/08/23 1128          Bed-Chair Transfer    Bed-Chair [place] (Transfers) minimum assist (75% patient effort);moderate assist (50% patient effort);2 person assist;verbal cues  B HHA  -EE     Comment, (Bed-Chair Transfer) Stand pivot bed > chair; pt able to take several shuffled steps with cues for sequencing and balance.  -EE       Row Name 11/08/23 1128          Sit-Stand Transfer    Sit-Stand [place] (Transfers) minimum assist (75% patient effort);2 person assist;verbal cues;nonverbal cues (demo/gesture)  B HHA  -EE     Comment, (Sit-Stand Transfer) STS x2 from EOB, initially loosely blocking B knees; however, pt able to maintain B knee extension without assist after vc's.  -EE       Row Name 11/08/23 1128          Gait/Stairs (Locomotion)    [place] Level (Gait) minimum assist (75% patient effort);moderate assist (50% patient effort);2 person assist;verbal cues  -EE     Assistive Device (Gait) --  B HHA  -EE     Distance in Feet (Gait) 3 steps from bed to chair  -EE     Deviations/Abnormal Patterns (Gait) festinating/shuffling;base of support, narrow  -EE     Bilateral Gait Deviations forward flexed posture  -EE               User Key  (r) = Recorded By, (t) = Taken By, (c) =  Cosigned By      Initials Name Provider Type    EE Keeley Azul, RHEA Physical Therapist                   Obj/Interventions       Row Name 11/08/23 1129          Range of Motion Comprehensive    General Range of Motion bilateral lower extremity ROM WFL  -EE       Row Name 11/08/23 1129          Strength Comprehensive (MMT)    General Manual Muscle Testing (MMT) Assessment lower extremity strength deficits identified  -EE     Comment, General Manual Muscle Testing (MMT) Assessment R LE 4/5, L LE 3+/5  -EE       Row Name 11/08/23 1129          Motor Skills    Therapeutic Exercise --  Seated B ankle pumps, LAQ x 5 reps each  -EE       Row Name 11/08/23 1129          Balance    Balance Assessment sitting static balance;standing static balance;standing dynamic balance  -EE     Static Sitting Balance standby assist;contact guard  -EE     Position, Sitting Balance sitting edge of bed  -EE     Static Standing Balance minimal assist;2-person assist  -EE     Dynamic Standing Balance minimal assist;moderate assist;2-person assist  -EE     Position/Device Used, Standing Balance supported  -EE     Comment, Balance Static standing at EOB x 1 min; pt demos mild L and posterior lean. Cues for balance and posture.  -EE       Row Name 11/08/23 1129          Sensory Assessment (Somatosensory)    Sensory Assessment (Somatosensory) other (see comments)  Pt reports no sensory changes B LE; unable to fully assess 2/2 cognition  -EE               User Key  (r) = Recorded By, (t) = Taken By, (c) = Cosigned By      Initials Name Provider Type    Keeley Bello, RHEA Physical Therapist                   Goals/Plan       Row Name 11/08/23 1133          Bed Mobility Goal 1 (PT)    Activity/Assistive Device (Bed Mobility Goal 1, PT) sit to supine/supine to sit  -EE     [place] Level/Cues Needed (Bed Mobility Goal 1, PT) standby assist  -EE     Time Frame (Bed Mobility Goal 1, PT) 1 week  -EE     Progress/Outcomes (Bed Mobility Goal 1, PT)  goal ongoing  -EE       Row Name 11/08/23 1133          Transfer Goal 1 (PT)    Activity/Assistive Device (Transfer Goal 1, PT) sit-to-stand/stand-to-sit;bed-to-chair/chair-to-bed  -EE     [place] Level/Cues Needed (Transfer Goal 1, PT) minimum assist (75% or more patient effort)  -EE     Time Frame (Transfer Goal 1, PT) 1 week  -EE     Progress/Outcome (Transfer Goal 1, PT) goal ongoing  -EE       Row Name 11/08/23 1133          Gait Training Goal 1 (PT)    Activity/Assistive Device (Gait Training Goal 1, PT) gait (walking locomotion);assistive device use;improve balance and speed;increase endurance/gait distance;decrease fall risk;diminish gait deviation  -EE     [place] Level (Gait Training Goal 1, PT) minimum assist (75% or more patient effort);moderate assist (50-74% patient effort)  -EE     Distance (Gait Training Goal 1, PT) 25ft  -EE     Time Frame (Gait Training Goal 1, PT) 1 week  -EE     Progress/Outcome (Gait Training Goal 1, PT) goal revised this date  -EE       Row Name 11/08/23 1133          Therapy Assessment/Plan (PT)    Planned Therapy Interventions (PT) balance training;bed mobility training;gait training;home exercise program;neuromuscular re-education;motor coordination training;patient/family education;postural re-education;ROM (range of motion);strengthening;transfer training;stretching  -EE               User Key  (r) = Recorded By, (t) = Taken By, (c) = Cosigned By      Initials Name Provider Type    EE Keeley Azul, PT Physical Therapist                   Clinical Impression       Row Name 11/08/23 1131          Pain    Pretreatment Pain Rating 0/10 - no pain  -EE     Posttreatment Pain Rating 0/10 - no pain  -EE       Row Name 11/08/23 1131          Plan of Care Review    Plan of Care Reviewed With patient  -EE     Outcome Evaluation PT re-eval complete, pt now s/p SARAHY stent placement on 11/6. Upon exam, pt demonstrates generalized weakness (L LE weaker than R LE), impaired  balance, impaired trunk control, decreased endurance, and impaired cognition limiting mobility. Pt was able to perform supine > sit with CGA, stood at EOB with min A x2, and transfer to chair with min/mod A x2. Pt requires moderate cues for sequencing and safety throughout session. Pt was also able to complete seated B LE ther ex with cues. Pt will continue to benefit from PT to address above impairments and continue to progress mobility.  -EE       Row Name 11/08/23 1131          Therapy Assessment/Plan (PT)    Rehab Potential (PT) good, to achieve stated therapy goals  -EE     Criteria for Skilled Interventions Met (PT) yes  -EE     Therapy Frequency (PT) 6 times/wk  -EE       Row Name 11/08/23 1131          Positioning and Restraints    Pre-Treatment Position in bed  -EE     Post Treatment Position chair  -EE     In Chair reclined;call light within reach;encouraged to call for assist;exit alarm on;with family/caregiver;notified nsg;legs elevated;LUE elevated;RUE elevated  -EE               User Key  (r) = Recorded By, (t) = Taken By, (c) = Cosigned By      Initials Name Provider Type    EE Keeley Azul, PT Physical Therapist                   Outcome Measures       Row Name 11/08/23 1134 11/08/23 0800       How much help from another person do you currently need...    Turning from your back to your side while in flat bed without using bedrails? 4  -EE 4  -AC    Moving from lying on back to sitting on the side of a flat bed without bedrails? 3  -EE 3  -AC    Moving to and from a bed to a chair (including a wheelchair)? 2  -EE 2  -AC    Standing up from a chair using your arms (e.g., wheelchair, bedside chair)? 2  -EE 2  -AC    Climbing 3-5 steps with a railing? 1  -EE 1  -AC    To walk in hospital room? 2  -EE 1  -AC    AM-PAC 6 Clicks Score (PT) 14  -EE 13  -AC    Highest level of mobility 4 --> Transferred to chair/commode  -EE 4 --> Transferred to chair/commode  -AC              User Key  (r) = Recorded By, (t)  = Taken By, (c) = Cosigned By      Initials Name Provider Type    Keeley Bello, RHEA Physical Therapist    Jan Gill, RN Registered Nurse                                 Physical Therapy Education       Title: PT OT SLP Therapies (In Progress)       Topic: Physical Therapy (In Progress)       Point: Mobility training (In Progress)       Learning Progress Summary             Patient Acceptance, E,TB, NR by EE at 11/8/2023 1134    Acceptance, E, VU,NR by CN at 11/4/2023 1413    Acceptance, E, VU by ML at 10/31/2023 0308    Acceptance, E,TB,D, NL,NR by CB at 10/30/2023 1530    Acceptance, E, VU by ML at 10/30/2023 0409    Acceptance, E,TB,D, NR,NL by CB at 10/29/2023 1302   Family Acceptance, E,TB,D, NR,NL by CB at 10/29/2023 1302                         Point: Home exercise program (In Progress)       Learning Progress Summary             Patient Acceptance, E,TB, NR by EE at 11/8/2023 1134    Acceptance, E, VU,NR by CN at 11/4/2023 1413    Acceptance, E, VU by ML at 10/31/2023 0308    Acceptance, E,TB,D, NL,NR by CB at 10/30/2023 1530    Acceptance, E, VU by ML at 10/30/2023 0409                         Point: Body mechanics (In Progress)       Learning Progress Summary             Patient Acceptance, E,TB, NR by EE at 11/8/2023 1134    Acceptance, E, VU,NR by CN at 11/4/2023 1413    Acceptance, E, NL,NR by DJ at 11/1/2023 1152    Acceptance, E, VU by ML at 10/31/2023 0308    Acceptance, E,TB,D, NL,NR by CB at 10/30/2023 1530    Acceptance, E, VU by ML at 10/30/2023 0409    Acceptance, E,TB,D, NR,NL by CB at 10/29/2023 1302   Family Acceptance, E,TB,D, NR,NL by CB at 10/29/2023 1302                         Point: Precautions (In Progress)       Learning Progress Summary             Patient Acceptance, E,TB, NR by EE at 11/8/2023 1134    Acceptance, E, VU,NR by CN at 11/4/2023 1413    Acceptance, E, NR,NL by MG at 11/3/2023 1210    Acceptance, E, NL,NR by DJ at 11/1/2023 1152    Acceptance, E, VU by ML  at 10/31/2023 0308    Acceptance, E,TB,D, NL,NR by CB at 10/30/2023 1530    Acceptance, E, VU by ML at 10/30/2023 0409    Acceptance, E,TB,D, NR,NL by CB at 10/29/2023 1302   Family Acceptance, E,TB,D, NR,NL by CB at 10/29/2023 1302                                         User Key       Initials Effective Dates Name Provider Type Discipline    EE 06/16/21 -  Keeley Azul, PT Physical Therapist PT    MG 05/24/22 -  Camelia Gibson, PT Physical Therapist PT    CN 06/16/21 -  Mayi Webber, PT Physical Therapist PT    DJ 10/25/19 -  Sheila Reyna, PT Physical Therapist PT    CB 10/22/21 -  Katherin England, PT Physical Therapist PT    ML 06/15/23 -  Kendal Flor, RN Registered Nurse Nurse                  PT Recommendation and Plan  Planned Therapy Interventions (PT): balance training, bed mobility training, gait training, home exercise program, neuromuscular re-education, motor coordination training, patient/family education, postural re-education, ROM (range of motion), strengthening, transfer training, stretching  Plan of Care Reviewed With: patient  Outcome Evaluation: PT re-eval complete, pt now s/p SARAHY stent placement on 11/6. Upon exam, pt demonstrates generalized weakness (L LE weaker than R LE), impaired balance, impaired trunk control, decreased endurance, and impaired cognition limiting mobility. Pt was able to perform supine > sit with CGA, stood at EOB with min A x2, and transfer to chair with min/mod A x2. Pt requires moderate cues for sequencing and safety throughout session. Pt was also able to complete seated B LE ther ex with cues. Pt will continue to benefit from PT to address above impairments and continue to progress mobility.     Time Calculation:         PT Charges       Row Name 11/08/23 1135             Time Calculation    Start Time 1042  -EE      Stop Time 1055  -EE      Time Calculation (min) 13 min  -EE      PT Received On 11/08/23  -EE      PT - Next Appointment 11/09/23  -EE       PT Goal Re-Cert Due Date 11/15/23  -EE         Time Calculation- PT    Total Timed Code Minutes- PT 11 minute(s)  -EE         Timed Charges    36336 - PT Therapeutic Activity Minutes 11  -EE         Total Minutes    Timed Charges Total Minutes 11  -EE       Total Minutes 11  -EE                User Key  (r) = Recorded By, (t) = Taken By, (c) = Cosigned By      Initials Name Provider Type    EE Keeley Azul, PT Physical Therapist                  Therapy Charges for Today       Code Description Service Date Service Provider Modifiers Qty    [id number]  PT THERAPEUTIC ACT EA 15 MIN 11/8/2023 Keeley Azul, PT GP 1            PT G-Codes  Outcome Measure Options: AM-PAC 6 Clicks Basic Mobility (PT)  AM-PAC 6 Clicks Score (PT): 14  AM-PAC 6 Clicks Score (OT): 14  Modified Lisa Scale: 5 - Severe disability.  Bedridden, incontinent, and requiring constant nursing care and attention.  PT Discharge Summary  Anticipated Discharge Disposition (PT): inpatient rehabilitation facility    Keeley Azul PT  11/8/2023

## 2023-11-08 NOTE — TELEPHONE ENCOUNTER
----- Message from SUNG Yin sent at 11/7/2023  4:52 PM EST -----  Regarding: Follow-up  Please have patient follow-up with RP in 2 weeks-sorry if I already sent this I cannot remember    thanks!

## 2023-11-08 NOTE — PLAN OF CARE
Goal Outcome Evaluation:           Progress: no change     Transfer to floor from ICU. NIH3. Stroke educational packet provided to patient and family. Bed alarm set. Call light within reach.

## 2023-11-08 NOTE — PLAN OF CARE
Goal Outcome Evaluation:  Plan of Care Reviewed With: patient           Outcome Evaluation: PT re-eval complete, pt now s/p SARAHY stent placement on 11/6. Upon exam, pt demonstrates generalized weakness (L LE weaker than R LE), impaired balance, impaired trunk control, decreased endurance, and impaired cognition limiting mobility. Pt was able to perform supine > sit with CGA, stood at EOB with min A x2, and transfer to chair with min/mod A x2. Pt requires moderate cues for sequencing and safety throughout session. Pt was also able to complete seated B LE ther ex with cues. Pt will continue to benefit from PT to address above impairments and continue to progress mobility.      Anticipated Discharge Disposition (PT): inpatient rehabilitation facility

## 2023-11-08 NOTE — PROGRESS NOTES
"                                              LOS: 11 days   Patient Care Team:  Provider, No Known as PCP - General    Chief Complaint:  F/up respiratory failure, CHF and medical problems listed below.  Critical care management post R ICA stent    Subjective   Interval History    On RA.  No shortness of breath or cough.  Reported no pain in his legs/calves.  No claudication.    REVIEW OF SYSTEMS:   CARDIOVASCULAR: No chest pain, chest pressure or chest discomfort. No palpitations or edema.     GASTROINTESTINAL: No anorexia, nausea, vomiting or diarrhea. No abdominal pain.  CONSTITUTIONAL: No fever or chills.     Ventilator/Non-Invasive Ventilation Settings (From admission, onward)      None                  Physical Exam:     Vital Signs  Temp:  [97.6 °F (36.4 °C)-98.3 °F (36.8 °C)] 97.6 °F (36.4 °C)  Heart Rate:  [60-74] 60  Resp:  [20] 20  BP: (114-144)/(55-75) 129/55    Intake/Output Summary (Last 24 hours) at 11/8/2023 1347  Last data filed at 11/8/2023 1300  Gross per 24 hour   Intake 1914 ml   Output 1100 ml   Net 814 ml     Flowsheet Rows      Flowsheet Row First Filed Value   Admission Height 172.7 cm (68\") Documented at 10/28/2023 1306   Admission Weight 74.8 kg (165 lb) Documented at 10/28/2023 1306            PPE used per hospital policy    General Appearance:   Alert, cooperative, in no acute distress   ENMT:  Mallampati score 3, moist mucous membrane   Eyes:  Pupils equal and reactive to light. EOMI   Neck:   Trachea midline. No thyromegaly.   Lungs:   Improved air entry bilaterally with diminished crackles.  No wheezing.  No use of accessory muscles.    Heart:   Regular rhythm and normal rate, normal S1 and S2, no         murmur   Skin:   No rash or ecchymosis   Abdomen:    Obese. Soft. No tenderness. No HSM.   Neuro/psych:  Conscious, alert, oriented x3. Strength 5/5 in upper and lower  ext.  Appropriate mood and affect   Extremities:  No cyanosis, clubbing or edema.  Cold feet with no palpable pedal " pulses        Results Review:        Results from last 7 days   Lab Units 11/08/23  0806 11/07/23  2052 11/07/23  0352 11/06/23  1800 11/04/23  2314 11/04/23  0730   SODIUM mmol/L  --   --  140 143  --  141   POTASSIUM mmol/L 3.7 3.4* 3.0*  3.0* 3.3*   < > 2.9*   CHLORIDE mmol/L  --   --  112* 108*  --  105   CO2 mmol/L  --   --  19.9* 21.8*  --  26.0   BUN mg/dL  --   --  12 15  --  16   CREATININE mg/dL  --   --  0.94 1.12  --  1.32*   GLUCOSE mg/dL  --   --  112* 97  --  88   CALCIUM mg/dL  --   --  6.8* 8.0*  --  8.5*    < > = values in this interval not displayed.     Results from last 7 days   Lab Units 11/02/23  0735 11/02/23  0612 11/01/23 2031   HSTROP T ng/L 425* 451* 354*     Results from last 7 days   Lab Units 11/04/23  0730 11/02/23  0612 11/01/23  1846   WBC 10*3/mm3 7.82 12.87* 14.89*   HEMOGLOBIN g/dL 13.2 13.5 14.3   HEMATOCRIT % 38.2 39.5 42.3   PLATELETS 10*3/mm3 265 270 292         Results from last 7 days   Lab Units 11/02/23  0735   PROBNP pg/mL 56,131.0*                   Results from last 7 days   Lab Units 11/05/23  1518 11/02/23  0825   PH, ARTERIAL pH units 7.474*  --    PCO2, ARTERIAL mm Hg 26.8*  --    PO2 ART mm Hg 75.9*  --    O2 SATURATION ART % 96.2  --    FLOW RATE lpm 2.0000  --    MODALITY  Cannula Room Air         I reviewed the patient's new clinical results.        Medication Review:   amiodarone, 150 mg, Intravenous, Once  amoxicillin-clavulanate, 1 tablet, Oral, Q12H  aspirin, 81 mg, Oral, Daily  atorvastatin, 80 mg, Oral, Nightly  carvedilol, 6.25 mg, Oral, BID With Meals  clopidogrel, 75 mg, Oral, Daily  enoxaparin, 40 mg, Subcutaneous, Q24H  lisinopril, 20 mg, Oral, Daily  nicotine, 1 patch, Transdermal, Q24H  senna-docusate sodium, 2 tablet, Oral, BID  sodium chloride, 10 mL, Intravenous, Q12H        lactated ringers, 9 mL/hr  sodium chloride, 50 mL/hr, Last Rate: 50 mL/hr (11/07/23 5952)        Diagnostic imaging:  I personally and independently reviewed the following  images:  CXR 11/6/2023: Increased pulmonary vascular markings.  Pacemaker noted.    CT chest 11/7/2023: Small to moderate bilateral pleural effusion.  Bilateral lower lobe compressive atelectasis    Assessment     Acute on chronic systolic CHF, EF 20%  Acute pulmonary edema, improved  Aspiration pneumonia, being treated  Small to moderate bilateral pleural effusion  Small bilateral lower lobe compressive atelectasis  Mild pulmonary hypertension on echo 10/30/2023, RVSP 35-45, likely group 2.  Uncontrolled hypertension, resolved  Severe right ICA stenosis s/p stent 11/6/2023  Electrolytes disturbance: Hypokalemia, persistent  Bilateral severe iliac artery stenosis, incidental on CTA    Acute R sided watershed CVAs with severe ICA stenosis   h/o large MCA infarct  History of V. tach  NICM s/p AICD        Plan           Start Lasix 40 mg p.o. daily (home med).  Administer PRN Lasix when needed for shortness of breath/crackles.    Amoxicillin/clavulanic acid 1 tablet twice a day to finish 7 days of antibiotic therapy.  DVT prophylaxis with Lovenox  RTC in 2 weeks with neurosurgery.  Continue DAPT with Plavix and aspirin  Lipitor 80 mg daily  Outpatient follow-up per vascular surgery  Incentive spirometry    Discussed with the ICU staff and patient's daughter at bedside  Dispo: Transfer to floor.            Malinda Vera MD  11/08/23  13:47 EST            This note was dictated utilizing Dragon dictation

## 2023-11-08 NOTE — NURSING NOTE
1402: Attempt to call report to receiving floor unsuccessful, will attempt again shortly.    1416: Report called to receiving RN, family agreeable to transfer. Discussed POC, medications, and concerns with RN. Transport request placed for patient to be transferred by wheelchair

## 2023-11-08 NOTE — PROGRESS NOTES
Name: Tigre Amaral ADMIT: 10/28/2023   : 1950  PCP: Provider, No Known    MRN: [id number] LOS: 11 days   AGE/SEX: 73 y.o. male  ROOM: [facility]     Subjective   Subjective   Seen in ICU while transport moving patient to telemetry floor.       Objective   Objective   Vital Signs  Temp:  [97.6 °F (36.4 °C)-98.3 °F (36.8 °C)] 97.6 °F (36.4 °C)  Heart Rate:  [60-73] 63  Resp:  [20] 20  BP: (114-144)/(55-75) 130/58  SpO2:  [92 %-100 %] 98 %  on   ;   Device (Oxygen Therapy): room air  Body mass index is 25.54 kg/m².  Physical Exam  Vitals and nursing note reviewed.   Constitutional:       General: He is not in acute distress.     Appearance: He is ill-appearing.   HENT:      Nose:      Comments: Large mass on the right side of his nose consistent with prior diagnosis basal cell carcinoma  Cardiovascular:      Rate and Rhythm: Normal rate and regular rhythm.   Pulmonary:      Effort: Pulmonary effort is normal.      Breath sounds: Normal breath sounds.   Abdominal:      General: Bowel sounds are normal.      Palpations: Abdomen is soft.      Tenderness: There is no abdominal tenderness.   Musculoskeletal:         General: No swelling.   Skin:     General: Skin is warm and dry.   Neurological:      Mental Status: He is alert. Mental status is at baseline. He is disoriented.      Comments: Left-sided weakness       Results Review     I reviewed the patient's new clinical results.  Results from last 7 days   Lab Units 23  0730 23  0612 23  1846   WBC 10*3/mm3 7.82 12.87* 14.89*   HEMOGLOBIN g/dL 13.2 13.5 14.3   PLATELETS 10*3/mm3 265 270 292     Results from last 7 days   Lab Units 23  0806 23  2052 23  0352 23  1800 23  2314 23  0730 23  0612   SODIUM mmol/L  --   --  140 143  --  141 136   POTASSIUM mmol/L 3.7 3.4* 3.0*  3.0* 3.3*   < > 2.9* 3.9  3.9   CHLORIDE mmol/L  --   --  112* 108*  --  105 109*   CO2 mmol/L  --   --  19.9* 21.8*  --  26.0  18.1*   BUN mg/dL  --   --  12 15  --  16 11   CREATININE mg/dL  --   --  0.94 1.12  --  1.32* 1.14   GLUCOSE mg/dL  --   --  112* 97  --  88 108*   EGFR mL/min/1.73  --   --  84.5 68.5  --  56.2* 67.1    < > = values in this interval not displayed.     Results from last 7 days   Lab Units 11/02/23  0612 11/01/23  1846   ALBUMIN g/dL 3.3* 3.4*   BILIRUBIN mg/dL 1.2 1.1   ALK PHOS U/L 76 86   AST (SGOT) U/L 51* 51*   ALT (SGPT) U/L 16 14     Results from last 7 days   Lab Units 11/07/23  0352 11/06/23  1800 11/04/23  0730 11/02/23  0612 11/01/23  1846   CALCIUM mg/dL 6.8* 8.0* 8.5* 8.5* 8.9   ALBUMIN g/dL  --   --   --  3.3* 3.4*   MAGNESIUM mg/dL 1.6  --  2.0  --   --      Results from last 7 days   Lab Units 11/02/23  0612 11/01/23  1846   PROCALCITONIN ng/mL 0.06 0.04   LACTATE mmol/L  --  1.8     Glucose   Date/Time Value Ref Range Status   11/07/2023 1729 109 70 - 130 mg/dL Final   11/07/2023 1135 104 70 - 130 mg/dL Final   11/07/2023 0523 120 70 - 130 mg/dL Final   11/07/2023 0003 133 (H) 70 - 130 mg/dL Final   11/06/2023 1525 89 70 - 130 mg/dL Final       CT Head Without Contrast    Result Date: 11/8/2023  1.The patient has extensive areas of chronic infarction in the left middle cerebral artery territory with an 8 x 3.5 x 3.5 cm chronic lateral left temporal occipital infarct and an additional 5.5 x 3.3 x 2.5 cm old infarct in the mid to inferior lateral left frontal lobe and a 2 cm old inferior left parietal infarct all in the left MCA territory. 2. On recent head CT and subsequent MRI of the brain 5 days ago on 11/02/2023 there were multiple over 30 hypodense 3-10 mm acute infarcts studding the superior right frontal parietal parenchyma, superior right frontal parietal cortex, subcortical white matter and several in the right corona radiata region and several in the posterior lateral right parietal lobe and lateral right frontal lobe.  Many of these lined up in the watershed territory and could have been  acute watershed territory infarcts although some of the may have been in the right MCA territory. These were diffusion hyperintense infarcts compatible with acute to subacute infarcts on the MRI of the brain 11/02/2023 five days ago. These subacute infarcts are difficult to appreciate on the current head CT as they are nearly isodense to brain parenchyma. 3. There is stable soft tissue mass in the inferior medial right periorbital soft tissues and tracking inferiorly anterior to the medial aspect of the anterior wall of the right maxillary sinus and further medially and contacting the lateral aspect of the right nasal bone. This is compatible with a cutaneous malignancy with subcutaneous extension.  It maximally measures 3.4 x 2.1 x 2.5 cm in medial lateral, anterior posterior and craniocaudal dimension. 4. There is mild-to-moderate small vessel disease in the cerebral white matter and mild diffuse cerebral atrophy.  The remainder of the head CT is within normal limits. Specifically, no acute intracranial hemorrhage is identified. Results communicated to Keith Spann MD, from neurointerventional neurosurgery by telephone on 11/07/2023 at 2 p.m.  Radiation dose reduction techniques were utilized, including automated exposure control and exposure modulation based on body size.   This report was finalized on 11/8/2023 7:54 AM by Dr. Jon Saldivar M.D on Workstation: [id number]      CT Angio Abdominal Aorta Bilateral Iliofem Runoff    Result Date: 11/7/2023  1.  Severe atherosclerosis throughout the arterial vasculature of the abdomen, pelvis and bilateral lower extremities with varying degrees of stenosis as detailed above. Of note, the right external iliac artery appears to be completely occluded, there is moderate to severe stenosis throughout the course of the right femoral artery and a moderate length segment of occlusion of the right superficial femoral artery. These findings were discussed with Kori, the patient's  nurse by telephone by Addy Joseph at 2:15 p.m.   on   11/7/2023 . Additionally, the right lower extremity arterial vasculature is not definitively seen opacified distal to the mid right lower leg suggestive of slow flow given the above findings; however, occlusion of any of these arteries cannot be excluded. 2.  Long segment of severe stenosis of the superior mesenteric artery. 3.  Short segment of moderate to severe stenosis of the right distal renal artery. 4.  Small to moderate bilateral pleural effusions with overlying pulmonary pacification incompletely visualized. Small amount of ascites. 5.  Other findings as above.   This report was finalized on 11/7/2023 2:21 PM by Dr. Addy Joseph M.D on Workstation: [id number]       I have personally reviewed all medications:  Scheduled Medications  amiodarone, 150 mg, Intravenous, Once  amoxicillin-clavulanate, 1 tablet, Oral, Q12H  aspirin, 81 mg, Oral, Daily  atorvastatin, 80 mg, Oral, Nightly  carvedilol, 6.25 mg, Oral, BID With Meals  clopidogrel, 75 mg, Oral, Daily  enoxaparin, 40 mg, Subcutaneous, Q24H  lisinopril, 20 mg, Oral, Daily  nicotine, 1 patch, Transdermal, Q24H  senna-docusate sodium, 2 tablet, Oral, BID  sodium chloride, 10 mL, Intravenous, Q12H    Infusions  lactated ringers, 9 mL/hr  sodium chloride, 50 mL/hr, Last Rate: 50 mL/hr (11/07/23 1753)    Diet  Diet: Cardiac Diets; Healthy Heart (2-3 Na+); Texture: Pureed (NDD 1); Fluid Consistency: Thin (IDDSI 0)    I have personally reviewed:  [x]  Laboratory   [x]  Microbiology   [x]  Radiology   [x]  EKG/Telemetry  [x]  Cardiology/Vascular   []  Pathology    []  Records       Assessment/Plan     Active Hospital Problems    Diagnosis  POA    **Cerebrovascular accident (CVA) due to embolism of right middle cerebral artery [I63.411]  Yes    Aspiration pneumonia [J69.0]  Unknown    Acute on chronic systolic congestive heart failure [I50.23]  Unknown    Hypertension [I10]  Unknown    Stage 3a chronic kidney  "disease [N18.31]  Unknown    Hyperlipidemia [E78.5]  Unknown    Carotid artery stenosis, symptomatic, right [I65.21]  Unknown    Tobacco abuse [Z72.0]  Unknown    Basal cell carcinoma (BCC) of skin of nose [C44.311]  Unknown      Resolved Hospital Problems   No resolved problems to display.       73 y.o. male admitted with Cerebrovascular accident (CVA) due to embolism of right middle cerebral artery.  He is s/p right ICA stent placement by IR.    Stable postoperative day 2 and plan to transfer to non-ICU level of care.  He is stable respiratory wise and completing course of Augmentin for aspiration pneumonia.  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  Given patient is asymptomatic plan for medical management only and outpatient follow-up.  Cardiology following.    He has large mass on right side of his nose that seems to be consistent with basal cell carcinoma.  Per chart this is \"known\" but I do not see confirmation of this.  Would not recommend intervention at this time but would recommend outpatient referral to dermatology.      Pharmacy to dose Lovenox for DVT prophylaxis.  Full code.  Discussed with patient.  Anticipate discharge to [facility] acute rehab when cleared by consultants..      Jose Mendiola MD  [facility]  11/08/23  15:19 EST    "

## 2023-11-08 NOTE — PLAN OF CARE
Re-eval completed as the pt is s/p SARAHY stent placement on 11/6. The pt was pleasant and agreeable to OT/PT this AM. He completed bed mobility with CGA. BUE exercises as able, improved movement of LUE. He completed a STS with Min A x2 and pivoted to the bedside chair with Min/Mod A x2 - no knee buckling or blocking present. S/S grooming. He remains weak and will require IRF prior to returning home.       Anticipated Discharge Disposition (OT): inpatient rehabilitation facility

## 2023-11-08 NOTE — PROGRESS NOTES
Name: Tigre Amaral ADMIT: 10/28/2023   : 1950  PCP: Provider, No Known    MRN: [id number] LOS: 11 days   AGE/SEX: 73 y.o. male  ROOM: [address]    [place], Subsequent Hospital Care    73 y.o. male with severe PAD, recent stroke s/p carotid stent placement    Doing well overnight.  No acute events.  Sitting up in bed, eager to get out of bed and ambulate.  He denies any right leg pain, numbness, or weakness    Scheduled Medications:   amiodarone, 150 mg, Intravenous, Once  amoxicillin-clavulanate, 1 tablet, Oral, Q12H  aspirin, 81 mg, Oral, Daily  atorvastatin, 80 mg, Oral, Nightly  carvedilol, 6.25 mg, Oral, BID With Meals  clopidogrel, 75 mg, Oral, Daily  enoxaparin, 40 mg, Subcutaneous, Q24H  lisinopril, 20 mg, Oral, Daily  nicotine, 1 patch, Transdermal, Q24H  senna-docusate sodium, 2 tablet, Oral, BID  sodium chloride, 10 mL, Intravenous, Q12H      Active Infusions:  lactated ringers, 9 mL/hr  niCARdipine, 5-15 mg/hr, Last Rate: Stopped (23 09)  sodium chloride, 50 mL/hr, Last Rate: 50 mL/hr (23 1753)      Vital Signs  Vital Signs Patient Vitals for the past 24 hrs:   BP Temp Temp src Pulse Resp SpO2   23 09 133/68 -- -- 60 -- 98 %   23 08 144/72 -- -- 60 -- 98 %   23 0717 142/71 97.6 °F (36.4 °C) Oral 60 -- 97 %   23 0700 144/75 -- -- 68 -- 95 %   23 0600 137/75 -- -- 63 -- 96 %   23 0500 122/74 -- -- 64 -- 94 %   23 0400 128/64 -- -- 60 -- 97 %   23 0300 131/65 -- -- 65 -- 95 %   23 0200 126/67 -- -- 61 -- 95 %   23 0100 130/64 -- -- 66 -- 92 %   23 0000 121/65 -- -- 70 -- 95 %   23 2300 128/57 -- -- 64 -- 96 %   23 2200 123/62 -- -- 67 -- 96 %   23 2100 123/61 -- -- 67 -- 96 %   23 118/65 98.3 °F (36.8 °C) Oral 66 20 95 %   23 1900 132/64 -- -- 73 -- 96 %   23 1800 126/63 -- -- 68 -- 97 %   23 1700 116/65 -- -- 69 -- 94 %   23 1600 116/58 -- --  69 -- 95 %   11/07/23 1530 114/57 -- -- 65 -- 96 %   11/07/23 1515 125/60 -- -- 74 -- 97 %   11/07/23 1500 120/59 -- -- 68 -- 95 %   11/07/23 1445 129/58 -- -- 66 -- 95 %   11/07/23 1430 135/66 -- -- 72 -- 98 %   11/07/23 1415 121/75 -- -- 69 -- 96 %   11/07/23 1400 123/58 -- -- 66 -- 95 %   11/07/23 1345 121/59 -- -- 71 -- 98 %   11/07/23 1330 113/52 -- -- 65 -- 97 %   11/07/23 1315 120/60 -- -- 63 -- 98 %   11/07/23 1215 103/47 -- -- 69 -- 93 %   11/07/23 1200 105/53 -- -- 70 -- 95 %   11/07/23 1145 98/46 -- -- 66 -- 94 %   11/07/23 1133 -- 98.1 °F (36.7 °C) Oral -- -- --   11/07/23 1130 98/55 -- -- 68 -- 95 %   11/07/23 1115 117/59 -- -- 68 -- 95 %   11/07/23 1100 111/59 -- -- 67 -- 96 %   11/07/23 1045 113/50 -- -- 66 -- 94 %   11/07/23 1030 108/57 -- -- 67 -- 94 %     I/O:  I/O last 3 completed shifts:  In: 2776 [P.O.:380; I.V.:8377]  Out: 2000 [Urine:2000]  Physical Exam:    Physical Exam   Physical exam unchanged from yesterday evening, neurologically intact to light touch and with normal motor function of right leg, vascular exam unchanged with monophasic signals in right foot, no ulcerations    Imaging:  CT Head Without Contrast    Result Date: 11/8/2023  1.The patient has extensive areas of chronic infarction in the left middle cerebral artery territory with an 8 x 3.5 x 3.5 cm chronic lateral left temporal occipital infarct and an additional 5.5 x 3.3 x 2.5 cm old infarct in the mid to inferior lateral left frontal lobe and a 2 cm old inferior left parietal infarct all in the left MCA territory. 2. On recent head CT and subsequent MRI of the brain 5 days ago on 11/02/2023 there were multiple over 30 hypodense 3-10 mm acute infarcts studding the superior right frontal parietal parenchyma, superior right frontal parietal cortex, subcortical white matter and several in the right corona radiata region and several in the posterior lateral right parietal lobe and lateral right frontal lobe.  Many of these lined  up in the watershed territory and could have been acute watershed territory infarcts although some of the may have been in the right MCA territory. These were diffusion hyperintense infarcts compatible with acute to subacute infarcts on the MRI of the brain 11/02/2023 five days ago. These subacute infarcts are difficult to appreciate on the current head CT as they are nearly isodense to brain parenchyma. 3. There is stable soft tissue mass in the inferior medial right periorbital soft tissues and tracking inferiorly anterior to the medial aspect of the anterior wall of the right maxillary sinus and further medially and contacting the lateral aspect of the right nasal bone. This is compatible with a cutaneous malignancy with subcutaneous extension.  It maximally measures 3.4 x 2.1 x 2.5 cm in medial lateral, anterior posterior and craniocaudal dimension. 4. There is mild-to-moderate small vessel disease in the cerebral white matter and mild diffuse cerebral atrophy.  The remainder of the head CT is within normal limits. Specifically, no acute intracranial hemorrhage is identified. Results communicated to Keith Spann MD, from neurointerventional neurosurgery by telephone on 11/07/2023 at 2 p.m.  Radiation dose reduction techniques were utilized, including automated exposure control and exposure modulation based on body size.   This report was finalized on 11/8/2023 7:54 AM by Dr. Jon Saldivar M.D on Workstation: [id number]      CT Angio Abdominal Aorta Bilateral Iliofem Runoff    Result Date: 11/7/2023  1.  Severe atherosclerosis throughout the arterial vasculature of the abdomen, pelvis and bilateral lower extremities with varying degrees of stenosis as detailed above. Of note, the right external iliac artery appears to be completely occluded, there is moderate to severe stenosis throughout the course of the right femoral artery and a moderate length segment of occlusion of the right superficial femoral artery. These  findings were discussed with Kori, the patient's nurse by telephone by Addy Joseph at 2:15 p.m.   on   11/7/2023 . Additionally, the right lower extremity arterial vasculature is not definitively seen opacified distal to the mid right lower leg suggestive of slow flow given the above findings; however, occlusion of any of these arteries cannot be excluded. 2.  Long segment of severe stenosis of the superior mesenteric artery. 3.  Short segment of moderate to severe stenosis of the right distal renal artery. 4.  Small to moderate bilateral pleural effusions with overlying pulmonary pacification incompletely visualized. Small amount of ascites. 5.  Other findings as above.   This report was finalized on 11/7/2023 2:21 PM by Dr. Addy Joseph M.D on Workstation: [id number]      IR Stent Cerv Carotid Without Emb Prot    Result Date: 11/6/2023  1.  82% stenosis of the right internal carotid artery just beyond its origin. Successful carotid artery stent placement under distal protection performed with no residual narrowing seen. Continuation of dual antiplatelet therapy required with follow-up carotid Doppler sonography as described above. 2. 78% stenosis of the left internal carotid artery and future stent placement recommended in approximately 1 month. 3. Bilateral external iliac artery high-grade stenosis and pre-occlusion. Vascular surgery consult recommended.  All carotid stenosis measurements were made using NASCET criteria.    This report was finalized on 11/6/2023 5:14 PM by Dr. Sanjiv Spann M.D on Workstation: [id number]        CBC    Results from last 7 days   Lab Units 11/04/23  0730 11/02/23  0612 11/01/23  1846   WBC 10*3/mm3 7.82 12.87* 14.89*   HEMOGLOBIN g/dL 13.2 13.5 14.3   PLATELETS 10*3/mm3 265 270 292     BMP   Results from last 7 days   Lab Units 11/08/23  0806 11/07/23  2052 11/07/23  0352 11/06/23  1800 11/05/23  1304 11/04/23  2314 11/04/23  0730 11/02/23  0612 11/01/23  1846   SODIUM mmol/L   --   --  140 143  --   --  141 136 137   POTASSIUM mmol/L 3.7 3.4* 3.0*  3.0* 3.3* 3.9 3.2* 2.9* 3.9  3.9 3.4*   CHLORIDE mmol/L  --   --  112* 108*  --   --  105 109* 106   CO2 mmol/L  --   --  19.9* 21.8*  --   --  26.0 18.1* 20.0*   BUN mg/dL  --   --  12 15  --   --  16 11 11   CREATININE mg/dL  --   --  0.94 1.12  --   --  1.32* 1.14 1.21   GLUCOSE mg/dL  --   --  112* 97  --   --  88 108* 119*   MAGNESIUM mg/dL  --   --  1.6  --   --   --  2.0  --   --      Coag     Assessment & Plan   Assessment & Plan    Cerebrovascular accident (CVA) due to embolism of right middle cerebral artery    Aspiration pneumonia    Acute on chronic systolic congestive heart failure    Hypertension    Stage 3a chronic kidney disease    Hyperlipidemia    Carotid artery stenosis, symptomatic, right    Tobacco abuse    Basal cell carcinoma (BCC) of skin of nose    73 y.o. male with severe PAD and recent symptomatic carotid stenosis s/p carotid stent placement via bilateral groin approach    -no signs or symptoms of acute limb ischemia after sheath removed.  Since he is asymptomatic, I favor outpatient follow up with continued medical management over surgical intervention at this time.  I will set him up for follow up with me in 2-3 months for surveillance.  Agree with continued dual antiplatelet therapy and statin for medical management.     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  I discussed with his stepdaughter and caregiver the CT and JOESPH findings.  She denies any post prandial pain or unintentional weight loss.  The patient's weight has been stable for the last 1.5 years.  I explained my recommendation for medical management with outpatient follow up and she was in agreement.      Personal protective equipment used for this patient encounter:  Patient wearing surgical mask []    Provider wearing a surgical mask [x]    Gloves []    Eye protection []    Face Shield []    Gown []    N 95 respirator or CAPR/PAPR []   Duration of interaction  10 mins    Penny Andres MD  Vascular Surgery  [facility]  O: [phone]  F: [phone]      Please call my office with any question: [phone]    Active Hospital Problems    Diagnosis  POA    **Cerebrovascular accident (CVA) due to embolism of right middle cerebral artery [I63.411]  Yes    Aspiration pneumonia [J69.0]  Unknown    Acute on chronic systolic congestive heart failure [I50.23]  Unknown    Hypertension [I10]  Unknown    Stage 3a chronic kidney disease [N18.31]  Unknown    Hyperlipidemia [E78.5]  Unknown    Carotid artery stenosis, symptomatic, right [I65.21]  Unknown    Tobacco abuse [Z72.0]  Unknown    Basal cell carcinoma (BCC) of skin of nose [C44.311]  Unknown      Resolved Hospital Problems   No resolved problems to display.

## 2023-11-08 NOTE — PROGRESS NOTES
"DOS: 2023  NAME: Tigre Amaral   : 1950  PCP: Provider, No Known  Chief Complaint   Patient presents with    Fall    Arm Injury   Patient seen in follow-up today; new to me        Stroke      Subjective: No acute events overnight.  Patient denies any new complaints or concerns on my exam.      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  Left groin puncture site clean dry and intact with small amount of dried blood present  Neurological:   MS: AO. Language normal. No neglect. Higher integrative function normal  CN: II-XII normal-uvula slightly deviated left  Motor: 5/5, normal tone on the right; left upper extremity distal 3 -/5 handgrip 3/5, proximal 3+/5  Reflexes: Toes downgoing  Sensory: Intact-to light touch  Coordination: Normal-finger-to-nose    Laboratory results:  Lab Results   Component Value Date    GLUCOSE 112 (H) 2023    CALCIUM 6.8 (L) 2023     2023    K 3.7 2023    CO2 19.9 (L) 2023     (H) 2023    BUN 12 2023    CREATININE 0.94 2023    BCR 12.8 2023    ANIONGAP 8.1 2023     Lab Results   Component Value Date    WBC 7.82 2023    HGB 13.2 2023    HCT 38.2 2023    MCV 89.5 2023     2023     Lab Results   Component Value Date    CHOL 146 10/29/2023     Lab Results   Component Value Date    HDL 34 (L) 10/29/2023     Lab Results   Component Value Date    LDL 98 10/29/2023     Lab Results   Component Value Date    TRIG 72 10/29/2023            Review and interpretation of imaging:  Imaging " discussed below reviewed independently.      Impression: This is a 73-year-old male with known past medical history of EtOH dependency/tobacco abuse, TBI, PPM, hypertension and reported dementia who presented to [facility] 10/28 due to complaint of left upper extremity weakness for which our service was consulted.  Chart review reflects that symptoms started Thursday night before arrival after patient sustained a fall.  Family reportedly did not notice any significant left arm weakness that patient was complaining initial CT of the head negative for acute findings although known area of large left MCA territory infarct versus residual TBI noted and age-indeterminate infarct in the right MCA CVA territories.  Patient was normotensive on arrival and afebrile.  EKG AV paced rhythm.  Since admission patient found to have numerous acute right hemispheric infarcts cortical and subcortical-watershed.  Moderate small vessel disease.  Chronic left temporal infarct noted.  Other work-up noted below. Both vascular team and endovascular teams consulted-Dr. Spann to review case and complete possible stenting in the interim patient on aspirin and statin-advised against hypotension/hypovolemia-dehydration.  Patient on DAPT therapy and high-dose statin here-unclear if patient was taking antiplatelet consistent prior to arrival      Neurologically, stable-to date status post carotid placement.  Left groin puncture site clean dry and intact.  Other recommendations below. No further neurology workup indicated. We will sign off and see again upon request.      Work up to date:  2D Echo: LA cavity mildly dilated, saline test negative, EF 21.6%, septal wall motion abnormality noted, no evidence of LV thrombus or mass, no AV stenosis, mild to moderate MVR.  Carotid ultrasound: Severe near occlusive right internal carotid artery.  Left internal carotid artery with severe stenosis-both with near occlusion  CTA of the head:  Multiple small age-indeterminate infarcts noted.  Evidence of chronic large infarct in the left MCA distribution noted.  Severe ICA stenosis bilaterally.  CT head repeat 11/2: Remote large left MCA infarct redemonstrated with evidence of volume loss.  Lacunar infarcts involving corona radiata on the right redemonstrated.  No evidence of acute infarct and/or intracranial hemorrhage noted.  Incidental finding of 3.3 x 1.9 soft tissue mass appreciated maxilla on the right over the right nasal bone-patient with known basal cell carcinoma  MRI brain WO: Numerous acute right hemispheric infarcts cortical and subcortical with majority in an AP dimension consistent with watershed infarcts.  Moderate small vessel disease.  Chronic left temporal infarct noted.        Diagnosis:  1.  Acute right hemispheric infarcts-subcortical watershed.  Suspect due to right carotid stenosis-near occlusive since undergone cerebral DSA with right ICA stent  2.  Bilateral carotid stenosis with right slightly greater than left etiology of infarct felt to be secondary to right carotid [id number] post stent placed  3.  History of large left MCA infarct  4.  History of TBI with associated cognitive impairment felt to be representative of dementia  5.  Tobacco abuse  6.  Cardiomyopathy  7.  Bilateral iliac stenosis; vascular team following-planned medical management patient follow-up in 2 to 3 months    Plan:  Aspirin 81 mg daily- on PTA  Plavix 75 mg daily -started this admission- load given   Lipitor 80 mg daily-LDL 98-on no statin prior to arrival  Neurochecks  BP control  Stroke Education  OLMAN/SCDs  PT/OT/ST  Follow-up with neurology in 90-day    Case reviewed with attending neurologist Kwasi 11/8 and he agrees with treatment plan above.     SUNG Butterfield

## 2023-11-08 NOTE — PROGRESS NOTES
"Nutrition Services    Patient Name:  Tigre Amaral  YOB: 1950  MRN: [id number]  Admit Date:  10/28/2023    Assessment Date:  11/08/23    NUTRITION SCREENING      Reason for Encounter LOS   Diagnosis/Problem CVA, Asp PNA, CHF. HTN, CKD, Basal cell carcinoma (BCC) of skin of nose        PO Diet Diet: Cardiac Diets; Healthy Heart (2-3 Na+); Texture: Pureed (NDD 1); Fluid Consistency: Thin (IDDSI 0)   Supplements    PO Intake % % per family       Medications MAR reviewed by RD   Labs  Listed below, reviewed   Physical Findings Disoriented, Alert, eating lunch   GI Function BM today - diarrhea   Skin Status Bruising, Basal cell carcinoma (BCC) of skin of nose        Height  Weight  BMI  Weight Trend     Height: 172.7 cm (68\")  Weight: 76.2 kg (168 lb) (11/06/23 0959)  Body mass index is 25.54 kg/m².  Stable       Nutrition Problem (PES) Nutrition appropriate for condition at this time as evidenced by Good appetite/po .       Intervention/Plan Nutrition screen for LOS. Pt here with CVA, SLP eval noted and diet changed to puree yesterday. Family report pt has a good appetite and got him a grilled cheese and soup.  He is alert and feeding himself. They are getting him his dentures and state \"holding\" of food in mouth is normal for him.      RD to follow up per protocol.     Results from last 7 days   Lab Units 11/08/23  0806 11/07/23  2052 11/07/23  0352 11/06/23  1800 11/04/23  2314 11/04/23  0730 11/02/23  0612 11/01/23  1846   SODIUM mmol/L  --   --  140 143  --  141 136 137   POTASSIUM mmol/L 3.7 3.4* 3.0*  3.0* 3.3*   < > 2.9* 3.9  3.9 3.4*   CHLORIDE mmol/L  --   --  112* 108*  --  105 109* 106   CO2 mmol/L  --   --  19.9* 21.8*  --  26.0 18.1* 20.0*   BUN mg/dL  --   --  12 15  --  16 11 11   CREATININE mg/dL  --   --  0.94 1.12  --  1.32* 1.14 1.21   CALCIUM mg/dL  --   --  6.8* 8.0*  --  8.5* 8.5* 8.9   BILIRUBIN mg/dL  --   --   --   --   --   --  1.2 1.1   ALK PHOS U/L  --   --   --   -- "   --   --  76 86   ALT (SGPT) U/L  --   --   --   --   --   --  16 14   AST (SGOT) U/L  --   --   --   --   --   --  51* 51*   GLUCOSE mg/dL  --   --  112* 97  --  88 108* 119*    < > = values in this interval not displayed.     Results from last 7 days   Lab Units 11/07/23  0352 11/04/23  0730   MAGNESIUM mg/dL 1.6 2.0   HEMOGLOBIN g/dL  --  13.2   HEMATOCRIT %  --  38.2     Lab Results   Component Value Date    HGBA1C 5.20 10/29/2023         Electronically signed by:  Michelle Rivas RD  11/08/23 11:46 EST

## 2023-11-09 PROCEDURE — 97535 SELF CARE MNGMENT TRAINING: CPT

## 2023-11-09 RX ORDER — FUROSEMIDE 40 MG/1
40 TABLET ORAL DAILY
Status: DISCONTINUED | OUTPATIENT
Start: 2023-11-09 | End: 2023-11-11 | Stop reason: HOSPADM

## 2023-11-09 RX ADMIN — LISINOPRIL 20 MG: 20 TABLET ORAL at 10:32

## 2023-11-09 RX ADMIN — Medication 10 ML: at 21:39

## 2023-11-09 RX ADMIN — SENNOSIDES AND DOCUSATE SODIUM 2 TABLET: 50; 8.6 TABLET ORAL at 20:48

## 2023-11-09 RX ADMIN — Medication 10 ML: at 10:33

## 2023-11-09 RX ADMIN — CARVEDILOL 6.25 MG: 6.25 TABLET, FILM COATED ORAL at 17:52

## 2023-11-09 RX ADMIN — FUROSEMIDE 40 MG: 40 TABLET ORAL at 17:52

## 2023-11-09 RX ADMIN — CARVEDILOL 6.25 MG: 6.25 TABLET, FILM COATED ORAL at 10:32

## 2023-11-09 RX ADMIN — CLOPIDOGREL BISULFATE 75 MG: 75 TABLET, FILM COATED ORAL at 10:32

## 2023-11-09 RX ADMIN — AMOXICILLIN AND CLAVULANATE POTASSIUM 1 TABLET: 875; 125 TABLET, FILM COATED ORAL at 10:32

## 2023-11-09 RX ADMIN — ASPIRIN 81 MG: 81 TABLET, CHEWABLE ORAL at 10:32

## 2023-11-09 RX ADMIN — AMOXICILLIN AND CLAVULANATE POTASSIUM 1 TABLET: 875; 125 TABLET, FILM COATED ORAL at 20:48

## 2023-11-09 RX ADMIN — NICOTINE 1 PATCH: 21 PATCH, EXTENDED RELEASE TRANSDERMAL at 10:34

## 2023-11-09 RX ADMIN — ATORVASTATIN CALCIUM 80 MG: 80 TABLET, FILM COATED ORAL at 20:48

## 2023-11-09 NOTE — PLAN OF CARE
Goal Outcome Evaluation:  Plan of Care Reviewed With: patient        Progress: improving  Outcome Evaluation: Patient participated in bed mobility, and toileting ADL task today. Patient performed bed mobility with min A, and ambulation with HHA within hospital room, unsteady gait requiring frequent corrections to LOB. Patient performed toileting with mod A, requires significant assist with doning/doffing brief. Patient able to stand at sink for hand hygiene with CGA for stability. Patient will continue to benefit from skilled OT to address remaining functional deficits, recommend inpatient rehab at [place].      Anticipated Discharge Disposition (OT): inpatient rehabilitation facility

## 2023-11-09 NOTE — THERAPY TREATMENT NOTE
Patient Name: Tigre Amaral  : 1950    MRN: [id number]                              Today's Date: 2023       Admit Date: 10/28/2023    Visit Dx:     ICD-10-CM ICD-9-CM   1. Cerebrovascular accident (CVA), unspecified mechanism  I63.9 434.91     Patient Active Problem List   Diagnosis    Cerebrovascular accident (CVA) due to embolism of right middle cerebral artery    Aspiration pneumonia    Acute on chronic systolic congestive heart failure    Hypertension    Stage 3a chronic kidney disease    Hyperlipidemia    Carotid artery stenosis, symptomatic, right    Tobacco abuse    Basal cell carcinoma (BCC) of skin of nose     History reviewed. No pertinent past medical history.  History reviewed. No pertinent surgical history.   General Information       Row Name 23 1307          OT Time and Intention    Document Type therapy note (daily note)  -     Mode of Treatment individual therapy;occupational therapy  -       Row Name 23 1307          General Information    Patient Profile Reviewed yes  -     Existing Precautions/Restrictions fall  -     Barriers to Rehab medically complex  -       Row Name 23 1307          Living Environment    People in Home child(bolivar), adult  -       Row Name 23 1307          Cognition    Orientation Status (Cognition) oriented to;person;place;verbal cues/prompts needed for orientation;situation  -       Row Name 23 1307          Safety Issues, Functional Mobility    Safety Issues Affecting Function (Mobility) safety precautions follow-through/compliance;awareness of need for assistance;insight into deficits/self-awareness;judgment;sequencing abilities;problem-solving  -     Impairments Affecting Function (Mobility) balance;cognition;endurance/activity tolerance;strength;pain;postural/trunk control;range of motion (ROM)  -     Cognitive Impairments, Mobility Safety/Performance attention;judgment;insight into  deficits/self-awareness;problem-solving/reasoning;safety precaution awareness  -               User Key  (r) = Recorded By, (t) = Taken By, (c) = Cosigned By      Initials Name Provider Type     Dorina Quintero, OT Occupational Therapist                     Mobility/ADL's       Row Name 11/09/23 1308          Bed Mobility    Bed Mobility supine-sit  -     Supine-Sit [place] (Bed Mobility) verbal cues;contact guard  -     Sit-Supine [place] (Bed Mobility) verbal cues;minimum assist (75% patient effort);nonverbal cues (demo/gesture)  -     Assistive Device (Bed Mobility) bed rails  -       Row Name 11/09/23 1308          Transfers    Transfers sit-stand transfer;bed-chair transfer;toilet transfer  -[facility] Name 11/09/23 1308          Bed-Chair Transfer    Bed-Chair [place] (Transfers) minimum assist (75% patient effort);verbal cues  -     Assistive Device (Bed-Chair Transfers) other (see comments)  -     Comment, (Bed-Chair Transfer) A  [facility] Name 11/09/23 1308          Sit-Stand Transfer    Sit-Stand [place] (Transfers) minimum assist (75% patient effort);verbal cues  -     Comment, (Sit-Stand Transfer) A  [facility] Name 11/09/23 1308          Toilet Transfer    Type (Toilet Transfer) stand pivot/stand step  -     [place] Level (Toilet Transfer) minimum assist (75% patient effort);verbal cues  -[facility] Name 11/09/23 1308          Functional Mobility    Patient was able to Ambulate yes  -[facility] Name 11/09/23 1308          Activities of Daily Living    BADL Assessment/Intervention toileting;grooming  -[facility] Name 11/09/23 1308          Toileting Assessment/Training    [place] Level (Toileting) toileting skills;adjust/manage clothing;perform perineal hygiene;moderate assist (50% patient effort)  -     Assistive Devices (Toileting) commode, 3-in-1  -     Position (Toileting) supported sitting  -       Row Name 11/09/23 1308           Grooming Assessment/Training    [place] Level (Grooming) grooming skills;wash face, hands;contact guard assist  -     Position (Grooming) supported standing  -               User Key  (r) = Recorded By, (t) = Taken By, (c) = Cosigned By      Initials Name Provider Type     Dorina Quintero OT Occupational Therapist                   Obj/Interventions       Row Name 11/09/23 1312          Sensory Assessment (Somatosensory)    Sensory Assessment (Somatosensory) sensation intact  -[facility] Name 11/09/23 1312          Vision Assessment/Intervention    Visual Impairment/Limitations WFL  -       Row Name 11/09/23 1312          Range of Motion Comprehensive    General Range of Motion bilateral upper extremity ROM WFL  -[facility] Name 11/09/23 1312          Balance    Balance Assessment sitting static balance;sitting dynamic balance;sit to stand dynamic balance;standing dynamic balance;standing static balance  -     Static Sitting Balance standby assist  -     Dynamic Sitting Balance standby assist  -     Position, Sitting Balance sitting edge of bed  -     Static Standing Balance minimal assist  -     Dynamic Standing Balance minimal assist  -     Balance Interventions sitting;standing;occupation based/functional task  -               User Key  (r) = Recorded By, (t) = Taken By, (c) = Cosigned By      Initials Name Provider Type     Dorina Quintero OT Occupational Therapist                   Goals/Plan    No documentation.                  Clinical Impression       [facility] Name 11/09/23 1313          Pain Assessment    Pretreatment Pain Rating 0/10 - no pain  -     Posttreatment Pain Rating 0/10 - no pain  -     Pain Intervention(s) Medication (See MAR);Ambulation/increased activity;Repositioned  -       Row Name 11/09/23 1313          Plan of Care Review    Plan of Care Reviewed With patient  -     Progress improving  -     Outcome Evaluation Patient participated in bed mobility,  and toileting ADL task today. Patient performed bed mobility with min A, and ambulation with HHA within hospital room, unsteady gait requiring frequent corrections to LOB. Patient performed toileting with mod A, requires significant assist with doning/doffing brief. Patient able to stand at sink for hand hygiene with CGA for stability. Patient will continue to benefit from skilled OT to address remaining functional deficits, recommend inpatient rehab at [place].  -       Row Name 11/09/23 1313          Therapy Assessment/Plan (OT)    Rehab Potential (OT) good, to achieve stated therapy goals  -     Criteria for Skilled Therapeutic Interventions Met (OT) yes;skilled treatment is necessary  -     Therapy Frequency (OT) 5 times/wk  -       Row Name 11/09/23 1313          Therapy Plan Review/Discharge Plan (OT)    Anticipated Discharge Disposition (OT) inpatient rehabilitation facility  -       Row Name 11/09/23 1313          Positioning and Restraints    Pre-Treatment Position in bed  -     Post Treatment Position chair  -     In Chair sitting;call light within reach;encouraged to call for assist;exit alarm on;with family/caregiver  -               User Key  (r) = Recorded By, (t) = Taken By, (c) = Cosigned By      Initials Name Provider Type     Dorina Quintero, OT Occupational Therapist                   Outcome Measures       Row Name 11/09/23 1316          How much help from another is currently needed...    Putting on and taking off regular lower body clothing? 2  -LH     Bathing (including washing, rinsing, and drying) 2  -LH     Toileting (which includes using toilet bed pan or urinal) 2  -LH     Putting on and taking off regular upper body clothing 3  -LH     Taking care of personal grooming (such as brushing teeth) 3  -LH     Eating meals 3  -LH     AM-PAC 6 Clicks Score (OT) 15  -       Row Name 11/09/23 1316          Modified [place]wood Scale    Modified Lisa Scale 3 - Moderate disability.   Requiring some help, but able to walk without assistance.  -       Row Name 11/09/23 1316          Functional Assessment    Outcome Measure Options AM-PAC 6 Clicks Daily Activity (OT);Modified [place]  -               User Key  (r) = Recorded By, (t) = Taken By, (c) = Cosigned By      Initials Name Provider Type     Droina Quintero OT Occupational Therapist                    Occupational Therapy Education       Title: PT OT SLP Therapies (In Progress)       Topic: Occupational Therapy (Done)       Point: ADL training (Done)       Description:   Instruct learner(s) on proper safety adaptation and remediation techniques during self care or transfers.   Instruct in proper use of assistive devices.                  Learning Progress Summary             Patient Acceptance, E, VU by ML at 10/31/2023 0308    Acceptance, E, VU by ML at 10/30/2023 0409    Acceptance, E, VU,NR by BS at 10/29/2023 1214    Comment: Reason for eval/consult, goal setting, next level of care   Family Acceptance, E, VU,NR by BS at 10/29/2023 1214    Comment: Reason for eval/consult, goal setting, next level of care                         Point: Home exercise program (Done)       Description:   Instruct learner(s) on appropriate technique for monitoring, assisting and/or progressing therapeutic exercises/activities.                  Learning Progress Summary             Patient Acceptance, E, VU by ML at 10/31/2023 0308    Acceptance, E, VU by ML at 10/30/2023 0409    Acceptance, E, VU,NR by BS at 10/29/2023 1214    Comment: Reason for eval/consult, goal setting, next level of care   Family Acceptance, E, VU,NR by BS at 10/29/2023 1214    Comment: Reason for eval/consult, goal setting, next level of care                         Point: Precautions (Done)       Description:   Instruct learner(s) on prescribed precautions during self-care and functional transfers.                  Learning Progress Summary             Patient Acceptance, E, VU by  ML at 10/31/2023 0308    Acceptance, E, VU by ML at 10/30/2023 0409    Acceptance, E, VU,NR by BS at 10/29/2023 1214    Comment: Reason for eval/consult, goal setting, next level of care   Family Acceptance, E, VU,NR by BS at 10/29/2023 1214    Comment: Reason for eval/consult, goal setting, next level of care                         Point: Body mechanics (Done)       Description:   Instruct learner(s) on proper positioning and spine alignment during self-care, functional mobility activities and/or exercises.                  Learning Progress Summary             Patient Acceptance, E, VU by ML at 10/31/2023 0308    Acceptance, E, VU by ML at 10/30/2023 0409    Acceptance, E, VU,NR by BS at 10/29/2023 1214    Comment: Reason for eval/consult, goal setting, next level of care   Family Acceptance, E, VU,NR by BS at 10/29/2023 1214    Comment: Reason for eval/consult, goal setting, next level of care                                         User Key       Initials Effective Dates Name Provider Type Discipline    BS 11/14/22 -  Camille Dutta OT Occupational Therapist OT     06/15/23 -  Kendal Flor, RN Registered Nurse Nurse                  OT Recommendation and Plan  Therapy Frequency (OT): 5 times/wk  Plan of Care Review  Plan of Care Reviewed With: patient  Progress: improving  Outcome Evaluation: Patient participated in bed mobility, and toileting ADL task today. Patient performed bed mobility with min A, and ambulation with HHA within hospital room, unsteady gait requiring frequent corrections to LOB. Patient performed toileting with mod A, requires significant assist with doning/doffing brief. Patient able to stand at sink for hand hygiene with CGA for stability. Patient will continue to benefit from skilled OT to address remaining functional deficits, recommend inpatient rehab at [place].     Time Calculation:         Time Calculation- OT       Row Name 11/09/23 1317             Time Calculation- OT    OT Start  Time 1115  -      OT Stop Time 1140  -      OT Time Calculation (min) 25 min  -      Total Timed Code Minutes- OT 25 minute(s)  -      OT Received On 11/09/23  -      OT - Next Appointment 11/10/23  -         Timed Charges    59330 - OT Self Care/Mgmt Minutes 25  -         Total Minutes    Timed Charges Total Minutes 25  -       Total Minutes 25  -                User Key  (r) = Recorded By, (t) = Taken By, (c) = Cosigned By      Initials Name Provider Type     Dorina Quintero OT Occupational Therapist                  Therapy Charges for Today       Code Description Service Date Service Provider Modifiers Qty    [id number] HC OT SELF CARE/MGMT/TRAIN EA 15 MIN 11/9/2023 Dorina Quintero OT GO 2                 Dorina Quintero OT  11/9/2023

## 2023-11-09 NOTE — PROGRESS NOTES
"                                              LOS: 12 days   Patient Care Team:  Provider, No Known as PCP - General    Chief Complaint:  F/up respiratory failure, CHF and medical problems listed below.  Critical care management post R ICA stent    Subjective   Interval History    On RA.  No shortness of breath or cough.      REVIEW OF SYSTEMS:   CARDIOVASCULAR: No chest pain, chest pressure or chest discomfort. No palpitations or edema.     CONSTITUTIONAL: No fever or chills.     Ventilator/Non-Invasive Ventilation Settings (From admission, onward)      None                  Physical Exam:     Vital Signs  Temp:  [97.3 °F (36.3 °C)-98.2 °F (36.8 °C)] 97.3 °F (36.3 °C)  Heart Rate:  [60-64] 60  Resp:  [18-19] 18  BP: (108-139)/(45-68) 136/58    Intake/Output Summary (Last 24 hours) at 11/9/2023 1501  Last data filed at 11/9/2023 1010  Gross per 24 hour   Intake 1365.83 ml   Output 500 ml   Net 865.83 ml     Flowsheet Rows      Flowsheet Row First Filed Value   Admission Height 172.7 cm (68\") Documented at 10/28/2023 1306   Admission Weight 74.8 kg (165 lb) Documented at 10/28/2023 1306            PPE used per hospital policy    General Appearance:   Alert, cooperative, in no acute distress   ENMT:  Mallampati score 3, moist mucous membrane   Eyes:  Pupils equal and reactive to light. EOMI   Neck:   Trachea midline. No thyromegaly.   Lungs:   Clear on anterior auscultation but slightly diminished at the bases.  No crackles or wheezing.    Heart:   Regular rhythm and normal rate, normal S1 and S2, no         murmur   Skin:   No rash or ecchymosis   Abdomen:    Obese. Soft. No tenderness. No HSM.   Neuro/psych:  Conscious, alert, oriented x3. Strength 5/5 in upper and lower  ext.  Appropriate mood and affect   Extremities:  No cyanosis, clubbing or edema.  Cold feet with no palpable pedal pulses        Results Review:        Results from last 7 days   Lab Units 11/08/23  0806 11/07/23 2052 11/07/23  0352 " 11/06/23  1800 11/04/23  2314 11/04/23  0730   SODIUM mmol/L  --   --  140 143  --  141   POTASSIUM mmol/L 3.7 3.4* 3.0*  3.0* 3.3*   < > 2.9*   CHLORIDE mmol/L  --   --  112* 108*  --  105   CO2 mmol/L  --   --  19.9* 21.8*  --  26.0   BUN mg/dL  --   --  12 15  --  16   CREATININE mg/dL  --   --  0.94 1.12  --  1.32*   GLUCOSE mg/dL  --   --  112* 97  --  88   CALCIUM mg/dL  --   --  6.8* 8.0*  --  8.5*    < > = values in this interval not displayed.           Results from last 7 days   Lab Units 11/04/23  0730   WBC 10*3/mm3 7.82   HEMOGLOBIN g/dL 13.2   HEMATOCRIT % 38.2   PLATELETS 10*3/mm3 265                             Results from last 7 days   Lab Units 11/05/23  1518   PH, ARTERIAL pH units 7.474*   PCO2, ARTERIAL mm Hg 26.8*   PO2 ART mm Hg 75.9*   O2 SATURATION ART % 96.2   FLOW RATE lpm 2.0000   MODALITY  Cannula         I reviewed the patient's new clinical results.        Medication Review:   amiodarone, 150 mg, Intravenous, Once  amoxicillin-clavulanate, 1 tablet, Oral, Q12H  aspirin, 81 mg, Oral, Daily  atorvastatin, 80 mg, Oral, Nightly  carvedilol, 6.25 mg, Oral, BID With Meals  clopidogrel, 75 mg, Oral, Daily  enoxaparin, 40 mg, Subcutaneous, Q24H  furosemide, 40 mg, Oral, Daily  lisinopril, 20 mg, Oral, Daily  nicotine, 1 patch, Transdermal, Q24H  senna-docusate sodium, 2 tablet, Oral, BID  sodium chloride, 10 mL, Intravenous, Q12H        lactated ringers, 9 mL/hr  sodium chloride, 50 mL/hr, Last Rate: 50 mL/hr (11/08/23 1848)        Diagnostic imaging:  I personally and independently reviewed the following images:  CXR 11/6/2023: Increased pulmonary vascular markings.  Pacemaker noted.    CT chest 11/7/2023: Small to moderate bilateral pleural effusion.  Bilateral lower lobe compressive atelectasis    Assessment     Acute on chronic systolic CHF, EF 20%  Acute pulmonary edema, improved  Aspiration pneumonia, being treated  Small to moderate bilateral pleural effusion  Small bilateral lower  lobe compressive atelectasis  Mild pulmonary hypertension on echo 10/30/2023, RVSP 35-45, likely group 2.  Uncontrolled hypertension, resolved  Severe right ICA stenosis s/p stent 11/6/2023  Electrolytes disturbance: Hypokalemia, persistent  Bilateral severe iliac artery stenosis, incidental on CTA    Acute R sided watershed CVAs with severe ICA stenosis   h/o large MCA infarct  History of V. tach  NICM s/p AICD        Plan           Lasix 40 mg p.o. daily (home med)  Amoxicillin/clavulanic acid 1 tablet twice a day to finish 7 days of antibiotic therapy.  DVT prophylaxis with Lovenox  DAPT and Lipitor 80 mg daily  Coreg and lisinopril for CHF  Incentive spirometry    Discussed with his daughter at bedside.    Dispo: Okay to discharge from pulmonary perspective        Malinda Vera MD  11/09/23  15:01 EST            This note was dictated utilizing Dragon dictation

## 2023-11-09 NOTE — PROGRESS NOTES
Tigre Amaral   73 y.o.  male    LOS: 12 days   Patient Care Team:  Provider, No Known as PCP - General      Subjective   No specific complaints today  Interval History:     Patient Complaints:     Review of Systems:       Medication Review:   Current Facility-Administered Medications:     acetaminophen (TYLENOL) tablet 650 mg, 650 mg, Oral, Q4H PRN, 650 mg at 23 1235 **OR** [DISCONTINUED] acetaminophen (TYLENOL) suppository 650 mg, 650 mg, Rectal, Q4H PRN, Lashon Brooks MD    amiodarone 150 mg in 100 mL D5W (loading dose), 150 mg, Intravenous, Once **FOLLOWED BY** [] amiodarone 360 mg in 200 mL D5W infusion, 1 mg/min, Intravenous, Continuous **FOLLOWED BY** [] amiodarone 360 mg in 200 mL D5W infusion, 0.5 mg/min, Intravenous, Continuous, Mika Kim MD    amoxicillin-clavulanate (AUGMENTIN) 875-125 MG per tablet 1 tablet, 1 tablet, Oral, Q12H, Sanjiv Spann MD, 1 tablet at 238    aspirin chewable tablet 81 mg, 81 mg, Oral, Daily, Sanjiv Spann MD, 81 mg at 23 0806    atorvastatin (LIPITOR) tablet 80 mg, 80 mg, Oral, Nightly, Sanjiv Spann MD, 80 mg at 23    sennosides-docusate (PERICOLACE) 8.6-50 MG per tablet 2 tablet, 2 tablet, Oral, BID, 2 tablet at 23 **AND** polyethylene glycol (MIRALAX) packet 17 g, 17 g, Oral, Daily PRN **AND** bisacodyl (DULCOLAX) EC tablet 5 mg, 5 mg, Oral, Daily PRN **AND** bisacodyl (DULCOLAX) suppository 10 mg, 10 mg, Rectal, Daily PRN, Sanjiv Spann MD    carvedilol (COREG) tablet 6.25 mg, 6.25 mg, Oral, BID With Meals, Sanjiv Spann MD, 6.25 mg at 23 1702    clopidogrel (PLAVIX) tablet 75 mg, 75 mg, Oral, Daily, Sanjiv Spann MD, 75 mg at 23 0806    Enoxaparin Sodium (LOVENOX) syringe 40 mg, 40 mg, Subcutaneous, Q24H, Sanjiv Spann MD, 40 mg at 23    HYDROcodone-acetaminophen (NORCO) 5-325 MG per tablet 1 tablet, 1 tablet, Oral, Q6H PRN, Sanjiv Spann,  MD    lactated ringers infusion, 9 mL/hr, Intravenous, Continuous PRN, Sanjiv Spann MD    lisinopril (PRINIVIL,ZESTRIL) tablet 20 mg, 20 mg, Oral, Daily, Sanjiv Spann MD, 20 mg at 11/08/23 0806    Magnesium Standard Dose Replacement - Follow Nurse / BPA Driven Protocol, , Does not apply, PRMaria Ines BARRIOS Richard, MD    melatonin tablet 3 mg, 3 mg, Oral, Nightly PRN, Sanjiv Spann MD, 3 mg at 11/02/23 2106    nicotine (NICODERM CQ) 21 MG/24HR patch 1 patch, 1 patch, Transdermal, Q24H, Sanjiv Spann MD, 1 patch at 11/08/23 0838    nitroglycerin (NITROSTAT) SL tablet 0.4 mg, 0.4 mg, Sublingual, Q5 Min PRN, Mika Kim MD    ondansetron (ZOFRAN) tablet 4 mg, 4 mg, Oral, Q6H PRN **OR** ondansetron (ZOFRAN) injection 4 mg, 4 mg, Intravenous, Q6H PRN, Sanjiv Spann MD, 4 mg at 11/06/23 2119    Phosphorus Replacement - Follow Nurse / BPA Driven Protocol, , Does not apply, Maria Ines BARRON Richard, MD    Potassium Replacement - Follow Nurse / BPA Driven Protocol, , Does not apply, Maria Ines BARRON Richard, MD    sodium chloride 0.9 % flush 10 mL, 10 mL, Intravenous, Q12H, Sanjiv Spann MD, 10 mL at 11/08/23 2128    sodium chloride 0.9 % flush 10 mL, 10 mL, Intravenous, PRN, Sanjiv Spann MD    sodium chloride 0.9 % flush 10 mL, 10 mL, Intravenous, Paramjit BARRON Abhishek, MD    sodium chloride 0.9 % flush 20 mL, 20 mL, Intravenous, PRParamjit BARRIOS Abhishek, MD    sodium chloride 0.9 % infusion 40 mL, 40 mL, Intravenous, PRN, Sanjiv Spann MD    sodium chloride 0.9 % infusion, 50 mL/hr, Intravenous, Continuous, Sanjiv Spann MD, Last Rate: 50 mL/hr at 11/08/23 1848, 50 mL/hr at 11/08/23 1848      Objective   Vital Sign Min/Max for last 24 hours  Temp  Min: 97.6 °F (36.4 °C)  Max: 98.2 °F (36.8 °C)   BP  Min: 108/45  Max: 139/66    Pulse  Min: 60  Max: 64     Wt Readings from Last 3 Encounters:   11/08/23 69.1 kg (152 lb 5.4 oz)        Intake/Output Summary (Last 24 hours) at 11/9/2023  "0951  Last data filed at 11/9/2023 0510  Gross per 24 hour   Intake 1845.83 ml   Output 100 ml   Net 1745.83 ml     Physical Exam:      General Appearance:    Well developed and well nourished in no acute distress   Head:    Normocephalic, atraumatic   Eyes:            Conjunctivae normal, anicteric, no xanthelasma   Neck:   supple, trachea midline, no thyromegaly, no carotid bruit, no JVD, no elevated CVP   Lungs:     Clear to auscultation,respirations regular, even and                  unlabored    Heart:    Regular rhythm and normal rate, normal S1 and S2,            No murmur, no gallop, no rub, no click   Chest Wall:    No abnormalities observed   Abdomen:     Normal bowel sounds, no masses, no organomegaly, soft        nontender, nondistended, no guarding, no rebound                tenderness   Rectal:     Deferred   Extremities:   No edema. Moves all extremities well, no cyanosis, no erythema   Pulses:   Pulses palpable and equal bilaterally   Skin:   No bleeding, bruising or rash   Neurologic:   awake alert and oriented x3, speech clear and approp, no facial drooping     :    Monitor:      Results Review:         Sodium Sodium   Date Value Ref Range Status   11/07/2023 140 136 - 145 mmol/L Final   11/06/2023 143 136 - 145 mmol/L Final      Potassium Potassium   Date Value Ref Range Status   11/08/2023 3.7 3.5 - 5.2 mmol/L Final     Comment:     Slight hemolysis detected by analyzer. Result may be falsely elevated.   11/07/2023 3.4 (L) 3.5 - 5.2 mmol/L Final   11/07/2023 3.0 (L) 3.5 - 5.2 mmol/L Final   11/07/2023 3.0 (L) 3.5 - 5.2 mmol/L Final   11/06/2023 3.3 (L) 3.5 - 5.2 mmol/L Final      Chloride Chloride   Date Value Ref Range Status   11/07/2023 112 (H) 98 - 107 mmol/L Final   11/06/2023 108 (H) 98 - 107 mmol/L Final      Bicarbonate No results found for: \"PLASMABICARB\"   BUN BUN   Date Value Ref Range Status   11/07/2023 12 8 - 23 mg/dL Final   11/06/2023 15 8 - 23 mg/dL Final      Creatinine " "Creatinine   Date Value Ref Range Status   11/07/2023 0.94 0.76 - 1.27 mg/dL Final   11/06/2023 1.12 0.76 - 1.27 mg/dL Final      Calcium Calcium   Date Value Ref Range Status   11/07/2023 6.8 (L) 8.6 - 10.5 mg/dL Final   11/06/2023 8.0 (L) 8.6 - 10.5 mg/dL Final      Magnesium Magnesium   Date Value Ref Range Status   11/07/2023 1.6 1.6 - 2.4 mg/dL Final        Results from last 7 days   Lab Units 11/04/23  0730   WBC 10*3/mm3 7.82   HEMOGLOBIN g/dL 13.2   HEMATOCRIT % 38.2   PLATELETS 10*3/mm3 265     Lab Results   Lab Value Date/Time    TROPONINT 425 (C) 11/02/2023 0735    TROPONINT 451 (C) 11/02/2023 0612    TROPONINT 354 (C) 11/01/2023 2031    TROPONINT 339 (C) 11/01/2023 1846            Echo EF Estimated  No results found for: \"ECHOEFEST\"      Assessment/ Plan  Assessment & Plan   Active Hospital Problems    Diagnosis  POA    **Cerebrovascular accident (CVA) due to embolism of right middle cerebral artery [I63.411]  Yes    Aspiration pneumonia [J69.0]  Unknown    Acute on chronic systolic congestive heart failure [I50.23]  Unknown    Hypertension [I10]  Unknown    Stage 3a chronic kidney disease [N18.31]  Unknown    Hyperlipidemia [E78.5]  Unknown    Carotid artery stenosis, symptomatic, right [I65.21]  Unknown    Tobacco abuse [Z72.0]  Unknown    Basal cell carcinoma (BCC) of skin of nose [C44.311]  Unknown     Acute on chronic HFrEF  2. NICM s/p AICD     A. Echo 10/30/23:   Left ventricular systolic function is severely decreased. Calculated left ventricular EF = 21.6% Left ventricular ejection fraction appears to be less than 20%.The following left ventricular wall segments are hypokinetic: mid anterolateral, basal inferolateral, mid inferolateral, mid inferior, basal inferoseptal, mid inferoseptal, mid anteroseptal, basal inferior and basal inferoseptal. The following left ventricular wall segments are akinetic: apical anterior, apical lateral, apical inferior, apical septal and apex.  Left ventricular " wall thickness is consistent with mild concentric hypertrophy.   Left ventricular diastolic function is consistent with (grade II w/high LAP) pseudonormalization.  The left atrial cavity is mildly dilated.  Saline test results are negative for right to left atrial level shunt.  Estimated right ventricular systolic pressure from tricuspid regurgitation is mildly elevated (35-45 mmHg).     3. H/o Vtach  4. HTN  5. HLD  6. Acute R sided watershed CVAs with severe ICA stenosis, s/p stent placement 11/6/23    A. h/o large MCA infarct  7. Aspiration PNA  8. Dementia  9. Iliac stenosis  Okay to discharge to rehab  Will sign off          Mika Kim MD  11/09/23  09:51 EST

## 2023-11-09 NOTE — PROGRESS NOTES
Name: Tigre Amaral ADMIT: 10/28/2023   : 1950  PCP: Provider, No Known    MRN: [id number] LOS: 12 days   AGE/SEX: 73 y.o. male  ROOM: [facility]     Subjective   Subjective   No new complaints overnight.       Objective   Objective   Vital Signs  Temp:  [97.6 °F (36.4 °C)-98.2 °F (36.8 °C)] 98.1 °F (36.7 °C)  Heart Rate:  [60-64] 60  Resp:  [18-19] 18  BP: (108-139)/(45-68) 128/51  SpO2:  [96 %-100 %] 96 %  on   ;   Device (Oxygen Therapy): room air  Body mass index is 23.16 kg/m².  Physical Exam  Vitals and nursing note reviewed.   Constitutional:       General: He is not in acute distress.     Appearance: He is ill-appearing.   HENT:      Nose:      Comments: Large mass on the right side of his nose consistent with prior diagnosis basal cell carcinoma  Cardiovascular:      Rate and Rhythm: Normal rate and regular rhythm.   Pulmonary:      Effort: Pulmonary effort is normal.      Breath sounds: Normal breath sounds.   Abdominal:      General: Bowel sounds are normal.      Palpations: Abdomen is soft.      Tenderness: There is no abdominal tenderness.   Musculoskeletal:         General: No swelling.   Skin:     General: Skin is warm and dry.   Neurological:      Mental Status: He is alert. Mental status is at baseline. He is disoriented.      Comments: Left-sided weakness       Results Review     I reviewed the patient's new clinical results.  Results from last 7 days   Lab Units 23  0730   WBC 10*3/mm3 7.82   HEMOGLOBIN g/dL 13.2   PLATELETS 10*3/mm3 265     Results from last 7 days   Lab Units 23  0806 23  2052 23  0352 23  1800 23  2314 23  0730   SODIUM mmol/L  --   --  140 143  --  141   POTASSIUM mmol/L 3.7 3.4* 3.0*  3.0* 3.3*   < > 2.9*   CHLORIDE mmol/L  --   --  112* 108*  --  105   CO2 mmol/L  --   --  19.9* 21.8*  --  26.0   BUN mg/dL  --   --  12 15  --  16   CREATININE mg/dL  --   --  0.94 1.12  --  1.32*   GLUCOSE mg/dL  --   --  112* 97  --  88    EGFR mL/min/1.73  --   --  84.5 68.5  --  56.2*    < > = values in this interval not displayed.           Results from last 7 days   Lab Units 11/07/23  0352 11/06/23  1800 11/04/23  0730   CALCIUM mg/dL 6.8* 8.0* 8.5*   MAGNESIUM mg/dL 1.6  --  2.0           Glucose   Date/Time Value Ref Range Status   11/07/2023 1729 109 70 - 130 mg/dL Final   11/07/2023 1135 104 70 - 130 mg/dL Final   11/07/2023 0523 120 70 - 130 mg/dL Final   11/07/2023 0003 133 (H) 70 - 130 mg/dL Final   11/06/2023 1525 89 70 - 130 mg/dL Final         I have personally reviewed all medications:  Scheduled Medications  amiodarone, 150 mg, Intravenous, Once  amoxicillin-clavulanate, 1 tablet, Oral, Q12H  aspirin, 81 mg, Oral, Daily  atorvastatin, 80 mg, Oral, Nightly  carvedilol, 6.25 mg, Oral, BID With Meals  clopidogrel, 75 mg, Oral, Daily  enoxaparin, 40 mg, Subcutaneous, Q24H  lisinopril, 20 mg, Oral, Daily  nicotine, 1 patch, Transdermal, Q24H  senna-docusate sodium, 2 tablet, Oral, BID  sodium chloride, 10 mL, Intravenous, Q12H    Infusions  lactated ringers, 9 mL/hr  sodium chloride, 50 mL/hr, Last Rate: 50 mL/hr (11/08/23 1848)    Diet  Diet: Cardiac Diets; Healthy Heart (2-3 Na+); Texture: Pureed (NDD 1); Fluid Consistency: Thin (IDDSI 0)    I have personally reviewed:  [x]  Laboratory   [x]  Microbiology   [x]  Radiology   [x]  EKG/Telemetry  [x]  Cardiology/Vascular   []  Pathology    []  Records       Assessment/Plan     Active Hospital Problems    Diagnosis  POA    **Cerebrovascular accident (CVA) due to embolism of right middle cerebral artery [I63.411]  Yes    Aspiration pneumonia [J69.0]  Unknown    Acute on chronic systolic congestive heart failure [I50.23]  Unknown    Hypertension [I10]  Unknown    Stage 3a chronic kidney disease [N18.31]  Unknown    Hyperlipidemia [E78.5]  Unknown    Carotid artery stenosis, symptomatic, right [I65.21]  Unknown    Tobacco abuse [Z72.0]  Unknown    Basal cell carcinoma (BCC) of skin of nose  [C44.311]  Unknown      Resolved Hospital Problems   No resolved problems to display.       73 y.o. male admitted with Cerebrovascular accident (CVA) due to embolism of right middle cerebral artery.  He is s/p right ICA stent placement by IR on 11/6 by Dr. Spann.    Seems to be feeling quite well.  He is without complaint.  Stable postoperatively.  No labs ordered in a couple of days.  We will recheck labs today.  Await follow-up recommendations from consultants.  BP stable.  CCP working on rehab placement.    Discussed with him again about mass on right side of nose.  He is a very poor historian but it does not seem as if he has ever had this evaluated.  Explained to him that it likely is a form of skin cancer and that he should be seen by a dermatologist ASAP.  He says he gets his care primarily at the [facility] and will follow-up with them as soon as he can after discharge.       Pharmacy to dose Lovenox for DVT prophylaxis.  Full code.  Discussed with patient.  Anticipate discharge to [facility] acute rehab when cleared by consultants.      Jose Mendiola MD  [facility]  11/09/23  08:18 EST

## 2023-11-10 LAB
ANION GAP SERPL CALCULATED.3IONS-SCNC: 9.6 MMOL/L (ref 5–15)
BUN SERPL-MCNC: 8 MG/DL (ref 8–23)
BUN/CREAT SERPL: 6.6 (ref 7–25)
CALCIUM SPEC-SCNC: 8.5 MG/DL (ref 8.6–10.5)
CHLORIDE SERPL-SCNC: 101 MMOL/L (ref 98–107)
CO2 SERPL-SCNC: 26.4 MMOL/L (ref 22–29)
CREAT SERPL-MCNC: 1.22 MG/DL (ref 0.76–1.27)
DEPRECATED RDW RBC AUTO: 42.4 FL (ref 37–54)
EGFRCR SERPLBLD CKD-EPI 2021: 61.8 ML/MIN/1.73
ERYTHROCYTE [DISTWIDTH] IN BLOOD BY AUTOMATED COUNT: 12.8 % (ref 12.3–15.4)
GLUCOSE SERPL-MCNC: 112 MG/DL (ref 65–99)
HCT VFR BLD AUTO: 38 % (ref 37.5–51)
HGB BLD-MCNC: 12.9 G/DL (ref 13–17.7)
MCH RBC QN AUTO: 31 PG (ref 26.6–33)
MCHC RBC AUTO-ENTMCNC: 33.9 G/DL (ref 31.5–35.7)
MCV RBC AUTO: 91.3 FL (ref 79–97)
PLATELET # BLD AUTO: 289 10*3/MM3 (ref 140–450)
PMV BLD AUTO: 9.9 FL (ref 6–12)
POTASSIUM SERPL-SCNC: 3 MMOL/L (ref 3.5–5.2)
RBC # BLD AUTO: 4.16 10*6/MM3 (ref 4.14–5.8)
SODIUM SERPL-SCNC: 137 MMOL/L (ref 136–145)
WBC NRBC COR # BLD: 10.1 10*3/MM3 (ref 3.4–10.8)

## 2023-11-10 PROCEDURE — 25810000003 SODIUM CHLORIDE 0.9 % SOLUTION: Performed by: RADIOLOGY

## 2023-11-10 PROCEDURE — 92526 ORAL FUNCTION THERAPY: CPT

## 2023-11-10 PROCEDURE — 85027 COMPLETE CBC AUTOMATED: CPT | Performed by: HOSPITALIST

## 2023-11-10 PROCEDURE — 80048 BASIC METABOLIC PNL TOTAL CA: CPT | Performed by: HOSPITALIST

## 2023-11-10 RX ORDER — POTASSIUM CHLORIDE 750 MG/1
20 TABLET, FILM COATED, EXTENDED RELEASE ORAL DAILY
Status: DISCONTINUED | OUTPATIENT
Start: 2023-11-11 | End: 2023-11-11 | Stop reason: HOSPADM

## 2023-11-10 RX ORDER — POTASSIUM CHLORIDE 1.5 G/1.58G
40 POWDER, FOR SOLUTION ORAL EVERY 4 HOURS
Status: COMPLETED | OUTPATIENT
Start: 2023-11-10 | End: 2023-11-10

## 2023-11-10 RX ADMIN — NICOTINE 1 PATCH: 21 PATCH, EXTENDED RELEASE TRANSDERMAL at 09:02

## 2023-11-10 RX ADMIN — AMOXICILLIN AND CLAVULANATE POTASSIUM 1 TABLET: 875; 125 TABLET, FILM COATED ORAL at 09:00

## 2023-11-10 RX ADMIN — ATORVASTATIN CALCIUM 80 MG: 80 TABLET, FILM COATED ORAL at 20:43

## 2023-11-10 RX ADMIN — SENNOSIDES AND DOCUSATE SODIUM 2 TABLET: 50; 8.6 TABLET ORAL at 20:43

## 2023-11-10 RX ADMIN — FUROSEMIDE 40 MG: 40 TABLET ORAL at 08:59

## 2023-11-10 RX ADMIN — POTASSIUM CHLORIDE 40 MEQ: 1.5 FOR SOLUTION ORAL at 20:43

## 2023-11-10 RX ADMIN — ASPIRIN 81 MG: 81 TABLET, CHEWABLE ORAL at 08:59

## 2023-11-10 RX ADMIN — SODIUM CHLORIDE 50 ML/HR: 9 INJECTION, SOLUTION INTRAVENOUS at 06:42

## 2023-11-10 RX ADMIN — POTASSIUM CHLORIDE 40 MEQ: 1.5 FOR SOLUTION ORAL at 18:48

## 2023-11-10 RX ADMIN — CARVEDILOL 6.25 MG: 6.25 TABLET, FILM COATED ORAL at 09:00

## 2023-11-10 RX ADMIN — Medication 10 ML: at 20:44

## 2023-11-10 RX ADMIN — LISINOPRIL 20 MG: 20 TABLET ORAL at 09:01

## 2023-11-10 RX ADMIN — CLOPIDOGREL BISULFATE 75 MG: 75 TABLET, FILM COATED ORAL at 08:59

## 2023-11-10 RX ADMIN — CARVEDILOL 6.25 MG: 6.25 TABLET, FILM COATED ORAL at 18:48

## 2023-11-10 RX ADMIN — POTASSIUM CHLORIDE 40 MEQ: 1.5 FOR SOLUTION ORAL at 15:23

## 2023-11-10 RX ADMIN — Medication 10 ML: at 09:01

## 2023-11-10 NOTE — CASE MANAGEMENT/SOCIAL WORK
Continued Stay Note  [facility]     Patient Name: Tigre Amaral  MRN: [id number]  Today's Date: 11/10/2023    Admit Date: 10/28/2023    Plan: [facility] Acute [facility]hab Saturday 11/11 at 2:00pm   Discharge Plan       Row Name 11/10/23 1205       Plan    Plan [facility] Saturday 11/11 at 2:00pm    Patient/Family in Agreement with Plan yes    Plan Comments Scheduled Calliber  [facility] for Saturday 11/11 at 2:00pm. packet on chart      Row Name 11/10/23 0925       Plan    [facility]    Patient/Family in Agreement with Plan yes    Plan Comments Call from Meghan at [facility], bed available Saturday 11/11. Will message [facility]/Kristi to inform of bed availbility. Will place packet on chart and arrange transport if needed. CCP to follow.                   Discharge Codes    No documentation.                 Expected Discharge Date and Time       Expected Discharge Date Expected Discharge Time    Nov 10, 2023               SVEN Naranjo

## 2023-11-10 NOTE — PLAN OF CARE
Goal Outcome Evaluation:              Outcome Evaluation: Patient seen for re-evaluation of swallow function. Daughter at bedside. Previously downgraded to puree due to noted difficulties of oral stage. VFSS indicated no pharyngeal deficits. RN reporting patient consumed biscuit brought by family with no swallowing/mastication difficulties. No overt s/s of aspiration with thin liquids, puree, soft/chopped solid or mixed consistency. Prolonged mastication with regular solid trial. Mastication and manipulation of remaining solid trials adequate with no oral residue appreciated. Improved since previous re-evaluation. Recommend upgrade to soft/chopped solid diet with thin liquids. Meds whole or crushed with puree, as tolerated. Sitting upright, slow rate, small bites/sips. Speech to follow for diet tolerance.

## 2023-11-10 NOTE — PROGRESS NOTES
Name: Tigre Amaral ADMIT: 10/28/2023   : 1950  PCP: Provider, No Known    MRN: [id number] LOS: 13 days   AGE/SEX: 73 y.o. male  ROOM: [facility]     Subjective   Subjective   No complaints overnight.           Objective   Objective   Vital Signs  Temp:  [97.3 °F (36.3 °C)-98.4 °F (36.9 °C)] 98.4 °F (36.9 °C)  Heart Rate:  [60] 60  Resp:  [18] 18  BP: (136-155)/(53-79) 155/79  SpO2:  [98 %-100 %] 100 %  on   ;   Device (Oxygen Therapy): room air  Body mass index is 23.16 kg/m².  Physical Exam  Vitals and nursing note reviewed.   Constitutional:       General: He is not in acute distress.     Appearance: He is ill-appearing.   HENT:      Nose:      Comments: Large mass on the right side of his nose consistent with prior diagnosis basal cell carcinoma  Cardiovascular:      Rate and Rhythm: Normal rate and regular rhythm.   Pulmonary:      Effort: Pulmonary effort is normal.      Breath sounds: Normal breath sounds.   Abdominal:      General: Bowel sounds are normal.      Palpations: Abdomen is soft.      Tenderness: There is no abdominal tenderness.   Musculoskeletal:         General: No swelling.   Skin:     General: Skin is warm and dry.   Neurological:      Mental Status: He is alert. Mental status is at baseline. He is disoriented.      Comments: Left-sided weakness       Results Review     I reviewed the patient's new clinical results.  Results from last 7 days   Lab Units 23  0730   WBC 10*3/mm3 7.82   HEMOGLOBIN g/dL 13.2   PLATELETS 10*3/mm3 265     Results from last 7 days   Lab Units 23  0806 23  2052 23  0352 23  1800 23  2314 23  0730   SODIUM mmol/L  --   --  140 143  --  141   POTASSIUM mmol/L 3.7 3.4* 3.0*  3.0* 3.3*   < > 2.9*   CHLORIDE mmol/L  --   --  112* 108*  --  105   CO2 mmol/L  --   --  19.9* 21.8*  --  26.0   BUN mg/dL  --   --  12 15  --  16   CREATININE mg/dL  --   --  0.94 1.12  --  1.32*   GLUCOSE mg/dL  --   --  112* 97  --  88   EGFR  mL/min/1.73  --   --  84.5 68.5  --  56.2*    < > = values in this interval not displayed.           Results from last 7 days   Lab Units 11/07/23  0352 11/06/23  1800 11/04/23  0730   CALCIUM mg/dL 6.8* 8.0* 8.5*   MAGNESIUM mg/dL 1.6  --  2.0           Glucose   Date/Time Value Ref Range Status   11/07/2023 1729 109 70 - 130 mg/dL Final   11/07/2023 1135 104 70 - 130 mg/dL Final         I have personally reviewed all medications:  Scheduled Medications  amiodarone, 150 mg, Intravenous, Once  amoxicillin-clavulanate, 1 tablet, Oral, Q12H  aspirin, 81 mg, Oral, Daily  atorvastatin, 80 mg, Oral, Nightly  carvedilol, 6.25 mg, Oral, BID With Meals  clopidogrel, 75 mg, Oral, Daily  enoxaparin, 40 mg, Subcutaneous, Q24H  furosemide, 40 mg, Oral, Daily  lisinopril, 20 mg, Oral, Daily  nicotine, 1 patch, Transdermal, Q24H  senna-docusate sodium, 2 tablet, Oral, BID  sodium chloride, 10 mL, Intravenous, Q12H    Infusions  lactated ringers, 9 mL/hr  sodium chloride, 50 mL/hr, Last Rate: 50 mL/hr (11/10/23 0642)    Diet  Diet: Cardiac Diets; Healthy Heart (2-3 Na+); Texture: Pureed (NDD 1); Fluid Consistency: Thin (IDDSI 0)    I have personally reviewed:  [x]  Laboratory   [x]  Microbiology   [x]  Radiology   [x]  EKG/Telemetry  [x]  Cardiology/Vascular   []  Pathology    []  Records       Assessment/Plan     Active Hospital Problems    Diagnosis  POA    **Cerebrovascular accident (CVA) due to embolism of right middle cerebral artery [I63.411]  Yes    Aspiration pneumonia [J69.0]  Unknown    Acute on chronic systolic congestive heart failure [I50.23]  Unknown    Hypertension [I10]  Unknown    Stage 3a chronic kidney disease [N18.31]  Unknown    Hyperlipidemia [E78.5]  Unknown    Carotid artery stenosis, symptomatic, right [I65.21]  Unknown    Tobacco abuse [Z72.0]  Unknown    Basal cell carcinoma (BCC) of skin of nose [C44.311]  Unknown      Resolved Hospital Problems   No resolved problems to display.       73 y.o. male  admitted with Cerebrovascular accident (CVA) due to embolism of right middle cerebral artery.  He is s/p right ICA stent placement by IR on 11/6 by Dr. Spann.    Continues to do well postoperatively.  [facility] informs me [facility].  Plan discharge tomorrow if okay with all.     We will follow-up on labs that were ordered yesterday but still not done.  Discussed with nurse.      Pharmacy to dose Lovenox for DVT prophylaxis.  Full code.  Discussed with patient and nursing staff.  Anticipate discharge to [facility] rehab tomorrow if cleared by consultants.      Jose Mendiola MD  [facility]  11/10/23  10:33 EST

## 2023-11-10 NOTE — PROGRESS NOTES
"                                              LOS: 13 days   Patient Care Team:  Provider, No Known as PCP - General    Chief Complaint:  F/up respiratory failure, CHF and medical problems listed below.  Critical care management post R ICA stent    Subjective   Interval History    On RA.  No shortness of breath or cough.      REVIEW OF SYSTEMS:       CONSTITUTIONAL: No fever or chills.     Ventilator/Non-Invasive Ventilation Settings (From admission, onward)      None                  Physical Exam:     Vital Signs  Temp:  [98.2 °F (36.8 °C)-98.7 °F (37.1 °C)] 98.7 °F (37.1 °C)  Heart Rate:  [60] 60  Resp:  [18] 18  BP: (137-155)/(53-82) 144/82    Intake/Output Summary (Last 24 hours) at 11/10/2023 1624  Last data filed at 11/10/2023 1016  Gross per 24 hour   Intake 0 ml   Output 1550 ml   Net -1550 ml     Flowsheet Rows      Flowsheet Row First Filed Value   Admission Height 172.7 cm (68\") Documented at 10/28/2023 1306   Admission Weight 74.8 kg (165 lb) Documented at 10/28/2023 1306            PPE used per hospital policy    General Appearance:   Alert, cooperative, in no acute distress   ENMT:  Mallampati score 3, moist mucous membrane   Eyes:  Pupils equal and reactive to light. EOMI   Neck:   Trachea midline. No thyromegaly.   Lungs:   Clear on anterior auscultation but slightly diminished at the bases.  No crackles or wheezing.    Heart:   Regular rhythm and normal rate, normal S1 and S2, no         murmur   Skin:   No rash or ecchymosis   Abdomen:    Obese. Soft. No tenderness. No HSM.   Neuro/psych:  Conscious, alert, oriented x3. Strength 5/5 in upper and lower  ext.  Appropriate mood and affect   Extremities:  No cyanosis, clubbing or edema.  Cold feet with no palpable pedal pulses        Results Review:        Results from last 7 days   Lab Units 11/10/23  1115 11/08/23  0806 11/07/23  2052 11/07/23  0352 11/06/23  1800   SODIUM mmol/L 137  --   --  140 143   POTASSIUM mmol/L 3.0* 3.7 3.4* 3.0*  3.0* " 3.3*   CHLORIDE mmol/L 101  --   --  112* 108*   CO2 mmol/L 26.4  --   --  19.9* 21.8*   BUN mg/dL 8  --   --  12 15   CREATININE mg/dL 1.22  --   --  0.94 1.12   GLUCOSE mg/dL 112*  --   --  112* 97   CALCIUM mg/dL 8.5*  --   --  6.8* 8.0*           Results from last 7 days   Lab Units 11/10/23  1116 11/04/23  0730   WBC 10*3/mm3 10.10 7.82   HEMOGLOBIN g/dL 12.9* 13.2   HEMATOCRIT % 38.0 38.2   PLATELETS 10*3/mm3 289 265                             Results from last 7 days   Lab Units 11/05/23  1518   PH, ARTERIAL pH units 7.474*   PCO2, ARTERIAL mm Hg 26.8*   PO2 ART mm Hg 75.9*   O2 SATURATION ART % 96.2   FLOW RATE lpm 2.0000   MODALITY  Cannula         I reviewed the patient's new clinical results.        Medication Review:   amiodarone, 150 mg, Intravenous, Once  amoxicillin-clavulanate, 1 tablet, Oral, Q12H  aspirin, 81 mg, Oral, Daily  atorvastatin, 80 mg, Oral, Nightly  carvedilol, 6.25 mg, Oral, BID With Meals  clopidogrel, 75 mg, Oral, Daily  enoxaparin, 40 mg, Subcutaneous, Q24H  furosemide, 40 mg, Oral, Daily  lisinopril, 20 mg, Oral, Daily  nicotine, 1 patch, Transdermal, Q24H  potassium chloride, 40 mEq, Oral, Q4H  senna-docusate sodium, 2 tablet, Oral, BID  sodium chloride, 10 mL, Intravenous, Q12H        lactated ringers, 9 mL/hr        Diagnostic imaging:  I personally and independently reviewed the following images:  CXR 11/6/2023: Increased pulmonary vascular markings.  Pacemaker noted.    CT chest 11/7/2023: Small to moderate bilateral pleural effusion.  Bilateral lower lobe compressive atelectasis    Assessment     Acute on chronic systolic CHF, EF 20%  Acute pulmonary edema, improved  Aspiration pneumonia, being treated  Small to moderate bilateral pleural effusion  Small bilateral lower lobe compressive atelectasis  Mild pulmonary hypertension on echo 10/30/2023, RVSP 35-45, likely group 2.  Uncontrolled hypertension, resolved  Severe right ICA stenosis s/p stent 11/6/2023  Electrolytes  disturbance: Hypokalemia, persistent  Bilateral severe iliac artery stenosis, incidental on CTA    Acute R sided watershed CVAs with severe ICA stenosis   h/o large MCA infarct  History of V. tach  NICM s/p AICD        Plan           Lasix 40 mg p.o. daily (home med).  Replace potassium.  Due to persistent/recurrent hypokalemia, will place on 20 mEq KCl daily  Augmentin.  Will be finished today.  DVT prophylaxis with Lovenox  DAPT and Lipitor 80 mg daily  Coreg and lisinopril for CHF  Incentive spirometry    Discussed with his daughter at bedside.    Dispo: Okay to discharge from pulmonary perspective.  Please call back if needed.        Malinda Vera MD  11/10/23  16:24 EST            This note was dictated utilizing Dragon dictation

## 2023-11-10 NOTE — CASE MANAGEMENT/SOCIAL WORK
Continued Stay Note  [facility]     Patient Name: Tigre Amaral  MRN: [id number]  Today's Date: 11/10/2023    Admit Date: 10/28/2023    Plan: [facility] acute rehab has bed tomorrow Saturday 11/11   Discharge Plan       Row Name 11/10/23 0925       Plan    Plan [facility] acute rehab has bed tomorrow Saturday 11/11    Patient/Family in Agreement with Plan yes    Plan Comments Call from Meghan at [facility], bed available Saturday 11/11. Will message [facility]/Kristi to inform of bed availbility. Will place packet on chart and arrange transport if needed. CCP to follow.                   Discharge Codes    No documentation.                 Expected Discharge Date and Time       Expected Discharge Date Expected Discharge Time    Nov 10, 2023               SVEN Naranjo     (3) no apparent problem

## 2023-11-10 NOTE — THERAPY RE-EVALUATION
Acute Care - Speech Language Pathology   Swallow Re-Evaluation [facility]     Patient Name: Tigre Amaral  : 1950  MRN: [id number]  Today's Date: 11/10/2023               Admit Date: 10/28/2023    Visit Dx:     ICD-10-CM ICD-9-CM   1. Cerebrovascular accident (CVA), unspecified mechanism  I63.9 434.91     Patient Active Problem List   Diagnosis    Cerebrovascular accident (CVA) due to embolism of right middle cerebral artery    Aspiration pneumonia    Acute on chronic systolic congestive heart failure    Hypertension    Stage 3a chronic kidney disease    Hyperlipidemia    Carotid artery stenosis, symptomatic, right    Tobacco abuse    Basal cell carcinoma (BCC) of skin of nose     History reviewed. No pertinent past medical history.  History reviewed. No pertinent surgical history.    SLP Recommendation and Plan  SLP Swallowing Diagnosis: mild, oral dysphagia (11/10/23 1300)  SLP Diet Recommendation: soft to chew textures, chopped, thin liquids (11/10/23 1300)  Recommended Precautions and Strategies: upright posture during/after eating, small bites of food and sips of liquid, general aspiration precautions, other (see comments) (supervision/set-up) (11/10/23 1300)  SLP Rec. for Method of Medication Administration: meds whole, meds crushed, as tolerated, with puree (11/10/23 1300)     Monitor for Signs of Aspiration: yes, notify SLP if any concerns (11/10/23 1300)  Recommended Diagnostics: reassess via clinical swallow evaluation (11/10/23 1300)  Swallow Criteria for Skilled Therapeutic Interventions Met: demonstrates skilled criteria (11/10/23 1300)  Anticipated Discharge Disposition (SLP): anticipate therapy at next level of care (11/10/23 1300)  Rehab Potential/Prognosis, Swallowing: good, to achieve stated therapy goals (11/10/23 1300)  Therapy Frequency (Swallow): PRN (11/10/23 1300)  Predicted Duration Therapy Intervention (Days): until discharge (11/10/23 1300)  Oral Care Recommendations: Oral  Care BID/PRN (11/10/23 1300)                                      Oral Care Recommendations: Oral Care BID/PRN (11/10/23 1300)    Outcome Evaluation: Patient seen for re-evaluation of swallow function. Daughter at bedside. Previously downgraded to puree due to noted difficulties of oral stage. VFSS indicated no pharyngeal deficits. RN reporting patient consumed biscuit brought by family with no swallowing/mastication difficulties. No overt s/s of aspiration with thin liquids, puree, soft/chopped solid or mixed consistency. Prolonged mastication with regular solid trial. Mastication and manipulation of remaining solid trials adequate with no oral residue appreciated. Improved since previous re-evaluation. Recommend upgrade to soft/chopped solid diet with thin liquids. Meds whole or crushed with puree, as tolerated. Sitting upright, slow rate, small bites/sips. Speech to follow for diet tolerance.      SWALLOW EVALUATION (last 72 hours)       SLP Adult Swallow Evaluation       Row Name 11/10/23 1300                   Rehab Evaluation    Document Type re-evaluation  -CR        Subjective Information no complaints  -CR        Patient Observations alert;cooperative  -CR        Patient Effort good  -CR        Symptoms Noted During/After Treatment none  -CR           General Information    Patient Profile Reviewed yes  -CR           Pain Scale: Numbers Pre/Post-Treatment    Pretreatment Pain Rating 0/10 - no pain  -CR           SLP Evaluation Clinical Impression    SLP Swallowing Diagnosis mild;oral dysphagia  -CR        Functional Impact risk of aspiration/pneumonia  -CR        Rehab Potential/Prognosis, Swallowing good, to achieve stated therapy goals  -CR        Swallow Criteria for Skilled Therapeutic Interventions Met demonstrates skilled criteria  -CR           Recommendations    Therapy Frequency (Swallow) PRN  -CR        Predicted Duration Therapy Intervention (Days) until discharge  -CR        SLP Diet  Recommendation soft to chew textures;chopped;thin liquids  -CR        Recommended Diagnostics reassess via clinical swallow evaluation  -CR        Recommended Precautions and Strategies upright posture during/after eating;small bites of food and sips of liquid;general aspiration precautions;other (see comments)  supervision/set-up  -CR        Oral Care Recommendations Oral Care BID/PRN  -CR        SLP Rec. for Method of Medication Administration meds whole;meds crushed;as tolerated;with puree  -CR        Monitor for Signs of Aspiration yes;notify SLP if any concerns  -CR        Anticipated Discharge Disposition (SLP) anticipate therapy at next level of care  -CR           (LTG) Swallow    (LTG) Swallow Tolerate least restrictive diet with no overt s/s aspiration.  -CR        [place] (Swallow Long Term Goal) with minimal cues (75-90% accuracy)  -CR        Time Frame (Swallow Long Term Goal) by discharge  -CR        Progress/Outcomes (Swallow Long Term Goal) good progress toward goal  -CR        Comment (Swallow Long Term Goal) Patient seen for re-evaluation of swallow function. Daughter at bedside. Previously downgraded to puree due to noted difficulties of oral stage. VFSS indicated no pharyngeal deficits. RN reporting patient consumed biscuit brought by family with no swallowing/mastication difficulties. No overt s/s of aspiration with thin liquids, puree, soft/chopped solid or mixed consistency. Prolonged mastication with regular solid trial. Mastication and manipulation of remaining solid trials adequate with no oral residue appreciated. Improved since previous re-evaluation. Recommend upgrade to soft/chopped solid diet with thin liquids. Meds whole or crushed with puree, as tolerated. Sitting upright, slow rate, small bites/sips. Speech to follow for diet tolerance.  -CR                  User Key  (r) = Recorded By, (t) = Taken By, (c) = Cosigned By      Initials Name Effective Dates    RANDY Mejia  FREDY Shin 08/28/23 -                     EDUCATION  The patient has been educated in the following areas:   Dysphagia (Swallowing Impairment).        SLP GOALS       Row Name 11/10/23 1300             (LTG) Swallow    (LTG) Swallow Tolerate least restrictive diet with no overt s/s aspiration.  -CR      [place] (Swallow Long Term Goal) with minimal cues (75-90% accuracy)  -CR      Time Frame (Swallow Long Term Goal) by discharge  -CR      Progress/Outcomes (Swallow Long Term Goal) good progress toward goal  -CR      Comment (Swallow Long Term Goal) Patient seen for re-evaluation of swallow function. Daughter at bedside. Previously downgraded to puree due to noted difficulties of oral stage. VFSS indicated no pharyngeal deficits. RN reporting patient consumed biscuit brought by family with no swallowing/mastication difficulties. No overt s/s of aspiration with thin liquids, puree, soft/chopped solid or mixed consistency. Prolonged mastication with regular solid trial. Mastication and manipulation of remaining solid trials adequate with no oral residue appreciated. Improved since previous re-evaluation. Recommend upgrade to soft/chopped solid diet with thin liquids. Meds whole or crushed with puree, as tolerated. Sitting upright, slow rate, small bites/sips. Speech to follow for diet tolerance.  -CR                User Key  (r) = Recorded By, (t) = Taken By, (c) = Cosigned By      Initials Name Provider Type    CR Aleida Mejia SLP Speech and Language Pathologist                       Time Calculation:    Time Calculation- SLP       Row Name 11/10/23 1317             Time Calculation- SLP    SLP Start Time 0740  -CR      SLP Received On 11/10/23  -CR         Untimed Charges    [id number] Treatment Swallow Minutes 45  -CR         Total Minutes    Untimed Charges Total Minutes 45  -CR       Total Minutes 45  -CR                User Key  (r) = Recorded By, (t) = Taken By, (c) = Cosigned By      Initials Name  Provider Type    Aleida Sargent SLP Speech and Language Pathologist                    Therapy Charges for Today       Code Description Service Date Service Provider Modifiers Qty    [id number] HC ST TREATMENT SWALLOW 3 11/10/2023 Aleida Mejia SLP GN 1                 FREDY Ramos  11/10/2023

## 2023-11-11 VITALS
TEMPERATURE: 97.6 F | RESPIRATION RATE: 18 BRPM | WEIGHT: 142.2 LBS | HEIGHT: 68 IN | BODY MASS INDEX: 21.55 KG/M2 | OXYGEN SATURATION: 100 % | DIASTOLIC BLOOD PRESSURE: 79 MMHG | HEART RATE: 60 BPM | SYSTOLIC BLOOD PRESSURE: 154 MMHG

## 2023-11-11 LAB
POTASSIUM SERPL-SCNC: 3.4 MMOL/L (ref 3.5–5.2)
POTASSIUM SERPL-SCNC: 3.9 MMOL/L (ref 3.5–5.2)

## 2023-11-11 PROCEDURE — 84132 ASSAY OF SERUM POTASSIUM: CPT | Performed by: HOSPITALIST

## 2023-11-11 RX ORDER — POTASSIUM CHLORIDE 20 MEQ/1
20 TABLET, EXTENDED RELEASE ORAL DAILY
Start: 2023-11-12

## 2023-11-11 RX ORDER — ATORVASTATIN CALCIUM 80 MG/1
80 TABLET, FILM COATED ORAL NIGHTLY
Start: 2023-11-11

## 2023-11-11 RX ORDER — CLOPIDOGREL BISULFATE 75 MG/1
75 TABLET ORAL DAILY
Start: 2023-11-12

## 2023-11-11 RX ORDER — POTASSIUM CHLORIDE 1.5 G/1.58G
40 POWDER, FOR SOLUTION ORAL EVERY 4 HOURS
Status: COMPLETED | OUTPATIENT
Start: 2023-11-11 | End: 2023-11-11

## 2023-11-11 RX ADMIN — LISINOPRIL 20 MG: 20 TABLET ORAL at 08:39

## 2023-11-11 RX ADMIN — ASPIRIN 81 MG: 81 TABLET, CHEWABLE ORAL at 08:39

## 2023-11-11 RX ADMIN — CLOPIDOGREL BISULFATE 75 MG: 75 TABLET, FILM COATED ORAL at 08:39

## 2023-11-11 RX ADMIN — NICOTINE 1 PATCH: 21 PATCH, EXTENDED RELEASE TRANSDERMAL at 08:39

## 2023-11-11 RX ADMIN — POTASSIUM CHLORIDE 40 MEQ: 1.5 FOR SOLUTION ORAL at 12:33

## 2023-11-11 RX ADMIN — POTASSIUM CHLORIDE 40 MEQ: 1.5 FOR SOLUTION ORAL at 06:14

## 2023-11-11 RX ADMIN — FUROSEMIDE 40 MG: 40 TABLET ORAL at 08:39

## 2023-11-11 RX ADMIN — POTASSIUM CHLORIDE 20 MEQ: 750 TABLET, EXTENDED RELEASE ORAL at 08:39

## 2023-11-11 RX ADMIN — CARVEDILOL 6.25 MG: 6.25 TABLET, FILM COATED ORAL at 08:39

## 2023-11-11 RX ADMIN — Medication 10 ML: at 08:39

## 2023-11-11 NOTE — DISCHARGE SUMMARY
Patient Name: Tigre Amaral  : 1950  MRN: [id number]    Date of Admission: 10/28/2023  Date of Discharge:  2023  Primary Care Physician: Provider, No Known      Chief Complaint:   Fall and Arm Injury      Discharge Diagnoses     Active Hospital Problems    Diagnosis  POA    **Cerebrovascular accident (CVA) due to embolism of right middle cerebral artery [I63.411]  Yes    Aspiration pneumonia [J69.0]  Unknown    Acute on chronic systolic congestive heart failure [I50.23]  Unknown    Hypertension [I10]  Unknown    Stage 3a chronic kidney disease [N18.31]  Unknown    Hyperlipidemia [E78.5]  Unknown    Carotid artery stenosis, symptomatic, right [I65.21]  Unknown    Tobacco abuse [Z72.0]  Unknown    Basal cell carcinoma (BCC) of skin of nose [C44.311]  Unknown      Resolved Hospital Problems   No resolved problems to display.        Hospital Course     Mr. Amaral is a 73 y.o. male with a history of EtOH dependency/tobacco abuse, TBI, PPM, hypertension and reported dementia who presented to [facility] initially complaining of left-sided weakness.  Please see the admitting history and physical for further details.  He was found to have abnormal head CT concerning for acute stroke and was admitted to the hospital for further evaluation and treatment.  Neurology was consulted.  There was initial concern whether this was acute stroke versus findings due to prior traumatic brain injury that occurred years ago when he was in the .  He did not meet criteria for any thrombolytics and was initially managed on antiplatelet therapy.  Results of testing outlined below.  Echocardiogram showed ejection fraction of 20%.  He did develop some respiratory distress and had to be transferred to the intensive care unit briefly and he did respond to diuresis. He was found to have numerous acute right hemispheric infarcts cortical and subcortical-watershed.  Moderate small vessel disease.  Chronic left  temporal infarct noted.  Both vascular team and endovascular teams consulted.    As his symptoms were felt secondary to acute right-sided CVA due to severe symptomatic SARAHY stenosis it was decided that he needed intervention on his carotid artery.  It was not felt he was candidate for CEA and neuro endovascular recommended and performed carotid stenting.  He has had a very stable postprocedural course.  It has been recommended he remain on DAPT and high intensity statin.    He was found to have atherosclerosis in his iliac arteries and superior mesenteric artery.  These are felt to be asymptomatic at this time and vascular surgery recommends outpatient surveillance and plan to follow-up with him in the outpatient setting.    He was counseled on the need to stop smoking.  He will follow-up with vascular surgery.  He will follow-up with stroke neurology in 90 days.      He has a large growth on the right side of his nose under his right eye that seems consistent with a skin cancer, probable basal cell.  I have discussed with him multiple times about this.  He is a very poor historian but it does not seem as if he has ever had this evaluated.  Explained to him that it likely is a form of skin cancer and that he should be seen by a dermatologist ASAP.  He says he gets his care primarily at the [facility] and will follow-up with them as soon as he can after discharge.       Day of Discharge     Subjective:  No new complaints.  Doing well.  Eager to go to rehab today.    Physical Exam:  Temp:  [97.3 °F (36.3 °C)-98.7 °F (37.1 °C)] 97.9 °F (36.6 °C)  Heart Rate:  [60-65] 60  Resp:  [18] 18  BP: (133-144)/(60-82) 133/76  Body mass index is 21.62 kg/m².  Physical Exam  Vitals and nursing note reviewed.   Constitutional:       General: He is not in acute distress.     Appearance: He is ill-appearing.   HENT:      Nose:      Comments: Large mass on the right side of his nose consistent with prior diagnosis basal cell  carcinoma  Cardiovascular:      Rate and Rhythm: Normal rate and regular rhythm.   Pulmonary:      Effort: Pulmonary effort is normal.      Breath sounds: Normal breath sounds.   Abdominal:      General: Bowel sounds are normal.      Palpations: Abdomen is soft.      Tenderness: There is no abdominal tenderness.   Musculoskeletal:         General: No swelling.   Skin:     General: Skin is warm and dry.   Neurological:      Mental Status: He is alert. Mental status is at baseline. He is disoriented.      Comments: Left-sided weakness         Consultants     Consult Orders (all) (From admission, onward)       Start     Ordered    11/07/23 0657  Inpatient Vascular Surgery Consult  Once        Specialty:  Vascular Surgery  Provider:  Penny Andres MD    11/07/23 0659    11/03/23 1047  Inpatient Neurointerventionalist Consult  Once        Provider:  (Not yet assigned)    11/03/23 1047    11/02/23 1627  Inpatient Vascular Surgery Consult  Once        Specialty:  Vascular Surgery  Provider:  Evan Ramos II, MD    11/02/23 1627    11/02/23 0807  Inpatient Pulmonology Consult  Once        Specialty:  Pulmonary Disease  Provider:  Magdiel Murray MD    11/02/23 0806    11/01/23 1940  Inpatient Cardiology Consult  Once        Specialty:  Cardiology  Provider:  Leo Mueller MD    11/01/23 1939    10/28/23 1615  Inpatient Neurology Consult Stroke  Once        Specialty:  Neurology  Provider:  (Not yet assigned)    10/28/23 1615    10/28/23 1615  Inpatient Rehab Admission Consult  Once        Provider:  (Not yet assigned)    10/28/23 1617    10/28/23 1615  Inpatient Diabetes Educator Consult  Once,   Status:  Canceled        Provider:  (Not yet assigned)    10/28/23 1617        Procedures     Cerebral DSA with SARAHY Origin Stent under Distal Protection  11/06/23  Sanjiv Spann MD       Imaging Results (All)       Procedure Component Value Units Date/Time    CT Head Without Contrast [[id number]] Collected:  11/07/23 1439     Updated: 11/08/23 0757    Narrative:      CT SCAN OF THE HEAD WITHOUT CONTRAST ON 11/07/2023     CLINICAL HISTORY: Stroke follow-up, patient had recent stenting of  high-grade narrowing in the cervical right internal carotid artery.      TECHNIQUE: Spiral CT images were obtained from the base of the skull to  the vertex without intravenous contrast. The images were reformatted and  are submitted in 3 mm thick axial, sagittal and coronal CT sections with  brain algorithm.      This is correlated to a prior head CT from [facility] on  11/02/2023 and MRI of the brain on 11/02/2023.     FINDINGS: There is some patchy low-density extending from the  periventricular into the subcortical white matter of the cerebral  hemispheres, consistent with mild-to-moderate small vessel disease.  There is a moderate size area of encephalomalacia tracking throughout  the lateral left temporal lobe into the anterior lateral left occipital  lobe compatible with an old lateral left temporal occipital infarct in  the left MCA territory that measures up to 8 x 3.5 x 3.5 cm in anterior  posterior, medial lateral and craniocaudal dimension and there is an  additional chronic infarct in the mid inferior lateral left frontal lobe  that is in the distribution of the mid and inferior lateral frontal  branches of the left MCA territory that measures 5.5 x 3.3 x 2.5 cm in  anterior posterior, craniocaudal and medial lateral dimension. There are  additional smaller chronic infarcts in the inferior lateral left  parietal lobe measuring 2 cm in the left MCA territory. On recent head  CT on 11/02/2023 there were multiple small nodular 4-10 mm slightly  hypodense acute to subacute infarcts in the right cerebral hemisphere  that were diffusion hyperintense on MRI of the brain 11/02/2023 with  over 30 separate 3-10 mm acute infarcts involving the superior right  frontal parietal parenchyma, superior right frontal parietal  subcortical  white matter and 2 in the right corona radiata region then involvement  of the lateral right frontal lobe and posterior lateral right parietal  lobe. These infarcts are very difficult to appreciate on this head CT  and are undoubtedly subacute infarcts that are nearly isodense to brain  on the current exam.  Many of them may have been in the watershed  territory between the right anterior and middle cerebral artery  territories although some of them could have been embolic infarcts in  the right MCA territory. The remainder of the brain parenchyma is normal  in attenuation.  There is mild cerebral atrophy and the ventricles are  upper limits of normal in size.  I see no midline shift.  No extra-axial  fluid collections are identified. There is no evidence of acute  intracranial hemorrhage. The calvarium and the skull base are normal in  appearance. The paranasal sinuses, mastoid air cells and middle ear  cavities are clear.  There is a soft tissue mass in the medial right  periorbital soft tissues that immediately abuts the right side of the  nasal bone and inferiorly abuts the medial aspect of the anterior wall  of the right maxillary sinus.  This mass measures 3.4 x 2.1 x 2.5 cm in  medial lateral, anterior posterior and craniocaudal dimension and is  concerning for a cutaneous malignancy with deep subcutaneous extension  contacting bony surfaces as described and it is unchanged since head CT  5 days ago on 11/02/2023.       Impression:      1.The patient has extensive areas of chronic infarction in the left  middle cerebral artery territory with an 8 x 3.5 x 3.5 cm chronic  lateral left temporal occipital infarct and an additional 5.5 x 3.3 x  2.5 cm old infarct in the mid to inferior lateral left frontal lobe and  a 2 cm old inferior left parietal infarct all in the left MCA territory.  2. On recent head CT and subsequent MRI of the brain 5 days ago on  11/02/2023 there were multiple over 30  hypodense 3-10 mm acute infarcts  studding the superior right frontal parietal parenchyma, superior right  frontal parietal cortex, subcortical white matter and several in the  right corona radiata region and several in the posterior lateral right  parietal lobe and lateral right frontal lobe.  Many of these lined up in  the watershed territory and could have been acute watershed territory  infarcts although some of the may have been in the right MCA territory.  These were diffusion hyperintense infarcts compatible with acute to  subacute infarcts on the MRI of the brain 11/02/2023 five days ago.  These subacute infarcts are difficult to appreciate on the current head  CT as they are nearly isodense to brain parenchyma.  3. There is stable soft tissue mass in the inferior medial right  periorbital soft tissues and tracking inferiorly anterior to the medial  aspect of the anterior wall of the right maxillary sinus and further  medially and contacting the lateral aspect of the right nasal bone.   This is compatible with a cutaneous malignancy with subcutaneous  extension.  It maximally measures 3.4 x 2.1 x 2.5 cm in medial lateral,  anterior posterior and craniocaudal dimension.   4. There is mild-to-moderate small vessel disease in the cerebral white  matter and mild diffuse cerebral atrophy.  The remainder of the head CT  is within normal limits. Specifically, no acute intracranial hemorrhage  is identified. Results communicated to Keith Spann MD, from  neurointerventional neurosurgery by telephone on 11/07/2023 at 2 p.m.     Radiation dose reduction techniques were utilized, including automated  exposure control and exposure modulation based on body size.        This report was finalized on 11/8/2023 7:54 AM by Dr. Jon Saldivar M.D  on Workstation: [id number]       CT Angio Abdominal Aorta Bilateral Iliofem Runoff [[id number]] Collected: 11/07/23 1354     Updated: 11/07/23 1424    Narrative:      CT ANGIOGRAM OF THE  ABDOMEN AND PELVIS WITH BILATERAL LOWER EXTREMITY  RUNOFF. MULTIPLE CORONAL, SAGITTAL, AND 3-D RECONSTRUCTIONS.     HISTORY: Stroke, peripheral arterial disease      TECHNIQUE: Radiation dose reduction techniques were utilized, including  automated exposure control and exposure modulation based on body size.   CT angiogram of the abdomen and pelvis with lower extremity runoff was  performed. Multiple coronal, sagittal, and 3-D reconstruction images  were obtained.     COMPARISON: None     FINDINGS:     Small to moderate bilateral pleural effusions with overlying pulmonary  opacification incompletely visualized. Presumed pacemaker wires are also  incompletely seen. There are no findings of small bowel obstruction. The  appendix is unremarkable. While this examination is not tailored for  detailed evaluation of the abdominal viscera due to contrast timing, no  gross abnormality is seen within the pancreas, spleen or adrenal glands.  Subcentimeter liver lesions are too small to characterize. Presumed  vicarious excretion into the gallbladder is present.     Subcentimeter renal lesions are too small to characterize. Larger  hypodense lesions demonstrating density less than 15 Hounsfield units  are likely benign per Bosniak 2019 criteria. There is no hydronephrosis.     No abdominal pelvic adenopathy by size criteria. There is colonic  diverticulosis. The bladder is decompressed by Almeida catheter and not  well evaluated. Small amount of ascites is present no free  intraperitoneal air is seen.     Generalized bone demineralization is present.     CT angiography abdomen and pelvis:  *  Long segment of severe stenosis within the proximal in mid superior  mesenteric artery secondary to calcified and noncalcified  atherosclerosis with reconstitution seen distally.  *  There is mild stenosis of the origin of the celiac artery secondary  to calcified noncalcified atherosclerosis.  *  Mild to moderate stenosis of the origins of  the bilateral renal  arteries is present. Short segment of moderate to severe stenosis of the  distal right renal artery secondary to noncalcified atherosclerosis.  *  Severe atherosclerosis present within the abdominal aorta. Segments  of mild to moderate stenosis are present within the bilateral common  iliac arteries, left greater than right.     Left lower extremity:  *  Significant atherosclerosis there is present within the left external  iliac artery. Short segment of severe stenosis within the mid to distal  aspect of the left external iliac artery secondary to calcified  noncalcified atherosclerosis.  *  There is mild to moderate stenosis throughout the course of the left  common femoral artery. The left profunda femoris artery is patent.  *  Scattered areas of atherosclerosis are present along the left  superficial femoral artery. There is a short segment of mild stenosis  within the distal left superficial femoral artery and a short segment of  moderate to severe stenosis as it projects in the area of the abductor  hiatus (image [phone]). The popliteal artery reconstitutes distally.  *  Atherosclerosis is present along the course of the left popliteal  artery resulting in short segments of mild to moderate stenosis, most  notably along its distal aspect.  *  The left anterior tibial and posterior tibial arteries are patent  into the foot. The left peroneal artery is more attenuated and not  definitively seen opacified with contrast distal to the ankle due to  slow flow versus occlusion.     Right lower extremity:  *  A right femoral artery catheter is present with tip terminating in  the distal abdominal aorta.  *  The right external iliac artery is occluded without definitive  opacification.  *   The right common femoral artery demonstrates moderate to severe  stenosis along its course. The right profunda femoris artery is patent.  *  The right superficial femoral artery is asymmetrically attenuated  and  becomes gradually nonopacified with contrast distally over a segment of  approximately 2.8 cm.  *  The right popliteal artery reconstitutes distal to the adductor  hiatus and demonstrates moderate to severe stenosis throughout its  course due to calcified noncalcified atherosclerosis.  *  The right anterior tibial, posterior tibial and peroneal arteries are  opacified with contrast approximately and becomes gradually nonopacified  and no longer visualized distal to the midportion of the right lower leg  at approximately the same level.       Impression:      1.  Severe atherosclerosis throughout the arterial vasculature of the  abdomen, pelvis and bilateral lower extremities with varying degrees of  stenosis as detailed above. Of note, the right external iliac artery  appears to be completely occluded, there is moderate to severe stenosis  throughout the course of the right femoral artery and a moderate length  segment of occlusion of the right superficial femoral artery. These  findings were discussed with Kori, the patient's nurse by telephone by  Addy Joseph at 2:15 p.m.   on   11/7/2023 . Additionally, the right  lower extremity arterial vasculature is not definitively seen opacified  distal to the mid right lower leg suggestive of slow flow given the  above findings; however, occlusion of any of these arteries cannot be  excluded.  2.  Long segment of severe stenosis of the superior mesenteric artery.  3.  Short segment of moderate to severe stenosis of the right distal  renal artery.  4.  Small to moderate bilateral pleural effusions with overlying  pulmonary pacification incompletely visualized. Small amount of ascites.  5.  Other findings as above.        This report was finalized on 11/7/2023 2:21 PM by Dr. Addy Joseph M.D  on Workstation: [id number]       IR Stent Cerv Carotid Without Emb Prot [[id number]] Collected: 11/06/23 1617     Updated: 11/06/23 1717    Narrative:      CEREBRAL ARTERIOGRAM  WITH RIGHT ICA ORIGIN STENT PLACEMENT UNDER DISTAL  PROTECTION performed on 11/6/2023.     CLINICAL HISTORY: 73-year-old male with history of hypertension,  hyperlipidemia, cardiac EF of 20%, coronary artery disease and tobacco  abuse. Patient had watershed infarcts in the right carotid distribution  on stroke work-up imaging and now presents for possible carotid stent  placement under distal protection.       TECHNIQUE: After obtaining informed consent, the patient was placed in a  supine position on the angiographic table and the right groin was then  prepped and draped in usual sterile fashion with ChloraPrep soap. Under  ultrasound guidance with permanent images recorded, a 4 Algerian  micro-puncture set was used to access the right common femoral artery  through a dermatotomy. Through this access a 6 Algerian 25 cm sheath was  positioned in the right groin for arterial blood pressure monitoring.  Using the micropuncture set again the right common femoral vein was  percutaneously accessed and a 4 Algerian sheath was positioned in this  vessel in the right groin for venous access. On the left, a 4 Algerian  micropuncture set was used to access the left common femoral artery  through a dermatotomy. Through this access an 8 Algerian sheath was  positioned within the left groin. Over an 035 Glidewire, a 6 Algerian  vertebral artery diagnostic catheter was placed coaxially into a BMX 96  guiding sheath and together these were placed into the circulation and  the following vessels were then selectively catheterized:     1. Right common carotid artery with digital subtraction imaging of the  cervical and intracranial runoff after contrast injection.  2. Right vertebral artery with digital subtraction imaging of the  cervical and intracranial runoff after contrast injection  3. Left common carotid artery with digital subtraction imaging of the  cervical and intracranial runoff after contrast injection.     Upon completion of the  diagnostic portion of the exam, the endovascular  procedure was performed as described below. At the conclusion of the  entire procedure, the catheters and sheath were removed with placement  of an 8 Setswana Angio-Seal on the left, but the right-sided arterial and  venous sheaths were left sutured in place for arterial blood pressure  monitoring and venous access. Once hemostasis was achieved on the left,  the appropriate dressing was applied. No immediate postprocedural  complications were encountered. General endotracheal anesthesia was  provided by the anesthesia department. Approximately 50.4 minutes of  fluoroscopy time were employed delivering an AK of 2126.48 mGy. 293 cc  of Visipaque 320 were injected. Maximal sterile barrier technique was  employed throughout the entire procedure according to current  guidelines.     Surgeon: Dr. Sanjiv Spann.     FINDINGS: The right common carotid artery injection demonstrates a  patent bifurcation occurring at the C3-4 level, but atherosclerotic  irregularity and narrowing is seen at the C2-3 level. This short segment  of the proximal internal carotid artery has a high-grade stenosis by  NASCET criteria present measuring 82% with slower runoff seen. The  intracranial vasculature shows a patent M1 segment with delayed filling  of the MCA runoff compared to the external carotid artery circulation. A  mildly hypoplastic A1 segment is also identified with some inflow  washout suggested from a patent anterior communicating artery. The MCA  and MIGUEL runoff is patent with no distal occlusion identified, but  delayed circulation time again seen. No aneurysms, hypervascular lesions  or evidence of AV shunting is appreciated.     The right vertebral artery injection demonstrates a dominant caliber and  a widely patent vertebrobasilar junction. Basilar artery is mildly  irregular but fairly normal in caliber. Basilar artery terminates into  P1 and P2 segments bilaterally as well as  a single left superior  cerebellar artery and duplicated right superior cerebellar artery.  Duplicated anterior inferior cerebellar artery is seen on the right as  well with a single left anterior inferior cerebellar artery. Dominant  posterior inferior cerebellar artery is seen. No inflow washout or  reflux into the left vertebral artery is seen at the VB junction. Patent  bilateral transcortical collaterals are seen to the distal MCA  territory.     The left internal carotid artery injection demonstrates a high-grade  stenosis measuring 78% by NASCET criteria over approximately 15 mm in  length. The atherosclerotic change and stenosis occurs at the C2-3 level  with the origin at the C3 level. A normal caliber and tortuous course is  seen in the cervical and intracranial segments. The M1 segment is  patent. The MCA runoff is otherwise normal in its arterial, capillary  and venous phases, but markedly delayed and circulation time. Transient  flash filling of the A1 segment is seen, but only intermittently due to  inflow washout. A small posterior communicating artery is identified.  The dural sinuses are patent and dominant on the right. No aneurysms,  hypervascular lesions or evidence of AV shunting is appreciated.     ENDOVASCULAR PROCEDURE:     Right ICA Origin Carotid Stent Placement under Distal Protection - Upon  completion of the diagnostic portion of the exam and with the BMX 96  guiding sheath in the cervical right common carotid artery, a  Traxcess-14 microguidewire was placed through the stenosis and  introduced into the cervical right ICA under roadmap conditions through  the guiding sheath. A Spider FX 5 mm distal protection device was then  placed over the Traxcess-14 and it was then removed and the distal  protection device wire became the working wire. Predilatation was then  achieved with a 5 x 20 mm Viatrack 14+ angioplasty balloon. A 7 x 30 mm  Cordis Precise Pro Rx stent was then deployed in the  proximal portion of  the cervical internal carotid artery repairing the stenosis. Post  dilatation was required secondary to a residual waist within the  midportion of the stent. This was achieved with a 6 x 20 mm Viatrack 14+  angioplasty balloon. No residual waist or stenosis was seen. Marked  improved flow intracranially was now present. Patient received Plavix  and aspirin preoperatively. IV heparin was also administered during the  procedure with bolus doses to maintain an ACT of 250 seconds or greater.  Dual antiplatelet therapy will be required for 3 - 6 months with  follow-up carotid Doppler ultrasound at 6 months.       Impression:      1.  82% stenosis of the right internal carotid artery just beyond its  origin. Successful carotid artery stent placement under distal  protection performed with no residual narrowing seen. Continuation of  dual antiplatelet therapy required with follow-up carotid Doppler  sonography as described above.  2. 78% stenosis of the left internal carotid artery and future stent  placement recommended in approximately 1 month.  3. Bilateral external iliac artery high-grade stenosis and  pre-occlusion. Vascular surgery consult recommended.     All carotid stenosis measurements were made using NASCET criteria.           This report was finalized on 11/6/2023 5:14 PM by Dr. Sanjiv Spann M.D on Workstation: [id number]       XR Chest 1 View [[id number]] Collected: 11/06/23 0703     Updated: 11/06/23 0707    Narrative:      ONE-VIEW PORTABLE CHEST AT 4:08 A.M.     HISTORY: Follow-up of pneumonia.     FINDINGS: The lungs are well expanded with resolution of what appears to  be patchy pneumonia in the lower right lung noted on the portable  examination of 11-23. No new abnormality is seen. The heart remains  mildly enlarged with a pacemaker in place.     This report was finalized on 11/6/2023 7:04 AM by Dr. J Luis Pozo M.D  on Workstation: [id number]       FL Video Swallow Single  Contrast [[id number]] Collected: 11/03/23 1721     Updated: 11/03/23 1724    Narrative:      VIDEO SWALLOW STUDY     HISTORY: Dysphagia.     Fluoroscopy was provided for the speech pathologist during a video  swallow study.     No aspiration was seen.       Impression:      Fluoroscopy was provided for the speech pathologist during a  video swallow study. For full details please see the speech pathology  report.     This report was finalized on 11/3/2023 5:21 PM by Dr. Addy Joseph M.D  on Workstation: [id number]       MRI Brain Without Contrast [[id number]] Collected: 11/02/23 1623     Updated: 11/02/23 1651    Narrative:      MRI EXAMINATION OF THE BRAIN WITHOUT CONTRAST     HISTORY: Left arm weakness.     COMPARISON: CT head 11/02/2023.     TECHNIQUE: A MRI examination of the brain was performed utilizing  sagittal T1, axial diffusion, T1, T2, T2 FLAIR and gradient echo T2  imaging.     FINDINGS:     The study is hampered by patient motion.     The diffusion sequence demonstrates multiple acute infarcts involving  the right cerebral hemisphere the majority of which are oriented in AP  dimension consistent with watershed infarcts.     There is expected flow void in the basilar artery and in the  supraclinoid ICAs bilaterally. A large area of encephalomalacia  involving the left temporal lobe is appreciated measuring approximately  7.8 cm in AP dimension. This extends to the anterior aspect of the left  parietal and occipital lobes into the inferior lateral aspect of the  left frontal lobe. Moderate small vessel ischemic disease is noted.     A mass involving the anterior aspect of the maxilla medially is  appreciated which extends medially over the nasal bone consistent with  the patient's known history of a basal cell carcinoma. This measures  approximately 3.3 x 1.9 cm in size.       Impression:      The study is significantly hampered by patient motion but  multiple acute infarcts involving the right frontal,  parietal and to a  lesser extent occipital and temporal lobes are appreciated including  both white matter and cortical infarcts. The majority of infarcts are  oriented in an AP dimension consistent with watershed infarcts.      The above information was called to and discussed with SUNG Munroe on 11/02/2023 at 1610 hours.     This report was finalized on 11/2/2023 4:48 PM by Dr. Abhinav Vidales M.D  on Workstation: [id number]       CT Head Without Contrast [[id number]] Collected: 11/02/23 1139     Updated: 11/02/23 1619    Narrative:      CT HEAD WITHOUT CONTRAST     HISTORY: Left arm weakness.     COMPARISON: CT head 10/29/2023 and 10/28/2023.     FINDINGS: A large area of encephalomalacia consistent with a remote  infarct is appreciated involving the left temporal lobe and to a lesser  extent inferior lateral aspect of the left frontal lobe and anterior  aspect of the left occipital lobe. There is volume loss with secondary  enlargement of the left lateral ventricle. Lacunar infarcts involving  the corona radiata on the right are appreciated, present previously.  There is no evidence of hemorrhage or a focal area of decreased  attenuation to suggest acute infarction.     There is a soft tissue mass overlying the maxilla medially and extending  over the nasal bone measuring approximately 3.3 x 1.9 cm in size. The  patient has a known basal cell carcinoma at this location.       Impression:      1.  A large remote left MCA distribution infarct is appreciated with  volume loss similar in appearance as compared to the prior examination.  Lacunar infarcts involving the corona radiata on the right are noted.  There is no evidence of acute infarction or intracranial hemorrhage.  Further evaluation could be performed with a MRI examination of the  brain as indicated.  2.  A 3.3 x 1.9 cm soft tissue mass is appreciated overlying the medial  aspect of the maxilla on the right and extending over the posterior  aspect of  the nasal bone on the right. The patient has a known basal  cell carcinoma at this location.     Radiation dose reduction techniques were utilized, including automated  exposure control and exposure modulation based on body size.        This report was finalized on 11/2/2023 4:16 PM by Dr. Abhinav Vidales M.D  on Workstation: [id number]       XR Chest 1 View [[id number]] Collected: 11/02/23 0818     Updated: 11/02/23 0824    Narrative:      Portable chest radiograph     HISTORY: Shortness of breath     TECHNIQUE: Single AP portable radiograph of the chest     COMPARISON: Chest radiograph 11/1/2023       Impression:      FINDINGS AND IMPRESSION:  Left pacer/AICD is present.     Worsening pulmonary vascular congestion is present. There is also  worsening bibasilar pulmonary pacification and interstitial thickening,  right greater than left. Findings are most concerning for worsening  multifocal pneumonia and/or pulmonary edema in the appropriate clinical  context. Correlation with patient history is recommended with follow-up  chest CT if clinically indicated. No pneumothorax is seen. Cardiac  silhouette is within normal limits for size.     This report was finalized on 11/2/2023 8:21 AM by Dr. Addy Joseph M.D  on Workstation: [id number]       XR Chest 1 View [[id number]] Collected: 11/01/23 1905     Updated: 11/01/23 1909    Narrative:      STAT PORTABLE RADIOGRAPHIC VIEW OF THE CHEST     CLINICAL HISTORY: Shortness of air.     FINDINGS:     Frontal projection of the chest demonstrates a left subclavian approach  pacer device with its leads unchanged in position when compared to the  prior study dated 10/28/2023. There is a lead that terminates in the  expected location of the right atrium and another within the expected  location of the right ventricle. Cardiomediastinal silhouette is stable  in appearance. Atherosclerotic calcifications are incidentally noted  within the aortic arch. There is pulmonary vascular  engorgement as well  as some interstitial indistinctness suggesting the possibility of mild  interstitial pulmonary edema. Please correlate with clinical data. No  significant osseous abnormality is evident.     This report was finalized on 11/1/2023 7:06 PM by Dr. Jeff Harrison M.D  on Workstation: [id number]       CT Angiogram Neck [[id number]] Collected: 10/29/23 1712     Updated: 10/29/23 1917    Narrative:      CT ANGIOGRAM OF THE HEAD AND NECK WITH CONTRAST     CLINICAL HISTORY: Left arm weakness. Follow-up stroke.     TECHNIQUE: CT angiogram of the head and neck was obtained with 1 mm  axial images following the administration of IV contrast. Sagittal,  coronal, and 3-dimensional reconstructed images were obtained.  Additionally, a CT scan of the head was obtained with 3 mm axial images  before and after the administration of IV contrast.     COMPARISON: CT head dated 10/28/2023.     FINDINGS:     CTA NECK: There is a bovine configuration of the aortic arch.  Atherosclerotic changes are identified within the aortic arch and great  vessel origins. There is mild-to-moderate degree of stenosis at the  origin of the left common carotid artery and a mild degree of stenosis  is seen within the innominate artery. Mild-to-moderate degrees of  stenoses are appreciated within the origins of the right subclavian  artery and right common carotid artery. The right vertebral artery is  dominant. A mild-to-moderate degree of stenosis is appreciated at the  origin of the right vertebral artery. There is a 60-70% NASCET stenosis  within the proximal portion of the right ICA secondary to a calcified  and noncalcified atherosclerotic plaque. A mild-to-moderate degree of  stenosis is appreciated within the common carotid arteries bilaterally.  Within the proximal portion of the left ICA, there is an approximate  70-80% NASCET stenosis secondary to mixed calcified and noncalcified  atherosclerotic plaque. There is a severe degree of  stenosis at the  origin of the left vertebral artery. A moderate degree of stenosis is  appreciated within the V3 segment of the left vertebral artery.     CTA HEAD: The right vertebral artery is dominant. The post PICA segment  of the left vertebral artery is very diminutive in size and there is  mild atherosclerotic irregularity involving the junction of the V3 and  V4 segments of the left vertebral artery. The basilar artery is  unremarkable. The posterior cerebral arteries are remarkable for  moderate degrees of stenoses within both P2 segments. There is  atherosclerotic change resulting in mild-to-moderate irregularity and  stenosis of the left cavernous ICA. There is up to a moderate degree of  stenosis involving the supraclinoid segment of the right ICA and a  mild-to-moderate degree of stenosis is appreciated within the cavernous  segment of the right ICA. The middle and anterior cerebral arteries are  otherwise unremarkable.     Again appreciated are age-indeterminate infarcts within the right  frontal and parietal lobe within the right MCA distribution. The largest  of these infarcts measures up to approximately 1.1 cm in diameter on  this examination. Also, there is an age-indeterminate infarct within the  right occipital lobe that is either within the right MCA or right PCA  distribution. This infarct is approximately 1 cm in diameter. Again,  further temporal characterization with MR imaging is suggested. When  compared to the prior head CT performed yesterday, there is no  convincing interval change.     Again noted are extensive areas of chronic infarction within the lateral  aspect of the left frontal, parietal, temporal, and occipital lobes that  are within the left MCA distribution. Again, these foci of chronic  infarctions measure approximately 10.1 x 2.5 cm in greatest axial  dimensions. Additional smaller chronic infarcts are noted within the  left lentiform nucleus within lateral  lenticulostriate distribution.     Incidental note is made of a soft tissue mass within the right  premaxillary soft tissues that extends into the soft tissues along the  lateral aspect of the nose measuring up to 3.3 x 1.9 cm in greatest  axial dimensions that is compatible with a known basal cell carcinoma.       Impression:         There are small age-indeterminate infarcts noted within the right  frontal and parietal lobes within the right MCA distribution.  Additionally, a small age-indeterminate infarct is seen within the right  occipital lobe that is either within the right MCA or right PCA  distribution. Further temporal characterization with MR imaging is  suggested.     There is a large chronic infarct within the lateral aspect left frontal,  parietal, temporal, and occipital lobes within the left MCA distribution  and chronic infarcts are noted within the left lentiform nucleus within  the lateral lenticulostriate distribution.     There is an approximate 60-70% NASCET stenosis within the proximal  portion of the right ICA and an approximate 70-80% NASCET stenosis  within the proximal portion of the left ICA secondary to mixed calcified  and noncalcified atherosclerotic plaques.     There is a severe degree of stenosis at the origin of the left vertebral  artery and a moderate degree of stenosis is seen within the V3 segment  of the left vertebral artery.     The remaining intracranial and extracranial atherosclerotic changes are  as discussed in detail above.        Radiation dose reduction techniques were utilized, including automated  exposure control and exposure modulation based on body size.     This report was finalized on 10/29/2023 7:14 PM by Dr. Jeff Harrison M.D  on Workstation: [id number]       CT Angiogram Head [[id number]] Collected: 10/29/23 1712     Updated: 10/29/23 1917    Narrative:      CT ANGIOGRAM OF THE HEAD AND NECK WITH CONTRAST     CLINICAL HISTORY: Left arm weakness. Follow-up  stroke.     TECHNIQUE: CT angiogram of the head and neck was obtained with 1 mm  axial images following the administration of IV contrast. Sagittal,  coronal, and 3-dimensional reconstructed images were obtained.  Additionally, a CT scan of the head was obtained with 3 mm axial images  before and after the administration of IV contrast.     COMPARISON: CT head dated 10/28/2023.     FINDINGS:     CTA NECK: There is a bovine configuration of the aortic arch.  Atherosclerotic changes are identified within the aortic arch and great  vessel origins. There is mild-to-moderate degree of stenosis at the  origin of the left common carotid artery and a mild degree of stenosis  is seen within the innominate artery. Mild-to-moderate degrees of  stenoses are appreciated within the origins of the right subclavian  artery and right common carotid artery. The right vertebral artery is  dominant. A mild-to-moderate degree of stenosis is appreciated at the  origin of the right vertebral artery. There is a 60-70% NASCET stenosis  within the proximal portion of the right ICA secondary to a calcified  and noncalcified atherosclerotic plaque. A mild-to-moderate degree of  stenosis is appreciated within the common carotid arteries bilaterally.  Within the proximal portion of the left ICA, there is an approximate  70-80% NASCET stenosis secondary to mixed calcified and noncalcified  atherosclerotic plaque. There is a severe degree of stenosis at the  origin of the left vertebral artery. A moderate degree of stenosis is  appreciated within the V3 segment of the left vertebral artery.     CTA HEAD: The right vertebral artery is dominant. The post PICA segment  of the left vertebral artery is very diminutive in size and there is  mild atherosclerotic irregularity involving the junction of the V3 and  V4 segments of the left vertebral artery. The basilar artery is  unremarkable. The posterior cerebral arteries are remarkable for  moderate  degrees of stenoses within both P2 segments. There is  atherosclerotic change resulting in mild-to-moderate irregularity and  stenosis of the left cavernous ICA. There is up to a moderate degree of  stenosis involving the supraclinoid segment of the right ICA and a  mild-to-moderate degree of stenosis is appreciated within the cavernous  segment of the right ICA. The middle and anterior cerebral arteries are  otherwise unremarkable.     Again appreciated are age-indeterminate infarcts within the right  frontal and parietal lobe within the right MCA distribution. The largest  of these infarcts measures up to approximately 1.1 cm in diameter on  this examination. Also, there is an age-indeterminate infarct within the  right occipital lobe that is either within the right MCA or right PCA  distribution. This infarct is approximately 1 cm in diameter. Again,  further temporal characterization with MR imaging is suggested. When  compared to the prior head CT performed yesterday, there is no  convincing interval change.     Again noted are extensive areas of chronic infarction within the lateral  aspect of the left frontal, parietal, temporal, and occipital lobes that  are within the left MCA distribution. Again, these foci of chronic  infarctions measure approximately 10.1 x 2.5 cm in greatest axial  dimensions. Additional smaller chronic infarcts are noted within the  left lentiform nucleus within lateral lenticulostriate distribution.     Incidental note is made of a soft tissue mass within the right  premaxillary soft tissues that extends into the soft tissues along the  lateral aspect of the nose measuring up to 3.3 x 1.9 cm in greatest  axial dimensions that is compatible with a known basal cell carcinoma.       Impression:         There are small age-indeterminate infarcts noted within the right  frontal and parietal lobes within the right MCA distribution.  Additionally, a small age-indeterminate infarct is seen  within the right  occipital lobe that is either within the right MCA or right PCA  distribution. Further temporal characterization with MR imaging is  suggested.     There is a large chronic infarct within the lateral aspect left frontal,  parietal, temporal, and occipital lobes within the left MCA distribution  and chronic infarcts are noted within the left lentiform nucleus within  the lateral lenticulostriate distribution.     There is an approximate 60-70% NASCET stenosis within the proximal  portion of the right ICA and an approximate 70-80% NASCET stenosis  within the proximal portion of the left ICA secondary to mixed calcified  and noncalcified atherosclerotic plaques.     There is a severe degree of stenosis at the origin of the left vertebral  artery and a moderate degree of stenosis is seen within the V3 segment  of the left vertebral artery.     The remaining intracranial and extracranial atherosclerotic changes are  as discussed in detail above.        Radiation dose reduction techniques were utilized, including automated  exposure control and exposure modulation based on body size.     This report was finalized on 10/29/2023 7:14 PM by Dr. Jeff Harrison M.D  on Workstation: [id number]       CT Head Without Contrast Stroke Protocol [[id number]] Collected: 10/28/23 1524     Updated: 10/28/23 1605    Narrative:      CT HEAD WITHOUT CONTRAST     CLINICAL HISTORY: Left arm weakness. Status post fall.     TECHNIQUE: CT scan of the head was obtained with 3 mm axial soft tissue  and 2 mm axial bone algorithm algorithm images. No intravenous contrast  was administered. Sagittal and coronal reconstructions were obtained.     COMPARISON: No previous similar studies are available for comparison.     FINDINGS:       There is no evidence for a calvarial fracture. There is no evidence for  an acute extra-axial hemorrhage.     The ventricles, sulci, and cisterns are age-appropriate. There are  extensive areas of chronic  infarction identified within the lateral  aspect of the left frontal, parietal, temporal, and occipital lobes  within the left MCA distribution. These foci of encephalomalacia measure  up to approximately 10.1 x 2.5 cm in greatest axial dimensions. Small  chronic infarcts within the left lentiform nucleus are within the  lateral lenticulostriate distribution. Also, there is a focus of  age-indeterminate infarction within the superior aspect of the right  parietal lobe within the right MCA distribution measuring up to  approximately 5 mm in diameter. Another age-indeterminate infarct is  seen within the right MCA distribution involving the right precentral  gyrus measuring up to 9 mm in diameter. Also, there is an  age-indeterminate infarct within the right occipital lobe that measures  up to 1 cm in diameter that is either within the right MCA or right PCA  distribution. Further temporal characterization with MR imaging is  suggested.     There is a soft tissue mass within the right premaxillary soft tissues  that extends into the soft tissues along the right lateral aspect of the  nose and this measures up to 3.3 x 1.9 cm in greatest axial dimensions  and is compatible with a known basal cell carcinoma.     Incidental note is made of a fracture of the right orbital floor that is  likely chronic in nature although correlation with clinical data is  suggested       Impression:         There is no evidence for acute traumatic intracranial pathology.  However, there are small age-indeterminate infarcts noted within the  right frontal and parietal lobes that could potentially be acute to  subacute in nature. These are within the right MCA distribution.  Additionally and similarly, there is an age-indeterminate infarct within  the right occipital lobe that is either within the right MCA or right  PCA distribution. Further temporal characterization is recommended with  MR imaging.     There is a large chronic infarct  within the lateral aspect of the left  cerebral hemisphere that is within the left MCA distribution and this  involves the lateral aspect of the left frontal, parietal, temporal, and  occipital lobes.     There is a 3.3 x 1.9 cm soft tissue mass within the right premaxillary  soft tissues that extends into the soft tissues along the right lateral  aspect of the nose and this is compatible with a known basal cell  carcinoma.     Incidental note is made of a fracture of the right orbital floor that is  likely chronic in nature although correlation with clinical data is  suggested.     These findings and recommendations were discussed with Dr. Niko Rivas  on 10/28/2023 at approximately 3:15 p.m.     Radiation dose reduction techniques were utilized, including automated  exposure control and exposure modulation based on body size.     This report was finalized on 10/28/2023 4:02 PM by Dr. Jeff Harrison M.D  on Workstation: [id number]       XR Chest 1 View [[id number]] Collected: 10/28/23 1506     Updated: 10/28/23 1510    Narrative:      PORTABLE CHEST X-RAY AND LEFT SHOULDER X-RAY     HISTORY: Fall, arm pain. Stroke     Portable chest x-ray is provided. A total of 3 images of the left  shoulder also provided. Correlation: None.     FINDINGS: The cardiomediastinal silhouette is normal. The lungs are  clear. The costophrenic sulci are dry and the bones appear normal. There  is no pneumothorax. Dual-lead cardiac pacer/defibrillator.     No osseous, articular, or soft tissue abnormality identified at the left  shoulder.       Impression:      Cardiac defibrillator in place. Otherwise negative x-rays of  the chest and the left shoulder.     This report was finalized on 10/28/2023 3:07 PM by Dr. Jhonny Charles M.D on Workstation: [id number]       XR Shoulder 2+ View Left [[id number]] Collected: 10/28/23 1506     Updated: 10/28/23 1510    Narrative:      PORTABLE CHEST X-RAY AND LEFT SHOULDER X-RAY     HISTORY: Fall, arm pain.  Stroke     Portable chest x-ray is provided. A total of 3 images of the left  shoulder also provided. Correlation: None.     FINDINGS: The cardiomediastinal silhouette is normal. The lungs are  clear. The costophrenic sulci are dry and the bones appear normal. There  is no pneumothorax. Dual-lead cardiac pacer/defibrillator.     No osseous, articular, or soft tissue abnormality identified at the left  shoulder.       Impression:      Cardiac defibrillator in place. Otherwise negative x-rays of  the chest and the left shoulder.     This report was finalized on 10/28/2023 3:07 PM by Dr. Jhonny Charles M.D on Workstation: [id number]             Results for orders placed during the hospital encounter of 10/28/23    Doppler Ankle Brachial Index Single Level CAR    Interpretation Summary    Right Conclusion: The right JOEPSH is severely reduced. Severe digital ischemia.    Left Conclusion: The left JOESPH is mildly reduced. Mild digital ischemia.    Results for orders placed during the hospital encounter of 10/28/23    Adult transthoracic echo complete    Interpretation Summary    Left ventricular systolic function is severely decreased. Calculated left ventricular EF = 21.6% Left ventricular ejection fraction appears to be less than 20%.The following left ventricular wall segments are hypokinetic: mid anterolateral, basal inferolateral, mid inferolateral, mid inferior, basal inferoseptal, mid inferoseptal, mid anteroseptal, basal inferior and basal inferoseptal. The following left ventricular wall segments are akinetic: apical anterior, apical lateral, apical inferior, apical septal and apex.    Left ventricular wall thickness is consistent with mild concentric hypertrophy.    Left ventricular diastolic function is consistent with (grade II w/high LAP) pseudonormalization.    The left atrial cavity is mildly dilated.    Saline test results are negative for right to left atrial level shunt.    Estimated right ventricular  "systolic pressure from tricuspid regurgitation is mildly elevated (35-45 mmHg).    Pertinent Labs     Results from last 7 days   Lab Units 11/10/23  1116   WBC 10*3/mm3 10.10   HEMOGLOBIN g/dL 12.9*   PLATELETS 10*3/mm3 289     Results from last 7 days   Lab Units 11/11/23  0128 11/10/23  1115 11/08/23  0806 11/07/23 2052 11/07/23 0352 11/06/23  1800   SODIUM mmol/L  --  137  --   --  140 143   POTASSIUM mmol/L 3.4* 3.0* 3.7 3.4* 3.0*  3.0* 3.3*   CHLORIDE mmol/L  --  101  --   --  112* 108*   CO2 mmol/L  --  26.4  --   --  19.9* 21.8*   BUN mg/dL  --  8  --   --  12 15   CREATININE mg/dL  --  1.22  --   --  0.94 1.12   GLUCOSE mg/dL  --  112*  --   --  112* 97   EGFR mL/min/1.73  --  61.8  --   --  84.5 68.5       Results from last 7 days   Lab Units 11/10/23  1115 11/07/23 0352 11/06/23  1800   CALCIUM mg/dL 8.5* 6.8* 8.0*   MAGNESIUM mg/dL  --  1.6  --                Invalid input(s): \"LDLCALC\"          Test Results Pending at Discharge       Discharge Details        Discharge Medications        New Medications        Instructions Start Date   atorvastatin 80 MG tablet  Commonly known as: LIPITOR   80 mg, Oral, Nightly      clopidogrel 75 MG tablet  Commonly known as: PLAVIX   75 mg, Oral, Daily   Start Date: November 12, 2023     potassium chloride 20 MEQ CR tablet  Commonly known as: K-DUR,KLOR-CON   20 mEq, Oral, Daily   Start Date: November 12, 2023            Continue These Medications        Instructions Start Date   aspirin 81 MG EC tablet   81 mg, Oral, Daily      carvedilol 6.25 MG tablet  Commonly known as: COREG   6.25 mg, Oral, 2 Times Daily With Meals      furosemide 40 MG tablet  Commonly known as: LASIX   40 mg, Oral, Daily      lisinopril 20 MG tablet  Commonly known as: PRINIVIL,ZESTRIL   20 mg, Oral, Daily               No Known Allergies    Discharge Disposition:  Rehab Facility or Unit (DC - External)      Discharge Diet:  Diet Order   Procedures    Diet: Cardiac Diets; Healthy Heart (2-3 " Na+); Texture: Soft to Chew (NDD 3); Soft to Chew: Chopped Meat; Fluid Consistency: Thin (IDDSI 0)       Discharge Activity:   Activity Instructions       Activity as Tolerated              CODE STATUS:    Code Status and Medical Interventions:   Ordered at: 10/28/23 1615     Code Status (Patient has no pulse and is not breathing):    CPR (Attempt to Resuscitate)     Medical Interventions (Patient has pulse or is breathing):    Full       Future Appointments   Date Time Provider Department Center   11/28/2023  9:00 AM Sanjiv Spann MD [facility]     Additional Instructions for the Follow-ups that You Need to Schedule       Discharge Follow-up with PCP   As directed       Currently Documented PCP:    Provider, No Known    PCP Phone Number:    [phone]     Follow Up Details: 1 to 2 weeks (or sooner if problems)               Contact information for follow-up providers       Kayla Fowler APRN Follow up in 3 month(s).    Specialties: Neurology, Nurse Practitioner, Emergency Medicine  Contact information:  [address] [facility] [address]  [place] [id number] [phone]               Penny Andres MD Follow up in 3 month(s).    Specialty: Vascular Surgery  Contact information:  [address] [facility] [address]  [facility] [address]  [phone]               Provider, No Known .    Why: 1 to 2 weeks (or sooner if problems)  Contact information:  [facility] [id number] [phone]                       Contact information for after-discharge care       Destination       [facility] .    Service: Inpatient Rehabilitation  Contact information:  [address] [place] [id number] [phone]                                   Additional Instructions for the Follow-ups that You Need to Schedule       Discharge Follow-up with PCP   As directed       Currently Documented PCP:    Provider, No Known    PCP Phone Number:    [phone]     Follow Up Details: 1 to 2 weeks  (or sooner if problems)            Time Spent on Discharge:  Greater than 30 minutes      Jose Mendiola MD  [facility]  11/11/23  09:16 EST

## 2023-11-13 NOTE — CASE MANAGEMENT/SOCIAL WORK
Case Management Discharge Note      Final Note: [facility] acute rehab. chaya rn/ccp         Selected Continued Care - Discharged on 11/11/2023 Admission date: 10/28/2023 - Discharge disposition: Rehab Facility or Unit (DC - External)      Destination Coordination complete.      Service Provider Selected Services Address Phone Fax Patient Preferred    Critical access hospital Inpatient Rehabilitation [address] [facility] [address] [phone] --              Durable Medical Equipment    No services have been selected for the patient.                Dialysis/Infusion    No services have been selected for the patient.                Home Medical Care    No services have been selected for the patient.                Therapy    No services have been selected for the patient.                Community Resources    No services have been selected for the patient.                Community & DME    No services have been selected for the patient.                    Transportation Services  W/C Van: [facility]    Final Discharge Disposition Code: 62 - inpatient rehab facility

## 2023-12-13 ENCOUNTER — TELEPHONE (OUTPATIENT)
Dept: NEUROSURGERY | Facility: CLINIC | Age: 73
End: 2023-12-13

## 2023-12-13 ENCOUNTER — TELEPHONE (OUTPATIENT)
Dept: NEUROSURGERY | Facility: CLINIC | Age: 73
End: 2023-12-13
Payer: OTHER GOVERNMENT

## 2023-12-13 NOTE — TELEPHONE ENCOUNTER
Hub staff attempted to follow warm transfer process and was unsuccessful     Caller: Rola Jaime    Relationship to patient: Emergency Contact    Best call back number: [phone]    Patient is needing: PER TBOY VELA IS UNAVAILABLE. PLEASE CALL BACK TO [facility]; NUMBER HAS BEEN UPDATED IN CHART.

## 2023-12-13 NOTE — TELEPHONE ENCOUNTER
LM for patient to see if she could come in earlier for her appt with Dr. Spann tomorrow morning at 10 or 10:30. Asked that she call me back.

## 2024-01-02 ENCOUNTER — OFFICE VISIT (OUTPATIENT)
Dept: NEUROSURGERY | Facility: CLINIC | Age: 74
End: 2024-01-02
Payer: OTHER GOVERNMENT

## 2024-01-02 VITALS
BODY MASS INDEX: 21.52 KG/M2 | SYSTOLIC BLOOD PRESSURE: 104 MMHG | DIASTOLIC BLOOD PRESSURE: 62 MMHG | HEIGHT: 68 IN | WEIGHT: 142 LBS | RESPIRATION RATE: 20 BRPM

## 2024-01-02 DIAGNOSIS — I65.22 CAROTID STENOSIS, ASYMPTOMATIC, LEFT: ICD-10-CM

## 2024-01-02 DIAGNOSIS — I10 HYPERTENSION, UNSPECIFIED TYPE: ICD-10-CM

## 2024-01-02 DIAGNOSIS — E78.5 HYPERLIPIDEMIA, UNSPECIFIED HYPERLIPIDEMIA TYPE: ICD-10-CM

## 2024-01-02 DIAGNOSIS — Z72.0 TOBACCO ABUSE: ICD-10-CM

## 2024-01-02 DIAGNOSIS — I65.21 CAROTID ARTERY STENOSIS, SYMPTOMATIC, RIGHT: Primary | ICD-10-CM

## 2024-01-02 RX ORDER — CLOPIDOGREL BISULFATE 75 MG/1
75 TABLET ORAL DAILY
Qty: 30 TABLET | Refills: 12
Start: 2024-01-02 | End: 2024-01-05 | Stop reason: SDUPTHER

## 2024-01-02 RX ORDER — ATORVASTATIN CALCIUM 80 MG/1
80 TABLET, FILM COATED ORAL NIGHTLY
Qty: 30 TABLET | Refills: 12
Start: 2024-01-02 | End: 2024-01-05 | Stop reason: SDUPTHER

## 2024-01-04 ENCOUNTER — TELEPHONE (OUTPATIENT)
Dept: NEUROSURGERY | Facility: CLINIC | Age: 74
End: 2024-01-04
Payer: OTHER GOVERNMENT

## 2024-01-05 RX ORDER — CLOPIDOGREL BISULFATE 75 MG/1
75 TABLET ORAL DAILY
Qty: 30 TABLET | Refills: 12 | Status: SHIPPED | OUTPATIENT
Start: 2024-01-05

## 2024-01-05 RX ORDER — ATORVASTATIN CALCIUM 80 MG/1
80 TABLET, FILM COATED ORAL NIGHTLY
Qty: 30 TABLET | Refills: 12 | Status: SHIPPED | OUTPATIENT
Start: 2024-01-05

## 2024-01-05 NOTE — TELEPHONE ENCOUNTER
Rx Refill Note  Requested Prescriptions     Pending Prescriptions Disp Refills    atorvastatin (LIPITOR) 80 MG tablet       Sig: Take 1 tablet by mouth Every Night.    clopidogrel (PLAVIX) 75 MG tablet       Sig: Take 1 tablet by mouth Daily.      Last office visit with prescribing clinician: 1/2/2024   Last telemedicine visit with prescribing clinician: Visit date not found   Next office visit with prescribing clinician: Visit date not found                         Would you like a call back once the refill request has been completed: [] Yes [] No    If the office needs to give you a call back, can they leave a voicemail: [] Yes [] No    Nichelle Tenorio MA  01/05/24, 11:23 EST

## 2024-01-05 NOTE — TELEPHONE ENCOUNTER
Called patient left vm. Needing to confirm which pharmacy they would like to pick medication up from?

## 2024-01-05 NOTE — TELEPHONE ENCOUNTER
Caller: Rola Jaime    Relationship: Emergency Contact    Best call back number: [phone]    What is the best time to reach you: ANYTIME    Who are you requesting to speak with (clinical staff, provider,  specific staff member): CLINICAL STAFF    Do you know the name of the person who called: NA    What was the call regarding: PATIENTS STEP-DAUGHTER CALLED AND STATES THAT [facility] STATES THEY ARE WAITING FOR DOCTOR APPROVAL/ AUTHORIZATION FOR THE MEDICATIONS atorvastatin (LIPITOR) 80 MG tablet clopidogrel (PLAVIX) 75 MG tablet  PTS DAUGHTER STATES THAT HE IS NEEDING THE MEDICATION BUT SHE IS UNABLE TO PICK IT UP-PLEASE CONTACT PATIENT CONTACT TO [facility] THANK YOU PT HAS BEEN OUT OF MEDICATION FOR 8 DAYS     Is it okay if the provider responds through MyChart: NO

## 2024-02-23 NOTE — PROGRESS NOTES
02/26/24 0001   Pre-Procedure Phone Call   Procedure Time Verified Yes   Arrival Time 0700   Procedure Location Verified Yes   Medical History Reviewed No   NPO Status Reinforced Yes   Ride and Caregiver Arranged N/A   Patient Knows to Bring Current Medications Yes   Bring Outside Films Requested No

## 2024-02-26 ENCOUNTER — ANESTHESIA (OUTPATIENT)
Dept: INTERVENTIONAL RADIOLOGY/VASCULAR | Facility: HOSPITAL | Age: 74
DRG: 036 | End: 2024-02-26
Payer: OTHER GOVERNMENT

## 2024-02-26 ENCOUNTER — HOSPITAL ENCOUNTER (INPATIENT)
Dept: INTERVENTIONAL RADIOLOGY/VASCULAR | Facility: HOSPITAL | Age: 74
LOS: 1 days | Discharge: HOME HEALTH | DRG: 036 | End: 2024-02-27
Attending: RADIOLOGY | Admitting: RADIOLOGY
Payer: OTHER GOVERNMENT

## 2024-02-26 DIAGNOSIS — I10 HYPERTENSION, UNSPECIFIED TYPE: ICD-10-CM

## 2024-02-26 DIAGNOSIS — Z72.0 TOBACCO ABUSE: ICD-10-CM

## 2024-02-26 DIAGNOSIS — E78.5 HYPERLIPIDEMIA, UNSPECIFIED HYPERLIPIDEMIA TYPE: ICD-10-CM

## 2024-02-26 PROBLEM — I65.22 STENOSIS OF LEFT CAROTID ARTERY: Status: ACTIVE | Noted: 2024-02-26

## 2024-02-26 LAB
ANION GAP SERPL CALCULATED.3IONS-SCNC: 12.2 MMOL/L (ref 5–15)
BASOPHILS # BLD AUTO: 0.11 10*3/MM3 (ref 0–0.2)
BASOPHILS NFR BLD AUTO: 1.2 % (ref 0–1.5)
BUN SERPL-MCNC: 22 MG/DL (ref 8–23)
BUN/CREAT SERPL: 14.2 (ref 7–25)
CALCIUM SPEC-SCNC: 9 MG/DL (ref 8.6–10.5)
CHLORIDE SERPL-SCNC: 96 MMOL/L (ref 98–107)
CO2 SERPL-SCNC: 29.8 MMOL/L (ref 22–29)
CREAT SERPL-MCNC: 1.55 MG/DL (ref 0.76–1.27)
DEPRECATED RDW RBC AUTO: 36.8 FL (ref 37–54)
EGFRCR SERPLBLD CKD-EPI 2021: 46.4 ML/MIN/1.73
EOSINOPHIL # BLD AUTO: 0.43 10*3/MM3 (ref 0–0.4)
EOSINOPHIL NFR BLD AUTO: 4.6 % (ref 0.3–6.2)
ERYTHROCYTE [DISTWIDTH] IN BLOOD BY AUTOMATED COUNT: 11.9 % (ref 12.3–15.4)
GLUCOSE BLDC GLUCOMTR-MCNC: 136 MG/DL (ref 70–130)
GLUCOSE SERPL-MCNC: 119 MG/DL (ref 65–99)
HCT VFR BLD AUTO: 39.2 % (ref 37.5–51)
HGB BLD-MCNC: 13 G/DL (ref 13–17.7)
IMM GRANULOCYTES # BLD AUTO: 0.02 10*3/MM3 (ref 0–0.05)
IMM GRANULOCYTES NFR BLD AUTO: 0.2 % (ref 0–0.5)
LYMPHOCYTES # BLD AUTO: 3.29 10*3/MM3 (ref 0.7–3.1)
LYMPHOCYTES NFR BLD AUTO: 34.9 % (ref 19.6–45.3)
MAGNESIUM SERPL-MCNC: 2 MG/DL (ref 1.6–2.4)
MCH RBC QN AUTO: 28.3 PG (ref 26.6–33)
MCHC RBC AUTO-ENTMCNC: 33.2 G/DL (ref 31.5–35.7)
MCV RBC AUTO: 85.4 FL (ref 79–97)
MONOCYTES # BLD AUTO: 0.74 10*3/MM3 (ref 0.1–0.9)
MONOCYTES NFR BLD AUTO: 7.8 % (ref 5–12)
NEUTROPHILS NFR BLD AUTO: 4.84 10*3/MM3 (ref 1.7–7)
NEUTROPHILS NFR BLD AUTO: 51.3 % (ref 42.7–76)
NRBC BLD AUTO-RTO: 0 /100 WBC (ref 0–0.2)
PA ADP PRP-ACNC: 9 PRU (ref 194–418)
PLATELET # BLD AUTO: 306 10*3/MM3 (ref 140–450)
PMV BLD AUTO: 9.5 FL (ref 6–12)
POTASSIUM SERPL-SCNC: 3 MMOL/L (ref 3.5–5.2)
RBC # BLD AUTO: 4.59 10*6/MM3 (ref 4.14–5.8)
SODIUM SERPL-SCNC: 138 MMOL/L (ref 136–145)
WBC NRBC COR # BLD AUTO: 9.43 10*3/MM3 (ref 3.4–10.8)

## 2024-02-26 PROCEDURE — 25010000002 NICARDIPINE 2.5 MG/ML SOLUTION: Performed by: ANESTHESIOLOGY

## 2024-02-26 PROCEDURE — 25010000002 SUGAMMADEX 200 MG/2ML SOLUTION: Performed by: ANESTHESIOLOGY

## 2024-02-26 PROCEDURE — C1725 CATH, TRANSLUMIN NON-LASER: HCPCS

## 2024-02-26 PROCEDURE — 25010000002 HEPARIN (PORCINE) PER 1000 UNITS: Performed by: ANESTHESIOLOGY

## 2024-02-26 PROCEDURE — 25810000003 SODIUM CHLORIDE 0.9 % SOLUTION: Performed by: RADIOLOGY

## 2024-02-26 PROCEDURE — C1887 CATHETER, GUIDING: HCPCS

## 2024-02-26 PROCEDURE — C1884 EMBOLIZATION PROTECT SYST: HCPCS

## 2024-02-26 PROCEDURE — 37215 TRANSCATH STENT CCA W/EPS: CPT | Performed by: RADIOLOGY

## 2024-02-26 PROCEDURE — 25810000003 LACTATED RINGERS PER 1000 ML: Performed by: ANESTHESIOLOGY

## 2024-02-26 PROCEDURE — 25010000002 PROPOFOL 10 MG/ML EMULSION: Performed by: ANESTHESIOLOGY

## 2024-02-26 PROCEDURE — C1760 CLOSURE DEV, VASC: HCPCS

## 2024-02-26 PROCEDURE — B314YZZ FLUOROSCOPY OF LEFT COMMON CAROTID ARTERY USING OTHER CONTRAST: ICD-10-PCS | Performed by: RADIOLOGY

## 2024-02-26 PROCEDURE — B316YZZ FLUOROSCOPY OF RIGHT INTERNAL CAROTID ARTERY USING OTHER CONTRAST: ICD-10-PCS | Performed by: RADIOLOGY

## 2024-02-26 PROCEDURE — 25010000002 PHENYLEPHRINE 10 MG/ML SOLUTION: Performed by: RADIOLOGY

## 2024-02-26 PROCEDURE — C1769 GUIDE WIRE: HCPCS

## 2024-02-26 PROCEDURE — 25010000002 PHENYLEPHRINE 10 MG/ML SOLUTION 5 ML VIAL: Performed by: ANESTHESIOLOGY

## 2024-02-26 PROCEDURE — 0 IODIXANOL PER 1 ML: Performed by: RADIOLOGY

## 2024-02-26 PROCEDURE — 85025 COMPLETE CBC W/AUTO DIFF WBC: CPT | Performed by: RADIOLOGY

## 2024-02-26 PROCEDURE — 85347 COAGULATION TIME ACTIVATED: CPT

## 2024-02-26 PROCEDURE — 25010000002 PHENYLEPHRINE 10 MG/ML SOLUTION: Performed by: ANESTHESIOLOGY

## 2024-02-26 PROCEDURE — 037J3DZ DILATION OF LEFT COMMON CAROTID ARTERY WITH INTRALUMINAL DEVICE, PERCUTANEOUS APPROACH: ICD-10-PCS | Performed by: RADIOLOGY

## 2024-02-26 PROCEDURE — 25010000002 HYDRALAZINE PER 20 MG: Performed by: ANESTHESIOLOGY

## 2024-02-26 PROCEDURE — 80048 BASIC METABOLIC PNL TOTAL CA: CPT | Performed by: RADIOLOGY

## 2024-02-26 PROCEDURE — C1894 INTRO/SHEATH, NON-LASER: HCPCS

## 2024-02-26 PROCEDURE — 25010000002 HEPARIN (PORCINE) PER 1000 UNITS: Performed by: RADIOLOGY

## 2024-02-26 PROCEDURE — 85576 BLOOD PLATELET AGGREGATION: CPT | Performed by: RADIOLOGY

## 2024-02-26 PROCEDURE — 25810000003 SODIUM CHLORIDE 0.9 % SOLUTION 250 ML FLEX CONT: Performed by: ANESTHESIOLOGY

## 2024-02-26 PROCEDURE — 83735 ASSAY OF MAGNESIUM: CPT | Performed by: INTERNAL MEDICINE

## 2024-02-26 PROCEDURE — 36224 PLACE CATH CAROTD ART: CPT | Performed by: RADIOLOGY

## 2024-02-26 PROCEDURE — 25010000002 ONDANSETRON PER 1 MG: Performed by: ANESTHESIOLOGY

## 2024-02-26 PROCEDURE — 82948 REAGENT STRIP/BLOOD GLUCOSE: CPT

## 2024-02-26 PROCEDURE — C1876 STENT, NON-COA/NON-COV W/DEL: HCPCS

## 2024-02-26 RX ORDER — MELATONIN
1 DAILY
COMMUNITY
Start: 2023-12-07

## 2024-02-26 RX ORDER — HEPARIN SODIUM 1000 [USP'U]/ML
INJECTION, SOLUTION INTRAVENOUS; SUBCUTANEOUS AS NEEDED
Status: DISCONTINUED | OUTPATIENT
Start: 2024-02-26 | End: 2024-02-26 | Stop reason: SURG

## 2024-02-26 RX ORDER — LIDOCAINE HYDROCHLORIDE 20 MG/ML
INJECTION, SOLUTION INFILTRATION; PERINEURAL AS NEEDED
Status: DISCONTINUED | OUTPATIENT
Start: 2024-02-26 | End: 2024-02-26 | Stop reason: SURG

## 2024-02-26 RX ORDER — SODIUM CHLORIDE 9 MG/ML
100 INJECTION, SOLUTION INTRAVENOUS CONTINUOUS
Status: DISCONTINUED | OUTPATIENT
Start: 2024-02-26 | End: 2024-02-27 | Stop reason: HOSPADM

## 2024-02-26 RX ORDER — SODIUM CHLORIDE 0.9 % (FLUSH) 0.9 %
10 SYRINGE (ML) INJECTION AS NEEDED
Status: DISCONTINUED | OUTPATIENT
Start: 2024-02-26 | End: 2024-02-27 | Stop reason: HOSPADM

## 2024-02-26 RX ORDER — HYDRALAZINE HYDROCHLORIDE 20 MG/ML
5 INJECTION INTRAMUSCULAR; INTRAVENOUS
Status: DISCONTINUED | OUTPATIENT
Start: 2024-02-26 | End: 2024-02-27

## 2024-02-26 RX ORDER — ATORVASTATIN CALCIUM 80 MG/1
80 TABLET, FILM COATED ORAL NIGHTLY
Status: DISCONTINUED | OUTPATIENT
Start: 2024-02-27 | End: 2024-02-27 | Stop reason: HOSPADM

## 2024-02-26 RX ORDER — FLUMAZENIL 0.1 MG/ML
0.2 INJECTION INTRAVENOUS AS NEEDED
Status: DISCONTINUED | OUTPATIENT
Start: 2024-02-26 | End: 2024-02-27

## 2024-02-26 RX ORDER — SODIUM CHLORIDE 9 MG/ML
40 INJECTION, SOLUTION INTRAVENOUS AS NEEDED
Status: DISCONTINUED | OUTPATIENT
Start: 2024-02-26 | End: 2024-02-27 | Stop reason: HOSPADM

## 2024-02-26 RX ORDER — POTASSIUM CHLORIDE 1.5 G/1.58G
40 POWDER, FOR SOLUTION ORAL EVERY 4 HOURS
Status: COMPLETED | OUTPATIENT
Start: 2024-02-26 | End: 2024-02-26

## 2024-02-26 RX ORDER — ASPIRIN 81 MG/1
81 TABLET ORAL DAILY
Status: DISCONTINUED | OUTPATIENT
Start: 2024-02-27 | End: 2024-02-27 | Stop reason: HOSPADM

## 2024-02-26 RX ORDER — NICARDIPINE HYDROCHLORIDE 2.5 MG/ML
INJECTION INTRAVENOUS AS NEEDED
Status: DISCONTINUED | OUTPATIENT
Start: 2024-02-26 | End: 2024-02-26 | Stop reason: SURG

## 2024-02-26 RX ORDER — LIDOCAINE HYDROCHLORIDE 10 MG/ML
0.5 INJECTION, SOLUTION INFILTRATION; PERINEURAL ONCE AS NEEDED
Status: DISCONTINUED | OUTPATIENT
Start: 2024-02-26 | End: 2024-02-27

## 2024-02-26 RX ORDER — ACETAMINOPHEN 325 MG/1
650 TABLET ORAL EVERY 4 HOURS PRN
Status: DISCONTINUED | OUTPATIENT
Start: 2024-02-26 | End: 2024-02-27 | Stop reason: HOSPADM

## 2024-02-26 RX ORDER — IPRATROPIUM BROMIDE AND ALBUTEROL SULFATE 2.5; .5 MG/3ML; MG/3ML
3 SOLUTION RESPIRATORY (INHALATION) ONCE AS NEEDED
Status: DISCONTINUED | OUTPATIENT
Start: 2024-02-26 | End: 2024-02-27

## 2024-02-26 RX ORDER — SODIUM CHLORIDE 0.9 % (FLUSH) 0.9 %
3 SYRINGE (ML) INJECTION EVERY 12 HOURS SCHEDULED
Status: DISCONTINUED | OUTPATIENT
Start: 2024-02-26 | End: 2024-02-27

## 2024-02-26 RX ORDER — NITROGLYCERIN 0.4 MG/1
0.4 TABLET SUBLINGUAL
Status: DISCONTINUED | OUTPATIENT
Start: 2024-02-26 | End: 2024-02-27 | Stop reason: HOSPADM

## 2024-02-26 RX ORDER — AMOXICILLIN 250 MG
2 CAPSULE ORAL 2 TIMES DAILY
Status: DISCONTINUED | OUTPATIENT
Start: 2024-02-26 | End: 2024-02-27 | Stop reason: HOSPADM

## 2024-02-26 RX ORDER — LABETALOL HYDROCHLORIDE 5 MG/ML
20 INJECTION, SOLUTION INTRAVENOUS
Status: DISCONTINUED | OUTPATIENT
Start: 2024-02-26 | End: 2024-02-27 | Stop reason: HOSPADM

## 2024-02-26 RX ORDER — SODIUM CHLORIDE 0.9 % (FLUSH) 0.9 %
1-10 SYRINGE (ML) INJECTION AS NEEDED
Status: DISCONTINUED | OUTPATIENT
Start: 2024-02-26 | End: 2024-02-27 | Stop reason: HOSPADM

## 2024-02-26 RX ORDER — PHENYLEPHRINE HCL IN 0.9% NACL 0.5 MG/5ML
.5-3 SYRINGE (ML) INTRAVENOUS
Status: DISCONTINUED | OUTPATIENT
Start: 2024-02-26 | End: 2024-02-27 | Stop reason: HOSPADM

## 2024-02-26 RX ORDER — SODIUM CHLORIDE 0.9 % (FLUSH) 0.9 %
10 SYRINGE (ML) INJECTION EVERY 12 HOURS SCHEDULED
Status: DISCONTINUED | OUTPATIENT
Start: 2024-02-26 | End: 2024-02-27 | Stop reason: HOSPADM

## 2024-02-26 RX ORDER — ONDANSETRON 2 MG/ML
INJECTION INTRAMUSCULAR; INTRAVENOUS AS NEEDED
Status: DISCONTINUED | OUTPATIENT
Start: 2024-02-26 | End: 2024-02-26 | Stop reason: SURG

## 2024-02-26 RX ORDER — SODIUM CHLORIDE, SODIUM LACTATE, POTASSIUM CHLORIDE, CALCIUM CHLORIDE 600; 310; 30; 20 MG/100ML; MG/100ML; MG/100ML; MG/100ML
9 INJECTION, SOLUTION INTRAVENOUS CONTINUOUS
Status: DISCONTINUED | OUTPATIENT
Start: 2024-02-26 | End: 2024-02-27

## 2024-02-26 RX ORDER — BISACODYL 5 MG/1
5 TABLET, DELAYED RELEASE ORAL DAILY PRN
Status: DISCONTINUED | OUTPATIENT
Start: 2024-02-26 | End: 2024-02-27 | Stop reason: HOSPADM

## 2024-02-26 RX ORDER — POTASSIUM BICARBONATE 978 MG/1
25 TABLET, EFFERVESCENT ORAL DAILY
COMMUNITY
Start: 2023-11-27

## 2024-02-26 RX ORDER — ONDANSETRON 2 MG/ML
4 INJECTION INTRAMUSCULAR; INTRAVENOUS EVERY 6 HOURS PRN
Status: DISCONTINUED | OUTPATIENT
Start: 2024-02-26 | End: 2024-02-27 | Stop reason: HOSPADM

## 2024-02-26 RX ORDER — FUROSEMIDE 40 MG/1
40 TABLET ORAL DAILY
Status: DISCONTINUED | OUTPATIENT
Start: 2024-02-27 | End: 2024-02-27 | Stop reason: HOSPADM

## 2024-02-26 RX ORDER — PROPOFOL 10 MG/ML
VIAL (ML) INTRAVENOUS AS NEEDED
Status: DISCONTINUED | OUTPATIENT
Start: 2024-02-26 | End: 2024-02-26 | Stop reason: SURG

## 2024-02-26 RX ORDER — CARVEDILOL 6.25 MG/1
6.25 TABLET ORAL 2 TIMES DAILY WITH MEALS
Status: DISCONTINUED | OUTPATIENT
Start: 2024-02-26 | End: 2024-02-27 | Stop reason: HOSPADM

## 2024-02-26 RX ORDER — HYDROMORPHONE HYDROCHLORIDE 1 MG/ML
0.25 INJECTION, SOLUTION INTRAMUSCULAR; INTRAVENOUS; SUBCUTANEOUS
Status: DISCONTINUED | OUTPATIENT
Start: 2024-02-26 | End: 2024-02-27

## 2024-02-26 RX ORDER — PHENYLEPHRINE HYDROCHLORIDE 10 MG/ML
INJECTION INTRAVENOUS AS NEEDED
Status: DISCONTINUED | OUTPATIENT
Start: 2024-02-26 | End: 2024-02-26 | Stop reason: SURG

## 2024-02-26 RX ORDER — ONDANSETRON 2 MG/ML
4 INJECTION INTRAMUSCULAR; INTRAVENOUS ONCE AS NEEDED
Status: DISCONTINUED | OUTPATIENT
Start: 2024-02-26 | End: 2024-02-27

## 2024-02-26 RX ORDER — LABETALOL HYDROCHLORIDE 5 MG/ML
5 INJECTION, SOLUTION INTRAVENOUS
Status: DISCONTINUED | OUTPATIENT
Start: 2024-02-26 | End: 2024-02-26

## 2024-02-26 RX ORDER — AMOXICILLIN 250 MG
1 CAPSULE ORAL
COMMUNITY
Start: 2023-12-07

## 2024-02-26 RX ORDER — LISINOPRIL 20 MG/1
20 TABLET ORAL DAILY
Status: DISCONTINUED | OUTPATIENT
Start: 2024-02-27 | End: 2024-02-27 | Stop reason: HOSPADM

## 2024-02-26 RX ORDER — HYDROCODONE BITARTRATE AND ACETAMINOPHEN 7.5; 325 MG/1; MG/1
1 TABLET ORAL EVERY 4 HOURS PRN
Status: DISCONTINUED | OUTPATIENT
Start: 2024-02-26 | End: 2024-02-27

## 2024-02-26 RX ORDER — ROCURONIUM BROMIDE 10 MG/ML
INJECTION, SOLUTION INTRAVENOUS AS NEEDED
Status: DISCONTINUED | OUTPATIENT
Start: 2024-02-26 | End: 2024-02-26 | Stop reason: SURG

## 2024-02-26 RX ORDER — PROMETHAZINE HYDROCHLORIDE 25 MG/1
25 TABLET ORAL ONCE AS NEEDED
Status: DISCONTINUED | OUTPATIENT
Start: 2024-02-26 | End: 2024-02-27

## 2024-02-26 RX ORDER — HYDROCODONE BITARTRATE AND ACETAMINOPHEN 5; 325 MG/1; MG/1
1 TABLET ORAL ONCE AS NEEDED
Status: DISCONTINUED | OUTPATIENT
Start: 2024-02-26 | End: 2024-02-27

## 2024-02-26 RX ORDER — EPHEDRINE SULFATE 50 MG/ML
5 INJECTION, SOLUTION INTRAVENOUS ONCE AS NEEDED
Status: DISCONTINUED | OUTPATIENT
Start: 2024-02-26 | End: 2024-02-27

## 2024-02-26 RX ORDER — CLOPIDOGREL BISULFATE 75 MG/1
75 TABLET ORAL DAILY
Status: DISCONTINUED | OUTPATIENT
Start: 2024-02-27 | End: 2024-02-27 | Stop reason: HOSPADM

## 2024-02-26 RX ORDER — ACETAMINOPHEN 650 MG/1
650 SUPPOSITORY RECTAL EVERY 4 HOURS PRN
Status: DISCONTINUED | OUTPATIENT
Start: 2024-02-26 | End: 2024-02-27 | Stop reason: HOSPADM

## 2024-02-26 RX ORDER — SODIUM CHLORIDE 9 MG/ML
75 INJECTION, SOLUTION INTRAVENOUS CONTINUOUS
Status: ACTIVE | OUTPATIENT
Start: 2024-02-26 | End: 2024-02-26

## 2024-02-26 RX ORDER — DROPERIDOL 2.5 MG/ML
0.62 INJECTION, SOLUTION INTRAMUSCULAR; INTRAVENOUS
Status: DISCONTINUED | OUTPATIENT
Start: 2024-02-26 | End: 2024-02-27

## 2024-02-26 RX ORDER — AMOXICILLIN 250 MG
1 CAPSULE ORAL 2 TIMES DAILY
Status: DISCONTINUED | OUTPATIENT
Start: 2024-02-26 | End: 2024-02-26

## 2024-02-26 RX ORDER — IODIXANOL 320 MG/ML
250 INJECTION, SOLUTION INTRAVASCULAR
Status: COMPLETED | OUTPATIENT
Start: 2024-02-26 | End: 2024-02-26

## 2024-02-26 RX ORDER — POLYETHYLENE GLYCOL 3350 17 G/17G
17 POWDER, FOR SOLUTION ORAL DAILY PRN
Status: DISCONTINUED | OUTPATIENT
Start: 2024-02-26 | End: 2024-02-27 | Stop reason: HOSPADM

## 2024-02-26 RX ORDER — SODIUM CHLORIDE 0.9 % (FLUSH) 0.9 %
3-10 SYRINGE (ML) INJECTION AS NEEDED
Status: DISCONTINUED | OUTPATIENT
Start: 2024-02-26 | End: 2024-02-27

## 2024-02-26 RX ORDER — PROMETHAZINE HYDROCHLORIDE 25 MG/1
25 SUPPOSITORY RECTAL ONCE AS NEEDED
Status: DISCONTINUED | OUTPATIENT
Start: 2024-02-26 | End: 2024-02-27

## 2024-02-26 RX ORDER — NALOXONE HCL 0.4 MG/ML
0.2 VIAL (ML) INJECTION AS NEEDED
Status: DISCONTINUED | OUTPATIENT
Start: 2024-02-26 | End: 2024-02-27

## 2024-02-26 RX ORDER — BISACODYL 10 MG
10 SUPPOSITORY, RECTAL RECTAL DAILY PRN
Status: DISCONTINUED | OUTPATIENT
Start: 2024-02-26 | End: 2024-02-27 | Stop reason: HOSPADM

## 2024-02-26 RX ORDER — FAMOTIDINE 10 MG/ML
20 INJECTION, SOLUTION INTRAVENOUS ONCE
Status: COMPLETED | OUTPATIENT
Start: 2024-02-26 | End: 2024-02-26

## 2024-02-26 RX ORDER — ACETAMINOPHEN 650 MG/1
650 SUPPOSITORY RECTAL EVERY 4 HOURS PRN
Status: DISCONTINUED | OUTPATIENT
Start: 2024-02-26 | End: 2024-02-26 | Stop reason: SDUPTHER

## 2024-02-26 RX ORDER — DIPHENHYDRAMINE HYDROCHLORIDE 50 MG/ML
12.5 INJECTION INTRAMUSCULAR; INTRAVENOUS
Status: DISCONTINUED | OUTPATIENT
Start: 2024-02-26 | End: 2024-02-27

## 2024-02-26 RX ORDER — FENTANYL CITRATE 50 UG/ML
25 INJECTION, SOLUTION INTRAMUSCULAR; INTRAVENOUS
Status: DISCONTINUED | OUTPATIENT
Start: 2024-02-26 | End: 2024-02-27

## 2024-02-26 RX ORDER — POTASSIUM CHLORIDE 750 MG/1
20 TABLET, FILM COATED, EXTENDED RELEASE ORAL DAILY
Status: DISCONTINUED | OUTPATIENT
Start: 2024-02-27 | End: 2024-02-27 | Stop reason: HOSPADM

## 2024-02-26 RX ORDER — NICOTINE 21 MG/24HR
1 PATCH, TRANSDERMAL 24 HOURS TRANSDERMAL
Status: DISCONTINUED | OUTPATIENT
Start: 2024-02-26 | End: 2024-02-27 | Stop reason: HOSPADM

## 2024-02-26 RX ADMIN — FAMOTIDINE 20 MG: 10 INJECTION INTRAVENOUS at 08:29

## 2024-02-26 RX ADMIN — CARVEDILOL 6.25 MG: 6.25 TABLET, FILM COATED ORAL at 18:04

## 2024-02-26 RX ADMIN — HEPARIN SODIUM: 1000 INJECTION INTRAVENOUS; SUBCUTANEOUS at 09:04

## 2024-02-26 RX ADMIN — PHENYLEPHRINE HYDROCHLORIDE 50 MCG: 50 INJECTION INTRAVENOUS at 08:49

## 2024-02-26 RX ADMIN — HEPARIN SODIUM 5000 UNITS: 1000 INJECTION, SOLUTION INTRAVENOUS; SUBCUTANEOUS at 09:22

## 2024-02-26 RX ADMIN — PROPOFOL 130 MG: 10 INJECTION, EMULSION INTRAVENOUS at 08:37

## 2024-02-26 RX ADMIN — POTASSIUM CHLORIDE 40 MEQ: 1.5 POWDER, FOR SOLUTION ORAL at 14:55

## 2024-02-26 RX ADMIN — Medication 10 ML: at 13:14

## 2024-02-26 RX ADMIN — SODIUM CHLORIDE, POTASSIUM CHLORIDE, SODIUM LACTATE AND CALCIUM CHLORIDE: 600; 310; 30; 20 INJECTION, SOLUTION INTRAVENOUS at 08:22

## 2024-02-26 RX ADMIN — POTASSIUM CHLORIDE 40 MEQ: 1.5 POWDER, FOR SOLUTION ORAL at 18:04

## 2024-02-26 RX ADMIN — IODIXANOL 220 ML: 320 INJECTION, SOLUTION INTRAVASCULAR at 10:17

## 2024-02-26 RX ADMIN — ROCURONIUM BROMIDE 10 MG: 10 INJECTION, SOLUTION INTRAVENOUS at 10:00

## 2024-02-26 RX ADMIN — NICARDIPINE HYDROCHLORIDE 100 MCG: 25 INJECTION, SOLUTION INTRAVENOUS at 09:34

## 2024-02-26 RX ADMIN — Medication 3 ML: at 08:10

## 2024-02-26 RX ADMIN — HYDRALAZINE HYDROCHLORIDE 5 MG: 20 INJECTION INTRAMUSCULAR; INTRAVENOUS at 16:21

## 2024-02-26 RX ADMIN — PHENYLEPHRINE HYDROCHLORIDE 0.2 MCG/KG/MIN: 10 INJECTION, SOLUTION INTRAVENOUS at 09:07

## 2024-02-26 RX ADMIN — Medication 10 ML: at 19:41

## 2024-02-26 RX ADMIN — SUGAMMADEX 400 MG: 100 INJECTION, SOLUTION INTRAVENOUS at 10:08

## 2024-02-26 RX ADMIN — ROCURONIUM BROMIDE 50 MG: 10 INJECTION, SOLUTION INTRAVENOUS at 08:37

## 2024-02-26 RX ADMIN — POTASSIUM CHLORIDE 40 MEQ: 1.5 POWDER, FOR SOLUTION ORAL at 21:08

## 2024-02-26 RX ADMIN — ONDANSETRON 4 MG: 2 INJECTION INTRAMUSCULAR; INTRAVENOUS at 10:12

## 2024-02-26 RX ADMIN — PHENYLEPHRINE HYDROCHLORIDE 50 MCG: 50 INJECTION INTRAVENOUS at 08:54

## 2024-02-26 RX ADMIN — LIDOCAINE HYDROCHLORIDE 100 MG: 20 INJECTION, SOLUTION INFILTRATION; PERINEURAL at 08:37

## 2024-02-26 RX ADMIN — ROCURONIUM BROMIDE 20 MG: 10 INJECTION, SOLUTION INTRAVENOUS at 09:10

## 2024-02-26 RX ADMIN — NICOTINE 1 PATCH: 21 PATCH, EXTENDED RELEASE TRANSDERMAL at 15:17

## 2024-02-26 RX ADMIN — Medication 0.5 MCG/KG/MIN: at 11:09

## 2024-02-26 RX ADMIN — SODIUM CHLORIDE 75 ML/HR: 9 INJECTION, SOLUTION INTRAVENOUS at 13:13

## 2024-02-26 NOTE — POST-PROCEDURE NOTE
POST PROCEDURE NOTE    Procedure: Cerebral Angiogram with LICA Stent under Distal Protection    Pre-Procedure Diagnosis: Bilateral Carotid Stenosis    Post-procedure Diagnosis: Same    Findings: 78% stenosis of the LICA origin with successful JOAN placement under distal protection. No significant residual stenosis and marked improved intracranial flow. Previously placed SARAHY origin stent patent with no significant restenosis. Official report to follow.    Complications: None encountered.    Blood loss: 100 cc    Specimen Removed: None    Disposition:   Admit to Neuro ICU.

## 2024-02-26 NOTE — ANESTHESIA PROCEDURE NOTES
Arterial Line      Patient reassessed immediately prior to procedure    Patient location during procedure: pre-op  Start time: 2/26/2024 8:04 AM  Stop Time:2/26/2024 8:10 AM       Line placed for respiratory failure, hemodynamic monitoring, MD/Surgeon request and ABGs/Labs/ISTAT.  Performed By   Anesthesiologist: Candace Lopez MD   Preanesthetic Checklist  Completed: patient identified, IV checked, site marked, risks and benefits discussed, surgical consent, monitors and equipment checked, pre-op evaluation and timeout performed  Arterial Line Prep    Sterile Tech: cap, gloves, sterile barriers and mask  Prep: ChloraPrep  Patient monitoring: EKG, continuous pulse oximetry and blood pressure monitoring  Arterial Line Procedure   Laterality:left  Location:  radial artery  Catheter size: 20 G   Guidance: ultrasound guided  PROCEDURE NOTE/ULTRASOUND INTERPRETATION.  Using ultrasound guidance the potential vascular sites for insertion of the catheter were visualized to determine the patency of the vessel to be used for vascular access.  After selecting the appropriate site for insertion, the needle was visualized under ultrasound being inserted into the radial artery, followed by ultrasound confirmation of wire and catheter placement. There were no abnormalities seen on ultrasound; an image was taken; and the patient tolerated the procedure with no complications.   Number of attempts: 1  Successful placement: yes Images: still images not obtained  Post Assessment   Dressing Type: wrist guard applied, secured with tape and occlusive dressing applied.   Complications no  Circ/Move/Sens Assessment: normal and unchanged.   Patient Tolerance: patient tolerated the procedure well with no apparent complications  Additional Notes  Using ultrasound guidance the potential vascular sites for insertion of the catheter were visualized to determine the patency of the vessel to be used for vascular access.  After selecting the  appropriate site for insertion, the needle was visualized under ultrasound being inserted into the artery, followed by ultrasound confirmation of wire and catheter placement.  There were no abnormalities seen on ultrasound; an image was taken/ and the patient tolerated the procedure with no complications.

## 2024-02-26 NOTE — ANESTHESIA PREPROCEDURE EVALUATION
Anesthesia Evaluation     Patient summary reviewed and Nursing notes reviewed   NPO Solid Status: > 8 hours  NPO Liquid Status: > 2 hours           Airway   Mallampati: II  TM distance: >3 FB  Neck ROM: full  No difficulty expected  Dental    (+) edentulous    Pulmonary    (+) pneumonia (aspiration) , a smoker Current, cigarettes,  Cardiovascular     ECG reviewed  PT is on anticoagulation therapy  Patient on routine beta blocker and Beta blocker given within 24 hours of surgery    (+) pacemaker ICD, hypertension, CHF (EF 20%) Diastolic >=55% and Systolic <55%, hyperlipidemia,  carotid artery disease (s/p right stenting) carotid bilateral      Neuro/Psych  (+) CVA (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 I have explained the risks of anesthesia including but not limited to dental damage, corneal abrasion, nerve injury, MI, stroke, aspiration, and death. Patient has agreed to proceed.      There were no vitals taken for this visit.  )  intravenous induction     Anesthetic plan, risks, benefits, and alternatives have been provided, discussed and informed consent has been obtained with: patient.      CODE STATUS:

## 2024-02-26 NOTE — ANESTHESIA POSTPROCEDURE EVALUATION
Patient: Tigre Amaral    Procedure Summary       Date: 02/26/24 Room / Location: [facility] INTERVENTIONAL    Anesthesia Start: 0830 Anesthesia Stop: 1029    Procedure: IR STENT CERV CAROTID WO EMB PROT Diagnosis:       Tobacco abuse      Hypertension, unspecified type      Hyperlipidemia, unspecified hyperlipidemia type      Stenosis of left carotid artery      (Left Carotid Stenosis)    Scheduled Providers:  Provider: Candace Lopez MD    Anesthesia Type: general ASA Status: 4            Anesthesia Type: general    Vitals  Vitals Value Taken Time   /49 02/26/24 1035   Temp     Pulse 60 02/26/24 1035   Resp 10 02/26/24 1035   SpO2 99 % 02/26/24 1035           Post Anesthesia Care and Evaluation    Patient location during evaluation: bedside  Patient participation: complete - patient participated  Level of consciousness: awake and alert  Pain management: adequate    Airway patency: patent  Anesthetic complications: No anesthetic complications  PONV Status: controlled  Cardiovascular status: acceptable  Respiratory status: acceptable  Hydration status: acceptable

## 2024-02-26 NOTE — CONSULTS
Patient Care Team:  Provider, Quiana Known as PCP - General      Subjective     Patient is a 73 y.o. male.  Asked to see with to assist with critical care management, patient is status post cerebral angiogram within left internal carotid artery stent under distal protection for 78% stenosis at the origin, patient has had prior internal carotid stent and they did not see any significant restenosis with angiography.  Patient has a history of multiple right-sided watershed infarcts that is why he underwent the previous right internal carotid.  Patient was on Plavix aspirin and Lipitor but continues to smoke.  Patient says that is the 1 thing he still loves.  He is talking a whole lot almost nonstop is quite a character was a  work on [employer] by numbers in the [employer].  He is single.  Does not drink alcohol.  He is not having any pain.  He has had this lesion on the right side of his face below his eye for at least 2 years he has not attempted to have it worked on.  No headache no nausea no chest pain or shortness of breath.      Review of Systems:  He denies everything says he is great says he feels like he can run asking if he could go to a couple steps he can go a lot more than that  but he said I cannot go mile      History      Social History     Socioeconomic History    Marital status: Single   Tobacco Use    Smoking status: Every Day     Types: Cigarettes    Smokeless tobacco: Never   Vaping Use    Vaping Use: Unknown     No family history on file.      Allergies:  Patient has no known allergies.    Medications:  Prior to Admission medications    Medication Sig Start Date End Date Taking? Authorizing Provider   aspirin 81 MG EC tablet Take 1 tablet by mouth Daily.   Yes ProviderTracee MD   atorvastatin (LIPITOR) 80 MG tablet Take 1 tablet by mouth Every Night. 1/5/24  Yes Sanjiv Spann MD   carvedilol (COREG) 6.25 MG tablet Take 1 tablet by mouth 2 (Two) Times a Day With  "Meals.   Yes Tracee Sharma MD   Cholecalciferol 25 MCG (1000 UT) tablet Take 1 tablet by mouth Daily. 12/7/23  Yes Tracee Sharma MD   clopidogrel (PLAVIX) 75 MG tablet Take 1 tablet by mouth Daily. 1/5/24  Yes Sanjiv Spann MD   furosemide (LASIX) 40 MG tablet Take 1 tablet by mouth Daily.   Yes Tracee Sharma MD   Klor-Con/EF 25 MEQ disintegrating tablet Take 1 tablet by mouth Daily. 11/27/23  Yes Tracee Sharma MD   lisinopril (PRINIVIL,ZESTRIL) 20 MG tablet Take 1 tablet by mouth Daily.   Yes Tracee Sharma MD   potassium chloride (K-DUR,KLOR-CON) 20 MEQ CR tablet Take 1 tablet by mouth Daily. 11/12/23  Yes Jose Mendiola MD   sennosides-docusate (PERICOLACE) 8.6-50 MG per tablet 1 tablet. 12/7/23  Yes Tracee Sharma MD     sodium chloride, 10 mL, Intravenous, Q12H  sodium chloride, 3 mL, Intravenous, Q12H      lactated ringers, 9 mL/hr  phenylephrine, 0.5-3 mcg/kg/min, Last Rate: Stopped (02/26/24 1144)  sodium chloride, 100 mL/hr        Objective     Vital Signs  Vital Sign Min/Max for last 24 hours  Temp  Min: 97.5 °F (36.4 °C)  Max: 97.5 °F (36.4 °C)   BP  Min: 76/52  Max: 141/61   Pulse  Min: 58  Max: 64   Resp  Min: 10  Max: 18   SpO2  Min: 97 %  Max: 100 %   No data recorded   Weight  Min: 65.8 kg (145 lb)  Max: 65.8 kg (145 lb)       Intake/Output Summary (Last 24 hours) at 2/26/2024 1240  Last data filed at 2/26/2024 0935  Gross per 24 hour   Intake 350 ml   Output --   Net 350 ml     I/O this shift:  In: 350 [I.V.:350]  Out: -   Last Weight and Admission Weight        02/26/24  0710   Weight: 65.8 kg (145 lb)     Flowsheet Rows      Flowsheet Row First Filed Value   Admission Height 172.7 cm (68\") Documented at 02/26/2024 0710   Admission Weight 65.8 kg (145 lb) Documented at 02/26/2024 0710            Body mass index is 22.05 kg/m².           Physical Exam:  General Appearance: Elderly white male he is resting in bed he seems to be in great spirits in no " apparent distress  Eyes: Conjunctiva are clear and anicteric pupils are equal  ENT: Mucous membranes are little dry he does not have most of his teeth.  He has a Mallampati 1 airway and nasal septum midline he has a large mass under his right eye that pushes up probably limits some of his lower visual field  Neck: Trachea midline no palpable lymphadenopathy no visible jugular venous distention  Lungs: Little decreased but clear no wheezes no rales no rhonchi  Cardiac: Regular rate rhythm no murmur no gallop  Abdomen: Soft no palpable hepatosplenomegaly or masses  : Grossly normal  Musculoskeletal: He has left femoral puncture site that is soft there is no masses no ecchymoses clean dry dressing.  He has a left radial A-line with good waveform site okay  Skin: Warm and dry no jaundice no petechiae  Neuro: He is awake and alert oriented to person he thinks since March 2025 actually is agreeable when corrected he is following commands moving all extremities good dorsiflexion plantarflexion good  and finger-to-nose with upper extremities  Extremities/P Vascular: No clubbing no cyanosis no edema he has palpable radial pulse on the right the left radial A-line and palpable dorsalis pedis pulses bilaterally  MSE: Seems to be in very good spirits      Labs:  Results from last 7 days   Lab Units 02/26/24  0732   GLUCOSE mg/dL 119*   SODIUM mmol/L 138   POTASSIUM mmol/L 3.0*   CO2 mmol/L 29.8*   CHLORIDE mmol/L 96*   ANION GAP mmol/L 12.2   CREATININE mg/dL 1.55*   BUN mg/dL 22   BUN / CREAT RATIO  14.2   CALCIUM mg/dL 9.0     Estimated Creatinine Clearance: 38.3 mL/min (A) (by C-G formula based on SCr of 1.55 mg/dL (H)).      Results from last 7 days   Lab Units 02/26/24  0815   WBC 10*3/mm3 9.43   RBC 10*6/mm3 4.59   HEMOGLOBIN g/dL 13.0   HEMATOCRIT % 39.2   MCV fL 85.4   MCH pg 28.3   MCHC g/dL 33.2   RDW % 11.9*   RDW-SD fl 36.8*   MPV fL 9.5   PLATELETS 10*3/mm3 306   NEUTROPHIL % % 51.3   LYMPHOCYTE % % 34.9    MONOCYTES % % 7.8   EOSINOPHIL % % 4.6   BASOPHIL % % 1.2   IMM GRAN % % 0.2   NEUTROS ABS 10*3/mm3 4.84   LYMPHS ABS 10*3/mm3 3.29*   MONOS ABS 10*3/mm3 0.74   EOS ABS 10*3/mm3 0.43*   BASOS ABS 10*3/mm3 0.11   IMMATURE GRANS (ABS) 10*3/mm3 0.02   NRBC /100 WBC 0.0                             Microbiology Results (last 10 days)       ** No results found for the last 240 hours. **              Diagnostics:  Arterial Line    Result Date: 2/26/2024  Candace Lopez MD     2/26/2024  8:22 AM Arterial Line Patient reassessed immediately prior to procedure Patient location during procedure: pre-op Start time: 2/26/2024 8:04 AM Stop Time:2/26/2024 8:10 AM  Line placed for respiratory failure, hemodynamic monitoring, MD/Surgeon request and ABGs/Labs/ISTAT. Performed By Anesthesiologist: Candace Lopez MD Preanesthetic Checklist Completed: patient identified, IV checked, site marked, risks and benefits discussed, surgical consent, monitors and equipment checked, pre-op evaluation and timeout performed Arterial Line Prep  Sterile Tech: cap, gloves, sterile barriers and mask Prep: ChloraPrep Patient monitoring: EKG, continuous pulse oximetry and blood pressure monitoring Arterial Line Procedure Laterality:left Location:  radial artery Catheter size: 20 G Guidance: ultrasound guided PROCEDURE NOTE/ULTRASOUND INTERPRETATION.  Using ultrasound guidance the potential vascular sites for insertion of the catheter were visualized to determine the patency of the vessel to be used for vascular access.  After selecting the appropriate site for insertion, the needle was visualized under ultrasound being inserted into the radial artery, followed by ultrasound confirmation of wire and catheter placement. There were no abnormalities seen on ultrasound; an image was taken; and the patient tolerated the procedure with no complications. Number of attempts: 1 Successful placement: yes Images: still images not obtained Post  Assessment Dressing Type: wrist guard applied, secured with tape and occlusive dressing applied. Complications no Circ/Move/Sens Assessment: normal and unchanged. Patient Tolerance: patient tolerated the procedure well with no apparent complications Additional Notes Using ultrasound guidance the potential vascular sites for insertion of the catheter were visualized to determine the patency of the vessel to be used for vascular access.  After selecting the appropriate site for insertion, the needle was visualized under ultrasound being inserted into the artery, followed by ultrasound confirmation of wire and catheter placement.  There were no abnormalities seen on ultrasound; an image was taken/ and the patient tolerated the procedure with no complications.      ICD Dual Lead - In Clinic - [facility]    Result Date: 2/16/2024  This result has an attachment that is not available.    Results for orders placed during the hospital encounter of 10/28/23    Adult transthoracic echo complete    Interpretation Summary    Left ventricular systolic function is severely decreased. Calculated left ventricular EF = 21.6% Left ventricular ejection fraction appears to be less than 20%.The following left ventricular wall segments are hypokinetic: mid anterolateral, basal inferolateral, mid inferolateral, mid inferior, basal inferoseptal, mid inferoseptal, mid anteroseptal, basal inferior and basal inferoseptal. The following left ventricular wall segments are akinetic: apical anterior, apical lateral, apical inferior, apical septal and apex.    Left ventricular wall thickness is consistent with mild concentric hypertrophy.    Left ventricular diastolic function is consistent with (grade II w/high LAP) pseudonormalization.    The left atrial cavity is mildly dilated.    Saline test results are negative for right to left atrial level shunt.    Estimated right ventricular systolic pressure from tricuspid regurgitation is mildly elevated  (35-45 mmHg).          Assessment & Plan     Bilateral carotid artery stenosis status post left internal carotid artery stent on 2/26/2024 with previous right internal carotid artery stent that was still patent.  We did call and confirm with Dr. 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

## 2024-02-26 NOTE — H&P
History of Present Illness  73 y.o. male who initially presented to the ED in November 2023 with complaints of left-sided weakness.  He was found to have multiple right sided watershed infarcts.  A carotid Doppler was obtained and showed severe stenosis in the right ICA measuring 70 to 99%.  Patient had a 70% left ICA origin stenosis as well.  He underwent successful right carotid artery stent placement under distal protection on 11/6/2023 and now returns for his follow-up and preop for left carotid stent placement.  Patient's status has greatly improved with the improved blood flow to the brain, but he does continue to smoke and does still have a skin cancer present on the right side of his nose.  He continues on his Plavix and aspirin as well as Lipitor.  His balance has improved and he has not fallen recently.  His concentration and memory have also improved some.  He presents today to arrange for his left-sided carotid stent and to review his previous stent placement.        The following portions of the patient's history were reviewed and updated as appropriate: He  has no past medical history on file.  He does not have any pertinent problems on file.  He  has no past surgical history on file.  His family history is not on file.  He  reports that he has been smoking cigarettes. He has never used smokeless tobacco. No history on file for alcohol use and drug use.         Current Outpatient Medications   Medication Sig Dispense Refill    aspirin 81 MG EC tablet Take 1 tablet by mouth Daily.        atorvastatin (LIPITOR) 80 MG tablet Take 1 tablet by mouth Every Night. 30 tablet 12    carvedilol (COREG) 6.25 MG tablet Take 1 tablet by mouth 2 (Two) Times a Day With Meals.        clopidogrel (PLAVIX) 75 MG tablet Take 1 tablet by mouth Daily. 30 tablet 12    furosemide (LASIX) 40 MG tablet Take 1 tablet by mouth Daily.        lisinopril (PRINIVIL,ZESTRIL) 20 MG tablet Take 1 tablet by mouth Daily.        potassium  "chloride (K-DUR,KLOR-CON) 20 MEQ CR tablet Take 1 tablet by mouth Daily.          No current facility-administered medications for this visit.           Current Outpatient Medications on File Prior to Visit   Medication Sig    aspirin 81 MG EC tablet Take 1 tablet by mouth Daily.    carvedilol (COREG) 6.25 MG tablet Take 1 tablet by mouth 2 (Two) Times a Day With Meals.    furosemide (LASIX) 40 MG tablet Take 1 tablet by mouth Daily.    lisinopril (PRINIVIL,ZESTRIL) 20 MG tablet Take 1 tablet by mouth Daily.    potassium chloride (K-DUR,KLOR-CON) 20 MEQ CR tablet Take 1 tablet by mouth Daily.    [DISCONTINUED] atorvastatin (LIPITOR) 80 MG tablet Take 1 tablet by mouth Every Night.    [DISCONTINUED] clopidogrel (PLAVIX) 75 MG tablet Take 1 tablet by mouth Daily.      No current facility-administered medications on file prior to visit.      He has No Known Allergies..     Review of Systems   Constitutional:  Negative for fever.   Neurological:  Negative for dizziness and light-headedness.   All other systems reviewed and are negative.              Objective   Vitals       Vitals:     01/02/24 1330   BP: 104/62   BP Location: Left arm   Patient Position: Sitting   Cuff Size: Adult   Resp: 20   Weight: 64.4 kg (142 lb)   Height: 172.7 cm (67.99\")   PainSc: 0-No pain         Body mass index is 21.6 kg/m².        Physical Exam  Vitals and nursing note reviewed.   Constitutional:       General: He is not in acute distress.     Appearance: He is normal weight.   HENT:      Nose:      Comments: Mass on right side of nose  Cardiovascular:      Rate and Rhythm: Normal rate.   Pulmonary:      Effort: Pulmonary effort is normal.   Musculoskeletal:         General: Normal range of motion.      Cervical back: Normal range of motion.   Skin:     General: Skin is warm and dry.   Neurological:      General: No focal deficit present.      Mental Status: He is alert and oriented to person, place, and time.      Cranial Nerves: No " cranial nerve deficit.      Sensory: No sensory deficit.      Motor: No weakness.      Coordination: Coordination normal.      Gait: Gait normal.         Neurologic Exam      Mental Status   Oriented to person, place, and time.                     Assessment & Plan  Independent Review of Radiographic Studies:      I personally reviewed the images from the following studies.     The right carotid stent placement under distal protection performed on 2023 was reviewed with the patient and shows a high-grade stenosis with successful revascularization and no significant residual narrowing present.  Additionally a 78% stenosis of the left ICA origin is seen with delayed circulation time on the left.     Medical Decision Makin-year-old male with history of prior cerebral infarcts and bilateral carotid stenosis now status post repair of the right and needing repair of the left.  Patient has had significant improvement since the right carotid stent placement and continues on his Plavix and aspirin in preparation for left carotid stent placement.  This will be performed in the near future.  The risks, alternatives and procedure were again reviewed with the patient agreeing to proceed.  Diagnoses and all orders for this visit:     1. Carotid artery stenosis, symptomatic, right status post stent placement (Primary)     2. Carotid stenosis, asymptomatic, left     3. Hypertension, unspecified type  -     IR Stent Cerv Carotid Without Emb Prot; Future  -     Basic Metabolic Panel; Future  -     CBC & Differential; Future  -     P2Y12 Platelet Inhibition; Future     4. Hyperlipidemia, unspecified hyperlipidemia type  -     IR Stent Cerv Carotid Without Emb Prot; Future  -     Basic Metabolic Panel; Future  -     CBC & Differential; Future  -     P2Y12 Platelet Inhibition; Future     5. Tobacco abuse  -     IR Stent Cerv Carotid Without Emb Prot; Future  -     Basic Metabolic Panel; Future  -     CBC & Differential;  Future  -     P2Y12 Platelet Inhibition; Future     Other orders  -     atorvastatin (LIPITOR) 80 MG tablet; Take 1 tablet by mouth Every Night.  Dispense: 30 tablet; Refill: 12  -     clopidogrel (PLAVIX) 75 MG tablet; Take 1 tablet by mouth Daily.  Dispense: 30 tablet; Refill: 12        Return now for Carotid stent placement under distal protection on the left.

## 2024-02-27 ENCOUNTER — READMISSION MANAGEMENT (OUTPATIENT)
Dept: CALL CENTER | Facility: HOSPITAL | Age: 74
End: 2024-02-27
Payer: OTHER GOVERNMENT

## 2024-02-27 VITALS
HEART RATE: 64 BPM | RESPIRATION RATE: 16 BRPM | HEIGHT: 68 IN | WEIGHT: 151.01 LBS | DIASTOLIC BLOOD PRESSURE: 62 MMHG | TEMPERATURE: 97.6 F | SYSTOLIC BLOOD PRESSURE: 148 MMHG | BODY MASS INDEX: 22.89 KG/M2 | OXYGEN SATURATION: 95 %

## 2024-02-27 LAB
ACT BLD: 152 SECONDS (ref 82–152)
ACT BLD: 158 SECONDS (ref 82–152)
ACT BLD: 239 SECONDS (ref 82–152)
ANION GAP SERPL CALCULATED.3IONS-SCNC: 12.4 MMOL/L (ref 5–15)
BASOPHILS # BLD AUTO: 0.06 10*3/MM3 (ref 0–0.2)
BASOPHILS NFR BLD AUTO: 0.7 % (ref 0–1.5)
BUN SERPL-MCNC: 17 MG/DL (ref 8–23)
BUN/CREAT SERPL: 12.1 (ref 7–25)
CALCIUM SPEC-SCNC: 7.5 MG/DL (ref 8.6–10.5)
CHLORIDE SERPL-SCNC: 108 MMOL/L (ref 98–107)
CO2 SERPL-SCNC: 21.6 MMOL/L (ref 22–29)
CREAT SERPL-MCNC: 1.4 MG/DL (ref 0.76–1.27)
DEPRECATED RDW RBC AUTO: 39.3 FL (ref 37–54)
EGFRCR SERPLBLD CKD-EPI 2021: 52.4 ML/MIN/1.73
EOSINOPHIL # BLD AUTO: 0.32 10*3/MM3 (ref 0–0.4)
EOSINOPHIL NFR BLD AUTO: 3.7 % (ref 0.3–6.2)
ERYTHROCYTE [DISTWIDTH] IN BLOOD BY AUTOMATED COUNT: 12.4 % (ref 12.3–15.4)
GLUCOSE BLDC GLUCOMTR-MCNC: 108 MG/DL (ref 70–130)
GLUCOSE BLDC GLUCOMTR-MCNC: 92 MG/DL (ref 70–130)
GLUCOSE SERPL-MCNC: 92 MG/DL (ref 65–99)
HCT VFR BLD AUTO: 30.7 % (ref 37.5–51)
HGB BLD-MCNC: 9.9 G/DL (ref 13–17.7)
IMM GRANULOCYTES # BLD AUTO: 0.01 10*3/MM3 (ref 0–0.05)
IMM GRANULOCYTES NFR BLD AUTO: 0.1 % (ref 0–0.5)
LYMPHOCYTES # BLD AUTO: 2.72 10*3/MM3 (ref 0.7–3.1)
LYMPHOCYTES NFR BLD AUTO: 31.4 % (ref 19.6–45.3)
MCH RBC QN AUTO: 28 PG (ref 26.6–33)
MCHC RBC AUTO-ENTMCNC: 32.2 G/DL (ref 31.5–35.7)
MCV RBC AUTO: 87 FL (ref 79–97)
MONOCYTES # BLD AUTO: 0.79 10*3/MM3 (ref 0.1–0.9)
MONOCYTES NFR BLD AUTO: 9.1 % (ref 5–12)
NEUTROPHILS NFR BLD AUTO: 4.77 10*3/MM3 (ref 1.7–7)
NEUTROPHILS NFR BLD AUTO: 55 % (ref 42.7–76)
NRBC BLD AUTO-RTO: 0 /100 WBC (ref 0–0.2)
PLATELET # BLD AUTO: 213 10*3/MM3 (ref 140–450)
PMV BLD AUTO: 9.5 FL (ref 6–12)
POTASSIUM SERPL-SCNC: 3 MMOL/L (ref 3.5–5.2)
POTASSIUM SERPL-SCNC: 3 MMOL/L (ref 3.5–5.2)
RBC # BLD AUTO: 3.53 10*6/MM3 (ref 4.14–5.8)
SODIUM SERPL-SCNC: 142 MMOL/L (ref 136–145)
WBC NRBC COR # BLD AUTO: 8.67 10*3/MM3 (ref 3.4–10.8)

## 2024-02-27 PROCEDURE — 82948 REAGENT STRIP/BLOOD GLUCOSE: CPT

## 2024-02-27 PROCEDURE — 84132 ASSAY OF SERUM POTASSIUM: CPT | Performed by: INTERNAL MEDICINE

## 2024-02-27 PROCEDURE — 25010000002 POTASSIUM CHLORIDE 10 MEQ/100ML SOLUTION: Performed by: RADIOLOGY

## 2024-02-27 PROCEDURE — 25010000002 LABETALOL 5 MG/ML SOLUTION: Performed by: INTERNAL MEDICINE

## 2024-02-27 PROCEDURE — 80048 BASIC METABOLIC PNL TOTAL CA: CPT | Performed by: RADIOLOGY

## 2024-02-27 PROCEDURE — 85025 COMPLETE CBC W/AUTO DIFF WBC: CPT | Performed by: RADIOLOGY

## 2024-02-27 PROCEDURE — 25010000002 HYDRALAZINE PER 20 MG: Performed by: ANESTHESIOLOGY

## 2024-02-27 PROCEDURE — 99024 POSTOP FOLLOW-UP VISIT: CPT | Performed by: NURSE PRACTITIONER

## 2024-02-27 RX ORDER — POTASSIUM CHLORIDE 7.45 MG/ML
10 INJECTION INTRAVENOUS
Status: COMPLETED | OUTPATIENT
Start: 2024-02-27 | End: 2024-02-27

## 2024-02-27 RX ADMIN — POTASSIUM CHLORIDE 10 MEQ: 7.46 INJECTION, SOLUTION INTRAVENOUS at 09:39

## 2024-02-27 RX ADMIN — DOCUSATE SODIUM 50MG AND SENNOSIDES 8.6MG 2 TABLET: 8.6; 5 TABLET, FILM COATED ORAL at 08:02

## 2024-02-27 RX ADMIN — POTASSIUM CHLORIDE 10 MEQ: 7.46 INJECTION, SOLUTION INTRAVENOUS at 11:48

## 2024-02-27 RX ADMIN — POTASSIUM CHLORIDE 10 MEQ: 7.46 INJECTION, SOLUTION INTRAVENOUS at 10:47

## 2024-02-27 RX ADMIN — CARVEDILOL 6.25 MG: 6.25 TABLET, FILM COATED ORAL at 08:02

## 2024-02-27 RX ADMIN — HYDRALAZINE HYDROCHLORIDE 5 MG: 20 INJECTION INTRAMUSCULAR; INTRAVENOUS at 04:13

## 2024-02-27 RX ADMIN — POTASSIUM CHLORIDE 20 MEQ: 750 TABLET, EXTENDED RELEASE ORAL at 08:02

## 2024-02-27 RX ADMIN — FUROSEMIDE 40 MG: 40 TABLET ORAL at 08:02

## 2024-02-27 RX ADMIN — Medication 10 ML: at 08:03

## 2024-02-27 RX ADMIN — POTASSIUM CHLORIDE 10 MEQ: 7.46 INJECTION, SOLUTION INTRAVENOUS at 05:59

## 2024-02-27 RX ADMIN — LABETALOL HYDROCHLORIDE 20 MG: 5 INJECTION, SOLUTION INTRAVENOUS at 10:48

## 2024-02-27 RX ADMIN — ASPIRIN 81 MG: 81 TABLET, COATED ORAL at 08:02

## 2024-02-27 RX ADMIN — NICOTINE 1 PATCH: 21 PATCH, EXTENDED RELEASE TRANSDERMAL at 08:04

## 2024-02-27 RX ADMIN — POTASSIUM CHLORIDE 10 MEQ: 7.46 INJECTION, SOLUTION INTRAVENOUS at 08:01

## 2024-02-27 RX ADMIN — LISINOPRIL 20 MG: 20 TABLET ORAL at 08:02

## 2024-02-27 RX ADMIN — POTASSIUM CHLORIDE 10 MEQ: 7.46 INJECTION, SOLUTION INTRAVENOUS at 07:09

## 2024-02-27 RX ADMIN — CLOPIDOGREL BISULFATE 75 MG: 75 TABLET, FILM COATED ORAL at 08:02

## 2024-02-27 NOTE — DISCHARGE SUMMARY
Tigre Amaral  1950    Patient Care Team:  Provider, No Known as PCP - General    Date of Admit: 2/26/2024    Date of Discharge:  2/27/2024    Discharge Diagnosis: Left ICA stenosis, status post successful stent placement    Procedures Performed  Cerebral angiogram with successful left ICA stent placement       Complications: None    Consultants:   Consults       Date and Time Order Name Status Description    2/26/2024 12:53 PM Inpatient Intensivist Consult              Condition on Discharge: improved    Discharge disposition: home      Brief HPI: 73-year-old male who initially presented to the ED in November 2023 with complaints of left-sided weakness he was found to have multiple right sided watershed infarcts.  A carotid Doppler was obtained and showed severe stenosis at the right ICA measuring 70 to 99% as well as when he percent left ICA stenosis as well.  He underwent successful right carotid artery stent placement on 11/6/2023 with plans for future stent placement of left ICA.    Hospital Course: Patient admitted for above procedure. The procedure itself was without complication. The patient was transferred to the ICU following recovery where he has done very well.  He has no complaints and denies any strokelike symptoms.  He does continue to smoke cigarettes and states he will not quit anytime soon.  He understands this increases his risks for further cardiovascular events.  He will need to remain on aspirin and Plavix and follow-up with us in 2 weeks.    Discharge Physical Exam:    Temp:  [97.5 °F (36.4 °C)-98.1 °F (36.7 °C)] 97.7 °F (36.5 °C)  Heart Rate:  [58-82] 64  Resp:  [10-18] 16  BP: ()/(46-75) 129/65  Arterial Line BP: (100-105)/(39-41) 100/39    Current labs:  Lab Results (last 24 hours)       Procedure Component Value Units Date/Time    POC Activated Clotting Time [[id number]]  (Abnormal) Collected: 02/26/24 0928    Specimen: Blood Updated: 02/27/24 0658     Activated Clotting Time   239 Seconds      Comment: Serial Number: [id number]:  12       POC Activated Clotting Time [[id number]]  (Normal) Collected: 02/26/24 1023    Specimen: Blood Updated: 02/27/24 0658     Activated Clotting Time  152 Seconds      Comment: Serial Number: [id number]:  12       POC Glucose Once [[id number]]  (Abnormal) Collected: 02/26/24 1233    Specimen: Blood Updated: 02/26/24 1235     Glucose 136 mg/dL     Magnesium [[id number]]  (Normal) Collected: 02/26/24 1322    Specimen: Blood Updated: 02/26/24 1405     Magnesium 2.0 mg/dL     POC Glucose Once [[id number]]  (Normal) Collected: 02/27/24 0024    Specimen: Blood Updated: 02/27/24 0025     Glucose 92 mg/dL     Potassium [[id number]]  (Abnormal) Collected: 02/27/24 0242    Specimen: Blood Updated: 02/27/24 0313     Potassium 3.0 mmol/L     Basic Metabolic Panel [[id number]]  (Abnormal) Collected: 02/27/24 0242    Specimen: Blood Updated: 02/27/24 0316     Glucose 92 mg/dL      BUN 17 mg/dL      Creatinine 1.40 mg/dL      Sodium 142 mmol/L      Potassium 3.0 mmol/L      Chloride 108 mmol/L      CO2 21.6 mmol/L      Calcium 7.5 mg/dL      BUN/Creatinine Ratio 12.1     Anion Gap 12.4 mmol/L      eGFR 52.4 mL/min/1.73     Narrative:      GFR Normal >60  Chronic Kidney Disease <60  Kidney Failure <15    The GFR formula is only valid for adults with stable renal function between ages 18 and 70.    CBC & Differential [[id number]]  (Abnormal) Collected: 02/27/24 0242    Specimen: Blood Updated: 02/27/24 0257    Narrative:      The following orders were created for panel order CBC & Differential.  Procedure                               Abnormality         Status                     ---------                               -----------         ------                     CBC Auto Differential[[id number]]        Abnormal            Final result                 Please view results for these tests on the individual orders.    CBC Auto Differential [[id number]]  (Abnormal) Collected:  02/27/24 0242    Specimen: Blood Updated: 02/27/24 0257     WBC 8.67 10*3/mm3      RBC 3.53 10*6/mm3      Hemoglobin 9.9 g/dL      Hematocrit 30.7 %      MCV 87.0 fL      MCH 28.0 pg      MCHC 32.2 g/dL      RDW 12.4 %      RDW-SD 39.3 fl      MPV 9.5 fL      Platelets 213 10*3/mm3      Neutrophil % 55.0 %      Lymphocyte % 31.4 %      Monocyte % 9.1 %      Eosinophil % 3.7 %      Basophil % 0.7 %      Immature Grans % 0.1 %      Neutrophils, Absolute 4.77 10*3/mm3      Lymphocytes, Absolute 2.72 10*3/mm3      Monocytes, Absolute 0.79 10*3/mm3      Eosinophils, Absolute 0.32 10*3/mm3      Basophils, Absolute 0.06 10*3/mm3      Immature Grans, Absolute 0.01 10*3/mm3      nRBC 0.0 /100 WBC     POC Glucose Once [[id number]]  (Normal) Collected: 02/27/24 0619    Specimen: Blood Updated: 02/27/24 0621     Glucose 108 mg/dL               General Appearance No acute distress   HEENT NC/AT;    Neurological Awake, Alert, and oriented x 3   Cranial nerves CN II-XII intact   Motor Strength 5/5 throughout, tone normal in upper and lower extremities   Sensory Intact to light touch in upper and lower extremity   Gait and station Ambulating independently   Back Left groin site flat, soft, no hematoma and pedal pulses present.   Extremities Moves all extremities well, no edema, no cyanosis, no redness         Discharge Medications  MARCO has been reviewed and narcotic consent is on file in the patient's chart.     Your medication list        ASK your doctor about these medications        Instructions Last Dose Given Next Dose Due   aspirin 81 MG EC tablet      Take 1 tablet by mouth Daily.       atorvastatin 80 MG tablet  Commonly known as: LIPITOR      Take 1 tablet by mouth Every Night.       carvedilol 6.25 MG tablet  Commonly known as: COREG      Take 1 tablet by mouth 2 (Two) Times a Day With Meals.       cholecalciferol 25 MCG (1000 UT) tablet  Commonly known as: VITAMIN D3      Take 1 tablet by mouth Daily.       clopidogrel  "75 MG tablet  Commonly known as: PLAVIX      Take 1 tablet by mouth Daily.       furosemide 40 MG tablet  Commonly known as: LASIX      Take 1 tablet by mouth Daily.       Klor-Con/EF 25 MEQ disintegrating tablet  Generic drug: potassium bicarbonate      Take 1 tablet by mouth Daily.       lisinopril 20 MG tablet  Commonly known as: PRINIVIL,ZESTRIL      Take 1 tablet by mouth Daily.       potassium chloride 20 MEQ CR tablet  Commonly known as: K-DUR,KLOR-CON      Take 1 tablet by mouth Daily.       sennosides-docusate 8.6-50 MG per tablet  Commonly known as: PERICOLACE      1 tablet.                Discharge Diet:     Diet Order   Procedures    Diet: Cardiac Diets; Healthy Heart (2-3 Na+); Texture: Regular Texture (IDDSI 7); Fluid Consistency: Thin (IDDSI 0)       Activity at Discharge: no restrictions      Call for: questions or concerns    Follow-up Appointments  Future Appointments   Date Time Provider Department Center   3/7/2024 11:30 AM Sanjiv Spann MD [id number]           Test Results Pending at Discharge     None    I discussed the discharge instructions with patient and nursing staff    SUNG Yin  02/27/24  09:26 EST    45 min spent in reviewing records, discussion and examination of the patient and discussion with other members of the patient's medical team.    \"Dictated utilizing Dragon dictation\".      "

## 2024-02-27 NOTE — CASE MANAGEMENT/SOCIAL WORK
Discharge Planning Assessment  [facility]     Patient Name: Tigre Amaral  MRN: [id number]  Today's Date: 2/27/2024    Admit Date: 2/26/2024    Plan: Home with family and [facility] (pending acceptance)   Discharge Needs Assessment       Row Name 02/27/24 1159       Living Environment    People in Home alone    Current Living Arrangements home    Potentially Unsafe Housing Conditions none    Primary Care Provided by child(bolivar)    Provides Primary Care For no one, unable/limited ability to care for self    Family Caregiver if Needed child(bolivar), adult    Family Caregiver Names daughters: Rola Jaime and Manuela Benji, son-in-law Desmond Gibson    Quality of Family Relationships supportive    Able to Return to Prior Arrangements yes       Resource/Environmental Concerns    Resource/Environmental Concerns none    Transportation Concerns none       Transition Planning    Patient/Family Anticipates Transition to home    Patient/Family Anticipated Services at Transition home health care    Transportation Anticipated family or friend will provide       Discharge Needs Assessment    Readmission Within the Last 30 Days no previous admission in last 30 days    Equipment Currently Used at Home other (see comments)  has but does not use a rolling walker    Concerns to be Addressed adjustment to diagnosis/illness;basic needs;discharge planning    Anticipated Changes Related to Illness none    Equipment Needed After Discharge none    Outpatient/Agency/Support Group Needs homecare agency                   Discharge Plan       Row Name 02/27/24 1155       Plan    Patient/Family in Agreement with Plan unable to assess  due to pt's thought process difficulty I spoke with his dtr Rola, he told me Rola was his wife    Provided Post Acute Provider List? Yes    Post Acute Provider List Home Health    Delivered To Support Person    Support Person daughter: Rola Cervantes    Method of Delivery Telephone    Plan  Comments I was updated by pt's nurse that she would like for her father to have home health when discharged.  I spoke with pt's daughter georgia Jaime [phone], she confirmed the address and [facility] are correct and pt's PCP is Dr. Tara Encarnacion, she said [facility], she said [place], she said family assist him with bathing and dressing and family does everything for pt, cooking, cleaning and shopping.  She said pt has a walker but will not use it, he went to [facility] for rehab when discharged from [facility] in November then had [facility] and she would like to have them again (referral per Epic and they have marked they are considering), she said pt's daughter Manuela Leblanc who lives out-of-town will be staying with pt for the first week when he returns home from this admit.     Gemini Woodson RN      Row Name 02/27/24 4618       Plan    Plan Home with family and [facility] (pending acceptance)                  Continued Care and Services - Admitted Since 2/26/2024       Home Medical Care       Service Provider Request Status Selected Services Address Phone Fax Patient Preferred    [facility] Considering N/A [address], [address], [facility] [address] [phone] --                     Demographic Summary       Row Name 02/27/24 115       General Information    Admission Type inpatient    Arrived From home    Referral Source nursing    Reason for Consult discharge planning    Preferred Language English                   Functional Status       Row Name 02/27/24 1157       Functional Status, IADL    Medications assistive person    Meal Preparation completely dependent    Housekeeping completely dependent    Laundry completely dependent    Shopping completely dependent    IADL Comments pt's dtr said multiple family  members [place] and they provide everything for him, she said he can ambulate to the bathroom holding to furniture, he will not use his walker             Gemini Woodson RN

## 2024-02-27 NOTE — PLAN OF CARE
Goal Outcome Evaluation:   Patient admitted to ICU around 1230; following commands, confused at baseline and had brain injury when was 16 y/o due to fell on Bar stool causing midline shift per daughter. Hydrazine given to keep SBP between . Used urinal, spontaneously urinate. No BM no fever. Care on going. Daughters visited and update given.

## 2024-02-27 NOTE — DISCHARGE PLACEMENT REQUEST
"Tigre Amaral (73 y.o. Male)       Date of Birth   1950    Social Security Number       Address   [address] [place] [address]    Home Phone   [phone]    MRN   [id number]       Confucianism   None    Marital Status   Single                            Admission Date   2/26/24    Admission Type   Elective    Admitting Provider   Sanjiv Spann MD    Attending Provider   Sanjiv Spann MD    Department, Room/Bed   [facility] INTENSIVE CARE, I376/1       Discharge Date       Discharge Disposition       Discharge Destination                                 Attending Provider: Sanjiv Spann MD    Allergies: No Known Allergies    Isolation: None   Infection: None   Code Status: CPR    Ht: 172.7 cm (68\")   Wt: 68.5 kg (151 lb 0.2 oz)    Admission Cmt: None   Principal Problem: None                  Active Insurance as of 2/26/2024       Primary Coverage       Payor Plan Insurance Group Employer/Plan Group    [facility] CC[employer] OPTUM        Payor Plan Address Payor Plan Phone Number Payor Plan Fax Number Effective Dates    [address] [phone]  1/1/2022 - None Entered    [facility] [id number]         Subscriber Name Subscriber Birth Date Member ID       TIGRE AMARAL 1950 [id number]                     Emergency Contacts        (Rel.) Home Phone Work Phone Mobile Phone    Rola Jaime (Daughter) -- -- [phone]                "

## 2024-02-27 NOTE — CASE MANAGEMENT/SOCIAL WORK
Continued Stay Note  [facility]     Patient Name: Tigre Amaral  MRN: [id number]  Today's Date: 2/27/2024    Admit Date: 2/26/2024    Plan: Home with family and [facility] (pending acceptance)   Discharge Plan       Row Name 02/27/24 1316       Plan    Plan Comments Pt has discharge order, plan is home and [facility]. [facility] of [facility] by voice mail. Cameron    Final Discharge Disposition Code 06 - home with home health care      Row Name 02/27/24 1155       Plan    Patient/Family in Agreement with Plan unable to assess  due to pt's thought process difficulty I spoke with his dtr Georgia, he told me Georgia was his wife    Provided Post Acute Provider List? Yes    Post Acute Provider List Home Health    Delivered To Support Person    Support Person daughter: Georgia Cervantes    Method of Delivery Telephone    Plan Comments I was updated by pt's nurse that she would like for her father to have home health when discharged.  I spoke with pt's daughter georgia Jaime [phone], she confirmed the address and [facility] and pt's PCP is Dr. Tara Encarnacion, she said [facility], she said pt lives alone in [place], she said family assist him with bathing and dressing and family does everything for pt, cooking, cleaning and shopping.  She said pt has a walker but will not use it, he went to [facility] for rehab when discharged from [facility] in November then had [facility] and she would like to have them again (referral per Epic and they have marked they are considering), she said pt's daughter Manuela Leblanc who lives out-of-town will be staying with pt for the first week when he returns home from this admit.     Gemini Woodson RN      Row Name 02/27/24 114       Plan    Plan Home with family and [facility] (pending  acceptance)                   Discharge Codes    No documentation.                 Expected Discharge Date and Time       Expected Discharge Date Expected Discharge Time    Feb 27, 2024               Gemini Woodson RN

## 2024-02-27 NOTE — PROGRESS NOTES
LOS: 1 day   Patient Care Team:  Provider, No Known as PCP - General    Subjective     Patient is a 73 y.o. male.  Asked to see with to assist with critical care management, patient is status post cerebral angiogram within left internal carotid artery stent under distal protection for 78% stenosis at the origin, patient has had prior internal carotid stent and they did not see any significant restenosis with angiography.   Patient has no complaints today no pain no headache no nausea he wants to go home  Review of Systems:          Objective     Vital Signs  Vital Sign Min/Max for last 24 hours  Temp  Min: 97.5 °F (36.4 °C)  Max: 98.1 °F (36.7 °C)   BP  Min: 76/52  Max: 143/54   Pulse  Min: 58  Max: 78   Resp  Min: 10  Max: 18   SpO2  Min: 91 %  Max: 100 %   No data recorded   Weight  Min: 65.8 kg (145 lb)  Max: 68.5 kg (151 lb 0.2 oz)        Ventilator/Non-Invasive Ventilation Settings (From admission, onward)      None                Arterial Line BP: 100/39  Arterial Line MAP (mmHg): 61 mmHg     Body mass index is 22.96 kg/m².  I/O last 3 completed shifts:  In: 1693.9 [P.O.:980; I.V.:713.9]  Out: 120 [Urine:120]  I/O this shift:  In: -   Out: 1350 [Urine:1350]        Physical Exam:    General Appearance: Elderly white male he is resting in bed he seems to be in great spirits in no apparent distress  Eyes: Conjunctiva are clear and anicteric pupils are equal  ENT: Mucous membranes are little dry he does not have most of his teeth.  He has a Mallampati 1 airway and nasal septum midline he has a large mass under his right eye that pushes up probably limits some of his lower visual field  Neck: Trachea midline no palpable lymphadenopathy no visible jugular venous distention  Lungs: Little decreased but clear no wheezes no rales no rhonchi  Cardiac: Regular rate rhythm no murmur no gallop  Abdomen: Soft no palpable hepatosplenomegaly or masses  : Grossly normal  Musculoskeletal: He has left femoral puncture  site that is soft there is no masses no ecchymoses clean dry dressing.   Skin: Warm and dry no jaundice no petechiae  Neuro: He is awake and alert oriented he still thinks it is March at least he knows it is 2024 today he knows he is at [facility]  he is following commands moving all extremities good dorsiflexion plantarflexion good  and finger-to-nose with upper extremities  Extremities/P Vascular: No clubbing no cyanosis no edema he has palpable radial pulse and palpable dorsalis pedis pulses bilaterally  MSE: Seems to be in very good spirits     Labs:  Results from last 7 days   Lab Units 02/27/24  0242 02/26/24  1322 02/26/24  0732   GLUCOSE mg/dL 92  --  119*   SODIUM mmol/L 142  --  138   POTASSIUM mmol/L 3.0*  3.0*  --  3.0*   MAGNESIUM mg/dL  --  2.0  --    CO2 mmol/L 21.6*  --  29.8*   CHLORIDE mmol/L 108*  --  96*   ANION GAP mmol/L 12.4  --  12.2   CREATININE mg/dL 1.40*  --  1.55*   BUN mg/dL 17  --  22   BUN / CREAT RATIO  12.1  --  14.2   CALCIUM mg/dL 7.5*  --  9.0     Estimated Creatinine Clearance: 44.2 mL/min (A) (by C-G formula based on SCr of 1.4 mg/dL (H)).      Results from last 7 days   Lab Units 02/27/24  0242 02/26/24  0815   WBC 10*3/mm3 8.67 9.43   RBC 10*6/mm3 3.53* 4.59   HEMOGLOBIN g/dL 9.9* 13.0   HEMATOCRIT % 30.7* 39.2   MCV fL 87.0 85.4   MCH pg 28.0 28.3   MCHC g/dL 32.2 33.2   RDW % 12.4 11.9*   RDW-SD fl 39.3 36.8*   MPV fL 9.5 9.5   PLATELETS 10*3/mm3 213 306   NEUTROPHIL % % 55.0 51.3   LYMPHOCYTE % % 31.4 34.9   MONOCYTES % % 9.1 7.8   EOSINOPHIL % % 3.7 4.6   BASOPHIL % % 0.7 1.2   IMM GRAN % % 0.1 0.2   NEUTROS ABS 10*3/mm3 4.77 4.84   LYMPHS ABS 10*3/mm3 2.72 3.29*   MONOS ABS 10*3/mm3 0.79 0.74   EOS ABS 10*3/mm3 0.32 0.43*   BASOS ABS 10*3/mm3 0.06 0.11   IMMATURE GRANS (ABS) 10*3/mm3 0.01 0.02   NRBC /100 WBC 0.0 0.0                             Microbiology Results (last 10 days)       ** No results found for the last 240 hours. **                aspirin, 81  mg, Oral, Daily  atorvastatin, 80 mg, Oral, Nightly  carvedilol, 6.25 mg, Oral, BID With Meals  clopidogrel, 75 mg, Oral, Daily  furosemide, 40 mg, Oral, Daily  lisinopril, 20 mg, Oral, Daily  nicotine, 1 patch, Transdermal, Q24H  potassium chloride, 20 mEq, Oral, Daily  potassium chloride, 10 mEq, Intravenous, Q1H  senna-docusate sodium, 2 tablet, Oral, BID  sodium chloride, 10 mL, Intravenous, Q12H  sodium chloride, 10 mL, Intravenous, Q12H  sodium chloride, 10 mL, Intravenous, Q12H  sodium chloride, 3 mL, Intravenous, Q12H      lactated ringers, 9 mL/hr  niCARdipine, 5-15 mg/hr, Last Rate: Stopped (02/26/24 1254)  phenylephrine, 0.5-3 mcg/kg/min, Last Rate: Stopped (02/26/24 1144)  sodium chloride, 100 mL/hr        Diagnostics:  IR Stent Cerv Carotid Without Emb Prot    Result Date: 2/26/2024  CEREBRAL ARTERIOGRAM WITH LEFT ICA ORIGIN STENT PLACEMENT UNDER DISTAL PROTECTION performed on 2/26/2024.  CLINICAL HISTORY: 73-year-old male with history of hypertension, hyperlipidemia, cardiac EF of 20%, coronary artery disease and tobacco abuse. Patient had watershed infarcts in the right carotid distribution on stroke work-up imaging and underwent successful right carotid stent placement on 11/6/2023 and now presents for possible left carotid stent placement under distal protection secondary to a high-grade stenosis.   TECHNIQUE: After obtaining informed consent, the patient was placed in a supine position on the angiographic table and the left groin was then prepped and draped in usual sterile fashion with ChloraPrep soap. Under ultrasound guidance with permanent images recorded, a 4 Gibraltarian micro-puncture set was used to access the left common femoral artery through a dermatotomy. Through this access an 8 Gibraltarian sheath was positioned in the left groin. Over an 035 Glidewire, a 6 Gibraltarian [employer] 2 diagnostic catheter was placed coaxially into a BMX 96 guiding sheath and together these were placed into the circulation  and the following vessels were then selectively catheterized:  1. Right internal carotid artery with digital subtraction imaging of the cervical and intracranial runoff after contrast injection. 2. Left common carotid artery with digital subtraction imaging of the cervical and intracranial runoff after contrast injection.  Upon completion of the diagnostic portion of the exam, the endovascular procedure was performed as described below. At the conclusion of the entire procedure, the catheters and sheath were removed with placement of an 8 Slovenian Angio-Seal on the left. Once hemostasis was achieved, the appropriate dressing was applied. No immediate postprocedural complications were encountered. General endotracheal anesthesia was provided by the anesthesia department. Approximately 41.8 minutes of fluoroscopy time were employed delivering an AK of 2062.05 mGy. 220 cc of Visipaque 320 were injected. Maximal sterile barrier technique was employed throughout the entire procedure according to current guidelines.  Surgeon: Dr. Sanjiv Spann.  FINDINGS: The right internal carotid artery injection demonstrates a patent bifurcation occurring at the C3-4 level with a patent carotid stent seen at the C2-4 level. The stent shows some plaque still narrowing the carotid bulb cervical segment junction approximately 40%. No significant stenosis by NASCET criteria remains. The intracranial vasculature shows a patent M1 segment and A1 segment with cross filling of the left MCA territory through a patent anterior communicating artery. The MCA and MIGUEL runoff is normal in its arterial, capillary and venous phases. No aneurysms, hypervascular lesions or evidence of AV shunting is appreciated.  The left internal carotid artery injection demonstrates a high-grade stenosis measuring 78% by NASCET criteria over approximately 15 mm in length. The atherosclerotic change and stenosis occurs at the C2-3 level with the origin at the C3 level.  A normal caliber and tortuous course is seen in the cervical and intracranial segments. The M1 segment is patent. The MCA runoff is otherwise normal in its arterial, capillary and venous phases, but markedly delayed and circulation time. Transient flash filling of the A1 segment is seen, but only intermittently due to inflow washout. A small posterior communicating artery is identified. The dural sinuses are patent and dominant on the right. No aneurysms, hypervascular lesions or evidence of AV shunting is appreciated.  ENDOVASCULAR PROCEDURE:  Left ICA Origin Carotid Stent Placement under Distal Protection - Upon completion of the diagnostic portion of the exam and with the BMX 96 guiding sheath in the cervical left common carotid artery, a 7.2 mm [employer] distal protection device was then placed across the stenosis and it was then deployed in the mid cervical ICA and became the working wire. Predilatation was then achieved with a 4 x 20 mm Viatrack 14+ angioplasty balloon. An 8-6 x 40 mm Xact carotid  stent was then deployed in the proximal portion of the cervical internal carotid artery extending into the distal common carotid artery repairing the stenosis. Post dilatation was required secondary to a residual waist within the midportion of the stent. This was achieved with a 5 x 20 mm Viatrack 14+ angioplasty balloon. No residual waist or stenosis was seen. Marked improved flow intracranially was now present. Patient received Plavix and aspirin preoperatively. IV heparin was also administered during the procedure with bolus doses to maintain an ACT near 250 seconds. Dual antiplatelet therapy will be required for 3 - 6 months with follow-up carotid Doppler ultrasound at 6 months.      1. Patent right internal carotid artery origin stent with no significant residual narrowing seen. Marked improvement in the intracranial flow is present with a patent anterior communicating artery seen also supplying the left MCA  territory pre-stent placement on the left. 2. 78% stenosis of the left internal carotid artery with successful carotid stent placement under distal protection as described above with no residual stenosis seen. Dual antiplatelet therapy recommended for approximately 6 months if possible. 3. Bilateral carotid Doppler sonogram in 6 months.  All carotid stenosis measurements were made using NASCET criteria.   This report was finalized on 2/26/2024 4:06 PM by Dr. Sanjiv Spann M.D on Workstation: [id number]      Arterial Line    Result Date: 2/26/2024  Candace Lopez MD     2/26/2024  8:22 AM Arterial Line Patient reassessed immediately prior to procedure Patient location during procedure: pre-op Start time: 2/26/2024 8:04 AM Stop Time:2/26/2024 8:10 AM  Line placed for respiratory failure, hemodynamic monitoring, MD/Surgeon request and ABGs/Labs/ISTAT. Performed By Anesthesiologist: Candace Lopez MD Preanesthetic Checklist Completed: patient identified, IV checked, site marked, risks and benefits discussed, surgical consent, monitors and equipment checked, pre-op evaluation and timeout performed Arterial Line Prep  Sterile Tech: cap, gloves, sterile barriers and mask Prep: ChloraPrep Patient monitoring: EKG, continuous pulse oximetry and blood pressure monitoring Arterial Line Procedure Laterality:left Location:  radial artery Catheter size: 20 G Guidance: ultrasound guided PROCEDURE NOTE/ULTRASOUND INTERPRETATION.  Using ultrasound guidance the potential vascular sites for insertion of the catheter were visualized to determine the patency of the vessel to be used for vascular access.  After selecting the appropriate site for insertion, the needle was visualized under ultrasound being inserted into the radial artery, followed by ultrasound confirmation of wire and catheter placement. There were no abnormalities seen on ultrasound; an image was taken; and the patient tolerated the procedure with no  complications. Number of attempts: 1 Successful placement: yes Images: still images not obtained Post Assessment Dressing Type: wrist guard applied, secured with tape and occlusive dressing applied. Complications no Circ/Move/Sens Assessment: normal and unchanged. Patient Tolerance: patient tolerated the procedure well with no apparent complications Additional Notes Using ultrasound guidance the potential vascular sites for insertion of the catheter were visualized to determine the patency of the vessel to be used for vascular access.  After selecting the appropriate site for insertion, the needle was visualized under ultrasound being inserted into the artery, followed by ultrasound confirmation of wire and catheter placement.  There were no abnormalities seen on ultrasound; an image was taken/ and the patient tolerated the procedure with no complications.      ICD Dual Lead - In Clinic - PaceArt    Result Date: 2/16/2024  This result has an attachment that is not available.    Results for orders placed during the hospital encounter of 10/28/23    Adult transthoracic echo complete    Interpretation Summary    Left ventricular systolic function is severely decreased. Calculated left ventricular EF = 21.6% Left ventricular ejection fraction appears to be less than 20%.The following left ventricular wall segments are hypokinetic: mid anterolateral, basal inferolateral, mid inferolateral, mid inferior, basal inferoseptal, mid inferoseptal, mid anteroseptal, basal inferior and basal inferoseptal. The following left ventricular wall segments are akinetic: apical anterior, apical lateral, apical inferior, apical septal and apex.    Left ventricular wall thickness is consistent with mild concentric hypertrophy.    Left ventricular diastolic function is consistent with (grade II w/high LAP) pseudonormalization.    The left atrial cavity is mildly dilated.    Saline test results are negative for right to left atrial level  shunt.    Estimated right ventricular systolic pressure from tricuspid regurgitation is mildly elevated (35-45 mmHg).          There are no hospital problems to display for this patient.        Assessment & Plan     Bilateral carotid artery stenosis status post left internal carotid artery stent on 2/26/2024 with previous right internal carotid artery stent that was still patent.  We did call and confirm with Dr. 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

## 2024-02-28 NOTE — OUTREACH NOTE
Prep Survey      Flowsheet Row Responses   Religious facility patient discharged from? [place]   Is LACE score < 7 ? No   Eligibility Readm Mgmt   Discharge diagnosis Left ICA stenosis   Does the patient have one of the following disease processes/diagnoses(primary or secondary)? Other   Does the patient have Home health ordered? Yes   What is the Home health agency?  [facility]   Prep survey completed? Yes            Candace CONTRERAS - Registered Nurse

## 2024-02-28 NOTE — CASE MANAGEMENT/SOCIAL WORK
Case Management Discharge Note      Final Note: DC'd home with [facility]. Deuce MOE RN    Provided Post Acute Provider List?: Yes  Post Acute Provider List: Home Health  Delivered To: Support Person  Support Person: daughter: Rola Cervantes  Method of Delivery: Telephone    Selected Continued Care - Discharged on 2/27/2024 Admission date: 2/26/2024 - Discharge disposition: Home-Health Care Svc      Destination    No services have been selected for the patient.                Durable Medical Equipment    No services have been selected for the patient.                Dialysis/Infusion    No services have been selected for the patient.                Home Medical Care       Service Provider Selected Services Address Phone Fax Patient Preferred    [facility] [address], [address], [place] [address] [phone] --              Therapy    No services have been selected for the patient.                Community Resources    No services have been selected for the patient.                Community & DME    No services have been selected for the patient.                    Transportation Services  Private: Car    Final Discharge Disposition Code: 06 - home with home health care

## 2024-03-15 ENCOUNTER — READMISSION MANAGEMENT (OUTPATIENT)
Dept: CALL CENTER | Facility: HOSPITAL | Age: 74
End: 2024-03-15
Payer: OTHER GOVERNMENT

## 2024-03-15 NOTE — OUTREACH NOTE
Medical Week 3 Survey      Flowsheet Row Responses   [facility] patient discharged from? [place]   Does the patient have one of the following disease processes/diagnoses(primary or secondary)? Other   Week 3 attempt successful? No   Unsuccessful attempts Attempt 1            Marisa ARANDA - Registered Nurse

## 2024-03-21 ENCOUNTER — READMISSION MANAGEMENT (OUTPATIENT)
Dept: CALL CENTER | Facility: HOSPITAL | Age: 74
End: 2024-03-21
Payer: OTHER GOVERNMENT

## 2024-03-21 NOTE — OUTREACH NOTE
Medical Week 3 Survey      Flowsheet Row Responses   [facility] patient discharged from? [place]   Does the patient have one of the following disease processes/diagnoses(primary or secondary)? Other   Week 3 attempt successful? No   Unsuccessful attempts Attempt 2   Revoke Decline to participate            Candace CONTRERAS - Registered Nurse

## 2024-04-01 ENCOUNTER — TELEPHONE (OUTPATIENT)
Dept: NEUROSURGERY | Facility: CLINIC | Age: 74
End: 2024-04-01

## 2024-04-01 NOTE — TELEPHONE ENCOUNTER
Called phone number provider and informed [facility] has no follow up scheduled. Will be calling patient to get scheduled and will inform patient to call [facility] with appt date.

## 2024-04-01 NOTE — TELEPHONE ENCOUNTER
[facility] received a fax that requires your attention. The document has been indexed to the patient’s chart for your review.      Reason for sending: URGENT REQUEST FOR UPDATE ON     Documents Description: URGENT REQUEST FOR UPDATE_PRUDENCE MARQUEZ [facility]_03/27/24    Name of Sender: PRUDENCE MARQUEZ [facility]-MARYANNE WOLFF RN    Date Indexed: 04/01/24    Notes (if needed): URGENT REQUEST FOR UPDATE ON , PLEASE CALL OR FAX [facility] OFFICE AT [phone] TO REPORT PATIENT'S NEXT APPT.

## 2024-04-10 NOTE — PROGRESS NOTES
Subjective   Patient ID: Tigre Amaral is a 74 y.o. male is here today for follow-up. He is post LICA stent placement under distal protection done on 2/26/24.     History of Present Illness  74 y.o. male who initially presented to the ED in November 2023 with complaints of left-sided weakness.  He was found to have multiple right sided watershed infarcts.  A carotid Doppler was obtained and showed severe stenosis in the right ICA measuring 70 to 99%.  Patient had a 70% left ICA origin stenosis as well.  He underwent successful right carotid artery stent placement under distal protection on 11/6/2023 and returned for his left carotid stent placement.  Patient's status has greatly improved with the improved blood flow to the brain, but he does continue to smoke.  He continues on his Plavix and aspirin as well as Lipitor.  His balance has improved and he has not fallen recently.  His concentration and memory have also improved some.  He presents today for follow-up of his left-sided carotid stent procedure.  He currently has no neurologic complaints but his lower extremities still cause him pain and he may be developing some rest claudication.      The following portions of the patient's history were reviewed and updated as appropriate: He  has no past medical history on file.  He does not have any pertinent problems on file.  He  has no past surgical history on file.  His family history is not on file.  He  reports that he has been smoking cigarettes. He has a 60 pack-year smoking history. He has never used smokeless tobacco. He reports that he does not use drugs. No history on file for alcohol use.  Current Outpatient Medications   Medication Sig Dispense Refill    aspirin 81 MG EC tablet Take 1 tablet by mouth Daily.      atorvastatin (LIPITOR) 80 MG tablet Take 1 tablet by mouth Every Night. 30 tablet 12    carvedilol (COREG) 6.25 MG tablet Take 1 tablet by mouth 2 (Two) Times a Day With Meals.       "Cholecalciferol 25 MCG (1000 UT) tablet Take 1 tablet by mouth Daily.      clopidogrel (PLAVIX) 75 MG tablet Take 1 tablet by mouth Daily. 30 tablet 12    furosemide (LASIX) 40 MG tablet Take 1 tablet by mouth Daily.      Klor-Con/EF 25 MEQ disintegrating tablet Take 1 tablet by mouth Daily.      lisinopril (PRINIVIL,ZESTRIL) 20 MG tablet Take 1 tablet by mouth Daily.      potassium chloride (K-DUR,KLOR-CON) 20 MEQ CR tablet Take 1 tablet by mouth Daily.      sennosides-docusate (PERICOLACE) 8.6-50 MG per tablet 1 tablet.       No current facility-administered medications for this visit.     Current Outpatient Medications on File Prior to Visit   Medication Sig    aspirin 81 MG EC tablet Take 1 tablet by mouth Daily.    atorvastatin (LIPITOR) 80 MG tablet Take 1 tablet by mouth Every Night.    carvedilol (COREG) 6.25 MG tablet Take 1 tablet by mouth 2 (Two) Times a Day With Meals.    Cholecalciferol 25 MCG (1000 UT) tablet Take 1 tablet by mouth Daily.    clopidogrel (PLAVIX) 75 MG tablet Take 1 tablet by mouth Daily.    furosemide (LASIX) 40 MG tablet Take 1 tablet by mouth Daily.    Klor-Con/EF 25 MEQ disintegrating tablet Take 1 tablet by mouth Daily.    lisinopril (PRINIVIL,ZESTRIL) 20 MG tablet Take 1 tablet by mouth Daily.    potassium chloride (K-DUR,KLOR-CON) 20 MEQ CR tablet Take 1 tablet by mouth Daily.    sennosides-docusate (PERICOLACE) 8.6-50 MG per tablet 1 tablet.     No current facility-administered medications on file prior to visit.     He has No Known Allergies..    Review of Systems   Eyes:  Negative for visual disturbance.   Neurological:  Negative for dizziness and headaches.       Objective     Vitals:    04/11/24 1440   BP: 114/70   Pulse: 54   Temp: 96.9 °F (36.1 °C)   SpO2: 100%   Weight: 67.2 kg (148 lb 3.2 oz)   Height: 172.7 cm (68\")     Body mass index is 22.53 kg/m².      Physical Exam  Vitals and nursing note reviewed.   Constitutional:       General: He is not in acute distress.     " Appearance: He is normal weight.   Cardiovascular:      Rate and Rhythm: Normal rate.   Pulmonary:      Effort: Pulmonary effort is normal.   Musculoskeletal:         General: Normal range of motion.      Cervical back: Normal range of motion.   Skin:     General: Skin is warm and dry.   Neurological:      General: No focal deficit present.      Mental Status: He is alert and oriented to person, place, and time.      Cranial Nerves: No cranial nerve deficit.      Sensory: No sensory deficit.      Motor: No weakness.      Coordination: Coordination normal.      Gait: Gait normal.     Neurologic Exam     Mental Status   Oriented to person, place, and time.           Assessment & Plan   Independent Review of Radiographic Studies:      I personally reviewed the images from the following studies.    The cerebral angiogram with left carotid stent placement performed on 2024 was reviewed with the patient and shows the high-grade narrowing with successful revascularization and stent placement.  Marked improvement in blood flow intracranially is seen.  Previously placed stent appears widely patent and no in-stent stenosis of significance is appreciated.    Medical Decision Makin-year-old male with extensive atherosclerotic vascular disease including bilateral carotid stenosis now status post stent repair on the right on 2023 and on the left on 2024 with good outcomes and improved intracranial flow.  The patient is currently on Plavix and aspirin for the stents and will discontinue the Plavix in 6 months.  He will undergo bilateral carotid Doppler sonography in 1 year to continue to monitor the stents particularly since he is continuing to smoke.  Patient also has extensive vascular disease in the lower extremities with claudication and developing rest pain.  He is to follow-up with vascular surgery for further evaluation.  He is currently medically controlling his hypertension and hyperlipidemia, but  is frustrated by the number of pills he has to take.  Again the patient will return in 1 year after carotid Doppler sonography.  Diagnoses and all orders for this visit:    1. Carotid stenosis, asymptomatic, left status post stent placement (Primary)    2. Carotid artery stenosis, symptomatic, right status post stent placement    3. Cerebrovascular accident (CVA) due to embolism of right middle cerebral artery    4. Hypertension, unspecified type    5. Hyperlipidemia, unspecified hyperlipidemia type    6. Tobacco abuse      Return in about 1 year (around 4/11/2025) for Recheck, Carotid Doppler Ultrasound.

## 2024-04-11 ENCOUNTER — OFFICE VISIT (OUTPATIENT)
Dept: NEUROSURGERY | Facility: CLINIC | Age: 74
End: 2024-04-11
Payer: OTHER GOVERNMENT

## 2024-04-11 ENCOUNTER — TELEPHONE (OUTPATIENT)
Dept: NEUROSURGERY | Facility: CLINIC | Age: 74
End: 2024-04-11

## 2024-04-11 VITALS
TEMPERATURE: 96.9 F | BODY MASS INDEX: 22.46 KG/M2 | DIASTOLIC BLOOD PRESSURE: 70 MMHG | HEIGHT: 68 IN | HEART RATE: 54 BPM | OXYGEN SATURATION: 100 % | SYSTOLIC BLOOD PRESSURE: 114 MMHG | WEIGHT: 148.2 LBS

## 2024-04-11 DIAGNOSIS — I63.411 CEREBROVASCULAR ACCIDENT (CVA) DUE TO EMBOLISM OF RIGHT MIDDLE CEREBRAL ARTERY: ICD-10-CM

## 2024-04-11 DIAGNOSIS — I65.22 CAROTID STENOSIS, ASYMPTOMATIC, LEFT: Primary | ICD-10-CM

## 2024-04-11 DIAGNOSIS — I65.21 CAROTID ARTERY STENOSIS, SYMPTOMATIC, RIGHT: ICD-10-CM

## 2024-04-11 DIAGNOSIS — I10 HYPERTENSION, UNSPECIFIED TYPE: ICD-10-CM

## 2024-04-11 DIAGNOSIS — E78.5 HYPERLIPIDEMIA, UNSPECIFIED HYPERLIPIDEMIA TYPE: ICD-10-CM

## 2024-04-11 DIAGNOSIS — Z72.0 TOBACCO ABUSE: ICD-10-CM

## 2024-04-12 DIAGNOSIS — I65.22 CAROTID STENOSIS, ASYMPTOMATIC, LEFT: Primary | ICD-10-CM

## 2024-04-12 DIAGNOSIS — I65.21 CAROTID ARTERY STENOSIS, SYMPTOMATIC, RIGHT: ICD-10-CM

## 2024-04-26 NOTE — TELEPHONE ENCOUNTER
Rx Refill Note  Requested Prescriptions     Pending Prescriptions Disp Refills    carvedilol (COREG) 3.125 MG tablet [Pharmacy Med Name: CARVEDILOL 3.125 MG TABLET] 120 tablet      Si TAB ORAL TWICE DAILY WITH MEALS    furosemide (LASIX) 20 MG tablet [Pharmacy Med Name: FUROSEMIDE 20 MG TABLET] 60 tablet      Sig: TAKE 2 TABLETS BY MOUTH EVERY DAY    lisinopril (PRINIVIL,ZESTRIL) 20 MG tablet [Pharmacy Med Name: LISINOPRIL 20 MG TABLET] 30 tablet      Sig: TAKE 1 TABLET BY MOUTH EVERY DAY      Last office visit with prescribing clinician: 2024   Last telemedicine visit with prescribing clinician: Visit date not found   Next office visit with prescribing clinician: 2025                         Would you like a call back once the refill request has been completed: [] Yes [] No    If the office needs to give you a call back, can they leave a voicemail: [] Yes [] No    Nichelle Burrows MA  24, 13:58 EDT

## 2024-04-29 RX ORDER — CARVEDILOL 3.12 MG/1
TABLET ORAL
Qty: 120 TABLET | OUTPATIENT
Start: 2024-04-29

## 2024-04-29 RX ORDER — FUROSEMIDE 20 MG/1
40 TABLET ORAL DAILY
Qty: 60 TABLET | OUTPATIENT
Start: 2024-04-29

## 2024-04-29 RX ORDER — LISINOPRIL 20 MG/1
20 TABLET ORAL DAILY
Qty: 30 TABLET | OUTPATIENT
Start: 2024-04-29

## 2024-06-03 NOTE — ANESTHESIA PROCEDURE NOTES
Airway  Urgency: elective    Date/Time: 2/26/2024 8:42 AM  Airway not difficult    General Information and Staff    Patient location during procedure: OR  Anesthesiologist: Candace Lopez MD    Indications and Patient Condition  Indications for airway management: airway protection    Preoxygenated: yes  Mask difficulty assessment: 1 - vent by mask    Final Airway Details  Final airway type: endotracheal airway      Successful airway: ETT  Cuffed: yes   Successful intubation technique: direct laryngoscopy  Endotracheal tube insertion site: oral  Blade: Josie  Blade size: 4  ETT size (mm): 8.0  Cormack-Lehane Classification: grade I - full view of glottis  Placement verified by: chest auscultation and capnometry   Cuff volume (mL): 7  Measured from: teeth  ETT/EBT  to teeth (cm): 22  Number of attempts at approach: 1  Assessment: lips, teeth, and gum same as pre-op and atraumatic intubation             Elevated troponin

## 2024-08-27 ENCOUNTER — HOSPITAL ENCOUNTER (INPATIENT)
Facility: HOSPITAL | Age: 74
LOS: 7 days | Discharge: HOME | End: 2024-09-03
Attending: EMERGENCY MEDICINE | Admitting: INTERNAL MEDICINE
Payer: OTHER GOVERNMENT

## 2024-08-27 ENCOUNTER — APPOINTMENT (OUTPATIENT)
Dept: GENERAL RADIOLOGY | Facility: HOSPITAL | Age: 74
End: 2024-08-27
Payer: OTHER GOVERNMENT

## 2024-08-27 DIAGNOSIS — N18.9 ACUTE ON CHRONIC RENAL INSUFFICIENCY: ICD-10-CM

## 2024-08-27 DIAGNOSIS — G93.41 ACUTE METABOLIC ENCEPHALOPATHY: ICD-10-CM

## 2024-08-27 DIAGNOSIS — N28.9 ACUTE ON CHRONIC RENAL INSUFFICIENCY: ICD-10-CM

## 2024-08-27 DIAGNOSIS — J44.1 ACUTE EXACERBATION OF CHRONIC OBSTRUCTIVE PULMONARY DISEASE (COPD): Primary | ICD-10-CM

## 2024-08-27 DIAGNOSIS — I50.9 ACUTE CONGESTIVE HEART FAILURE, UNSPECIFIED HEART FAILURE TYPE: ICD-10-CM

## 2024-08-27 LAB
ALBUMIN SERPL-MCNC: 3.8 G/DL (ref 3.5–5.2)
ALBUMIN/GLOB SERPL: 1.5 G/DL
ALP SERPL-CCNC: 119 U/L (ref 39–117)
ALT SERPL W P-5'-P-CCNC: 32 U/L (ref 1–41)
ANION GAP SERPL CALCULATED.3IONS-SCNC: 12 MMOL/L (ref 5–15)
ARTERIAL PATENCY WRIST A: POSITIVE
AST SERPL-CCNC: 25 U/L (ref 1–40)
ATMOSPHERIC PRESS: 750.7 MMHG
B PARAPERT DNA SPEC QL NAA+PROBE: NOT DETECTED
B PERT DNA SPEC QL NAA+PROBE: NOT DETECTED
BASE EXCESS BLDA CALC-SCNC: -1.2 MMOL/L (ref 0–2)
BASOPHILS # BLD AUTO: 0.1 10*3/MM3 (ref 0–0.2)
BASOPHILS NFR BLD AUTO: 1.2 % (ref 0–1.5)
BDY SITE: ABNORMAL
BILIRUB SERPL-MCNC: 0.9 MG/DL (ref 0–1.2)
BUN SERPL-MCNC: 25 MG/DL (ref 8–23)
BUN/CREAT SERPL: 16 (ref 7–25)
C PNEUM DNA NPH QL NAA+NON-PROBE: NOT DETECTED
CALCIUM SPEC-SCNC: 9.2 MG/DL (ref 8.6–10.5)
CHLORIDE SERPL-SCNC: 101 MMOL/L (ref 98–107)
CO2 BLDA-SCNC: 22 MMOL/L (ref 23–27)
CO2 SERPL-SCNC: 25 MMOL/L (ref 22–29)
CREAT SERPL-MCNC: 1.56 MG/DL (ref 0.76–1.27)
DEPRECATED RDW RBC AUTO: 45.4 FL (ref 37–54)
DEVICE COMMENT: ABNORMAL
EGFRCR SERPLBLD CKD-EPI 2021: 45.7 ML/MIN/1.73
EOSINOPHIL # BLD AUTO: 0.09 10*3/MM3 (ref 0–0.4)
EOSINOPHIL NFR BLD AUTO: 1.1 % (ref 0.3–6.2)
ERYTHROCYTE [DISTWIDTH] IN BLOOD BY AUTOMATED COUNT: 13.6 % (ref 12.3–15.4)
FLUAV SUBTYP SPEC NAA+PROBE: NOT DETECTED
FLUBV RNA ISLT QL NAA+PROBE: NOT DETECTED
GAS FLOW AIRWAY: 1 LPM
GEN 5 2HR TROPONIN T REFLEX: 77 NG/L
GLOBULIN UR ELPH-MCNC: 2.5 GM/DL
GLUCOSE SERPL-MCNC: 127 MG/DL (ref 65–99)
HADV DNA SPEC NAA+PROBE: NOT DETECTED
HCO3 BLDA-SCNC: 21.1 MMOL/L (ref 22–28)
HCOV 229E RNA SPEC QL NAA+PROBE: NOT DETECTED
HCOV HKU1 RNA SPEC QL NAA+PROBE: NOT DETECTED
HCOV NL63 RNA SPEC QL NAA+PROBE: NOT DETECTED
HCOV OC43 RNA SPEC QL NAA+PROBE: NOT DETECTED
HCT VFR BLD AUTO: 41.5 % (ref 37.5–51)
HEMODILUTION: NO
HGB BLD-MCNC: 13.3 G/DL (ref 13–17.7)
HMPV RNA NPH QL NAA+NON-PROBE: NOT DETECTED
HOLD SPECIMEN: NORMAL
HOLD SPECIMEN: NORMAL
HPIV1 RNA ISLT QL NAA+PROBE: NOT DETECTED
HPIV2 RNA SPEC QL NAA+PROBE: NOT DETECTED
HPIV3 RNA NPH QL NAA+PROBE: NOT DETECTED
HPIV4 P GENE NPH QL NAA+PROBE: NOT DETECTED
IMM GRANULOCYTES # BLD AUTO: 0.05 10*3/MM3 (ref 0–0.05)
IMM GRANULOCYTES NFR BLD AUTO: 0.6 % (ref 0–0.5)
LYMPHOCYTES # BLD AUTO: 2.51 10*3/MM3 (ref 0.7–3.1)
LYMPHOCYTES NFR BLD AUTO: 29.4 % (ref 19.6–45.3)
M PNEUMO IGG SER IA-ACNC: NOT DETECTED
MCH RBC QN AUTO: 29.9 PG (ref 26.6–33)
MCHC RBC AUTO-ENTMCNC: 32 G/DL (ref 31.5–35.7)
MCV RBC AUTO: 93.3 FL (ref 79–97)
MODALITY: ABNORMAL
MONOCYTES # BLD AUTO: 0.81 10*3/MM3 (ref 0.1–0.9)
MONOCYTES NFR BLD AUTO: 9.5 % (ref 5–12)
NEUTROPHILS NFR BLD AUTO: 4.97 10*3/MM3 (ref 1.7–7)
NEUTROPHILS NFR BLD AUTO: 58.2 % (ref 42.7–76)
NRBC BLD AUTO-RTO: 0 /100 WBC (ref 0–0.2)
NT-PROBNP SERPL-MCNC: ABNORMAL PG/ML (ref 0–1800)
PCO2 BLDA: 28.4 MM HG (ref 35–45)
PH BLDA: 7.48 PH UNITS (ref 7.35–7.45)
PLATELET # BLD AUTO: 242 10*3/MM3 (ref 140–450)
PMV BLD AUTO: 9.9 FL (ref 6–12)
PO2 BLDA: 113.9 MM HG (ref 80–100)
POTASSIUM SERPL-SCNC: 4.4 MMOL/L (ref 3.5–5.2)
PROCALCITONIN SERPL-MCNC: 0.07 NG/ML (ref 0–0.25)
PROT SERPL-MCNC: 6.3 G/DL (ref 6–8.5)
RBC # BLD AUTO: 4.45 10*6/MM3 (ref 4.14–5.8)
RHINOVIRUS RNA SPEC NAA+PROBE: NOT DETECTED
RSV RNA NPH QL NAA+NON-PROBE: NOT DETECTED
SAO2 % BLDCOA: 98.8 % (ref 92–98.5)
SARS-COV-2 RNA NPH QL NAA+NON-PROBE: NOT DETECTED
SODIUM SERPL-SCNC: 138 MMOL/L (ref 136–145)
TROPONIN T DELTA: 9 NG/L
TROPONIN T SERPL HS-MCNC: 68 NG/L
WBC NRBC COR # BLD AUTO: 8.53 10*3/MM3 (ref 3.4–10.8)
WHOLE BLOOD HOLD COAG: NORMAL
WHOLE BLOOD HOLD SPECIMEN: NORMAL

## 2024-08-27 PROCEDURE — 94799 UNLISTED PULMONARY SVC/PX: CPT

## 2024-08-27 PROCEDURE — 71045 X-RAY EXAM CHEST 1 VIEW: CPT

## 2024-08-27 PROCEDURE — 85025 COMPLETE CBC W/AUTO DIFF WBC: CPT | Performed by: PHYSICIAN ASSISTANT

## 2024-08-27 PROCEDURE — 94640 AIRWAY INHALATION TREATMENT: CPT

## 2024-08-27 PROCEDURE — 82803 BLOOD GASES ANY COMBINATION: CPT | Performed by: EMERGENCY MEDICINE

## 2024-08-27 PROCEDURE — 0202U NFCT DS 22 TRGT SARS-COV-2: CPT | Performed by: PHYSICIAN ASSISTANT

## 2024-08-27 PROCEDURE — 36415 COLL VENOUS BLD VENIPUNCTURE: CPT | Performed by: PHYSICIAN ASSISTANT

## 2024-08-27 PROCEDURE — 80053 COMPREHEN METABOLIC PANEL: CPT | Performed by: PHYSICIAN ASSISTANT

## 2024-08-27 PROCEDURE — 93005 ELECTROCARDIOGRAM TRACING: CPT | Performed by: PHYSICIAN ASSISTANT

## 2024-08-27 PROCEDURE — 84484 ASSAY OF TROPONIN QUANT: CPT | Performed by: PHYSICIAN ASSISTANT

## 2024-08-27 PROCEDURE — 84145 PROCALCITONIN (PCT): CPT | Performed by: PHYSICIAN ASSISTANT

## 2024-08-27 PROCEDURE — 99285 EMERGENCY DEPT VISIT HI MDM: CPT

## 2024-08-27 PROCEDURE — 36415 COLL VENOUS BLD VENIPUNCTURE: CPT

## 2024-08-27 PROCEDURE — 83880 ASSAY OF NATRIURETIC PEPTIDE: CPT | Performed by: PHYSICIAN ASSISTANT

## 2024-08-27 PROCEDURE — 94760 N-INVAS EAR/PLS OXIMETRY 1: CPT

## 2024-08-27 PROCEDURE — 94761 N-INVAS EAR/PLS OXIMETRY MLT: CPT

## 2024-08-27 PROCEDURE — 93010 ELECTROCARDIOGRAM REPORT: CPT | Performed by: INTERNAL MEDICINE

## 2024-08-27 PROCEDURE — 36600 WITHDRAWAL OF ARTERIAL BLOOD: CPT | Performed by: EMERGENCY MEDICINE

## 2024-08-27 RX ORDER — SODIUM CHLORIDE 0.9 % (FLUSH) 0.9 %
10 SYRINGE (ML) INJECTION AS NEEDED
Status: DISCONTINUED | OUTPATIENT
Start: 2024-08-27 | End: 2024-09-03 | Stop reason: HOSPADM

## 2024-08-27 RX ORDER — IPRATROPIUM BROMIDE AND ALBUTEROL SULFATE 2.5; .5 MG/3ML; MG/3ML
3 SOLUTION RESPIRATORY (INHALATION) ONCE
Status: COMPLETED | OUTPATIENT
Start: 2024-08-27 | End: 2024-08-27

## 2024-08-27 RX ADMIN — IPRATROPIUM BROMIDE AND ALBUTEROL SULFATE 3 ML: .5; 3 SOLUTION RESPIRATORY (INHALATION) at 19:53

## 2024-08-27 NOTE — ED NOTES
Pt to ED from home via [facility]. EMS called for CP, SOA, increased confusion. Pt family reports increased confusion and unsteady gait since yesterday. Pt confused at baseline. Pt given 162mg aspirin.

## 2024-08-27 NOTE — ED PROVIDER NOTES
" EMERGENCY DEPARTMENT ENCOUNTER      Room Number:  04/04  PCP: Tara Encarnacion MD  Independent Historians: Patient/EMS  Patient Care Team:  Tara Encarnacion MD as PCP - General (Geriatric Medicine)       HPI:  Chief Complaint: SOA    A complete HPI/ROS/PMH/PSH/SH/FH are unobtainable due to: Poor historian    Chronic or social conditions impacting patient care (Social Determinants of Health): None      Context: Tigre Amaral is a 74 y.o. male with a PMH significant for congestive heart failure, CVA, hypertension, chronic kidney disease, basal cell carcinoma, carotid stenosis who presents to the ED c/o acute shortness of air.  EMS reports that they were called by the patient's family for increased shortness of air, increased confusion over the past several days.  The patient is confused at baseline but the family reports that he has been altered more than normal intermittently for the past few days.  He is able to tell me that he has been feeling some tightness in his chest in association with increased shortness of breath.  Denies fever, chills, cough.  No sick exposures.  No leg swelling or recent travel or surgeries.  No history of blood clots or clotting disorders.  No other complaints at this time but the patient does ask \"can I have a cigarette?\", several times throughout my encounter.      Upon review of prior external notes (non-ED) -and- Review of prior external test results outside of this encounter it appears the patient was evaluated in the office with neurosurgery for carotid stenosis on April 11, 2024.  The patient had a normal potassium on 11/11/2023 and a normal potassium on 11/8/2023.      PAST MEDICAL HISTORY  Active Ambulatory Problems     Diagnosis Date Noted    Cerebrovascular accident (CVA) due to embolism of right middle cerebral artery 10/28/2023    Aspiration pneumonia 11/03/2023    Acute on chronic systolic congestive heart failure 11/03/2023    Hypertension 11/03/2023    " Stage 3a chronic kidney disease 11/03/2023    Hyperlipidemia 11/03/2023    Carotid artery stenosis, symptomatic, right 11/03/2023    Tobacco abuse 11/03/2023    Basal cell carcinoma (BCC) of skin of nose 11/03/2023    Carotid stenosis, asymptomatic, left 01/02/2024    Stenosis of left carotid artery 02/26/2024     Resolved Ambulatory Problems     Diagnosis Date Noted    No Resolved Ambulatory Problems     No Additional Past Medical History         PAST SURGICAL HISTORY  No past surgical history on file.      FAMILY HISTORY  No family history on file.      SOCIAL HISTORY  Social History     Socioeconomic History    Marital status: Single   Tobacco Use    Smoking status: Every Day     Current packs/day: 1.00     Average packs/day: 1 pack/day for 60.0 years (60.0 ttl pk-yrs)     Types: Cigarettes    Smokeless tobacco: Never   Vaping Use    Vaping status: Unknown   Substance and Sexual Activity    Drug use: Never    Sexual activity: Not Currently         ALLERGIES  Patient has no known allergies.      REVIEW OF SYSTEMS  Included in HPI  All systems reviewed and negative except for those discussed in HPI.      PHYSICAL EXAM    I have reviewed the triage vital signs and nursing notes.    ED Triage Vitals [08/27/24 1915]   Temp Heart Rate Resp BP SpO2   97.3 °F (36.3 °C) 90 22 142/93 100 %      Temp src Heart Rate Source Patient Position BP Location FiO2 (%)   -- -- -- -- --       Physical Exam  Constitutional:       General: He is not in acute distress.     Appearance: He is well-developed.      Interventions: Nasal cannula in place.   HENT:      Head: Normocephalic and atraumatic.   Eyes:      General: No scleral icterus.     Conjunctiva/sclera: Conjunctivae normal.   Neck:      Trachea: No tracheal deviation.   Cardiovascular:      Rate and Rhythm: Normal rate and regular rhythm.   Pulmonary:      Effort: Pulmonary effort is normal.      Breath sounds: Normal breath sounds.      Comments: Diffuse expiratory wheezes,  mildly increased work of breathing, speaks in broken sentences.  Abdominal:      Palpations: Abdomen is soft.      Tenderness: There is no abdominal tenderness. There is no guarding.   Musculoskeletal:         General: No deformity.      Cervical back: Normal range of motion.      Right lower leg: No edema.      Left lower leg: No edema.   Lymphadenopathy:      Cervical: No cervical adenopathy.   Skin:     General: Skin is warm and dry.   Neurological:      Mental Status: He is alert and oriented to person, place, and time.   Psychiatric:         Behavior: Behavior normal.         Vital signs and nursing notes reviewed.      PPE: I wore a surgical mask throughout my encounters with the pt. I performed hand hygiene on entry into the pt room and upon exit.     LAB RESULTS  Recent Results (from the past 24 hour(s))   ECG 12 Lead Dyspnea    Collection Time: 08/27/24  7:51 PM   Result Value Ref Range    QT Interval 460 ms    QTC Interval 525 ms   Respiratory Panel PCR w/COVID-19(SARS-CoV-2) [facility]US/BEATRICE/[facility]LER In-House, NP Swab in UTM/VTM, 2 HR TAT - Swab, Nasopharynx    Collection Time: 08/27/24  8:13 PM    Specimen: Nasopharynx; Swab   Result Value Ref Range    ADENOVIRUS, PCR Not Detected Not Detected    Coronavirus 229E Not Detected Not Detected    Coronavirus HKU1 Not Detected Not Detected    Coronavirus NL63 Not Detected Not Detected    Coronavirus OC43 Not Detected Not Detected    COVID19 Not Detected Not Detected - Ref. Range    Human Metapneumovirus Not Detected Not Detected    Human Rhinovirus/Enterovirus Not Detected Not Detected    Influenza A PCR Not Detected Not Detected    Influenza B PCR Not Detected Not Detected    Parainfluenza Virus 1 Not Detected Not Detected    Parainfluenza Virus 2 Not Detected Not Detected    Parainfluenza Virus 3 Not Detected Not Detected    Parainfluenza Virus 4 Not Detected Not Detected    RSV, PCR Not Detected Not Detected    Bordetella pertussis pcr Not Detected Not  Detected    Bordetella parapertussis PCR Not Detected Not Detected    Chlamydophila pneumoniae PCR Not Detected Not Detected    Mycoplasma pneumo by PCR Not Detected Not Detected   Comprehensive Metabolic Panel    Collection Time: 08/27/24  8:18 PM    Specimen: Blood   Result Value Ref Range    Glucose 127 (H) 65 - 99 mg/dL    BUN 25 (H) 8 - 23 mg/dL    Creatinine 1.56 (H) 0.76 - 1.27 mg/dL    Sodium 138 136 - 145 mmol/L    Potassium 4.4 3.5 - 5.2 mmol/L    Chloride 101 98 - 107 mmol/L    CO2 25.0 22.0 - 29.0 mmol/L    Calcium 9.2 8.6 - 10.5 mg/dL    Total Protein 6.3 6.0 - 8.5 g/dL    Albumin 3.8 3.5 - 5.2 g/dL    ALT (SGPT) 32 1 - 41 U/L    AST (SGOT) 25 1 - 40 U/L    Alkaline Phosphatase 119 (H) 39 - 117 U/L    Total Bilirubin 0.9 0.0 - 1.2 mg/dL    Globulin 2.5 gm/dL    A/G Ratio 1.5 g/dL    BUN/Creatinine Ratio 16.0 7.0 - 25.0    Anion Gap 12.0 5.0 - 15.0 mmol/L    eGFR 45.7 (L) >60.0 mL/min/1.73   BNP    Collection Time: 08/27/24  8:18 PM    Specimen: Blood   Result Value Ref Range    proBNP 30,594.0 (H) 0.0 - 1,800.0 pg/mL   Single High Sensitivity Troponin T    Collection Time: 08/27/24  8:18 PM    Specimen: Blood   Result Value Ref Range    HS Troponin T 68 (C) <22 ng/L   Procalcitonin    Collection Time: 08/27/24  8:18 PM    Specimen: Blood   Result Value Ref Range    Procalcitonin 0.07 0.00 - 0.25 ng/mL   Green Top (Gel)    Collection Time: 08/27/24  8:18 PM   Result Value Ref Range    Extra Tube Hold for add-ons.    Lavender Top    Collection Time: 08/27/24  8:18 PM   Result Value Ref Range    Extra Tube hold for add-on    Gold Top - SST    Collection Time: 08/27/24  8:18 PM   Result Value Ref Range    Extra Tube Hold for add-ons.    Light Blue Top    Collection Time: 08/27/24  8:18 PM   Result Value Ref Range    Extra Tube Hold for add-ons.    CBC Auto Differential    Collection Time: 08/27/24  8:18 PM    Specimen: Blood   Result Value Ref Range    WBC 8.53 3.40 - 10.80 10*3/mm3    RBC 4.45 4.14 - 5.80  10*6/mm3    Hemoglobin 13.3 13.0 - 17.7 g/dL    Hematocrit 41.5 37.5 - 51.0 %    MCV 93.3 79.0 - 97.0 fL    MCH 29.9 26.6 - 33.0 pg    MCHC 32.0 31.5 - 35.7 g/dL    RDW 13.6 12.3 - 15.4 %    RDW-SD 45.4 37.0 - 54.0 fl    MPV 9.9 6.0 - 12.0 fL    Platelets 242 140 - 450 10*3/mm3    Neutrophil % 58.2 42.7 - 76.0 %    Lymphocyte % 29.4 19.6 - 45.3 %    Monocyte % 9.5 5.0 - 12.0 %    Eosinophil % 1.1 0.3 - 6.2 %    Basophil % 1.2 0.0 - 1.5 %    Immature Grans % 0.6 (H) 0.0 - 0.5 %    Neutrophils, Absolute 4.97 1.70 - 7.00 10*3/mm3    Lymphocytes, Absolute 2.51 0.70 - 3.10 10*3/mm3    Monocytes, Absolute 0.81 0.10 - 0.90 10*3/mm3    Eosinophils, Absolute 0.09 0.00 - 0.40 10*3/mm3    Basophils, Absolute 0.10 0.00 - 0.20 10*3/mm3    Immature Grans, Absolute 0.05 0.00 - 0.05 10*3/mm3    nRBC 0.0 0.0 - 0.2 /100 WBC   Blood Gas, Arterial -    Collection Time: 08/27/24  9:15 PM    Specimen: Arterial Blood   Result Value Ref Range    Site Right Radial     Issac's Test Positive     pH, Arterial 7.479 (H) 7.350 - 7.450 pH units    pCO2, Arterial 28.4 (L) 35.0 - 45.0 mm Hg    pO2, Arterial 113.9 (H) 80.0 - 100.0 mm Hg    HCO3, Arterial 21.1 (L) 22.0 - 28.0 mmol/L    Base Excess, Arterial -1.2 (L) 0.0 - 2.0 mmol/L    O2 Saturation, Arterial 98.8 (H) 92.0 - 98.5 %    CO2 Content 22.0 (L) 23 - 27 mmol/L    Barometric Pressure for Blood Gas 750.7000 mmHg    Modality Cannula     Flow Rate 1.0000 lpm    Hemodilution No     Device Comment sats=98%          RADIOLOGY  XR Chest 1 View    Result Date: 8/27/2024  XR CHEST 1 VW-  Clinical: Acute mental status change  COMPARISON examination 11/6/2023  FINDINGS: There is cardiomegaly. Atherosclerotic calcification of the aorta. Patient is rotated towards the left. There is a patchy area of infiltrate demonstrated within the medial right lower lobe worrisome for pneumonia. The left lung is clear. No pulmonary edema or pleural effusion seen, the remainder is unremarkable.  This report was  finalized on 8/27/2024 8:33 PM by Dr. Emigdio Kowalski M.D on Workstation: [id number]         MEDICATIONS GIVEN IN ER  Medications   sodium chloride 0.9 % flush 10 mL (has no administration in time range)   ipratropium-albuterol (DUO-NEB) nebulizer solution 3 mL (3 mL Nebulization Given 8/27/24 1953)         ORDERS PLACED DURING THIS VISIT:  Orders Placed This Encounter   Procedures    Respiratory Panel PCR w/COVID-19(SARS-CoV-2) [facility]AT[facility]ICE/[facility] In-House, NP Swab in UTM/VTM, 2 HR TAT - Swab, Nasopharynx    XR Chest 1 View    Edinburgh Draw    Comprehensive Metabolic Panel    Urinalysis With Microscopic If Indicated (No Culture) - Urine, Catheter    BNP    Single High Sensitivity Troponin T    Procalcitonin    CBC Auto Differential    Blood Gas, Arterial -    High Sensitivity Troponin T 2Hr    Continuous Pulse Oximetry    Vital Signs    L[facility] (on-call MD unless specified) Details    Oxygen Therapy- Nasal Cannula; Titrate 1-6 LPM Per SpO2; 90 - 95%    POC Glucose Once    ECG 12 Lead Dyspnea    Insert Peripheral IV    Inpatient Admission    Fall Precautions    CBC & Differential    Green Top (Gel)    Lavender Top    Gold Top - SST    Light Blue Top         OUTPATIENT MEDICATION MANAGEMENT:  Current Facility-Administered Medications Ordered in Epic   Medication Dose Route Frequency Provider Last Rate Last Admin    sodium chloride 0.9 % flush 10 mL  10 mL Intravenous PRN Wolf Avalos PA         Current Outpatient Medications Ordered in Epic   Medication Sig Dispense Refill    aspirin 81 MG EC tablet Take 1 tablet by mouth Daily.      atorvastatin (LIPITOR) 80 MG tablet Take 1 tablet by mouth Every Night. 30 tablet 12    carvedilol (COREG) 6.25 MG tablet Take 1 tablet by mouth 2 (Two) Times a Day With Meals.      Cholecalciferol 25 MCG (1000 UT) tablet Take 1 tablet by mouth Daily.      clopidogrel (PLAVIX) 75 MG tablet Take 1 tablet by mouth Daily. 30 tablet 12    furosemide (LASIX) 40 MG tablet Take 1 tablet by  mouth Daily.      Klor-Con/EF 25 MEQ disintegrating tablet Take 1 tablet by mouth Daily.      lisinopril (PRINIVIL,ZESTRIL) 20 MG tablet Take 1 tablet by mouth Daily.      potassium chloride (K-DUR,KLOR-CON) 20 MEQ CR tablet Take 1 tablet by mouth Daily.      sennosides-docusate (PERICOLACE) 8.6-50 MG per tablet 1 tablet.               PROGRESS, DATA ANALYSIS, CONSULTS, AND MEDICAL DECISION MAKING  All labs have been independently interpreted by me.  All radiology studies have been reviewed by me. All EKG's have been independently viewed and interpreted by me.  Discussion below represents my analysis of pertinent findings related to patient's condition, differential diagnosis, treatment plan and final disposition.      DIFFERENTIAL DIAGNOSIS INCLUDE BUT NOT LIMITED TO:     Differential diagnosis includes but is not limited to:  -COVID  -CHF  -acute coronary syndrome  -pulmonary embolism  -pneumothorax  -pneumonia  -asthma/COPD  -deconditioning  -anemia  -anxiety     Clinical Scores: N/A      ED Course as of 08/27/24 2134   Tue Aug 27, 2024   2004 EKG          EKG time: 1951  Rhythm/Rate: Ventricular paced, rate 78    Independently Interpreted by me  Not significantly changed compared to prior 11/4/2023   [TR]   2015 XR Chest 1 View  My independent interpretation of the imaging study is no pneumothorax [TR]   2116 HS Troponin T(!!): 68 [DC]   2116 proBNP(!): 30,594.0  Troponin and BNP likely related to poor kidney function with a GFR of 45 today. [DC]   2119 Patient presentation and evaluation consistent with increased dyspnea likely related to a combination of COPD and CHF exacerbations.  He would benefit from admission for further supportive care and possible cardiology consultation knowing that he has a known EF of 21% on a documented echocardiogram from October 2023.  No indication for emergent consultation with cardiology currently.  Some symptom improvement with DuoNeb treatment but continues with some wheezing  and increased work of breathing from baseline.  Plan for additional Lasix and admission.  Patient and family agreeable. [DC]   2133 I discussed the case with MD Machelle with [facility] at this time regarding the patient.  I discussed work-up, results, concerns.  I discussed the consulting provider's desire for tele admit.    [DC]      ED Course User Index  [DC] Wolf Avalos, PA  [TR] Nelson Flood MD            2135 I rechecked the patient.  I discussed the patient's labs, radiology findings (including all incidental findings), diagnosis, and plan for admission. The patient understands and agrees with the plan.      AS OF 21:34 EDT VITALS:    BP - 139/77  HR - 75  TEMP - 97.3 °F (36.3 °C)  O2 SATS - 100%    COMPLEXITY OF CARE  The patient requires admission.      DIAGNOSIS  Final diagnoses:   Acute exacerbation of chronic obstructive pulmonary disease (COPD)   Acute congestive heart failure, unspecified heart failure type   Acute on chronic renal insufficiency   Acute metabolic encephalopathy         DISPOSITION  ED Disposition       ED Disposition   Decision to Admit    Condition   --    Comment   Level of Care: Telemetry [5]   Diagnosis: Acute CHF [321893]   Admitting Physician: JUSTO POLO [[id number]]   Attending Physician: JUSTO POLO [[id number]]   Certification: I Certify That Inpatient Hospital Services Are Medically Necessary For Greater Than 2 Midnights                  Please note that portions of this document were completed with a voice recognition program.    Note Disclaimer: At [facility], we believe that sharing information builds trust and better relationships. You are receiving this note because you recently visited [facility]. It is possible you will see health information before a provider has talked with you about it. This kind of information can be easy to misunderstand. To help you fully understand what it means for your health, we urge you to discuss this note with your provider.          Wolf Avalos, PA  08/27/24 5739

## 2024-08-28 ENCOUNTER — APPOINTMENT (OUTPATIENT)
Dept: CT IMAGING | Facility: HOSPITAL | Age: 74
End: 2024-08-28
Payer: OTHER GOVERNMENT

## 2024-08-28 PROBLEM — R41.0 CONFUSION: Status: ACTIVE | Noted: 2024-08-28

## 2024-08-28 PROBLEM — Z86.73 HISTORY OF CVA (CEREBROVASCULAR ACCIDENT): Status: ACTIVE | Noted: 2024-08-28

## 2024-08-28 PROBLEM — F03.90 DEMENTIA: Status: ACTIVE | Noted: 2024-08-28

## 2024-08-28 PROBLEM — Z95.0 PACEMAKER: Status: ACTIVE | Noted: 2024-08-28

## 2024-08-28 PROBLEM — I50.43 ACUTE ON CHRONIC COMBINED SYSTOLIC (CONGESTIVE) AND DIASTOLIC (CONGESTIVE) HEART FAILURE: Status: ACTIVE | Noted: 2023-11-03

## 2024-08-28 PROBLEM — R53.1 LEFT-SIDED WEAKNESS: Status: ACTIVE | Noted: 2024-08-28

## 2024-08-28 PROBLEM — J69.0 ASPIRATION PNEUMONIA: Status: ACTIVE | Noted: 2024-08-28

## 2024-08-28 PROBLEM — R13.10 DYSPHAGIA: Status: ACTIVE | Noted: 2024-08-28

## 2024-08-28 PROBLEM — G93.41 METABOLIC ENCEPHALOPATHY: Status: ACTIVE | Noted: 2024-08-28

## 2024-08-28 PROBLEM — Z72.0 TOBACCO ABUSE: Status: ACTIVE | Noted: 2024-08-28

## 2024-08-28 PROBLEM — I65.23 BILATERAL CAROTID ARTERY STENOSIS: Status: ACTIVE | Noted: 2024-02-26

## 2024-08-28 LAB
AMMONIA BLD-SCNC: 32 UMOL/L (ref 16–60)
BILIRUB UR QL STRIP: NEGATIVE
CLARITY UR: CLEAR
COLOR UR: YELLOW
FOLATE SERPL-MCNC: >20 NG/ML (ref 4.78–24.2)
GLUCOSE BLDC GLUCOMTR-MCNC: 105 MG/DL (ref 70–130)
GLUCOSE BLDC GLUCOMTR-MCNC: 107 MG/DL (ref 70–130)
GLUCOSE BLDC GLUCOMTR-MCNC: 111 MG/DL (ref 70–130)
GLUCOSE UR STRIP-MCNC: NEGATIVE MG/DL
HGB UR QL STRIP.AUTO: NEGATIVE
KETONES UR QL STRIP: NEGATIVE
L PNEUMO1 AG UR QL IA: NEGATIVE
LEUKOCYTE ESTERASE UR QL STRIP.AUTO: NEGATIVE
NITRITE UR QL STRIP: NEGATIVE
PH UR STRIP.AUTO: 6.5 [PH] (ref 5–8)
PROT UR QL STRIP: NEGATIVE
QT INTERVAL: 460 MS
QTC INTERVAL: 525 MS
RPR SER QL: NORMAL
S PNEUM AG SPEC QL LA: NEGATIVE
SP GR UR STRIP: 1.01 (ref 1–1.03)
TSH SERPL DL<=0.05 MIU/L-ACNC: 3.63 UIU/ML (ref 0.27–4.2)
UROBILINOGEN UR QL STRIP: NORMAL
VIT B12 BLD-MCNC: 456 PG/ML (ref 211–946)

## 2024-08-28 PROCEDURE — 25010000002 FUROSEMIDE PER 20 MG: Performed by: INTERNAL MEDICINE

## 2024-08-28 PROCEDURE — 87899 AGENT NOS ASSAY W/OPTIC: CPT | Performed by: INTERNAL MEDICINE

## 2024-08-28 PROCEDURE — 82746 ASSAY OF FOLIC ACID SERUM: CPT | Performed by: INTERNAL MEDICINE

## 2024-08-28 PROCEDURE — 82607 VITAMIN B-12: CPT | Performed by: INTERNAL MEDICINE

## 2024-08-28 PROCEDURE — 81003 URINALYSIS AUTO W/O SCOPE: CPT | Performed by: PHYSICIAN ASSISTANT

## 2024-08-28 PROCEDURE — 82948 REAGENT STRIP/BLOOD GLUCOSE: CPT

## 2024-08-28 PROCEDURE — 86592 SYPHILIS TEST NON-TREP QUAL: CPT | Performed by: INTERNAL MEDICINE

## 2024-08-28 PROCEDURE — 84443 ASSAY THYROID STIM HORMONE: CPT | Performed by: INTERNAL MEDICINE

## 2024-08-28 PROCEDURE — 94761 N-INVAS EAR/PLS OXIMETRY MLT: CPT

## 2024-08-28 PROCEDURE — 70450 CT HEAD/BRAIN W/O DYE: CPT

## 2024-08-28 PROCEDURE — 94799 UNLISTED PULMONARY SVC/PX: CPT

## 2024-08-28 PROCEDURE — 25010000002 PIPERACILLIN SOD-TAZOBACTAM PER 1 G: Performed by: INTERNAL MEDICINE

## 2024-08-28 PROCEDURE — 92610 EVALUATE SWALLOWING FUNCTION: CPT

## 2024-08-28 PROCEDURE — 71250 CT THORAX DX C-: CPT

## 2024-08-28 PROCEDURE — 87040 BLOOD CULTURE FOR BACTERIA: CPT | Performed by: INTERNAL MEDICINE

## 2024-08-28 PROCEDURE — 87449 NOS EACH ORGANISM AG IA: CPT | Performed by: INTERNAL MEDICINE

## 2024-08-28 PROCEDURE — 82140 ASSAY OF AMMONIA: CPT | Performed by: INTERNAL MEDICINE

## 2024-08-28 RX ORDER — ACETAMINOPHEN 650 MG/1
650 SUPPOSITORY RECTAL EVERY 4 HOURS PRN
Status: DISCONTINUED | OUTPATIENT
Start: 2024-08-28 | End: 2024-09-03 | Stop reason: HOSPADM

## 2024-08-28 RX ORDER — NITROGLYCERIN 0.4 MG/1
0.4 TABLET SUBLINGUAL
Status: DISCONTINUED | OUTPATIENT
Start: 2024-08-28 | End: 2024-09-03 | Stop reason: HOSPADM

## 2024-08-28 RX ORDER — BISACODYL 5 MG/1
5 TABLET, DELAYED RELEASE ORAL DAILY PRN
Status: DISCONTINUED | OUTPATIENT
Start: 2024-08-28 | End: 2024-09-03 | Stop reason: HOSPADM

## 2024-08-28 RX ORDER — ONDANSETRON 4 MG/1
4 TABLET, ORALLY DISINTEGRATING ORAL EVERY 6 HOURS PRN
Status: DISCONTINUED | OUTPATIENT
Start: 2024-08-28 | End: 2024-09-03 | Stop reason: HOSPADM

## 2024-08-28 RX ORDER — ONDANSETRON 2 MG/ML
4 INJECTION INTRAMUSCULAR; INTRAVENOUS EVERY 6 HOURS PRN
Status: DISCONTINUED | OUTPATIENT
Start: 2024-08-28 | End: 2024-09-03 | Stop reason: HOSPADM

## 2024-08-28 RX ORDER — SODIUM CHLORIDE 0.9 % (FLUSH) 0.9 %
10 SYRINGE (ML) INJECTION EVERY 12 HOURS SCHEDULED
Status: DISCONTINUED | OUTPATIENT
Start: 2024-08-28 | End: 2024-09-03 | Stop reason: HOSPADM

## 2024-08-28 RX ORDER — ATORVASTATIN CALCIUM 80 MG/1
80 TABLET, FILM COATED ORAL NIGHTLY
Status: DISCONTINUED | OUTPATIENT
Start: 2024-08-28 | End: 2024-09-03 | Stop reason: HOSPADM

## 2024-08-28 RX ORDER — POLYETHYLENE GLYCOL 3350 17 G/17G
17 POWDER, FOR SOLUTION ORAL DAILY PRN
Status: DISCONTINUED | OUTPATIENT
Start: 2024-08-28 | End: 2024-09-03 | Stop reason: HOSPADM

## 2024-08-28 RX ORDER — ASPIRIN 81 MG/1
81 TABLET ORAL DAILY
Status: DISCONTINUED | OUTPATIENT
Start: 2024-08-28 | End: 2024-09-03 | Stop reason: HOSPADM

## 2024-08-28 RX ORDER — GUAIFENESIN 600 MG/1
600 TABLET, EXTENDED RELEASE ORAL EVERY 12 HOURS SCHEDULED
Status: DISCONTINUED | OUTPATIENT
Start: 2024-08-28 | End: 2024-08-31 | Stop reason: CLARIF

## 2024-08-28 RX ORDER — CHOLECALCIFEROL (VITAMIN D3) 25 MCG
1000 TABLET ORAL DAILY
Status: DISCONTINUED | OUTPATIENT
Start: 2024-08-28 | End: 2024-09-03 | Stop reason: HOSPADM

## 2024-08-28 RX ORDER — ALUMINA, MAGNESIA, AND SIMETHICONE 2400; 2400; 240 MG/30ML; MG/30ML; MG/30ML
15 SUSPENSION ORAL EVERY 6 HOURS PRN
Status: DISCONTINUED | OUTPATIENT
Start: 2024-08-28 | End: 2024-09-03 | Stop reason: HOSPADM

## 2024-08-28 RX ORDER — AMOXICILLIN 250 MG
2 CAPSULE ORAL 2 TIMES DAILY
Status: DISCONTINUED | OUTPATIENT
Start: 2024-08-28 | End: 2024-09-03 | Stop reason: HOSPADM

## 2024-08-28 RX ORDER — ACETAMINOPHEN 160 MG/5ML
650 SOLUTION ORAL EVERY 4 HOURS PRN
Status: DISCONTINUED | OUTPATIENT
Start: 2024-08-28 | End: 2024-09-03 | Stop reason: HOSPADM

## 2024-08-28 RX ORDER — CLOPIDOGREL BISULFATE 75 MG/1
75 TABLET ORAL DAILY
Status: DISCONTINUED | OUTPATIENT
Start: 2024-08-28 | End: 2024-09-03 | Stop reason: HOSPADM

## 2024-08-28 RX ORDER — AMOXICILLIN 250 MG
2 CAPSULE ORAL 2 TIMES DAILY PRN
Status: DISCONTINUED | OUTPATIENT
Start: 2024-08-28 | End: 2024-09-03 | Stop reason: HOSPADM

## 2024-08-28 RX ORDER — BISACODYL 10 MG
10 SUPPOSITORY, RECTAL RECTAL DAILY PRN
Status: DISCONTINUED | OUTPATIENT
Start: 2024-08-28 | End: 2024-09-03 | Stop reason: HOSPADM

## 2024-08-28 RX ORDER — CARVEDILOL 6.25 MG/1
6.25 TABLET ORAL 2 TIMES DAILY WITH MEALS
Status: DISCONTINUED | OUTPATIENT
Start: 2024-08-28 | End: 2024-09-03 | Stop reason: HOSPADM

## 2024-08-28 RX ORDER — IPRATROPIUM BROMIDE AND ALBUTEROL SULFATE 2.5; .5 MG/3ML; MG/3ML
3 SOLUTION RESPIRATORY (INHALATION)
Status: DISCONTINUED | OUTPATIENT
Start: 2024-08-28 | End: 2024-09-03 | Stop reason: HOSPADM

## 2024-08-28 RX ORDER — LISINOPRIL 20 MG/1
20 TABLET ORAL DAILY
Status: DISCONTINUED | OUTPATIENT
Start: 2024-08-28 | End: 2024-09-03 | Stop reason: HOSPADM

## 2024-08-28 RX ORDER — ACETAMINOPHEN 325 MG/1
650 TABLET ORAL EVERY 4 HOURS PRN
Status: DISCONTINUED | OUTPATIENT
Start: 2024-08-28 | End: 2024-09-03 | Stop reason: HOSPADM

## 2024-08-28 RX ORDER — FUROSEMIDE 10 MG/ML
40 INJECTION INTRAMUSCULAR; INTRAVENOUS EVERY 12 HOURS
Status: DISCONTINUED | OUTPATIENT
Start: 2024-08-28 | End: 2024-08-31

## 2024-08-28 RX ORDER — FAMOTIDINE 10 MG/ML
10 INJECTION, SOLUTION INTRAVENOUS EVERY 12 HOURS SCHEDULED
Status: DISCONTINUED | OUTPATIENT
Start: 2024-08-28 | End: 2024-09-03 | Stop reason: HOSPADM

## 2024-08-28 RX ADMIN — SENNOSIDES AND DOCUSATE SODIUM 2 TABLET: 50; 8.6 TABLET ORAL at 20:50

## 2024-08-28 RX ADMIN — FAMOTIDINE 10 MG: 10 INJECTION INTRAVENOUS at 20:51

## 2024-08-28 RX ADMIN — LISINOPRIL 20 MG: 20 TABLET ORAL at 12:04

## 2024-08-28 RX ADMIN — PIPERACILLIN AND TAZOBACTAM 3.38 G: 3; .375 INJECTION, POWDER, FOR SOLUTION INTRAVENOUS at 18:13

## 2024-08-28 RX ADMIN — GUAIFENESIN 600 MG: 600 TABLET, EXTENDED RELEASE ORAL at 20:51

## 2024-08-28 RX ADMIN — FUROSEMIDE 40 MG: 10 INJECTION, SOLUTION INTRAMUSCULAR; INTRAVENOUS at 20:50

## 2024-08-28 RX ADMIN — SENNOSIDES AND DOCUSATE SODIUM 2 TABLET: 50; 8.6 TABLET ORAL at 12:05

## 2024-08-28 RX ADMIN — IPRATROPIUM BROMIDE AND ALBUTEROL SULFATE 3 ML: .5; 3 SOLUTION RESPIRATORY (INHALATION) at 14:10

## 2024-08-28 RX ADMIN — CARVEDILOL 6.25 MG: 6.25 TABLET, FILM COATED ORAL at 12:04

## 2024-08-28 RX ADMIN — CLOPIDOGREL BISULFATE 75 MG: 75 TABLET, FILM COATED ORAL at 12:04

## 2024-08-28 RX ADMIN — PIPERACILLIN AND TAZOBACTAM 3.38 G: 3; .375 INJECTION, POWDER, FOR SOLUTION INTRAVENOUS at 12:05

## 2024-08-28 RX ADMIN — Medication 1000 UNITS: at 12:05

## 2024-08-28 RX ADMIN — FAMOTIDINE 10 MG: 10 INJECTION INTRAVENOUS at 12:06

## 2024-08-28 RX ADMIN — Medication 10 ML: at 20:52

## 2024-08-28 RX ADMIN — IPRATROPIUM BROMIDE AND ALBUTEROL SULFATE 3 ML: .5; 3 SOLUTION RESPIRATORY (INHALATION) at 20:10

## 2024-08-28 RX ADMIN — GUAIFENESIN 600 MG: 600 TABLET, EXTENDED RELEASE ORAL at 12:05

## 2024-08-28 RX ADMIN — ATORVASTATIN CALCIUM 80 MG: 80 TABLET, FILM COATED ORAL at 20:51

## 2024-08-28 RX ADMIN — Medication 10 ML: at 09:14

## 2024-08-28 RX ADMIN — FUROSEMIDE 40 MG: 10 INJECTION, SOLUTION INTRAMUSCULAR; INTRAVENOUS at 12:05

## 2024-08-28 RX ADMIN — ASPIRIN 81 MG: 81 TABLET, COATED ORAL at 12:05

## 2024-08-28 RX ADMIN — CARVEDILOL 6.25 MG: 6.25 TABLET, FILM COATED ORAL at 18:12

## 2024-08-28 NOTE — CASE MANAGEMENT/SOCIAL WORK
Discharge Planning Assessment  [facility]     Patient Name: Tigre Amaral  MRN: [id number]  Today's Date: 8/27/2024    Admit Date: 8/27/2024        Discharge Needs Assessment    No documentation.                  Discharge Plan       Row Name 08/27/24 2137       Plan    Plan Comments Notified via registration patient has [facility] Insurance.  [facility] called to notify of patient admission.  Authorization Number:  [id number] and Notification ID:  [id number].  ERIN Burgos RN                  Continued Care and Services - Admitted Since 8/27/2024    No active coordination exists for this encounter.          Demographic Summary    No documentation.                  Functional Status    No documentation.                  Psychosocial    No documentation.                  Abuse/Neglect    No documentation.                  Legal    No documentation.                  Substance Abuse    No documentation.                  Patient Forms    No documentation.                     Kathryn Ladd RN

## 2024-08-28 NOTE — PLAN OF CARE
Goal Outcome Evaluation:  Plan of Care Reviewed With: patient           Outcome Evaluation: Patient seen for clinical swallow assessment. He denies dysphagia. Voice appears intermittently wet in conversation, but clear with sustained vowel. Oral mech exam revealed slight facial assymetry on L. Pt with hx of CVA with L weakness per chart, but patient denies. Poor STM skills 2/2 hx of TBI. Pt reported being in the hospital for one week, despite education that it has only been one evening admitted. Previous VFSS reviewed. CXR revealed possible R lobe pna. Pt admitted with CP, confusion, and SOA. No overt s/s of aspiration with ice or thins via cup and straw. Strong gag with puree. Increased mastication time with mech soft. No oral residue post swallow. SLP recs mech soft and thins. Meds as mario. If further concern for aspiration, please order VFSS.

## 2024-08-28 NOTE — CONSULTS
Tigre Amaral   74 y.o.  male    LOS: 1 day   Patient Care Team:  Tara Encarnacion MD as PCP - General (Geriatric Medicine)      Subjective     Chief Complaint: chest pain/soa    History of Present Illness:  Mr Amaral is a 73 yo male who follows with dr deutsch with [facility]  Patient complains of generalized weakness and dyspnea.  He has no chest pains.  High-sensitivity troponin is 68 and proBNP is 30,594.  He has history of paroxysmal ventricular tachycardia.  He was last seen by Dr. Kenyon on 8/15/2024.  Patient has a peripheral valve disease bilateral carotid stents and and blockages on the lower extremities.    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  HFrEF  CVA 10-28-23 , traumatic brain injury  HLD    Past Medical History per review of record  He has a past medical history of Acute on chronic systolic congestive heart failure (11/3/2023), Aspiration pneumonia (11/3/2023), Automatic implantable cardiac defibrillator in situ (4/18/2017), Backache (1/19/2024), Basal cell carcinoma of skin of nose (11/3/2023), Cardiac pacemaker in situ (1/19/2024), Cardiomyopathy (11/14/2014), Congestive heart failure (10/15/2018), Depressive disorder (1/19/2024), 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 (1/19/2024), Dizziness (1/19/2024), Dyspnea (1/19/2024), Embolic stroke (10/28/2023),  Family history of cancer of colon (1/19/2024), Hyperlipidemia (10/18/2016), Hypertensive disorder (11/14/2014), Insomnia (1/19/2024), Left carotid artery stenosis (1/2/2024), Other unknown and unspecified cause of morbidity or mortality (1/19/2024), Personal history of traumatic brain injury (1/19/2024), Stage 3a chronic kidney disease (CMS/HCC) (11/3/2023), Tinea unguium (1/19/2024), Tobacco user (11/3/2023), and Ventricular tachycardia     Cardiac testing  2d echo 10/30/2023 Interpretation Summary     Left ventricular systolic  function is severely decreased. Calculated left ventricular EF = 21.6% Left ventricular ejection fraction appears to be less than 20%.The following left ventricular wall segments are hypokinetic: mid anterolateral, basal inferolateral, mid inferolateral, mid inferior, basal inferoseptal, mid inferoseptal, mid anteroseptal, basal inferior and basal inferoseptal. The following left ventricular wall segments are akinetic: apical anterior, apical lateral, apical inferior, apical septal and apex.    Left ventricular wall thickness is consistent with mild concentric hypertrophy.    Left ventricular diastolic function is consistent with (grade II w/high LAP) pseudonormalization.    The left atrial cavity is mildly dilated.    Saline test results are negative for right to left atrial level shunt.    Estimated right ventricular systolic pressure from tricuspid regurgitation is mildly elevated (35-45 mmHg).      Past Medical History:   Diagnosis Date    Hyperlipidemia     Hypertension      Past Surgical History:   Procedure Laterality Date    PACEMAKER IMPLANTATION Left      Medications Prior to Admission   Medication Sig Dispense Refill Last Dose    aspirin 81 MG EC tablet Take 1 tablet by mouth Daily.       atorvastatin (LIPITOR) 80 MG tablet Take 1 tablet by mouth Every Night. 30 tablet 12     carvedilol (COREG) 6.25 MG tablet Take 1 tablet by mouth 2 (Two) Times a Day With Meals.       Cholecalciferol 25 MCG (1000 UT) tablet Take 1 tablet by mouth Daily.       clopidogrel (PLAVIX) 75 MG tablet Take 1 tablet by mouth Daily. 30 tablet 12     furosemide (LASIX) 40 MG tablet Take 1 tablet by mouth Daily.       Klor-Con/EF 25 MEQ disintegrating tablet Take 1 tablet by mouth Daily.       lisinopril (PRINIVIL,ZESTRIL) 20 MG tablet Take 1 tablet by mouth Daily.       potassium chloride (K-DUR,KLOR-CON) 20 MEQ CR tablet Take 1 tablet by mouth Daily.       sennosides-docusate (PERICOLACE) 8.6-50 MG per tablet 1 tablet.           Family History   Problem Relation Age of Onset    Heart disease Mother     Heart disease Father     Heart disease Sister      Social History     Socioeconomic History    Marital status: Single   Tobacco Use    Smoking status: Every Day     Current packs/day: 1.00     Average packs/day: 1 pack/day for 60.0 years (60.0 ttl pk-yrs)     Types: Cigarettes    Smokeless tobacco: Never   Vaping Use    Vaping status: Unknown   Substance and Sexual Activity    Drug use: Never    Sexual activity: Not Currently     Objective       Review of Systems:   Constitutional: Negative for diaphoresis, fatigue, fever and unexpected weight change.   HENT: Negative.    Eyes: Negative.    Respiratory: Negative for cough, shortness of breath and wheezing.    Cardiovascular: Negative for chest pain, palpitations and leg swelling.   Gastrointestinal: Negative for abdominal pain, blood in stool, constipation, diarrhea, nausea and vomiting.   Endocrine: Negative.    Genitourinary: Negative for difficulty urinating, dysuria and frequency.   Musculoskeletal: Negative.    Skin: Negative.    Allergic/Immunologic: Negative for environmental allergies and food allergies.   Neurological: Negative.    Hematological: Negative.    Psychiatric/Behavioral: Negative.        Current Facility-Administered Medications:     acetaminophen (TYLENOL) tablet 650 mg, 650 mg, Oral, Q4H PRN **OR** acetaminophen (TYLENOL) 160 MG/5ML oral solution 650 mg, 650 mg, Oral, Q4H PRN **OR** acetaminophen (TYLENOL) suppository 650 mg, 650 mg, Rectal, Q4H PRN, Laura Nelson APRN    aluminum-magnesium hydroxide-simethicone (MAALOX MAX) 400-400-40 MG/5ML suspension 15 mL, 15 mL, Oral, Q6H PRN, Laura Nelson APRN    sennosides-docusate (PERICOLACE) 8.6-50 MG per tablet 2 tablet, 2 tablet, Oral, BID PRN **AND** polyethylene glycol (MIRALAX) packet 17 g, 17 g, Oral, Daily PRN **AND** bisacodyl (DULCOLAX) EC tablet 5 mg, 5 mg, Oral, Daily PRN **AND** bisacodyl  (DULCOLAX) suppository 10 mg, 10 mg, Rectal, Daily PRN, Laura Nelson APRN    melatonin tablet 5 mg, 5 mg, Oral, Nightly PRN, Laura Nelson APRN    nitroglycerin (NITROSTAT) SL tablet 0.4 mg, 0.4 mg, Sublingual, Q5 Min PRN, Laura Nelson APRN    ondansetron ODT (ZOFRAN-ODT) disintegrating tablet 4 mg, 4 mg, Oral, Q6H PRN **OR** ondansetron (ZOFRAN) injection 4 mg, 4 mg, Intravenous, Q6H PRN, Laura Nelson APRN    sodium chloride 0.9 % flush 10 mL, 10 mL, Intravenous, PRN, Wolf Avalos PA    sodium chloride 0.9 % flush 10 mL, 10 mL, Intravenous, Q12H, Laura Nelson APRN      Physical Exam:   Vital Sign Min/Max for last 24 hours  Temp  Min: 97.2 °F (36.2 °C)  Max: 98 °F (36.7 °C)   BP  Min: 131/78  Max: 144/93    Pulse  Min: 75  Max: 90     Wt Readings from Last 3 Encounters:   08/28/24 64.4 kg (142 lb)   04/11/24 67.2 kg (148 lb 3.2 oz)   02/26/24 68.5 kg (151 lb 0.2 oz)       General Appearance:  Awake,  Alert, cooperative, in no acute distress   Head:  Normocephalic, without obvious abnormality, atraumatic   Eyes:          Conjunctivae normal, anicteric, eom intact    Neck: No adenopathy, supple, trachea midline, no thyromegaly, no   carotid bruit, no JVD, no elevated cvp   Lungs:   Clear to auscultation,respirations regular, even and                  unlabored    Heart:  Regular rhythm and normal rate, normal S1 and S2,            No murmur, no gallop, no rub, no click    Chest Wall:  No abnormalities observed   Abdomen:   Normal bowel sounds, soft nontender, nondistended                    Rectal:   Deferred   Extremities: No edema. Moves all extremities well, no cyanosis, no erythema   Pulses: Pulses palpable and equal bilaterally   Skin: No bleeding, bruising or rash   Neurologic: Speech clear and appropriate, no facial drooping     :       MONITOR:    Results Review:     Sodium Sodium   Date Value Ref Range Status   08/27/2024 138 136 - 145 mmol/L Final      Potassium  "Potassium   Date Value Ref Range Status   08/27/2024 4.4 3.5 - 5.2 mmol/L Final      Chloride Chloride   Date Value Ref Range Status   08/27/2024 101 98 - 107 mmol/L Final      Bicarbonate No results found for: \"PLASMABICARB\"   BUN BUN   Date Value Ref Range Status   08/27/2024 25 (H) 8 - 23 mg/dL Final      Creatinine Creatinine   Date Value Ref Range Status   08/27/2024 1.56 (H) 0.76 - 1.27 mg/dL Final      Calcium Calcium   Date Value Ref Range Status   08/27/2024 9.2 8.6 - 10.5 mg/dL Final      Magnesium No results found for: \"MG\"     Results from last 7 days   Lab Units 08/27/24 2018   WBC 10*3/mm3 8.53   HEMOGLOBIN g/dL 13.3   HEMATOCRIT % 41.5   PLATELETS 10*3/mm3 242     Lab Results   Lab Value Date/Time    TROPONINT 77 (C) 08/27/2024 2252    TROPONINT 68 (C) 08/27/2024 2018    TROPONINT 425 (C) 11/02/2023 0735    TROPONINT 451 (C) 11/02/2023 0612    TROPONINT 354 (C) 11/01/2023 2031    TROPONINT 339 (C) 11/01/2023 1846     Lab Results   Component Value Date    CHOL 146 10/29/2023     Lab Results   Component Value Date    HDL 34 (L) 10/29/2023     Lab Results   Component Value Date    LDL 98 10/29/2023     Lab Results   Component Value Date    TRIG 72 10/29/2023     No components found for: \"CHOLHDL\"  No results found for: \"PTT\"  No components found for: \"PT/INR\"  Lab Results   Component Value Date    HGBA1C 5.20 10/29/2023          Echo EF Estimated  )No results found for: \"ECHOEFEST\"      Assessment/ Plan    Active Hospital Problems    Diagnosis  POA    **Acute CHF [I50.9]  Yes     Priority: Low     Chest pain  Hs trop 77<- 68  Probnp 30,594  Cxr There is cardiomegaly. Atherosclerotic calcification of the aorta. Patient is rotated towards the left. There is a patchy area of infiltrate demonstrated within the medial right lower lobe worrisome for pneumonia. The left lung is clear. 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  Discussed with primary  Gentle diuresis  Check echocardiogram

## 2024-08-28 NOTE — PROGRESS NOTES
[facility] Clinical Pharmacy Services: Piperacillin-Tazobactam Consult    Pt Name: Tigre Amaral   : 1950    Indication: Pneumonia    Relevant clinical data and objective history reviewed:    Past Medical History:   Diagnosis Date    Hyperlipidemia     Hypertension      Creatinine   Date Value Ref Range Status   2024 1.56 (H) 0.76 - 1.27 mg/dL Final   2024 1.40 (H) 0.76 - 1.27 mg/dL Final   2024 1.55 (H) 0.76 - 1.27 mg/dL Final     BUN   Date Value Ref Range Status   2024 25 (H) 8 - 23 mg/dL Final     Estimated Creatinine Clearance: 36.7 mL/min (A) (by C-G formula based on SCr of 1.56 mg/dL (H)).    Lab Results   Component Value Date    WBC 8.53 2024     Temp Readings from Last 3 Encounters:   24 98 °F (36.7 °C) (Oral)   24 96.9 °F (36.1 °C)   24 97.6 °F (36.4 °C) (Oral)      Assessment/Plan  Estimated CrCl >20 mL/min at this time; BMI 21 kg/m2  Will start piperacillin-tazobactam 3.375 g IV every 8 hours     Pharmacy will continue to follow daily while on piperacillin-tazobactam and adjust as needed. Thank you for this consult.    Yuri Johnson RP  Clinical Pharmacist

## 2024-08-28 NOTE — THERAPY EVALUATION
Acute Care - Speech Language Pathology   Swallow Initial Evaluation [facility]     Patient Name: Tigre Amaral  : 1950  MRN: [id number]  Today's Date: 2024               Admit Date: 2024    Visit Dx:     ICD-10-CM ICD-9-CM   1. Acute exacerbation of chronic obstructive pulmonary disease (COPD)  J44.1 491.21   2. Acute congestive heart failure, unspecified heart failure type  I50.9 428.0   3. Acute on chronic renal insufficiency  N28.9 593.9    N18.9 585.9   4. Acute metabolic encephalopathy  G93.41 348.31     Patient Active Problem List   Diagnosis    Cerebrovascular accident (CVA) due to embolism of right middle cerebral artery    Aspiration pneumonia    Acute on chronic combined systolic (congestive) and diastolic (congestive) heart failure    Essential hypertension    Stage 3a chronic kidney disease    Hyperlipidemia    Carotid artery stenosis, symptomatic, right    Tobacco abuse    Basal cell carcinoma (BCC) of skin of nose    Carotid stenosis, asymptomatic, left    Bilateral carotid artery stenosis    Pacemaker    Aspiration pneumonia    Dysphagia    Confusion    Dementia    History of CVA (cerebrovascular accident)    Left-sided weakness    Tobacco abuse    Metabolic encephalopathy     Past Medical History:   Diagnosis Date    Hyperlipidemia     Hypertension      Past Surgical History:   Procedure Laterality Date    PACEMAKER IMPLANTATION Left        SLP Recommendation and Plan  SLP Swallowing Diagnosis: R/O pharyngeal dysphagia (24 1300)  SLP Diet Recommendation: mechanical ground textures, no mixed consistencies, thin liquids (24 1300)  Recommended Precautions and Strategies: upright posture during/after eating, small bites of food and sips of liquid (24 1300)  SLP Rec. for Method of Medication Administration: meds whole, as tolerated (24 1300)     Monitor for Signs of Aspiration: yes, notify SLP if any concerns (24 1300)  Recommended Diagnostics: reassess  via clinical swallow evaluation (08/28/24 1300)           Therapy Frequency (Swallow): PRN (08/28/24 1300)  Predicted Duration Therapy Intervention (Days): until discharge (08/28/24 1300)  Oral Care Recommendations: Oral Care BID/PRN (08/28/24 1300)                                        Plan of Care Reviewed With: patient  Outcome Evaluation: Patient seen for clinical swallow assessment. He denies dysphagia. Voice appears intermittently wet in conversation, but clear with sustained vowel. Oral mech exam revealed slight facial assymetry on L. Pt with hx of CVA with L weakness per chart, but patient denies. Poor STM skills 2/2 hx of TBI. Pt reported being in the hospital for one week, despite education that it has only been one evening admitted. Previous VFSS reviewed. CXR revealed possible R lobe pna. Pt admitted with CP, confusion, and SOA. No overt s/s of aspiration with ice or thins via cup and straw. Strong gag with puree. Increased mastication time with mech soft. No oral residue post swallow. SLP recs mech soft and thins. Meds as mario. If further concern for aspiration, please order VFSS.      SWALLOW EVALUATION (Last 72 Hours)       SLP Adult Swallow Evaluation       Row Name 08/28/24 1300                   Rehab Evaluation    Document Type evaluation  -SH        Patient Effort adequate  -SH           General Information    Patient Profile Reviewed yes  -SH        Pertinent History Of Current Problem CP, SOA, confusion, CXR-shows infiltrates  -SH        Current Method of Nutrition NPO  -SH        Precautions/Limitations, Vision WFL;for purposes of eval  -SH        Precautions/Limitations, Hearing WFL;for purposes of eval  -SH        Prior Level of Function-Communication cognitive-linguistic impairment  -SH        Prior Level of Function-Swallowing other (see comments)  VFSS 11/3//23 with recs for mech soft/ground and thin  -SH        Plans/Goals Discussed with patient;agreed upon  -SH        Barriers to Rehab  medically complex  -           Pain    Additional Documentation Pain Scale: FACES Pre/Post-Treatment (Group)  -           Pain Scale: FACES Pre/Post-Treatment    Pain: FACES Scale, Pretreatment 0-->no hurt  -           Oral Motor Structure and Function    Dentition Assessment upper dentures/partial in place;other (see comments)  some missing lower denition on R  -SH           Oral Musculature and Cranial Nerve Assessment    Oral Motor General Assessment oral labial or buccal impairment  -           Clinical Swallow Eval    Clinical Swallow Evaluation Summary Patient seen for clinical swallow assessment. He denies dysphagia. Voice appears intermittently wet in conversation, but clear with sustained vowel. Oral mech exam revealed slight facial assymetry on L. Pt with hx of CVA with L weakness per chart, but patient denies. Poor STM skills 2/2 hx of TBI. Pt reported being in the hospital for one week, despite education that it has only been one evening admitted. Previous VFSS reviewed. CXR revealed possible R lobe pna. Pt admitted with CP, confusion, and SOA. No overt s/s of aspiration with ice or thins via cup and straw. Strong gag with puree. Increased mastication time with mech soft. No oral residue post swallow. SLP recs mech soft and thins. Meds as mario. If further concern for aspiration, please order VFSS.  -           SLP Evaluation Clinical Impression    SLP Swallowing Diagnosis R/O pharyngeal dysphagia  -           Recommendations    Therapy Frequency (Swallow) PRN  -        Predicted Duration Therapy Intervention (Days) until discharge  -        SLP Diet Recommendation mechanical ground textures;no mixed consistencies;thin liquids  -        Recommended Diagnostics reassess via clinical swallow evaluation  -        Recommended Precautions and Strategies upright posture during/after eating;small bites of food and sips of liquid  -        Oral Care Recommendations Oral Care BID/PRN  -         SLP Rec. for Method of Medication Administration meds whole;as tolerated  -        Monitor for Signs of Aspiration yes;notify SLP if any concerns  -           Swallow Goals (SLP)    Swallow LTGs Patient will demonstrate functional swallow for  -SH           (LTG) Patient will demonstrate functional swallow for    Diet Texture (Demonstrate functional swallow) mechanical ground textures  -        Liquid viscosity (Demonstrate functional swallow) thin liquids  -        [place] (Demonstrate functional swallow) independently (over 90% accuracy)  -        Time Frame (Demonstrate functional swallow) by discharge  -                  User Key  (r) = Recorded By, (t) = Taken By, (c) = Cosigned By      Initials Name Effective Dates    Kori Tejeda SLP 01/05/24 -                     EDUCATION  The patient has been educated in the following areas:   Dysphagia (Swallowing Impairment).        SLP GOALS       Row Name 08/28/24 1300             (LTG) Patient will demonstrate functional swallow for    Diet Texture (Demonstrate functional swallow) mechanical ground textures  -      Liquid viscosity (Demonstrate functional swallow) thin liquids  -      [place] (Demonstrate functional swallow) independently (over 90% accuracy)  -      Time Frame (Demonstrate functional swallow) by discharge  -                User Key  (r) = Recorded By, (t) = Taken By, (c) = Cosigned By      Initials Name Provider Type    Kori Tejeda, SLP Speech and Language Pathologist                         Time Calculation:    Time Calculation- SLP       Row Name 08/28/24 1538             Time Calculation- [facility]    SLP Start Time 1300  -      SLP Received On 08/28/24  -                User Key  (r) = Recorded By, (t) = Taken By, (c) = Cosigned By      Initials Name Provider Type    Kori Tejeda SLP Speech and Language Pathologist                    Therapy Charges for Today       Code Description Service Date Service  Provider Modifiers Qty    [id number] [facility] EVAL ORAL PHARYNG SWALLOW 4 8/28/2024 Kori Dacosta, SLP GN 1                 FREDY Kim  8/28/2024

## 2024-08-28 NOTE — ED PROVIDER NOTES
EMERGENCY DEPARTMENT MD ATTESTATION NOTE    Room Number:  3114/1  PCP: Tara Encarnacion MD  Independent Historians: Patient, Family, and EMS    HPI:  A complete HPI/ROS/PMH/PSH/SH/FH are unobtainable due to: None    Chronic or social conditions impacting patient care (Social Determinants of Health): None      Context: Tigre Amaral is a 74 y.o. male with a medical history of hypertension, stroke, chronic kidney disease who presents to the ED c/o acute generalized weakness and shortness of breath.  Family reports that over the last several days the patient has had progressive generalized weakness as well as shortness of breath.  Family reports that they saw the cardiologist recently and was put on 3 days of additional Lasix.  They report the patient was too weak to walk today.  He has had some chronic confusion but reports it is somewhat worse over the last few days.  He has not had a fall or injury.  He does not usually require oxygen at home however he was requiring supplemental oxygen here.        Review of prior external notes (non-ED) -and- Review of prior external test results outside of this encounter:  Evaluation 2/27/2024 shows a CBC with hemoglobin 9.9    Prescription drug monitoring program review:           PHYSICAL EXAM    I have reviewed the triage vital signs and nursing notes.    ED Triage Vitals   Temp Heart Rate Resp BP SpO2   08/27/24 1915 08/27/24 1915 08/27/24 1915 08/27/24 1915 08/27/24 1915   97.3 °F (36.3 °C) 90 22 142/93 100 %      Temp src Heart Rate Source Patient Position BP Location FiO2 (%)   -- 08/27/24 1953 -- -- --    Monitor          Physical Exam  GENERAL: Awake, alert, chronically ill-appearing  SKIN: Warm, dry  HENT: Normocephalic, atraumatic  EYES: no scleral icterus  CV: regular rhythm, regular rate  RESPIRATORY: normal effort, lungs diminished bilaterally  ABDOMEN: soft, nontender, nondistended  MUSCULOSKELETAL: no deformity  NEURO: alert, moves all extremities,  follows commands            MEDICATIONS GIVEN IN ER  Medications   sodium chloride 0.9 % flush 10 mL (has no administration in time range)   sodium chloride 0.9 % flush 10 mL (has no administration in time range)   nitroglycerin (NITROSTAT) SL tablet 0.4 mg (has no administration in time range)   acetaminophen (TYLENOL) tablet 650 mg (has no administration in time range)     Or   acetaminophen (TYLENOL) 160 MG/5ML oral solution 650 mg (has no administration in time range)     Or   acetaminophen (TYLENOL) suppository 650 mg (has no administration in time range)   sennosides-docusate (PERICOLACE) 8.6-50 MG per tablet 2 tablet (has no administration in time range)     And   polyethylene glycol (MIRALAX) packet 17 g (has no administration in time range)     And   bisacodyl (DULCOLAX) EC tablet 5 mg (has no administration in time range)     And   bisacodyl (DULCOLAX) suppository 10 mg (has no administration in time range)   ondansetron ODT (ZOFRAN-ODT) disintegrating tablet 4 mg (has no administration in time range)     Or   ondansetron (ZOFRAN) injection 4 mg (has no administration in time range)   aluminum-magnesium hydroxide-simethicone (MAALOX MAX) 400-400-40 MG/5ML suspension 15 mL (has no administration in time range)   melatonin tablet 5 mg (has no administration in time range)   ipratropium-albuterol (DUO-NEB) nebulizer solution 3 mL (3 mL Nebulization Given 8/27/24 1953)         ORDERS PLACED DURING THIS VISIT:  Orders Placed This Encounter   Procedures    Respiratory Panel PCR w/COVID-19(SARS-CoV-2) SIMBA/JESUS/BEATRICE/PAD/COR/TYLER In-House, NP Swab in UTM/VTM, 2 HR TAT - Swab, Nasopharynx    XR Chest 1 View    [facility]kins Draw    Comprehensive Metabolic Panel    Urinalysis With Microscopic If Indicated (No Culture) - Urine, Catheter    BNP    Single High Sensitivity Troponin T    Procalcitonin    CBC Auto Differential    Blood Gas, Arterial -    High Sensitivity Troponin T 2Hr    NPO Diet NPO Type: Strict NPO    Continuous  Pulse Oximetry    Vital Signs    Vital Signs    Oral Care    Place Sequential Compression Device    Maintain Sequential Compression Device    Maintain IV Access    Telemetry - Place Orders & Notify Provider of Results When Patient Experiences Acute Chest Pain, Dysrhythmia or Respiratory Distress    May Be Off Telemetry for Tests    Up With Assistance    Daily Weights    Strict Intake & Output    Code Status and Medical Interventions: CPR (Attempt to Resuscitate); Full Support    LHA (on-call MD unless specified) Details    Inpatient Cardiology Consult    Oxygen Therapy- Nasal Cannula; Titrate 1-6 LPM Per SpO2; 90 - 95%    POC Glucose Once    ECG 12 Lead Dyspnea    Telemetry Scan    Insert Peripheral IV    Inpatient Admission    Fall Precautions    CBC & Differential    Green Top (Gel)    Lavender Top    Gold Top - SST    Light Blue Top         PROCEDURES  Procedures      Critical care provider statement:    Critical care time (minutes): 45.   Critical care time was exclusive of:  Separately billable procedures and treating other patients   Critical care was necessary to treat or prevent imminent or life-threatening deterioration of the following conditions:  Respiratory Failure   Critical care was time spent personally by me on the following activities:  Development of treatment plan with patient or surrogate, discussions with consultants, evaluation of patient's response to treatment, examination of patient, obtaining history from patient or surrogate, ordering and performing treatments and interventions, ordering and review of laboratory studies, ordering and review of radiographic studies, pulse oximetry, re-evaluation of patient's condition and review of old charts. Critical Care indicators:        PROGRESS, DATA ANALYSIS, CONSULTS, AND MEDICAL DECISION MAKING  All labs have been independently interpreted by me.  All radiology studies have been reviewed by me. All EKG's have been independently viewed and  interpreted by me.  Discussion below represents my analysis of pertinent findings related to patient's condition, differential diagnosis, treatment plan and final disposition.    Differential diagnosis includes but is not limited to viral syndrome, pneumonia, COPD exacerbation, CHF exacerbation, hypoxia, respiratory failure.    Clinical Scores:                   ED Course as of 08/28/24 0101   Tue Aug 27, 2024   2004 EKG          EKG time: 1951  Rhythm/Rate: Ventricular paced, rate 78    Independently Interpreted by me  Not significantly changed compared to prior 11/4/2023   [TR]   2015 XR Chest 1 View  My independent interpretation of the imaging study is no pneumothorax [TR]   2116 HS Troponin T(!!): 68 [DC]   2116 proBNP(!): 30,594.0  Troponin and BNP likely related to poor kidney function with a GFR of 45 today. [DC]   2119 Patient presentation and evaluation consistent with increased dyspnea likely related to a combination of COPD and CHF exacerbations.  He would benefit from admission for further supportive care and possible cardiology consultation knowing that he has a known EF of 21% on a documented echocardiogram from October 2023.  No indication for emergent consultation with cardiology currently.  Some symptom improvement with DuoNeb treatment but continues with some wheezing and increased work of breathing from baseline.  Plan for additional Lasix and admission.  Patient and family agreeable. [DC]   2133 I discussed the case with MD Machelle with [facility] at this time regarding the patient.  I discussed work-up, results, concerns.  I discussed the consulting provider's desire for tele admit.    [DC]      ED Course User Index  [DC] Wolf Avalos PA  [TR] Nelson Flood MD       MDM: The patient has new hypoxic respiratory failure.  Is some increased confusion so we will get ABG to rule out hypercarbia.  Plan chest x-ray, EKG, laboratory evaluation.  He will require admission.      COMPLEXITY OF CARE  The  patient requires admission.    Please note that portions of this document were completed with a voice recognition program.    Note Disclaimer: At [facility], we believe that sharing information builds trust and better relationships. You are receiving this note because you recently visited [facility]. It is possible you will see health information before a provider has talked with you about it. This kind of information can be easy to misunderstand. To help you fully understand what it means for your health, we urge you to discuss this note with your provider.         Nelson Flood MD  08/28/24 0101

## 2024-08-28 NOTE — ED NOTES
"Nursing report ED to floor  Tigre Amaral  74 y.o.  male    HPI :  HPI (Adult)  Stated Reason for Visit: cp soa ams  History Obtained From: EMS    Chief Complaint  Chief Complaint   Patient presents with    Chest Pain    Altered Mental Status    Shortness of Breath       Admitting doctor:   Freeman Carty DO    Admitting diagnosis:   The primary encounter diagnosis was Acute exacerbation of chronic obstructive pulmonary disease (COPD). Diagnoses of Acute congestive heart failure, unspecified heart failure type, Acute on chronic renal insufficiency, and Acute metabolic encephalopathy were also pertinent to this visit.    Code status:   Current Code Status       Date Active Code Status Order ID Comments User Context       Prior            Allergies:   Patient has no known allergies.    Isolation:   No active isolations    Intake and Output  No intake or output data in the 24 hours ending 08/27/24 2143    Weight:       08/27/24 1915   Weight: 65.8 kg (145 lb)       Most recent vitals:   Vitals:    08/27/24 1915 08/27/24 1953 08/27/24 1957 08/27/24 2001   BP: 142/93   139/77   Pulse: 90   75   Resp: 22 24 24    Temp: 97.3 °F (36.3 °C)      SpO2: 100%   100%   Weight: 65.8 kg (145 lb)      Height: 172.7 cm (68\")          Active LDAs/IV Access:   Lines, Drains & Airways       Active LDAs       Name Placement date Placement time Site Days    Peripheral IV 08/27/24 1910 Right Antecubital 08/27/24 1910  Antecubital  less than 1                    Labs (abnormal labs have a star):   Labs Reviewed   COMPREHENSIVE METABOLIC PANEL - Abnormal; Notable for the following components:       Result Value    Glucose 127 (*)     BUN 25 (*)     Creatinine 1.56 (*)     Alkaline Phosphatase 119 (*)     eGFR 45.7 (*)     All other components within normal limits    Narrative:     GFR Normal >60  Chronic Kidney Disease <60  Kidney Failure <15    The GFR formula is only valid for adults with stable renal function between ages 18 and 70. "   BNP (IN-HOUSE) - Abnormal; Notable for the following components:    proBNP 30,594.0 (*)     All other components within normal limits    Narrative:     This assay is used as an aid in the diagnosis of individuals suspected of having heart failure. It can be used as an aid in the diagnosis of acute decompensated heart failure (ADHF) in patients presenting with signs and symptoms of ADHF to the emergency department (ED). In addition, NT-proBNP of <300 pg/mL indicates ADHF is not likely.    Age Range Result Interpretation  NT-proBNP Concentration (pg/mL:      <50             Positive            >450                   Gray                 300-450                    Negative             <300    50-75           Positive            >900                  Gray                300-900                  Negative            <300      >75             Positive            >1800                  Gray                300-1800                  Negative            <300   SINGLE HS TROPONIN T - Abnormal; Notable for the following components:    HS Troponin T 68 (*)     All other components within normal limits    Narrative:     High Sensitive Troponin T Reference Range:  <14.0 ng/L- Negative Female for AMI  <22.0 ng/L- Negative Male for AMI  >=14 - Abnormal Female indicating possible myocardial injury.  >=22 - Abnormal Male indicating possible myocardial injury.   Clinicians would have to utilize clinical acumen, EKG, Troponin, and serial changes to determine if it is an Acute Myocardial Infarction or myocardial injury due to an underlying chronic condition.        CBC WITH AUTO DIFFERENTIAL - Abnormal; Notable for the following components:    Immature Grans % 0.6 (*)     All other components within normal limits   BLOOD GAS, ARTERIAL - Abnormal; Notable for the following components:    pH, Arterial 7.479 (*)     pCO2, Arterial 28.4 (*)     pO2, Arterial 113.9 (*)     HCO3, Arterial 21.1 (*)     Base Excess, Arterial -1.2 (*)     O2  "Saturation, Arterial 98.8 (*)     CO2 Content 22.0 (*)     All other components within normal limits   RESPIRATORY PANEL PCR W/ COVID-19 (SARS-COV-2), NP SWAB IN UTM/VTP, 2 HR TAT - Normal    Narrative:     In the setting of a positive respiratory panel with a viral infection PLUS a negative procalcitonin without other underlying concern for bacterial infection, consider observing off antibiotics or discontinuation of antibiotics and continue supportive care. If the respiratory panel is positive for atypical bacterial infection (Bordetella pertussis, Chlamydophila pneumoniae, or Mycoplasma pneumoniae), consider antibiotic de-escalation to target atypical bacterial infection.   PROCALCITONIN - Normal    Narrative:     As a Marker for Sepsis (Non-Neonates):    1. <0.5 ng/mL represents a low risk of severe sepsis and/or septic shock.  2. >2 ng/mL represents a high risk of severe sepsis and/or septic shock.    As a Marker for Lower Respiratory Tract Infections that require antibiotic therapy:    PCT on Admission    Antibiotic Therapy       6-12 Hrs later    >0.5                Strongly Recommended  >0.25 - <0.5        Recommended   0.1 - 0.25          Discouraged              Remeasure/reassess PCT  <0.1                Strongly Discouraged     Remeasure/reassess PCT    As 28 day mortality risk marker: \"Change in Procalcitonin Result\" (>80% or <=80%) if Day 0 (or Day 1) and Day 4 values are available. Refer to http://www.[facility]-pct-calculator.com    Change in PCT <=80%  A decrease of PCT levels below or equal to 80% defines a positive change in PCT test result representing a higher risk for 28-day all-cause mortality of patients diagnosed with severe sepsis for septic shock.    Change in PCT >80%  A decrease of PCT levels of more than 80% defines a negative change in PCT result representing a lower risk for 28-day all-cause mortality of patients diagnosed with severe sepsis or septic shock.      RAINBOW DRAW    " Narrative:     The following orders were created for panel order [place] Draw.  Procedure                               Abnormality         Status                     ---------                               -----------         ------                     Green Top (Gel)[[id number]]                                  Final result               Lavender Top[[id number]]                                     Final result               Gold Top - SST[[id number]]                                   Final result               Light Blue Top[[id number]]                                   Final result                 Please view results for these tests on the individual orders.   URINALYSIS W/ MICROSCOPIC IF INDICATED (NO CULTURE)   HIGH SENSITIVITIY TROPONIN T 2HR   POCT GLUCOSE FINGERSTICK   CBC AND DIFFERENTIAL    Narrative:     The following orders were created for panel order CBC & Differential.  Procedure                               Abnormality         Status                     ---------                               -----------         ------                     CBC Auto Differential[[id number]]        Abnormal            Final result                 Please view results for these tests on the individual orders.   GREEN TOP   LAVENDER TOP   GOLD TOP - SST   LIGHT BLUE TOP       EKG:   ECG 12 Lead Dyspnea   Preliminary Result   HEART RATE=78  bpm   RR Fcbardzx=567  ms   MS Zsuflqyp=064  ms   P Horizontal Axis=35  deg   P Front Axis=75  deg   QRSD Ocdwcxzn=949  ms   QT Itqlgdkx=030  ms   GDvF=872  ms   QRS Axis=-65  deg   T Wave Axis=111  deg   - ABNORMAL ECG -   Ventricular-paced complexes   No further analysis attempted due to paced rhythm   Date and Time of Study:2024-08-27 19:51:46          Meds given in ED:   Medications   sodium chloride 0.9 % flush 10 mL (has no administration in time range)   ipratropium-albuterol (DUO-NEB) nebulizer solution 3 mL (3 mL Nebulization Given 8/27/24 1953)       Imaging results:  No radiology  results for the last day    Ambulatory status:   - max assist    Social issues:   Social History     Socioeconomic History    Marital status: Single   Tobacco Use    Smoking status: Every Day     Current packs/day: 1.00     Average packs/day: 1 pack/day for 60.0 years (60.0 ttl pk-yrs)     Types: Cigarettes    Smokeless tobacco: Never   Vaping Use    Vaping status: Unknown   Substance and Sexual Activity    Drug use: Never    Sexual activity: Not Currently       Peripheral Neurovascular  Peripheral Neurovascular (Adult)  Peripheral Neurovascular WDL: .WDL except, neurovascular assessment lower, pulse assessment  Pulse Assessment: dorsalis pedis  LLE Neurovascular Assessment  Temperature LLE: (S) cool  RLE Neurovascular Assessment  Temperature RLE: (S) cool    Neuro Cognitive  Neuro Cognitive (Adult)  Cognitive/Neuro/Behavioral WDL: .WDL except, orientation  Orientation: disoriented to, situation  Additional Documentation: [place] Coma Scale (Group)  [place] Coma Scale  Best Eye Response: 4-->(E4) spontaneous  Best Motor Response: 6-->(M6) obeys commands  Best Verbal Response: 4-->(V4) confused  [place] Coma Scale Score: 14    Learning  Learning Assessment (Adult)  Learning Readiness and Ability: cognitive limitation noted  Education Provided  Person Taught: patient, family member/friend    Respiratory  Respiratory WDL  Respiratory WDL: .WDL except, all  Rhythm/Pattern, Respiratory: (S) shortness of breath, gasping  Breath Sounds  Breath Sounds: All Fields  All Lung Fields Breath Sounds: Anterior:, Lateral:, clear  Breath Sounds Post-Respiratory Treatment  Breath Sounds Posttreatment All Fields: All Fields  Breath Sounds Posttreatment All Fields: Posterior:, Lateral:, no change    Abdominal Pain  Safety Interventions  Safety Precautions/Falls Reduction: family at bedside    Pain Assessments  Pain (Adult)  (0-10) Pain Rating: Rest: 5    NIH Stroke Scale       Saturnino Darden RN  08/27/24 21:43 EDT

## 2024-08-28 NOTE — DISCHARGE PLACEMENT REQUEST
"Tigre Amaral (74 y.o. Male)       Date of Birth   1950    Social Security Number       Address   [address] [place] [address]    Home Phone   [phone]    MRN   [id number]       [employer]   None    Marital Status   Single                            Admission Date   8/27/24    Admission Type   Emergency    Admitting Provider   Cheo Chase MD    Attending Provider   Cheo Chase MD    Department, Room/Bed   [facility], 3114/1       Discharge Date       Discharge Disposition       Discharge Destination                                 Attending Provider: Cheo Chase MD    Allergies: No Known Allergies    Isolation: None   Infection: None   Code Status: CPR    Ht: 172.7 cm (68\")   Wt: 64.4 kg (142 lb)    Admission Cmt: None   Principal Problem: Acute on chronic combined systolic (congestive) and diastolic (congestive) heart failure [I50.43]                   Active Insurance as of 8/27/2024       Primary Coverage       Payor Plan Insurance Group Employer/Plan Group    [facility] [id number]        Payor Plan Address Payor Plan Phone Number Payor Plan Fax Number Effective Dates    [address] [phone]  1/1/2022 - None Entered    [facility] [id number]         Subscriber Name Subscriber Birth Date Member ID       TIGRE AMARAL 1950 [id number]                     Emergency Contacts        (Rel.) Home Phone Work Phone Mobile Phone    Rola Jaime (Daughter) [phone] -- [phone]                "

## 2024-08-28 NOTE — CASE MANAGEMENT/SOCIAL WORK
"Discharge Planning Assessment  [facility]     Patient Name: Tigre Amaral  MRN: [id number]  Today's Date: 8/28/2024    Admit Date: 8/27/2024    Plan: Home w/ 24/7 assist of family;  Referral pending for    Discharge Needs Assessment       Row Name 08/28/24 1727       Living Environment    People in Home alone    Unique Family Situation Has multiple family members all around him on same land.  Daughter is at his home, \"4-5\" times a day to assist.    Current Living Arrangements home    Potentially Unsafe Housing Conditions none    In the past 12 months has the electric, gas, oil, or water company threatened to shut off services in your home? No    Primary Care Provided by child(bolivar)    Provides Primary Care For no one, unable/limited ability to care for self    Family Caregiver if Needed child(bolivar), adult;other relative(s)    Family Caregiver Names Daughter/Rola Jaime; Dgtr/Manuela Leblanc; S.I.L./Desmond Gibson    Quality of Family Relationships helpful;involved;supportive    Able to Return to Prior Arrangements yes       Resource/Environmental Concerns    Resource/Environmental Concerns none    Transportation Concerns none       Transportation Needs    In the past 12 months, has lack of transportation kept you from medical appointments or from getting medications? no    In the past 12 months, has lack of transportation kept you from meetings, work, or from getting things needed for daily living? No       Food Insecurity    Within the past 12 months, you worried that your food would run out before you got the money to buy more. Never true    Within the past 12 months, the food you bought just didn't last and you didn't have money to get more. Never true       Transition Planning    Patient/Family Anticipates Transition to home with family    Patient/Family Anticipated Services at Transition home health care    Transportation Anticipated family or friend will provide       Discharge Needs Assessment    Equipment " Currently Used at Home walker, rolling;cane, straight;grab bar    Concerns to be Addressed discharge planning    Anticipated Changes Related to Illness none    Equipment Needed After Discharge none    Discharge Facility/Level of Care Needs home with home health    Patient's Choice of Community Agency(s) Daughter/Rola request [facility] at d/c.                   Discharge Plan       Row Name 08/28/24 1045       Plan    Plan Home w/ 24/7 assist of family;  Referral pending for HH    Plan Comments CCP spoke with patient and daughter/Rola Jaime, at bedside.  Explained role of CCP and discharge planning discussed.  Information on face sheet verified.  Patient receives assistance with bathing on Thursdays from a private paid caregiver.  She takes care of his nails, haircuts and bathing.  Pt is retired a no longer drives.  Patient lives with his dog in single story home with two steps to enter.  Patient home [place] acres of land, and he has various family members that visit him throughout the day.  Rola takes care if his medications, shopping, cooking, cleaning, laundry and transportation.  Patients PCP confirmed as, Tara Encarnacion at [address]  Patient's pharmacy [facility], [facility]  Patient has the following DME- rollator and grab bars.  Daughter reports he has been to [facility] in the past (she thinks that is the name).  Rola is requesting patient get home health at discharge.  Rola reports patient d/c plan is home.  Pt has used [facility] this past October.  CCP sent referral to [facility] (pending).  CCP will continue to follow….…Reshma VICKERS /HECTOR.                  Continued Care and Services - Admitted Since 8/27/2024       Home Medical Care       Service Provider Request Status Selected Services Address Phone Fax Patient Preferred    [facility] Pending - Request Sent N/A [id number], [facility] [address] [phone] --                  Expected Discharge Date and Time       Expected Discharge Date Expected Discharge Time    Aug 31, 2024            Demographic Summary       Row Name 08/28/24 1726       General Information    Admission Type inpatient    Arrived From emergency department    Referral Source admission list    Reason for Consult discharge planning    Preferred Language English       Contact Information    Permission Granted to Share Info With family/designee                   Functional Status       Row Name 08/28/24 1727       Functional Status    Usual Activity Tolerance moderate    Current Activity Tolerance fair       Physical Activity    On average, how many days per week do you engage in moderate to strenuous exercise (like a brisk walk)? 0 days    On average, how many minutes do you engage in exercise at this level? 0 min    Number of minutes of exercise per week 0       Assessment of Health Literacy    How often do you have someone help you read hospital materials? Sometimes    Health Literacy Moderate       Functional Status, IADL    Medications assistive person    Meal Preparation assistive person;completely dependent    Housekeeping assistive person;completely dependent    Laundry completely dependent    Shopping completely dependent       Mental Status    General Appearance WDL WDL       Mental Status Summary    Recent Changes in Mental Status/Cognitive Functioning no changes       Employment/    Employment Status retired                   Psychosocial    No documentation.                  Abuse/Neglect    No documentation.                  Legal    No documentation.                  Substance Abuse    No documentation.                  Patient Forms    No documentation.                     Reshma Hale RN

## 2024-08-28 NOTE — PLAN OF CARE
Goal Outcome Evaluation:         Patient denies any pain nor discomfort, vital signs stable, pt confused at times,  pt on oxygen at  2 liter , educate to call for assist, call light in reach,  bed alarm intact, paced on the monitor, cont to monitor

## 2024-08-28 NOTE — H&P
Patient Name:  Tigre Amaral  YOB: 1950  MRN:  [id number]  Admit Date:  8/27/2024  Patient Care Team:  Tara Encarnacion MD as PCP - General (Geriatric Medicine)        Chief Complaint   Patient presents with    Chest Pain    Altered Mental Status    Shortness of Breath   Duration 5 days.    History Present Illness     A pleasant 74 years old gentleman with a past history of combined congestive heart failure/status post pacer/hypertension/carotid peripheral vascular disease/CVA with left-sided weakness/dyslipidemia/stage IIIa chronic renal failure/tobacco abuse who presented to the emergency department with progressive shortness of breath and chest tightness with deterioration of baseline mentation over the last 4 to 5 days.  No PND orthopnea.  No palpitation.  No ankle edema.  No cough.  No wheeze.  No hemoptysis.  No fever or chills.  In the emergency department respiratory PCR panel was negative.  CBC was normal.  Procalcitonin normal.  Troponin elevated at 68 with repeat troponin of 77 and positive delta.  proBNP elevated at 45016.  CMP normal except a random blood sugar of 127, BUN 25, creatinine 1.56.  Arterial blood gas revealed a pH of 7.49, pCO2 of 28.4, pO2 of 113 on 1 L.  UA negative.  Chest x-ray with right lower lobe infiltrate.        Review of Systems   GI.  Positive choking with food but not the liquid.  Positive upper abdominal pain.  No odynophagia.  Normal bowel habits without constipation/diarrhea/bleeding per rectum/melena  .  No dysuria or hematuria  CNS.  Positive for increased confusion.  Positive old left upper extremity and lower extremity weakness.  No headache.  No loss of consciousness.  No seizures.  No other focal neurological symptoms.    Personal History     Past Medical History:   Diagnosis Date    Hyperlipidemia     Hypertension      Past Surgical History:   Procedure Laterality Date    PACEMAKER IMPLANTATION Left      Family History   Problem  Relation Age of Onset    Heart disease Mother     Heart disease Father     Heart disease Sister      Social History     Tobacco Use    Smoking status: Every Day     Current packs/day: 1.00     Average packs/day: 1 pack/day for 60.0 years (60.0 ttl pk-yrs)     Types: Cigarettes    Smokeless tobacco: Never   Vaping Use    Vaping status: Unknown   Substance Use Topics    Drug use: Never     No current facility-administered medications on file prior to encounter.     Current Outpatient Medications on File Prior to Encounter   Medication Sig Dispense Refill    aspirin 81 MG EC tablet Take 1 tablet by mouth Daily.      atorvastatin (LIPITOR) 80 MG tablet Take 1 tablet by mouth Every Night. 30 tablet 12    carvedilol (COREG) 6.25 MG tablet Take 1 tablet by mouth 2 (Two) Times a Day With Meals.      Cholecalciferol 25 MCG (1000 UT) tablet Take 1 tablet by mouth Daily.      clopidogrel (PLAVIX) 75 MG tablet Take 1 tablet by mouth Daily. 30 tablet 12    furosemide (LASIX) 40 MG tablet Take 1 tablet by mouth Daily.      Klor-Con/EF 25 MEQ disintegrating tablet Take 1 tablet by mouth Daily.      lisinopril (PRINIVIL,ZESTRIL) 20 MG tablet Take 1 tablet by mouth Daily.      potassium chloride (K-DUR,KLOR-CON) 20 MEQ CR tablet Take 1 tablet by mouth Daily.      sennosides-docusate (PERICOLACE) 8.6-50 MG per tablet 1 tablet.       No Known Allergies    Objective    Objective     Vital Signs  Temp:  [97.2 °F (36.2 °C)-98 °F (36.7 °C)] 98 °F (36.7 °C)  Heart Rate:  [75-90] 86  Resp:  [20-24] 20  BP: (131-144)/(76-93) 131/78  SpO2:  [94 %-100 %] 94 %  on  Flow (L/min):  [2] 2;   Device (Oxygen Therapy): nasal cannula  Body mass index is 21.59 kg/m².    Physical Exam  General.  Elderly gentleman.  He is alert and oriented times 3 out of 4.  In no acute pain.  In mild respiratory distress.  Face.  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  Eyes.  Pupils equal round and reactive.  Intact extraocular musculature.  No pallor or  jaundice.  Oral cavity.  Moist mucous membrane.  Neck.  Supple.  No JVD.  No lymphadenopathy or thyromegaly.  Cardio vascular.  Regular rate and rhythm and grade 2 systolic murmur  Chest.  Poor bilateral air entry with bibasilar rhonchi.  Abdomen.  Soft lax.  No tenderness.  No organomegaly.  No guarding or rebound.  Extremities.  No clubbing/cyanosis/edema.  CNS.  No acute focal neurological deficits.  Positive old left sided grade 4/5 weakness        Results Review:  I reviewed the patient's new clinical results.  I reviewed the patient's new imaging results and agree with the interpretation.  I reviewed the patient's other test results and agree with the interpretation  I personally viewed and interpreted the patient's EKG/Telemetry data  Discussed with ED provider.    Lab Results (last 24 hours)       Procedure Component Value Units Date/Time    Respiratory Panel PCR w/COVID-19(SARS-CoV-2) SIMBA/JESUS/BEATRICE/PAD/COR/TYLER In-House, NP Swab in UTM/VTM, 2 HR TAT - Swab, Nasopharynx [[id number]]  (Normal) Collected: 08/27/24 2013    Specimen: Swab from Nasopharynx Updated: 08/27/24 2125     ADENOVIRUS, PCR Not Detected     Coronavirus 229E Not Detected     Coronavirus HKU1 Not Detected     Coronavirus NL63 Not Detected     Coronavirus OC43 Not Detected     COVID19 Not Detected     Human Metapneumovirus Not Detected     Human Rhinovirus/Enterovirus Not Detected     Influenza A PCR Not Detected     Influenza B PCR Not Detected     Parainfluenza Virus 1 Not Detected     Parainfluenza Virus 2 Not Detected     Parainfluenza Virus 3 Not Detected     Parainfluenza Virus 4 Not Detected     RSV, PCR Not Detected     Bordetella pertussis pcr Not Detected     Bordetella parapertussis PCR Not Detected     Chlamydophila pneumoniae PCR Not Detected     Mycoplasma pneumo by PCR Not Detected    Narrative:      In the setting of a positive respiratory panel with a viral infection PLUS a negative procalcitonin without other underlying concern  for bacterial infection, consider observing off antibiotics or discontinuation of antibiotics and continue supportive care. If the respiratory panel is positive for atypical bacterial infection (Bordetella pertussis, Chlamydophila pneumoniae, or Mycoplasma pneumoniae), consider antibiotic de-escalation to target atypical bacterial infection.    CBC & Differential [[id number]]  (Abnormal) Collected: 08/27/24 2018    Specimen: Blood Updated: 08/27/24 2036    Narrative:      The following orders were created for panel order CBC & Differential.  Procedure                               Abnormality         Status                     ---------                               -----------         ------                     CBC Auto Differential[[id number]]        Abnormal            Final result                 Please view results for these tests on the individual orders.    Comprehensive Metabolic Panel [[id number]]  (Abnormal) Collected: 08/27/24 2018    Specimen: Blood Updated: 08/27/24 2101     Glucose 127 mg/dL      BUN 25 mg/dL      Creatinine 1.56 mg/dL      Sodium 138 mmol/L      Potassium 4.4 mmol/L      Chloride 101 mmol/L      CO2 25.0 mmol/L      Calcium 9.2 mg/dL      Total Protein 6.3 g/dL      Albumin 3.8 g/dL      ALT (SGPT) 32 U/L      AST (SGOT) 25 U/L      Alkaline Phosphatase 119 U/L      Total Bilirubin 0.9 mg/dL      Globulin 2.5 gm/dL      A/G Ratio 1.5 g/dL      BUN/Creatinine Ratio 16.0     Anion Gap 12.0 mmol/L      eGFR 45.7 mL/min/1.73     Narrative:      GFR Normal >60  Chronic Kidney Disease <60  Kidney Failure <15    The GFR formula is only valid for adults with stable renal function between ages 18 and 70.    BNP [[id number]]  (Abnormal) Collected: 08/27/24 2018    Specimen: Blood Updated: 08/27/24 2101     proBNP 30,594.0 pg/mL     Narrative:      This assay is used as an aid in the diagnosis of individuals suspected of having heart failure. It can be used as an aid in the diagnosis of acute  decompensated heart failure (ADHF) in patients presenting with signs and symptoms of ADHF to the emergency department (ED). In addition, NT-proBNP of <300 pg/mL indicates ADHF is not likely.    Age Range Result Interpretation  NT-proBNP Concentration (pg/mL:      <50             Positive            >450                   Gray                 300-450                    Negative             <300    50-75           Positive            >900                  Gray                300-900                  Negative            <300      >75             Positive            >1800                  Gray                300-1800                  Negative            <300    Single High Sensitivity Troponin T [[id number]]  (Abnormal) Collected: 08/27/24 2018    Specimen: Blood Updated: 08/27/24 2103     HS Troponin T 68 ng/L     Narrative:      High Sensitive Troponin T Reference Range:  <14.0 ng/L- Negative Female for AMI  <22.0 ng/L- Negative Male for AMI  >=14 - Abnormal Female indicating possible myocardial injury.  >=22 - Abnormal Male indicating possible myocardial injury.   Clinicians would have to utilize clinical acumen, EKG, Troponin, and serial changes to determine if it is an Acute Myocardial Infarction or myocardial injury due to an underlying chronic condition.         Procalcitonin [[id number]]  (Normal) Collected: 08/27/24 2018    Specimen: Blood Updated: 08/27/24 2100     Procalcitonin 0.07 ng/mL     Narrative:      As a Marker for Sepsis (Non-Neonates):    1. <0.5 ng/mL represents a low risk of severe sepsis and/or septic shock.  2. >2 ng/mL represents a high risk of severe sepsis and/or septic shock.    As a Marker for Lower Respiratory Tract Infections that require antibiotic therapy:    PCT on Admission    Antibiotic Therapy       6-12 Hrs later    >0.5                Strongly Recommended  >0.25 - <0.5        Recommended   0.1 - 0.25          Discouraged              Remeasure/reassess PCT  <0.1                 "Strongly Discouraged     Remeasure/reassess PCT    As 28 day mortality risk marker: \"Change in Procalcitonin Result\" (>80% or <=80%) if Day 0 (or Day 1) and Day 4 values are available. Refer to http://www.[facility]-pct-calculator.com    Change in PCT <=80%  A decrease of PCT levels below or equal to 80% defines a positive change in PCT test result representing a higher risk for 28-day all-cause mortality of patients diagnosed with severe sepsis for septic shock.    Change in PCT >80%  A decrease of PCT levels of more than 80% defines a negative change in PCT result representing a lower risk for 28-day all-cause mortality of patients diagnosed with severe sepsis or septic shock.       CBC Auto Differential [[id number]]  (Abnormal) Collected: 08/27/24 2018    Specimen: Blood Updated: 08/27/24 2036     WBC 8.53 10*3/mm3      RBC 4.45 10*6/mm3      Hemoglobin 13.3 g/dL      Hematocrit 41.5 %      MCV 93.3 fL      MCH 29.9 pg      MCHC 32.0 g/dL      RDW 13.6 %      RDW-SD 45.4 fl      MPV 9.9 fL      Platelets 242 10*3/mm3      Neutrophil % 58.2 %      Lymphocyte % 29.4 %      Monocyte % 9.5 %      Eosinophil % 1.1 %      Basophil % 1.2 %      Immature Grans % 0.6 %      Neutrophils, Absolute 4.97 10*3/mm3      Lymphocytes, Absolute 2.51 10*3/mm3      Monocytes, Absolute 0.81 10*3/mm3      Eosinophils, Absolute 0.09 10*3/mm3      Basophils, Absolute 0.10 10*3/mm3      Immature Grans, Absolute 0.05 10*3/mm3      nRBC 0.0 /100 WBC     Blood Gas, Arterial - [[id number]]  (Abnormal) Collected: 08/27/24 2115    Specimen: Arterial Blood Updated: 08/27/24 2117     Site Right Radial     Issac's Test Positive     pH, Arterial 7.479 pH units      pCO2, Arterial 28.4 mm Hg      pO2, Arterial 113.9 mm Hg      HCO3, Arterial 21.1 mmol/L      Base Excess, Arterial -1.2 mmol/L      Comment: Serial Number: [id number]:  [id number]        O2 Saturation, Arterial 98.8 %      CO2 Content 22.0 mmol/L      Barometric Pressure for Blood Gas " 750.7000 mmHg      Modality Cannula     Flow Rate 1.0000 lpm      Hemodilution No     Device Comment sats=98%    High Sensitivity Troponin T 2Hr [[id number]]  (Abnormal) Collected: 08/27/24 2252    Specimen: Blood Updated: 08/27/24 2337     HS Troponin T 77 ng/L      Troponin T Delta 9 ng/L     Narrative:      High Sensitive Troponin T Reference Range:  <14.0 ng/L- Negative Female for AMI  <22.0 ng/L- Negative Male for AMI  >=14 - Abnormal Female indicating possible myocardial injury.  >=22 - Abnormal Male indicating possible myocardial injury.   Clinicians would have to utilize clinical acumen, EKG, Troponin, and serial changes to determine if it is an Acute Myocardial Infarction or myocardial injury due to an underlying chronic condition.         Urinalysis With Microscopic If Indicated (No Culture) - Urine, Catheter [[id number]]  (Normal) Collected: 08/28/24 0410    Specimen: Urine, Catheter Updated: 08/28/24 0423     Color, UA Yellow     Appearance, UA Clear     pH, UA 6.5     Specific Gravity, UA 1.012     Glucose, UA Negative     Ketones, UA Negative     Bilirubin, UA Negative     Blood, UA Negative     Protein, UA Negative     Leuk Esterase, UA Negative     Nitrite, UA Negative     Urobilinogen, UA 1.0 E.U./dL    Narrative:      Urine microscopic not indicated.    POC Glucose Once [[id number]]  (Normal) Collected: 08/28/24 0618    Specimen: Blood Updated: 08/28/24 0619     Glucose 107 mg/dL             Imaging Results (Last 24 Hours)       Procedure Component Value Units Date/Time    XR Chest 1 View [[id number]] Collected: 08/27/24 2032     Updated: 08/27/24 2036    Narrative:      XR CHEST 1 VW-     Clinical: Acute mental status change     COMPARISON examination 11/6/2023     FINDINGS: There is cardiomegaly. Atherosclerotic calcification of the  aorta. Patient is rotated towards the left. There is a patchy area of  infiltrate demonstrated within the medial right lower lobe worrisome for  pneumonia. The left lung  is clear. No pulmonary edema or pleural  effusion seen, the remainder is unremarkable.     This report was finalized on 8/27/2024 8:33 PM by Dr. Emigdio Kowalski M.D  on Workstation: [id number]               Results for orders placed during the hospital encounter of 10/28/23    Adult transthoracic echo complete    Interpretation Summary    Left ventricular systolic function is severely decreased. Calculated left ventricular EF = 21.6% Left ventricular ejection fraction appears to be less than 20%.The following left ventricular wall segments are hypokinetic: mid anterolateral, basal inferolateral, mid inferolateral, mid inferior, basal inferoseptal, mid inferoseptal, mid anteroseptal, basal inferior and basal inferoseptal. The following left ventricular wall segments are akinetic: apical anterior, apical lateral, apical inferior, apical septal and apex.    Left ventricular wall thickness is consistent with mild concentric hypertrophy.    Left ventricular diastolic function is consistent with (grade II w/high LAP) pseudonormalization.    The left atrial cavity is mildly dilated.    Saline test results are negative for right to left atrial level shunt.    Estimated right ventricular systolic pressure from tricuspid regurgitation is mildly elevated (35-45 mmHg).      ECG 12 Lead Dyspnea   Preliminary Result   HEART RATE=78  bpm   RR Icjtuitj=470  ms   FL Qcqxkfqv=170  ms   P Horizontal Axis=35  deg   P Front Axis=75  deg   QRSD Nvkwtdhi=066  ms   QT Qikmghld=390  ms   HRfX=598  ms   QRS Axis=-65  deg   T Wave Axis=111  deg   - ABNORMAL ECG -   Ventricular-paced complexes   No further analysis attempted due to paced rhythm   Date and Time of Study:2024-08-27 19:51:46      Telemetry Scan   Final Result               Assessment/Plan     Active Hospital Problems    Diagnosis  POA    **Acute CHF [I50.9]  Yes      Resolved Hospital Problems   No resolved problems to display.           Chest pain/acute on chronic combined  congestive heart failure in a patient with a history of hypertension status post pacer.  Patient last 2D echo on 10/20/2023 revealing an ejection fraction less than 20% with multiple left ventricular hypokinetic areas and grade 2 diastolic dysfunction.  Troponins and proBNP are elevated.  Will continue aspirin/Coreg/lisinopril and stop p.o. Lasix and start IV Lasix.  Will need to rule out ischemia and might require a nuclear stress test.  Will recheck 2D echo and consult cardiology.  Aspiration pneumonia.  Will place n.p.o. for now until speech evaluates the patient.  Will initiate Zosyn/Mucinex/DuoNebs.  Will check blood and sputum culture.  Will check CT scan of the chest in view of prolonged history of smoking.  Dysphagia.  Speech evaluation.  N.p.o. until speech evaluates the patient  Stage IIIa chronic renal failure.  Baseline creatinine between 1.4 and 1.55.  Patient appears to be mildly volume overloaded.  Initiate IV Lasix.  Monitor input and output and renal function.  Metabolic encephalopathy In a patient with a possible baseline dementia.  Patient mental status is back to baseline according to the daughter.  Patient has a recent history of CVA.  CNS examination revealed no acute changes (old left-sided weakness.).  Will check CT scan of the brain.  Will check metabolic workup for dementia.  Carotid peripheral vascular disease/dyslipidemia/CVA with left-sided weakness.  Check CT scan of the brain.  No acute CNS exam changes (old left-sided weakness).  Continue aspirin/Plavix/Lipitor.  Tobacco abuse.  Counseled.  VTE prophylaxis.  Heparin.        Discussed my findings and plan of treatment with the patient/daughter/cardiology.  Disposition.  To be determined based on clinical course  Code Status -full code.       Cheo Chase MD  [facility]  08/28/24  10:17 EDT

## 2024-08-29 ENCOUNTER — APPOINTMENT (OUTPATIENT)
Dept: CARDIOLOGY | Facility: HOSPITAL | Age: 74
End: 2024-08-29
Payer: OTHER GOVERNMENT

## 2024-08-29 LAB
ANION GAP SERPL CALCULATED.3IONS-SCNC: 15 MMOL/L (ref 5–15)
ASCENDING AORTA: 3.9 CM
BASOPHILS # BLD AUTO: 0.1 10*3/MM3 (ref 0–0.2)
BASOPHILS NFR BLD AUTO: 1.2 % (ref 0–1.5)
BH CV ECHO LEFT VENTRICLE GLOBAL LONGITUDINAL STRAIN: -3.8 %
BH CV ECHO MEAS - ACS: 1.7 CM
BH CV ECHO MEAS - AI P1/2T: 453.9 MSEC
BH CV ECHO MEAS - AO MAX PG: 4.1 MMHG
BH CV ECHO MEAS - AO MEAN PG: 1.91 MMHG
BH CV ECHO MEAS - AO ROOT DIAM: 3.5 CM
BH CV ECHO MEAS - AO V2 MAX: 100.8 CM/SEC
BH CV ECHO MEAS - AO V2 VTI: 22.4 CM
BH CV ECHO MEAS - AVA(I,D): 1.24 CM2
BH CV ECHO MEAS - EDV(CUBED): 274.8 ML
BH CV ECHO MEAS - EDV(MOD-SP2): 258 ML
BH CV ECHO MEAS - EDV(MOD-SP4): 214 ML
BH CV ECHO MEAS - EF(MOD-BP): 11.3 %
BH CV ECHO MEAS - EF(MOD-SP2): 3.1 %
BH CV ECHO MEAS - EF(MOD-SP4): 9.3 %
BH CV ECHO MEAS - ESV(CUBED): 241.8 ML
BH CV ECHO MEAS - ESV(MOD-SP2): 250 ML
BH CV ECHO MEAS - ESV(MOD-SP4): 194 ML
BH CV ECHO MEAS - FS: 4.2 %
BH CV ECHO MEAS - IVS/LVPW: 1.38 CM
BH CV ECHO MEAS - IVSD: 1.48 CM
BH CV ECHO MEAS - LV MASS(C)D: 389.6 GRAMS
BH CV ECHO MEAS - LV MAX PG: 0.88 MMHG
BH CV ECHO MEAS - LV MEAN PG: 0.32 MMHG
BH CV ECHO MEAS - LV V1 MAX: 46.8 CM/SEC
BH CV ECHO MEAS - LV V1 VTI: 8.6 CM
BH CV ECHO MEAS - LVIDD: 6.5 CM
BH CV ECHO MEAS - LVIDS: 6.2 CM
BH CV ECHO MEAS - LVOT AREA: 3.2 CM2
BH CV ECHO MEAS - LVOT DIAM: 2.03 CM
BH CV ECHO MEAS - LVPWD: 1.07 CM
BH CV ECHO MEAS - MV A MAX VEL: 24.6 CM/SEC
BH CV ECHO MEAS - MV DEC TIME: 0.13 SEC
BH CV ECHO MEAS - MV E MAX VEL: 72.8 CM/SEC
BH CV ECHO MEAS - MV E/A: 3
BH CV ECHO MEAS - PI END-D VEL: 169.4 CM/SEC
BH CV ECHO MEAS - RAP SYSTOLE: 3 MMHG
BH CV ECHO MEAS - RV MAX PG: 0.72 MMHG
BH CV ECHO MEAS - RV V1 MAX: 42.5 CM/SEC
BH CV ECHO MEAS - RV V1 VTI: 6.7 CM
BH CV ECHO MEAS - RVSP: 46 MMHG
BH CV ECHO MEAS - SV(LVOT): 27.7 ML
BH CV ECHO MEAS - SV(MOD-SP2): 8 ML
BH CV ECHO MEAS - SV(MOD-SP4): 20 ML
BH CV ECHO MEAS - TAPSE (>1.6): 1.37 CM
BH CV ECHO MEAS - TR MAX PG: 43.5 MMHG
BH CV ECHO MEAS - TR MAX VEL: 329.7 CM/SEC
BH CV XLRA - RV BASE: 3.8 CM
BH CV XLRA - RV LENGTH: 7.9 CM
BH CV XLRA - RV MID: 3.1 CM
BUN SERPL-MCNC: 25 MG/DL (ref 8–23)
BUN/CREAT SERPL: 16.6 (ref 7–25)
CALCIUM SPEC-SCNC: 8.8 MG/DL (ref 8.6–10.5)
CHLORIDE SERPL-SCNC: 101 MMOL/L (ref 98–107)
CO2 SERPL-SCNC: 23 MMOL/L (ref 22–29)
CREAT SERPL-MCNC: 1.51 MG/DL (ref 0.76–1.27)
DEPRECATED RDW RBC AUTO: 47.4 FL (ref 37–54)
EGFRCR SERPLBLD CKD-EPI 2021: 47.6 ML/MIN/1.73
EOSINOPHIL # BLD AUTO: 0.14 10*3/MM3 (ref 0–0.4)
EOSINOPHIL NFR BLD AUTO: 1.7 % (ref 0.3–6.2)
ERYTHROCYTE [DISTWIDTH] IN BLOOD BY AUTOMATED COUNT: 13.8 % (ref 12.3–15.4)
GLUCOSE BLDC GLUCOMTR-MCNC: 103 MG/DL (ref 70–130)
GLUCOSE BLDC GLUCOMTR-MCNC: 153 MG/DL (ref 70–130)
GLUCOSE BLDC GLUCOMTR-MCNC: 173 MG/DL (ref 70–130)
GLUCOSE BLDC GLUCOMTR-MCNC: 99 MG/DL (ref 70–130)
GLUCOSE SERPL-MCNC: 106 MG/DL (ref 65–99)
HCT VFR BLD AUTO: 40.4 % (ref 37.5–51)
HGB BLD-MCNC: 13.2 G/DL (ref 13–17.7)
IMM GRANULOCYTES # BLD AUTO: 0.03 10*3/MM3 (ref 0–0.05)
IMM GRANULOCYTES NFR BLD AUTO: 0.4 % (ref 0–0.5)
LEFT ATRIUM VOLUME INDEX: 53.2 ML/M2
LV EF 2D ECHO EST: 10 %
LYMPHOCYTES # BLD AUTO: 2.36 10*3/MM3 (ref 0.7–3.1)
LYMPHOCYTES NFR BLD AUTO: 29.1 % (ref 19.6–45.3)
MCH RBC QN AUTO: 30.9 PG (ref 26.6–33)
MCHC RBC AUTO-ENTMCNC: 32.7 G/DL (ref 31.5–35.7)
MCV RBC AUTO: 94.6 FL (ref 79–97)
MONOCYTES # BLD AUTO: 0.76 10*3/MM3 (ref 0.1–0.9)
MONOCYTES NFR BLD AUTO: 9.4 % (ref 5–12)
NEUTROPHILS NFR BLD AUTO: 4.72 10*3/MM3 (ref 1.7–7)
NEUTROPHILS NFR BLD AUTO: 58.2 % (ref 42.7–76)
NRBC BLD AUTO-RTO: 0 /100 WBC (ref 0–0.2)
PLATELET # BLD AUTO: 200 10*3/MM3 (ref 140–450)
PMV BLD AUTO: 10.2 FL (ref 6–12)
POTASSIUM SERPL-SCNC: 3.6 MMOL/L (ref 3.5–5.2)
POTASSIUM SERPL-SCNC: 4.1 MMOL/L (ref 3.5–5.2)
RBC # BLD AUTO: 4.27 10*6/MM3 (ref 4.14–5.8)
SINUS: 3.2 CM
SODIUM SERPL-SCNC: 139 MMOL/L (ref 136–145)
STJ: 3 CM
WBC NRBC COR # BLD AUTO: 8.11 10*3/MM3 (ref 3.4–10.8)

## 2024-08-29 PROCEDURE — 82948 REAGENT STRIP/BLOOD GLUCOSE: CPT

## 2024-08-29 PROCEDURE — 97530 THERAPEUTIC ACTIVITIES: CPT

## 2024-08-29 PROCEDURE — 94761 N-INVAS EAR/PLS OXIMETRY MLT: CPT

## 2024-08-29 PROCEDURE — 25010000002 FUROSEMIDE PER 20 MG: Performed by: INTERNAL MEDICINE

## 2024-08-29 PROCEDURE — 25510000001 PERFLUTREN 6.52 MG/ML SUSPENSION 2 ML VIAL: Performed by: INTERNAL MEDICINE

## 2024-08-29 PROCEDURE — 93306 TTE W/DOPPLER COMPLETE: CPT

## 2024-08-29 PROCEDURE — 25010000002 PIPERACILLIN SOD-TAZOBACTAM PER 1 G: Performed by: INTERNAL MEDICINE

## 2024-08-29 PROCEDURE — 85025 COMPLETE CBC W/AUTO DIFF WBC: CPT | Performed by: INTERNAL MEDICINE

## 2024-08-29 PROCEDURE — 94799 UNLISTED PULMONARY SVC/PX: CPT

## 2024-08-29 PROCEDURE — 84132 ASSAY OF SERUM POTASSIUM: CPT | Performed by: HOSPITALIST

## 2024-08-29 PROCEDURE — 97162 PT EVAL MOD COMPLEX 30 MIN: CPT

## 2024-08-29 PROCEDURE — 93356 MYOCRD STRAIN IMG SPCKL TRCK: CPT

## 2024-08-29 PROCEDURE — 93306 TTE W/DOPPLER COMPLETE: CPT | Performed by: INTERNAL MEDICINE

## 2024-08-29 PROCEDURE — 94664 DEMO&/EVAL PT USE INHALER: CPT

## 2024-08-29 PROCEDURE — 93356 MYOCRD STRAIN IMG SPCKL TRCK: CPT | Performed by: INTERNAL MEDICINE

## 2024-08-29 PROCEDURE — 80048 BASIC METABOLIC PNL TOTAL CA: CPT | Performed by: INTERNAL MEDICINE

## 2024-08-29 RX ORDER — POTASSIUM CHLORIDE 1.5 G/1.58G
40 POWDER, FOR SOLUTION ORAL EVERY 4 HOURS
Status: COMPLETED | OUTPATIENT
Start: 2024-08-29 | End: 2024-08-29

## 2024-08-29 RX ORDER — POTASSIUM CHLORIDE 750 MG/1
40 TABLET, FILM COATED, EXTENDED RELEASE ORAL EVERY 4 HOURS
Status: DISCONTINUED | OUTPATIENT
Start: 2024-08-29 | End: 2024-08-29

## 2024-08-29 RX ADMIN — PIPERACILLIN AND TAZOBACTAM 3.38 G: 3; .375 INJECTION, POWDER, FOR SOLUTION INTRAVENOUS at 16:57

## 2024-08-29 RX ADMIN — POTASSIUM CHLORIDE 40 MEQ: 1.5 FOR SOLUTION ORAL at 15:28

## 2024-08-29 RX ADMIN — FAMOTIDINE 10 MG: 10 INJECTION INTRAVENOUS at 09:41

## 2024-08-29 RX ADMIN — Medication 10 ML: at 09:42

## 2024-08-29 RX ADMIN — GUAIFENESIN 600 MG: 600 TABLET, EXTENDED RELEASE ORAL at 09:41

## 2024-08-29 RX ADMIN — CARVEDILOL 6.25 MG: 6.25 TABLET, FILM COATED ORAL at 16:56

## 2024-08-29 RX ADMIN — LISINOPRIL 20 MG: 20 TABLET ORAL at 09:41

## 2024-08-29 RX ADMIN — CARVEDILOL 6.25 MG: 6.25 TABLET, FILM COATED ORAL at 09:41

## 2024-08-29 RX ADMIN — PIPERACILLIN AND TAZOBACTAM 3.38 G: 3; .375 INJECTION, POWDER, FOR SOLUTION INTRAVENOUS at 09:41

## 2024-08-29 RX ADMIN — CLOPIDOGREL BISULFATE 75 MG: 75 TABLET, FILM COATED ORAL at 09:41

## 2024-08-29 RX ADMIN — SENNOSIDES AND DOCUSATE SODIUM 2 TABLET: 50; 8.6 TABLET ORAL at 20:16

## 2024-08-29 RX ADMIN — SENNOSIDES AND DOCUSATE SODIUM 2 TABLET: 50; 8.6 TABLET ORAL at 09:41

## 2024-08-29 RX ADMIN — ASPIRIN 81 MG: 81 TABLET, COATED ORAL at 09:41

## 2024-08-29 RX ADMIN — ATORVASTATIN CALCIUM 80 MG: 80 TABLET, FILM COATED ORAL at 20:15

## 2024-08-29 RX ADMIN — PIPERACILLIN AND TAZOBACTAM 3.38 G: 3; .375 INJECTION, POWDER, FOR SOLUTION INTRAVENOUS at 01:42

## 2024-08-29 RX ADMIN — GUAIFENESIN 600 MG: 600 TABLET, EXTENDED RELEASE ORAL at 20:15

## 2024-08-29 RX ADMIN — FUROSEMIDE 40 MG: 10 INJECTION, SOLUTION INTRAMUSCULAR; INTRAVENOUS at 09:42

## 2024-08-29 RX ADMIN — IPRATROPIUM BROMIDE AND ALBUTEROL SULFATE 3 ML: .5; 3 SOLUTION RESPIRATORY (INHALATION) at 19:15

## 2024-08-29 RX ADMIN — POTASSIUM CHLORIDE 40 MEQ: 1.5 FOR SOLUTION ORAL at 10:29

## 2024-08-29 RX ADMIN — Medication 1000 UNITS: at 09:41

## 2024-08-29 RX ADMIN — Medication 10 ML: at 20:16

## 2024-08-29 RX ADMIN — PERFLUTREN 2 ML: 6.52 INJECTION, SUSPENSION INTRAVENOUS at 09:03

## 2024-08-29 RX ADMIN — IPRATROPIUM BROMIDE AND ALBUTEROL SULFATE 3 ML: .5; 3 SOLUTION RESPIRATORY (INHALATION) at 06:41

## 2024-08-29 RX ADMIN — Medication 5 MG: at 23:38

## 2024-08-29 RX ADMIN — IPRATROPIUM BROMIDE AND ALBUTEROL SULFATE 3 ML: .5; 3 SOLUTION RESPIRATORY (INHALATION) at 23:29

## 2024-08-29 RX ADMIN — FUROSEMIDE 40 MG: 10 INJECTION, SOLUTION INTRAMUSCULAR; INTRAVENOUS at 23:48

## 2024-08-29 NOTE — THERAPY EVALUATION
Patient Name: Tigre Amaral  : 1950    MRN: [id number]                              Today's Date: 2024       Admit Date: 2024    Visit Dx:     ICD-10-CM ICD-9-CM   1. Acute exacerbation of chronic obstructive pulmonary disease (COPD)  J44.1 491.21   2. Acute congestive heart failure, unspecified heart failure type  I50.9 428.0   3. Acute on chronic renal insufficiency  N28.9 593.9    N18.9 585.9   4. Acute metabolic encephalopathy  G93.41 348.31     Patient Active Problem List   Diagnosis    Cerebrovascular accident (CVA) due to embolism of right middle cerebral artery    Aspiration pneumonia    Acute on chronic combined systolic (congestive) and diastolic (congestive) heart failure    Essential hypertension    Stage 3a chronic kidney disease    Hyperlipidemia    Carotid artery stenosis, symptomatic, right    Tobacco abuse    Basal cell carcinoma (BCC) of skin of nose    Carotid stenosis, asymptomatic, left    Bilateral carotid artery stenosis    Pacemaker    Aspiration pneumonia    Dysphagia    Confusion    Dementia    History of CVA (cerebrovascular accident)    Left-sided weakness    Tobacco abuse    Metabolic encephalopathy     Past Medical History:   Diagnosis Date    Hyperlipidemia     Hypertension      Past Surgical History:   Procedure Laterality Date    PACEMAKER IMPLANTATION Left       General Information       Row Name 24 1411          Physical Therapy Time and Intention    Document Type evaluation  -     Mode of Treatment individual therapy;physical therapy  -       Row Name 24 1411          General Information    Prior Level of Function independent:;gait;transfer;bed mobility  Patient reports he has a walker at home but does not use normally.  -     Existing Precautions/Restrictions fall  -     Barriers to Rehab medically complex  -       Row Name 24 1411          Living Environment    People in Home alone  Family checks in often throughout the day.  -        Row Name 08/29/24 1411          Cognition    Orientation Status (Cognition) oriented x 3  -CH       Row Name 08/29/24 1411          Safety Issues, Functional Mobility    Impairments Affecting Function (Mobility) balance;strength;endurance/activity tolerance  -               User Key  (r) = Recorded By, (t) = Taken By, (c) = Cosigned By      Initials Name Provider Type     Lashonda Chapin, PT Physical Therapist                   Mobility       Row Name 08/29/24 1412          Bed Mobility    Bed Mobility supine-sit;sit-supine  -     Supine-Sit [place] (Bed Mobility) not tested  Sitting in chair  -     Sit-Supine [place] (Bed Mobility) not tested  Sitting in bathroom with aide  -       Row Name 08/29/24 1412          Sit-Stand Transfer    Sit-Stand [place] (Transfers) verbal cues;nonverbal cues (demo/gesture);contact guard  -       Row Name 08/29/24 1412          Gait/Stairs (Locomotion)    [place] Level (Gait) verbal cues;nonverbal cues (demo/gesture);contact guard  -     Assistive Device (Gait) --  Patient pushed IV pole  -     Distance in Feet (Gait) 20  -     Deviations/Abnormal Patterns (Gait) falguni decreased;festinating/shuffling;gait speed decreased;stride length decreased  -     Bilateral Gait Deviations forward flexed posture  -     Comment, (Gait/Stairs) Gait distance limited by bathroom needs, aide present to wash patient up after BM  -CH               User Key  (r) = Recorded By, (t) = Taken By, (c) = Cosigned By      Initials Name Provider Type     Lashonda Chapin, PT Physical Therapist                   Obj/Interventions       Row Name 08/29/24 1414          Range of Motion Comprehensive    General Range of Motion no range of motion deficits identified  -       Row Name 08/29/24 1414          Strength Comprehensive (MMT)    Comment, General Manual Muscle Testing (MMT) Assessment Generalized weakness noted with functional mobility and gait,  bilateral lower extremities grossly 3+/5  -       Row Name 08/29/24 1414          Balance    Balance Assessment standing static balance;standing dynamic balance  -     Static Standing Balance verbal cues;non-verbal cues (demo/gesture);contact guard  -     Dynamic Standing Balance verbal cues;non-verbal cues (demo/gesture);contact guard  -               User Key  (r) = Recorded By, (t) = Taken By, (c) = Cosigned By      Initials Name Provider Type     Lashonda Chapin, PT Physical Therapist                   Goals/Plan       Row Name 08/29/24 1417          Bed Mobility Goal 1 (PT)    Activity/Assistive Device (Bed Mobility Goal 1, PT) bed mobility activities, all  -CH     [place] Level/Cues Needed (Bed Mobility Goal 1, PT) supervision required  -CH     Time Frame (Bed Mobility Goal 1, PT) 1 week  -       Row Name 08/29/24 1417          Transfer Goal 1 (PT)    Activity/Assistive Device (Transfer Goal 1, PT) transfers, all  -CH     [place] Level/Cues Needed (Transfer Goal 1, PT) supervision required  -CH     Time Frame (Transfer Goal 1, PT) 1 week  -[facility] Name 08/29/24 1417          Gait Training Goal 1 (PT)    Activity/Assistive Device (Gait Training Goal 1, PT) gait (walking locomotion)  -     [place] Level (Gait Training Goal 1, PT) supervision required  -CH     Distance (Gait Training Goal 1, PT) 150  -CH     Time Frame (Gait Training Goal 1, PT) 1 week  -       Row Name 08/29/24 1417          Therapy Assessment/Plan (PT)    Planned Therapy Interventions (PT) balance training;bed mobility training;gait training;home exercise program;patient/family education;strengthening;transfer training  -               User Key  (r) = Recorded By, (t) = Taken By, (c) = Cosigned By      Initials Name Provider Type     Lashonda Chapin, PT Physical Therapist                   Clinical Impression       Row Name 08/29/24 1415          Pain    Pretreatment Pain Rating 0/10 - no pain  -      Posttreatment Pain Rating 0/10 - no pain  -     Pre/Posttreatment Pain Comment Patient reported no pain during PT session  -       Row Name 08/29/24 1415          Plan of Care Review    Plan of Care Reviewed With patient  -     Outcome Evaluation Patient is a 74-year-old male who presented to the hospital with chest pain, shortness of air, and increased confusion.  Patient found to have acute on chronic CHF.  Patient presents to PT with impaired functional mobility and gait secondary to generalized weakness and decreased activity tolerance.  Patient may benefit from skilled PT to address strength, mobility, and gait.  -       Row Name 08/29/24 1415          Therapy Assessment/Plan (PT)    Patient/Family Therapy Goals Statement (PT) To return to prior level of function  -     Rehab Potential (PT) good, to achieve stated therapy goals  -     Criteria for Skilled Interventions Met (PT) skilled treatment is necessary  -     Therapy Frequency (PT) 5 times/wk  -       Row Name 08/29/24 1415          Positioning and Restraints    Pre-Treatment Position sitting in chair/recliner  -     Post Treatment Position bathroom  -     Bathroom sitting;with nsg  Aide present cleaning patient up  -               User Key  (r) = Recorded By, (t) = Taken By, (c) = Cosigned By      Initials Name Provider Type     Lashonda Chapin, PT Physical Therapist                   Outcome Measures       Row Name 08/29/24 1418 08/29/24 0730       How much help from another person do you currently need...    Turning from your back to your side while in flat bed without using bedrails? 3  -CH 3  -JS    Moving from lying on back to sitting on the side of a flat bed without bedrails? 3  -CH 3  -JS    Moving to and from a bed to a chair (including a wheelchair)? 3  -CH 3  -JS    Standing up from a chair using your arms (e.g., wheelchair, bedside chair)? 3  -CH 3  -JS    Climbing 3-5 steps with a railing? 2  - 3  -JS    To  walk in hospital room? 3  - 3  -JS    AM-PAC 6 Clicks Score (PT) 17  - 18  -JS    Highest Level of Mobility Goal 5 --> Static standing  - 6 --> Walk 10 steps or more  -      Row Name 08/29/24 1418          Functional Assessment    Outcome Measure Options AM-PAC 6 Clicks Basic Mobility (PT)  -               User Key  (r) = Recorded By, (t) = Taken By, (c) = Cosigned By      Initials Name Provider Type     Lashonda Chapin, PT Physical Therapist    Bianka Price, RN Registered Nurse                                 Physical Therapy Education       Title: PT OT SLP Therapies (In Progress)       Topic: Physical Therapy (In Progress)       Point: Mobility training (Done)       Learning Progress Summary             Patient Acceptance, E,TB,D, VU,NR by  at 8/29/2024 1418                         Point: Home exercise program (Not Started)       Learner Progress:  Not documented in this visit.              Point: Body mechanics (Done)       Learning Progress Summary             Patient Acceptance, E,TB,D, VU,NR by  at 8/29/2024 1418                         Point: Precautions (Done)       Learning Progress Summary             Patient Acceptance, E,TB,D, VU,NR by  at 8/29/2024 1418                                         User Key       Initials Effective Dates Name Provider Type [facility] 06/16/21 -  Lashonda Chapin, PT Physical Therapist PT                  PT Recommendation and Plan  Planned Therapy Interventions (PT): balance training, bed mobility training, gait training, home exercise program, patient/family education, strengthening, transfer training  Plan of Care Reviewed With: patient  Outcome Evaluation: Patient is a 74-year-old male who presented to the hospital with chest pain, shortness of air, and increased confusion.  Patient found to have acute on chronic CHF.  Patient presents to PT with impaired functional mobility and gait secondary to generalized weakness and decreased  activity tolerance.  Patient may benefit from skilled PT to address strength, mobility, and gait.     Time Calculation:         PT Charges       Row Name 08/29/24 1418             Time Calculation    Start Time 1312  -      Stop Time 1328  -      Time Calculation (min) 16 min  -CH      PT Received On 08/29/24  -      PT - Next Appointment 08/30/24  -      PT Goal Re-Cert Due Date 09/05/24  -         Time Calculation- PT    Total Timed Code Minutes- PT 10 minute(s)  -CH         Timed Charges    23158 - PT Therapeutic Activity Minutes 10  -CH         Total Minutes    Timed Charges Total Minutes 10  -CH       Total Minutes 10  -CH                User Key  (r) = Recorded By, (t) = Taken By, (c) = Cosigned By      Initials Name Provider Type     Lashonda Chapin, PT Physical Therapist                  Therapy Charges for Today       Code Description Service Date Service Provider Modifiers Qty    [id number]  PT THERAPEUTIC ACT EA 15 MIN 8/29/2024 Lashonda Chapin, PT GP 1    [id number]  PT EVAL MOD COMPLEXITY 2 8/29/2024 Lashonda Chapin, PT GP 1            PT G-Codes  Outcome Measure Options: AM-PAC 6 Clicks Basic Mobility (PT)  AM-PAC 6 Clicks Score (PT): 17  PT Discharge Summary  Anticipated Discharge Disposition (PT): home with 24/7 care, home with home health    Lashonda Chapin, PT  8/29/2024

## 2024-08-29 NOTE — PLAN OF CARE
Goal Outcome Evaluation:      Patient denies pain nor discomfort, vitals signs stable, paced on the monitor, bed alarm intact, educate to call for assist, call light intact cont time.

## 2024-08-29 NOTE — ACP (ADVANCE CARE PLANNING)
Met with the patient and the daughter at the bedside.  We discussed his chronic and acute, mental illness.  He smoked for years and has no plans to quit smoking.  He has underlying mild dementia as well as a previous traumatic brain injury.  He has some mild oropharyngeal dysphagia and is noted to have aspiration pneumonia on this admission.  We noted his acute or chronic issues we discussed his overall morbidity mortality as well as his overall prognosis.  He is relatively functional and lives close to family.  They on [place] acres and at 3 houses on the property.  He lives in one of the homes.  His daughter is highly invested involved in his care.  We discussed their overall plans for the future.  We talked about his dementia and his probable ongoing clinical decline over time.  We discussed the natural course of dementia and his other issues.  We discussed cardiopulmonary resuscitation and the lack of benefit to his overall quality of life with these interventions.  The patient is adamant that when it is time he is ready to go.  He would not want tube feedings he would not want dialysis and would not want CPR.  We move forward with a DNR order here in the hospital.  We also discussed plans for home his ultimate plan will be to stay at home as long as possible.  His daughter prefers this but if he comes to the point where he requires full and total care she would not be able to provide this at home.  Electronically signed by Abhinav Whitlock MD, 08/29/24, 5:03 PM EDT.

## 2024-08-29 NOTE — CASE MANAGEMENT/SOCIAL WORK
Continued Stay Note  [facility]     Patient Name: Tigre Amaral  MRN: [id number]  Today's Date: 8/29/2024    Admit Date: 8/27/2024    Plan: Home w/ assist of family; [facility]  accepted;  Needs to work with therapy.   Discharge Plan       Row Name 08/29/24 0928       Plan    Plan Home w/ assist of family; [facility] accepted;  Needs to work with therapy.    Plan Comments [facility]  accepted.  Pt will d/c home with various family members assisting (as they usually do).  Daughter/Rola will transport him home at d/c.  CCP following..........Reshma VICKERS/JOANNA                   Discharge Codes    No documentation.                 Expected Discharge Date and Time       Expected Discharge Date Expected Discharge Time    Aug 31, 2024               Reshma Hale RN

## 2024-08-29 NOTE — PLAN OF CARE
Goal Outcome Evaluation:  Plan of Care Reviewed With: patient           Outcome Evaluation: Patient is a 74-year-old male who presented to the hospital with chest pain, shortness of air, and increased confusion.  Patient found to have acute on chronic CHF.  Patient presents to PT with impaired functional mobility and gait secondary to generalized weakness and decreased activity tolerance.  Patient may benefit from skilled PT to address strength, mobility, and gait.      Anticipated Discharge Disposition (PT): home with 24/7 care, home with home health

## 2024-08-29 NOTE — PROGRESS NOTES
Tigre KALIA Munir   74 y.o.  male    LOS: 2 days   Patient Care Team:  Tara Encarnacion MD as PCP - General (Geriatric Medicine)      Subjective   Breathing better now  Interval History:     Patient Complaints:     Review of Systems:       Medication Review:   Current Facility-Administered Medications:     acetaminophen (TYLENOL) tablet 650 mg, 650 mg, Oral, Q4H PRN **OR** acetaminophen (TYLENOL) 160 MG/5ML oral solution 650 mg, 650 mg, Oral, Q4H PRN **OR** acetaminophen (TYLENOL) suppository 650 mg, 650 mg, Rectal, Q4H PRN, Laura Nelson, SUNG    aluminum-magnesium hydroxide-simethicone (MAALOX MAX) 400-400-40 MG/5ML suspension 15 mL, 15 mL, Oral, Q6H PRN, Laura Nelson APRN    aspirin EC tablet 81 mg, 81 mg, Oral, Daily, Cheo Chase MD, 81 mg at 08/29/24 0941    atorvastatin (LIPITOR) tablet 80 mg, 80 mg, Oral, Nightly, Cheo Chase MD, 80 mg at 08/28/24 2051    sennosides-docusate (PERICOLACE) 8.6-50 MG per tablet 2 tablet, 2 tablet, Oral, BID PRN **AND** polyethylene glycol (MIRALAX) packet 17 g, 17 g, Oral, Daily PRN **AND** bisacodyl (DULCOLAX) EC tablet 5 mg, 5 mg, Oral, Daily PRN **AND** bisacodyl (DULCOLAX) suppository 10 mg, 10 mg, Rectal, Daily PRN, Laura Nelson, APRN    Calcium Replacement - Follow Nurse / BPA Driven Protocol, , Does not apply, PRN, Cheo Chase MD    carvedilol (COREG) tablet 6.25 mg, 6.25 mg, Oral, BID With Meals, Cheo Chase MD, 6.25 mg at 08/29/24 0941    cholecalciferol (VITAMIN D3) tablet 1,000 Units, 1,000 Units, Oral, Daily, Cheo Chase MD, 1,000 Units at 08/29/24 0941    clopidogrel (PLAVIX) tablet 75 mg, 75 mg, Oral, Daily, Cheo Chase MD, 75 mg at 08/29/24 0941    famotidine (PEPCID) injection 10 mg, 10 mg, Intravenous, Q12H, Cheo Chase MD, 10 mg at 08/29/24 0941    furosemide (LASIX) injection 40 mg, 40 mg, Intravenous, Q12H, Choe Chase MD, 40 mg at 08/29/24 0942    guaiFENesin (MUCINEX) 12 hr tablet  600 mg, 600 mg, Oral, Q12H, Cheo Chase MD, 600 mg at 08/29/24 0941    ipratropium-albuterol (DUO-NEB) nebulizer solution 3 mL, 3 mL, Nebulization, Q6H - RT, Cheo Chase MD, 3 mL at 08/29/24 0641    lisinopril (PRINIVIL,ZESTRIL) tablet 20 mg, 20 mg, Oral, Daily, Cheo Chase MD, 20 mg at 08/29/24 0941    Magnesium Cardiology Dose Replacement - Follow Nurse / BPA Driven Protocol, , Does not apply, PRN, Cheo Chase MD    melatonin tablet 5 mg, 5 mg, Oral, Nightly PRN, Laura Nelson APRN    nitroglycerin (NITROSTAT) SL tablet 0.4 mg, 0.4 mg, Sublingual, Q5 Min PRN, Laura Nelson APRN    ondansetron ODT (ZOFRAN-ODT) disintegrating tablet 4 mg, 4 mg, Oral, Q6H PRN **OR** ondansetron (ZOFRAN) injection 4 mg, 4 mg, Intravenous, Q6H PRN, Laura Nelson APRN    Phosphorus Replacement - Follow Nurse / BPA Driven Protocol, , Does not apply, PRN, Cheo Chase MD    piperacillin-tazobactam (ZOSYN) 3.375 g IVPB in 100 mL NS MBP (CD), 3.375 g, Intravenous, Q8H, Cheo Chase MD, 3.375 g at 08/29/24 0941    potassium chloride (KLOR-CON) packet 40 mEq, 40 mEq, Oral, Q4H, Abhinav Whitlock MD, 40 mEq at 08/29/24 1029    Potassium Replacement - Follow Nurse / BPA Driven Protocol, , Does not apply, PRN, Cheo Chase MD    sennosides-docusate (PERICOLACE) 8.6-50 MG per tablet 2 tablet, 2 tablet, Oral, BID, Cheo Chase MD, 2 tablet at 08/29/24 0941    sodium chloride 0.9 % flush 10 mL, 10 mL, Intravenous, PRN, Cheo Chase MD    sodium chloride 0.9 % flush 10 mL, 10 mL, Intravenous, Q12H, Laura Nelson APRN, 10 mL at 08/29/24 0942      Objective   Vital Sign Min/Max for last 24 hours  Temp  Min: 97.6 °F (36.4 °C)  Max: 98.2 °F (36.8 °C)   BP  Min: 120/84  Max: 129/78    Pulse  Min: 64  Max: 82     Wt Readings from Last 3 Encounters:   08/29/24 59.4 kg (131 lb)   04/11/24 67.2 kg (148 lb 3.2 oz)   02/26/24 68.5 kg (151 lb 0.2 oz)        Intake/Output Summary  "(Last 24 hours) at 8/29/2024 1043  Last data filed at 8/28/2024 1758  Gross per 24 hour   Intake 120 ml   Output --   Net 120 ml     Physical Exam:      General Appearance:    Well developed and well nourished in no acute distress   Head:    Normocephalic, atraumatic   Eyes:            Conjunctivae normal, anicteric, no xanthelasma   Neck:   supple, trachea midline, no thyromegaly, no carotid bruit, no JVD, no elevated CVP   Lungs:     Clear to auscultation,respirations regular, even and                unlabored    Heart:    Regular rhythm and normal rate, normal S1 and S2,            No murmur, no gallop, no rub, no click   Chest Wall:    No abnormalities observed   Abdomen:     Normal bowel sounds, soft, nondistended           Rectal:     Deferred   Extremities:   No edema. Moves all extremities well, no cyanosis, no erythema   Pulses:   Pulses palpable and equal bilaterally   Skin:   No bleeding, bruising or rash   Neurologic:   awake alert and oriented x3, speech clear and approp, no facial drooping     :    Monitor:      Results Review:         Sodium Sodium   Date Value Ref Range Status   08/29/2024 139 136 - 145 mmol/L Final   08/27/2024 138 136 - 145 mmol/L Final      Potassium Potassium   Date Value Ref Range Status   08/29/2024 3.6 3.5 - 5.2 mmol/L Final   08/27/2024 4.4 3.5 - 5.2 mmol/L Final      Chloride Chloride   Date Value Ref Range Status   08/29/2024 101 98 - 107 mmol/L Final   08/27/2024 101 98 - 107 mmol/L Final      Bicarbonate No results found for: \"PLASMABICARB\"   BUN BUN   Date Value Ref Range Status   08/29/2024 25 (H) 8 - 23 mg/dL Final   08/27/2024 25 (H) 8 - 23 mg/dL Final      Creatinine Creatinine   Date Value Ref Range Status   08/29/2024 1.51 (H) 0.76 - 1.27 mg/dL Final   08/27/2024 1.56 (H) 0.76 - 1.27 mg/dL Final      Calcium Calcium   Date Value Ref Range Status   08/29/2024 8.8 8.6 - 10.5 mg/dL Final   08/27/2024 9.2 8.6 - 10.5 mg/dL Final      Magnesium No results found for: " "\"MG\"     Results from last 7 days   Lab Units 08/29/24  0428   WBC 10*3/mm3 8.11   HEMOGLOBIN g/dL 13.2   HEMATOCRIT % 40.4   PLATELETS 10*3/mm3 200     Lab Results   Lab Value Date/Time    TROPONINT 77 (C) 08/27/2024 2252    TROPONINT 68 (C) 08/27/2024 2018    TROPONINT 425 (C) 11/02/2023 0735    TROPONINT 451 (C) 11/02/2023 0612    TROPONINT 354 (C) 11/01/2023 2031    TROPONINT 339 (C) 11/01/2023 1846            Echo EF Estimated  No results found for: \"ECHOEFEST\"      Assessment/ Plan  Assessment & Plan   Active Hospital Problems    Diagnosis  POA    **Acute on chronic combined systolic (congestive) and diastolic (congestive) heart failure [I50.43]  Yes    Pacemaker [Z95.0]  Yes    Aspiration pneumonia [J69.0]  Yes    Dysphagia [R13.10]  Yes    Confusion [R41.0]  Yes    Dementia [F03.90]  Yes    History of CVA (cerebrovascular accident) [Z86.73]  Not Applicable    Left-sided weakness [R53.1]  Yes    Tobacco abuse [Z72.0]  Yes    Metabolic encephalopathy [G93.41]  Yes    Bilateral carotid artery stenosis [I65.23]  Yes    Essential hypertension [I10]  Yes       Assessment/ Plan            Active Hospital Problems     Diagnosis   POA    **Acute CHF [I50.9]   Yes       Priority: Low      Chest pain  Hs trop 77<- 68  Probnp 30,594  Cxr There is cardiomegaly. Atherosclerotic calcification of the aorta. Patient is rotated towards the left. There is a patchy area of infiltrate demonstrated within the medial right lower lobe worrisome for pneumonia. The left lung is clear. 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

## 2024-08-29 NOTE — PROGRESS NOTES
[facility]    [phone]   LOS: 2 days     Name: Tigre Amaral  Age/Sex: 74 y.o. male  :  1950        PCP: Tara Encarnacion MD  Chief Complaint   Patient presents with    Chest Pain    Altered Mental Status    Shortness of Breath      Subjective   He is doing okay today sitting up in the chair denies chest pain or palpitations.  Rested decently overnight.  His daughter is present at the bedside.  General: No Fever or Chills, Cardiac: No Chest Pain or Palpitations, Resp: No Cough or SOA, GI: No Nausea, Vomiting, or Diarrhea, and Other: No bleeding    aspirin, 81 mg, Oral, Daily  atorvastatin, 80 mg, Oral, Nightly  carvedilol, 6.25 mg, Oral, BID With Meals  cholecalciferol, 1,000 Units, Oral, Daily  clopidogrel, 75 mg, Oral, Daily  famotidine, 10 mg, Intravenous, Q12H  furosemide, 40 mg, Intravenous, Q12H  guaiFENesin, 600 mg, Oral, Q12H  ipratropium-albuterol, 3 mL, Nebulization, Q6H - RT  lisinopril, 20 mg, Oral, Daily  piperacillin-tazobactam, 3.375 g, Intravenous, Q8H  sennosides-docusate, 2 tablet, Oral, BID  sodium chloride, 10 mL, Intravenous, Q12H           Objective   Vital Signs  Temp:  [97.6 °F (36.4 °C)-98 °F (36.7 °C)] 98 °F (36.7 °C)  Heart Rate:  [64-79] 65  Resp:  [18] 18  BP: ()/(58-84) 97/58  Body mass index is 19.92 kg/m².    Intake/Output Summary (Last 24 hours) at 2024 1657  Last data filed at 2024 1758  Gross per 24 hour   Intake 120 ml   Output --   Net 120 ml       Physical Exam  Vitals and nursing note reviewed.   Constitutional:       General: He is not in acute distress.     Appearance: He is ill-appearing.   Cardiovascular:      Rate and Rhythm: Normal rate and regular rhythm.   Pulmonary:      Effort: Pulmonary effort is normal. No respiratory distress.      Breath sounds: Wheezing present.   Abdominal:      General: Bowel sounds are normal.      Palpations: Abdomen is soft.   Skin:     General: Skin is warm and dry.   Neurological:       Mental Status: He is alert. Mental status is at baseline.           Results Review:       I reviewed the patient's new clinical results.  Results from last 7 days   Lab Units 08/29/24 0428 08/27/24 2018   WBC 10*3/mm3 8.11 8.53   HEMOGLOBIN g/dL 13.2 13.3   PLATELETS 10*3/mm3 200 242     Results from last 7 days   Lab Units 08/29/24 0428 08/27/24 2018   SODIUM mmol/L 139 138   POTASSIUM mmol/L 3.6 4.4   CHLORIDE mmol/L 101 101   CO2 mmol/L 23.0 25.0   BUN mg/dL 25* 25*   CREATININE mg/dL 1.51* 1.56*   CALCIUM mg/dL 8.8 9.2   Estimated Creatinine Clearance: 35 mL/min (A) (by C-G formula based on SCr of 1.51 mg/dL (H)).      Assessment & Plan   Active Hospital Problems    Diagnosis  POA    **Acute on chronic combined systolic (congestive) and diastolic (congestive) heart failure [I50.43]  Yes    Pacemaker [Z95.0]  Yes    Aspiration pneumonia [J69.0]  Yes    Dysphagia [R13.10]  Yes    Confusion [R41.0]  Yes    Dementia [F03.90]  Yes    History of CVA (cerebrovascular accident) [Z86.73]  Not Applicable    Left-sided weakness [R53.1]  Yes    Tobacco abuse [Z72.0]  Yes    Metabolic encephalopathy [G93.41]  Yes    Bilateral carotid artery stenosis [I65.23]  Yes    Essential hypertension [I10]  Yes      Resolved Hospital Problems   No resolved problems to display.       PLAN  This is a 74-year-old gentleman with a history of combined systolic and diastolic heart failure, dementia, chronic tobacco use, carotid artery stenosis and hypertension who presents to the hospital with chest discomfort and shortness of breath and is found to have metabolic encephalopathy and decompensated heart failure  -At this point he had an echocardiogram done today and may need a stress test.  Appreciate cardiology's assistance and further workup and management.  -Remains on treatment for aspiration pneumonia.  Speech therapy's been consulted and they have evaluated the patient.  He remains on a mechanical soft and thin diet.  The plan for  video swallow study prior to discharge to further about this oropharyngeal dysphagia  -His renal function remained stable no other issues at this time electrolytes are within normal limits  -He has no plans to stop smoking.  -Heparin for VTE prophylaxis  -DNR, please see patient's separate advance care planning documentation for further details      Disposition  Expected Discharge Date: 8/31/2024; Expected Discharge Time:        Abhinav Whitlock MD  [facility]  08/29/24  16:57 EDT

## 2024-08-30 LAB
ANION GAP SERPL CALCULATED.3IONS-SCNC: 12 MMOL/L (ref 5–15)
BASOPHILS # BLD AUTO: 0.08 10*3/MM3 (ref 0–0.2)
BASOPHILS NFR BLD AUTO: 1 % (ref 0–1.5)
BUN SERPL-MCNC: 24 MG/DL (ref 8–23)
BUN/CREAT SERPL: 13.8 (ref 7–25)
CALCIUM SPEC-SCNC: 8.8 MG/DL (ref 8.6–10.5)
CHLORIDE SERPL-SCNC: 103 MMOL/L (ref 98–107)
CO2 SERPL-SCNC: 23 MMOL/L (ref 22–29)
CREAT SERPL-MCNC: 1.74 MG/DL (ref 0.76–1.27)
DEPRECATED RDW RBC AUTO: 47.9 FL (ref 37–54)
EGFRCR SERPLBLD CKD-EPI 2021: 40.1 ML/MIN/1.73
EOSINOPHIL # BLD AUTO: 0.19 10*3/MM3 (ref 0–0.4)
EOSINOPHIL NFR BLD AUTO: 2.5 % (ref 0.3–6.2)
ERYTHROCYTE [DISTWIDTH] IN BLOOD BY AUTOMATED COUNT: 13.7 % (ref 12.3–15.4)
GLUCOSE BLDC GLUCOMTR-MCNC: 101 MG/DL (ref 70–130)
GLUCOSE BLDC GLUCOMTR-MCNC: 104 MG/DL (ref 70–130)
GLUCOSE BLDC GLUCOMTR-MCNC: 106 MG/DL (ref 70–130)
GLUCOSE BLDC GLUCOMTR-MCNC: 109 MG/DL (ref 70–130)
GLUCOSE SERPL-MCNC: 94 MG/DL (ref 65–99)
HCT VFR BLD AUTO: 41.1 % (ref 37.5–51)
HGB BLD-MCNC: 13.1 G/DL (ref 13–17.7)
IMM GRANULOCYTES # BLD AUTO: 0.03 10*3/MM3 (ref 0–0.05)
IMM GRANULOCYTES NFR BLD AUTO: 0.4 % (ref 0–0.5)
LYMPHOCYTES # BLD AUTO: 2.6 10*3/MM3 (ref 0.7–3.1)
LYMPHOCYTES NFR BLD AUTO: 33.7 % (ref 19.6–45.3)
MAGNESIUM SERPL-MCNC: 2.2 MG/DL (ref 1.6–2.4)
MAGNESIUM SERPL-MCNC: 2.5 MG/DL (ref 1.6–2.4)
MCH RBC QN AUTO: 30.3 PG (ref 26.6–33)
MCHC RBC AUTO-ENTMCNC: 31.9 G/DL (ref 31.5–35.7)
MCV RBC AUTO: 95.1 FL (ref 79–97)
MONOCYTES # BLD AUTO: 0.65 10*3/MM3 (ref 0.1–0.9)
MONOCYTES NFR BLD AUTO: 8.4 % (ref 5–12)
NEUTROPHILS NFR BLD AUTO: 4.16 10*3/MM3 (ref 1.7–7)
NEUTROPHILS NFR BLD AUTO: 54 % (ref 42.7–76)
NRBC BLD AUTO-RTO: 0 /100 WBC (ref 0–0.2)
PLATELET # BLD AUTO: 206 10*3/MM3 (ref 140–450)
PMV BLD AUTO: 10.4 FL (ref 6–12)
POTASSIUM SERPL-SCNC: 4 MMOL/L (ref 3.5–5.2)
RBC # BLD AUTO: 4.32 10*6/MM3 (ref 4.14–5.8)
SODIUM SERPL-SCNC: 138 MMOL/L (ref 136–145)
WBC NRBC COR # BLD AUTO: 7.71 10*3/MM3 (ref 3.4–10.8)

## 2024-08-30 PROCEDURE — 83735 ASSAY OF MAGNESIUM: CPT | Performed by: HOSPITALIST

## 2024-08-30 PROCEDURE — 82948 REAGENT STRIP/BLOOD GLUCOSE: CPT

## 2024-08-30 PROCEDURE — 94799 UNLISTED PULMONARY SVC/PX: CPT

## 2024-08-30 PROCEDURE — 80048 BASIC METABOLIC PNL TOTAL CA: CPT | Performed by: INTERNAL MEDICINE

## 2024-08-30 PROCEDURE — 25010000002 FUROSEMIDE PER 20 MG: Performed by: INTERNAL MEDICINE

## 2024-08-30 PROCEDURE — 94664 DEMO&/EVAL PT USE INHALER: CPT

## 2024-08-30 PROCEDURE — 97116 GAIT TRAINING THERAPY: CPT

## 2024-08-30 PROCEDURE — 25010000002 PIPERACILLIN SOD-TAZOBACTAM PER 1 G: Performed by: INTERNAL MEDICINE

## 2024-08-30 PROCEDURE — 85025 COMPLETE CBC W/AUTO DIFF WBC: CPT | Performed by: INTERNAL MEDICINE

## 2024-08-30 PROCEDURE — 94761 N-INVAS EAR/PLS OXIMETRY MLT: CPT

## 2024-08-30 RX ADMIN — Medication 10 ML: at 21:16

## 2024-08-30 RX ADMIN — IPRATROPIUM BROMIDE AND ALBUTEROL SULFATE 3 ML: .5; 3 SOLUTION RESPIRATORY (INHALATION) at 07:50

## 2024-08-30 RX ADMIN — CARVEDILOL 6.25 MG: 6.25 TABLET, FILM COATED ORAL at 17:31

## 2024-08-30 RX ADMIN — FUROSEMIDE 40 MG: 10 INJECTION, SOLUTION INTRAMUSCULAR; INTRAVENOUS at 11:53

## 2024-08-30 RX ADMIN — PIPERACILLIN AND TAZOBACTAM 3.38 G: 3; .375 INJECTION, POWDER, FOR SOLUTION INTRAVENOUS at 01:15

## 2024-08-30 RX ADMIN — Medication 1000 UNITS: at 08:09

## 2024-08-30 RX ADMIN — ATORVASTATIN CALCIUM 80 MG: 80 TABLET, FILM COATED ORAL at 21:16

## 2024-08-30 RX ADMIN — FAMOTIDINE 10 MG: 10 INJECTION INTRAVENOUS at 21:16

## 2024-08-30 RX ADMIN — GUAIFENESIN 600 MG: 600 TABLET, EXTENDED RELEASE ORAL at 08:09

## 2024-08-30 RX ADMIN — Medication 10 ML: at 08:09

## 2024-08-30 RX ADMIN — FAMOTIDINE 10 MG: 10 INJECTION INTRAVENOUS at 09:27

## 2024-08-30 RX ADMIN — CARVEDILOL 6.25 MG: 6.25 TABLET, FILM COATED ORAL at 08:09

## 2024-08-30 RX ADMIN — PIPERACILLIN AND TAZOBACTAM 3.38 G: 3; .375 INJECTION, POWDER, FOR SOLUTION INTRAVENOUS at 16:51

## 2024-08-30 RX ADMIN — IPRATROPIUM BROMIDE AND ALBUTEROL SULFATE 3 ML: .5; 3 SOLUTION RESPIRATORY (INHALATION) at 19:53

## 2024-08-30 RX ADMIN — FUROSEMIDE 40 MG: 10 INJECTION, SOLUTION INTRAMUSCULAR; INTRAVENOUS at 22:54

## 2024-08-30 RX ADMIN — PIPERACILLIN AND TAZOBACTAM 3.38 G: 3; .375 INJECTION, POWDER, FOR SOLUTION INTRAVENOUS at 08:25

## 2024-08-30 RX ADMIN — LISINOPRIL 20 MG: 20 TABLET ORAL at 08:09

## 2024-08-30 RX ADMIN — ASPIRIN 81 MG: 81 TABLET, COATED ORAL at 08:09

## 2024-08-30 RX ADMIN — CLOPIDOGREL BISULFATE 75 MG: 75 TABLET, FILM COATED ORAL at 08:09

## 2024-08-30 RX ADMIN — SENNOSIDES AND DOCUSATE SODIUM 2 TABLET: 50; 8.6 TABLET ORAL at 08:09

## 2024-08-30 RX ADMIN — SENNOSIDES AND DOCUSATE SODIUM 2 TABLET: 50; 8.6 TABLET ORAL at 21:16

## 2024-08-30 RX ADMIN — IPRATROPIUM BROMIDE AND ALBUTEROL SULFATE 3 ML: .5; 3 SOLUTION RESPIRATORY (INHALATION) at 11:41

## 2024-08-30 NOTE — PLAN OF CARE
Goal Outcome Evaluation:                      Patient to have swallow eval done.  Patient tolerated being up in chair today.

## 2024-08-30 NOTE — PROGRESS NOTES
[facility]    [phone]   LOS: 3 days     Name: Tigre Amaral  Age/Sex: 74 y.o. male  :  1950        PCP: Tara Encarnacion MD  Chief Complaint   Patient presents with    Chest Pain    Altered Mental Status    Shortness of Breath      Subjective   He is resting in bed this evening.  Denies new issues or complaints.  I did discuss with his daughter in the hallway earlier today.  General: No Fever or Chills, Cardiac: No Chest Pain or Palpitations, Resp: No Cough or SOA, GI: No Nausea, Vomiting, or Diarrhea, and Other: No bleeding    aspirin, 81 mg, Oral, Daily  atorvastatin, 80 mg, Oral, Nightly  carvedilol, 6.25 mg, Oral, BID With Meals  cholecalciferol, 1,000 Units, Oral, Daily  clopidogrel, 75 mg, Oral, Daily  famotidine, 10 mg, Intravenous, Q12H  furosemide, 40 mg, Intravenous, Q12H  guaiFENesin, 600 mg, Oral, Q12H  ipratropium-albuterol, 3 mL, Nebulization, Q6H - RT  lisinopril, 20 mg, Oral, Daily  piperacillin-tazobactam, 3.375 g, Intravenous, Q8H  sennosides-docusate, 2 tablet, Oral, BID  sodium chloride, 10 mL, Intravenous, Q12H           Objective   Vital Signs  Temp:  [97.3 °F (36.3 °C)-98.2 °F (36.8 °C)] 98 °F (36.7 °C)  Heart Rate:  [59-67] 63  Resp:  [16-18] 16  BP: ()/(61-80) 122/80  Body mass index is 21.89 kg/m².    Intake/Output Summary (Last 24 hours) at 2024 1610  Last data filed at 2024 0903  Gross per 24 hour   Intake 160 ml   Output 350 ml   Net -190 ml       Physical Exam  Vitals and nursing note reviewed.   Constitutional:       General: He is not in acute distress.     Appearance: He is ill-appearing.   Cardiovascular:      Rate and Rhythm: Normal rate and regular rhythm.   Pulmonary:      Effort: Pulmonary effort is normal. No respiratory distress.      Breath sounds: Wheezing present.   Abdominal:      General: Bowel sounds are normal.      Palpations: Abdomen is soft.   Skin:     General: Skin is warm and dry.   Neurological:      Mental  Status: He is alert. Mental status is at baseline.           Results Review:       I reviewed the patient's new clinical results.  Results from last 7 days   Lab Units 08/30/24  0349 08/29/24 0428 08/27/24 2018   WBC 10*3/mm3 7.71 8.11 8.53   HEMOGLOBIN g/dL 13.1 13.2 13.3   PLATELETS 10*3/mm3 206 200 242     Results from last 7 days   Lab Units 08/30/24  0602 08/30/24  0205 08/29/24  1734 08/29/24 0428 08/27/24 2018   SODIUM mmol/L  --  138  --  139 138   POTASSIUM mmol/L  --  4.0 4.1 3.6 4.4   CHLORIDE mmol/L  --  103  --  101 101   CO2 mmol/L  --  23.0  --  23.0 25.0   BUN mg/dL  --  24*  --  25* 25*   CREATININE mg/dL  --  1.74*  --  1.51* 1.56*   CALCIUM mg/dL  --  8.8  --  8.8 9.2   MAGNESIUM mg/dL 2.2 2.5*  --   --   --    Estimated Creatinine Clearance: 33.4 mL/min (A) (by C-G formula based on SCr of 1.74 mg/dL (H)).      Assessment & Plan   Active Hospital Problems    Diagnosis  POA    **Acute on chronic combined systolic (congestive) and diastolic (congestive) heart failure [I50.43]  Yes    Pacemaker [Z95.0]  Yes    Aspiration pneumonia [J69.0]  Yes    Dysphagia [R13.10]  Yes    Confusion [R41.0]  Yes    Dementia [F03.90]  Yes    History of CVA (cerebrovascular accident) [Z86.73]  Not Applicable    Left-sided weakness [R53.1]  Yes    Tobacco abuse [Z72.0]  Yes    Metabolic encephalopathy [G93.41]  Yes    Bilateral carotid artery stenosis [I65.23]  Yes    Essential hypertension [I10]  Yes      Resolved Hospital Problems   No resolved problems to display.       PLAN  This is a 74-year-old gentleman with a history of combined systolic and diastolic heart failure, dementia, chronic tobacco use, carotid artery stenosis and hypertension who presents to the hospital with chest discomfort and shortness of breath and is found to have metabolic encephalopathy and decompensated heart failure  -Echocardiogram reviewed remains on IV diuretics.  Will follow-up cardiology's recommendations for transition to oral  diuretics.  -Remains on treatment for aspiration pneumonia.  Speech therapy's been consulted and they have evaluated the patient.  He remains on a mechanical soft and thin diet.  The plan for video swallow study prior to discharge to further about this oropharyngeal dysphagia  -His renal function remained stable no other issues at this time electrolytes are within normal limits  -He has no plans to stop smoking.  -Heparin for VTE prophylaxis  -DNR, please see patient's separate advance care planning documentation for further details      Disposition  Expected Discharge Date: 9/3/2024; Expected Discharge Time:        Abhinav Whitlock MD  [facility]  08/30/24  16:10 EDT

## 2024-08-30 NOTE — PLAN OF CARE
Goal Outcome Evaluation:            Outcome Evaluation: pt has stable vital signs. room air. alert to self and paced rythm on the monitor. pt reoriented and educated on call light use.

## 2024-08-30 NOTE — THERAPY TREATMENT NOTE
Patient Name: Tigre Amaral  : 1950    MRN: [id number]                              Today's Date: 2024       Admit Date: 2024    Visit Dx:     ICD-10-CM ICD-9-CM   1. Acute exacerbation of chronic obstructive pulmonary disease (COPD)  J44.1 491.21   2. Acute congestive heart failure, unspecified heart failure type  I50.9 428.0   3. Acute on chronic renal insufficiency  N28.9 593.9    N18.9 585.9   4. Acute metabolic encephalopathy  G93.41 348.31     Patient Active Problem List   Diagnosis    Cerebrovascular accident (CVA) due to embolism of right middle cerebral artery    Aspiration pneumonia    Acute on chronic combined systolic (congestive) and diastolic (congestive) heart failure    Essential hypertension    Stage 3a chronic kidney disease    Hyperlipidemia    Carotid artery stenosis, symptomatic, right    Tobacco abuse    Basal cell carcinoma (BCC) of skin of nose    Carotid stenosis, asymptomatic, left    Bilateral carotid artery stenosis    Pacemaker    Aspiration pneumonia    Dysphagia    Confusion    Dementia    History of CVA (cerebrovascular accident)    Left-sided weakness    Tobacco abuse    Metabolic encephalopathy     Past Medical History:   Diagnosis Date    Hyperlipidemia     Hypertension      Past Surgical History:   Procedure Laterality Date    PACEMAKER IMPLANTATION Left       General Information       Row Name 24 1515          Physical Therapy Time and Intention    Document Type therapy note (daily note)  -EB     Mode of Treatment individual therapy;physical therapy  -EB       Row Name 24 1515          General Information    Existing Precautions/Restrictions fall  -EB       Row Name 24 1515          Cognition    Orientation Status (Cognition) oriented x 3  -EB       Row Name 24 1515          Safety Issues, Functional Mobility    Impairments Affecting Function (Mobility) strength;endurance/activity tolerance;shortness of breath  -EB               User  Key  (r) = Recorded By, (t) = Taken By, (c) = Cosigned By      Initials Name Provider Type    Juana Moore PTA Physical Therapist Assistant                   Mobility       Row Name 08/30/24 1515          Bed Mobility    Supine-Sit [place] (Bed Mobility) standby assist  -EB     Sit-Supine [place] (Bed Mobility) not tested  -EB     Assistive Device (Bed Mobility) bed rails;head of bed elevated  -EB       Row Name 08/30/24 1515          Sit-Stand Transfer    Sit-Stand [place] (Transfers) contact guard  -EB     Assistive Device (Sit-Stand Transfers) walker, front-wheeled  -EB     Comment, (Sit-Stand Transfer) 2 stands, 1 from EOB and the other from low toilet seat  -EB       Row Name 08/30/24 1515          Gait/Stairs (Locomotion)    [place] Level (Gait) contact guard  -EB     Assistive Device (Gait) walker, front-wheeled  -EB     Distance in Feet (Gait) 25  -EB     Deviations/Abnormal Patterns (Gait) falguni decreased;festinating/shuffling;gait speed decreased;stride length decreased  -EB     Bilateral Gait Deviations forward flexed posture  -EB     Comment, (Gait/Stairs) cues for upright posture and for breathing. Pt gets SOA  -EB               User Key  (r) = Recorded By, (t) = Taken By, (c) = Cosigned By      Initials Name Provider Type    Juana Moore PTA Physical Therapist Assistant                   Obj/Interventions    No documentation.                  Goals/Plan    No documentation.                  Clinical Impression       Row Name 08/30/24 1517          Pain    Pretreatment Pain Rating 0/10 - no pain  -EB     Posttreatment Pain Rating 0/10 - no pain  -EB       Row Name 08/30/24 1517          Plan of Care Review    Plan of Care Reviewed With patient  -EB     Progress no change  -EB     Outcome Evaluation Pt seen for PT tx today. Pt agreeable to participate. Pt is SBA with bed mobility and with stands from EOB. Pt able to ambulate 25ft with rwx, CGA. Pt cued for upright posture.  Pt declined to ambulate further d/t feeling SOA. Pt ambulated to the chair. Pt has poor activity tolerance.  Will continue to follow pt for d/c needs.  -       Row Name 08/30/24 1517          Therapy Assessment/Plan (PT)    Therapy Frequency (PT) 5 times/wk  -       Row Name 08/30/24 1517          Positioning and Restraints    Pre-Treatment Position in bed  -EB     Post Treatment Position chair  -EB     In Chair reclined;call light within reach;encouraged to call for assist;exit alarm on  -EB               User Key  (r) = Recorded By, (t) = Taken By, (c) = Cosigned By      Initials Name Provider Type    Juana Moore PTA Physical Therapist Assistant                   Outcome Measures       Row Name 08/30/24 1525 08/30/24 0800       How much help from another person do you currently need...    Turning from your back to your side while in flat bed without using bedrails? 3  -EB 3  -RB    Moving from lying on back to sitting on the side of a flat bed without bedrails? 3  -EB 3  -RB    Moving to and from a bed to a chair (including a wheelchair)? 3  -EB 3  -RB    Standing up from a chair using your arms (e.g., wheelchair, bedside chair)? 3  -EB 3  -RB    Climbing 3-5 steps with a railing? 2  -EB 2  -RB    To walk in hospital room? 3  -EB 3  -RB    AM-PAC 6 Clicks Score (PT) 17  -EB 17  -RB    Highest Level of Mobility Goal 5 --> Static standing  -EB 5 --> Static standing  -RB              User Key  (r) = Recorded By, (t) = Taken By, (c) = Cosigned By      Initials Name Provider Type    Jon Lucas RN Registered Nurse    Juana Moore PTA Physical Therapist Assistant                                 Physical Therapy Education       Title: PT OT SLP Therapies (In Progress)       Topic: Physical Therapy (In Progress)       Point: Mobility training (Done)       Learning Progress Summary             Patient Acceptance, E, VU by GÓMEZ at 8/30/2024 1525    Acceptance, E,TB,D, VU,NR by  at 8/29/2024 1418                          Point: Home exercise program (Not Started)       Learner Progress:  Not documented in this visit.              Point: Body mechanics (Done)       Learning Progress Summary             Patient Acceptance, E, VU by  at 8/30/2024 1525    Acceptance, E,TB,D, VU,NR by  at 8/29/2024 1418                         Point: Precautions (Done)       Learning Progress Summary             Patient Acceptance, E,TB,D, VU,NR by  at 8/29/2024 1418                                         User Key       Initials Effective Dates Name Provider Type Discipline     06/16/21 -  Lashonda Chapin, PT Physical Therapist PT     02/14/23 -  Juana De La Torre PTA Physical Therapist Assistant PT                  PT Recommendation and Plan     Plan of Care Reviewed With: patient  Progress: no change  Outcome Evaluation: Pt seen for PT tx today. Pt agreeable to participate. Pt is SBA with bed mobility and with stands from EOB. Pt able to ambulate 25ft with rwx, CGA. Pt cued for upright posture. Pt declined to ambulate further d/t feeling SOA. Pt ambulated to the chair. Pt has poor activity tolerance.  Will continue to follow pt for d/c needs.     Time Calculation:         PT Charges       Row Name 08/30/24 1514             Time Calculation    Start Time 1121  -EB      Stop Time 1139  -      Time Calculation (min) 18 min  -      PT Received On 08/30/24  -      PT - Next Appointment 08/31/24  -         Time Calculation- PT    Total Timed Code Minutes- PT 18 minute(s)  -                User Key  (r) = Recorded By, (t) = Taken By, (c) = Cosigned By      Initials Name Provider Type     Juana De La Torre PTA Physical Therapist Assistant                  Therapy Charges for Today       Code Description Service Date Service Provider Modifiers Qty    [id number] HC GAIT TRAINING EA 15 MIN 8/30/2024 Juana De La Torre PTA GP 1            PT G-Codes  Outcome Measure Options: AM-PAC 6 Clicks Basic Mobility (PT)  AM-PAC 6  Clicks Score (PT): 17       Juana De La Torre, PTA  8/30/2024

## 2024-08-30 NOTE — PLAN OF CARE
Goal Outcome Evaluation:  Plan of Care Reviewed With: patient        Progress: no change  Outcome Evaluation: Pt seen for PT tx today. Pt agreeable to participate. Pt is SBA with bed mobility and with stands from EOB. Pt able to ambulate 25ft with rwx, CGA. Pt cued for upright posture. Pt declined to ambulate further d/t feeling SOA. Pt ambulated to the chair. Pt has poor activity tolerance.  Will continue to follow pt for d/c needs.

## 2024-08-30 NOTE — PROGRESS NOTES
Tigre KALIA Munir   74 y.o.  male    LOS: 3 days   Patient Care Team:  Tara Encarnacion MD as PCP - General (Geriatric Medicine)      Subjective   Breathing better now  Interval History:     Patient Complaints:     Review of Systems:       Medication Review:   Current Facility-Administered Medications:     acetaminophen (TYLENOL) tablet 650 mg, 650 mg, Oral, Q4H PRN **OR** acetaminophen (TYLENOL) 160 MG/5ML oral solution 650 mg, 650 mg, Oral, Q4H PRN **OR** acetaminophen (TYLENOL) suppository 650 mg, 650 mg, Rectal, Q4H PRN, Laura Nelson APRN    aluminum-magnesium hydroxide-simethicone (MAALOX MAX) 400-400-40 MG/5ML suspension 15 mL, 15 mL, Oral, Q6H PRN, Laura Nelson APRN    aspirin EC tablet 81 mg, 81 mg, Oral, Daily, Cheo Chase MD, 81 mg at 08/30/24 0809    atorvastatin (LIPITOR) tablet 80 mg, 80 mg, Oral, Nightly, Cheo Chase MD, 80 mg at 08/29/24 2015    sennosides-docusate (PERICOLACE) 8.6-50 MG per tablet 2 tablet, 2 tablet, Oral, BID PRN **AND** polyethylene glycol (MIRALAX) packet 17 g, 17 g, Oral, Daily PRN **AND** bisacodyl (DULCOLAX) EC tablet 5 mg, 5 mg, Oral, Daily PRN **AND** bisacodyl (DULCOLAX) suppository 10 mg, 10 mg, Rectal, Daily PRN, Laura Nelson APRN    Calcium Replacement - Follow Nurse / BPA Driven Protocol, , Does not apply, PRN, Cheo Chsae MD    carvedilol (COREG) tablet 6.25 mg, 6.25 mg, Oral, BID With Meals, Cheo Chase MD, 6.25 mg at 08/30/24 0809    cholecalciferol (VITAMIN D3) tablet 1,000 Units, 1,000 Units, Oral, Daily, Cheo Chase MD, 1,000 Units at 08/30/24 0809    clopidogrel (PLAVIX) tablet 75 mg, 75 mg, Oral, Daily, Cheo Chase MD, 75 mg at 08/30/24 0809    famotidine (PEPCID) injection 10 mg, 10 mg, Intravenous, Q12H, Cheo Chase MD, 10 mg at 08/30/24 0927    furosemide (LASIX) injection 40 mg, 40 mg, Intravenous, Q12H, Cheo Chase MD, 40 mg at 08/29/24 2348    guaiFENesin (MUCINEX) 12 hr tablet  600 mg, 600 mg, Oral, Q12H, Cheo Chase MD, 600 mg at 08/30/24 0809    ipratropium-albuterol (DUO-NEB) nebulizer solution 3 mL, 3 mL, Nebulization, Q6H - RT, Cheo Chase MD, 3 mL at 08/30/24 0750    lisinopril (PRINIVIL,ZESTRIL) tablet 20 mg, 20 mg, Oral, Daily, Cheo Chase MD, 20 mg at 08/30/24 0809    Magnesium Cardiology Dose Replacement - Follow Nurse / BPA Driven Protocol, , Does not apply, PRN, Cheo Chase MD    melatonin tablet 5 mg, 5 mg, Oral, Nightly PRN, Laura Nelson APRN, 5 mg at 08/29/24 2338    nitroglycerin (NITROSTAT) SL tablet 0.4 mg, 0.4 mg, Sublingual, Q5 Min PRN, Laura Nelson APRN    ondansetron ODT (ZOFRAN-ODT) disintegrating tablet 4 mg, 4 mg, Oral, Q6H PRN **OR** ondansetron (ZOFRAN) injection 4 mg, 4 mg, Intravenous, Q6H PRN, Laura Nelson APRN    Phosphorus Replacement - Follow Nurse / BPA Driven Protocol, , Does not apply, PRN, Cheo Chase MD    piperacillin-tazobactam (ZOSYN) 3.375 g IVPB in 100 mL NS MBP (CD), 3.375 g, Intravenous, Q8H, Cheo Chase MD, 3.375 g at 08/30/24 0825    Potassium Replacement - Follow Nurse / BPA Driven Protocol, , Does not apply, PRN, Cheo Chase MD    sennosides-docusate (PERICOLACE) 8.6-50 MG per tablet 2 tablet, 2 tablet, Oral, BID, Cheo Chase MD, 2 tablet at 08/30/24 0809    sodium chloride 0.9 % flush 10 mL, 10 mL, Intravenous, PRN, Cheo Chase MD    sodium chloride 0.9 % flush 10 mL, 10 mL, Intravenous, Q12H, Laura Nelson APRN, 10 mL at 08/30/24 0809      Objective   Vital Sign Min/Max for last 24 hours  Temp  Min: 97.3 °F (36.3 °C)  Max: 98.2 °F (36.8 °C)   BP  Min: 93/61  Max: 126/78    Pulse  Min: 59  Max: 71     Wt Readings from Last 3 Encounters:   08/30/24 65.3 kg (143 lb 15.4 oz)   04/11/24 67.2 kg (148 lb 3.2 oz)   02/26/24 68.5 kg (151 lb 0.2 oz)        Intake/Output Summary (Last 24 hours) at 8/30/2024 0940  Last data filed at 8/29/2024 2145  Gross per 24 hour  "  Intake 80 ml   Output 350 ml   Net -270 ml     Physical Exam:      General Appearance:    Well developed and well nourished in no acute distress   Head:    Normocephalic, atraumatic   Eyes:            Conjunctivae normal, anicteric, no xanthelasma   Neck:   supple, trachea midline, no thyromegaly, no carotid bruit, no JVD, no elevated CVP   Lungs:     Clear to auscultation,respirations regular, even and                unlabored    Heart:    Regular rhythm and normal rate, normal S1 and S2,            No murmur, no gallop, no rub, no click   Chest Wall:    No abnormalities observed   Abdomen:     Normal bowel sounds, soft, nondistended           Rectal:     Deferred   Extremities:   No edema. Moves all extremities well, no cyanosis, no erythema   Pulses:   Pulses palpable and equal bilaterally   Skin:   No bleeding, bruising or rash   Neurologic:   awake alert and oriented x3, speech clear and approp, no facial drooping     :    Monitor:      Results Review:         Sodium Sodium   Date Value Ref Range Status   08/30/2024 138 136 - 145 mmol/L Final   08/29/2024 139 136 - 145 mmol/L Final   08/27/2024 138 136 - 145 mmol/L Final      Potassium Potassium   Date Value Ref Range Status   08/30/2024 4.0 3.5 - 5.2 mmol/L Final   08/29/2024 4.1 3.5 - 5.2 mmol/L Final     Comment:     Slight hemolysis detected by analyzer. Result may be falsely elevated.   08/29/2024 3.6 3.5 - 5.2 mmol/L Final   08/27/2024 4.4 3.5 - 5.2 mmol/L Final      Chloride Chloride   Date Value Ref Range Status   08/30/2024 103 98 - 107 mmol/L Final   08/29/2024 101 98 - 107 mmol/L Final   08/27/2024 101 98 - 107 mmol/L Final      Bicarbonate No results found for: \"PLASMABICARB\"   BUN BUN   Date Value Ref Range Status   08/30/2024 24 (H) 8 - 23 mg/dL Final   08/29/2024 25 (H) 8 - 23 mg/dL Final   08/27/2024 25 (H) 8 - 23 mg/dL Final      Creatinine Creatinine   Date Value Ref Range Status   08/30/2024 1.74 (H) 0.76 - 1.27 mg/dL Final   08/29/2024 " 1.51 (H) 0.76 - 1.27 mg/dL Final   08/27/2024 1.56 (H) 0.76 - 1.27 mg/dL Final      Calcium Calcium   Date Value Ref Range Status   08/30/2024 8.8 8.6 - 10.5 mg/dL Final   08/29/2024 8.8 8.6 - 10.5 mg/dL Final   08/27/2024 9.2 8.6 - 10.5 mg/dL Final      Magnesium Magnesium   Date Value Ref Range Status   08/30/2024 2.2 1.6 - 2.4 mg/dL Final   08/30/2024 2.5 (H) 1.6 - 2.4 mg/dL Final        Results from last 7 days   Lab Units 08/30/24  0349   WBC 10*3/mm3 7.71   HEMOGLOBIN g/dL 13.1   HEMATOCRIT % 41.1   PLATELETS 10*3/mm3 206     Lab Results   Lab Value Date/Time    TROPONINT 77 (C) 08/27/2024 2252    TROPONINT 68 (C) 08/27/2024 2018    TROPONINT 425 (C) 11/02/2023 0735    TROPONINT 451 (C) 11/02/2023 0612    TROPONINT 354 (C) 11/01/2023 2031    TROPONINT 339 (C) 11/01/2023 1846            Echo EF Estimated  Lab Results   Component Value Date    ECHOEFEST 10.0 08/29/2024         The left ventricular cavity is severely dilated.    Left ventricular systolic function is severely decreased. Estimated left ventricular EF = 10%    Left ventricular diastolic function is consistent with (grade II w/high LAP) pseudonormalization.    The right ventricular cavity is mildly dilated. Severely reduced right ventricular systolic function noted.    The left atrial cavity is moderate to severely dilated.    Moderate aortic valve regurgitation is present.    Mild to moderate mitral valve regurgitation is present.    Mild to moderate tricuspid valve regurgitation is present.    Calculated right ventricular systolic pressure from tricuspid regurgitation is 46 mmHg.    The ascending aorta is mildly dilated at 3.9 cm    There is a small (<1cm) circumferential pericardial effusion. There is no evidence of cardiac tamponade.    Assessment/ Plan  Assessment & Plan   Active Hospital Problems    Diagnosis  POA    **Acute on chronic combined systolic (congestive) and diastolic (congestive) heart failure [I50.43]  Yes    Pacemaker [Z95.0]   Yes    Aspiration pneumonia [J69.0]  Yes    Dysphagia [R13.10]  Yes    Confusion [R41.0]  Yes    Dementia [F03.90]  Yes    History of CVA (cerebrovascular accident) [Z86.73]  Not Applicable    Left-sided weakness [R53.1]  Yes    Tobacco abuse [Z72.0]  Yes    Metabolic encephalopathy [G93.41]  Yes    Bilateral carotid artery stenosis [I65.23]  Yes    Essential hypertension [I10]  Yes       Assessment/ Plan            Active Hospital Problems     Diagnosis   POA    **Acute CHF [I50.9]   Yes       Priority: Low      Chest pain  Hs trop 77<- 68  Probnp 30,594  Cxr There is cardiomegaly. Atherosclerotic calcification of the aorta. Patient is rotated towards the left. There is a patchy area of infiltrate demonstrated within the medial right lower lobe worrisome for pneumonia. The left lung is clear. 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

## 2024-08-31 LAB
ANION GAP SERPL CALCULATED.3IONS-SCNC: 12 MMOL/L (ref 5–15)
BASOPHILS # BLD AUTO: 0.07 10*3/MM3 (ref 0–0.2)
BASOPHILS NFR BLD AUTO: 0.9 % (ref 0–1.5)
BUN SERPL-MCNC: 21 MG/DL (ref 8–23)
BUN/CREAT SERPL: 12.9 (ref 7–25)
CALCIUM SPEC-SCNC: 8.1 MG/DL (ref 8.6–10.5)
CHLORIDE SERPL-SCNC: 98 MMOL/L (ref 98–107)
CO2 SERPL-SCNC: 24 MMOL/L (ref 22–29)
CREAT SERPL-MCNC: 1.63 MG/DL (ref 0.76–1.27)
DEPRECATED RDW RBC AUTO: 45.4 FL (ref 37–54)
EGFRCR SERPLBLD CKD-EPI 2021: 43.4 ML/MIN/1.73
EOSINOPHIL # BLD AUTO: 0.19 10*3/MM3 (ref 0–0.4)
EOSINOPHIL NFR BLD AUTO: 2.5 % (ref 0.3–6.2)
ERYTHROCYTE [DISTWIDTH] IN BLOOD BY AUTOMATED COUNT: 13.3 % (ref 12.3–15.4)
GLUCOSE BLDC GLUCOMTR-MCNC: 111 MG/DL (ref 70–130)
GLUCOSE BLDC GLUCOMTR-MCNC: 147 MG/DL (ref 70–130)
GLUCOSE BLDC GLUCOMTR-MCNC: 82 MG/DL (ref 70–130)
GLUCOSE BLDC GLUCOMTR-MCNC: 96 MG/DL (ref 70–130)
GLUCOSE SERPL-MCNC: 151 MG/DL (ref 65–99)
HCT VFR BLD AUTO: 37.8 % (ref 37.5–51)
HGB BLD-MCNC: 12.2 G/DL (ref 13–17.7)
IMM GRANULOCYTES # BLD AUTO: 0.03 10*3/MM3 (ref 0–0.05)
IMM GRANULOCYTES NFR BLD AUTO: 0.4 % (ref 0–0.5)
LYMPHOCYTES # BLD AUTO: 2.29 10*3/MM3 (ref 0.7–3.1)
LYMPHOCYTES NFR BLD AUTO: 30 % (ref 19.6–45.3)
MCH RBC QN AUTO: 30.3 PG (ref 26.6–33)
MCHC RBC AUTO-ENTMCNC: 32.3 G/DL (ref 31.5–35.7)
MCV RBC AUTO: 93.8 FL (ref 79–97)
MONOCYTES # BLD AUTO: 0.71 10*3/MM3 (ref 0.1–0.9)
MONOCYTES NFR BLD AUTO: 9.3 % (ref 5–12)
NEUTROPHILS NFR BLD AUTO: 4.35 10*3/MM3 (ref 1.7–7)
NEUTROPHILS NFR BLD AUTO: 56.9 % (ref 42.7–76)
NRBC BLD AUTO-RTO: 0 /100 WBC (ref 0–0.2)
PLATELET # BLD AUTO: 210 10*3/MM3 (ref 140–450)
PMV BLD AUTO: 10 FL (ref 6–12)
POTASSIUM SERPL-SCNC: 2.9 MMOL/L (ref 3.5–5.2)
POTASSIUM SERPL-SCNC: 4.6 MMOL/L (ref 3.5–5.2)
RBC # BLD AUTO: 4.03 10*6/MM3 (ref 4.14–5.8)
SODIUM SERPL-SCNC: 134 MMOL/L (ref 136–145)
URATE SERPL-MCNC: 7.6 MG/DL (ref 3.4–7)
WBC NRBC COR # BLD AUTO: 7.64 10*3/MM3 (ref 3.4–10.8)

## 2024-08-31 PROCEDURE — 94761 N-INVAS EAR/PLS OXIMETRY MLT: CPT

## 2024-08-31 PROCEDURE — 94664 DEMO&/EVAL PT USE INHALER: CPT

## 2024-08-31 PROCEDURE — 84550 ASSAY OF BLOOD/URIC ACID: CPT | Performed by: HOSPITALIST

## 2024-08-31 PROCEDURE — 94799 UNLISTED PULMONARY SVC/PX: CPT

## 2024-08-31 PROCEDURE — 25010000002 PIPERACILLIN SOD-TAZOBACTAM PER 1 G: Performed by: INTERNAL MEDICINE

## 2024-08-31 PROCEDURE — 25010000002 FUROSEMIDE PER 20 MG: Performed by: INTERNAL MEDICINE

## 2024-08-31 PROCEDURE — 82948 REAGENT STRIP/BLOOD GLUCOSE: CPT

## 2024-08-31 PROCEDURE — 80048 BASIC METABOLIC PNL TOTAL CA: CPT | Performed by: INTERNAL MEDICINE

## 2024-08-31 PROCEDURE — 85025 COMPLETE CBC W/AUTO DIFF WBC: CPT | Performed by: INTERNAL MEDICINE

## 2024-08-31 PROCEDURE — 84132 ASSAY OF SERUM POTASSIUM: CPT | Performed by: HOSPITALIST

## 2024-08-31 RX ORDER — FUROSEMIDE 40 MG
40 TABLET ORAL
Status: DISCONTINUED | OUTPATIENT
Start: 2024-09-01 | End: 2024-09-03 | Stop reason: HOSPADM

## 2024-08-31 RX ORDER — POTASSIUM CHLORIDE 1.5 G/1.58G
40 POWDER, FOR SOLUTION ORAL EVERY 4 HOURS
Status: COMPLETED | OUTPATIENT
Start: 2024-08-31 | End: 2024-08-31

## 2024-08-31 RX ORDER — GUAIFENESIN 200 MG/10ML
300 LIQUID ORAL EVERY 6 HOURS SCHEDULED
Status: DISCONTINUED | OUTPATIENT
Start: 2024-09-01 | End: 2024-09-03 | Stop reason: HOSPADM

## 2024-08-31 RX ORDER — POTASSIUM CHLORIDE 750 MG/1
40 TABLET, FILM COATED, EXTENDED RELEASE ORAL ONCE
Status: COMPLETED | OUTPATIENT
Start: 2024-08-31 | End: 2024-08-31

## 2024-08-31 RX ADMIN — Medication 10 ML: at 22:07

## 2024-08-31 RX ADMIN — FAMOTIDINE 10 MG: 10 INJECTION INTRAVENOUS at 08:27

## 2024-08-31 RX ADMIN — Medication 5 MG: at 23:12

## 2024-08-31 RX ADMIN — IPRATROPIUM BROMIDE AND ALBUTEROL SULFATE 3 ML: .5; 3 SOLUTION RESPIRATORY (INHALATION) at 11:39

## 2024-08-31 RX ADMIN — CARVEDILOL 6.25 MG: 6.25 TABLET, FILM COATED ORAL at 18:45

## 2024-08-31 RX ADMIN — IPRATROPIUM BROMIDE AND ALBUTEROL SULFATE 3 ML: .5; 3 SOLUTION RESPIRATORY (INHALATION) at 06:42

## 2024-08-31 RX ADMIN — POTASSIUM CHLORIDE 40 MEQ: 1.5 POWDER, FOR SOLUTION ORAL at 06:18

## 2024-08-31 RX ADMIN — IPRATROPIUM BROMIDE AND ALBUTEROL SULFATE 3 ML: .5; 3 SOLUTION RESPIRATORY (INHALATION) at 20:29

## 2024-08-31 RX ADMIN — CLOPIDOGREL BISULFATE 75 MG: 75 TABLET, FILM COATED ORAL at 08:24

## 2024-08-31 RX ADMIN — ASPIRIN 81 MG: 81 TABLET, COATED ORAL at 08:24

## 2024-08-31 RX ADMIN — POTASSIUM CHLORIDE 40 MEQ: 750 TABLET, EXTENDED RELEASE ORAL at 18:45

## 2024-08-31 RX ADMIN — POTASSIUM CHLORIDE 40 MEQ: 1.5 POWDER, FOR SOLUTION ORAL at 08:24

## 2024-08-31 RX ADMIN — Medication 10 ML: at 08:24

## 2024-08-31 RX ADMIN — CARVEDILOL 6.25 MG: 6.25 TABLET, FILM COATED ORAL at 08:23

## 2024-08-31 RX ADMIN — ATORVASTATIN CALCIUM 80 MG: 80 TABLET, FILM COATED ORAL at 21:57

## 2024-08-31 RX ADMIN — PIPERACILLIN AND TAZOBACTAM 3.38 G: 3; .375 INJECTION, POWDER, FOR SOLUTION INTRAVENOUS at 01:03

## 2024-08-31 RX ADMIN — FAMOTIDINE 10 MG: 10 INJECTION INTRAVENOUS at 22:04

## 2024-08-31 RX ADMIN — AMOXICILLIN AND CLAVULANATE POTASSIUM 1 TABLET: 875; 125 TABLET, FILM COATED ORAL at 08:24

## 2024-08-31 RX ADMIN — LISINOPRIL 20 MG: 20 TABLET ORAL at 08:24

## 2024-08-31 RX ADMIN — Medication 1000 UNITS: at 08:24

## 2024-08-31 RX ADMIN — AMOXICILLIN AND CLAVULANATE POTASSIUM 1 TABLET: 875; 125 TABLET, FILM COATED ORAL at 21:57

## 2024-08-31 RX ADMIN — POTASSIUM CHLORIDE 40 MEQ: 1.5 POWDER, FOR SOLUTION ORAL at 13:27

## 2024-08-31 RX ADMIN — GUAIFENESIN 600 MG: 600 TABLET, EXTENDED RELEASE ORAL at 08:24

## 2024-08-31 RX ADMIN — SENNOSIDES AND DOCUSATE SODIUM 2 TABLET: 50; 8.6 TABLET ORAL at 08:24

## 2024-08-31 RX ADMIN — FUROSEMIDE 40 MG: 10 INJECTION, SOLUTION INTRAMUSCULAR; INTRAVENOUS at 11:54

## 2024-08-31 NOTE — PLAN OF CARE
Problem: Fall Injury Risk  Goal: Absence of Fall and Fall-Related Injury  Outcome: Ongoing, Progressing     Problem: Skin Injury Risk Increased  Goal: Skin Health and Integrity  Outcome: Ongoing, Progressing   Goal Outcome Evaluation:  Plan of Care Reviewed With: patient        Progress: improving  Outcome Evaluation: Pt appeared to sleep fair, alert and oriented x 2-3, forgetful, coop with care, v paced, no soa, remains on room air, up to br with assist, incontinent at times, compliant with scd, compliant with turns at times, educated on purpose/benefit of repositioning and risk of non compliance.

## 2024-08-31 NOTE — PROGRESS NOTES
Re:     S:    O:  Vitals:    08/31/24 0711 08/31/24 1139 08/31/24 1142 08/31/24 1320   BP: 97/69   97/65   BP Location: Right arm   Right arm   Patient Position: Lying   Sitting   Pulse: 60 60 64 63   Resp: 18 16  18   Temp: 97.7 °F (36.5 °C)   97.7 °F (36.5 °C)   TempSrc: Oral   Oral   SpO2: 97% 100% 99% 100%   Weight:       Height:           GEN awake but confused  RESP cta   CVS regular no jvd or edema  GI soft nt  NEURO awake not completely oriented      Intake/Output Summary (Last 24 hours) at 8/31/2024 1437  Last data filed at 8/31/2024 0215  Gross per 24 hour   Intake 200 ml   Output 450 ml   Net -250 ml       Tele sinus paced, runs of NSVT noted       Results for orders placed during the hospital encounter of 08/27/24    Adult Transthoracic Echo Complete W/ Cont if Necessary Per Protocol    Interpretation Summary    The left ventricular cavity is severely dilated.    Left ventricular systolic function is severely decreased. Estimated left ventricular EF = 10%    Left ventricular diastolic function is consistent with (grade II w/high LAP) pseudonormalization.    The right ventricular cavity is mildly dilated. Severely reduced right ventricular systolic function noted.    The left atrial cavity is moderate to severely dilated.    Moderate aortic valve regurgitation is present.    Mild to moderate mitral valve regurgitation is present.    Mild to moderate tricuspid valve regurgitation is present.    Calculated right ventricular systolic pressure from tricuspid regurgitation is 46 mmHg.    The ascending aorta is mildly dilated at 3.9 cm    There is a small (<1cm) circumferential pericardial effusion. There is no evidence of cardiac tamponade.      Assessment and Plan:  Weakness and dyspnea on presentation  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 use EF 10%  ICD MDT 2018 for secondary prevention, Known VT  HF REF compensated  Cva 2023 and TBI  HLD  HTN    Pt seen examined by me  Euvolemic, no angina, little  confused  Change to oral diuretic   Known VT and has ICD in place  Tele with runs of NSVT, lytes K 2.9 replace  Bp low normal, unable to titrate bb more, if continues to have despite k replacement add amiodarone 200 mg bid tomr

## 2024-08-31 NOTE — PROGRESS NOTES
[facility]    [phone]   LOS: 4 days     Name: Tigre Amaral  Age/Sex: 74 y.o. male  :  1950        PCP: Tara Encarnacion MD  Chief Complaint   Patient presents with    Chest Pain    Altered Mental Status    Shortness of Breath      Subjective   He is resting in bed this morning.  Denies new issues or complaints.   General: No Fever or Chills, Cardiac: No Chest Pain or Palpitations, Resp: No Cough or SOA, GI: No Nausea, Vomiting, or Diarrhea, and Other: No bleeding    aspirin, 81 mg, Oral, Daily  atorvastatin, 80 mg, Oral, Nightly  carvedilol, 6.25 mg, Oral, BID With Meals  cholecalciferol, 1,000 Units, Oral, Daily  clopidogrel, 75 mg, Oral, Daily  famotidine, 10 mg, Intravenous, Q12H  furosemide, 40 mg, Intravenous, Q12H  guaiFENesin, 600 mg, Oral, Q12H  ipratropium-albuterol, 3 mL, Nebulization, Q6H - RT  lisinopril, 20 mg, Oral, Daily  piperacillin-tazobactam, 3.375 g, Intravenous, Q8H  potassium chloride, 40 mEq, Oral, Q4H  sennosides-docusate, 2 tablet, Oral, BID  sodium chloride, 10 mL, Intravenous, Q12H           Objective   Vital Signs  Temp:  [97.3 °F (36.3 °C)-98.2 °F (36.8 °C)] 98.2 °F (36.8 °C)  Heart Rate:  [60-77] 64  Resp:  [16-18] 18  BP: (106-122)/(60-80) 115/71  Body mass index is 20.48 kg/m².    Intake/Output Summary (Last 24 hours) at 2024 0541  Last data filed at 2024 0215  Gross per 24 hour   Intake 430 ml   Output 450 ml   Net -20 ml       Physical Exam  Vitals and nursing note reviewed.   Constitutional:       General: He is not in acute distress.     Appearance: He is ill-appearing.   Cardiovascular:      Rate and Rhythm: Normal rate and regular rhythm.   Pulmonary:      Effort: Pulmonary effort is normal. No respiratory distress.      Breath sounds: Wheezing present.   Abdominal:      General: Bowel sounds are normal.      Palpations: Abdomen is soft.   Skin:     General: Skin is warm and dry.   Neurological:      Mental Status: He is alert.  Mental status is at baseline.           Results Review:       I reviewed the patient's new clinical results.  Results from last 7 days   Lab Units 08/31/24  0259 08/30/24  0349 08/29/24 0428 08/27/24 2018   WBC 10*3/mm3 7.64 7.71 8.11 8.53   HEMOGLOBIN g/dL 12.2* 13.1 13.2 13.3   PLATELETS 10*3/mm3 210 206 200 242     Results from last 7 days   Lab Units 08/31/24  0259 08/30/24  0602 08/30/24  0205 08/29/24  1734 08/29/24 0428 08/27/24 2018   SODIUM mmol/L 134*  --  138  --  139 138   POTASSIUM mmol/L 2.9*  --  4.0 4.1 3.6 4.4   CHLORIDE mmol/L 98  --  103  --  101 101   CO2 mmol/L 24.0  --  23.0  --  23.0 25.0   BUN mg/dL 21  --  24*  --  25* 25*   CREATININE mg/dL 1.63*  --  1.74*  --  1.51* 1.56*   CALCIUM mg/dL 8.1*  --  8.8  --  8.8 9.2   MAGNESIUM mg/dL  --  2.2 2.5*  --   --   --    Estimated Creatinine Clearance: 33.3 mL/min (A) (by C-G formula based on SCr of 1.63 mg/dL (H)).      Assessment & Plan   Active Hospital Problems    Diagnosis  POA    **Acute on chronic combined systolic (congestive) and diastolic (congestive) heart failure [I50.43]  Yes    Pacemaker [Z95.0]  Yes    Aspiration pneumonia [J69.0]  Yes    Dysphagia [R13.10]  Yes    Confusion [R41.0]  Yes    Dementia [F03.90]  Yes    History of CVA (cerebrovascular accident) [Z86.73]  Not Applicable    Left-sided weakness [R53.1]  Yes    Tobacco abuse [Z72.0]  Yes    Metabolic encephalopathy [G93.41]  Yes    Bilateral carotid artery stenosis [I65.23]  Yes    Essential hypertension [I10]  Yes      Resolved Hospital Problems   No resolved problems to display.       PLAN  This is a 74-year-old gentleman with a history of combined systolic and diastolic heart failure, dementia, chronic tobacco use, carotid artery stenosis and hypertension who presents to the hospital with chest discomfort and shortness of breath and is found to have metabolic encephalopathy and decompensated heart failure  -Echocardiogram reviewed remains on IV diuretics.  Will  follow-up cardiology's recommendations for transition to oral diuretics.  -Replace potassium today sodium did drop some likely volume related.  Creatinine stable  -Remains on treatment for aspiration pneumonia plan transition to Augmentin today.  Speech therapy's been consulted and they have evaluated the patient.  He remains on a mechanical soft and thin diet.  The plan for video swallow study prior to discharge to further about this oropharyngeal dysphagia  -His renal function remained stable electrolyte replacement plan for today  -He has no plans to stop smoking.  -Heparin for VTE prophylaxis  -DNR, please see patient's separate advance care planning documentation for further details      Disposition  Expected Discharge Date: 9/3/2024; Expected Discharge Time:        Abhinav Whitlock MD  [facility]  08/31/24  05:41 EDT

## 2024-09-01 LAB
ANION GAP SERPL CALCULATED.3IONS-SCNC: 11.1 MMOL/L (ref 5–15)
BASOPHILS # BLD AUTO: 0.08 10*3/MM3 (ref 0–0.2)
BASOPHILS NFR BLD AUTO: 1 % (ref 0–1.5)
BUN SERPL-MCNC: 22 MG/DL (ref 8–23)
BUN/CREAT SERPL: 11.2 (ref 7–25)
CALCIUM SPEC-SCNC: 8.8 MG/DL (ref 8.6–10.5)
CHLORIDE SERPL-SCNC: 102 MMOL/L (ref 98–107)
CO2 SERPL-SCNC: 23.9 MMOL/L (ref 22–29)
CREAT SERPL-MCNC: 1.96 MG/DL (ref 0.76–1.27)
DEPRECATED RDW RBC AUTO: 46.9 FL (ref 37–54)
EGFRCR SERPLBLD CKD-EPI 2021: 34.8 ML/MIN/1.73
EOSINOPHIL # BLD AUTO: 0.16 10*3/MM3 (ref 0–0.4)
EOSINOPHIL NFR BLD AUTO: 2 % (ref 0.3–6.2)
ERYTHROCYTE [DISTWIDTH] IN BLOOD BY AUTOMATED COUNT: 13.8 % (ref 12.3–15.4)
GLUCOSE BLDC GLUCOMTR-MCNC: 117 MG/DL (ref 70–130)
GLUCOSE BLDC GLUCOMTR-MCNC: 122 MG/DL (ref 70–130)
GLUCOSE BLDC GLUCOMTR-MCNC: 133 MG/DL (ref 70–130)
GLUCOSE BLDC GLUCOMTR-MCNC: 97 MG/DL (ref 70–130)
GLUCOSE SERPL-MCNC: 101 MG/DL (ref 65–99)
HCT VFR BLD AUTO: 40.4 % (ref 37.5–51)
HGB BLD-MCNC: 13 G/DL (ref 13–17.7)
IMM GRANULOCYTES # BLD AUTO: 0.03 10*3/MM3 (ref 0–0.05)
IMM GRANULOCYTES NFR BLD AUTO: 0.4 % (ref 0–0.5)
LYMPHOCYTES # BLD AUTO: 3.12 10*3/MM3 (ref 0.7–3.1)
LYMPHOCYTES NFR BLD AUTO: 38.6 % (ref 19.6–45.3)
MAGNESIUM SERPL-MCNC: 2.3 MG/DL (ref 1.6–2.4)
MCH RBC QN AUTO: 30.3 PG (ref 26.6–33)
MCHC RBC AUTO-ENTMCNC: 32.2 G/DL (ref 31.5–35.7)
MCV RBC AUTO: 94.2 FL (ref 79–97)
MONOCYTES # BLD AUTO: 0.7 10*3/MM3 (ref 0.1–0.9)
MONOCYTES NFR BLD AUTO: 8.7 % (ref 5–12)
NEUTROPHILS NFR BLD AUTO: 3.99 10*3/MM3 (ref 1.7–7)
NEUTROPHILS NFR BLD AUTO: 49.3 % (ref 42.7–76)
NRBC BLD AUTO-RTO: 0 /100 WBC (ref 0–0.2)
PLATELET # BLD AUTO: 229 10*3/MM3 (ref 140–450)
PMV BLD AUTO: 10 FL (ref 6–12)
POTASSIUM SERPL-SCNC: 4.1 MMOL/L (ref 3.5–5.2)
RBC # BLD AUTO: 4.29 10*6/MM3 (ref 4.14–5.8)
SODIUM SERPL-SCNC: 137 MMOL/L (ref 136–145)
URATE SERPL-MCNC: 7.6 MG/DL (ref 3.4–7)
WBC NRBC COR # BLD AUTO: 8.08 10*3/MM3 (ref 3.4–10.8)

## 2024-09-01 PROCEDURE — 94761 N-INVAS EAR/PLS OXIMETRY MLT: CPT

## 2024-09-01 PROCEDURE — 94799 UNLISTED PULMONARY SVC/PX: CPT

## 2024-09-01 PROCEDURE — 84550 ASSAY OF BLOOD/URIC ACID: CPT | Performed by: HOSPITALIST

## 2024-09-01 PROCEDURE — 94760 N-INVAS EAR/PLS OXIMETRY 1: CPT

## 2024-09-01 PROCEDURE — 85025 COMPLETE CBC W/AUTO DIFF WBC: CPT | Performed by: INTERNAL MEDICINE

## 2024-09-01 PROCEDURE — 80048 BASIC METABOLIC PNL TOTAL CA: CPT | Performed by: INTERNAL MEDICINE

## 2024-09-01 PROCEDURE — 82948 REAGENT STRIP/BLOOD GLUCOSE: CPT

## 2024-09-01 PROCEDURE — 83735 ASSAY OF MAGNESIUM: CPT | Performed by: HOSPITALIST

## 2024-09-01 PROCEDURE — 94664 DEMO&/EVAL PT USE INHALER: CPT

## 2024-09-01 PROCEDURE — 97530 THERAPEUTIC ACTIVITIES: CPT

## 2024-09-01 RX ADMIN — CLOPIDOGREL BISULFATE 75 MG: 75 TABLET, FILM COATED ORAL at 08:09

## 2024-09-01 RX ADMIN — CARVEDILOL 6.25 MG: 6.25 TABLET, FILM COATED ORAL at 17:11

## 2024-09-01 RX ADMIN — Medication 1000 UNITS: at 08:09

## 2024-09-01 RX ADMIN — Medication 5 MG: at 20:14

## 2024-09-01 RX ADMIN — ASPIRIN 81 MG: 81 TABLET, COATED ORAL at 08:09

## 2024-09-01 RX ADMIN — IPRATROPIUM BROMIDE AND ALBUTEROL SULFATE 3 ML: .5; 3 SOLUTION RESPIRATORY (INHALATION) at 19:27

## 2024-09-01 RX ADMIN — FAMOTIDINE 10 MG: 10 INJECTION INTRAVENOUS at 08:16

## 2024-09-01 RX ADMIN — IPRATROPIUM BROMIDE AND ALBUTEROL SULFATE 3 ML: .5; 3 SOLUTION RESPIRATORY (INHALATION) at 01:13

## 2024-09-01 RX ADMIN — FUROSEMIDE 40 MG: 40 TABLET ORAL at 08:09

## 2024-09-01 RX ADMIN — ATORVASTATIN CALCIUM 80 MG: 80 TABLET, FILM COATED ORAL at 20:14

## 2024-09-01 RX ADMIN — GUAIFENESIN 300 MG: 100 SOLUTION ORAL at 12:39

## 2024-09-01 RX ADMIN — IPRATROPIUM BROMIDE AND ALBUTEROL SULFATE 3 ML: .5; 3 SOLUTION RESPIRATORY (INHALATION) at 11:29

## 2024-09-01 RX ADMIN — CARVEDILOL 6.25 MG: 6.25 TABLET, FILM COATED ORAL at 08:09

## 2024-09-01 RX ADMIN — IPRATROPIUM BROMIDE AND ALBUTEROL SULFATE 3 ML: .5; 3 SOLUTION RESPIRATORY (INHALATION) at 07:01

## 2024-09-01 RX ADMIN — AMOXICILLIN AND CLAVULANATE POTASSIUM 1 TABLET: 875; 125 TABLET, FILM COATED ORAL at 20:14

## 2024-09-01 RX ADMIN — AMOXICILLIN AND CLAVULANATE POTASSIUM 1 TABLET: 875; 125 TABLET, FILM COATED ORAL at 08:11

## 2024-09-01 RX ADMIN — FUROSEMIDE 40 MG: 40 TABLET ORAL at 17:11

## 2024-09-01 RX ADMIN — LISINOPRIL 20 MG: 20 TABLET ORAL at 08:09

## 2024-09-01 RX ADMIN — GUAIFENESIN 300 MG: 100 SOLUTION ORAL at 17:11

## 2024-09-01 NOTE — THERAPY TREATMENT NOTE
Patient Name: Tigre Amaral  : 1950    MRN: [id number]                              Today's Date: 2024       Admit Date: 2024    Visit Dx:     ICD-10-CM ICD-9-CM   1. Acute exacerbation of chronic obstructive pulmonary disease (COPD)  J44.1 491.21   2. Acute congestive heart failure, unspecified heart failure type  I50.9 428.0   3. Acute on chronic renal insufficiency  N28.9 593.9    N18.9 585.9   4. Acute metabolic encephalopathy  G93.41 348.31     Patient Active Problem List   Diagnosis    Cerebrovascular accident (CVA) due to embolism of right middle cerebral artery    Aspiration pneumonia    Acute on chronic combined systolic (congestive) and diastolic (congestive) heart failure    Essential hypertension    Stage 3a chronic kidney disease    Hyperlipidemia    Carotid artery stenosis, symptomatic, right    Tobacco abuse    Basal cell carcinoma (BCC) of skin of nose    Carotid stenosis, asymptomatic, left    Bilateral carotid artery stenosis    Pacemaker    Aspiration pneumonia    Dysphagia    Confusion    Dementia    History of CVA (cerebrovascular accident)    Left-sided weakness    Tobacco abuse    Metabolic encephalopathy     Past Medical History:   Diagnosis Date    Hyperlipidemia     Hypertension      Past Surgical History:   Procedure Laterality Date    PACEMAKER IMPLANTATION Left       General Information       Row Name 24 1200          Physical Therapy Time and Intention    Document Type therapy note (daily note)  -ST     Mode of Treatment individual therapy;physical therapy  -ST       Row Name 24 1200          General Information    Existing Precautions/Restrictions fall  -ST       Row Name 24 1200          Cognition    Orientation Status (Cognition) oriented x 3  -ST       Row Name 24 1200          Safety Issues, Functional Mobility    Impairments Affecting Function (Mobility) strength;endurance/activity tolerance;shortness of breath  -ST     Comment, Safety  Issues/Impairments (Mobility) gait belt, nonskid socks donned  -ST               User Key  (r) = Recorded By, (t) = Taken By, (c) = Cosigned By      Initials Name Provider Type    Mayra Zimmerman PT Physical Therapist                   Mobility       Row Name 09/01/24 1200          Bed Mobility    Supine-Sit [place] (Bed Mobility) standby assist  -ST     Comment, (Bed Mobility) VC for sequencing and safety  -ST       Row Name 09/01/24 1200          Sit-Stand Transfer    Sit-Stand [place] (Transfers) contact guard  -ST     Comment, (Sit-Stand Transfer) pt refused walker today, VC to safety  -ST       Row Name 09/01/24 1200          Gait/Stairs (Locomotion)    [place] Level (Gait) contact guard  -ST     Distance in Feet (Gait) 30  -ST     Deviations/Abnormal Patterns (Gait) falguni decreased;festinating/shuffling;gait speed decreased;stride length decreased  -ST     Comment, (Gait/Stairs) VC for safety as pt very impulsive  -ST               User Key  (r) = Recorded By, (t) = Taken By, (c) = Cosigned By      Initials Name Provider Type    Mayra Zimmerman PT Physical Therapist                   Obj/Interventions       Row Name 09/01/24 1203          Balance    Comment, Balance significant unsteadiness without AD today but pt refuse rwx,  -ST               User Key  (r) = Recorded By, (t) = Taken By, (c) = Cosigned By      Initials Name Provider Type    Mayra Zimmerman PT Physical Therapist                   Goals/Plan    No documentation.                  Clinical Impression       Row Name 09/01/24 1203          Pain    Pretreatment Pain Rating 0/10 - no pain  -ST     Posttreatment Pain Rating 0/10 - no pain  -ST       Row Name 09/01/24 1203          Plan of Care Review    Plan of Care Reviewed With patient  -ST     Progress no change  -ST     Outcome Evaluation Pt seen for PT this AM - refusing rwx today, stating he never uses it at home. Significant impulsivity noted despite  cues. LOB x 2 without AD needing CGA. Educated pt on importance of using rwx for safety. He continues to benefit from skilled PT to address mobility deficits. Recommend home with assist and home PT  -ST       Row Name 09/01/24 1203          Therapy Assessment/Plan (PT)    Rehab Potential (PT) good, to achieve stated therapy goals  -ST     Criteria for Skilled Interventions Met (PT) skilled treatment is necessary  -ST     Therapy Frequency (PT) 5 times/wk  -ST       Row Name 09/01/24 1203          Positioning and Restraints    Pre-Treatment Position in bed  -ST     Post Treatment Position bed  -ST     In Chair notified nsg;reclined;call light within reach;encouraged to call for assist;exit alarm on  -ST               User Key  (r) = Recorded By, (t) = Taken By, (c) = Cosigned By      Initials Name Provider Type    Mayra Zimmerman PT Physical Therapist                   Outcome Measures       Row Name 09/01/24 1206 09/01/24 0800       How much help from another person do you currently need...    Turning from your back to your side while in flat bed without using bedrails? 4  -ST 4  -RB    Moving from lying on back to sitting on the side of a flat bed without bedrails? 3  -ST 3  -RB    Moving to and from a bed to a chair (including a wheelchair)? 3  -ST 3  -RB    Standing up from a chair using your arms (e.g., wheelchair, bedside chair)? 3  -ST 3  -RB    Climbing 3-5 steps with a railing? 3  -ST 3  -RB    To walk in hospital room? 3  -ST 3  -RB    AM-PAC 6 Clicks Score (PT) 19  -ST 19  -RB    Highest Level of Mobility Goal 6 --> Walk 10 steps or more  -ST 6 --> Walk 10 steps or more  -RB              User Key  (r) = Recorded By, (t) = Taken By, (c) = Cosigned By      Initials Name Provider Type    Jon Lucas RN Registered Nurse    Mayra Zimmerman PT Physical Therapist                                 Physical Therapy Education       Title: PT OT SLP Therapies (In Progress)       Topic:  Physical Therapy (In Progress)       Point: Mobility training (Done)       Learning Progress Summary             Patient Acceptance, E,TB, VU,NR by  at 9/1/2024 1206    Acceptance, E, VU by  at 8/30/2024 1525    Acceptance, E,TB,D, VU,NR by  at 8/29/2024 1418                         Point: Home exercise program (Not Started)       Learner Progress:  Not documented in this visit.              Point: Body mechanics (Done)       Learning Progress Summary             Patient Acceptance, E,TB, VU,NR by  at 9/1/2024 1206    Acceptance, E, VU by  at 8/30/2024 1525    Acceptance, E,TB,D, VU,NR by  at 8/29/2024 1418                         Point: Precautions (Done)       Learning Progress Summary             Patient Acceptance, E,TB, VU,NR by  at 9/1/2024 1206    Acceptance, E,TB,D, VU,NR by  at 8/29/2024 1418                                         User Key       Initials Effective Dates Name Provider Type Discipline     06/16/21 -  Lashonda Chapin, PT Physical Therapist PT     02/14/23 -  Juana De La Torre, EARNEST Physical Therapist Assistant PT     09/22/22 -  Mayra Henriquez, PT Physical Therapist PT                  PT Recommendation and Plan     Plan of Care Reviewed With: patient  Progress: no change  Outcome Evaluation: Pt seen for PT this AM - refusing rwx today, stating he never uses it at home. Significant impulsivity noted despite cues. LOB x 2 without AD needing CGA. Educated pt on importance of using rwx for safety. He continues to benefit from skilled PT to address mobility deficits. Recommend home with assist and home PT     Time Calculation:         PT Charges       Row Name 09/01/24 1206             Time Calculation    Start Time 1136  -ST      Stop Time 1146  -ST      Time Calculation (min) 10 min  -ST      PT Received On 09/01/24  -ST      PT - Next Appointment 09/02/24  -ST         Time Calculation- PT    Total Timed Code Minutes- PT 10 minute(s)  -ST         Timed Charges    63126  - PT Therapeutic Activity Minutes 10  -ST         Total Minutes    Timed Charges Total Minutes 10  -ST       Total Minutes 10  -ST                User Key  (r) = Recorded By, (t) = Taken By, (c) = Cosigned By      Initials Name Provider Type    Mayra Zimmerman PT Physical Therapist                  Therapy Charges for Today       Code Description Service Date Service Provider Modifiers Qty    22458332219 HC PT THERAPEUTIC ACT EA 15 MIN 9/1/2024 Mayra Henriquez PT GP 1            PT G-Codes  Outcome Measure Options: AM-PAC 6 Clicks Basic Mobility (PT)  AM-PAC 6 Clicks Score (PT): 19  PT Discharge Summary  Anticipated Discharge Disposition (PT): home with 24/7 care, home with home health    Mayra Henriquez, PT  9/1/2024

## 2024-09-01 NOTE — PROGRESS NOTES
Name: Tigre Amaral ADMIT: 2024   : 1950  PCP: Tara Encarnacion MD    MRN: [id number] LOS: 5 days   AGE/SEX: 74 y.o. male  ROOM: [facility]     Subjective   Subjective   Denies any current complaint. Lying in bed, eating breakfast     Objective   Objective   Vital Signs  Temp:  [97 °F (36.1 °C)-98.4 °F (36.9 °C)] 97.9 °F (36.6 °C)  Heart Rate:  [60-79] 67  Resp:  [16-20] 20  BP: ()/(65-77) 121/77  SpO2:  [91 %-100 %] 100 %  on   ;   Device (Oxygen Therapy): room air  Body mass index is 21.59 kg/m².    Physical Exam  Constitutional:       General: He is not in acute distress.     Appearance: He is not toxic-appearing.   HENT:      Head: Normocephalic and atraumatic.   Cardiovascular:      Rate and Rhythm: Normal rate and regular rhythm.   Pulmonary:      Effort: Pulmonary effort is normal. No respiratory distress.   Abdominal:      General: Bowel sounds are normal.      Palpations: Abdomen is soft.      Tenderness: There is no abdominal tenderness. There is no guarding or rebound.   Musculoskeletal:         General: No swelling.   Skin:     General: Skin is warm and dry.   Neurological:      Mental Status: He is alert. Mental status is at baseline.   Psychiatric:         Mood and Affect: Mood normal.         Behavior: Behavior normal.     Results Review  I reviewed the patient's new clinical results.  Results from last 7 days   Lab Units 24  0241 24  0259 24  0349 24  0428   WBC 10*3/mm3 8.08 7.64 7.71 8.11   HEMOGLOBIN g/dL 13.0 12.2* 13.1 13.2   PLATELETS 10*3/mm3 229 210 206 200     Results from last 7 days   Lab Units 24  0241 24  1719 24  0259 24  0205 24  1734 24  0428   SODIUM mmol/L 137  --  134* 138  --  139   POTASSIUM mmol/L 4.1 4.6 2.9* 4.0   < > 3.6   CHLORIDE mmol/L 102  --  98 103  --  101   CO2 mmol/L 23.9  --  24.0 23.0  --  23.0   BUN mg/dL 22  --  21 24*  --  25*   CREATININE mg/dL 1.96*  --  1.63* 1.74*  --  1.51*    GLUCOSE mg/dL 101*  --  151* 94  --  106*    < > = values in this interval not displayed.     Lab Results   Component Value Date    ANIONGAP 11.1 09/01/2024     Estimated Creatinine Clearance: 29.2 mL/min (A) (by C-G formula based on SCr of 1.96 mg/dL (H)).   Lab Results   Component Value Date    EGFR 34.8 (L) 09/01/2024     Results from last 7 days   Lab Units 08/27/24  2018   ALBUMIN g/dL 3.8   BILIRUBIN mg/dL 0.9   ALK PHOS U/L 119*   AST (SGOT) U/L 25   ALT (SGPT) U/L 32     Results from last 7 days   Lab Units 09/01/24  0241 08/31/24  0259 08/30/24  0602 08/30/24  0205 08/29/24  0428 08/27/24  2018   CALCIUM mg/dL 8.8 8.1*  --  8.8 8.8 9.2   ALBUMIN g/dL  --   --   --   --   --  3.8   MAGNESIUM mg/dL 2.3  --  2.2 2.5*  --   --      Results from last 7 days   Lab Units 08/27/24  2018   PROCALCITONIN ng/mL 0.07     Glucose   Date/Time Value Ref Range Status   09/01/2024 0549 97 70 - 130 mg/dL Final   08/31/2024 2137 111 70 - 130 mg/dL Final   08/31/2024 1632 147 (H) 70 - 130 mg/dL Final   08/31/2024 1115 96 70 - 130 mg/dL Final   08/31/2024 0620 82 70 - 130 mg/dL Final   08/30/2024 2048 109 70 - 130 mg/dL Final   08/30/2024 1648 101 70 - 130 mg/dL Final       No radiology results for the last day    Scheduled Meds  amoxicillin-clavulanate, 1 tablet, Oral, Q12H  aspirin, 81 mg, Oral, Daily  atorvastatin, 80 mg, Oral, Nightly  carvedilol, 6.25 mg, Oral, BID With Meals  cholecalciferol, 1,000 Units, Oral, Daily  clopidogrel, 75 mg, Oral, Daily  famotidine, 10 mg, Intravenous, Q12H  furosemide, 40 mg, Oral, BID  guaifenesin, 300 mg, Oral, Q6H  ipratropium-albuterol, 3 mL, Nebulization, Q6H - RT  lisinopril, 20 mg, Oral, Daily  sennosides-docusate, 2 tablet, Oral, BID  sodium chloride, 10 mL, Intravenous, Q12H    Continuous Infusions   PRN Meds    acetaminophen **OR** acetaminophen **OR** acetaminophen    aluminum-magnesium hydroxide-simethicone    senna-docusate sodium **AND** polyethylene glycol **AND** bisacodyl  **AND** bisacodyl    Calcium Replacement - Follow Nurse / BPA Driven Protocol    Magnesium Cardiology Dose Replacement - Follow Nurse / BPA Driven Protocol    melatonin    nitroglycerin    ondansetron ODT **OR** ondansetron    Phosphorus Replacement - Follow Nurse / BPA Driven Protocol    Potassium Replacement - Follow Nurse / BPA Driven Protocol    sodium chloride     Diet  Diet: Regular/House; No Mixed Consistencies; Texture: Mechanical Ground (NDD 2); Fluid Consistency: Thin (IDDSI 0)    Results for orders placed during the hospital encounter of 08/27/24    Adult Transthoracic Echo Complete W/ Cont if Necessary Per Protocol    Interpretation Summary    The left ventricular cavity is severely dilated.    Left ventricular systolic function is severely decreased. Estimated left ventricular EF = 10%    Left ventricular diastolic function is consistent with (grade II w/high LAP) pseudonormalization.    The right ventricular cavity is mildly dilated. Severely reduced right ventricular systolic function noted.    The left atrial cavity is moderate to severely dilated.    Moderate aortic valve regurgitation is present.    Mild to moderate mitral valve regurgitation is present.    Mild to moderate tricuspid valve regurgitation is present.    Calculated right ventricular systolic pressure from tricuspid regurgitation is 46 mmHg.    The ascending aorta is mildly dilated at 3.9 cm    There is a small (<1cm) circumferential pericardial effusion. There is no evidence of cardiac tamponade.        Assessment/Plan     Active Hospital Problems    Diagnosis  POA    **Acute on chronic combined systolic (congestive) and diastolic (congestive) heart failure [I50.43]  Yes    Pacemaker [Z95.0]  Yes    Aspiration pneumonia [J69.0]  Yes    Dysphagia [R13.10]  Yes    Confusion [R41.0]  Yes    Dementia [F03.90]  Yes    History of CVA (cerebrovascular accident) [Z86.73]  Not Applicable    Left-sided weakness [R53.1]  Yes    Tobacco abuse  [Z72.0]  Yes    Metabolic encephalopathy [G93.41]  Yes    Bilateral carotid artery stenosis [I65.23]  Yes    Essential hypertension [I10]  Yes      Resolved Hospital Problems   No resolved problems to display.     74 y.o. 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 diuretics  Cardiology following  Potassium corrected magnesium okay    Aspiration pneumonia  Oropharyngeal dysphagia  Augmentin  SLP: Diet: Regular/House; No Mixed Consistencies; Texture: Mechanical Ground (NDD 2); Fluid Consistency: Thin (IDDSI 0)   VFSS prior to discharge    Dementia delirium precautions  Smoker    DVT prophylaxis  SCDs    Discharge  TBD  Expected Discharge Date: 9/3/2024; Expected Discharge Time:     Discussed with patient and nursing staff    Jason Araujo MD  [facility]  09/01/24

## 2024-09-01 NOTE — PLAN OF CARE
Problem: Fall Injury Risk  Goal: Absence of Fall and Fall-Related Injury  Outcome: Ongoing, Progressing     Problem: Skin Injury Risk Increased  Goal: Skin Health and Integrity  Outcome: Ongoing, Progressing   Goal Outcome Evaluation:  Plan of Care Reviewed With: patient        Progress: improving  Outcome Evaluation: Pt appeared to sleep well after melatonin, alert and oriented x 2-3, forgetful, coop with care, v paced, no soa, voiding per urinal, compliant with scds, repositions self frequently.

## 2024-09-01 NOTE — PROGRESS NOTES
Re:  chf    S: no complaints    O:  Vitals:    09/01/24 0526 09/01/24 0701 09/01/24 0705 09/01/24 1129   BP:   121/77    BP Location:   Right arm    Patient Position:   Lying    Pulse:  69 67 71   Resp:  20 20 20   Temp:   97.9 °F (36.6 °C)    TempSrc:   Oral    SpO2:  97% 100% 98%   Weight: 64.4 kg (141 lb 15.6 oz)      Height:           GEN awake nad, mass under right eye  RESP cta  [facility] regular  GI soft   NEURO awake       Intake/Output Summary (Last 24 hours) at 9/1/2024 1230  Last data filed at 8/31/2024 2200  Gross per 24 hour   Intake --   Output 350 ml   Net -350 ml       Tele sinus paced, no nsvt      Results for orders placed during the hospital encounter of 08/27/24    Adult Transthoracic Echo Complete W/ Cont if Necessary Per Protocol    Interpretation Summary    The left ventricular cavity is severely dilated.    Left ventricular systolic function is severely decreased. Estimated left ventricular EF = 10%    Left ventricular diastolic function is consistent with (grade II w/high LAP) pseudonormalization.    The right ventricular cavity is mildly dilated. Severely reduced right ventricular systolic function noted.    The left atrial cavity is moderate to severely dilated.    Moderate aortic valve regurgitation is present.    Mild to moderate mitral valve regurgitation is present.    Mild to moderate tricuspid valve regurgitation is present.    Calculated right ventricular systolic pressure from tricuspid regurgitation is 46 mmHg.    The ascending aorta is mildly dilated at 3.9 cm    There is a small (<1cm) circumferential pericardial effusion. There is no evidence of cardiac tamponade.      Assessment and Plan:  Weakness and dyspnea on presentation  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 use EF 10%  ICD MDT 2018 for secondary prevention, Known VT  HF REF compensated  Cva 2023 and TBI  HLD  HTN    Patient seen examined by me  Labs electrolytes improved, Cr up 1.9 from 1.6, on oral lasix, monitor    Tele paced, euvolemic  NSVT runs resolved after lytes repleted  Cv wise ok for dc

## 2024-09-01 NOTE — PLAN OF CARE
Goal Outcome Evaluation:  Plan of Care Reviewed With: patient        Progress: no change  Outcome Evaluation: Pt seen for PT this AM - refusing rwx today, stating he never uses it at home. Significant impulsivity noted despite cues. LOB x 2 without AD needing CGA. Educated pt on importance of using rwx for safety. He continues to benefit from skilled PT to address mobility deficits. Recommend home with assist and home PT      Anticipated Discharge Disposition (PT): home with 24/7 care, home with home health

## 2024-09-01 NOTE — PLAN OF CARE
Goal Outcome Evaluation:               Patient up in chair today. Patient ambulated in hallway today with PT and walker.

## 2024-09-02 LAB
ANION GAP SERPL CALCULATED.3IONS-SCNC: 8 MMOL/L (ref 5–15)
BACTERIA SPEC AEROBE CULT: NORMAL
BACTERIA SPEC AEROBE CULT: NORMAL
BASOPHILS # BLD AUTO: 0.08 10*3/MM3 (ref 0–0.2)
BASOPHILS NFR BLD AUTO: 1.1 % (ref 0–1.5)
BUN SERPL-MCNC: 27 MG/DL (ref 8–23)
BUN/CREAT SERPL: 14.8 (ref 7–25)
CALCIUM SPEC-SCNC: 8.7 MG/DL (ref 8.6–10.5)
CHLORIDE SERPL-SCNC: 100 MMOL/L (ref 98–107)
CO2 SERPL-SCNC: 23 MMOL/L (ref 22–29)
CREAT SERPL-MCNC: 1.82 MG/DL (ref 0.76–1.27)
DEPRECATED RDW RBC AUTO: 45.9 FL (ref 37–54)
EGFRCR SERPLBLD CKD-EPI 2021: 38 ML/MIN/1.73
EOSINOPHIL # BLD AUTO: 0.18 10*3/MM3 (ref 0–0.4)
EOSINOPHIL NFR BLD AUTO: 2.4 % (ref 0.3–6.2)
ERYTHROCYTE [DISTWIDTH] IN BLOOD BY AUTOMATED COUNT: 13.6 % (ref 12.3–15.4)
GLUCOSE BLDC GLUCOMTR-MCNC: 105 MG/DL (ref 70–130)
GLUCOSE BLDC GLUCOMTR-MCNC: 109 MG/DL (ref 70–130)
GLUCOSE BLDC GLUCOMTR-MCNC: 110 MG/DL (ref 70–130)
GLUCOSE BLDC GLUCOMTR-MCNC: 171 MG/DL (ref 70–130)
GLUCOSE SERPL-MCNC: 113 MG/DL (ref 65–99)
HCT VFR BLD AUTO: 40.6 % (ref 37.5–51)
HGB BLD-MCNC: 13.3 G/DL (ref 13–17.7)
IMM GRANULOCYTES # BLD AUTO: 0.03 10*3/MM3 (ref 0–0.05)
IMM GRANULOCYTES NFR BLD AUTO: 0.4 % (ref 0–0.5)
LYMPHOCYTES # BLD AUTO: 2.4 10*3/MM3 (ref 0.7–3.1)
LYMPHOCYTES NFR BLD AUTO: 31.7 % (ref 19.6–45.3)
MCH RBC QN AUTO: 30.4 PG (ref 26.6–33)
MCHC RBC AUTO-ENTMCNC: 32.8 G/DL (ref 31.5–35.7)
MCV RBC AUTO: 92.7 FL (ref 79–97)
MONOCYTES # BLD AUTO: 0.67 10*3/MM3 (ref 0.1–0.9)
MONOCYTES NFR BLD AUTO: 8.9 % (ref 5–12)
NEUTROPHILS NFR BLD AUTO: 4.2 10*3/MM3 (ref 1.7–7)
NEUTROPHILS NFR BLD AUTO: 55.5 % (ref 42.7–76)
NRBC BLD AUTO-RTO: 0 /100 WBC (ref 0–0.2)
PLATELET # BLD AUTO: 219 10*3/MM3 (ref 140–450)
PMV BLD AUTO: 10.2 FL (ref 6–12)
POTASSIUM SERPL-SCNC: 4 MMOL/L (ref 3.5–5.2)
RBC # BLD AUTO: 4.38 10*6/MM3 (ref 4.14–5.8)
SODIUM SERPL-SCNC: 131 MMOL/L (ref 136–145)
WBC NRBC COR # BLD AUTO: 7.56 10*3/MM3 (ref 3.4–10.8)

## 2024-09-02 PROCEDURE — 94799 UNLISTED PULMONARY SVC/PX: CPT

## 2024-09-02 PROCEDURE — 80048 BASIC METABOLIC PNL TOTAL CA: CPT | Performed by: INTERNAL MEDICINE

## 2024-09-02 PROCEDURE — 82948 REAGENT STRIP/BLOOD GLUCOSE: CPT

## 2024-09-02 PROCEDURE — 94761 N-INVAS EAR/PLS OXIMETRY MLT: CPT

## 2024-09-02 PROCEDURE — 85025 COMPLETE CBC W/AUTO DIFF WBC: CPT | Performed by: INTERNAL MEDICINE

## 2024-09-02 PROCEDURE — 94664 DEMO&/EVAL PT USE INHALER: CPT

## 2024-09-02 RX ADMIN — Medication 1000 UNITS: at 10:13

## 2024-09-02 RX ADMIN — FUROSEMIDE 40 MG: 40 TABLET ORAL at 17:46

## 2024-09-02 RX ADMIN — AMOXICILLIN AND CLAVULANATE POTASSIUM 1 TABLET: 875; 125 TABLET, FILM COATED ORAL at 21:22

## 2024-09-02 RX ADMIN — GUAIFENESIN 300 MG: 100 SOLUTION ORAL at 13:19

## 2024-09-02 RX ADMIN — CLOPIDOGREL BISULFATE 75 MG: 75 TABLET, FILM COATED ORAL at 10:13

## 2024-09-02 RX ADMIN — ATORVASTATIN CALCIUM 80 MG: 80 TABLET, FILM COATED ORAL at 21:22

## 2024-09-02 RX ADMIN — IPRATROPIUM BROMIDE AND ALBUTEROL SULFATE 3 ML: .5; 3 SOLUTION RESPIRATORY (INHALATION) at 06:48

## 2024-09-02 RX ADMIN — IPRATROPIUM BROMIDE AND ALBUTEROL SULFATE 3 ML: .5; 3 SOLUTION RESPIRATORY (INHALATION) at 20:36

## 2024-09-02 RX ADMIN — Medication 5 MG: at 21:22

## 2024-09-02 RX ADMIN — SENNOSIDES AND DOCUSATE SODIUM 2 TABLET: 50; 8.6 TABLET ORAL at 21:22

## 2024-09-02 RX ADMIN — ASPIRIN 81 MG: 81 TABLET, COATED ORAL at 10:13

## 2024-09-02 RX ADMIN — CARVEDILOL 6.25 MG: 6.25 TABLET, FILM COATED ORAL at 17:46

## 2024-09-02 RX ADMIN — FUROSEMIDE 40 MG: 40 TABLET ORAL at 10:13

## 2024-09-02 RX ADMIN — LISINOPRIL 20 MG: 20 TABLET ORAL at 10:13

## 2024-09-02 RX ADMIN — SENNOSIDES AND DOCUSATE SODIUM 2 TABLET: 50; 8.6 TABLET ORAL at 10:13

## 2024-09-02 RX ADMIN — CARVEDILOL 6.25 MG: 6.25 TABLET, FILM COATED ORAL at 10:13

## 2024-09-02 RX ADMIN — AMOXICILLIN AND CLAVULANATE POTASSIUM 1 TABLET: 875; 125 TABLET, FILM COATED ORAL at 10:13

## 2024-09-02 RX ADMIN — IPRATROPIUM BROMIDE AND ALBUTEROL SULFATE 3 ML: .5; 3 SOLUTION RESPIRATORY (INHALATION) at 11:25

## 2024-09-02 RX ADMIN — GUAIFENESIN 300 MG: 100 SOLUTION ORAL at 17:46

## 2024-09-02 NOTE — PLAN OF CARE
Problem: Fall Injury Risk  Goal: Absence of Fall and Fall-Related Injury  Outcome: Ongoing, Progressing     Problem: Skin Injury Risk Increased  Goal: Skin Health and Integrity  Outcome: Ongoing, Progressing   Goal Outcome Evaluation:  Plan of Care Reviewed With: patient        Progress: no change  Outcome Evaluation: Pt appeared to sleep well after melatonin, alert and oriented x2-3, coop with care, voiding per urinal, no c/o soa, repositions self frequently.

## 2024-09-02 NOTE — PROGRESS NOTES
Name: Tigre Amaral ADMIT: 2024   : 1950  PCP: Tara Encarnacion MD    MRN: [id number] LOS: 6 days   AGE/SEX: 74 y.o. male  ROOM: [facility]     Subjective   Subjective   Denies any current complaint. Lying in bed     Objective   Objective   Vital Signs  Temp:  [97.3 °F (36.3 °C)-98.4 °F (36.9 °C)] 97.3 °F (36.3 °C)  Heart Rate:  [62-81] 72  Resp:  [16-20] 20  BP: (117-126)/(72-88) 126/84  SpO2:  [96 %-100 %] 100 %  on   ;   Device (Oxygen Therapy): room air  Body mass index is 22.79 kg/m².    Physical Exam  Constitutional:       General: He is not in acute distress.     Appearance: He is not toxic-appearing.   HENT:      Head: Normocephalic and atraumatic.   Cardiovascular:      Rate and Rhythm: Normal rate and regular rhythm.   Pulmonary:      Effort: Pulmonary effort is normal. No respiratory distress.   Abdominal:      General: Bowel sounds are normal.      Palpations: Abdomen is soft.      Tenderness: There is no abdominal tenderness. There is no guarding or rebound.   Musculoskeletal:         General: No swelling.   Skin:     General: Skin is warm and dry.   Neurological:      Mental Status: He is alert. Mental status is at baseline.   Psychiatric:         Mood and Affect: Mood normal.         Behavior: Behavior normal.     Results Review  I reviewed the patient's new clinical results.  Results from last 7 days   Lab Units 24  0238 24  02424  0259 24  0349   WBC 10*3/mm3 7.56 8.08 7.64 7.71   HEMOGLOBIN g/dL 13.3 13.0 12.2* 13.1   PLATELETS 10*3/mm3 219 229 210 206     Results from last 7 days   Lab Units 24  0238 24  0241 24  1719 24  0259 24  0205   SODIUM mmol/L 131* 137  --  134* 138   POTASSIUM mmol/L 4.0 4.1 4.6 2.9* 4.0   CHLORIDE mmol/L 100 102  --  98 103   CO2 mmol/L 23.0 23.9  --  24.0 23.0   BUN mg/dL 27* 22  --  21 24*   CREATININE mg/dL 1.82* 1.96*  --  1.63* 1.74*   GLUCOSE mg/dL 113* 101*  --  151* 94     Lab Results    Component Value Date    ANIONGAP 8.0 09/02/2024     Estimated Creatinine Clearance: 33.2 mL/min (A) (by C-G formula based on SCr of 1.82 mg/dL (H)).   Lab Results   Component Value Date    EGFR 38.0 (L) 09/02/2024     Results from last 7 days   Lab Units 08/27/24  2018   ALBUMIN g/dL 3.8   BILIRUBIN mg/dL 0.9   ALK PHOS U/L 119*   AST (SGOT) U/L 25   ALT (SGPT) U/L 32     Results from last 7 days   Lab Units 09/02/24  0238 09/01/24  0241 08/31/24  0259 08/30/24  0602 08/30/24  0205 08/29/24  0428 08/27/24 2018   CALCIUM mg/dL 8.7 8.8 8.1*  --  8.8   < > 9.2   ALBUMIN g/dL  --   --   --   --   --   --  3.8   MAGNESIUM mg/dL  --  2.3  --  2.2 2.5*  --   --     < > = values in this interval not displayed.     Results from last 7 days   Lab Units 08/27/24  2018   PROCALCITONIN ng/mL 0.07     Glucose   Date/Time Value Ref Range Status   09/02/2024 0606 105 70 - 130 mg/dL Final   09/01/2024 2021 117 70 - 130 mg/dL Final   09/01/2024 1618 122 70 - 130 mg/dL Final   09/01/2024 1115 133 (H) 70 - 130 mg/dL Final   09/01/2024 0549 97 70 - 130 mg/dL Final   08/31/2024 2137 111 70 - 130 mg/dL Final   08/31/2024 1632 147 (H) 70 - 130 mg/dL Final       No radiology results for the last day    Scheduled Meds  amoxicillin-clavulanate, 1 tablet, Oral, Q12H  aspirin, 81 mg, Oral, Daily  atorvastatin, 80 mg, Oral, Nightly  carvedilol, 6.25 mg, Oral, BID With Meals  cholecalciferol, 1,000 Units, Oral, Daily  clopidogrel, 75 mg, Oral, Daily  famotidine, 10 mg, Intravenous, Q12H  furosemide, 40 mg, Oral, BID  guaifenesin, 300 mg, Oral, Q6H  ipratropium-albuterol, 3 mL, Nebulization, Q6H - RT  lisinopril, 20 mg, Oral, Daily  sennosides-docusate, 2 tablet, Oral, BID  sodium chloride, 10 mL, Intravenous, Q12H    Continuous Infusions   PRN Meds    acetaminophen **OR** acetaminophen **OR** acetaminophen    aluminum-magnesium hydroxide-simethicone    senna-docusate sodium **AND** polyethylene glycol **AND** bisacodyl **AND** bisacodyl     Calcium Replacement - Follow Nurse / BPA Driven Protocol    Magnesium Cardiology Dose Replacement - Follow Nurse / BPA Driven Protocol    melatonin    nitroglycerin    ondansetron ODT **OR** ondansetron    Phosphorus Replacement - Follow Nurse / BPA Driven Protocol    Potassium Replacement - Follow Nurse / BPA Driven Protocol    sodium chloride     Diet  Diet: Regular/House; No Mixed Consistencies; Texture: Mechanical Ground (NDD 2); Fluid Consistency: Thin (IDDSI 0)    Results for orders placed during the hospital encounter of 08/27/24    Adult Transthoracic Echo Complete W/ Cont if Necessary Per Protocol    Interpretation Summary    The left ventricular cavity is severely dilated.    Left ventricular systolic function is severely decreased. Estimated left ventricular EF = 10%    Left ventricular diastolic function is consistent with (grade II w/high LAP) pseudonormalization.    The right ventricular cavity is mildly dilated. Severely reduced right ventricular systolic function noted.    The left atrial cavity is moderate to severely dilated.    Moderate aortic valve regurgitation is present.    Mild to moderate mitral valve regurgitation is present.    Mild to moderate tricuspid valve regurgitation is present.    Calculated right ventricular systolic pressure from tricuspid regurgitation is 46 mmHg.    The ascending aorta is mildly dilated at 3.9 cm    There is a small (<1cm) circumferential pericardial effusion. There is no evidence of cardiac tamponade.        Assessment/Plan     Active Hospital Problems    Diagnosis  POA    **Acute on chronic combined systolic (congestive) and diastolic (congestive) heart failure [I50.43]  Yes    Pacemaker [Z95.0]  Yes    Aspiration pneumonia [J69.0]  Yes    Dysphagia [R13.10]  Yes    Confusion [R41.0]  Yes    Dementia [F03.90]  Yes    History of CVA (cerebrovascular accident) [Z86.73]  Not Applicable    Left-sided weakness [R53.1]  Yes    Tobacco abuse [Z72.0]  Yes     Metabolic encephalopathy [G93.41]  Yes    Bilateral carotid artery stenosis [I65.23]  Yes    Essential hypertension [I10]  Yes      Resolved Hospital Problems   No resolved problems to display.     74 y.o. 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 diuretics  Cardiology following  Potassium corrected magnesium okay    Aspiration pneumonia  Oropharyngeal dysphagia  Augmentin  SLP: Diet: Regular/House; No Mixed Consistencies; Texture: Mechanical Ground (NDD 2); Fluid Consistency: Thin (IDDSI 0)   VFSS prior to discharge. Maybe tomorrow    Dementia delirium precautions  Smoker    DVT prophylaxis  SCDs    Discharge  Home with family with home health ([facility]) after possibly VFSS tomorrow  Expected Discharge Date: 9/3/2024; Expected Discharge Time:     Discussed with patient and nursing staff    Jason Araujo MD  [facility]  09/02/24

## 2024-09-03 ENCOUNTER — APPOINTMENT (OUTPATIENT)
Dept: GENERAL RADIOLOGY | Facility: HOSPITAL | Age: 74
End: 2024-09-03
Payer: OTHER GOVERNMENT

## 2024-09-03 ENCOUNTER — READMISSION MANAGEMENT (OUTPATIENT)
Dept: CALL CENTER | Facility: HOSPITAL | Age: 74
End: 2024-09-03
Payer: OTHER GOVERNMENT

## 2024-09-03 VITALS
HEIGHT: 68 IN | RESPIRATION RATE: 18 BRPM | HEART RATE: 63 BPM | WEIGHT: 149.91 LBS | SYSTOLIC BLOOD PRESSURE: 105 MMHG | TEMPERATURE: 97.5 F | DIASTOLIC BLOOD PRESSURE: 56 MMHG | OXYGEN SATURATION: 99 % | BODY MASS INDEX: 22.72 KG/M2

## 2024-09-03 LAB
ANION GAP SERPL CALCULATED.3IONS-SCNC: 13 MMOL/L (ref 5–15)
BASOPHILS # BLD AUTO: 0.07 10*3/MM3 (ref 0–0.2)
BASOPHILS NFR BLD AUTO: 0.9 % (ref 0–1.5)
BUN SERPL-MCNC: 24 MG/DL (ref 8–23)
BUN/CREAT SERPL: 14.8 (ref 7–25)
CALCIUM SPEC-SCNC: 8.7 MG/DL (ref 8.6–10.5)
CHLORIDE SERPL-SCNC: 101 MMOL/L (ref 98–107)
CO2 SERPL-SCNC: 20 MMOL/L (ref 22–29)
CREAT SERPL-MCNC: 1.62 MG/DL (ref 0.76–1.27)
DEPRECATED RDW RBC AUTO: 47.1 FL (ref 37–54)
EGFRCR SERPLBLD CKD-EPI 2021: 43.7 ML/MIN/1.73
EOSINOPHIL # BLD AUTO: 0.2 10*3/MM3 (ref 0–0.4)
EOSINOPHIL NFR BLD AUTO: 2.5 % (ref 0.3–6.2)
ERYTHROCYTE [DISTWIDTH] IN BLOOD BY AUTOMATED COUNT: 13.9 % (ref 12.3–15.4)
GLUCOSE BLDC GLUCOMTR-MCNC: 105 MG/DL (ref 70–130)
GLUCOSE BLDC GLUCOMTR-MCNC: 95 MG/DL (ref 70–130)
GLUCOSE SERPL-MCNC: 87 MG/DL (ref 65–99)
HCT VFR BLD AUTO: 38.3 % (ref 37.5–51)
HGB BLD-MCNC: 12.8 G/DL (ref 13–17.7)
IMM GRANULOCYTES # BLD AUTO: 0.03 10*3/MM3 (ref 0–0.05)
IMM GRANULOCYTES NFR BLD AUTO: 0.4 % (ref 0–0.5)
LYMPHOCYTES # BLD AUTO: 2.7 10*3/MM3 (ref 0.7–3.1)
LYMPHOCYTES NFR BLD AUTO: 33.4 % (ref 19.6–45.3)
MCH RBC QN AUTO: 31 PG (ref 26.6–33)
MCHC RBC AUTO-ENTMCNC: 33.4 G/DL (ref 31.5–35.7)
MCV RBC AUTO: 92.7 FL (ref 79–97)
MONOCYTES # BLD AUTO: 0.75 10*3/MM3 (ref 0.1–0.9)
MONOCYTES NFR BLD AUTO: 9.3 % (ref 5–12)
NEUTROPHILS NFR BLD AUTO: 4.34 10*3/MM3 (ref 1.7–7)
NEUTROPHILS NFR BLD AUTO: 53.5 % (ref 42.7–76)
NRBC BLD AUTO-RTO: 0 /100 WBC (ref 0–0.2)
PLATELET # BLD AUTO: 203 10*3/MM3 (ref 140–450)
PMV BLD AUTO: 9.9 FL (ref 6–12)
POTASSIUM SERPL-SCNC: 3.4 MMOL/L (ref 3.5–5.2)
POTASSIUM SERPL-SCNC: 4.5 MMOL/L (ref 3.5–5.2)
RBC # BLD AUTO: 4.13 10*6/MM3 (ref 4.14–5.8)
SODIUM SERPL-SCNC: 134 MMOL/L (ref 136–145)
WBC NRBC COR # BLD AUTO: 8.09 10*3/MM3 (ref 3.4–10.8)

## 2024-09-03 PROCEDURE — 82948 REAGENT STRIP/BLOOD GLUCOSE: CPT

## 2024-09-03 PROCEDURE — 92611 MOTION FLUOROSCOPY/SWALLOW: CPT

## 2024-09-03 PROCEDURE — 94799 UNLISTED PULMONARY SVC/PX: CPT

## 2024-09-03 PROCEDURE — 97530 THERAPEUTIC ACTIVITIES: CPT

## 2024-09-03 PROCEDURE — 94664 DEMO&/EVAL PT USE INHALER: CPT

## 2024-09-03 PROCEDURE — 80048 BASIC METABOLIC PNL TOTAL CA: CPT | Performed by: INTERNAL MEDICINE

## 2024-09-03 PROCEDURE — 85025 COMPLETE CBC W/AUTO DIFF WBC: CPT | Performed by: INTERNAL MEDICINE

## 2024-09-03 PROCEDURE — 74230 X-RAY XM SWLNG FUNCJ C+: CPT

## 2024-09-03 PROCEDURE — 84132 ASSAY OF SERUM POTASSIUM: CPT | Performed by: HOSPITALIST

## 2024-09-03 RX ORDER — FUROSEMIDE 40 MG
40 TABLET ORAL 2 TIMES DAILY
Qty: 60 TABLET | Refills: 0 | Status: SHIPPED | OUTPATIENT
Start: 2024-09-03

## 2024-09-03 RX ORDER — POTASSIUM CHLORIDE 1.5 G/1.58G
40 POWDER, FOR SOLUTION ORAL ONCE
Status: COMPLETED | OUTPATIENT
Start: 2024-09-03 | End: 2024-09-03

## 2024-09-03 RX ORDER — POTASSIUM CHLORIDE 750 MG/1
40 TABLET, FILM COATED, EXTENDED RELEASE ORAL EVERY 4 HOURS
Status: DISCONTINUED | OUTPATIENT
Start: 2024-09-03 | End: 2024-09-03

## 2024-09-03 RX ADMIN — CARVEDILOL 6.25 MG: 6.25 TABLET, FILM COATED ORAL at 10:03

## 2024-09-03 RX ADMIN — SENNOSIDES AND DOCUSATE SODIUM 2 TABLET: 50; 8.6 TABLET ORAL at 10:03

## 2024-09-03 RX ADMIN — BARIUM SULFATE 55 ML: 0.81 POWDER, FOR SUSPENSION ORAL at 11:11

## 2024-09-03 RX ADMIN — GUAIFENESIN 300 MG: 100 SOLUTION ORAL at 10:02

## 2024-09-03 RX ADMIN — BARIUM SULFATE 135 ML: 980 POWDER, FOR SUSPENSION ORAL at 11:11

## 2024-09-03 RX ADMIN — POTASSIUM CHLORIDE 40 MEQ: 1.5 FOR SOLUTION ORAL at 10:06

## 2024-09-03 RX ADMIN — POTASSIUM CHLORIDE 40 MEQ: 1.5 FOR SOLUTION ORAL at 16:14

## 2024-09-03 RX ADMIN — Medication 1000 UNITS: at 10:03

## 2024-09-03 RX ADMIN — CLOPIDOGREL BISULFATE 75 MG: 75 TABLET, FILM COATED ORAL at 13:43

## 2024-09-03 RX ADMIN — BARIUM SULFATE 1 TEASPOON(S): 0.6 CREAM ORAL at 11:11

## 2024-09-03 RX ADMIN — ASPIRIN 81 MG: 81 TABLET, COATED ORAL at 10:03

## 2024-09-03 RX ADMIN — GUAIFENESIN 300 MG: 100 SOLUTION ORAL at 16:13

## 2024-09-03 RX ADMIN — IPRATROPIUM BROMIDE AND ALBUTEROL SULFATE 3 ML: .5; 3 SOLUTION RESPIRATORY (INHALATION) at 12:08

## 2024-09-03 RX ADMIN — LISINOPRIL 20 MG: 20 TABLET ORAL at 10:11

## 2024-09-03 RX ADMIN — BARIUM SULFATE 50 ML: 400 SUSPENSION ORAL at 11:11

## 2024-09-03 RX ADMIN — FUROSEMIDE 40 MG: 40 TABLET ORAL at 10:03

## 2024-09-03 RX ADMIN — GUAIFENESIN 300 MG: 100 SOLUTION ORAL at 01:31

## 2024-09-03 RX ADMIN — AMOXICILLIN AND CLAVULANATE POTASSIUM 1 TABLET: 875; 125 TABLET, FILM COATED ORAL at 13:43

## 2024-09-03 NOTE — DISCHARGE SUMMARY
Date of Discharge:  9/3/2024    PCP: Tara Encarnacion MD    Discharge Diagnosis:   Active Hospital Problems    Diagnosis  POA    **Acute on chronic combined systolic (congestive) and diastolic (congestive) heart failure [I50.43]  Yes    Pacemaker [Z95.0]  Yes    Aspiration pneumonia [J69.0]  Yes    Dysphagia [R13.10]  Yes    Confusion [R41.0]  Yes    Dementia [F03.90]  Yes    History of CVA (cerebrovascular accident) [Z86.73]  Not Applicable    Left-sided weakness [R53.1]  Yes    Tobacco abuse [Z72.0]  Yes    Metabolic encephalopathy [G93.41]  Yes    Bilateral carotid artery stenosis [I65.23]  Yes    Essential hypertension [I10]  Yes      Resolved Hospital Problems   No resolved problems to display.          Consults       Date and Time Order Name Status Description    8/28/2024 12:12 AM Inpatient Cardiology Consult Completed     8/27/2024  9:19 PM [facility] (on-call MD unless specified) Details            Hospital Course  74 y.o. male with a history of chronic systolic and diastolic heart failure, pacemaker/AICD, hypertension, dementia, peripheral vascular disease, previous stroke, hyperlipidemia, CKD presented with shortness of breath and chest tightness. He was admitted for acute exacerbation of heart failure and aspiration pneumonia. He also had metabolic encephalopathy on baseline dementia.    Cardiology was consulted. He was gently diuresed. He was started on antibiotics to treat aspiration pneumonia. Echocardiogram is below. Antibiotics and diuretics were transitioned to PO. Dilated cardiomyopathy was felt due to alcohol use. He had a VFSS today and speech recommends Diet: Regular/House; No Mixed Consistencies; Texture: Soft to Chew (NDD 3); Soft to Chew: Chopped Meat; Fluid Consistency: Thin (IDDSI 0).    Patient has been stable for the last few days and wants to go home. He is going to go home with home health ([facility]). He had mildly enlarged lymph nodes that would need recheck CT with contrast in 3  months. Will defer to PCP given dementia would recommend continued goals of care discussion. Patient is DNR.     Results for orders placed during the hospital encounter of 08/27/24    Adult Transthoracic Echo Complete W/ Cont if Necessary Per Protocol    Interpretation Summary    The left ventricular cavity is severely dilated.    Left ventricular systolic function is severely decreased. Estimated left ventricular EF = 10%    Left ventricular diastolic function is consistent with (grade II w/high LAP) pseudonormalization.    The right ventricular cavity is mildly dilated. Severely reduced right ventricular systolic function noted.    The left atrial cavity is moderate to severely dilated.    Moderate aortic valve regurgitation is present.    Mild to moderate mitral valve regurgitation is present.    Mild to moderate tricuspid valve regurgitation is present.    Calculated right ventricular systolic pressure from tricuspid regurgitation is 46 mmHg.    The ascending aorta is mildly dilated at 3.9 cm    There is a small (<1cm) circumferential pericardial effusion. There is no evidence of cardiac tamponade.       I discussed the patient's findings and my recommendations with patient and nursing staff.    Temp:  [97.5 °F (36.4 °C)-97.9 °F (36.6 °C)] 97.5 °F (36.4 °C)  Heart Rate:  [63-70] 65  Resp:  [18-20] 18  BP: (105-124)/(56-80) 105/56  Body mass index is 22.79 kg/m².    Physical Exam  Constitutional:       General: He is not in acute distress.     Appearance: He is not toxic-appearing.   HENT:      Head: Normocephalic and atraumatic.   Cardiovascular:      Rate and Rhythm: Normal rate and regular rhythm.   Pulmonary:      Effort: Pulmonary effort is normal. No respiratory distress.   Abdominal:      General: Bowel sounds are normal.      Palpations: Abdomen is soft.      Tenderness: There is no abdominal tenderness. There is no guarding or rebound.   Musculoskeletal:         General: No swelling.   Skin:     General:  Skin is warm and dry.   Neurological:      Mental Status: He is alert. Mental status is at baseline.   Psychiatric:         Mood and Affect: Mood normal.         Behavior: Behavior normal.     Disposition: Home or Self Care       Discharge Medications        New Medications        Instructions Start Date   amoxicillin-clavulanate 875-125 MG per tablet  Commonly known as: AUGMENTIN   1 tablet, Oral, Every 12 Hours Scheduled             Changes to Medications        Instructions Start Date   furosemide 40 MG tablet  Commonly known as: LASIX  What changed: when to take this   40 mg, Oral, 2 Times Daily             Continue These Medications        Instructions Start Date   aspirin 81 MG EC tablet   81 mg, Oral, Daily      atorvastatin 80 MG tablet  Commonly known as: LIPITOR   80 mg, Oral, Nightly      carvedilol 6.25 MG tablet  Commonly known as: COREG   6.25 mg, Oral, 2 Times Daily With Meals      cholecalciferol 25 MCG (1000 UT) tablet  Commonly known as: VITAMIN D3   1 tablet, Oral, Daily      clopidogrel 75 MG tablet  Commonly known as: PLAVIX   75 mg, Oral, Daily      lisinopril 20 MG tablet  Commonly known as: PRINIVIL,ZESTRIL   20 mg, Oral, Daily      potassium chloride 20 MEQ CR tablet  Commonly known as: KLOR-CON M20   20 mEq, Oral, Daily      sennosides-docusate 8.6-50 MG per tablet  Commonly known as: PERICOLACE   1 tablet             Stop These Medications      Klor-Con/EF 25 MEQ disintegrating tablet  Generic drug: potassium bicarbonate             Diet Instructions       Diet: Regular/House Diet      Discharge Diet: Regular/House Diet    Diet Modifiers / Additional Diets: No Mixed Consistencies    Texture: Soft to Chew (NDD 3)    Soft to Chew: Chopped Meat    Fluid Consistency: Thin (IDDSI 0)           Activity Instructions       Activity as Tolerated             Additional Instructions for the Follow-ups that You Need to Schedule       Call MD for problems / concerns.   As directed             Contact  information for follow-up providers       Tara Encarnacion MD Follow up in 1 week(s).    Specialty: Geriatric Medicine  Why: After hospital follow up. Consider follow-up CT chest with contrast 3 months (enlarged lymph nodes)  Contact information:  [facility]  [address]  [address]  [facility] [address]  [phone]                       Contact information for after-discharge care       Home Medical Care       [facility] .    Service: Home Health Services  Contact information:  [address], [address]  [facility] [address] [phone]                                  Future Appointments   Date Time Provider Department Center   4/8/2025 11:00 AM Sanjiv Spann MD [facility] SIMBA Araujo MD  [facility]  09/03/24    Discharge time spent greater than 30 minutes.

## 2024-09-03 NOTE — THERAPY TREATMENT NOTE
Patient Name: Tigre Amaral  : 1950    MRN: [id number]                              Today's Date: 9/3/2024       Admit Date: 2024    Visit Dx:     ICD-10-CM ICD-9-CM   1. Acute exacerbation of chronic obstructive pulmonary disease (COPD)  J44.1 491.21   2. Acute congestive heart failure, unspecified heart failure type  I50.9 428.0   3. Acute on chronic renal insufficiency  N28.9 593.9    N18.9 585.9   4. Acute metabolic encephalopathy  G93.41 348.31     Patient Active Problem List   Diagnosis    Cerebrovascular accident (CVA) due to embolism of right middle cerebral artery    Aspiration pneumonia    Acute on chronic combined systolic (congestive) and diastolic (congestive) heart failure    Essential hypertension    Stage 3a chronic kidney disease    Hyperlipidemia    Carotid artery stenosis, symptomatic, right    Tobacco abuse    Basal cell carcinoma (BCC) of skin of nose    Carotid stenosis, asymptomatic, left    Bilateral carotid artery stenosis    Pacemaker    Aspiration pneumonia    Dysphagia    Confusion    Dementia    History of CVA (cerebrovascular accident)    Left-sided weakness    Tobacco abuse    Metabolic encephalopathy     Past Medical History:   Diagnosis Date    Hyperlipidemia     Hypertension      Past Surgical History:   Procedure Laterality Date    PACEMAKER IMPLANTATION Left       General Information       Row Name 24 1500          Physical Therapy Time and Intention    Document Type therapy note (daily note)  -CS     Mode of Treatment individual therapy;physical therapy  -CS       Row Name 24 1500          General Information    Patient Profile Reviewed yes  -CS     Existing Precautions/Restrictions fall  -CS       Row Name 24 1500          Cognition    Orientation Status (Cognition) oriented x 3  -CS       Row Name 24 1500          Safety Issues, Functional Mobility    Impairments Affecting Function (Mobility) strength;endurance/activity tolerance  -CS                User Key  (r) = Recorded By, (t) = Taken By, (c) = Cosigned By      Initials Name Provider Type    CS Pilar Claire, PT Physical Therapist                   Mobility       Row Name 09/03/24 1501          Bed Mobility    Comment, (Bed Mobility) NT - UIC  -CS       Row Name 09/03/24 1501          Sit-Stand Transfer    Sit-Stand [place] (Transfers) contact guard  -CS     Assistive Device (Sit-Stand Transfers) walker, front-wheeled  -CS       Row Name 09/03/24 1501          Gait/Stairs (Locomotion)    [place] Level (Gait) contact guard  -CS     Assistive Device (Gait) walker, front-wheeled  -CS     Distance in Feet (Gait) 40  20' x 2  -CS     Deviations/Abnormal Patterns (Gait) falguni decreased;gait speed decreased;stride length decreased  -CS     Comment, (Gait/Stairs) cues for proper use of RW  -CS               User Key  (r) = Recorded By, (t) = Taken By, (c) = Cosigned By      Initials Name Provider Type    CS Pilar Claire, PT Physical Therapist                   Obj/Interventions       Row Name 09/03/24 1503          Balance    Balance Assessment sitting static balance;sitting dynamic balance;standing static balance;standing dynamic balance  -CS     Static Sitting Balance modified independence  -CS     Dynamic Sitting Balance modified independence  -CS     Position, Sitting Balance supported;sitting in chair  -CS     Static Standing Balance standby assist;contact guard  -CS     Dynamic Standing Balance contact guard  -CS     Position/Device Used, Standing Balance supported;walker, front-wheeled  -CS               User Key  (r) = Recorded By, (t) = Taken By, (c) = Cosigned By      Initials Name Provider Type    Pilar Colvin, PT Physical Therapist                   Goals/Plan    No documentation.                  Clinical Impression       Row Name 09/03/24 1503          Pain    Pretreatment Pain Rating 0/10 - no pain  -CS     Posttreatment Pain Rating 0/10 - no pain  -CS       Row  Name 09/03/24 1503          Plan of Care Review    Plan of Care Reviewed With patient  -CS     Progress no change  -CS     Outcome Evaluation Pt received sitting UIC and agreeable to PT. Pt stood and ambulated 20' x 2 c RW requiring CGA. Pt demo's a slow pace with cues provided for proper use of RW for overall safety. Pt declined further ambulation and ther-ex. PT encouraged pt to mobilize with staff throughout the day. PT will continue to follow to address functional deficits.  -CS       Row Name 09/03/24 1503          Therapy Assessment/Plan (PT)    Criteria for Skilled Interventions Met (PT) yes;meets criteria  -CS     Therapy Frequency (PT) 5 times/wk  -CS       Row Name 09/03/24 1503          Positioning and Restraints    Pre-Treatment Position sitting in chair/recliner  -CS     Post Treatment Position chair  -CS     In Chair notified nsg;reclined;call light within reach;encouraged to call for assist;exit alarm on  -CS               User Key  (r) = Recorded By, (t) = Taken By, (c) = Cosigned By      Initials Name Provider Type    CS Pilar Claire, PT Physical Therapist                   Outcome Measures       Row Name 09/03/24 1504 09/03/24 1014       How much help from another person do you currently need...    Turning from your back to your side while in flat bed without using bedrails? 4  -CS 3  -MM    Moving from lying on back to sitting on the side of a flat bed without bedrails? 4  -CS 3  -MM    Moving to and from a bed to a chair (including a wheelchair)? 4  -CS 3  -MM    Standing up from a chair using your arms (e.g., wheelchair, bedside chair)? 4  -CS 3  -MM    Climbing 3-5 steps with a railing? 3  -CS 2  -MM    To walk in hospital room? 3  -CS 3  -MM    AM-PAC 6 Clicks Score (PT) 22  -CS 17  -MM    Highest Level of Mobility Goal 7 --> Walk 25 feet or more  -CS 5 --> Static standing  -MM      Row Name 09/03/24 1504          Functional Assessment    Outcome Measure Options AM-PAC 6 Clicks Basic  Mobility (PT)  -               User Key  (r) = Recorded By, (t) = Taken By, (c) = Cosigned By      Initials Name Provider Type    Lenora Dubois, RN Registered Nurse    Pilar Colvin, PT Physical Therapist                                 Physical Therapy Education       Title: PT OT SLP Therapies (Done)       Topic: Physical Therapy (Done)       Point: Mobility training (Done)       Learning Progress Summary             Patient Acceptance, E,TB, VU,DU,NR by  at 9/3/2024 1505    Acceptance, E,TB, VU,NR by  at 9/1/2024 1206    Acceptance, E, VU by  at 8/30/2024 1525    Acceptance, E,TB,D, VU,NR by  at 8/29/2024 1418                         Point: Home exercise program (Done)       Learning Progress Summary             Patient Acceptance, E,TB, VU,DU,NR by  at 9/3/2024 1505                         Point: Body mechanics (Done)       Learning Progress Summary             Patient Acceptance, E,TB, VU,DU,NR by  at 9/3/2024 1505    Acceptance, E,TB, VU,NR by  at 9/1/2024 1206    Acceptance, E, VU by  at 8/30/2024 1525    Acceptance, E,TB,D, VU,NR by  at 8/29/2024 1418                         Point: Precautions (Done)       Learning Progress Summary             Patient Acceptance, E,TB, VU,DU,NR by  at 9/3/2024 1505    Acceptance, E,TB, VU,NR by  at 9/1/2024 1206    Acceptance, E,TB,D, VU,NR by  at 8/29/2024 1418                                         User Key       Initials Effective Dates Name Provider Type Discipline     06/16/21 -  Lashonda Chapin, PT Physical Therapist PT    EB 02/14/23 -  Juana De La Torre PTA Physical Therapist Assistant PT    ST 09/22/22 -  Mayra Henriquez PT Physical Therapist PT     09/22/22 -  Pilar Claire, PT Physical Therapist PT                  PT Recommendation and Plan     Plan of Care Reviewed With: patient  Progress: no change  Outcome Evaluation: Pt received sitting UIC and agreeable to PT. Pt stood and ambulated 20' x 2 c RW requiring CGA. Pt  demo's a slow pace with cues provided for proper use of RW for overall safety. Pt declined further ambulation and ther-ex. PT encouraged pt to mobilize with staff throughout the day. PT will continue to follow to address functional deficits.     Time Calculation:         PT Charges       Row Name 09/03/24 1505             Time Calculation    Start Time 1449  -CS      Stop Time 1459  -CS      Time Calculation (min) 10 min  -CS      PT Received On 09/03/24  -CS      PT - Next Appointment 09/04/24  -CS         Time Calculation- PT    Total Timed Code Minutes- PT 8 minute(s)  -CS         Timed Charges    05557 - PT Therapeutic Activity Minutes 8  -CS         Total Minutes    Timed Charges Total Minutes 8  -CS       Total Minutes 8  -CS                User Key  (r) = Recorded By, (t) = Taken By, (c) = Cosigned By      Initials Name Provider Type    CS Pilar Claire, PT Physical Therapist                  Therapy Charges for Today       Code Description Service Date Service Provider Modifiers Qty    09047839133 HC PT THERAPEUTIC ACT EA 15 MIN 9/3/2024 Pilar Claire, PT GP 1            PT G-Codes  Outcome Measure Options: AM-PAC 6 Clicks Basic Mobility (PT)  AM-PAC 6 Clicks Score (PT): 22  PT Discharge Summary  Anticipated Discharge Disposition (PT): home with 24/7 care, home with home health    Pilar Claire PT  9/3/2024

## 2024-09-03 NOTE — MBS/VFSS/FEES
Acute Care - Speech Language Pathology   Swallow Initial Evaluation [facility]     Patient Name: Tigre Amaral  : 1950  MRN: [id number]  Today's Date: 9/3/2024               Admit Date: 2024    Visit Dx:     ICD-10-CM ICD-9-CM   1. Acute exacerbation of chronic obstructive pulmonary disease (COPD)  J44.1 491.21   2. Acute congestive heart failure, unspecified heart failure type  I50.9 428.0   3. Acute on chronic renal insufficiency  N28.9 593.9    N18.9 585.9   4. Acute metabolic encephalopathy  G93.41 348.31     Patient Active Problem List   Diagnosis    Cerebrovascular accident (CVA) due to embolism of right middle cerebral artery    Aspiration pneumonia    Acute on chronic combined systolic (congestive) and diastolic (congestive) heart failure    Essential hypertension    Stage 3a chronic kidney disease    Hyperlipidemia    Carotid artery stenosis, symptomatic, right    Tobacco abuse    Basal cell carcinoma (BCC) of skin of nose    Carotid stenosis, asymptomatic, left    Bilateral carotid artery stenosis    Pacemaker    Aspiration pneumonia    Dysphagia    Confusion    Dementia    History of CVA (cerebrovascular accident)    Left-sided weakness    Tobacco abuse    Metabolic encephalopathy     Past Medical History:   Diagnosis Date    Hyperlipidemia     Hypertension      Past Surgical History:   Procedure Laterality Date    PACEMAKER IMPLANTATION Left        SLP Recommendation and Plan  SLP Swallowing Diagnosis: mild, oral dysphagia, pharyngeal dysphagia (24)  SLP Diet Recommendation: soft to chew textures, chopped, thin liquids (24)  Recommended Precautions and Strategies: upright posture during/after eating, small bites of food and sips of liquid (24)  SLP Rec. for Method of Medication Administration: meds whole, as tolerated (24)     Monitor for Signs of Aspiration: yes, notify SLP if any concerns (24)              Therapy Frequency  "(Swallow): PRN (09/03/24 1100)  Predicted Duration Therapy Intervention (Days): until discharge (09/03/24 1100)  Oral Care Recommendations: Oral Care BID/PRN (09/03/24 1100)                                        Plan of Care Reviewed With: patient  Outcome Evaluation: VFSS completed. Full report pending. Aspiration was not observed on this study. SLP recs continuing current diet of [facility] soft/thins. Meds as mario.      SWALLOW EVALUATION (Last 72 Hours)       SLP Adult Swallow Evaluation       Row Name 09/03/24 1100                   Rehab Evaluation    Document Type evaluation  -SH           General Information    Patient Profile Reviewed yes  -        Pertinent History Of Current Problem PNA  -        Current Method of Nutrition mechanical ground textures;thin liquids  -        Precautions/Limitations, Vision WFL;for purposes of eval  -        Precautions/Limitations, Hearing WFL;for purposes of eval  -        Prior Level of Function-Communication cognitive-linguistic impairment  -        Plans/Goals Discussed with patient;agreed upon  -        Barriers to Rehab medically complex  -           Pain    Additional Documentation Pain Scale: FACES Pre/Post-Treatment (Group)  -           Pain Scale: FACES Pre/Post-Treatment    Pain: FACES Scale, Pretreatment 0-->no hurt  -           MBS/VFSS Interpretation    VFSS Summary Patient presents with minimal oropharyngeal dysphagia characterized by swallow delay/mistiming. Swallow elicited at the valleculae with nectar/thin via cup and puree without penetration. Spill to the pyriforms before the swallow with thins via straw. No penetration during the swallow. Munching mastication with soft solids and regular. Spill to the pyriforms before the swallow with mixed. Spill past the valleculae before the swallow with regular solids. No signficant pharyngeal residue post swallow. \"Structural considerations: incidental finding of anterior bony growths of cervical " "spine c/w osteophytes at C5-C6 (defer to MD for interpretation); does not appear to obstruct bolus flow.\".  -           SLP Communication to Radiology    Summary Statement Luann Bustillos, Radiology APRN, present. No aspiration with nectar or thin. No significant pharyngeal residue post swallow with solids.  -           SLP Evaluation Clinical Impression    SLP Swallowing Diagnosis mild;oral dysphagia;pharyngeal dysphagia  -           Recommendations    Therapy Frequency (Swallow) PRN  -        Predicted Duration Therapy Intervention (Days) until discharge  -        SLP Diet Recommendation soft to chew textures;chopped;thin liquids  -        Recommended Precautions and Strategies upright posture during/after eating;small bites of food and sips of liquid  -        Oral Care Recommendations Oral Care BID/PRN  -        SLP Rec. for Method of Medication Administration meds whole;as tolerated  -        Monitor for Signs of Aspiration yes;notify SLP if any concerns  -           Swallow Goals (SLP)    Swallow LTGs Patient will demonstrate functional swallow for  -           (LTG) Patient will demonstrate functional swallow for    Diet Texture (Demonstrate functional swallow) mechanical ground textures  -        Liquid viscosity (Demonstrate functional swallow) thin liquids  -        [place] (Demonstrate functional swallow) independently (over 90% accuracy)  [facility]        Time Frame (Demonstrate functional swallow) by discharge  [facility]        Progress/Outcomes (Demonstrate functional swallow) goal met  -                  User Key  (r) = Recorded By, (t) = Taken By, (c) = Cosigned By      Initials Name Effective Dates     Kori Dacosta, SLP 01/05/24 -                     EDUCATION  The patient has been educated in the following areas:   Dysphagia (Swallowing Impairment).        SLP GOALS       Row Name 09/03/24 1100             (LTG) Patient will demonstrate functional swallow for    Diet Texture " (Demonstrate functional swallow) mechanical ground textures  -      Liquid viscosity (Demonstrate functional swallow) thin liquids  -      [place] (Demonstrate functional swallow) independently (over 90% accuracy)  -      Time Frame (Demonstrate functional swallow) by discharge  -      Progress/Outcomes (Demonstrate functional swallow) goal met  -                User Key  (r) = Recorded By, (t) = Taken By, (c) = Cosigned By      Initials Name Provider Type     Kori Dacosta SLP Speech and Language Pathologist                         Time Calculation:    Time Calculation- SLP       Row Name 09/03/24 1447             Time Calculation- [facility]    SLP Start Time 1100  -      SLP Received On 09/03/24  -                User Key  (r) = Recorded By, (t) = Taken By, (c) = Cosigned By      Initials Name Provider Type     Kori Dacosta SLP Speech and Language Pathologist                    Therapy Charges for Today       Code Description Service Date Service Provider Modifiers Qty    [id number] HC ST MOTION FLUORO EVAL SWALLOW 5 9/3/2024 Kori Dacosta SLP GN 1                 FREDY Kim  9/3/2024

## 2024-09-03 NOTE — PLAN OF CARE
Goal Outcome Evaluation:  Plan of Care Reviewed With: patient        Progress: no change  Outcome Evaluation: Pt received sitting UIC and agreeable to PT. Pt stood and ambulated 20' x 2 c RW requiring CGA. Pt demo's a slow pace with cues provided for proper use of RW for overall safety. Pt declined further ambulation and ther-ex. PT encouraged pt to mobilize with staff throughout the day. PT will continue to follow to address functional deficits.      Anticipated Discharge Disposition (PT): home with 24/7 care, home with home health

## 2024-09-03 NOTE — PLAN OF CARE
Problem: Fall Injury Risk  Goal: Absence of Fall and Fall-Related Injury  Intervention: Identify and Manage Contributors  Recent Flowsheet Documentation  Taken 9/3/2024 1410 by Lenora Gentile RN  Self-Care Promotion: BADL personal objects within reach  Taken 9/3/2024 1014 by Lenora Gentile RN  Self-Care Promotion: BADL personal objects within reach     Problem: Fall Injury Risk  Goal: Absence of Fall and Fall-Related Injury  Intervention: Promote Injury-Free Environment  Recent Flowsheet Documentation  Taken 9/3/2024 1410 by Lenora Gentile RN  Safety Promotion/Fall Prevention:   safety round/check completed   room organization consistent   assistive device/personal items within reach   activity supervised  Taken 9/3/2024 1100 by Lenora Gentile RN  Safety Promotion/Fall Prevention: (video swallow test) patient off unit  Taken 9/3/2024 1014 by Lenora Gentile RN  Safety Promotion/Fall Prevention:   activity supervised   assistive device/personal items within reach   safety round/check completed   room organization consistent   nonskid shoes/slippers when out of bed     Problem: Adult Inpatient Plan of Care  Goal: Absence of Hospital-Acquired Illness or Injury  Intervention: Identify and Manage Fall Risk  Recent Flowsheet Documentation  Taken 9/3/2024 1410 by Lenora Gentile RN  Safety Promotion/Fall Prevention:   safety round/check completed   room organization consistent   assistive device/personal items within reach   activity supervised  Taken 9/3/2024 1100 by Lenora Gentile RN  Safety Promotion/Fall Prevention: (video swallow test) patient off unit  Taken 9/3/2024 1014 by Lenora Gentile RN  Safety Promotion/Fall Prevention:   activity supervised   assistive device/personal items within reach   safety round/check completed   room organization consistent   nonskid shoes/slippers when out of bed   Goal Outcome Evaluation:           Progress: improving  Outcome Evaluation: Patient AO x 2 disoriented time and place, able to  participated partially with PT take his medication with the help of daughter in law took potassium replecement x2 good appetite, have bowel movement today and voided in the toilet. will bve discharge home daughter in law inform she will trasported home.

## 2024-09-03 NOTE — DISCHARGE PLACEMENT REQUEST
"Tigre Amaral (74 y.o. Male)       Date of Birth   1950    Social Security Number       Address   [address] [place] [address]    Home Phone   [phone]    MRN   [id number]       Buddhism   None    Marital Status   Single                            Admission Date   8/27/24    Admission Type   Emergency    Admitting Provider   Cheo Chase MD    Attending Provider   Jason Araujo MD    Department, Room/Bed   [address], [address]       Discharge Date       Discharge Disposition       Discharge Destination                                 Attending Provider: Jason Araujo MD    Allergies: No Known Allergies    Isolation: None   Infection: None   Code Status: No CPR    Ht: 172.7 cm (68\")   Wt: 68 kg (149 lb 14.6 oz)    Admission Cmt: None   Principal Problem: Acute on chronic combined systolic (congestive) and diastolic (congestive) heart failure [I50.43]                   Active Insurance as of 8/27/2024       Primary Coverage       Payor Plan Insurance Group Employer/Plan Group    [facility] [employer]        Payor Plan Address Payor Plan Phone Number Payor Plan Fax Number Effective Dates    [address] [phone]  1/1/2022 - None Entered    [facility] [id number]         Subscriber Name Subscriber Birth Date Member ID       TIGRE AMARAL 1950 [id number]                     Emergency Contacts        (Rel.) Home Phone Work Phone Mobile Phone    Rola Jaime (Daughter) [phone] -- [phone]                "

## 2024-09-03 NOTE — PLAN OF CARE
Goal Outcome Evaluation:  Plan of Care Reviewed With: patient           Outcome Evaluation: VFSS completed. Full report pending. Aspiration was not observed on this study. SLP recs soft/chopped and thins. Small bites/drinks. Meds as mario.

## 2024-09-03 NOTE — PROGRESS NOTES
Name: Tigre Amaral ADMIT: 2024   : 1950  PCP: Tara Encarnacion MD    MRN: [id number] LOS: 7 days   AGE/SEX: 74 y.o. male  ROOM: [facility]     Subjective   Subjective   Denies any current complaint.      Objective   Objective   Vital Signs  Temp:  [97.3 °F (36.3 °C)-97.9 °F (36.6 °C)] 97.9 °F (36.6 °C)  Heart Rate:  [63-70] 68  Resp:  [19-20] 19  BP: (107-124)/(62-80) 107/62  SpO2:  [98 %-99 %] 99 %  on   ;   Device (Oxygen Therapy): room air  Body mass index is 22.79 kg/m².    Physical Exam  Constitutional:       General: He is not in acute distress.     Appearance: He is not toxic-appearing.   HENT:      Head: Normocephalic and atraumatic.   Cardiovascular:      Rate and Rhythm: Normal rate and regular rhythm.   Pulmonary:      Effort: Pulmonary effort is normal. No respiratory distress.   Abdominal:      General: Bowel sounds are normal.      Palpations: Abdomen is soft.      Tenderness: There is no abdominal tenderness. There is no guarding or rebound.   Musculoskeletal:         General: No swelling.   Skin:     General: Skin is warm and dry.   Neurological:      Mental Status: He is alert. Mental status is at baseline.   Psychiatric:         Mood and Affect: Mood normal.         Behavior: Behavior normal.     Results Review  I reviewed the patient's new clinical results.  Results from last 7 days   Lab Units 24  02524  02324  02424  0259   WBC 10*3/mm3 8.09 7.56 8.08 7.64   HEMOGLOBIN g/dL 12.8* 13.3 13.0 12.2*   PLATELETS 10*3/mm3 203 219 229 210     Results from last 7 days   Lab Units 24  0257 24  0238 24  02424  1719 24  0259   SODIUM mmol/L 134* 131* 137  --  134*   POTASSIUM mmol/L 3.4* 4.0 4.1 4.6 2.9*   CHLORIDE mmol/L 101 100 102  --  98   CO2 mmol/L 20.0* 23.0 23.9  --  24.0   BUN mg/dL 24* 27* 22  --  21   CREATININE mg/dL 1.62* 1.82* 1.96*  --  1.63*   GLUCOSE mg/dL 87 113* 101*  --  151*     Lab Results   Component  Value Date    ANIONGAP 13.0 09/03/2024     Estimated Creatinine Clearance: 37.3 mL/min (A) (by C-G formula based on SCr of 1.62 mg/dL (H)).   Lab Results   Component Value Date    EGFR 43.7 (L) 09/03/2024     Results from last 7 days   Lab Units 08/27/24  2018   ALBUMIN g/dL 3.8   BILIRUBIN mg/dL 0.9   ALK PHOS U/L 119*   AST (SGOT) U/L 25   ALT (SGPT) U/L 32     Results from last 7 days   Lab Units 09/03/24  0257 09/02/24  0238 09/01/24  0241 08/31/24  0259 08/30/24  0602 08/30/24  0205 08/29/24  0428 08/27/24 2018   CALCIUM mg/dL 8.7 8.7 8.8 8.1*  --  8.8   < > 9.2   ALBUMIN g/dL  --   --   --   --   --   --   --  3.8   MAGNESIUM mg/dL  --   --  2.3  --  2.2 2.5*  --   --     < > = values in this interval not displayed.     Results from last 7 days   Lab Units 08/27/24  2018   PROCALCITONIN ng/mL 0.07     Glucose   Date/Time Value Ref Range Status   09/03/2024 0546 95 70 - 130 mg/dL Final   09/02/2024 2118 110 70 - 130 mg/dL Final   09/02/2024 1605 109 70 - 130 mg/dL Final   09/02/2024 1107 171 (H) 70 - 130 mg/dL Final   09/02/2024 0606 105 70 - 130 mg/dL Final   09/01/2024 2021 117 70 - 130 mg/dL Final   09/01/2024 1618 122 70 - 130 mg/dL Final       No radiology results for the last day    Scheduled Meds  aspirin, 81 mg, Oral, Daily  atorvastatin, 80 mg, Oral, Nightly  carvedilol, 6.25 mg, Oral, BID With Meals  cholecalciferol, 1,000 Units, Oral, Daily  clopidogrel, 75 mg, Oral, Daily  famotidine, 10 mg, Intravenous, Q12H  furosemide, 40 mg, Oral, BID  guaifenesin, 300 mg, Oral, Q6H  ipratropium-albuterol, 3 mL, Nebulization, Q6H - RT  lisinopril, 20 mg, Oral, Daily  potassium chloride, 40 mEq, Oral, Once  sennosides-docusate, 2 tablet, Oral, BID  sodium chloride, 10 mL, Intravenous, Q12H    Continuous Infusions   PRN Meds    acetaminophen **OR** acetaminophen **OR** acetaminophen    aluminum-magnesium hydroxide-simethicone    senna-docusate sodium **AND** polyethylene glycol **AND** bisacodyl **AND**  bisacodyl    Calcium Replacement - Follow Nurse / BPA Driven Protocol    Magnesium Cardiology Dose Replacement - Follow Nurse / BPA Driven Protocol    melatonin    nitroglycerin    ondansetron ODT **OR** ondansetron    Phosphorus Replacement - Follow Nurse / BPA Driven Protocol    Potassium Replacement - Follow Nurse / BPA Driven Protocol    sodium chloride     Diet  Diet: Regular/House; No Mixed Consistencies; Texture: Mechanical Ground (NDD 2); Fluid Consistency: Thin (IDDSI 0)    Results for orders placed during the hospital encounter of 08/27/24    Adult Transthoracic Echo Complete W/ Cont if Necessary Per Protocol    Interpretation Summary    The left ventricular cavity is severely dilated.    Left ventricular systolic function is severely decreased. Estimated left ventricular EF = 10%    Left ventricular diastolic function is consistent with (grade II w/high LAP) pseudonormalization.    The right ventricular cavity is mildly dilated. Severely reduced right ventricular systolic function noted.    The left atrial cavity is moderate to severely dilated.    Moderate aortic valve regurgitation is present.    Mild to moderate mitral valve regurgitation is present.    Mild to moderate tricuspid valve regurgitation is present.    Calculated right ventricular systolic pressure from tricuspid regurgitation is 46 mmHg.    The ascending aorta is mildly dilated at 3.9 cm    There is a small (<1cm) circumferential pericardial effusion. There is no evidence of cardiac tamponade.        Assessment/Plan     Active Hospital Problems    Diagnosis  POA    **Acute on chronic combined systolic (congestive) and diastolic (congestive) heart failure [I50.43]  Yes    Pacemaker [Z95.0]  Yes    Aspiration pneumonia [J69.0]  Yes    Dysphagia [R13.10]  Yes    Confusion [R41.0]  Yes    Dementia [F03.90]  Yes    History of CVA (cerebrovascular accident) [Z86.73]  Not Applicable    Left-sided weakness [R53.1]  Yes    Tobacco abuse [Z72.0]   Yes    Metabolic encephalopathy [G93.41]  Yes    Bilateral carotid artery stenosis [I65.23]  Yes    Essential hypertension [I10]  Yes      Resolved Hospital Problems   No resolved problems to display.     74 y.o. 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 diuretics  Cardiology following    Hypokalemia  Replacing per prot (low probably from diuresis)  Check magnesium     Aspiration pneumonia  Oropharyngeal dysphagia  Augmentin will do 5 days total  SLP: Diet: Regular/House; No Mixed Consistencies; Texture: Mechanical Ground (NDD 2); Fluid Consistency: Thin (IDDSI 0)   VFSS prior to discharge.     Dementia delirium precautions  Smoker    DVT prophylaxis  SCDs    Discharge  Home with family with home health (care tenders) after VFSS   Expected Discharge Date: 9/3/2024; Expected Discharge Time:     Discussed with patient and nursing staff    Jason Araujo MD  [facility]  09/03/24

## 2024-09-03 NOTE — PLAN OF CARE
Goal Outcome Evaluation:  Plan of Care Reviewed With: patient        Progress: no change  Outcome Evaluation: Pt A&Ox2-3, VSS on RA. No c/o pain or discomfort. PRN med for sleep admin., plan of care ongoing.         Problem: Fall Injury Risk  Goal: Absence of Fall and Fall-Related Injury  Outcome: Ongoing, Progressing  Intervention: Identify and Manage Contributors  Recent Flowsheet Documentation  Taken 9/3/2024 0231 by Anisa Matos RN  Medication Review/Management: medications reviewed  Taken 9/3/2024 0015 by Anisa Matos RN  Medication Review/Management: medications reviewed  Taken 9/2/2024 2219 by Anisa Matos RN  Medication Review/Management: medications reviewed  Taken 9/2/2024 2020 by Anisa Matos RN  Medication Review/Management: medications reviewed  Intervention: Promote Injury-Free Environment  Recent Flowsheet Documentation  Taken 9/3/2024 0231 by Anisa Matos RN  Safety Promotion/Fall Prevention: safety round/check completed  Taken 9/3/2024 0015 by Anisa Matos RN  Safety Promotion/Fall Prevention:   activity supervised   assistive device/personal items within reach   clutter free environment maintained   fall prevention program maintained   lighting adjusted   mobility aid in reach   nonskid shoes/slippers when out of bed   room organization consistent   safety round/check completed  Taken 9/2/2024 2219 by Anisa Matos RN  Safety Promotion/Fall Prevention: safety round/check completed  Taken 9/2/2024 2020 by Anisa Matos RN  Safety Promotion/Fall Prevention:   assistive device/personal items within reach   clutter free environment maintained   fall prevention program maintained   lighting adjusted   mobility aid in reach   nonskid shoes/slippers when out of bed   room organization consistent   safety round/check completed     Problem: Adult Inpatient Plan of Care  Goal: Plan of Care Review  Outcome: Ongoing, Progressing  Flowsheets (Taken 9/3/2024 0316)  Progress: no change  Plan of Care  Reviewed With: patient  Outcome Evaluation: Pt A&Ox2-3, VSS on RA. No c/o pain or discomfort. PRN med for sleep admin., plan of care ongoing.  Goal: Patient-Specific Goal (Individualized)  Outcome: Ongoing, Progressing  Goal: Absence of Hospital-Acquired Illness or Injury  Outcome: Ongoing, Progressing  Intervention: Identify and Manage Fall Risk  Recent Flowsheet Documentation  Taken 9/3/2024 0231 by Anisa Matos RN  Safety Promotion/Fall Prevention: safety round/check completed  Taken 9/3/2024 0015 by Anisa Matos RN  Safety Promotion/Fall Prevention:   activity supervised   assistive device/personal items within reach   clutter free environment maintained   fall prevention program maintained   lighting adjusted   mobility aid in reach   nonskid shoes/slippers when out of bed   room organization consistent   safety round/check completed  Taken 9/2/2024 2219 by Anisa Matos RN  Safety Promotion/Fall Prevention: safety round/check completed  Taken 9/2/2024 2020 by Anisa Matos RN  Safety Promotion/Fall Prevention:   assistive device/personal items within reach   clutter free environment maintained   fall prevention program maintained   lighting adjusted   mobility aid in reach   nonskid shoes/slippers when out of bed   room organization consistent   safety round/check completed  Intervention: Prevent Skin Injury  Recent Flowsheet Documentation  Taken 9/3/2024 0231 by Anisa Matos RN  Body Position: position changed independently  Taken 9/3/2024 0015 by Anisa Matos RN  Body Position: position changed independently  Skin Protection: adhesive use limited  Taken 9/2/2024 2219 by Anisa Matos RN  Body Position: position changed independently  Taken 9/2/2024 2020 by Anisa Matos RN  Body Position: position changed independently  Intervention: Prevent and Manage VTE (Venous Thromboembolism) Risk  Recent Flowsheet Documentation  Taken 9/3/2024 0015 by Anisa Matos RN  Range of Motion: active ROM (range of motion)  encouraged  Taken 9/2/2024 2020 by Anisa Matos RN  Range of Motion: active ROM (range of motion) encouraged  Intervention: Prevent Infection  Recent Flowsheet Documentation  Taken 9/3/2024 0015 by Anisa Matos RN  Infection Prevention:   single patient room provided   rest/sleep promoted   environmental surveillance performed  Taken 9/2/2024 2020 by Anisa Matos RN  Infection Prevention:   single patient room provided   rest/sleep promoted  Goal: Optimal Comfort and Wellbeing  Outcome: Ongoing, Progressing  Intervention: Provide Person-Centered Care  Recent Flowsheet Documentation  Taken 9/3/2024 0015 by Anisa Matos RN  Trust Relationship/Rapport:   care explained   choices provided  Taken 9/2/2024 2020 by Anisa Matos RN  Trust Relationship/Rapport:   care explained   choices provided  Goal: Readiness for Transition of Care  Outcome: Ongoing, Progressing     Problem: Skin Injury Risk Increased  Goal: Skin Health and Integrity  Outcome: Ongoing, Progressing  Intervention: Optimize Skin Protection  Recent Flowsheet Documentation  Taken 9/3/2024 0231 by Anisa Matos RN  Head of Bed (HOB) Positioning: HOB at 20-30 degrees  Taken 9/3/2024 0015 by Anisa Matos RN  Pressure Reduction Techniques: frequent weight shift encouraged  Head of Bed (HOB) Positioning: HOB at 20-30 degrees  Pressure Reduction Devices: pressure-redistributing mattress utilized  Skin Protection: adhesive use limited  Taken 9/2/2024 2219 by Anisa Matos RN  Head of Bed (HOB) Positioning: HOB at 20-30 degrees  Taken 9/2/2024 2020 by Anisa Matos RN  Pressure Reduction Techniques: frequent weight shift encouraged  Head of Bed (HOB) Positioning: HOB elevated  Pressure Reduction Devices: pressure-redistributing mattress utilized     Problem: Chest Pain  Goal: Resolution of Chest Pain Symptoms  Outcome: Ongoing, Progressing

## 2024-09-04 ENCOUNTER — READMISSION MANAGEMENT (OUTPATIENT)
Dept: CALL CENTER | Facility: HOSPITAL | Age: 74
End: 2024-09-04
Payer: OTHER GOVERNMENT

## 2024-09-04 NOTE — CASE MANAGEMENT/SOCIAL WORK
Case Management Discharge Note      Final Note: Home with family, [facility] . No additional CCP needs.         Selected Continued Care - Discharged on 9/3/2024 Admission date: 8/27/2024 - Discharge disposition: Home or Self Care      Destination    No services have been selected for the patient.                Durable Medical Equipment    No services have been selected for the patient.                Dialysis/Infusion    No services have been selected for the patient.                Home Medical Care Coordination complete.      Service Provider Selected Services Address Phone Fax Patient Preferred    [facility] [address], [address], [place] [address] [phone] --              Therapy    No services have been selected for the patient.                Community Resources    No services have been selected for the patient.                Community & DME    No services have been selected for the patient.                         Final Discharge Disposition Code: 06 - home with home health care

## 2024-09-04 NOTE — OUTREACH NOTE
Prep Survey      Flowsheet Row Responses   [facility] facility patient discharged from? [place]   Is LACE score < 7 ? No   Eligibility Readm Mgmt   Discharge diagnosis a/c CHF   Does the patient have one of the following disease processes/diagnoses(primary or secondary)? CHF   Does the patient have Home health ordered? Yes   What is the Home health agency?  [facility]   Is there a DME ordered? No   Prep survey completed? Yes            CAMACHO GREENE - Registered Nurse

## 2024-09-04 NOTE — OUTREACH NOTE
CHF Week 1 Survey      Flowsheet Row Responses   [facility] facility patient discharged from? [place]   Does the patient have one of the following disease processes/diagnoses(primary or secondary)? CHF   CHF Week 1 attempt successful? No   Unsuccessful attempts Attempt 1            Colton RIZZO - Registered Nurse

## 2024-09-04 NOTE — CASE MANAGEMENT/SOCIAL WORK
Continued Stay Note  [facility]     Patient Name: Tigre Amaral  MRN: [id number]  Today's Date: 9/4/2024    Admit Date: 8/27/2024    [facility]   Discharge Plan       Row Name 09/04/24 1123       Plan    Plan Home with family, [facility]     Patient/Family in Agreement with Plan yes    Plan Comments Late entry from 9/3/24; CCP met with daughter Rola at the bedside while pt off the floor. Rola confirms DC plan is to return home with [facility] , Rola to transport. Rola denies additional CCP needs at this time. Eileen/[facility]  notified. Yo JIN/CCP    Final Discharge Disposition Code 06 - home with home health care    Final Note [facility], [facility]. No additional CCP needs.                   Discharge Codes    No documentation.                 Expected Discharge Date and Time       Expected Discharge Date Expected Discharge Time    Sep 3, 2024               Kathy Gasca RN

## 2024-09-05 ENCOUNTER — READMISSION MANAGEMENT (OUTPATIENT)
Dept: CALL CENTER | Facility: HOSPITAL | Age: 74
End: 2024-09-05
Payer: OTHER GOVERNMENT

## 2024-09-05 NOTE — OUTREACH NOTE
CHF Week 1 Survey      Flowsheet Row Responses   [facility] patient discharged from? [place]   Does the patient have one of the following disease processes/diagnoses(primary or secondary)? CHF   CHF Week 1 attempt successful? Yes   Call start time 1009   Call end time 1019   Discharge diagnosis a/c CHF   Person spoke with today (if not patient) and relationship Rola   Meds reviewed with patient/caregiver? Yes   Is the patient having any side effects they believe may be caused by any medication additions or changes? No   Does the patient have all medications ordered at discharge? Yes   Is the patient taking all medications as directed (includes completed medication regime)? Yes   Does the patient have a primary care provider?  Yes   Does the patient have an appointment with their PCP within 7 days of discharge? No   What is preventing the patient from scheduling follow up appointments within 7 days of discharge? Haven't had time   Nursing Interventions Educated patient on importance of making appointment, Advised patient to make appointment   Has the patient kept scheduled appointments due by today? N/A   What is the Home health agency?  [facility]    Has home health visited the patient within 72 hours of discharge? Yes   Pulse Ox monitoring Intermittent   Pulse Ox device source Patient   O2 Sat: education provided Sat levels, Monitoring frequency, When to seek care   Psychosocial issues? No   Nursing interventions Other   Did the patient receive a copy of their discharge instructions? Yes   Nursing interventions Reviewed instructions with patient   What is the patient's perception of their health status since discharge? Improving   Nursing interventions Nurse provided patient education   Is the patient able to teach back signs and symptoms of worsening condition? (i.e. weight gain, shortness of air, etc.) Yes   If the patient is a current smoker, are they able to teach back resources for cessation?  [id number]   Is the patient/caregiver able to teach back the hierarchy of who to call/visit for symptoms/problems? PCP, Specialist, Home health nurse, Urgent Care, ED, 911 Yes   Is the patient able to teach back Heart Failure Zones? No   CHF Nursing Interventions Education provided on various zones    CHF Week 1 call completed? Yes   Wrap up additional comments Daughter reports Pt is improving. Provided detailed information to daughter on HF and s/s. Daughter will start getting Pts wt in ams and keep log. Transfered call to HF clinic   Call end time 1019            KINGSTON CHAVIRA - Registered Nurse

## 2024-09-12 ENCOUNTER — HOSPITAL ENCOUNTER (OUTPATIENT)
Dept: CARDIOLOGY | Facility: HOSPITAL | Age: 74
Discharge: HOME | End: 2024-09-12
Admitting: NURSE PRACTITIONER

## 2024-09-12 VITALS
HEART RATE: 60 BPM | BODY MASS INDEX: 19.85 KG/M2 | WEIGHT: 131 LBS | DIASTOLIC BLOOD PRESSURE: 57 MMHG | SYSTOLIC BLOOD PRESSURE: 96 MMHG | HEIGHT: 68 IN

## 2024-09-12 DIAGNOSIS — I42.0 CARDIOMYOPATHY, DILATED: ICD-10-CM

## 2024-09-12 DIAGNOSIS — I50.22 CHRONIC HFREF (HEART FAILURE WITH REDUCED EJECTION FRACTION): Primary | ICD-10-CM

## 2024-09-12 PROCEDURE — 94726 PLETHYSMOGRAPHY LUNG VOLUMES: CPT | Performed by: NURSE PRACTITIONER

## 2024-09-12 PROCEDURE — G0463 HOSPITAL OUTPT CLINIC VISIT: HCPCS

## 2024-09-12 RX ORDER — METOPROLOL SUCCINATE 25 MG/1
25 TABLET, EXTENDED RELEASE ORAL DAILY
Qty: 30 TABLET | Refills: 3 | Status: SHIPPED | OUTPATIENT
Start: 2024-09-12 | End: 2024-09-12

## 2024-09-12 RX ORDER — METOPROLOL SUCCINATE 50 MG/1
50 TABLET, EXTENDED RELEASE ORAL DAILY
Qty: 30 TABLET | Refills: 3 | Status: SHIPPED | OUTPATIENT
Start: 2024-09-12

## 2024-09-12 NOTE — LETTER
September 12, 2024       No Recipients    Patient: Tigre Amaral   YOB: 1950   Date of Visit: 9/12/2024     Dear Phong Gonzales MD:       Thank you for referring Tigre Amaral to me for evaluation. Below are the relevant portions of my assessment and plan of care.    If you have questions, please do not hesitate to call me. I look forward to following Tigre along with you.         Sincerely,        SUNG Lynn        CC:   No Recipients    Saba Mallory APRN  09/12/24 1506  Signed    [facility]    Provider, No Known  [place] [id number]    Thank you for asking me to see Tigre Amaral.     History of Present Illness    1.  HFrEF    Subjective     Tigre Amaralis a 74 y.o. male who presents today for HFrEF.  History of pacemaker/AICD, HTN, dementia, peripheral vascular disease, previous stroke, HLD, CKD, bilateral carotid artery stenosis  The patient had an echo 10/2023 LVEF less than 20%.  However did have normal left ventricular size. mild concentric LVH.  Abnormal septal wall motion consistent with right ventricular pacing diastolic function consistent with grade 2 pseudonormalization.  Left atrial cavity mildly dilated  Patient had recent acute decompensated heart failure hospitalization from 8/27/2024 to 9/3/2024.  He initially presented to the ER with complaints of shortness of breath, was admitted for acute exacerbation of heart failure and aspiration pneumonia as well as metabolic encephalopathy on baseline dementia.  He was gently diuresed as well as started on antibiotics to treat the aspiration pneumonia.  He had 2D TTE completed 8/29/2024 that showed LVEF of 10%.  Severely dilated left ventricular cavity.  Grade 2 diastolic dysfunction.  Right ventricular cavity dilated and severely reduced right ventricular systolic function.  Left atrial cavity moderately to severely dilated.  Moderate aortic valve  regurgitation.  Mild to moderate mitral valve regurgitation.  Mild to moderate tricuspid valve regurgitation.  RVSP is elevated at 46 mmHg.  Mildly dilated ascending aorta 3.9 cm.  There is additionally a small less than 1 cm circumferential pericardial effusion without evidence of cardiac tamponade  It was felt the dilated cardiomyopathy was due to alcohol use.      Review of Systems - Review of Systems   Constitutional: Negative for weight gain.   Cardiovascular:  Positive for dyspnea on exertion. Negative for chest pain, leg swelling, orthopnea, paroxysmal nocturnal dyspnea and syncope.   Respiratory:  Positive for shortness of breath. Negative for cough.    Gastrointestinal:  Negative for bloating.   Neurological:  Positive for weakness. Negative for dizziness and light-headedness.          Patient Active Problem List   Diagnosis   • Cerebrovascular accident (CVA) due to embolism of right middle cerebral artery   • Aspiration pneumonia   • Acute on chronic combined systolic (congestive) and diastolic (congestive) heart failure   • Essential hypertension   • Stage 3a chronic kidney disease   • Hyperlipidemia   • Carotid artery stenosis, symptomatic, right   • Tobacco abuse   • Basal cell carcinoma (BCC) of skin of nose   • Carotid stenosis, asymptomatic, left   • Bilateral carotid artery stenosis   • Pacemaker   • Aspiration pneumonia   • Dysphagia   • Confusion   • Dementia   • History of CVA (cerebrovascular accident)   • Left-sided weakness   • Tobacco abuse   • Metabolic encephalopathy   • Chronic HFrEF (heart failure with reduced ejection fraction)     family history includes Heart disease in his father, mother, and sister.   reports that he quit smoking about 3 weeks ago. His smoking use included cigarettes. He has a 60 pack-year smoking history. He has never used smokeless tobacco. He reports that he does not drink alcohol and does not use drugs.  No Known Allergies  Physical Activity: Inactive (8/28/2024)     Exercise Vital Sign    • Days of Exercise per Week: 0 days    • Minutes of Exercise per Session: 0 min          Current Outpatient Medications:   •  aspirin 81 MG EC tablet, Take 1 tablet by mouth Daily., Disp: , Rfl:   •  atorvastatin (LIPITOR) 80 MG tablet, Take 1 tablet by mouth Every Night., Disp: 30 tablet, Rfl: 12  •  carvedilol (COREG) 6.25 MG tablet, Take 1 tablet by mouth 2 (Two) Times a Day With Meals., Disp: , Rfl:   •  Cholecalciferol 25 MCG (1000 UT) tablet, Take 1 tablet by mouth Daily., Disp: , Rfl:   •  clopidogrel (PLAVIX) 75 MG tablet, Take 1 tablet by mouth Daily., Disp: 30 tablet, Rfl: 12  •  furosemide (LASIX) 40 MG tablet, Take 1 tablet by mouth 2 (Two) Times a Day., Disp: 60 tablet, Rfl: 0  •  lisinopril (PRINIVIL,ZESTRIL) 20 MG tablet, Take 1 tablet by mouth Daily., Disp: , Rfl:   •  sennosides-docusate (PERICOLACE) 8.6-50 MG per tablet, 1 tablet., Disp: , Rfl:   •  potassium chloride (K-DUR,KLOR-CON) 20 MEQ CR tablet, Take 1 tablet by mouth Daily. (Patient not taking: Reported on 9/12/2024), Disp: , Rfl:     Objective  Vital Sign Review:   Vitals:    09/12/24 1316   BP: 96/57   Pulse: 60     Body mass index is 19.92 kg/m².      09/12/24  1316   Weight: 59.4 kg (131 lb)     Physical Exam:  Constitutional:       Appearance: Normal and healthy appearance. Frail. Chronically ill-appearing.   Neck:      Vascular: JVD normal.   Pulmonary:      Effort: Pulmonary effort is normal.      Breath sounds: Normal breath sounds.   Cardiovascular:      PMI at left midclavicular line. Normal rate. Regular rhythm.      Murmurs: There is a systolic murmur.   Pulses:     Radial: 2+ bilaterally.  Edema:     Peripheral edema absent.   Abdominal:      Palpations: Abdomen is soft.      Tenderness: There is no abdominal tenderness.   Skin:     General: Skin is warm and dry.   Neurological:      General: No focal deficit present.      Mental Status: Alert and oriented to person, place and time.   Psychiatric:          Behavior: Behavior is cooperative.          DATA REVIEWED:   EKG:  HEART RATE=78  bpm  RR Dznalotb=037  ms  SD Yophferu=765  ms  P Horizontal Axis=35  deg  P Front Axis=75  deg  QRSD Mazxulmh=361  ms  QT Elttketn=830  ms  QWdS=178  ms  QRS Axis=-65  deg  T Wave Axis=111  deg  - ABNORMAL ECG -  Ventricular-paced complexes  No further analysis attempted due to paced rhythm  No change from previous tracing  Electronically Signed By: Chidi Song ([facility]) 2024-08-28 15:48:51  Date and Time of Study:2024-08-27 19:51:46    ---------------------------------------------------  ECHO:    Results for orders placed during the hospital encounter of 08/27/24    Adult Transthoracic Echo Complete W/ Cont if Necessary Per Protocol    Interpretation Summary  •  The left ventricular cavity is severely dilated.  •  Left ventricular systolic function is severely decreased. Estimated left ventricular EF = 10%  •  Left ventricular diastolic function is consistent with (grade II w/high LAP) pseudonormalization.  •  The right ventricular cavity is mildly dilated. Severely reduced right ventricular systolic function noted.  •  The left atrial cavity is moderate to severely dilated.  •  Moderate aortic valve regurgitation is present.  •  Mild to moderate mitral valve regurgitation is present.  •  Mild to moderate tricuspid valve regurgitation is present.  •  Calculated right ventricular systolic pressure from tricuspid regurgitation is 46 mmHg.  •  The ascending aorta is mildly dilated at 3.9 cm  •  There is a small (<1cm) circumferential pericardial effusion. There is no evidence of cardiac tamponade.          -----------------------------------------------------  RHC/LHC    No results found for this or any previous visit.      ---------------------------------------------------------------------------    CT:   CT Head Without Contrast    Result Date: 8/28/2024  1. No convincing acute intracranial abnormality is identified.  2. This  patient has an 8.5 x 3.5 x 3 cm old lateral left temporooccipital infarct and an additional 4 x 2.5 x 2.5 cm old inferolateral left frontal lobe infarct extending into the anterior left insular cortex and these 2 old infarcts are in the left MCA territory and unchanged since MRI of the brain 11/02/2023. Furthermore, this patient has a row of chronic infarcts involving the right corona radiata region and the superior right frontoparietal subcortical white matter as well as a 1.8 x 1.2 x 2 cm old superior right parietal infarct and these infarcts occurred back in 11/2023. Many of them line up in the watershed territory. There is mild-to-moderate small vessel disease in the cerebral white matter and mild cerebral atrophy. The remainder the head CT is within normal limits with no discernible acute infarct identified. If there remains any clinical suspicion of acute infarct, I recommend an MRI of the brain for more complete assessment.  The results and recommendations were communicated to Cheo Chase by telephone on 08/28/2024 at 3:20 p.m.  Radiation dose reduction techniques were utilized, including automated exposure control and exposure modulation based on body size.   This report was finalized on 8/28/2024 4:21 PM by Dr. Jon Saldivar M.D on Workstation: [id number]      CT Chest Without Contrast Diagnostic    Result Date: 8/28/2024   1.  Small bilateral pleural effusions with mild bibasilar atelectasis. Minimal clustered nodularity in the anterior right upper lobe, likely infectious/inflammatory. 2.  Mildly enlarged lymph nodes are nonspecific and could be reactive or neoplastic. Recommend comparison with prior imaging, if available. Otherwise, recommend follow-up contrast-enhanced CT chest in 3 months. 3.  Thoracic aortic aneurysm. 4.  Cardiomegaly.     This report was finalized on 8/28/2024 3:39 PM by Dr. Keeley Magana M.D on Workstation: [id number]    "        --------------------------------------------------------------------------------------------------------------    Laboratory evaluations:  Renal Function: Estimated Creatinine Clearance: 32.6 mL/min (A) (by C-G formula based on SCr of 1.62 mg/dL (H)).    Lab Results   Component Value Date    GLUCOSE 87 09/03/2024    BUN 24 (H) 09/03/2024    CREATININE 1.62 (H) 09/03/2024    BCR 14.8 09/03/2024    K 4.5 09/03/2024    CO2 20.0 (L) 09/03/2024    CALCIUM 8.7 09/03/2024    ALBUMIN 3.8 08/27/2024    AST 25 08/27/2024    ALT 32 08/27/2024     Lab Results   Component Value Date    WBC 8.09 09/03/2024    HGB 12.8 (L) 09/03/2024    HCT 38.3 09/03/2024    MCV 92.7 09/03/2024     09/03/2024     Lab Results   Component Value Date    HGBA1C 5.20 10/29/2023     Lab Results   Component Value Date    HGBA1C 5.20 10/29/2023     Lab Results   Component Value Date    CREATININE 1.62 (H) 09/03/2024     No results found for: \"IRON\", \"TIBC\", \"FERRITIN\"    Result Review:  I have personally reviewed the results from the time of this admission to 9/12/2024 13:30 EDT and agree with these findings:  [x]  Laboratory list / accordion  []  Microbiology  [x]  Radiology  [x]  EKG/Telemetry   [x]  Cardiology/Vascular   []  Pathology  [x]  Old records  []  Other:      PH Screening:  RVSP 46mmHg--PFTs deferred per pt request  Will have patient have follow-up with PCP      Cardiac Amyloid Screening Questions     Does the patient experience or have a diagnosis of any of the following:    HF: yes  Aortic stenosis: no  Afib: no  Heart block: yes  Peripheral neuropathy: no  Orthostatic hypotension: no  Renal dysfunction: yes  Carpal tunnel syndrome: no  Lumbar spinal stenosis: no  Proteinuria: no  Unexpected/unintentional weight loss: no  Frequent nausea/early satiety: no  Significant episodes of diarrhea/constipation: no    6 MINUTE WALK   UTO                   Sleep Evaluation:  Pt declines sleep study         ReDS VOLUMETRIC " ASSESSMENT:  ReDs Vest    Performed by: Saba Mallory APRN  Authorized by: Saba Mallory APRN    ReDS value:  30  ReDS Value Description:  25-35 (green) = Optimal Volume Status        Assessment & Plan     1. Chronic HFrEF (heart failure with reduced ejection fraction)    2. Cardiomyopathy, dilated      1-2: HFrEF/dilated cardiomyopathy-patient is euvolemic today.   Goals of care discussed with the patient and stepdaughter at bedside.  They would like medical management only, do not want any interventions or advanced heart failure work-up  The KCCQ-12 was updated at the visit today.  score: 35.     Labs reviewed from 1 week ago (9/30/2024) sodium 134, potassium 3.4, chloride 101, creatinine 1.62, BUN 24, GFR 43.7  There is room to optimize GDMT.    I recommend starting Jardiance 10 mg daily.    His blood pressure is slightly low today 96/57 I recommend change from carvedilol to metoprolol succinate.  Changed over to equivalent dose.  He was on carvedilol 6.25 twice daily, we will switch to metoprolol succinate 50 mg daily.    Advance Care Planning  ACP discussion was held with the patient during this visit. Pt is DNR     NYHA stage C FC-III     Clinical status was assessed and has worsened.  Treatment intent: palliative   ReDS reading was obtained today.  ReDS result: 30       Today, Patient appears euvolemic. and with perfusion. The patient's hemodynamics are currently acceptable. HR is: normal and is at goal. BP/MAP was reviewed and there is not room for medication up-titration--however we will change carvedilol to metoprolol succinate.  The patient's clinical course has been impacted by renal function and hemodynamics. LVEF: 10%.     GDMT Assessment: The patient was placed on triple therapy.      GDMT changes recommended today:  Change beta-blocker, add SGLT2      BB:   change to metoprolol succinate  50mg daily  Monitor heart rate.  Please call the [facility] for HR<50, dizziness, lightheadedness,  syncope    A/A/A:     continue lisinopril (Prinivil)   20mg daily  Occasional monitoring of Chem-7 is recommended; call for the development of a new cough or S/Sx angioedema    SGLT2-I:  start Empagliflozin (Jardiance) 10 mg daily  Call for S/Sx genital mycotic infections  Do not take when inadequate oral intake, NPO, GI illness    MRA:  The patient is FC-NYHA Class III and MRA is indicated.   Pt has CKD, I would first like to start jardiance, and possible future addition of MRA at that time.       Diuretic Regimen:  -DIURETIC:   furosemide (LASIX) 40 mg two times daily  -Fluid restriction:   -requested  -2000 ml  -Patient has been asked to weigh daily and was provided with a printed diuretic strategy.  -Sodium restriction:   -1,500 mg Na restriction was discussed.      Labs/Diagnostics/Referrals:    Labs -reviewed most recent       Lifestyle Advice:   - call office if I gain more than 2 pounds in one day or 5 pounds in one week   - keep legs up while sitting   - use salt in moderation   - watch for swelling in feet, ankles and legs every day   - weigh myself daily   -call for concerning s/sx   -- activity or exercise based on tolerance encouraged     CODE STATUS: DNR             Return in about 1 month (around 10/12/2024).            Thank you for allowing me to participate in the care of your patient,         Saba Mallory, [facility] FAIL[facility]  09/12/24  09:55 EDT      **Yehuda Disclaimer:**  Much of this encounter note is an electronic transcription/translation of spoken language to printed text. The electronic translation of spoken language may permit erroneous, or at times, nonsensical words or phrases to be inadvertently transcribed. Although I have reviewed the note for such errors, some may still exist.    The information in this note was reviewed and updated during the visit to be as accurate as possible. As many patients have chronic medical problems, many of their individual problems and  ongoing plans do not change significantly from visit to visit.  This information is correct and is consistent with my treatment plan as of today's visit.

## 2024-09-12 NOTE — PATIENT INSTRUCTIONS
We will start jardiance 10mg daily for the heart failure.   Because his blood pressure is a little low we will change carvedilol to metoprolol succinate--this will allow his blood pressure to raise a little     Directions for when to call the clinic reviewed with the patient to include weight gain of 2 to 3 pounds in 24 hours, weight gain of 5 to 10 pounds within 7 days; worsening shortness of breath; worsening lower extremity edema or abdominal distention.

## 2024-09-12 NOTE — PROGRESS NOTES
[facility]    Provider, [place] [address]    Thank you for asking me to see Tigre Amaral.     History of Present Illness    1.  HFrEF    Subjective      Tigre Allen a 74 y.o. male who presents today for HFrEF.  History of pacemaker/AICD, HTN, dementia, peripheral vascular disease, previous stroke, HLD, CKD, bilateral carotid artery stenosis  The patient had an echo 10/2023 LVEF less than 20%.  However did have normal left ventricular size. mild concentric LVH.  Abnormal septal wall motion consistent with right ventricular pacing diastolic function consistent with grade 2 pseudonormalization.  Left atrial cavity mildly dilated  Patient had recent acute decompensated heart failure hospitalization from 8/27/2024 to 9/3/2024.  He initially presented to the ER with complaints of shortness of breath, was admitted for acute exacerbation of heart failure and aspiration pneumonia as well as metabolic encephalopathy on baseline dementia.  He was gently diuresed as well as started on antibiotics to treat the aspiration pneumonia.  He had 2D TTE completed 8/29/2024 that showed LVEF of 10%.  Severely dilated left ventricular cavity.  Grade 2 diastolic dysfunction.  Right ventricular cavity dilated and severely reduced right ventricular systolic function.  Left atrial cavity moderately to severely dilated.  Moderate aortic valve regurgitation.  Mild to moderate mitral valve regurgitation.  Mild to moderate tricuspid valve regurgitation.  RVSP is elevated at 46 mmHg.  Mildly dilated ascending aorta 3.9 cm.  There is additionally a small less than 1 cm circumferential pericardial effusion without evidence of cardiac tamponade  It was felt the dilated cardiomyopathy was due to alcohol use.      Review of Systems - Review of Systems   Constitutional: Negative for weight gain.   Cardiovascular:  Positive for dyspnea on exertion. Negative for chest pain,  leg swelling, orthopnea, paroxysmal nocturnal dyspnea and syncope.   Respiratory:  Positive for shortness of breath. Negative for cough.    Gastrointestinal:  Negative for bloating.   Neurological:  Positive for weakness. Negative for dizziness and light-headedness.          Patient Active Problem List   Diagnosis    Cerebrovascular accident (CVA) due to embolism of right middle cerebral artery    Aspiration pneumonia    Acute on chronic combined systolic (congestive) and diastolic (congestive) heart failure    Essential hypertension    Stage 3a chronic kidney disease    Hyperlipidemia    Carotid artery stenosis, symptomatic, right    Tobacco abuse    Basal cell carcinoma (BCC) of skin of nose    Carotid stenosis, asymptomatic, left    Bilateral carotid artery stenosis    Pacemaker    Aspiration pneumonia    Dysphagia    Confusion    Dementia    History of CVA (cerebrovascular accident)    Left-sided weakness    Tobacco abuse    Metabolic encephalopathy    Chronic HFrEF (heart failure with reduced ejection fraction)     family history includes Heart disease in his father, mother, and sister.   reports that he quit smoking about 3 weeks ago. His smoking use included cigarettes. He has a 60 pack-year smoking history. He has never used smokeless tobacco. He reports that he does not drink alcohol and does not use drugs.  No Known Allergies  Physical Activity: Inactive (8/28/2024)    Exercise Vital Sign     Days of Exercise per Week: 0 days     Minutes of Exercise per Session: 0 min          Current Outpatient Medications:     aspirin 81 MG EC tablet, Take 1 tablet by mouth Daily., Disp: , Rfl:     atorvastatin (LIPITOR) 80 MG tablet, Take 1 tablet by mouth Every Night., Disp: 30 tablet, Rfl: 12    carvedilol (COREG) 6.25 MG tablet, Take 1 tablet by mouth 2 (Two) Times a Day With Meals., Disp: , Rfl:     Cholecalciferol 25 MCG (1000 UT) tablet, Take 1 tablet by mouth Daily., Disp: , Rfl:     clopidogrel (PLAVIX) 75 MG  tablet, Take 1 tablet by mouth Daily., Disp: 30 tablet, Rfl: 12    furosemide (LASIX) 40 MG tablet, Take 1 tablet by mouth 2 (Two) Times a Day., Disp: 60 tablet, Rfl: 0    lisinopril (PRINIVIL,ZESTRIL) 20 MG tablet, Take 1 tablet by mouth Daily., Disp: , Rfl:     sennosides-docusate (PERICOLACE) 8.6-50 MG per tablet, 1 tablet., Disp: , Rfl:     potassium chloride (K-DUR,KLOR-CON) 20 MEQ CR tablet, Take 1 tablet by mouth Daily. (Patient not taking: Reported on 9/12/2024), Disp: , Rfl:     Objective   Vital Sign Review:   Vitals:    09/12/24 1316   BP: 96/57   Pulse: 60     Body mass index is 19.92 kg/m².      09/12/24  1316   Weight: 59.4 kg (131 lb)     Physical Exam:  Constitutional:       Appearance: Normal and healthy appearance. Frail. Chronically ill-appearing.   Neck:      Vascular: JVD normal.   Pulmonary:      Effort: Pulmonary effort is normal.      Breath sounds: Normal breath sounds.   Cardiovascular:      PMI at left midclavicular line. Normal rate. Regular rhythm.      Murmurs: There is a systolic murmur.   Pulses:     Radial: 2+ bilaterally.  Edema:     Peripheral edema absent.   Abdominal:      Palpations: Abdomen is soft.      Tenderness: There is no abdominal tenderness.   Skin:     General: Skin is warm and dry.   Neurological:      General: No focal deficit present.      Mental Status: Alert and oriented to person, place and time.   Psychiatric:         Behavior: Behavior is cooperative.          DATA REVIEWED:   EKG:  HEART RATE=78  bpm  RR Ikvxpcrq=226  ms  AR Ygqvrcdn=485  ms  P Horizontal Axis=35  deg  P Front Axis=75  deg  QRSD Kkeuwbsc=347  ms  QT Yuphqygb=216  ms  YUmA=581  ms  QRS Axis=-65  deg  T Wave Axis=111  deg  - ABNORMAL ECG -  Ventricular-paced complexes  No further analysis attempted due to paced rhythm  No change from previous tracing  Electronically Signed By: Chidi Song ([facility]) 2024-08-28 15:48:51  Date and Time of Study:2024-08-27  19:51:46    ---------------------------------------------------  ECHO:    Results for orders placed during the hospital encounter of 08/27/24    Adult Transthoracic Echo Complete W/ Cont if Necessary Per Protocol    Interpretation Summary    The left ventricular cavity is severely dilated.    Left ventricular systolic function is severely decreased. Estimated left ventricular EF = 10%    Left ventricular diastolic function is consistent with (grade II w/high LAP) pseudonormalization.    The right ventricular cavity is mildly dilated. Severely reduced right ventricular systolic function noted.    The left atrial cavity is moderate to severely dilated.    Moderate aortic valve regurgitation is present.    Mild to moderate mitral valve regurgitation is present.    Mild to moderate tricuspid valve regurgitation is present.    Calculated right ventricular systolic pressure from tricuspid regurgitation is 46 mmHg.    The ascending aorta is mildly dilated at 3.9 cm    There is a small (<1cm) circumferential pericardial effusion. There is no evidence of cardiac tamponade.          -----------------------------------------------------  RHC/C    No results found for this or any previous visit.      ---------------------------------------------------------------------------    CT:   CT Head Without Contrast    Result Date: 8/28/2024  1. No convincing acute intracranial abnormality is identified.  2. This patient has an 8.5 x 3.5 x 3 cm old lateral left temporooccipital infarct and an additional 4 x 2.5 x 2.5 cm old inferolateral left frontal lobe infarct extending into the anterior left insular cortex and these 2 old infarcts are in the left MCA territory and unchanged since MRI of the brain 11/02/2023. Furthermore, this patient has a row of chronic infarcts involving the right corona radiata region and the superior right frontoparietal subcortical white matter as well as a 1.8 x 1.2 x 2 cm old superior right parietal  infarct and these infarcts occurred back in 11/2023. Many of them line up in the watershed territory. There is mild-to-moderate small vessel disease in the cerebral white matter and mild cerebral atrophy. The remainder the head CT is within normal limits with no discernible acute infarct identified. If there remains any clinical suspicion of acute infarct, I recommend an MRI of the brain for more complete assessment.  The results and recommendations were communicated to Cheo Chase by telephone on 08/28/2024 at 3:20 p.m.  Radiation dose reduction techniques were utilized, including automated exposure control and exposure modulation based on body size.   This report was finalized on 8/28/2024 4:21 PM by Dr. Jon Saldivar M.D on Workstation: [id number]      CT Chest Without Contrast Diagnostic    Result Date: 8/28/2024   1.  Small bilateral pleural effusions with mild bibasilar atelectasis. Minimal clustered nodularity in the anterior right upper lobe, likely infectious/inflammatory. 2.  Mildly enlarged lymph nodes are nonspecific and could be reactive or neoplastic. Recommend comparison with prior imaging, if available. Otherwise, recommend follow-up contrast-enhanced CT chest in 3 months. 3.  Thoracic aortic aneurysm. 4.  Cardiomegaly.     This report was finalized on 8/28/2024 3:39 PM by Dr. Keeley Magana M.D on Workstation: [id number]           --------------------------------------------------------------------------------------------------------------    Laboratory evaluations:  Renal Function: Estimated Creatinine Clearance: 32.6 mL/min (A) (by C-G formula based on SCr of 1.62 mg/dL (H)).    Lab Results   Component Value Date    GLUCOSE 87 09/03/2024    BUN 24 (H) 09/03/2024    CREATININE 1.62 (H) 09/03/2024    BCR 14.8 09/03/2024    K 4.5 09/03/2024    CO2 20.0 (L) 09/03/2024    CALCIUM 8.7 09/03/2024    ALBUMIN 3.8 08/27/2024    AST 25 08/27/2024    ALT 32 08/27/2024     Lab Results   Component Value Date  "   WBC 8.09 09/03/2024    HGB 12.8 (L) 09/03/2024    HCT 38.3 09/03/2024    MCV 92.7 09/03/2024     09/03/2024     Lab Results   Component Value Date    HGBA1C 5.20 10/29/2023     Lab Results   Component Value Date    HGBA1C 5.20 10/29/2023     Lab Results   Component Value Date    CREATININE 1.62 (H) 09/03/2024     No results found for: \"IRON\", \"TIBC\", \"FERRITIN\"    Result Review:  I have personally reviewed the results from the time of this admission to 9/12/2024 13:30 EDT and agree with these findings:  [x]  Laboratory list / accordion  []  Microbiology  [x]  Radiology  [x]  EKG/Telemetry   [x]  Cardiology/Vascular   []  Pathology  [x]  Old records  []  Other:      PH Screening:  RVSP 46mmHg--PFTs deferred per pt request  Will have patient have follow-up with PCP      Cardiac Amyloid Screening Questions     Does the patient experience or have a diagnosis of any of the following:    HF: yes  Aortic stenosis: no  Afib: no  Heart block: yes  Peripheral neuropathy: no  Orthostatic hypotension: no  Renal dysfunction: yes  Carpal tunnel syndrome: no  Lumbar spinal stenosis: no  Proteinuria: no  Unexpected/unintentional weight loss: no  Frequent nausea/early satiety: no  Significant episodes of diarrhea/constipation: no    6 MINUTE WALK   UTO                   Sleep Evaluation:  Pt declines sleep study         ReDS VOLUMETRIC ASSESSMENT:  ReDs Vest    Performed by: Saba Mallory APRN  Authorized by: Saba Mallory APRN    ReDS value:  30  ReDS Value Description:  25-35 (green) = Optimal Volume Status        Assessment & Plan      1. Chronic HFrEF (heart failure with reduced ejection fraction)    2. Cardiomyopathy, dilated      1-2: HFrEF/dilated cardiomyopathy-patient is euvolemic today.   Goals of care discussed with the patient and stepdaughter at bedside.  They would like medical management only, do not want any interventions or advanced heart failure work-up  The KCCQ-12 was updated at the " visit today.  score: 35.     Labs reviewed from 1 week ago (9/30/2024) sodium 134, potassium 3.4, chloride 101, creatinine 1.62, BUN 24, GFR 43.7  There is room to optimize GDMT.    I recommend starting Jardiance 10 mg daily.    His blood pressure is slightly low today 96/57 I recommend change from carvedilol to metoprolol succinate.  Changed over to equivalent dose.  He was on carvedilol 6.25 twice daily, we will switch to metoprolol succinate 50 mg daily.    Advance Care Planning   ACP discussion was held with the patient during this visit. Pt is DNR     NYHA stage C FC-III     Clinical status was assessed and has worsened.  Treatment intent: palliative   ReDS reading was obtained today.  ReDS result: 30       Today, Patient appears euvolemic. and with perfusion. The patient's hemodynamics are currently acceptable. HR is: normal and is at goal. BP/MAP was reviewed and there is not room for medication up-titration--however we will change carvedilol to metoprolol succinate.  The patient's clinical course has been impacted by renal function and hemodynamics. LVEF: 10%.     GDMT Assessment: The patient was placed on triple therapy.      GDMT changes recommended today:  Change beta-blocker, add SGLT2      BB:   change to metoprolol succinate  50mg daily  Monitor heart rate.  Please call the [facility] for HR<50, dizziness, lightheadedness, syncope    A/A/A:     continue lisinopril (Prinivil)   20mg daily  Occasional monitoring of Chem-7 is recommended; call for the development of a new cough or S/Sx angioedema    SGLT2-I:  start Empagliflozin (Jardiance) 10 mg daily  Call for S/Sx genital mycotic infections  Do not take when inadequate oral intake, NPO, GI illness    MRA:  The patient is FC-NYHA Class III and MRA is indicated.   Pt has CKD, I would first like to start jardiance, and possible future addition of MRA at that time.       Diuretic Regimen:  -DIURETIC:   furosemide (LASIX) 40 mg two times daily  -Fluid restriction:    -requested  -2000 ml  -Patient has been asked to weigh daily and was provided with a printed diuretic strategy.  -Sodium restriction:   -1,500 mg Na restriction was discussed.      Labs/Diagnostics/Referrals:    Labs -reviewed most recent       Lifestyle Advice:   - call office if I gain more than 2 pounds in one day or 5 pounds in one week   - keep legs up while sitting   - use salt in moderation   - watch for swelling in feet, ankles and legs every day   - weigh myself daily   -call for concerning s/sx   -- activity or exercise based on tolerance encouraged     CODE STATUS: DNR             Return in about 1 month (around 10/12/2024).            Thank you for allowing me to participate in the care of your patient,         Saba Mackay SUNG Mallory  [facility]  09/12/24  09:55 EDT      **Yehuda Disclaimer:**  Much of this encounter note is an electronic transcription/translation of spoken language to printed text. The electronic translation of spoken language may permit erroneous, or at times, nonsensical words or phrases to be inadvertently transcribed. Although I have reviewed the note for such errors, some may still exist.    The information in this note was reviewed and updated during the visit to be as accurate as possible. As many patients have chronic medical problems, many of their individual problems and ongoing plans do not change significantly from visit to visit.  This information is correct and is consistent with my treatment plan as of today's visit.

## 2024-09-13 ENCOUNTER — READMISSION MANAGEMENT (OUTPATIENT)
Dept: CALL CENTER | Facility: HOSPITAL | Age: 74
End: 2024-09-13
Payer: OTHER GOVERNMENT

## 2024-09-13 NOTE — OUTREACH NOTE
CHF Week 2 Survey      Flowsheet Row Responses   [facility] patient discharged from? [facility]   Does the patient have one of the following disease processes/diagnoses(primary or secondary)? CHF   Week 2 attempt successful? Yes   Call start time 1820   Call end time 1827   Discharge diagnosis a/c CHF   Person spoke with today (if not patient) and relationship wanda Canela   Meds reviewed with patient/caregiver? Yes   Is the patient having any side effects they believe may be caused by any medication additions or changes? No   Does the patient have all medications ordered at discharge? Yes   Is the patient taking all medications as directed (includes completed medication regime)? Yes   Does the patient have a primary care provider?  Yes   Does the patient have an appointment with their PCP within 7 days of discharge? Yes   Has the patient kept scheduled appointments due by today? N/A   What is the Home health agency?  [facility]    Has home health visited the patient within 72 hours of discharge? Yes   Did the patient receive a copy of their discharge instructions? Yes   Nursing interventions Reviewed instructions with patient   What is the patient's perception of their health status since discharge? Improving   Nursing interventions Nurse provided patient education   Is the patient able to teach back signs and symptoms of worsening condition? (i.e. weight gain, shortness of air, etc.) Yes   If the patient is a current smoker, are they able to teach back resources for cessation? Not a smoker   Is the patient able to teach back Heart Failure Zones? Yes   CHF Nursing Interventions Education provided on various zones   CHF Zone this Call Green Zone   Green Zone Patient reports doing well, No new or worsening shortness of breath, Physical activity level is normal for you, No new swelling -  feet, ankles and legs look normal for you, Weight check stable, No chest pain   Green Zone Interventions Daily weight  check, Meds as directed, Low sodium diet, Follow up visits planned   CHF Week 2 call completed? Yes   Is the patient interested in additional calls from an ambulatory ? No   Would this patient benefit from a Referral to [facility] Social Work? No   Wrap up additional comments Dtr denies pt has SOB, weight gain and states pt is doing really good. No medication  issues.  visits.   Call end time 1827            Ana MOE - Registered Nurse

## 2024-09-13 NOTE — ADDENDUM NOTE
Encounter addended by: Shelia Anton MA on: 9/13/2024 8:45 AM   Actions taken: Charge Capture section accepted

## 2024-10-14 ENCOUNTER — LAB (OUTPATIENT)
Dept: LAB | Facility: HOSPITAL | Age: 74
End: 2024-10-14
Payer: COMMERCIAL

## 2024-10-14 ENCOUNTER — TELEPHONE (OUTPATIENT)
Dept: CARDIOLOGY | Facility: HOSPITAL | Age: 74
End: 2024-10-14
Payer: COMMERCIAL

## 2024-10-14 ENCOUNTER — HOSPITAL ENCOUNTER (OUTPATIENT)
Dept: CARDIOLOGY | Facility: HOSPITAL | Age: 74
Discharge: HOME | End: 2024-10-14
Payer: COMMERCIAL

## 2024-10-14 VITALS
BODY MASS INDEX: 20.88 KG/M2 | WEIGHT: 137.8 LBS | HEIGHT: 68 IN | DIASTOLIC BLOOD PRESSURE: 62 MMHG | SYSTOLIC BLOOD PRESSURE: 118 MMHG | HEART RATE: 59 BPM | OXYGEN SATURATION: 99 %

## 2024-10-14 DIAGNOSIS — I42.0 CARDIOMYOPATHY, DILATED: ICD-10-CM

## 2024-10-14 DIAGNOSIS — I50.22 CHRONIC HFREF (HEART FAILURE WITH REDUCED EJECTION FRACTION): ICD-10-CM

## 2024-10-14 DIAGNOSIS — I50.22 CHRONIC HFREF (HEART FAILURE WITH REDUCED EJECTION FRACTION): Primary | ICD-10-CM

## 2024-10-14 LAB
ANION GAP SERPL CALCULATED.3IONS-SCNC: 9.9 MMOL/L (ref 5–15)
BUN SERPL-MCNC: 18 MG/DL (ref 8–23)
BUN/CREAT SERPL: 10.5 (ref 7–25)
CALCIUM SPEC-SCNC: 9.4 MG/DL (ref 8.6–10.5)
CHLORIDE SERPL-SCNC: 99 MMOL/L (ref 98–107)
CO2 SERPL-SCNC: 29.1 MMOL/L (ref 22–29)
CREAT SERPL-MCNC: 1.72 MG/DL (ref 0.76–1.27)
EGFRCR SERPLBLD CKD-EPI 2021: 40.7 ML/MIN/1.73
GLUCOSE SERPL-MCNC: 90 MG/DL (ref 65–99)
POTASSIUM SERPL-SCNC: 3.6 MMOL/L (ref 3.5–5.2)
SODIUM SERPL-SCNC: 138 MMOL/L (ref 136–145)

## 2024-10-14 PROCEDURE — 94726 PLETHYSMOGRAPHY LUNG VOLUMES: CPT | Performed by: NURSE PRACTITIONER

## 2024-10-14 PROCEDURE — G0463 HOSPITAL OUTPT CLINIC VISIT: HCPCS

## 2024-10-14 PROCEDURE — 80048 BASIC METABOLIC PNL TOTAL CA: CPT

## 2024-10-14 PROCEDURE — 36415 COLL VENOUS BLD VENIPUNCTURE: CPT

## 2024-10-14 PROCEDURE — 99214 OFFICE O/P EST MOD 30 MIN: CPT | Performed by: NURSE PRACTITIONER

## 2024-10-14 RX ORDER — SPIRONOLACTONE 25 MG/1
12.5 TABLET ORAL DAILY
Qty: 30 TABLET | Refills: 0 | Status: SHIPPED | OUTPATIENT
Start: 2024-10-14

## 2024-10-14 NOTE — ADDENDUM NOTE
Encounter addended by: Shelia Anton MA on: 10/14/2024 4:22 PM   Actions taken: Charge Capture section accepted

## 2024-10-14 NOTE — TELEPHONE ENCOUNTER
----- Message from Saba Mallory sent at 10/14/2024  3:37 PM EDT -----  Please call and notify of results.   Kidney function is stable. We can go ahead and start spironolactone 12.5mg daily.   We will need labs drawn on 10/19/24 for recheck of potassium level and kidney function.

## 2024-10-14 NOTE — PATIENT INSTRUCTIONS
We will check labs today and if kidney function and potassium levels are stable we will start spironolactone, but we will call with results.     Directions for when to call the clinic reviewed with the patient to include weight gain of 2 to 3 pounds in 24 hours, weight gain of 5 to 10 pounds within 7 days; worsening shortness of breath; worsening lower extremity edema or abdominal distention.

## 2024-10-14 NOTE — TELEPHONE ENCOUNTER
Spoke with patient daughter Rola and informed of results and instructions.   Rola verbalized understanding

## 2024-10-14 NOTE — LETTER
October 14, 2024     Phong Gonzales MD  [address] [facility] [address]  [facility] [address]    Patient: Tigre Amaral   YOB: 1950   Date of Visit: 10/14/2024     Dear Phong Gonzales MD:       Thank you for referring Tigre Amaral to me for evaluation. Below are the relevant portions of my assessment and plan of care.    If you have questions, please do not hesitate to call me. I look forward to following Tigre along with you.         Sincerely,        SUNG Lynn        CC: MD Shawnee Jackson, SUNG Conner  10/14/24 1543  Signed    [facility]    Provider, No Known  [place] [address]    Thank you for asking me to see Tigre Amaral.     History of Present Illness    1.  HFrEF    Subjective     Tigre Amaralis a 74 y.o. male who presents today for HFrEF.  History of pacemaker/AICD, HTN, dementia, peripheral vascular disease, previous stroke, HLD, CKD, bilateral carotid artery stenosis  The patient had an echo 10/2023 LVEF less than 20%.  However did have normal left ventricular size. mild concentric LVH.  Abnormal septal wall motion consistent with right ventricular pacing diastolic function consistent with grade 2 pseudonormalization.  Left atrial cavity mildly dilated  Patient had recent acute decompensated heart failure hospitalization from 8/27/2024 to 9/3/2024.  He initially presented to the ER with complaints of shortness of breath, was admitted for acute exacerbation of heart failure and aspiration pneumonia as well as metabolic encephalopathy on baseline dementia.  He was gently diuresed as well as started on antibiotics to treat the aspiration pneumonia.  He had 2D TTE completed 8/29/2024 that showed LVEF of 10%.  Severely dilated left ventricular cavity.  Grade 2 diastolic dysfunction.  Right ventricular cavity dilated and severely reduced right ventricular systolic function.   Left atrial cavity moderately to severely dilated.  Moderate aortic valve regurgitation.  Mild to moderate mitral valve regurgitation.  Mild to moderate tricuspid valve regurgitation.  RVSP is elevated at 46 mmHg.  Mildly dilated ascending aorta 3.9 cm.  There is additionally a small less than 1 cm circumferential pericardial effusion without evidence of cardiac tamponade  It was felt the dilated cardiomyopathy was due to alcohol use.    Today reports weights are stable.   Denies any leg swelling, orthopnea, PND, or new or worsening shortness of breath-but continue chronic shortness of breath and CHAUDHRY.   Continued but stable exercise intolerance.         Review of Systems - Review of Systems   Constitutional: Negative for weight gain.   Cardiovascular:  Positive for dyspnea on exertion. Negative for chest pain, leg swelling, orthopnea, paroxysmal nocturnal dyspnea and syncope.   Respiratory:  Positive for shortness of breath. Negative for cough.    Gastrointestinal:  Negative for bloating.   Neurological:  Positive for weakness. Negative for dizziness and light-headedness.          Patient Active Problem List   Diagnosis   • Cerebrovascular accident (CVA) due to embolism of right middle cerebral artery   • Aspiration pneumonia   • Acute on chronic combined systolic (congestive) and diastolic (congestive) heart failure   • Essential hypertension   • Stage 3a chronic kidney disease   • Hyperlipidemia   • Carotid artery stenosis, symptomatic, right   • Tobacco abuse   • Basal cell carcinoma (BCC) of skin of nose   • Carotid stenosis, asymptomatic, left   • Bilateral carotid artery stenosis   • Pacemaker   • Aspiration pneumonia   • Dysphagia   • Confusion   • Dementia   • History of CVA (cerebrovascular accident)   • Left-sided weakness   • Tobacco abuse   • Metabolic encephalopathy   • Chronic HFrEF (heart failure with reduced ejection fraction)   • Cardiomyopathy, dilated     family history includes Heart disease in  his father, mother, and sister.   reports that he quit smoking about 7 weeks ago. His smoking use included cigarettes. He has a 60 pack-year smoking history. He has never used smokeless tobacco. He reports that he does not drink alcohol and does not use drugs.  No Known Allergies  Physical Activity: Inactive (8/28/2024)    Exercise Vital Sign    • Days of Exercise per Week: 0 days    • Minutes of Exercise per Session: 0 min          Current Outpatient Medications:   •  aspirin 81 MG EC tablet, Take 1 tablet by mouth Daily., Disp: , Rfl:   •  atorvastatin (LIPITOR) 80 MG tablet, Take 1 tablet by mouth Every Night., Disp: 30 tablet, Rfl: 12  •  Cholecalciferol 25 MCG (1000 UT) tablet, Take 1 tablet by mouth Daily., Disp: , Rfl:   •  clopidogrel (PLAVIX) 75 MG tablet, Take 1 tablet by mouth Daily., Disp: 30 tablet, Rfl: 12  •  empagliflozin (Jardiance) 10 MG tablet tablet, Take 1 tablet by mouth Daily., Disp: 30 tablet, Rfl: 3  •  furosemide (LASIX) 40 MG tablet, Take 1 tablet by mouth 2 (Two) Times a Day., Disp: 60 tablet, Rfl: 0  •  metoprolol succinate XL (TOPROL-XL) 50 MG 24 hr tablet, Take 1 tablet by mouth Daily., Disp: 30 tablet, Rfl: 3  •  potassium chloride (K-DUR,KLOR-CON) 20 MEQ CR tablet, Take 1 tablet by mouth Daily., Disp: , Rfl:   •  sennosides-docusate (PERICOLACE) 8.6-50 MG per tablet, 1 tablet., Disp: , Rfl:   •  lisinopril (PRINIVIL,ZESTRIL) 20 MG tablet, Take 1 tablet by mouth Daily. (Patient not taking: Reported on 10/14/2024), Disp: , Rfl:     Objective  Vital Sign Review:   Vitals:    10/14/24 1329   BP: 118/62   Pulse: 59   SpO2: 99%       Body mass index is 20.96 kg/m².      10/14/24  1329   Weight: 62.5 kg (137 lb 12.8 oz)       Physical Exam:  Constitutional:       Appearance: Normal and healthy appearance. Frail. Chronically ill-appearing.   Neck:      Vascular: JVD normal.   Pulmonary:      Effort: Pulmonary effort is normal.      Breath sounds: Normal breath sounds.   Cardiovascular:       PMI at left midclavicular line. Normal rate. Regular rhythm.      Murmurs: There is a systolic murmur.   Pulses:     Radial: 2+ bilaterally.  Edema:     Peripheral edema absent.   Abdominal:      Palpations: Abdomen is soft.      Tenderness: There is no abdominal tenderness.   Skin:     General: Skin is warm and dry.   Neurological:      General: No focal deficit present.      Mental Status: Alert and oriented to person, place and time.   Psychiatric:         Behavior: Behavior is cooperative.          DATA REVIEWED:   EKG:  HEART RATE=78  bpm  RR Jabxclxx=073  ms  MT Hqfyiglz=694  ms  P Horizontal Axis=35  deg  P Front Axis=75  deg  QRSD Henkgjwz=360  ms  QT Ksqhowtd=243  ms  IKzU=441  ms  QRS Axis=-65  deg  T Wave Axis=111  deg  - ABNORMAL ECG -  Ventricular-paced complexes  No further analysis attempted due to paced rhythm  No change from previous tracing  Electronically Signed By: Chidi Song ([facility]) 2024-08-28 15:48:51  Date and Time of Study:2024-08-27 19:51:46    ---------------------------------------------------  ECHO:    Results for orders placed during the hospital encounter of 08/27/24    Adult Transthoracic Echo Complete W/ Cont if Necessary Per Protocol    Interpretation Summary  •  The left ventricular cavity is severely dilated.  •  Left ventricular systolic function is severely decreased. Estimated left ventricular EF = 10%  •  Left ventricular diastolic function is consistent with (grade II w/high LAP) pseudonormalization.  •  The right ventricular cavity is mildly dilated. Severely reduced right ventricular systolic function noted.  •  The left atrial cavity is moderate to severely dilated.  •  Moderate aortic valve regurgitation is present.  •  Mild to moderate mitral valve regurgitation is present.  •  Mild to moderate tricuspid valve regurgitation is present.  •  Calculated right ventricular systolic pressure from tricuspid regurgitation is 46 mmHg.  •  The ascending aorta is mildly dilated  "at 3.9 cm  •  There is a small (<1cm) circumferential pericardial effusion. There is no evidence of cardiac tamponade.          -----------------------------------------------------  RHC/LHC    No results found for this or any previous visit.      ---------------------------------------------------------------------------    CT:   No radiology results for the last 30 days.        --------------------------------------------------------------------------------------------------------------    Laboratory evaluations:  Renal Function: Estimated Creatinine Clearance: 32.3 mL/min (A) (by C-G formula based on SCr of 1.72 mg/dL (H)).    Lab Results   Component Value Date    GLUCOSE 90 10/14/2024    BUN 18 10/14/2024    CREATININE 1.72 (H) 10/14/2024    BCR 10.5 10/14/2024    K 3.6 10/14/2024    CO2 29.1 (H) 10/14/2024    CALCIUM 9.4 10/14/2024    ALBUMIN 3.8 08/27/2024    AST 25 08/27/2024    ALT 32 08/27/2024     Lab Results   Component Value Date    WBC 8.09 09/03/2024    HGB 12.8 (L) 09/03/2024    HCT 38.3 09/03/2024    MCV 92.7 09/03/2024     09/03/2024     Lab Results   Component Value Date    HGBA1C 5.20 10/29/2023     Lab Results   Component Value Date    HGBA1C 5.20 10/29/2023     Lab Results   Component Value Date    CREATININE 1.72 (H) 10/14/2024     No results found for: \"IRON\", \"TIBC\", \"FERRITIN\"    Result Review:  I have personally reviewed the results from the time of this admission to 10/14/2024 15:33 EDT and agree with these findings:  [x]  Laboratory list / accordion  []  Microbiology  [x]  Radiology  [x]  EKG/Telemetry   [x]  Cardiology/Vascular   []  Pathology  [x]  Old records  []  Other:      PH Screening:  RVSP 46mmHg--PFTs deferred per pt request  Will have patient have follow-up with PCP      Cardiac Amyloid Screening Questions     Does the patient experience or have a diagnosis of any of the following:    HF: yes  Aortic stenosis: no  Afib: no  Heart block: yes  Peripheral neuropathy: " no  Orthostatic hypotension: no  Renal dysfunction: yes  Carpal tunnel syndrome: no  Lumbar spinal stenosis: no  Proteinuria: no  Unexpected/unintentional weight loss: no  Frequent nausea/early satiety: no  Significant episodes of diarrhea/constipation: no    6 MINUTE WALK   UTO                   Sleep Evaluation:  Pt declines sleep study         ReDS VOLUMETRIC ASSESSMENT:  ReDs Vest    Performed by: Saba Mallory APRN  Authorized by: Saba Mallory APRN    ReDS value:  31  ReDS Value Description:  25-35 (green) = Optimal Volume Status        Assessment & Plan     1. Chronic HFrEF (heart failure with reduced ejection fraction)    2. Cardiomyopathy, dilated      1-2: HFrEF/dilated cardiomyopathy-patient is euvolemic today.    They would like medical management only, do not want any interventions or advanced heart failure work-up/evaluation  Labs reveiwed from today HFR 40.7, potassium 3.6, creatinine 1.72  There is room to optimize GDMT. We will start spironolactone 12.5mg daily.   Blood pressure improved with changing BB to metoprolol. HR is at goal    Advance Care Planning  ACP discussion was held with the patient during this visit. Pt is DNR       The KCCQ-12.  score: 35.       NYHA stage C FC-III     Clinical status was assessed and has worsened.  Treatment intent: palliative   ReDS reading was obtained today.  ReDS result: 30       Today, Patient appears euvolemic. and with perfusion. The patient's hemodynamics are currently acceptable. HR is: normal and is at goal. BP/MAP was reviewed and there is  room for medication up-titration--however we will change carvedilol to metoprolol succinate.  The patient's clinical course has been impacted by renal function and hemodynamics. LVEF: 10%.     GDMT Assessment: The patient was placed on triple therapy.      GDMT changes recommended today:  start MRA      BB:   continue to metoprolol succinate  50mg daily  Monitor heart rate.  Please call the [facility] for  HR<50, dizziness, lightheadedness, syncope    A/A/A:     continue lisinopril (Prinivil)   20mg daily  Occasional monitoring of Chem-7 is recommended; call for the development of a new cough or S/Sx angioedema    SGLT2-I:  continue Empagliflozin (Jardiance) 10 mg daily  Call for S/Sx genital mycotic infections  Do not take when inadequate oral intake, NPO, GI illness    MRA:  The patient is FC-NYHA Class III and MRA is indicated.   GFR 40.7 today-we will start spironolactone 12.5mg daily, recheck BMP in 5 days.   He is also on potassium supplement, his potassium level today is 3.6, we can have him continue the potassium supplement for now.       Diuretic Regimen:  -DIURETIC:   furosemide (LASIX) 40 mg two times daily  -Fluid restriction:   -requested  -2000 ml  -Patient has been asked to weigh daily and was provided with a printed diuretic strategy.  -Sodium restriction:   -1,500 mg Na restriction was discussed.      Labs/Diagnostics/Referrals:    Labs -chem-7       Lifestyle Advice:   - call office if I gain more than 2 pounds in one day or 5 pounds in one week   - keep legs up while sitting   - use salt in moderation   - watch for swelling in feet, ankles and legs every day   - weigh myself daily   -call for concerning s/sx   -- activity or exercise based on tolerance encouraged     CODE STATUS: DNR             Return in about 1 month (around 11/14/2024).            Thank you for allowing me to participate in the care of your patient,         Saba Mallory, APRN  10/14/24  09:55 EDT      **Yehuda Disclaimer:**  Much of this encounter note is an electronic transcription/translation of spoken language to printed text. The electronic translation of spoken language may permit erroneous, or at times, nonsensical words or phrases to be inadvertently transcribed. Although I have reviewed the note for such errors, some may still exist.    The information in this note was reviewed and updated during the visit to be as  accurate as possible. As many patients have chronic medical problems, many of their individual problems and ongoing plans do not change significantly from visit to visit.  This information is correct and is consistent with my treatment plan as of today's visit.

## 2024-10-14 NOTE — PROGRESS NOTES
[facility]    Provider, No Known  [facility] [place] [address]    Thank you for asking me to see Tigre Amaral.     History of Present Illness    1.  HFrEF    Subjective      Tigre Allen a 74 y.o. male who presents today for HFrEF.  History of pacemaker/AICD, HTN, dementia, peripheral vascular disease, previous stroke, HLD, CKD, bilateral carotid artery stenosis  The patient had an echo 10/2023 LVEF less than 20%.  However did have normal left ventricular size. mild concentric LVH.  Abnormal septal wall motion consistent with right ventricular pacing diastolic function consistent with grade 2 pseudonormalization.  Left atrial cavity mildly dilated  Patient had recent acute decompensated heart failure hospitalization from 8/27/2024 to 9/3/2024.  He initially presented to the ER with complaints of shortness of breath, was admitted for acute exacerbation of heart failure and aspiration pneumonia as well as metabolic encephalopathy on baseline dementia.  He was gently diuresed as well as started on antibiotics to treat the aspiration pneumonia.  He had 2D TTE completed 8/29/2024 that showed LVEF of 10%.  Severely dilated left ventricular cavity.  Grade 2 diastolic dysfunction.  Right ventricular cavity dilated and severely reduced right ventricular systolic function.  Left atrial cavity moderately to severely dilated.  Moderate aortic valve regurgitation.  Mild to moderate mitral valve regurgitation.  Mild to moderate tricuspid valve regurgitation.  RVSP is elevated at 46 mmHg.  Mildly dilated ascending aorta 3.9 cm.  There is additionally a small less than 1 cm circumferential pericardial effusion without evidence of cardiac tamponade  It was felt the dilated cardiomyopathy was due to alcohol use.    Today reports weights are stable.   Denies any leg swelling, orthopnea, PND, or new or worsening shortness of breath-but continue chronic shortness of breath and  CHAUDHRY.   Continued but stable exercise intolerance.         Review of Systems - Review of Systems   Constitutional: Negative for weight gain.   Cardiovascular:  Positive for dyspnea on exertion. Negative for chest pain, leg swelling, orthopnea, paroxysmal nocturnal dyspnea and syncope.   Respiratory:  Positive for shortness of breath. Negative for cough.    Gastrointestinal:  Negative for bloating.   Neurological:  Positive for weakness. Negative for dizziness and light-headedness.          Patient Active Problem List   Diagnosis    Cerebrovascular accident (CVA) due to embolism of right middle cerebral artery    Aspiration pneumonia    Acute on chronic combined systolic (congestive) and diastolic (congestive) heart failure    Essential hypertension    Stage 3a chronic kidney disease    Hyperlipidemia    Carotid artery stenosis, symptomatic, right    Tobacco abuse    Basal cell carcinoma (BCC) of skin of nose    Carotid stenosis, asymptomatic, left    Bilateral carotid artery stenosis    Pacemaker    Aspiration pneumonia    Dysphagia    Confusion    Dementia    History of CVA (cerebrovascular accident)    Left-sided weakness    Tobacco abuse    Metabolic encephalopathy    Chronic HFrEF (heart failure with reduced ejection fraction)    Cardiomyopathy, dilated     family history includes Heart disease in his father, mother, and sister.   reports that he quit smoking about 7 weeks ago. His smoking use included cigarettes. He has a 60 pack-year smoking history. He has never used smokeless tobacco. He reports that he does not drink alcohol and does not use drugs.  No Known Allergies  Physical Activity: Inactive (8/28/2024)    Exercise Vital Sign     Days of Exercise per Week: 0 days     Minutes of Exercise per Session: 0 min          Current Outpatient Medications:     aspirin 81 MG EC tablet, Take 1 tablet by mouth Daily., Disp: , Rfl:     atorvastatin (LIPITOR) 80 MG tablet, Take 1 tablet by mouth Every Night., Disp: 30  tablet, Rfl: 12    Cholecalciferol 25 MCG (1000 UT) tablet, Take 1 tablet by mouth Daily., Disp: , Rfl:     clopidogrel (PLAVIX) 75 MG tablet, Take 1 tablet by mouth Daily., Disp: 30 tablet, Rfl: 12    empagliflozin (Jardiance) 10 MG tablet tablet, Take 1 tablet by mouth Daily., Disp: 30 tablet, Rfl: 3    furosemide (LASIX) 40 MG tablet, Take 1 tablet by mouth 2 (Two) Times a Day., Disp: 60 tablet, Rfl: 0    metoprolol succinate XL (TOPROL-XL) 50 MG 24 hr tablet, Take 1 tablet by mouth Daily., Disp: 30 tablet, Rfl: 3    potassium chloride (K-DUR,KLOR-CON) 20 MEQ CR tablet, Take 1 tablet by mouth Daily., Disp: , Rfl:     sennosides-docusate (PERICOLACE) 8.6-50 MG per tablet, 1 tablet., Disp: , Rfl:     lisinopril (PRINIVIL,ZESTRIL) 20 MG tablet, Take 1 tablet by mouth Daily. (Patient not taking: Reported on 10/14/2024), Disp: , Rfl:     Objective   Vital Sign Review:   Vitals:    10/14/24 1329   BP: 118/62   Pulse: 59   SpO2: 99%       Body mass index is 20.96 kg/m².      10/14/24  1329   Weight: 62.5 kg (137 lb 12.8 oz)       Physical Exam:  Constitutional:       Appearance: Normal and healthy appearance. Frail. Chronically ill-appearing.   Neck:      Vascular: JVD normal.   Pulmonary:      Effort: Pulmonary effort is normal.      Breath sounds: Normal breath sounds.   Cardiovascular:      PMI at left midclavicular line. Normal rate. Regular rhythm.      Murmurs: There is a systolic murmur.   Pulses:     Radial: 2+ bilaterally.  Edema:     Peripheral edema absent.   Abdominal:      Palpations: Abdomen is soft.      Tenderness: There is no abdominal tenderness.   Skin:     General: Skin is warm and dry.   Neurological:      General: No focal deficit present.      Mental Status: Alert and oriented to person, place and time.   Psychiatric:         Behavior: Behavior is cooperative.          DATA REVIEWED:   EKG:  HEART RATE=78  bpm  RR Hixifatb=959  ms  AK Fnkxqgwt=939  ms  P Horizontal Axis=35  deg  P Front Axis=75   deg  QRSD Jezyqlfp=637  ms  QT Cajidsba=919  ms  UVpQ=959  ms  QRS Axis=-65  deg  T Wave Axis=111  deg  - ABNORMAL ECG -  Ventricular-paced complexes  No further analysis attempted due to paced rhythm  No change from previous tracing  Electronically Signed By: Chidi Song ([facility]) 2024-08-28 15:48:51  Date and Time of Study:2024-08-27 19:51:46    ---------------------------------------------------  ECHO:    Results for orders placed during the hospital encounter of 08/27/24    Adult Transthoracic Echo Complete W/ Cont if Necessary Per Protocol    Interpretation Summary    The left ventricular cavity is severely dilated.    Left ventricular systolic function is severely decreased. Estimated left ventricular EF = 10%    Left ventricular diastolic function is consistent with (grade II w/high LAP) pseudonormalization.    The right ventricular cavity is mildly dilated. Severely reduced right ventricular systolic function noted.    The left atrial cavity is moderate to severely dilated.    Moderate aortic valve regurgitation is present.    Mild to moderate mitral valve regurgitation is present.    Mild to moderate tricuspid valve regurgitation is present.    Calculated right ventricular systolic pressure from tricuspid regurgitation is 46 mmHg.    The ascending aorta is mildly dilated at 3.9 cm    There is a small (<1cm) circumferential pericardial effusion. There is no evidence of cardiac tamponade.          -----------------------------------------------------  RHC/LHC    No results found for this or any previous visit.      ---------------------------------------------------------------------------    CT:   No radiology results for the last 30 days.        --------------------------------------------------------------------------------------------------------------    Laboratory evaluations:  Renal Function: Estimated Creatinine Clearance: 32.3 mL/min (A) (by C-G formula based on SCr of 1.72 mg/dL (H)).    Lab Results  "  Component Value Date    GLUCOSE 90 10/14/2024    BUN 18 10/14/2024    CREATININE 1.72 (H) 10/14/2024    BCR 10.5 10/14/2024    K 3.6 10/14/2024    CO2 29.1 (H) 10/14/2024    CALCIUM 9.4 10/14/2024    ALBUMIN 3.8 08/27/2024    AST 25 08/27/2024    ALT 32 08/27/2024     Lab Results   Component Value Date    WBC 8.09 09/03/2024    HGB 12.8 (L) 09/03/2024    HCT 38.3 09/03/2024    MCV 92.7 09/03/2024     09/03/2024     Lab Results   Component Value Date    HGBA1C 5.20 10/29/2023     Lab Results   Component Value Date    HGBA1C 5.20 10/29/2023     Lab Results   Component Value Date    CREATININE 1.72 (H) 10/14/2024     No results found for: \"IRON\", \"TIBC\", \"FERRITIN\"    Result Review:  I have personally reviewed the results from the time of this admission to 10/14/2024 15:33 EDT and agree with these findings:  [x]  Laboratory list / accordion  []  Microbiology  [x]  Radiology  [x]  EKG/Telemetry   [x]  Cardiology/Vascular   []  Pathology  [x]  Old records  []  Other:      PH Screening:  RVSP 46mmHg--PFTs deferred per pt request  Will have patient have follow-up with PCP      Cardiac Amyloid Screening Questions     Does the patient experience or have a diagnosis of any of the following:    HF: yes  Aortic stenosis: no  Afib: no  Heart block: yes  Peripheral neuropathy: no  Orthostatic hypotension: no  Renal dysfunction: yes  Carpal tunnel syndrome: no  Lumbar spinal stenosis: no  Proteinuria: no  Unexpected/unintentional weight loss: no  Frequent nausea/early satiety: no  Significant episodes of diarrhea/constipation: no    6 MINUTE WALK   UTO                   Sleep Evaluation:  Pt declines sleep study         ReDS VOLUMETRIC ASSESSMENT:  ReDs Vest    Performed by: Saba Mallory APRN  Authorized by: Saba Mallory APRN    ReDS value:  31  ReDS Value Description:  25-35 (green) = Optimal Volume Status        Assessment & Plan      1. Chronic HFrEF (heart failure with reduced ejection fraction)  "   2. Cardiomyopathy, dilated      1-2: HFrEF/dilated cardiomyopathy-patient is euvolemic today.    They would like medical management only, do not want any interventions or advanced heart failure work-up/evaluation  Labs reveiwed from today HFR 40.7, potassium 3.6, creatinine 1.72  There is room to optimize GDMT. We will start spironolactone 12.5mg daily.   Blood pressure improved with changing BB to metoprolol. HR is at goal    Advance Care Planning   ACP discussion was held with the patient during this visit. Pt is DNR       The KCCQ-12.  score: 35.       NYHA stage C FC-III     Clinical status was assessed and has worsened.  Treatment intent: palliative   ReDS reading was obtained today.  ReDS result: 30       Today, Patient appears euvolemic. and with perfusion. The patient's hemodynamics are currently acceptable. HR is: normal and is at goal. BP/MAP was reviewed and there is  room for medication up-titration--however we will change carvedilol to metoprolol succinate.  The patient's clinical course has been impacted by renal function and hemodynamics. LVEF: 10%.     GDMT Assessment: The patient was placed on triple therapy.      GDMT changes recommended today:  start MRA      BB:   continue to metoprolol succinate  50mg daily  Monitor heart rate.  Please call the [facility] for HR<50, dizziness, lightheadedness, syncope    A/A/A:     continue lisinopril (Prinivil)   20mg daily  Occasional monitoring of Chem-7 is recommended; call for the development of a new cough or S/Sx angioedema    SGLT2-I:  continue Empagliflozin (Jardiance) 10 mg daily  Call for S/Sx genital mycotic infections  Do not take when inadequate oral intake, NPO, GI illness    MRA:  The patient is FC-NYHA Class III and MRA is indicated.   GFR 40.7 today-we will start spironolactone 12.5mg daily, recheck BMP in 5 days.   He is also on potassium supplement, his potassium level today is 3.6, we can have him continue the potassium supplement for now.        Diuretic Regimen:  -DIURETIC:   furosemide (LASIX) 40 mg two times daily  -Fluid restriction:   -requested  -2000 ml  -Patient has been asked to weigh daily and was provided with a printed diuretic strategy.  -Sodium restriction:   -1,500 mg Na restriction was discussed.      Labs/Diagnostics/Referrals:    Labs -chem-7       Lifestyle Advice:   - call office if I gain more than 2 pounds in one day or 5 pounds in one week   - keep legs up while sitting   - use salt in moderation   - watch for swelling in feet, ankles and legs every day   - weigh myself daily   -call for concerning s/sx   -- activity or exercise based on tolerance encouraged     CODE STATUS: DNR             Return in about 1 month (around 11/14/2024).            Thank you for allowing me to participate in the care of your patient,         Saba Mallory, APRN  10/14/24  09:55 EDT      **Yehuda Disclaimer:**  Much of this encounter note is an electronic transcription/translation of spoken language to printed text. The electronic translation of spoken language may permit erroneous, or at times, nonsensical words or phrases to be inadvertently transcribed. Although I have reviewed the note for such errors, some may still exist.    The information in this note was reviewed and updated during the visit to be as accurate as possible. As many patients have chronic medical problems, many of their individual problems and ongoing plans do not change significantly from visit to visit.  This information is correct and is consistent with my treatment plan as of today's visit.

## 2024-10-22 ENCOUNTER — TELEPHONE (OUTPATIENT)
Dept: CARDIOLOGY | Facility: HOSPITAL | Age: 74
End: 2024-10-22

## 2024-10-22 NOTE — TELEPHONE ENCOUNTER
----- Message from Saba Mallory sent at 10/21/2024  8:42 AM EDT -----  marion Diana was supposed to have f/u labs done from starting spironolactone, can you call the patient and make sure he has this done today, if not today needs to be done within the next couple days  Thank you  ----- Message -----  From: Saba Mallory APRN  Sent: 10/21/2024  12:00 AM EDT  To: SUNG Lynn    5day lab f/u from starting spironolactone 12.5mg daily on 10/14/2024

## 2024-11-07 RX ORDER — CLOPIDOGREL BISULFATE 75 MG/1
75 TABLET ORAL DAILY
Qty: 90 TABLET | Refills: 4 | Status: SHIPPED | OUTPATIENT
Start: 2024-11-07

## 2024-11-07 RX ORDER — ATORVASTATIN CALCIUM 80 MG/1
80 TABLET, FILM COATED ORAL
Qty: 90 TABLET | Refills: 4 | Status: SHIPPED | OUTPATIENT
Start: 2024-11-07

## 2024-11-21 ENCOUNTER — HOSPITAL ENCOUNTER (OUTPATIENT)
Dept: CARDIOLOGY | Facility: HOSPITAL | Age: 74
Discharge: HOME | End: 2024-11-21
Payer: COMMERCIAL

## 2024-11-21 VITALS
DIASTOLIC BLOOD PRESSURE: 58 MMHG | HEIGHT: 68 IN | OXYGEN SATURATION: 98 % | BODY MASS INDEX: 21.49 KG/M2 | SYSTOLIC BLOOD PRESSURE: 104 MMHG | HEART RATE: 56 BPM | WEIGHT: 141.8 LBS

## 2024-11-21 DIAGNOSIS — I50.22 CHRONIC HFREF (HEART FAILURE WITH REDUCED EJECTION FRACTION): Primary | ICD-10-CM

## 2024-11-21 DIAGNOSIS — I42.0 CARDIOMYOPATHY, DILATED: ICD-10-CM

## 2024-11-21 LAB
ANION GAP SERPL CALCULATED.3IONS-SCNC: 9.6 MMOL/L (ref 5–15)
BUN SERPL-MCNC: 21 MG/DL (ref 8–23)
BUN/CREAT SERPL: 13.7 (ref 7–25)
CALCIUM SPEC-SCNC: 9.5 MG/DL (ref 8.6–10.5)
CHLORIDE SERPL-SCNC: 100 MMOL/L (ref 98–107)
CO2 SERPL-SCNC: 28.4 MMOL/L (ref 22–29)
CREAT SERPL-MCNC: 1.53 MG/DL (ref 0.76–1.27)
EGFRCR SERPLBLD CKD-EPI 2021: 46.8 ML/MIN/1.73
GLUCOSE SERPL-MCNC: 97 MG/DL (ref 65–99)
POTASSIUM SERPL-SCNC: 4 MMOL/L (ref 3.5–5.2)
SODIUM SERPL-SCNC: 138 MMOL/L (ref 136–145)

## 2024-11-21 PROCEDURE — G0463 HOSPITAL OUTPT CLINIC VISIT: HCPCS

## 2024-11-21 PROCEDURE — 94726 PLETHYSMOGRAPHY LUNG VOLUMES: CPT | Performed by: NURSE PRACTITIONER

## 2024-11-21 PROCEDURE — 80048 BASIC METABOLIC PNL TOTAL CA: CPT | Performed by: NURSE PRACTITIONER

## 2024-11-21 RX ORDER — FUROSEMIDE 20 MG/1
20 TABLET ORAL
COMMUNITY

## 2024-11-21 NOTE — ADDENDUM NOTE
Encounter addended by: Shelia Anton MA on: 11/21/2024 4:09 PM   Actions taken: Charge Capture section accepted

## 2024-11-21 NOTE — PROGRESS NOTES
[facility]    Provider, No Known  [facility] [place] [address]    Thank you for asking me to see Tigre Amaral.     History of Present Illness    1.  HFrEF    Subjective      Tigre Allen a 74 y.o. male who presents today for HFrEF.  History of pacemaker/AICD, HTN, dementia, peripheral vascular disease, previous stroke, HLD, CKD, bilateral carotid artery stenosis  The patient had an echo 10/2023 LVEF less than 20%.  However did have normal left ventricular size. mild concentric LVH.  Abnormal septal wall motion consistent with right ventricular pacing diastolic function consistent with grade 2 pseudonormalization.  Left atrial cavity mildly dilated  Patient had recent acute decompensated heart failure hospitalization from 8/27/2024 to 9/3/2024.  He initially presented to the ER with complaints of shortness of breath, was admitted for acute exacerbation of heart failure and aspiration pneumonia as well as metabolic encephalopathy on baseline dementia.  He was gently diuresed as well as started on antibiotics to treat the aspiration pneumonia.  He had 2D TTE completed 8/29/2024 that showed LVEF of 10%.  Severely dilated left ventricular cavity.  Grade 2 diastolic dysfunction.  Right ventricular cavity dilated and severely reduced right ventricular systolic function.  Left atrial cavity moderately to severely dilated.  Moderate aortic valve regurgitation.  Mild to moderate mitral valve regurgitation.  Mild to moderate tricuspid valve regurgitation.  RVSP is elevated at 46 mmHg.  Mildly dilated ascending aorta 3.9 cm.  There is additionally a small less than 1 cm circumferential pericardial effusion without evidence of cardiac tamponade  It was felt the dilated cardiomyopathy was due to alcohol use.    Today reports weights are stable.   Denies any leg swelling, orthopnea, PND, or new or worsening shortness of breath-but continue chronic shortness of breath and  CHAUDHRY.   Continued but stable exercise intolerance.         Review of Systems - Review of Systems   Constitutional: Negative for weight gain.   Cardiovascular:  Positive for dyspnea on exertion. Negative for chest pain, leg swelling, orthopnea, paroxysmal nocturnal dyspnea and syncope.   Respiratory:  Positive for shortness of breath. Negative for cough.    Gastrointestinal:  Negative for bloating.   Neurological:  Positive for weakness. Negative for dizziness and light-headedness.          Patient Active Problem List   Diagnosis    Cerebrovascular accident (CVA) due to embolism of right middle cerebral artery    Aspiration pneumonia    Acute on chronic combined systolic (congestive) and diastolic (congestive) heart failure    Essential hypertension    Stage 3a chronic kidney disease    Hyperlipidemia    Carotid artery stenosis, symptomatic, right    Tobacco abuse    Basal cell carcinoma (BCC) of skin of nose    Carotid stenosis, asymptomatic, left    Bilateral carotid artery stenosis    Pacemaker    Aspiration pneumonia    Dysphagia    Confusion    Dementia    History of CVA (cerebrovascular accident)    Left-sided weakness    Tobacco abuse    Metabolic encephalopathy    Chronic HFrEF (heart failure with reduced ejection fraction)    Cardiomyopathy, dilated     family history includes Heart disease in his father, mother, and sister.   reports that he quit smoking about 2 months ago. His smoking use included cigarettes. He has a 60 pack-year smoking history. He has never used smokeless tobacco. He reports that he does not drink alcohol and does not use drugs.  No Known Allergies  Physical Activity: Inactive (8/28/2024)    Exercise Vital Sign     Days of Exercise per Week: 0 days     Minutes of Exercise per Session: 0 min          Current Outpatient Medications:     atorvastatin (LIPITOR) 80 MG tablet, TAKE 1 TABLET BY MOUTH EVERY DAY AT NIGHT, Disp: 90 tablet, Rfl: 4    Cholecalciferol 25 MCG (1000 UT) tablet, Take 1  tablet by mouth Daily., Disp: , Rfl:     clopidogrel (PLAVIX) 75 MG tablet, TAKE 1 TABLET BY MOUTH EVERY DAY, Disp: 90 tablet, Rfl: 4    empagliflozin (Jardiance) 10 MG tablet tablet, Take 1 tablet by mouth Daily., Disp: 30 tablet, Rfl: 3    furosemide (LASIX) 20 MG tablet, Take 1 tablet by mouth. 2 tabs AM 1 tab PM, Disp: , Rfl:     lisinopril (PRINIVIL,ZESTRIL) 20 MG tablet, Take 1 tablet by mouth Daily., Disp: , Rfl:     metoprolol succinate XL (TOPROL-XL) 50 MG 24 hr tablet, Take 1 tablet by mouth Daily., Disp: 30 tablet, Rfl: 3    spironolactone (ALDACTONE) 25 MG tablet, Take 0.5 tablets by mouth Daily., Disp: 30 tablet, Rfl: 0    Objective   Vital Sign Review:   Vitals:    11/21/24 1316   BP: 104/58   Pulse: 56   SpO2: 98%         Body mass index is 21.57 kg/m².      11/21/24  1316   Weight: 64.3 kg (141 lb 12.8 oz)         Physical Exam:  Constitutional:       Appearance: Normal and healthy appearance. Frail. Chronically ill-appearing.   Neck:      Vascular: JVD normal.   Pulmonary:      Effort: Pulmonary effort is normal.      Breath sounds: Normal breath sounds.   Cardiovascular:      PMI at left midclavicular line. Normal rate. Regular rhythm.      Murmurs: There is a systolic murmur.   Pulses:     Radial: 2+ bilaterally.  Edema:     Peripheral edema absent.   Abdominal:      Palpations: Abdomen is soft.      Tenderness: There is no abdominal tenderness.   Skin:     General: Skin is warm and dry.   Neurological:      General: No focal deficit present.      Mental Status: Alert and oriented to person, place and time.   Psychiatric:         Behavior: Behavior is cooperative.          DATA REVIEWED:   EKG:  HEART RATE=78  bpm  RR Vrytlarl=963  ms  MN Lbaotdgh=330  ms  P Horizontal Axis=35  deg  P Front Axis=75  deg  QRSD Heullssh=654  ms  QT Vtbnstou=109  ms  PFgI=358  ms  QRS Axis=-65  deg  T Wave Axis=111  deg  - ABNORMAL ECG -  Ventricular-paced complexes  No further analysis attempted due to paced  rhythm  No change from previous tracing  Electronically Signed By: Chidi Song ([facility]) 2024-08-28 15:48:51  Date and Time of Study:2024-08-27 19:51:46    ---------------------------------------------------  ECHO:    Results for orders placed during the hospital encounter of 08/27/24    Adult Transthoracic Echo Complete W/ Cont if Necessary Per Protocol    Interpretation Summary    The left ventricular cavity is severely dilated.    Left ventricular systolic function is severely decreased. Estimated left ventricular EF = 10%    Left ventricular diastolic function is consistent with (grade II w/high LAP) pseudonormalization.    The right ventricular cavity is mildly dilated. Severely reduced right ventricular systolic function noted.    The left atrial cavity is moderate to severely dilated.    Moderate aortic valve regurgitation is present.    Mild to moderate mitral valve regurgitation is present.    Mild to moderate tricuspid valve regurgitation is present.    Calculated right ventricular systolic pressure from tricuspid regurgitation is 46 mmHg.    The ascending aorta is mildly dilated at 3.9 cm    There is a small (<1cm) circumferential pericardial effusion. There is no evidence of cardiac tamponade.          -----------------------------------------------------  RHC/LHC    No results found for this or any previous visit.      ---------------------------------------------------------------------------    CT:   No radiology results for the last 30 days.        --------------------------------------------------------------------------------------------------------------    Laboratory evaluations:  Renal Function: CrCl cannot be calculated (Patient's most recent lab result is older than the maximum 30 days allowed.).    Lab Results   Component Value Date    GLUCOSE 90 10/14/2024    BUN 18 10/14/2024    CREATININE 1.72 (H) 10/14/2024    BCR 10.5 10/14/2024    K 3.6 10/14/2024    CO2 29.1 (H) 10/14/2024    CALCIUM  "9.4 10/14/2024    ALBUMIN 3.8 08/27/2024    AST 25 08/27/2024    ALT 32 08/27/2024     Lab Results   Component Value Date    WBC 8.09 09/03/2024    HGB 12.8 (L) 09/03/2024    HCT 38.3 09/03/2024    MCV 92.7 09/03/2024     09/03/2024     Lab Results   Component Value Date    HGBA1C 5.20 10/29/2023     Lab Results   Component Value Date    HGBA1C 5.20 10/29/2023     Lab Results   Component Value Date    CREATININE 1.72 (H) 10/14/2024     No results found for: \"IRON\", \"TIBC\", \"FERRITIN\"    Result Review:  I have personally reviewed the results from the time of this admission to 11/21/2024 13:39 EST and agree with these findings:  [x]  Laboratory list / accordion  []  Microbiology  [x]  Radiology  [x]  EKG/Telemetry   [x]  Cardiology/Vascular   []  Pathology  [x]  Old records  []  Other:      PH Screening:  RVSP 46mmHg--PFTs deferred per pt request  Will have patient have follow-up with PCP      Cardiac Amyloid Screening Questions     Does the patient experience or have a diagnosis of any of the following:    HF: yes  Aortic stenosis: no  Afib: no  Heart block: yes  Peripheral neuropathy: no  Orthostatic hypotension: no  Renal dysfunction: yes  Carpal tunnel syndrome: no  Lumbar spinal stenosis: no  Proteinuria: no  Unexpected/unintentional weight loss: no  Frequent nausea/early satiety: no  Significant episodes of diarrhea/constipation: no    6 MINUTE WALK   UTO                   Sleep Evaluation:  Pt declines sleep study         ReDS VOLUMETRIC ASSESSMENT:  ReDs Vest    Performed by: Saba Mallory APRN  Authorized by: Saba Mallory APRN    ReDS value:  28  ReDS Value Description:  25-35 (green) = Optimal Volume Status        Assessment & Plan      1. Chronic HFrEF (heart failure with reduced ejection fraction)    2. Cardiomyopathy, dilated        1-2: HFrEF/dilated cardiomyopathy-patient is euvolemic today.  They would like medical management only, do not want any interventions or advanced heart " failure work-up/evaluation  We had started spironolactone 12.5mg daily at last visit, pt never had f/u labs completed after starting spironolactone, we will check BMP today  Blood pressure improved with changing BB to metoprolol. HR is at goal  GDMT listed below    Advance Care Planning   ACP discussion was held with the patient during this visit. Pt is DNR       The KCCQ-12.  score: 35.       NYHA stage C FC-III     Clinical status was assessed and has worsened.  Treatment intent: palliative   ReDS reading was obtained today.  ReDS result: 28       Today, Patient appears euvolemic. and with perfusion. The patient's hemodynamics are currently acceptable. HR is: normal and is at goal. BP/MAP was reviewed and there is  room for medication up-titration--however we will change carvedilol to metoprolol succinate.  The patient's clinical course has been impacted by renal function and hemodynamics. LVEF: 10%.     GDMT Assessment: The patient was placed on triple therapy.      GDMT changes recommended today:  no changes to medications      BB:   continue to metoprolol succinate  50mg daily  Monitor heart rate.  Please call the [facility] for HR<50, dizziness, lightheadedness, syncope    A/A/A:     continue lisinopril (Prinivil)   20mg daily  Occasional monitoring of Chem-7 is recommended; call for the development of a new cough or S/Sx angioedema    SGLT2-I:  continue Empagliflozin (Jardiance) 10 mg daily  Call for S/Sx genital mycotic infections  Do not take when inadequate oral intake, NPO, GI illness    MRA:  The patient is FC-NYHA Class III and MRA is indicated.   spironolactone 12.5mg daily  BMP checked today--if stable we will continue the current dose.         Diuretic Regimen:  -DIURETIC:   furosemide (LASIX) 40 mg two times daily  -Fluid restriction:   -requested  -2000 ml  -Patient has been asked to weigh daily and was provided with a printed diuretic strategy.  -Sodium restriction:   -1,500 mg Na restriction was  discussed.      Labs/Diagnostics/Referrals:    Labs -chem-7       Lifestyle Advice:   - call office if I gain more than 2 pounds in one day or 5 pounds in one week   - keep legs up while sitting   - use salt in moderation   - watch for swelling in feet, ankles and legs every day   - weigh myself daily   -call for concerning s/sx   -- activity or exercise based on tolerance encouraged     CODE STATUS: DNR             Return in about 3 months (around 2/21/2025).            Thank you for allowing me to participate in the care of your patient,         Saba Mallory, APRN  11/21/24  09:55 EDT      **Yehuda Disclaimer:**  Much of this encounter note is an electronic transcription/translation of spoken language to printed text. The electronic translation of spoken language may permit erroneous, or at times, nonsensical words or phrases to be inadvertently transcribed. Although I have reviewed the note for such errors, some may still exist.    The information in this note was reviewed and updated during the visit to be as accurate as possible. As many patients have chronic medical problems, many of their individual problems and ongoing plans do not change significantly from visit to visit.  This information is correct and is consistent with my treatment plan as of today's visit.

## 2024-11-21 NOTE — LETTER
November 21, 2024     Phong Gonzales MD  [address] [facility] [address]  [facility] [place] [facility] [address]    Patient: Tigre Amaral   YOB: 1950   Date of Visit: 11/21/2024     Dear Phong Gonzales MD:       Thank you for referring Tigre Amaral to me for evaluation. Below are the relevant portions of my assessment and plan of care.    If you have questions, please do not hesitate to call me. I look forward to following Tigre along with you.         Sincerely,        SUNG Lynn        CC: No Recipients    Saba Mallory APRN  11/21/24 1349  Signed    [facility]    Provider, [place] [address]    Thank you for asking me to see Tigre Amaral.     History of Present Illness    1.  HFrEF    Subjective     Tigre Amaralis a 74 y.o. male who presents today for HFrEF.  History of pacemaker/AICD, HTN, dementia, peripheral vascular disease, previous stroke, HLD, CKD, bilateral carotid artery stenosis  The patient had an echo 10/2023 LVEF less than 20%.  However did have normal left ventricular size. mild concentric LVH.  Abnormal septal wall motion consistent with right ventricular pacing diastolic function consistent with grade 2 pseudonormalization.  Left atrial cavity mildly dilated  Patient had recent acute decompensated heart failure hospitalization from 8/27/2024 to 9/3/2024.  He initially presented to the ER with complaints of shortness of breath, was admitted for acute exacerbation of heart failure and aspiration pneumonia as well as metabolic encephalopathy on baseline dementia.  He was gently diuresed as well as started on antibiotics to treat the aspiration pneumonia.  He had 2D TTE completed 8/29/2024 that showed LVEF of 10%.  Severely dilated left ventricular cavity.  Grade 2 diastolic dysfunction.  Right ventricular cavity dilated and severely reduced right ventricular systolic function.  Left atrial  cavity moderately to severely dilated.  Moderate aortic valve regurgitation.  Mild to moderate mitral valve regurgitation.  Mild to moderate tricuspid valve regurgitation.  RVSP is elevated at 46 mmHg.  Mildly dilated ascending aorta 3.9 cm.  There is additionally a small less than 1 cm circumferential pericardial effusion without evidence of cardiac tamponade  It was felt the dilated cardiomyopathy was due to alcohol use.    Today reports weights are stable.   Denies any leg swelling, orthopnea, PND, or new or worsening shortness of breath-but continue chronic shortness of breath and CHAUDHRY.   Continued but stable exercise intolerance.         Review of Systems - Review of Systems   Constitutional: Negative for weight gain.   Cardiovascular:  Positive for dyspnea on exertion. Negative for chest pain, leg swelling, orthopnea, paroxysmal nocturnal dyspnea and syncope.   Respiratory:  Positive for shortness of breath. Negative for cough.    Gastrointestinal:  Negative for bloating.   Neurological:  Positive for weakness. Negative for dizziness and light-headedness.          Patient Active Problem List   Diagnosis   • Cerebrovascular accident (CVA) due to embolism of right middle cerebral artery   • Aspiration pneumonia   • Acute on chronic combined systolic (congestive) and diastolic (congestive) heart failure   • Essential hypertension   • Stage 3a chronic kidney disease   • Hyperlipidemia   • Carotid artery stenosis, symptomatic, right   • Tobacco abuse   • Basal cell carcinoma (BCC) of skin of nose   • Carotid stenosis, asymptomatic, left   • Bilateral carotid artery stenosis   • Pacemaker   • Aspiration pneumonia   • Dysphagia   • Confusion   • Dementia   • History of CVA (cerebrovascular accident)   • Left-sided weakness   • Tobacco abuse   • Metabolic encephalopathy   • Chronic HFrEF (heart failure with reduced ejection fraction)   • Cardiomyopathy, dilated     family history includes Heart disease in his father,  mother, and sister.   reports that he quit smoking about 2 months ago. His smoking use included cigarettes. He has a 60 pack-year smoking history. He has never used smokeless tobacco. He reports that he does not drink alcohol and does not use drugs.  No Known Allergies  Physical Activity: Inactive (8/28/2024)    Exercise Vital Sign    • Days of Exercise per Week: 0 days    • Minutes of Exercise per Session: 0 min          Current Outpatient Medications:   •  atorvastatin (LIPITOR) 80 MG tablet, TAKE 1 TABLET BY MOUTH EVERY DAY AT NIGHT, Disp: 90 tablet, Rfl: 4  •  Cholecalciferol 25 MCG (1000 UT) tablet, Take 1 tablet by mouth Daily., Disp: , Rfl:   •  clopidogrel (PLAVIX) 75 MG tablet, TAKE 1 TABLET BY MOUTH EVERY DAY, Disp: 90 tablet, Rfl: 4  •  empagliflozin (Jardiance) 10 MG tablet tablet, Take 1 tablet by mouth Daily., Disp: 30 tablet, Rfl: 3  •  furosemide (LASIX) 20 MG tablet, Take 1 tablet by mouth. 2 tabs AM 1 tab PM, Disp: , Rfl:   •  lisinopril (PRINIVIL,ZESTRIL) 20 MG tablet, Take 1 tablet by mouth Daily., Disp: , Rfl:   •  metoprolol succinate XL (TOPROL-XL) 50 MG 24 hr tablet, Take 1 tablet by mouth Daily., Disp: 30 tablet, Rfl: 3  •  spironolactone (ALDACTONE) 25 MG tablet, Take 0.5 tablets by mouth Daily., Disp: 30 tablet, Rfl: 0    Objective  Vital Sign Review:   Vitals:    11/21/24 1316   BP: 104/58   Pulse: 56   SpO2: 98%         Body mass index is 21.57 kg/m².      11/21/24  1316   Weight: 64.3 kg (141 lb 12.8 oz)         Physical Exam:  Constitutional:       Appearance: Normal and healthy appearance. Frail. Chronically ill-appearing.   Neck:      Vascular: JVD normal.   Pulmonary:      Effort: Pulmonary effort is normal.      Breath sounds: Normal breath sounds.   Cardiovascular:      PMI at left midclavicular line. Normal rate. Regular rhythm.      Murmurs: There is a systolic murmur.   Pulses:     Radial: 2+ bilaterally.  Edema:     Peripheral edema absent.   Abdominal:      Palpations:  Abdomen is soft.      Tenderness: There is no abdominal tenderness.   Skin:     General: Skin is warm and dry.   Neurological:      General: No focal deficit present.      Mental Status: Alert and oriented to person, place and time.   Psychiatric:         Behavior: Behavior is cooperative.          DATA REVIEWED:   EKG:  HEART RATE=78  bpm  RR Drwapstz=318  ms  HI Mberftpe=930  ms  P Horizontal Axis=35  deg  P Front Axis=75  deg  QRSD Japetfla=624  ms  QT Fhturorm=431  ms  BBrD=809  ms  QRS Axis=-65  deg  T Wave Axis=111  deg  - ABNORMAL ECG -  Ventricular-paced complexes  No further analysis attempted due to paced rhythm  No change from previous tracing  Electronically Signed By: Chidi Song ([facility]) 2024-08-28 15:48:51  Date and Time of Study:2024-08-27 19:51:46    ---------------------------------------------------  ECHO:    Results for orders placed during the hospital encounter of 08/27/24    Adult Transthoracic Echo Complete W/ Cont if Necessary Per Protocol    Interpretation Summary  •  The left ventricular cavity is severely dilated.  •  Left ventricular systolic function is severely decreased. Estimated left ventricular EF = 10%  •  Left ventricular diastolic function is consistent with (grade II w/high LAP) pseudonormalization.  •  The right ventricular cavity is mildly dilated. Severely reduced right ventricular systolic function noted.  •  The left atrial cavity is moderate to severely dilated.  •  Moderate aortic valve regurgitation is present.  •  Mild to moderate mitral valve regurgitation is present.  •  Mild to moderate tricuspid valve regurgitation is present.  •  Calculated right ventricular systolic pressure from tricuspid regurgitation is 46 mmHg.  •  The ascending aorta is mildly dilated at 3.9 cm  •  There is a small (<1cm) circumferential pericardial effusion. There is no evidence of cardiac tamponade.          -----------------------------------------------------  RHC/LHC    No results  "found for this or any previous visit.      ---------------------------------------------------------------------------    CT:   No radiology results for the last 30 days.        --------------------------------------------------------------------------------------------------------------    Laboratory evaluations:  Renal Function: CrCl cannot be calculated (Patient's most recent lab result is older than the maximum 30 days allowed.).    Lab Results   Component Value Date    GLUCOSE 90 10/14/2024    BUN 18 10/14/2024    CREATININE 1.72 (H) 10/14/2024    BCR 10.5 10/14/2024    K 3.6 10/14/2024    CO2 29.1 (H) 10/14/2024    CALCIUM 9.4 10/14/2024    ALBUMIN 3.8 08/27/2024    AST 25 08/27/2024    ALT 32 08/27/2024     Lab Results   Component Value Date    WBC 8.09 09/03/2024    HGB 12.8 (L) 09/03/2024    HCT 38.3 09/03/2024    MCV 92.7 09/03/2024     09/03/2024     Lab Results   Component Value Date    HGBA1C 5.20 10/29/2023     Lab Results   Component Value Date    HGBA1C 5.20 10/29/2023     Lab Results   Component Value Date    CREATININE 1.72 (H) 10/14/2024     No results found for: \"IRON\", \"TIBC\", \"FERRITIN\"    Result Review:  I have personally reviewed the results from the time of this admission to 11/21/2024 13:39 EST and agree with these findings:  [x]  Laboratory list / accordion  []  Microbiology  [x]  Radiology  [x]  EKG/Telemetry   [x]  Cardiology/Vascular   []  Pathology  [x]  Old records  []  Other:      PH Screening:  RVSP 46mmHg--PFTs deferred per pt request  Will have patient have follow-up with PCP      Cardiac Amyloid Screening Questions     Does the patient experience or have a diagnosis of any of the following:    HF: yes  Aortic stenosis: no  Afib: no  Heart block: yes  Peripheral neuropathy: no  Orthostatic hypotension: no  Renal dysfunction: yes  Carpal tunnel syndrome: no  Lumbar spinal stenosis: no  Proteinuria: no  Unexpected/unintentional weight loss: no  Frequent nausea/early satiety: " no  Significant episodes of diarrhea/constipation: no    6 MINUTE WALK   UTO                   Sleep Evaluation:  Pt declines sleep study         ReDS VOLUMETRIC ASSESSMENT:  ReDs Vest    Performed by: Saba Mallory APRN  Authorized by: Saba Mallory APRN    ReDS value:  28  ReDS Value Description:  25-35 (green) = Optimal Volume Status        Assessment & Plan     1. Chronic HFrEF (heart failure with reduced ejection fraction)    2. Cardiomyopathy, dilated        1-2: HFrEF/dilated cardiomyopathy-patient is euvolemic today.  They would like medical management only, do not want any interventions or advanced heart failure work-up/evaluation  We had started spironolactone 12.5mg daily at last visit, pt never had f/u labs completed after starting spironolactone, we will check BMP today  Blood pressure improved with changing BB to metoprolol. HR is at goal  GDMT listed below    Advance Care Planning  ACP discussion was held with the patient during this visit. Pt is DNR       The KCCQ-12.  score: 35.       NYHA stage C FC-III     Clinical status was assessed and has worsened.  Treatment intent: palliative   ReDS reading was obtained today.  ReDS result: 28       Today, Patient appears euvolemic. and with perfusion. The patient's hemodynamics are currently acceptable. HR is: normal and is at goal. BP/MAP was reviewed and there is  room for medication up-titration--however we will change carvedilol to metoprolol succinate.  The patient's clinical course has been impacted by renal function and hemodynamics. LVEF: 10%.     GDMT Assessment: The patient was placed on triple therapy.      GDMT changes recommended today:  no changes to medications      BB:   continue to metoprolol succinate  50mg daily  Monitor heart rate.  Please call the [facility] for HR<50, dizziness, lightheadedness, syncope    A/A/A:     continue lisinopril (Prinivil)   20mg daily  Occasional monitoring of Chem-7 is recommended; call for the  development of a new cough or S/Sx angioedema    SGLT2-I:  continue Empagliflozin (Jardiance) 10 mg daily  Call for S/Sx genital mycotic infections  Do not take when inadequate oral intake, NPO, GI illness    MRA:  The patient is FC-NYHA Class III and MRA is indicated.   spironolactone 12.5mg daily  BMP checked today--if stable we will continue the current dose.         Diuretic Regimen:  -DIURETIC:   furosemide (LASIX) 40 mg two times daily  -Fluid restriction:   -requested  -2000 ml  -Patient has been asked to weigh daily and was provided with a printed diuretic strategy.  -Sodium restriction:   -1,500 mg Na restriction was discussed.      Labs/Diagnostics/Referrals:    Labs -chem-7       Lifestyle Advice:   - call office if I gain more than 2 pounds in one day or 5 pounds in one week   - keep legs up while sitting   - use salt in moderation   - watch for swelling in feet, ankles and legs every day   - weigh myself daily   -call for concerning s/sx   -- activity or exercise based on tolerance encouraged     CODE STATUS: DNR             Return in about 3 months (around 2/21/2025).            Thank you for allowing me to participate in the care of your patient,         Saba Mallory, APRN  11/21/24  09:55 EDT      **Yehuda Disclaimer:**  Much of this encounter note is an electronic transcription/translation of spoken language to printed text. The electronic translation of spoken language may permit erroneous, or at times, nonsensical words or phrases to be inadvertently transcribed. Although I have reviewed the note for such errors, some may still exist.    The information in this note was reviewed and updated during the visit to be as accurate as possible. As many patients have chronic medical problems, many of their individual problems and ongoing plans do not change significantly from visit to visit.  This information is correct and is consistent with my treatment plan as of today's visit.

## 2024-11-22 ENCOUNTER — TELEPHONE (OUTPATIENT)
Dept: CARDIOLOGY | Facility: HOSPITAL | Age: 74
End: 2024-11-22
Payer: COMMERCIAL

## 2024-11-22 NOTE — TELEPHONE ENCOUNTER
----- Message from Saba Mallory sent at 11/21/2024  4:50 PM EST -----  Please notify patient of results  Kidney function and electrolytes are stable  Continue all medications as prescribed

## 2024-12-12 RX ORDER — METOPROLOL SUCCINATE 50 MG/1
50 TABLET, EXTENDED RELEASE ORAL DAILY
Qty: 90 TABLET | Refills: 1 | Status: SHIPPED | OUTPATIENT
Start: 2024-12-12

## 2024-12-12 RX ORDER — SPIRONOLACTONE 25 MG/1
12.5 TABLET ORAL DAILY
Qty: 30 TABLET | Refills: 0 | Status: SHIPPED | OUTPATIENT
Start: 2024-12-12

## 2025-01-07 RX ORDER — EMPAGLIFLOZIN 10 MG/1
10 TABLET, FILM COATED ORAL DAILY
Qty: 30 TABLET | Refills: 3 | Status: SHIPPED | OUTPATIENT
Start: 2025-01-07

## 2025-03-04 ENCOUNTER — TELEPHONE (OUTPATIENT)
Dept: CARDIOLOGY | Facility: CLINIC | Age: 75
End: 2025-03-04
Payer: COMMERCIAL

## 2025-03-06 RX ORDER — SPIRONOLACTONE 25 MG/1
12.5 TABLET ORAL DAILY
Qty: 30 TABLET | Refills: 0 | OUTPATIENT
Start: 2025-03-06

## 2025-03-07 NOTE — TELEPHONE ENCOUNTER
Patient's daughter Rola called today. Patient is needing a refill on his Spironolactone. Please send to [facility] on file.  Patient now has F/U scheduled with Kanika for next week.    Thanks,  Shantal CRUZ [facility]

## 2025-03-10 RX ORDER — SPIRONOLACTONE 25 MG/1
12.5 TABLET ORAL DAILY
Qty: 15 TABLET | Refills: 2 | Status: SHIPPED | OUTPATIENT
Start: 2025-03-10

## 2025-03-12 ENCOUNTER — HOSPITAL ENCOUNTER (OUTPATIENT)
Dept: CARDIOLOGY | Facility: HOSPITAL | Age: 75
Discharge: HOME | End: 2025-03-12
Admitting: NURSE PRACTITIONER
Payer: COMMERCIAL

## 2025-03-12 VITALS
DIASTOLIC BLOOD PRESSURE: 54 MMHG | BODY MASS INDEX: 22.55 KG/M2 | HEIGHT: 68 IN | OXYGEN SATURATION: 99 % | HEART RATE: 71 BPM | SYSTOLIC BLOOD PRESSURE: 93 MMHG | WEIGHT: 148.8 LBS

## 2025-03-12 DIAGNOSIS — I50.22 CHRONIC HFREF (HEART FAILURE WITH REDUCED EJECTION FRACTION): Primary | ICD-10-CM

## 2025-03-12 LAB
ANION GAP SERPL CALCULATED.3IONS-SCNC: 9.7 MMOL/L (ref 5–15)
BUN SERPL-MCNC: 15 MG/DL (ref 8–23)
BUN/CREAT SERPL: 9.1 (ref 7–25)
CALCIUM SPEC-SCNC: 8.8 MG/DL (ref 8.6–10.5)
CHLORIDE SERPL-SCNC: 99 MMOL/L (ref 98–107)
CO2 SERPL-SCNC: 28.3 MMOL/L (ref 22–29)
CREAT SERPL-MCNC: 1.64 MG/DL (ref 0.76–1.27)
EGFRCR SERPLBLD CKD-EPI 2021: 43.1 ML/MIN/1.73
GLUCOSE SERPL-MCNC: 104 MG/DL (ref 65–99)
POTASSIUM SERPL-SCNC: 2.7 MMOL/L (ref 3.5–5.2)
SODIUM SERPL-SCNC: 137 MMOL/L (ref 136–145)

## 2025-03-12 PROCEDURE — 94726 PLETHYSMOGRAPHY LUNG VOLUMES: CPT | Performed by: NURSE PRACTITIONER

## 2025-03-12 PROCEDURE — 80048 BASIC METABOLIC PNL TOTAL CA: CPT | Performed by: NURSE PRACTITIONER

## 2025-03-12 PROCEDURE — G0463 HOSPITAL OUTPT CLINIC VISIT: HCPCS

## 2025-03-12 RX ORDER — SPIRONOLACTONE 25 MG/1
25 TABLET ORAL EVERY OTHER DAY
Qty: 45 TABLET | Refills: 3 | Status: SHIPPED | OUTPATIENT
Start: 2025-03-12

## 2025-03-12 NOTE — LETTER
March 18, 2025     Phong Gonzales MD  [address] [address]    Patient: Tigre Amaral   YOB: 1950   Date of Visit: 3/12/2025     Dear Phong Gonzales MD:       Thank you for referring Tigre Amaral to me for evaluation. Below are the relevant portions of my assessment and plan of care.    If you have questions, please do not hesitate to call me. I look forward to following Tigre along with you.         Sincerely,        SUNG Lynn        CC: No Recipients    Saba Mallory APRN  03/18/25 0818  Signed    [facility]    Provider, [place] [address]    Thank you for asking me to see Tigre Amaral.     Congestive Heart Failure  Associated symptoms include shortness of breath. Pertinent negatives include no chest pain.       1.  HFrEF    Subjective     Tigre Amaralis a 74 y.o. male who presents today for HFrEF.  History of pacemaker/AICD, HTN, dementia, peripheral vascular disease, previous stroke, HLD, CKD, bilateral carotid artery stenosis  The patient had an echo 10/2023 LVEF less than 20%.  However did have normal left ventricular size. mild concentric LVH.  Abnormal septal wall motion consistent with right ventricular pacing diastolic function consistent with grade 2 pseudonormalization.  Left atrial cavity mildly dilated  Patient had recent acute decompensated heart failure hospitalization from 8/27/2024 to 9/3/2024.  He initially presented to the ER with complaints of shortness of breath, was admitted for acute exacerbation of heart failure and aspiration pneumonia as well as metabolic encephalopathy on baseline dementia.  He was gently diuresed as well as started on antibiotics to treat the aspiration pneumonia.  He had 2D TTE completed 8/29/2024 that showed LVEF of 10%.  Severely dilated left ventricular cavity.  Grade 2 diastolic dysfunction.  Right ventricular  cavity dilated and severely reduced right ventricular systolic function.  Left atrial cavity moderately to severely dilated.  Moderate aortic valve regurgitation.  Mild to moderate mitral valve regurgitation.  Mild to moderate tricuspid valve regurgitation.  RVSP is elevated at 46 mmHg.  Mildly dilated ascending aorta 3.9 cm.  There is additionally a small less than 1 cm circumferential pericardial effusion without evidence of cardiac tamponade  It was felt the dilated cardiomyopathy was due to alcohol use.    Today reports weights are stable.   Denies any leg swelling, orthopnea, PND, or new or worsening shortness of breath-but continue chronic shortness of breath and CHAUDHRY.   Continued but stable exercise intolerance.         Review of Systems - Review of Systems   Constitutional: Negative for weight gain.   Cardiovascular:  Positive for dyspnea on exertion. Negative for chest pain, leg swelling, orthopnea, paroxysmal nocturnal dyspnea and syncope.   Respiratory:  Positive for shortness of breath. Negative for cough.    Gastrointestinal:  Negative for bloating.   Neurological:  Positive for weakness. Negative for dizziness and light-headedness.          Patient Active Problem List   Diagnosis   • Cerebrovascular accident (CVA) due to embolism of right middle cerebral artery   • Aspiration pneumonia   • Acute on chronic combined systolic (congestive) and diastolic (congestive) heart failure   • Essential hypertension   • Stage 3a chronic kidney disease   • Hyperlipidemia   • Carotid artery stenosis, symptomatic, right   • Tobacco abuse   • Basal cell carcinoma (BCC) of skin of nose   • Carotid stenosis, asymptomatic, left   • Bilateral carotid artery stenosis   • Pacemaker   • Aspiration pneumonia   • Dysphagia   • Confusion   • Dementia   • History of CVA (cerebrovascular accident)   • Left-sided weakness   • Tobacco abuse   • Metabolic encephalopathy   • Chronic HFrEF (heart failure with reduced ejection fraction)    • Cardiomyopathy, dilated     family history includes Heart disease in his father, mother, and sister.   reports that he quit smoking about 6 months ago. His smoking use included cigarettes. He has a 60 pack-year smoking history. He has never used smokeless tobacco. He reports that he does not drink alcohol and does not use drugs.  No Known Allergies  Physical Activity: Inactive (8/28/2024)    Exercise Vital Sign    • Days of Exercise per Week: 0 days    • Minutes of Exercise per Session: 0 min          Current Outpatient Medications:   •  atorvastatin (LIPITOR) 80 MG tablet, TAKE 1 TABLET BY MOUTH EVERY DAY AT NIGHT, Disp: 90 tablet, Rfl: 4  •  Cholecalciferol 25 MCG (1000 UT) tablet, Take 1 tablet by mouth Daily., Disp: , Rfl:   •  clopidogrel (PLAVIX) 75 MG tablet, TAKE 1 TABLET BY MOUTH EVERY DAY, Disp: 90 tablet, Rfl: 4  •  furosemide (LASIX) 20 MG tablet, Take 1 tablet by mouth. 2 tabs AM 1 tab PM, Disp: , Rfl:   •  Jardiance 10 MG tablet tablet, TAKE 1 TABLET BY MOUTH EVERY DAY, Disp: 30 tablet, Rfl: 3  •  lisinopril (PRINIVIL,ZESTRIL) 20 MG tablet, Take 1 tablet by mouth Daily., Disp: , Rfl:   •  metoprolol succinate XL (TOPROL-XL) 50 MG 24 hr tablet, TAKE 1 TABLET BY MOUTH EVERY DAY, Disp: 90 tablet, Rfl: 1  •  spironolactone (ALDACTONE) 25 MG tablet, Take 1 tablet by mouth Every Other Day., Disp: 45 tablet, Rfl: 3  •  potassium chloride (KLOR-CON M20) 20 MEQ CR tablet, Take 2 tablets by mouth 2 (Two) Times a Day., Disp: 120 tablet, Rfl: 3    Objective  Vital Sign Review:   Vitals:    03/12/25 1306   BP: 93/54   Pulse: 71   SpO2: 99%           Body mass index is 22.63 kg/m².      03/12/25  1306   Weight: 67.5 kg (148 lb 12.8 oz)         Physical Exam:  Constitutional:       Appearance: Normal and healthy appearance. Frail. Chronically ill-appearing.   Neck:      Vascular: JVD normal.   Pulmonary:      Effort: Pulmonary effort is normal.      Breath sounds: Normal breath sounds.   Cardiovascular:      PMI  at left midclavicular line. Normal rate. Regular rhythm.      Murmurs: There is a systolic murmur.   Pulses:     Radial: 2+ bilaterally.  Edema:     Peripheral edema absent.   Abdominal:      Palpations: Abdomen is soft.      Tenderness: There is no abdominal tenderness.   Skin:     General: Skin is warm and dry.   Neurological:      General: No focal deficit present.      Mental Status: Alert and oriented to person, place and time.   Psychiatric:         Behavior: Behavior is cooperative.          DATA REVIEWED:   EKG:  HEART RATE=78  bpm  RR Etrqwtbr=148  ms  WA Nbahfuwc=836  ms  P Horizontal Axis=35  deg  P Front Axis=75  deg  QRSD Irrhdtfx=641  ms  QT Pqpyjkkp=038  ms  RDhV=464  ms  QRS Axis=-65  deg  T Wave Axis=111  deg  - ABNORMAL ECG -  Ventricular-paced complexes  No further analysis attempted due to paced rhythm  No change from previous tracing  Electronically Signed By: Chidi Song ([facility]) 2024-08-28 15:48:51  Date and Time of Study:2024-08-27 19:51:46    ---------------------------------------------------  ECHO:    Results for orders placed during the hospital encounter of 08/27/24    Adult Transthoracic Echo Complete W/ Cont if Necessary Per Protocol    Interpretation Summary  •  The left ventricular cavity is severely dilated.  •  Left ventricular systolic function is severely decreased. Estimated left ventricular EF = 10%  •  Left ventricular diastolic function is consistent with (grade II w/high LAP) pseudonormalization.  •  The right ventricular cavity is mildly dilated. Severely reduced right ventricular systolic function noted.  •  The left atrial cavity is moderate to severely dilated.  •  Moderate aortic valve regurgitation is present.  •  Mild to moderate mitral valve regurgitation is present.  •  Mild to moderate tricuspid valve regurgitation is present.  •  Calculated right ventricular systolic pressure from tricuspid regurgitation is 46 mmHg.  •  The ascending aorta is mildly dilated at  "3.9 cm  •  There is a small (<1cm) circumferential pericardial effusion. There is no evidence of cardiac tamponade.          -----------------------------------------------------  RHC/LHC    No results found for this or any previous visit.      ---------------------------------------------------------------------------    CT:   No radiology results for the last 30 days.        --------------------------------------------------------------------------------------------------------------    Laboratory evaluations:  Renal Function: Estimated Creatinine Clearance: 36.6 mL/min (A) (by C-G formula based on SCr of 1.64 mg/dL (H)).    Lab Results   Component Value Date    GLUCOSE 104 (H) 03/12/2025    BUN 15 03/12/2025    CREATININE 1.64 (H) 03/12/2025    BCR 9.1 03/12/2025    K 2.7 (L) 03/12/2025    CO2 28.3 03/12/2025    CALCIUM 8.8 03/12/2025    ALBUMIN 3.8 08/27/2024    AST 25 08/27/2024    ALT 32 08/27/2024     Lab Results   Component Value Date    WBC 8.09 09/03/2024    HGB 12.8 (L) 09/03/2024    HCT 38.3 09/03/2024    MCV 92.7 09/03/2024     09/03/2024     Lab Results   Component Value Date    HGBA1C 5.20 10/29/2023     Lab Results   Component Value Date    HGBA1C 5.20 10/29/2023     Lab Results   Component Value Date    CREATININE 1.64 (H) 03/12/2025     No results found for: \"IRON\", \"TIBC\", \"FERRITIN\"    Result Review:  I have personally reviewed the results from the time of this admission to 3/18/2025 08:13 EDT and agree with these findings:  [x]  Laboratory list / accordion  []  Microbiology  [x]  Radiology  [x]  EKG/Telemetry   [x]  Cardiology/Vascular   []  Pathology  [x]  Old records  []  Other:      PH Screening:  RVSP 46mmHg--PFTs deferred per pt request  Will have patient have follow-up with PCP      Cardiac Amyloid Screening Questions     Does the patient experience or have a diagnosis of any of the following:    HF: yes  Aortic stenosis: no  Afib: no  Heart block: yes  Peripheral neuropathy: " no  Orthostatic hypotension: no  Renal dysfunction: yes  Carpal tunnel syndrome: no  Lumbar spinal stenosis: no  Proteinuria: no  Unexpected/unintentional weight loss: no  Frequent nausea/early satiety: no  Significant episodes of diarrhea/constipation: no    6 MINUTE WALK   UTO                   Sleep Evaluation:  Pt declines sleep study         ReDS VOLUMETRIC ASSESSMENT:  ReDs Vest    Performed by: Saba Mallory APRN  Authorized by: Saba Mallory APRN    ReDS value:  27  ReDS Value Description:  25-35 (green) = Optimal Volume Status        Assessment & Plan     1. Chronic HFrEF (heart failure with reduced ejection fraction)      1. HFrEF/dilated cardiomyopathy-LVEF 10% from 2D TTE 8/29/2024-patient is euvolemic today.  They would like medical management only, do not want any interventions or advanced heart failure work-up/evaluation  Labs reviewed from today kidney function is stable, but potassium is low.   We will need to have him start taking a potassium supplement 40MEQ twice daily,  prescription to the pharmacy.   We will plan to recheck this level in 2 weeks.   GDMT listed below    Advance Care Planning  ACP discussion was held with the patient during this visit. Pt is DNR       The KCCQ-12.  score: 35.       NYHA stage C FC-III     Clinical status was assessed and has worsened.  Treatment intent: palliative   ReDS reading was obtained today.  ReDS result: 27       Today, Patient appears euvolemic. and with perfusion. The patient's hemodynamics are currently acceptable. HR is: normal and is at goal. BP/MAP was reviewed and there is  room for medication up-titration--however we will change carvedilol to metoprolol succinate.  The patient's clinical course has been impacted by renal function and hemodynamics. LVEF: 10%.     GDMT Assessment: The patient was placed on quadruple therapy.      GDMT changes recommended today:  no changes to medications      BB:   continue to metoprolol succinate   50mg daily  Monitor heart rate.  Please call the [facility] for HR<50, dizziness, lightheadedness, syncope    A/A/A:     continue lisinopril (Prinivil)   20mg daily  Occasional monitoring of Chem-7 is recommended; call for the development of a new cough or S/Sx angioedema    SGLT2-I:  continue Empagliflozin (Jardiance) 10 mg daily  Call for S/Sx genital mycotic infections  Do not take when inadequate oral intake, NPO, GI illness    MRA:  The patient is FC-NYHA Class III and MRA is indicated.   spironolactone 12.5mg daily--having the tablets were becoming difficult for the patient/family therefore we will change spironolactone to 25 mg every other day  BMP today      Diuretic Regimen:  -DIURETIC:   Continue Lasix 40 mg in the morning, 20 mg in the afternoon  -Fluid restriction:   -requested  -2000 ml  -Patient has been asked to weigh daily and was provided with a printed diuretic strategy.  -Sodium restriction:   -1,500 mg Na restriction was discussed.      Labs/Diagnostics/Referrals:    Labs -chem-7       Lifestyle Advice:   - call office if I gain more than 2 pounds in one day or 5 pounds in one week   - keep legs up while sitting   - use salt in moderation   - watch for swelling in feet, ankles and legs every day   - weigh myself daily   -call for concerning s/sx   -- activity or exercise based on tolerance encouraged     CODE STATUS: DNR             Return in about 3 months (around 6/12/2025).            Thank you for allowing me to participate in the care of your patient,         Saba Mallory, APRN  03/18/25  09:55 EDT      **Yehuda Disclaimer:**  Much of this encounter note is an electronic transcription/translation of spoken language to printed text. The electronic translation of spoken language may permit erroneous, or at times, nonsensical words or phrases to be inadvertently transcribed. Although I have reviewed the note for such errors, some may still exist.    The information in this note was reviewed and  updated during the visit to be as accurate as possible. As many patients have chronic medical problems, many of their individual problems and ongoing plans do not change significantly from visit to visit.  This information is correct and is consistent with my treatment plan as of today's visit.

## 2025-03-12 NOTE — PROGRESS NOTES
[facility]    Provider, [place] [address]    Thank you for asking me to see Tigre Amaral.     Congestive Heart Failure  Associated symptoms include shortness of breath. Pertinent negatives include no chest pain.       1.  HFrEF    Subjective      Tigre Allen a 74 y.o. male who presents today for HFrEF.  History of pacemaker/AICD, HTN, dementia, peripheral vascular disease, previous stroke, HLD, CKD, bilateral carotid artery stenosis  The patient had an echo 10/2023 LVEF less than 20%.  However did have normal left ventricular size. mild concentric LVH.  Abnormal septal wall motion consistent with right ventricular pacing diastolic function consistent with grade 2 pseudonormalization.  Left atrial cavity mildly dilated  Patient had recent acute decompensated heart failure hospitalization from 8/27/2024 to 9/3/2024.  He initially presented to the ER with complaints of shortness of breath, was admitted for acute exacerbation of heart failure and aspiration pneumonia as well as metabolic encephalopathy on baseline dementia.  He was gently diuresed as well as started on antibiotics to treat the aspiration pneumonia.  He had 2D TTE completed 8/29/2024 that showed LVEF of 10%.  Severely dilated left ventricular cavity.  Grade 2 diastolic dysfunction.  Right ventricular cavity dilated and severely reduced right ventricular systolic function.  Left atrial cavity moderately to severely dilated.  Moderate aortic valve regurgitation.  Mild to moderate mitral valve regurgitation.  Mild to moderate tricuspid valve regurgitation.  RVSP is elevated at 46 mmHg.  Mildly dilated ascending aorta 3.9 cm.  There is additionally a small less than 1 cm circumferential pericardial effusion without evidence of cardiac tamponade  It was felt the dilated cardiomyopathy was due to alcohol use.    Today reports weights are stable.   Denies any leg swelling,  orthopnea, PND, or new or worsening shortness of breath-but continue chronic shortness of breath and CHAUDHRY.   Continued but stable exercise intolerance.         Review of Systems - Review of Systems   Constitutional: Negative for weight gain.   Cardiovascular:  Positive for dyspnea on exertion. Negative for chest pain, leg swelling, orthopnea, paroxysmal nocturnal dyspnea and syncope.   Respiratory:  Positive for shortness of breath. Negative for cough.    Gastrointestinal:  Negative for bloating.   Neurological:  Positive for weakness. Negative for dizziness and light-headedness.          Patient Active Problem List   Diagnosis    Cerebrovascular accident (CVA) due to embolism of right middle cerebral artery    Aspiration pneumonia    Acute on chronic combined systolic (congestive) and diastolic (congestive) heart failure    Essential hypertension    Stage 3a chronic kidney disease    Hyperlipidemia    Carotid artery stenosis, symptomatic, right    Tobacco abuse    Basal cell carcinoma (BCC) of skin of nose    Carotid stenosis, asymptomatic, left    Bilateral carotid artery stenosis    Pacemaker    Aspiration pneumonia    Dysphagia    Confusion    Dementia    History of CVA (cerebrovascular accident)    Left-sided weakness    Tobacco abuse    Metabolic encephalopathy    Chronic HFrEF (heart failure with reduced ejection fraction)    Cardiomyopathy, dilated     family history includes Heart disease in his father, mother, and sister.   reports that he quit smoking about 6 months ago. His smoking use included cigarettes. He has a 60 pack-year smoking history. He has never used smokeless tobacco. He reports that he does not drink alcohol and does not use drugs.  No Known Allergies  Physical Activity: Inactive (8/28/2024)    Exercise Vital Sign     Days of Exercise per Week: 0 days     Minutes of Exercise per Session: 0 min          Current Outpatient Medications:     atorvastatin (LIPITOR) 80 MG tablet, TAKE 1 TABLET BY  MOUTH EVERY DAY AT NIGHT, Disp: 90 tablet, Rfl: 4    Cholecalciferol 25 MCG (1000 UT) tablet, Take 1 tablet by mouth Daily., Disp: , Rfl:     clopidogrel (PLAVIX) 75 MG tablet, TAKE 1 TABLET BY MOUTH EVERY DAY, Disp: 90 tablet, Rfl: 4    furosemide (LASIX) 20 MG tablet, Take 1 tablet by mouth. 2 tabs AM 1 tab PM, Disp: , Rfl:     Jardiance 10 MG tablet tablet, TAKE 1 TABLET BY MOUTH EVERY DAY, Disp: 30 tablet, Rfl: 3    lisinopril (PRINIVIL,ZESTRIL) 20 MG tablet, Take 1 tablet by mouth Daily., Disp: , Rfl:     metoprolol succinate XL (TOPROL-XL) 50 MG 24 hr tablet, TAKE 1 TABLET BY MOUTH EVERY DAY, Disp: 90 tablet, Rfl: 1    spironolactone (ALDACTONE) 25 MG tablet, Take 0.5 tablets by mouth Daily., Disp: 15 tablet, Rfl: 2    Objective   Vital Sign Review:   There were no vitals filed for this visit.        Body mass index is 22.63 kg/m².      03/12/25  1306   Weight: 67.5 kg (148 lb 12.8 oz)         Physical Exam:  Constitutional:       Appearance: Normal and healthy appearance. Frail. Chronically ill-appearing.   Neck:      Vascular: JVD normal.   Pulmonary:      Effort: Pulmonary effort is normal.      Breath sounds: Normal breath sounds.   Cardiovascular:      PMI at left midclavicular line. Normal rate. Regular rhythm.      Murmurs: There is a systolic murmur.   Pulses:     Radial: 2+ bilaterally.  Edema:     Peripheral edema absent.   Abdominal:      Palpations: Abdomen is soft.      Tenderness: There is no abdominal tenderness.   Skin:     General: Skin is warm and dry.   Neurological:      General: No focal deficit present.      Mental Status: Alert and oriented to person, place and time.   Psychiatric:         Behavior: Behavior is cooperative.          DATA REVIEWED:   EKG:  HEART RATE=78  bpm  RR Wzighhxi=344  ms  AZ Hdcfwymv=888  ms  P Horizontal Axis=35  deg  P Front Axis=75  deg  QRSD Bxtasqyn=111  ms  QT Xbjvsdeh=550  ms  NEwL=567  ms  QRS Axis=-65  deg  T Wave Axis=111  deg  - ABNORMAL ECG  -  Ventricular-paced complexes  No further analysis attempted due to paced rhythm  No change from previous tracing  Electronically Signed By: Chidi Song ([facility]) 2024-08-28 15:48:51  Date and Time of Study:2024-08-27 19:51:46    ---------------------------------------------------  ECHO:    Results for orders placed during the hospital encounter of 08/27/24    Adult Transthoracic Echo Complete W/ Cont if Necessary Per Protocol    Interpretation Summary    The left ventricular cavity is severely dilated.    Left ventricular systolic function is severely decreased. Estimated left ventricular EF = 10%    Left ventricular diastolic function is consistent with (grade II w/high LAP) pseudonormalization.    The right ventricular cavity is mildly dilated. Severely reduced right ventricular systolic function noted.    The left atrial cavity is moderate to severely dilated.    Moderate aortic valve regurgitation is present.    Mild to moderate mitral valve regurgitation is present.    Mild to moderate tricuspid valve regurgitation is present.    Calculated right ventricular systolic pressure from tricuspid regurgitation is 46 mmHg.    The ascending aorta is mildly dilated at 3.9 cm    There is a small (<1cm) circumferential pericardial effusion. There is no evidence of cardiac tamponade.          -----------------------------------------------------  RHC/LHC    No results found for this or any previous visit.      ---------------------------------------------------------------------------    CT:   No radiology results for the last 30 days.        --------------------------------------------------------------------------------------------------------------    Laboratory evaluations:  Renal Function: CrCl cannot be calculated (Patient's most recent lab result is older than the maximum 30 days allowed.).    Lab Results   Component Value Date    GLUCOSE 97 11/21/2024    BUN 21 11/21/2024    CREATININE 1.53 (H) 11/21/2024    BCR  "13.7 11/21/2024    K 4.0 11/21/2024    CO2 28.4 11/21/2024    CALCIUM 9.5 11/21/2024    ALBUMIN 3.8 08/27/2024    AST 25 08/27/2024    ALT 32 08/27/2024     Lab Results   Component Value Date    WBC 8.09 09/03/2024    HGB 12.8 (L) 09/03/2024    HCT 38.3 09/03/2024    MCV 92.7 09/03/2024     09/03/2024     Lab Results   Component Value Date    HGBA1C 5.20 10/29/2023     Lab Results   Component Value Date    HGBA1C 5.20 10/29/2023     Lab Results   Component Value Date    CREATININE 1.53 (H) 11/21/2024     No results found for: \"IRON\", \"TIBC\", \"FERRITIN\"    Result Review:  I have personally reviewed the results from the time of this admission to 3/12/2025 13:16 EDT and agree with these findings:  [x]  Laboratory list / accordion  []  Microbiology  [x]  Radiology  [x]  EKG/Telemetry   [x]  Cardiology/Vascular   []  Pathology  [x]  Old records  []  Other:      PH Screening:  RVSP 46mmHg--PFTs deferred per pt request  Will have patient have follow-up with PCP      Cardiac Amyloid Screening Questions     Does the patient experience or have a diagnosis of any of the following:    HF: yes  Aortic stenosis: no  Afib: no  Heart block: yes  Peripheral neuropathy: no  Orthostatic hypotension: no  Renal dysfunction: yes  Carpal tunnel syndrome: no  Lumbar spinal stenosis: no  Proteinuria: no  Unexpected/unintentional weight loss: no  Frequent nausea/early satiety: no  Significant episodes of diarrhea/constipation: no    6 MINUTE WALK   UTO                   Sleep Evaluation:  Pt declines sleep study         ReDS VOLUMETRIC ASSESSMENT:  ReDs Vest    Performed by: Saba Mallory APRN  Authorized by: Saba Mallory APRN    ReDS value:  27  ReDS Value Description:  25-35 (green) = Optimal Volume Status        Assessment & Plan      No diagnosis found.      1-2: HFrEF/dilated cardiomyopathy-patient is euvolemic today.  They would like medical management only, do not want any interventions or advanced heart " failure work-up/evaluation  We had started spironolactone 12.5mg daily at last visit, pt never had f/u labs completed after starting spironolactone, we will check BMP today  Blood pressure improved with changing BB to metoprolol. HR is at goal  GDMT listed below    Advance Care Planning   ACP discussion was held with the patient during this visit. Pt is DNR       The KCCQ-12.  score: 35.       NYHA stage C FC-III     Clinical status was assessed and has worsened.  Treatment intent: palliative   ReDS reading was obtained today.  ReDS result: 27       Today, Patient appears euvolemic. and with perfusion. The patient's hemodynamics are currently acceptable. HR is: normal and is at goal. BP/MAP was reviewed and there is  room for medication up-titration--however we will change carvedilol to metoprolol succinate.  The patient's clinical course has been impacted by renal function and hemodynamics. LVEF: 10%.     GDMT Assessment: The patient was placed on triple therapy.      GDMT changes recommended today:  no changes to medications      BB:   continue to metoprolol succinate  50mg daily  Monitor heart rate.  Please call the [facility] for HR<50, dizziness, lightheadedness, syncope    A/A/A:     continue lisinopril (Prinivil)   20mg daily  Occasional monitoring of Chem-7 is recommended; call for the development of a new cough or S/Sx angioedema    SGLT2-I:  continue Empagliflozin (Jardiance) 10 mg daily  Call for S/Sx genital mycotic infections  Do not take when inadequate oral intake, NPO, GI illness    MRA:  The patient is FC-NYHA Class III and MRA is indicated.   spironolactone 12.5mg daily  BMP checked today--if stable we will continue the current dose.         Diuretic Regimen:  -DIURETIC:   furosemide (LASIX) 40 mg two times daily  -Fluid restriction:   -requested  -2000 ml  -Patient has been asked to weigh daily and was provided with a printed diuretic strategy.  -Sodium restriction:   -1,500 mg Na restriction was  discussed.      Labs/Diagnostics/Referrals:    Labs -chem-7       Lifestyle Advice:   - call office if I gain more than 2 pounds in one day or 5 pounds in one week   - keep legs up while sitting   - use salt in moderation   - watch for swelling in feet, ankles and legs every day   - weigh myself daily   -call for concerning s/sx   -- activity or exercise based on tolerance encouraged     CODE STATUS: DNR             No follow-ups on file.            Thank you for allowing me to participate in the care of your patient,         Saba Mallory, APRN  03/12/25  09:55 EDT      **Yehuda Disclaimer:**  Much of this encounter note is an electronic transcription/translation of spoken language to printed text. The electronic translation of spoken language may permit erroneous, or at times, nonsensical words or phrases to be inadvertently transcribed. Although I have reviewed the note for such errors, some may still exist.    The information in this note was reviewed and updated during the visit to be as accurate as possible. As many patients have chronic medical problems, many of their individual problems and ongoing plans do not change significantly from visit to visit.  This information is correct and is consistent with my treatment plan as of today's visit.

## 2025-03-13 ENCOUNTER — RESULTS FOLLOW-UP (OUTPATIENT)
Dept: CARDIOLOGY | Facility: HOSPITAL | Age: 75
End: 2025-03-13
Payer: COMMERCIAL

## 2025-03-13 RX ORDER — POTASSIUM CHLORIDE 1500 MG/1
40 TABLET, EXTENDED RELEASE ORAL 2 TIMES DAILY
Qty: 120 TABLET | Refills: 3 | Status: SHIPPED | OUTPATIENT
Start: 2025-03-13

## 2025-03-13 NOTE — TELEPHONE ENCOUNTER
Called spoke with Rola (daughter) informed of lab results and instructions Rola verbalized understanding of instructions. Will call us back if needs lab order faxed.

## 2025-04-03 ENCOUNTER — TELEPHONE (OUTPATIENT)
Dept: NEUROSURGERY | Facility: CLINIC | Age: 75
End: 2025-04-03
Payer: COMMERCIAL

## 2025-04-03 DIAGNOSIS — I50.22 CHRONIC HFREF (HEART FAILURE WITH REDUCED EJECTION FRACTION): Primary | ICD-10-CM

## 2025-04-03 NOTE — TELEPHONE ENCOUNTER
Called patients daughter to see if she was able to set up imaging for her father before his upcoming appointment on 4- with Dr. Spann. I left her a message on her voicemail to parag us back at the office to let us know.      OK for HUB to read and give answer to message and to move appointment out if imaging can not be obtained by 4-8-2025

## 2025-04-04 ENCOUNTER — TELEPHONE (OUTPATIENT)
Dept: CARDIOLOGY | Facility: HOSPITAL | Age: 75
End: 2025-04-04
Payer: COMMERCIAL

## 2025-04-04 NOTE — TELEPHONE ENCOUNTER
----- Message from Saba Mallory sent at 4/3/2025  8:45 AM EDT -----  He was supposed to have repeat labs done after starting potassium supplement. His potassium was 2.7, started on 40meq BID. I did not see the order in there, so I placed the order for the BMP.

## 2025-04-10 RX ORDER — POTASSIUM CHLORIDE 1.5 G/1.58G
40 POWDER, FOR SOLUTION ORAL 2 TIMES DAILY
Qty: 120 PACKET | Refills: 3 | Status: SHIPPED | OUTPATIENT
Start: 2025-04-10

## 2025-05-19 RX ORDER — EMPAGLIFLOZIN 10 MG/1
10 TABLET, FILM COATED ORAL DAILY
Qty: 30 TABLET | Refills: 3 | Status: SHIPPED | OUTPATIENT
Start: 2025-05-19

## 2025-06-11 RX ORDER — METOPROLOL SUCCINATE 50 MG/1
50 TABLET, EXTENDED RELEASE ORAL DAILY
Qty: 90 TABLET | Refills: 1 | Status: SHIPPED | OUTPATIENT
Start: 2025-06-11

## 2025-07-17 RX ORDER — POTASSIUM CHLORIDE 1.5 G/1
POWDER, FOR SOLUTION ORAL
Qty: 360 PACKET | Refills: 0 | OUTPATIENT
Start: 2025-07-17